# Patient Record
Sex: FEMALE | Race: WHITE | Employment: UNEMPLOYED | URBAN - METROPOLITAN AREA
[De-identification: names, ages, dates, MRNs, and addresses within clinical notes are randomized per-mention and may not be internally consistent; named-entity substitution may affect disease eponyms.]

---

## 2017-08-03 ENCOUNTER — HOSPITAL ENCOUNTER (INPATIENT)
Age: 42
LOS: 4 days | Discharge: NURSING, REHAB OR LONG-TERM CARE | DRG: 065 | End: 2017-08-08
Attending: EMERGENCY MEDICINE | Admitting: INTERNAL MEDICINE
Payer: COMMERCIAL

## 2017-08-03 ENCOUNTER — APPOINTMENT (OUTPATIENT)
Dept: GENERAL RADIOLOGY | Age: 42
DRG: 065 | End: 2017-08-03
Attending: EMERGENCY MEDICINE
Payer: COMMERCIAL

## 2017-08-03 ENCOUNTER — APPOINTMENT (OUTPATIENT)
Dept: CT IMAGING | Age: 42
DRG: 065 | End: 2017-08-03
Attending: EMERGENCY MEDICINE
Payer: COMMERCIAL

## 2017-08-03 DIAGNOSIS — I63.81 LEFT SIDED LACUNAR STROKE (HCC): ICD-10-CM

## 2017-08-03 DIAGNOSIS — F41.1 GENERALIZED ANXIETY DISORDER: ICD-10-CM

## 2017-08-03 DIAGNOSIS — I34.1 MITRAL VALVE PROLAPSE: ICD-10-CM

## 2017-08-03 DIAGNOSIS — D50.9 IRON DEFICIENCY ANEMIA, UNSPECIFIED IRON DEFICIENCY ANEMIA TYPE: ICD-10-CM

## 2017-08-03 DIAGNOSIS — R73.03 PREDIABETES: ICD-10-CM

## 2017-08-03 DIAGNOSIS — G45.9 TRANSIENT CEREBRAL ISCHEMIA, UNSPECIFIED TYPE: Primary | ICD-10-CM

## 2017-08-03 DIAGNOSIS — R07.9 CHEST PAIN, UNSPECIFIED TYPE: ICD-10-CM

## 2017-08-03 DIAGNOSIS — I10 ESSENTIAL HYPERTENSION: ICD-10-CM

## 2017-08-03 DIAGNOSIS — R53.1 WEAKNESS OF RIGHT SIDE OF BODY: ICD-10-CM

## 2017-08-03 DIAGNOSIS — G47.00 INSOMNIA, UNSPECIFIED: ICD-10-CM

## 2017-08-03 DIAGNOSIS — E66.9 OBESITY (BMI 30-39.9): ICD-10-CM

## 2017-08-03 LAB
ALBUMIN SERPL BCP-MCNC: 3.4 G/DL (ref 3.5–5)
ALBUMIN/GLOB SERPL: 0.8 {RATIO} (ref 1.2–3.5)
ALP SERPL-CCNC: 115 U/L (ref 50–136)
ALT SERPL-CCNC: 29 U/L (ref 12–65)
ANION GAP BLD CALC-SCNC: 10 MMOL/L (ref 7–16)
AST SERPL W P-5'-P-CCNC: 20 U/L (ref 15–37)
ATRIAL RATE: 86 BPM
BASOPHILS # BLD AUTO: 0 K/UL (ref 0–0.2)
BASOPHILS # BLD: 1 % (ref 0–2)
BILIRUB SERPL-MCNC: 0.2 MG/DL (ref 0.2–1.1)
BUN SERPL-MCNC: 12 MG/DL (ref 6–23)
CALCIUM SERPL-MCNC: 9 MG/DL (ref 8.3–10.4)
CALCULATED P AXIS, ECG09: 55 DEGREES
CALCULATED R AXIS, ECG10: 74 DEGREES
CALCULATED T AXIS, ECG11: 52 DEGREES
CHLORIDE SERPL-SCNC: 105 MMOL/L (ref 98–107)
CO2 SERPL-SCNC: 26 MMOL/L (ref 21–32)
CREAT SERPL-MCNC: 0.72 MG/DL (ref 0.6–1)
DIAGNOSIS, 93000: NORMAL
DIFFERENTIAL METHOD BLD: ABNORMAL
EOSINOPHIL # BLD: 0.2 K/UL (ref 0–0.8)
EOSINOPHIL NFR BLD: 2 % (ref 0.5–7.8)
ERYTHROCYTE [DISTWIDTH] IN BLOOD BY AUTOMATED COUNT: 16.4 % (ref 11.9–14.6)
GLOBULIN SER CALC-MCNC: 4.3 G/DL (ref 2.3–3.5)
GLUCOSE SERPL-MCNC: 108 MG/DL (ref 65–100)
HCT VFR BLD AUTO: 34.1 % (ref 35.8–46.3)
HGB BLD-MCNC: 10.4 G/DL (ref 11.7–15.4)
IMM GRANULOCYTES # BLD: 0 K/UL (ref 0–0.5)
IMM GRANULOCYTES NFR BLD AUTO: 0.2 % (ref 0–5)
LYMPHOCYTES # BLD AUTO: 34 % (ref 13–44)
LYMPHOCYTES # BLD: 2.2 K/UL (ref 0.5–4.6)
MCH RBC QN AUTO: 20.9 PG (ref 26.1–32.9)
MCHC RBC AUTO-ENTMCNC: 30.5 G/DL (ref 31.4–35)
MCV RBC AUTO: 68.5 FL (ref 79.6–97.8)
MONOCYTES # BLD: 0.7 K/UL (ref 0.1–1.3)
MONOCYTES NFR BLD AUTO: 11 % (ref 4–12)
NEUTS SEG # BLD: 3.5 K/UL (ref 1.7–8.2)
NEUTS SEG NFR BLD AUTO: 52 % (ref 43–78)
P-R INTERVAL, ECG05: 122 MS
PLATELET # BLD AUTO: 454 K/UL (ref 150–450)
PMV BLD AUTO: 9.6 FL (ref 10.8–14.1)
POTASSIUM SERPL-SCNC: 3.9 MMOL/L (ref 3.5–5.1)
PROT SERPL-MCNC: 7.7 G/DL (ref 6.3–8.2)
Q-T INTERVAL, ECG07: 378 MS
QRS DURATION, ECG06: 84 MS
QTC CALCULATION (BEZET), ECG08: 452 MS
RBC # BLD AUTO: 4.98 M/UL (ref 4.05–5.25)
SODIUM SERPL-SCNC: 141 MMOL/L (ref 136–145)
TROPONIN I SERPL-MCNC: <0.02 NG/ML (ref 0.02–0.05)
VENTRICULAR RATE, ECG03: 86 BPM
WBC # BLD AUTO: 6.6 K/UL (ref 4.3–11.1)

## 2017-08-03 PROCEDURE — 80053 COMPREHEN METABOLIC PANEL: CPT | Performed by: EMERGENCY MEDICINE

## 2017-08-03 PROCEDURE — 81003 URINALYSIS AUTO W/O SCOPE: CPT | Performed by: EMERGENCY MEDICINE

## 2017-08-03 PROCEDURE — 74011250636 HC RX REV CODE- 250/636: Performed by: INTERNAL MEDICINE

## 2017-08-03 PROCEDURE — 85025 COMPLETE CBC W/AUTO DIFF WBC: CPT | Performed by: EMERGENCY MEDICINE

## 2017-08-03 PROCEDURE — 99218 HC RM OBSERVATION: CPT

## 2017-08-03 PROCEDURE — 93005 ELECTROCARDIOGRAM TRACING: CPT | Performed by: EMERGENCY MEDICINE

## 2017-08-03 PROCEDURE — 84484 ASSAY OF TROPONIN QUANT: CPT | Performed by: EMERGENCY MEDICINE

## 2017-08-03 PROCEDURE — 70450 CT HEAD/BRAIN W/O DYE: CPT

## 2017-08-03 PROCEDURE — 71020 XR CHEST PA LAT: CPT

## 2017-08-03 PROCEDURE — 99285 EMERGENCY DEPT VISIT HI MDM: CPT | Performed by: EMERGENCY MEDICINE

## 2017-08-03 RX ORDER — SODIUM CHLORIDE 0.9 % (FLUSH) 0.9 %
5-10 SYRINGE (ML) INJECTION EVERY 8 HOURS
Status: DISCONTINUED | OUTPATIENT
Start: 2017-08-03 | End: 2017-08-08 | Stop reason: HOSPADM

## 2017-08-03 RX ORDER — SODIUM CHLORIDE 0.9 % (FLUSH) 0.9 %
5-10 SYRINGE (ML) INJECTION AS NEEDED
Status: DISCONTINUED | OUTPATIENT
Start: 2017-08-03 | End: 2017-08-08 | Stop reason: HOSPADM

## 2017-08-03 RX ORDER — ENOXAPARIN SODIUM 100 MG/ML
40 INJECTION SUBCUTANEOUS EVERY 24 HOURS
Status: DISCONTINUED | OUTPATIENT
Start: 2017-08-03 | End: 2017-08-08 | Stop reason: HOSPADM

## 2017-08-03 RX ORDER — ESCITALOPRAM OXALATE 10 MG/1
20 TABLET ORAL DAILY
Status: DISCONTINUED | OUTPATIENT
Start: 2017-08-04 | End: 2017-08-08 | Stop reason: HOSPADM

## 2017-08-03 RX ORDER — GUAIFENESIN 100 MG/5ML
81 LIQUID (ML) ORAL DAILY
Status: DISCONTINUED | OUTPATIENT
Start: 2017-08-04 | End: 2017-08-08

## 2017-08-03 RX ADMIN — ENOXAPARIN SODIUM 40 MG: 40 INJECTION SUBCUTANEOUS at 22:26

## 2017-08-03 RX ADMIN — Medication 10 ML: at 22:27

## 2017-08-03 NOTE — LETTER
NOTIFICATION RETURN TO WORK 
 
8/7/2017 8:35 AM 
 
Ms. Pipo Summers [address] [facility] [id number] To Whom It May Concern: 
 
Pipo Stauffer is currently under the care of [facility]. She has been hospitalized from 8/2 - present and will be requiring further acute care. If there are questions or concerns please have the patient contact our office. Sincerely, Tank Glover MD

## 2017-08-03 NOTE — ED TRIAGE NOTES
Pt states she was having sharp chest pain last night at work and it went away then it came back today while at work. Pt states the pain made her short of breath. Pt describes the pain as sharp. Pt states she was feeling weak and off balance and her right arm was heavy.

## 2017-08-03 NOTE — ED PROVIDER NOTES
HPI Comments: Patient is a 53-year-old female with a history of hypertension who states yesterday at work she started having sharp pains in her left upper chest.  They then occurred again today at work. She also had an episode this afternoon at 2:30 PM at work where she was dizzy and had near syncope. She states her right leg was weak and her right arm and hand were also weak. She had trouble holding things with the right hand. Those symptoms lasted about one hour and gradually resolved. She denies any head trauma she denies any complete loss of consciousness. Patient is a 38 y.o. female presenting with chest pain. The history is provided by the patient. Chest Pain (Angina)    This is a new problem. The current episode started yesterday. The problem has been rapidly improving. The problem occurs rarely. The pain is present in the left side. The pain is moderate. The quality of the pain is described as sharp. The pain does not radiate. Associated symptoms include weakness. Pertinent negatives include no abdominal pain, no back pain, no cough, no diaphoresis, no hemoptysis, no nausea, no numbness, no palpitations, no shortness of breath, no sputum production and no vomiting. Her past medical history is significant for HTN. Past Medical History:   Diagnosis Date    Generalized anxiety disorder     GERD (gastroesophageal reflux disease)     Hypertension     Insomnia, unspecified     Iron deficiency anemia 8/23/2013    Mitral valve prolapse 1993    Obesity (BMI 30-39. 9)     Prediabetes 7/28/2015 12/2/14 HgbA1C 5.9       Past Surgical History:   Procedure Laterality Date    HX ENDOSCOPY  10/1/04    Normal per Dr. Gerri Graham         Family History:   Problem Relation Age of Onset    Cancer Mother      cervical    Hypertension Mother     Cancer Sister      cervical    Hypertension Brother 32    Diabetes Maternal Grandmother     Hypertension Other      strong FH Social History     Social History    Marital status:      Spouse name: N/A    Number of children: 2    Years of education: N/A     Occupational History    Not on file. Social History Main Topics    Smoking status: Never Smoker    Smokeless tobacco: Never Used    Alcohol use No    Drug use: No    Sexual activity: Yes     Partners: Male     Other Topics Concern    Not on file     Social History Narrative    . Her  is disabled. Mother of 2 children. Her son is 23 and may be Bipolar. 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 ALLERGIES: Erythromycin    Review of Systems   Constitutional: Negative for diaphoresis. HENT: Negative. Eyes: Negative. Respiratory: Negative for cough, hemoptysis, sputum production and shortness of breath. Cardiovascular: Positive for chest pain. Negative for palpitations. Gastrointestinal: Negative for abdominal pain, nausea and vomiting. Endocrine: Negative. Genitourinary: Negative. Musculoskeletal: Negative for back pain and neck pain. Neurological: Positive for weakness. Negative for numbness. Vitals:    08/03/17 1722   BP: (!) 134/100   Pulse: (!) 101   Resp: 20   Temp: 97.8 °F (36.6 °C)   SpO2: 100%   Weight: 77.1 kg (170 lb)   Height: 4' 11\" (1.499 m)            Physical Exam   Constitutional: She is oriented to person, place, and time. She appears well-developed and well-nourished. overweight   HENT:   Head: Normocephalic and atraumatic. Eyes: EOM are normal. Pupils are equal, round, and reactive to light. Neck: Normal range of motion. Neck supple. Cardiovascular: Normal rate and regular rhythm. Pulmonary/Chest: Effort normal and breath sounds normal.   Abdominal: Soft. There is no tenderness. Neurological: She is alert and oriented to person, place, and time. She has normal strength. No cranial nerve deficit or sensory deficit. GCS eye subscore is 4. GCS verbal subscore is 5. GCS motor subscore is 6.    Skin: Skin is warm and dry. No rash noted. Psychiatric: Her mood appears anxious. Nursing note and vitals reviewed. MDM  Number of Diagnoses or Management Options  Diagnosis management comments: 58-year-old female presents with episodes of sharp chest pain since yesterday and episode of right-sided weakness and numbness  Earlier today. Differential diagnosis includes myocardial infarction, angina, arrhythmia, stroke, TIA    Right arm and leg weakness was 4 hours ago and has resolved so she is not a TPA candidate    EKG shows normal sinus rhythm with no acute ischemic changes       Amount and/or Complexity of Data Reviewed  Clinical lab tests: ordered and reviewed  Tests in the radiology section of CPT®: ordered and reviewed  Review and summarize past medical records: yes  Independent visualization of images, tracings, or specimens: yes    Risk of Complications, Morbidity, and/or Mortality  Presenting problems: moderate  Diagnostic procedures: moderate  Management options: moderate      ED Course   7:22 PM  CT scan of the head is negative per the radiologist.  I will discuss the case with the hospitalist to arrange for admission for further workup of stroke versus TIA. Voice dictation software was used during the making of this note. This software is not perfect and grammatical and other typographical errors may be present. This note has been proofread, but may still contain errors.   Ramon Coronado MD; 8/3/2017 @7:27 PM   ===================================================================        Procedures

## 2017-08-04 ENCOUNTER — APPOINTMENT (OUTPATIENT)
Dept: ULTRASOUND IMAGING | Age: 42
DRG: 065 | End: 2017-08-04
Attending: INTERNAL MEDICINE
Payer: COMMERCIAL

## 2017-08-04 ENCOUNTER — APPOINTMENT (OUTPATIENT)
Dept: MRI IMAGING | Age: 42
DRG: 065 | End: 2017-08-04
Attending: INTERNAL MEDICINE
Payer: COMMERCIAL

## 2017-08-04 PROBLEM — I63.9 STROKE (HCC): Status: ACTIVE | Noted: 2017-08-04

## 2017-08-04 LAB
CHOLEST SERPL-MCNC: 171 MG/DL
EST. AVERAGE GLUCOSE BLD GHB EST-MCNC: 111 MG/DL
HBA1C MFR BLD: 5.5 % (ref 4.8–6)
HDLC SERPL-MCNC: 46 MG/DL (ref 40–60)
HDLC SERPL: 3.7 {RATIO}
LDLC SERPL CALC-MCNC: 90.2 MG/DL
LIPID PROFILE,FLP: ABNORMAL
TRIGL SERPL-MCNC: 174 MG/DL (ref 35–150)
VLDLC SERPL CALC-MCNC: 34.8 MG/DL (ref 6–23)

## 2017-08-04 PROCEDURE — 93880 EXTRACRANIAL BILAT STUDY: CPT

## 2017-08-04 PROCEDURE — 99222 1ST HOSP IP/OBS MODERATE 55: CPT | Performed by: PHYSICAL MEDICINE & REHABILITATION

## 2017-08-04 PROCEDURE — 70551 MRI BRAIN STEM W/O DYE: CPT

## 2017-08-04 PROCEDURE — 99218 HC RM OBSERVATION: CPT

## 2017-08-04 PROCEDURE — 65270000029 HC RM PRIVATE

## 2017-08-04 PROCEDURE — 74011250637 HC RX REV CODE- 250/637: Performed by: INTERNAL MEDICINE

## 2017-08-04 PROCEDURE — 97166 OT EVAL MOD COMPLEX 45 MIN: CPT

## 2017-08-04 PROCEDURE — 65660000000 HC RM CCU STEPDOWN

## 2017-08-04 PROCEDURE — 74011250636 HC RX REV CODE- 250/636: Performed by: INTERNAL MEDICINE

## 2017-08-04 PROCEDURE — 97162 PT EVAL MOD COMPLEX 30 MIN: CPT

## 2017-08-04 PROCEDURE — C8929 TTE W OR WO FOL WCON,DOPPLER: HCPCS

## 2017-08-04 PROCEDURE — 80061 LIPID PANEL: CPT | Performed by: INTERNAL MEDICINE

## 2017-08-04 PROCEDURE — 83036 HEMOGLOBIN GLYCOSYLATED A1C: CPT | Performed by: INTERNAL MEDICINE

## 2017-08-04 PROCEDURE — 92610 EVALUATE SWALLOWING FUNCTION: CPT

## 2017-08-04 PROCEDURE — 36415 COLL VENOUS BLD VENIPUNCTURE: CPT | Performed by: INTERNAL MEDICINE

## 2017-08-04 PROCEDURE — 74011000250 HC RX REV CODE- 250: Performed by: INTERNAL MEDICINE

## 2017-08-04 PROCEDURE — 94660 CPAP INITIATION&MGMT: CPT

## 2017-08-04 PROCEDURE — G8996 SWALLOW CURRENT STATUS: HCPCS

## 2017-08-04 PROCEDURE — G8997 SWALLOW GOAL STATUS: HCPCS

## 2017-08-04 RX ORDER — ATORVASTATIN CALCIUM 40 MG/1
40 TABLET, FILM COATED ORAL DAILY
Status: DISCONTINUED | OUTPATIENT
Start: 2017-08-04 | End: 2017-08-08 | Stop reason: HOSPADM

## 2017-08-04 RX ORDER — HYDRALAZINE HYDROCHLORIDE 10 MG/1
10 TABLET, FILM COATED ORAL
Status: COMPLETED | OUTPATIENT
Start: 2017-08-04 | End: 2017-08-04

## 2017-08-04 RX ORDER — LORAZEPAM 2 MG/ML
1 INJECTION INTRAMUSCULAR
Status: DISCONTINUED | OUTPATIENT
Start: 2017-08-04 | End: 2017-08-08 | Stop reason: HOSPADM

## 2017-08-04 RX ADMIN — LORAZEPAM 1 MG: 2 INJECTION INTRAMUSCULAR; INTRAVENOUS at 11:07

## 2017-08-04 RX ADMIN — HYDRALAZINE HYDROCHLORIDE 10 MG: 10 TABLET, FILM COATED ORAL at 06:11

## 2017-08-04 RX ADMIN — ENOXAPARIN SODIUM 40 MG: 40 INJECTION SUBCUTANEOUS at 21:41

## 2017-08-04 RX ADMIN — Medication 10 ML: at 13:49

## 2017-08-04 RX ADMIN — PERFLUTREN 1 ML: 6.52 INJECTION, SUSPENSION INTRAVENOUS at 11:00

## 2017-08-04 RX ADMIN — Medication 10 ML: at 06:13

## 2017-08-04 NOTE — PROGRESS NOTES
was still on campus and received a request to see a  from this patient. Patient shared that she was frightened about her health concerns. Reassurance, support and prayer were provided for her and her family members. She appeared to be more calm by the visit.       [employer]

## 2017-08-04 NOTE — CONSULTS
PM&R Rehab Consult    Subjective:     Date of Consultation:  August 4, 2017    Referring Physician: Dr. Yahaira Martino    Patient is a 38 y.o. female who is being seen for Right sided weakness of acute onset. CVA w/u underway    Principal Problem:    Weakness of right side of body (8/3/2017)    Active Problems:    Essential hypertension (10/5/2016)      Obesity (BMI 30-39.9) ()    HPI: Ms. eVronica Rabago is a previously functionally independent, obese right handed 37yo WF with a htn of htn, LUCHO, MVP, suspected RADHA and anxiety who presented to the ED 8/3 after experiencing an acute onset of right sided weakness, slurred speech and chest pain while working in the [employer] at [facility]. She has never had symptoms like this before, but does note that she \" has chest pain all the time but sometimes barely noticeable. \" She was told she had mitral valve prolapse at the age of 21 after \"years of misdiagnosis. \" Her BP was elevated in the ED. She admits to being under a lot of stress bc her boss is out of town and her work duties have been overwhelming. She states compliance with her BP med (Avalide). She reports a long hx of snoring and an outpt sleep study was recommended but she never had done. She has chronic unspecified insomnia for which she takes Ambien. She was admitted under observation status pending stroke w/u. Her ECHO, Carotid duplex and MRI of the brain are pending. Pt is very tearful and says that she has been taking care of everyone but herself. Upon transfer to the 7th floor , she requested the use of oxygen, but did cpap and tolerated well. EKG nl. Lipid profile shows triglycerides of 174, chol of 171, hdl 46 and LdL of 90.2. She has been started on aspirin and lipitor. PT/OT/ST pending.     Past Medical History:   Diagnosis Date    Generalized anxiety disorder     GERD (gastroesophageal reflux disease)     Hypertension     Insomnia, unspecified     Iron deficiency anemia 8/23/2013    Mitral valve prolapse 1993    Obesity (BMI 30-39. 9)     Prediabetes 7/28/2015 12/2/14 HgbA1C 5.9      Family History   Problem Relation Age of Onset    Cancer Mother      cervical    Hypertension Mother     Cancer Sister      cervical    Hypertension Brother 32    Diabetes Maternal Grandmother     Hypertension Other      strong FH      Social History   Substance Use Topics    Smoking status: Never Smoker    Smokeless tobacco: Never Used    Alcohol use No   . Her  is disabled. Mother of 2 children. Her son is 23 and may be Bipolar. 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 Past Surgical History:   Procedure Laterality Date    HX ENDOSCOPY  10/1/04    Normal per Dr. Kris Chapman      Prior to Admission medications    Medication Sig Start Date End Date Taking? Authorizing Provider   HYDROcodone-homatropine (HYCODAN) 5-1.5 mg/5 mL syrup Take 5 mL by mouth four (4) times daily as needed for Cough. Max Daily Amount: 20 mL. 1/26/17   Linda Warren MD   zolpidem (AMBIEN) 10 mg tablet Take 1 Tab by mouth nightly as needed for Sleep. 10/5/16   Linda Warren MD   irbesartan-hydrochlorothiazide (AVALIDE) 150-12.5 mg per tablet Take 1 Tab by mouth daily. 10/5/16   Linda Warren MD   escitalopram oxalate (LEXAPRO) 20 mg tablet Take 1 Tab by mouth daily. 10/5/16   Linda Warren MD     Allergies   Allergen Reactions    Erythromycin Nausea and Vomiting        Review of Systems:    Constitutional: Negative for diaphoresis. No recent illness  HENT: Negative. Eyes: Negative. denies blurred vision or diplopia  Respiratory: Negative for cough, hemoptysis, sputum production and shortness of breath.  + snoring/?RADHA  Cardiovascular: Positive for non radiating chest pain and \"constant awareness of what heart is doing\". Negative for palpitations. Gastrointestinal: Negative for abdominal pain, nausea and vomiting. Endocrine: Negative. Genitourinary: Negative. Musculoskeletal: Negative for back pain and neck pain. Neurological: Positive for weakness, difficulty speaking and swallowing. Negative for numbness. Objective:     Vitals:  Blood pressure 156/79, pulse 86, temperature 97.6 °F (36.4 °C), resp. rate 16, height 4' 11\" (1.499 m), weight 170 lb (77.1 kg), last menstrual period 2017, SpO2 92 %. Temp (24hrs), Av.2 °F (36.8 °C), Min:97.6 °F (36.4 °C), Max:98.6 °F (37 °C)      Intake and Output:   1901 -  0700  In: -   Out: 60 [Urine:60]    Physical Exam:  General:  Alert, oriented and mood affect appropriate for situation but is tearful and anxious at times   Lungs:   Clear to auscultation bilaterally. Heart:  Regular rate and rhythm, S1, S2 stable, no murmur, click, rub or gallop. Abdomen:   Soft, non-tender. Bowel sounds present. No masses,  No organomegaly. obese   Genitourinary: deferred   Neuro Muscular: On observation, examiner would not think she could actively move her RUE; but with MMT; Biceps 4+, triceps 4+, WE 4+ ,  4, intrinsics 4-, deltoid 4  RLE hip flexion 4-/3+, KE 4, DF 3, PF 4, EHL 3  Some give away weakness noted  Plantar reflex up on right , down on left  L sided strength 5/5  No hypertonicity  Mild dysarthria, right lower facial weakness  Sensation and temp wnl   Skin:  No rashes, lesions, or signs/symptoms or infection.          Labs/Studies:Recent Results (from the past 72 hour(s))   EKG, 12 LEAD, INITIAL    Collection Time: 17  5:23 PM   Result Value Ref Range    Ventricular Rate 86 BPM    Atrial Rate 86 BPM    P-R Interval 122 ms    QRS Duration 84 ms    Q-T Interval 378 ms    QTC Calculation (Bezet) 452 ms    Calculated P Axis 55 degrees    Calculated R Axis 74 degrees    Calculated T Axis 52 degrees    Diagnosis       Normal sinus rhythm  Normal ECG  No previous ECGs available  Confirmed by ST SHARIFA LUND MD (), KUN LI ([id number]) on 8/3/2017 9:06:36 PM     TROPONIN I    Collection Time: 08/03/17  5:30 PM   Result Value Ref Range    Troponin-I, Qt. <0.02 (L) 0.02 - 0.05 NG/ML   CBC WITH AUTOMATED DIFF    Collection Time: 08/03/17  5:30 PM   Result Value Ref Range    WBC 6.6 4.3 - 11.1 K/uL    RBC 4.98 4.05 - 5.25 M/uL    HGB 10.4 (L) 11.7 - 15.4 g/dL    HCT 34.1 (L) 35.8 - 46.3 %    MCV 68.5 (L) 79.6 - 97.8 FL    MCH 20.9 (L) 26.1 - 32.9 PG    MCHC 30.5 (L) 31.4 - 35.0 g/dL    RDW 16.4 (H) 11.9 - 14.6 %    PLATELET 392 (H) 571 - 450 K/uL    MPV 9.6 (L) 10.8 - 14.1 FL    DF AUTOMATED      NEUTROPHILS 52 43 - 78 %    LYMPHOCYTES 34 13 - 44 %    MONOCYTES 11 4.0 - 12.0 %    EOSINOPHILS 2 0.5 - 7.8 %    BASOPHILS 1 0.0 - 2.0 %    IMMATURE GRANULOCYTES 0.2 0.0 - 5.0 %    ABS. NEUTROPHILS 3.5 1.7 - 8.2 K/UL    ABS. LYMPHOCYTES 2.2 0.5 - 4.6 K/UL    ABS. MONOCYTES 0.7 0.1 - 1.3 K/UL    ABS. EOSINOPHILS 0.2 0.0 - 0.8 K/UL    ABS. BASOPHILS 0.0 0.0 - 0.2 K/UL    ABS. IMM. GRANS. 0.0 0.0 - 0.5 K/UL   METABOLIC PANEL, COMPREHENSIVE    Collection Time: 08/03/17  5:30 PM   Result Value Ref Range    Sodium 141 136 - 145 mmol/L    Potassium 3.9 3.5 - 5.1 mmol/L    Chloride 105 98 - 107 mmol/L    CO2 26 21 - 32 mmol/L    Anion gap 10 7 - 16 mmol/L    Glucose 108 (H) 65 - 100 mg/dL    BUN 12 6 - 23 MG/DL    Creatinine 0.72 0.6 - 1.0 MG/DL    GFR est AA >60 >60 ml/min/1.73m2    GFR est non-AA >60 >60 ml/min/1.73m2    Calcium 9.0 8.3 - 10.4 MG/DL    Bilirubin, total 0.2 0.2 - 1.1 MG/DL    ALT (SGPT) 29 12 - 65 U/L    AST (SGOT) 20 15 - 37 U/L    Alk.  phosphatase 115 50 - 136 U/L    Protein, total 7.7 6.3 - 8.2 g/dL    Albumin 3.4 (L) 3.5 - 5.0 g/dL    Globulin 4.3 (H) 2.3 - 3.5 g/dL    A-G Ratio 0.8 (L) 1.2 - 3.5     LIPID PANEL    Collection Time: 08/04/17  4:46 AM   Result Value Ref Range    LIPID PROFILE          Cholesterol, total 171 <200 MG/DL    Triglyceride 174 (H) 35 - 150 MG/DL    HDL Cholesterol 46 40 - 60 MG/DL    LDL, calculated 90.2 <100 MG/DL    VLDL, calculated 34.8 (H) 6.0 - 23.0 MG/DL    CHOL/HDL Ratio 3.7     HEMOGLOBIN A1C WITH EAG    Collection Time: 08/04/17  4:46 AM Result Value Ref Range    Hemoglobin A1c 5.5 4.8 - 6.0 %    Est. average glucose 111 mg/dL         Speech Assessment:    Dysphagia Screening  Vocal Quality/Secretions: Normal  History of Dysphagia: No  O2 Saturation: Normal  Alertness: Normal  Pre-Swallow Assessment Score: 0  Purees: No difficulty noted  Water by Cup: No difficulty noted  Water by Straw: (!) Coughing/throat clearing                Ambulation:  Activity and Safety  Activity Level: Up with Assistance  Activity: In bed, Sleeping, Family/Visitors present  Activity Assistance: Partial (one person)  Repositioned: Head of bed elevated (degrees)  Patient Turned: Turns self  Head of Bed Elevated: HOB 30  Activity Response: Fairly tolerated  Assistive Device: Fall prevention device  Safety Measures: Bed/Chair-Wheels locked, Bed/Chair alarm on, Bed in low position, Call light within reach, Family at bedside, Gripper socks, Side rails X2     Impression/Plan:     Principal Problem:    Weakness of right side of body (8/3/2017)    Active Problems:    Essential hypertension (10/5/2016)      Obesity (BMI 30-39.9) ()    Possible Left hemispheric stroke    Recommendations: Continue Acute Rehab Program  Coordination of rehab/medical care  Counseling of PM & R care issues management  Monitoring and management of medical conditions per plan of care/orders   -await completion of stroke w/u; await PT/OT/ST evals. Rehab potential is excellent. Exam inconsistent and pt is actually quite strong when I isolated out muscle groups. [facility] appropriateness will depend on documentation of actual stroke and functional needs. Will f/u Monday. Will need [employer] precert for inpt rehab.  -will be interested in seeing ECHO results given report of constant cp and cardiac \"awareness\" . ?  PFO  -needs outpt sleep study for suspected RADHA  -I do think that regardless of what MRI shows, pts anxiety and current life stressors play a role in presentation  Discussion with Family/Caregiver/Staff  Reviewed Therapies/Labs/Meds/Records  Thank you  Signed By:  Iker Pike MD     August 4, 2017

## 2017-08-04 NOTE — PROGRESS NOTES
Problem: Mobility Impaired (Adult and Pediatric)  Goal: *Acute Goals and Plan of Care (Insert Text)  STG:  (1.)Ms. Fran Romero will move from supine to sit and sit to supine , scoot up and down and roll side to side in bed with SUPERVISION within 3 day(s). (2.)Ms. Fran Romero will transfer from bed to chair and chair to bed with CONTACT GUARD ASSIST using the least restrictive device within 3 day(s). (3.)Ms. Fran Romero will ambulate with MINIMAL ASSIST for 50+ feet with the least restrictive device within 3 day(s). (4.)Ms. Fran Romero will participate in therapeutic activity/exerices x 12 minutes for increased strength within 3 days. (5.)Ms. Fran Romero will ascend and descend 3 stairs using B hand rail(s) with MINIMAL ASSIST to improve functional mobility and safety within 3 day(s). LTG:  (1.)Ms. Fran Romero will move from supine to sit and sit to supine , scoot up and down and roll side to side in bed with MODIFIED INDEPENDENCE within 7 day(s). (2.)Ms. Fran Romero will transfer from bed to chair and chair to bed with SUPERVISION using the least restrictive device within 7 day(s). (3.)Ms. Boykin will ambulate with STAND BY ASSIST for 75 feet with the least restrictive device within 7 day(s). (4.)Ms. Fran Romero will participate in therapeutic activity/exerices x 23 minutes for increased strength within 7 days. (5.)Ms. Fran Romero will ascend and descend 3 stairs using B hand rail(s) with CONTACT GUARD ASSIST to improve functional mobility and safety within 7 day(s).     ________________________________________________________________________________________________      PHYSICAL THERAPY: INITIAL ASSESSMENT, AM 8/4/2017  OBSERVATION: Hospital Day: 2  Payor: Susi Neves / Plan: Adia Gaviria / Product Type: PPO /      NAME/AGE/GENDER: Willi Malave is a 38 y.o. female     PRIMARY DIAGNOSIS: Weakness of right side of body Weakness of right side of body Weakness of right side of body        ICD-10: Treatment Diagnosis: · Generalized Muscle Weakness (M62.81)  · Difficulty in walking, Not elsewhere classified (R26.2)  · Other abnormalities of gait and mobility (R26.89)   Precaution/Allergies:  Erythromycin       ASSESSMENT:      Ms. Barak Verdugo is a 38 y.o. female admitted to the hospital for the above and undergoing a CVA work-up. She reports that she lives in a trailer with her  and son that has several steps to enter. Pt also mentioned that PTA she was independent with ADLs and ambulation with no recent falls. Ms. Barak Verdugo had several bouts of tearfulness throughout evaluation and slight confusion with some complex tasks. Pt presents to PT with generalized weakness and AROM deficits in R LE (grossly 3- to 3/5). Pt also reports decreased sensation to light touch in R L3-S2 dermatomes. Pt also noted to have decreased R LE coordination but normal tone. Pt demonstrates prop sitting on edge of bed and required min assist for STS transfers. Ms. Barak Verdugo also required min-mod assist for standing balance which was poor. She has increased lean to L and required min assist to take steps in place with difficulty in R LE weight bearing. Ms. Barak Verdugo appears to be functioning below her baseline and could benefit from skilled PT. This section established at most recent assessment   PROBLEM LIST (Impairments causing functional limitations):  1. Decreased Strength  2. Decreased ADL/Functional Activities  3. Decreased Transfer Abilities  4. Decreased Ambulation Ability/Technique  5. Decreased Balance    INTERVENTIONS PLANNED: (Benefits and precautions of physical therapy have been discussed with the patient.)  1. Balance Exercise  2. Bed Mobility  3. Family Education  4. Gait Training  5. Neuromuscular Re-education/Strengthening  6. Therapeutic Activites  7. Therapeutic Exercise/Strengthening  8. Transfer Training  9.  Group Therapy      TREATMENT PLAN: Frequency/Duration: 3 times a week for duration of hospital stay  Rehabilitation Potential For Stated Goals: GOOD      RECOMMENDED REHABILITATION/EQUIPMENT: (at time of discharge pending progress): Continue Skilled Therapy. HISTORY:   History of Present Injury/Illness (Reason for Referral):  Per H&P:   HPI:   Patient is a 38 yof with a hx of obesity, htn who presents with acute onset rt sided upper and lower extremity weakness associated with some slurred speech. Denies associated Ha, blurred va, cp, nausea, vomiting, sob. Ct head in ER neg for acute abnorms. Pt not currently on any antiplatelets. Denies prior similar symptoms     Past Medical History/Comorbidities:   Ms. Tracy Jansen  has a past medical history of Generalized anxiety disorder; GERD (gastroesophageal reflux disease); Hypertension; Insomnia, unspecified; Iron deficiency anemia (8/23/2013); Mitral valve prolapse (1993); Obesity (BMI 30-39.9); and Prediabetes (7/28/2015). Ms. Tracy Jansen  has a past surgical history that includes tubal ligation (1995) and endoscopy (10/1/04). Social History/Living Environment:   Home Environment: Trailer/mobile home  # Steps to Enter: 4  Rails to Enter: Yes  Hand Rails : Bilateral  One/Two Story Residence: One story  Living Alone: No  Support Systems: Spouse/Significant Other/Partner  Patient Expects to be Discharged to[de-identified] Unknown  Current DME Used/Available at Home: None  Tub or Shower Type: Tub/Shower combination  Prior Level of Function/Work/Activity:  Pt lives in [facility] with  and son that has steps to enter. Independent with ADLs/ambulation PTA. Number of Personal Factors/Comorbidities that affect the Plan of Care:   Anxiety    1-2: MODERATE COMPLEXITY   EXAMINATION:   Most Recent Physical Functioning:   Gross Assessment:  AROM: Generally decreased, functional (R knee ext )  PROM: Within functional limits  Strength: Generally decreased, functional (R LE (grossly 3- to 3/5))  Coordination: Generally decreased, functional (R LE )  Tone: Normal  Sensation: Impaired (R LE)               Posture:     Balance:  Sitting: Impaired  Sitting - Static: Prop sitting  Sitting - Dynamic: Prop sitting  Standing: Impaired  Standing - Static: Poor  Standing - Dynamic : Poor Bed Mobility:  Sit to Supine: Stand-by asssistance  Scooting: Stand-by asssistance  Wheelchair Mobility:     Transfers:  Sit to Stand: Minimum assistance  Stand to Sit: Minimum assistance  Gait:             Body Structures Involved:  1. Nerves  2. Muscles Body Functions Affected:  1. Sensory/Pain  2. Neuromusculoskeletal  3. Movement Related Activities and Participation Affected:  1. General Tasks and Demands  2. Mobility  3. Self Care  4. Domestic Life  5. Community, Social and [employer]   Number of elements that affect the Plan of Care: 4+: HIGH COMPLEXITY   CLINICAL PRESENTATION:   Presentation: Evolving clinical presentation with changing clinical characteristics: MODERATE COMPLEXITY   CLINICAL DECISION MAKIN [facility] Inpatient Short Form  How much difficulty does the patient currently have. .. Unable A Lot A Little None   1. Turning over in bed (including adjusting bedclothes, sheets and blankets)? [ ] 1   [ ] 2   [X] 3   [ ] 4   2. Sitting down on and standing up from a chair with arms ( e.g., wheelchair, bedside commode, etc.)   [ ] 1   [ ] 2   [X] 3   [ ] 4   3. Moving from lying on back to sitting on the side of the bed? [ ] 1   [ ] 2   [X] 3   [ ] 4   How much help from another person does the patient currently need. .. Total A Lot A Little None   4. Moving to and from a bed to a chair (including a wheelchair)? [ ] 1   [X] 2   [ ] 3   [ ] 4   5. Need to walk in hospital room? [ ] 1   [X] 2   [ ] 3   [ ] 4   6. Climbing 3-5 steps with a railing? [ ] 1   [X] 2   [ ] 3   [ ] 4   © , Trustees of [address] [place] [address] [address] [id number], under license to [employer].  All rights reserved    Score:  Initial: 15 Most Recent: X (Date: -- )     Interpretation of Tool: Represents activities that are increasingly more difficult (i.e. Bed mobility, Transfers, Gait). Score 24 23 22-20 19-15 14-10 9-7 6       Modifier CH CI CJ CK CL CM CN         · Mobility - Walking and Moving Around:               - CURRENT STATUS:    CK - 40%-59% impaired, limited or restricted               - GOAL STATUS:           CJ - 20%-39% impaired, limited or restricted               - D/C STATUS:                       ---------------To be determined---------------  Payor: [employer] / Plan: Guido Nelson / Product Type: PPO /       Medical Necessity:     · Patient demonstrates good rehab potential due to higher previous functional level. Reason for Services/Other Comments:  · Patient continues to require skilled intervention due to decreased balance, ambulation, and functional mobility. Use of outcome tool(s) and clinical judgement create a POC that gives a: Questionable prediction of patient's progress: MODERATE COMPLEXITY                 TREATMENT:   (In addition to Assessment/Re-Assessment sessions the following treatments were rendered)   Pre-treatment Symptoms/Complaints:  No complaints  Pain: Initial:   Pain Intensity 1: 0  Post Session:  0      Assessment/Reassessment only, no treatment provided today     Braces/Orthotics/Lines/Etc:   · O2 Device: CPAP nasal  Treatment/Session Assessment:    · Response to Treatment:  Pt tolerated fairly given increased tearfulness and poor standing balance. · Interdisciplinary Collaboration:  · Physical Therapist  · Occupational Therapist  · Registered Nurse  · After treatment position/precautions:  · Supine in bed  · Bed/Chair-wheels locked  · Bed in low position  · Call light within reach  · Family at bedside  · Compliance with Program/Exercises: Will assess as treatment progresses. · Recommendations/Intent for next treatment session:   \"Next visit will focus on advancements to more challenging activities and reduction in assistance provided\".   Total Treatment Duration:  PT Patient Time In/Time Out  Time In: 0925  Time Out: 14500 Derek Carreno,[address] Larry PT, DPT

## 2017-08-04 NOTE — PROGRESS NOTES
Skin assessment performed with Tremaine Monterroso RN. Small scar on belly button. Scar on right lower abdomen. Skin otherwise intact.

## 2017-08-04 NOTE — PROGRESS NOTES
SPEECH PATHOLOGY NOTE:    Speech therapy consult received and appreciated. Attempted to see patient this AM for bedside swallow evaluation. Patient is currently off the floor for ultrasound. Will re-attempt at later time this date as patient becomes available and as schedule allows.      NOEMÍ Matthew, CCC-SLP, CBIS

## 2017-08-04 NOTE — PROGRESS NOTES
Problem: Interdisciplinary Rounds  Goal: Interdisciplinary Rounds  Outcome: Progressing Towards Goal  Interdisciplinary team rounds were held 8/4/2017 with the following team members:{INTERDISCIPLINARY TEAM:[id number]} and the {Persons; family members:[id number]}. Plan of care discussed. See clinical pathway and/or care plan for interventions and desired outcomes.

## 2017-08-04 NOTE — PROGRESS NOTES
LTG: Patient will tolerate least restrictive diet without overt signs or symptoms of airway compromise. STG: Patient will tolerate mechanical soft and thin without overt signs or symptoms of airway compromise. STG: Patient will participate in modified barium swallow study as clinically indicated. STG: Patient will participate in speech-language/cognitive evaluation to further assess function abilities. Speech language pathology: bedside swallow note: Initial Assessment    NAME/AGE/GENDER: Flora Medina is a 38 y.o. female  DATE: 8/4/2017  PRIMARY DIAGNOSIS: Weakness of right side of body       ICD-10: Treatment Diagnosis: R13.11 Oral Dysphagia  INTERDISCIPLINARY COLLABORATION: Registered Nurse  PRECAUTIONS/ALLERGIES: Erythromycin ASSESSMENT:Based on the objective data described below, Ms. Maya Rodriguez presents with mild oropharyngeal dysphagia. Per RN, patient coughing with thin liquids via straw last evening. Patient states that mastication felt effortful this AM, but denies coughing or choking. Patient restless throughout assessment and unable to identify comfortable position. Strained vocal quality and low volume, phonemic paraphasias and decreased word finding noted during interview. Patient was demonstrates mild right facial weakness characterized by decreased labial retraction and right lingual deviation. Patient also endorsing mild sensory changes on right side. She was presented with thin liquid via cup and straw, puree, mixed consistency, and solids. Increased mastication with mixed and solid consistency. Patient enforcing \"heaviness\" with mastication, which reduced rate. Swallow initiation was timely with adequate laryngeal elevation/excursion. Adequate oral clearance with no oral residue. No overt signs or symptoms of airway compromise noted with any consistency this date. Recommend  Mechanical soft diet with thin liquids. Medications as tolerated. Speech therapy to follow for diet tolerance. Patient will benefit from oral motor exercises to address facial weakness. Speech-language/cognitive evaluation is also warranted. Patient will benefit from skilled intervention to address the below impairments. ?????? ? ? This section established at most recent assessment??????????  PROBLEM LIST (Impairments causing functional limitations):  1. Oral dysphagia  2. Dysarthria  3. Word finding difficulties  REHABILITATION POTENTIAL FOR STATED GOALS: Good  PLAN OF CARE:   Patient will benefit from skilled intervention to address the following impairments. RECOMMENDATIONS AND PLANNED INTERVENTIONS (Benefits and precautions of therapy have been discussed with the patient.):  · PO:  Mechanical soft  · Liquids:  regular thin  MEDICATIONS:  · With liquid  COMPENSATORY STRATEGIES/MODIFICATIONS INCLUDING:  · Alternate liquids/solids  · Small sips and bites  OTHER RECOMMENDATIONS (including follow up treatment recommendations):   · Oral motor exercises  · Family training/education  · Patient education  · speech-language/cognitive evaluation. RECOMMENDED DIET MODIFICATIONS DISCUSSED WITH:  · Nursing  · Family  · Patient  FREQUENCY/DURATION: Continue to follow patient 3 times a week for duration of hospital stay to address above goals. RECOMMENDED REHABILITATION/EQUIPMENT: (at time of discharge pending progress):   Continue Skilled Therapy and Rehab. SUBJECTIVE:   Alert, cooperative. Family at bedside. History of Present Injury/Illness: Ms. Rosalba Springer  has a past medical history of Generalized anxiety disorder; GERD (gastroesophageal reflux disease); Hypertension; Insomnia, unspecified; Iron deficiency anemia (8/23/2013); Mitral valve prolapse (1993); Obesity (BMI 30-39.9); and Prediabetes (7/28/2015). .  She also  has a past surgical history that includes tubal ligation (1995) and endoscopy (10/1/04).    Present Symptoms: \"heaviness\" with mastication   Pain Intensity 1: 0  Pain Location 1: Chest  Current Medications:   No current facility-administered medications on file prior to encounter. Current Outpatient Prescriptions on File Prior to Encounter   Medication Sig Dispense Refill    HYDROcodone-homatropine (HYCODAN) 5-1.5 mg/5 mL syrup Take 5 mL by mouth four (4) times daily as needed for Cough. Max Daily Amount: 20 mL. 180 mL 0    zolpidem (AMBIEN) 10 mg tablet Take 1 Tab by mouth nightly as needed for Sleep. 30 Tab 5    irbesartan-hydrochlorothiazide (AVALIDE) 150-12.5 mg per tablet Take 1 Tab by mouth daily. 30 Tab 5    escitalopram oxalate (LEXAPRO) 20 mg tablet Take 1 Tab by mouth daily. 30 Tab 5     Current Dietary Status:  Regular/thin      Social History/Home Situation:    Home Environment: Trailer/mobile home  # Steps to Enter: 4  Rails to Enter: Yes  Hand Rails : Bilateral  One/Two Story Residence: One story  Living Alone: No  Support Systems: Spouse/Significant Other/Partner  Patient Expects to be Discharged to[de-identified] Unknown  Current DME Used/Available at Home: None  Tub or Shower Type: Tub/Shower combination  OBJECTIVE:   Respiratory Status:        CXR Results:No acute cardiopulmonary process evident by plain film imaging. MRI/CT Results:Motion limited study without acute intracranial process evident by  noncontrast CT study of the head. MRI results pending  Oral Motor Structure/Speech:  Oral-Motor Structure/Motor Speech  Labial: Right droop  Dentition: Intact, Natural  Oral Hygiene: Adequate  Lingual: Right deviation    Cognitive and Communication Status:  Neurologic State: Alert; Restless  Orientation Level: Oriented X4  Cognition: Follows commands  Perception: Cues to maintain midline in sitting  Perseveration: No perseveration noted  Safety/Judgement: Fall prevention    BEDSIDE SWALLOW EVALUATION  Oral Assessment:  Oral Assessment  Labial: Right droop  Dentition: Intact; Natural  Oral Hygiene: Adequate  Lingual: Right deviation  P.O.  Trials:  Patient Position: Upright in bed    The patient was given tsp-small bites amounts of the following:   Consistency Presented: Solid;Puree; Thin liquid;Mixed consistency  How Presented: Self-fed/presented;Cup/sip;Straw;Spoon    ORAL PHASE:  Bolus Acceptance: No impairment  Bolus Formation/Control: Impaired  Propulsion: Delayed (# of seconds)  Type of Impairment: Delayed;Mastication  Oral Residue: None    PHARYNGEAL PHASE:  Initiation of Swallow: No impairment  Laryngeal Elevation: Functional  Aspiration Signs/Symptoms: None  Vocal Quality: Low volume;Strain     Effective Modifications: Small sips and bites     Pharyngeal Phase Characteristics: No impairment, issues, or problems     OTHER OBSERVATIONS:  Rate/bite size: Impaired   Endurance:  Impaired   Comments: Tool Used: Dysphagia Outcome and Severity Scale (ARMOND)    Score Comments   Normal Diet  [] 7 With no strategies or extra time needed   Functional Swallow  [] 6 May have mild oral or pharyngeal delay       Mild Dysphagia    [x] 5 Which may require one diet consistency restricted (those who demonstrate penetration which is entirely cleared on MBS would be included)   Mild-Moderate Dysphagia  [] 4 With 1-2 diet consistencies restricted       Moderate Dysphagia  [] 3 With 2 or more diet consistencies restricted       Moderately Severe Dysphagia  [] 2 With partial PO strategies (trials with ST only)       Severe Dysphagia  [] 1 With inability to tolerate any PO safely          Score:  Initial: 5 Most Recent: X (Date: -- )   Interpretation of Tool: The Dysphagia Outcome and Severity Scale (ARMOND) is a simple, easy-to-use, 7-point scale developed to systematically rate the functional severity of dysphagia based on objective assessment and make recommendations for diet level, independence level, and type of nutrition. Score 7 6 5 4 3 2 1   Modifier CH CI CJ CK CL CM CN   ?  Swallowing:     - CURRENT STATUS: CJ - 20%-39% impaired, limited or restricted    - GOAL STATUS:  CI - 1%-19% impaired, limited or restricted    - D/C STATUS:  ---------------To be determined---------------  Payor: [employer] / Plan: Hawk Magdaleno / Product Type: PPO /     TREATMENT:    (In addition to Assessment/Re-Assessment sessions the following treatments were rendered)  Assessment/Reassessment only, no treatment provided today  MODALITIES:                                                                    ORAL MOTOR  EXERCISES:                                                                                                                                                                      LARYNGEAL / PHARYNGEAL EXERCISES:                                                                                                                                     __________________________________________________________________________________________________  Safety:   After treatment position/precautions:  · RN notified  · Family at bedside  · Upright in Bed. Progression/Medical Necessity:   · Patient is expected to demonstrate progress in diet tolerance and swallow safety to improve swallow safety. Compliance with Program/Exercises: Will assess as treatment progresses. Reason for Continuation of Services/Other Comments:  · Patient continues to require skilled intervention due to Dysphagia. Recommendations/Intent for next treatment session: \"Treatment next visit will focus on Diet tolerance and speech-language/cognitive assessment\".     Total Treatment Duration:  Time In: 1249  Time Out: 5500 Nacho Landry, ONEMÍ, CCC-SLP, CBIS

## 2017-08-04 NOTE — PROGRESS NOTES
Pt complained of SOB and difficulty swallowing. NC 2L O2 given. STAND test performed. Speech consult placed.

## 2017-08-04 NOTE — H&P
HOSPITALIST H&P/CONSULT  NAME:  Tonio Alcazar   Age:  38 y.o.  :   1975   MRN:   [id number]  PCP: Helen Stevens MD  Consulting MD:  Treatment Team: Attending Provider: Franca Lamas MD; Primary Nurse: Arabella West RN  HPI:   Patient is a 38 yof with a hx of obesity, htn who presents with acute onset rt sided upper and lower extremity weakness associated with some slurred speech. Denies associated Ha, blurred va, cp, nausea, vomiting, sob. Ct head in ER neg for acute abnorms. Pt not currently on any antiplatelets. Denies prior similar symptoms    Complete ROS done and is as stated in HPI or otherwise negative  Past Medical History:   Diagnosis Date    Generalized anxiety disorder     GERD (gastroesophageal reflux disease)     Hypertension     Insomnia, unspecified     Iron deficiency anemia 2013    Mitral valve prolapse     Obesity (BMI 30-39. 9)     Prediabetes 2015 HgbA1C 5.9      Past Surgical History:   Procedure Laterality Date    HX ENDOSCOPY  10/1/04    Normal per Dr. Roxy Hastings      Prior to Admission Medications   Prescriptions Last Dose Informant Patient Reported? Taking? HYDROcodone-homatropine (HYCODAN) 5-1.5 mg/5 mL syrup   No No   Sig: Take 5 mL by mouth four (4) times daily as needed for Cough. Max Daily Amount: 20 mL.   escitalopram oxalate (LEXAPRO) 20 mg tablet   No No   Sig: Take 1 Tab by mouth daily. irbesartan-hydrochlorothiazide (AVALIDE) 150-12.5 mg per tablet   No No   Sig: Take 1 Tab by mouth daily. zolpidem (AMBIEN) 10 mg tablet   No No   Sig: Take 1 Tab by mouth nightly as needed for Sleep.       Facility-Administered Medications: None     Allergies   Allergen Reactions    Erythromycin Nausea and Vomiting      Social History   Substance Use Topics    Smoking status: Never Smoker    Smokeless tobacco: Never Used    Alcohol use No      Family History   Problem Relation Age of Onset    Cancer Mother cervical    Hypertension Mother     Cancer Sister      cervical    Hypertension Brother 32    Diabetes Maternal Grandmother     Hypertension Other      strong FH      Objective:     Visit Vitals    BP (!) 134/100 (BP 1 Location: Right arm, BP Patient Position: At rest)    Pulse (!) 101    Temp 97.8 °F (36.6 °C)    Resp 20    Ht 4' 11\" (1.499 m)    Wt 77.1 kg (170 lb)    LMP 2017    SpO2 100%    BMI 34.34 kg/m2      Temp (24hrs), Av.8 °F (36.6 °C), Min:97.8 °F (36.6 °C), Max:97.8 °F (36.6 °C)    Oxygen Therapy  O2 Sat (%): 100 % (17)  Pulse via Oximetry: 101 beats per minute (17)  O2 Device: Room air (17)  Physical Exam:  General:    Alert, cooperative, no distress, appears stated age. Head:   Normocephalic, without obvious abnormality, atraumatic. Nose:  Nares normal. No drainage or sinus tenderness. Lungs:   Clear to auscultation bilaterally. No Wheezing or Rhonchi. No rales. Heart:   Regular rate and rhythm,  2/6 keyla  Abdomen:   Soft, non-tender. Not distended. Bowel sounds normal.   Extremities: No cyanosis. No edema. No clubbing  Skin:     Texture, turgor normal. No rashes or lesions.   Not Jaundiced  Neurologic: Alert and oriented x 3, rt sided hemiparesis with mild slurred speech  Data Review:   Recent Results (from the past 24 hour(s))   EKG, 12 LEAD, INITIAL    Collection Time: 17  5:23 PM   Result Value Ref Range    Ventricular Rate 86 BPM    Atrial Rate 86 BPM    P-R Interval 122 ms    QRS Duration 84 ms    Q-T Interval 378 ms    QTC Calculation (Bezet) 452 ms    Calculated P Axis 55 degrees    Calculated R Axis 74 degrees    Calculated T Axis 52 degrees    Diagnosis       Normal sinus rhythm  Normal ECG  No previous ECGs available     TROPONIN I    Collection Time: 17  5:30 PM   Result Value Ref Range    Troponin-I, Qt. <0.02 (L) 0.02 - 0.05 NG/ML   CBC WITH AUTOMATED DIFF    Collection Time: 17  5:30 PM   Result Value Ref Range    WBC 6.6 4.3 - 11.1 K/uL    RBC 4.98 4.05 - 5.25 M/uL    HGB 10.4 (L) 11.7 - 15.4 g/dL    HCT 34.1 (L) 35.8 - 46.3 %    MCV 68.5 (L) 79.6 - 97.8 FL    MCH 20.9 (L) 26.1 - 32.9 PG    MCHC 30.5 (L) 31.4 - 35.0 g/dL    RDW 16.4 (H) 11.9 - 14.6 %    PLATELET 874 (H) 115 - 450 K/uL    MPV 9.6 (L) 10.8 - 14.1 FL    DF AUTOMATED      NEUTROPHILS 52 43 - 78 %    LYMPHOCYTES 34 13 - 44 %    MONOCYTES 11 4.0 - 12.0 %    EOSINOPHILS 2 0.5 - 7.8 %    BASOPHILS 1 0.0 - 2.0 %    IMMATURE GRANULOCYTES 0.2 0.0 - 5.0 %    ABS. NEUTROPHILS 3.5 1.7 - 8.2 K/UL    ABS. LYMPHOCYTES 2.2 0.5 - 4.6 K/UL    ABS. MONOCYTES 0.7 0.1 - 1.3 K/UL    ABS. EOSINOPHILS 0.2 0.0 - 0.8 K/UL    ABS. BASOPHILS 0.0 0.0 - 0.2 K/UL    ABS. IMM. GRANS. 0.0 0.0 - 0.5 K/UL   METABOLIC PANEL, COMPREHENSIVE    Collection Time: 08/03/17  5:30 PM   Result Value Ref Range    Sodium 141 136 - 145 mmol/L    Potassium 3.9 3.5 - 5.1 mmol/L    Chloride 105 98 - 107 mmol/L    CO2 26 21 - 32 mmol/L    Anion gap 10 7 - 16 mmol/L    Glucose 108 (H) 65 - 100 mg/dL    BUN 12 6 - 23 MG/DL    Creatinine 0.72 0.6 - 1.0 MG/DL    GFR est AA >60 >60 ml/min/1.73m2    GFR est non-AA >60 >60 ml/min/1.73m2    Calcium 9.0 8.3 - 10.4 MG/DL    Bilirubin, total 0.2 0.2 - 1.1 MG/DL    ALT (SGPT) 29 12 - 65 U/L    AST (SGOT) 20 15 - 37 U/L    Alk. phosphatase 115 50 - 136 U/L    Protein, total 7.7 6.3 - 8.2 g/dL    Albumin 3.4 (L) 3.5 - 5.0 g/dL    Globulin 4.3 (H) 2.3 - 3.5 g/dL    A-G Ratio 0.8 (L) 1.2 - 3.5       Imaging /Procedures /Studies     Assessment and Plan: Active Hospital Problems    Diagnosis Date Noted    Weakness of right side of body 08/03/2017    Essential hypertension 10/05/2016    Obesity (BMI 30-39. 9)        PLAN  ·  admit to obs  · Check mri brain, echo, carotid duplex  · Asa 81 mg po daily  · Check a1c, lipid panel  · Pt/ot/speech eval    Code Status: full    Anticipated discharge: 2days    Signed By: Kenyatta Max MD     August 3, 2017

## 2017-08-04 NOTE — PROGRESS NOTES
Care Management Interventions  PCP Verified by CM: Yes (Dr. Richard Castro)  Last Visit to PCP: 01/26/17  Mode of Transport at Discharge: Other (see comment)  Transition of Care Consult (CM Consult): Discharge Planning  Discharge Durable Medical Equipment: No  Physical Therapy Consult: Yes  Occupational Therapy Consult: Yes  Speech Therapy Consult: Yes  Current Support Network: Own Home, Lives with Spouse (pt works FT at [employer]; spouse is disabled; 24 yo son lives with them)  Confirm Follow Up Transport: Family  Plan discussed with Pt/Family/Caregiver: Yes  Freedom of Choice Offered: Yes  Discharge Location  Discharge Placement: Rehab hospital/unit acute    Pt is a 39 yo female admitted due to right sided weakness. Imaging revealed an acute lacunar infarct. Pt would benefit from rehab at discharge. Physiatrist was consulted and has evaluated the pt. No final decision reached whether or not pt is accepted for admission. Physiatrist plans to follow the pt and reassess on Monday. Pt's insurance will require precert. SW following to assist as needed.

## 2017-08-04 NOTE — ROUTINE PROCESS
TRANSFER - IN REPORT:    Verbal report received from [place], [facility] on [email or web address]  being received from ED for routine progression of care      Report consisted of patients Situation, Background, Assessment and   Recommendations(SBAR). Information from the following report(s) SBAR, ED Summary, [facility] and Cardiac Rhythm . was reviewed with the receiving nurse. Opportunity for questions and clarification was provided. Assessment completed upon patients arrival to unit and care assumed.

## 2017-08-04 NOTE — DISCHARGE INSTRUCTIONS

## 2017-08-04 NOTE — ED NOTES
TRANSFER - OUT REPORT:    Verbal report given to [facility] on [employer]  being transferred to [address] [place] [address] for routine progression of care       Report consisted of patients Situation, Background, Assessment and   Recommendations(SBAR). Information from the following report(s) SBAR, ED Summary, Procedure Summary, Intake/Output, MAR, Recent Results and Cardiac Rhythm sr was reviewed with the receiving nurse. Lines:   Peripheral IV 08/03/17 Left Antecubital (Active)   Site Assessment Clean, dry, & intact 8/3/2017  8:11 PM   Phlebitis Assessment 0 8/3/2017  8:11 PM   Infiltration Assessment 0 8/3/2017  8:11 PM   Dressing Status Clean, dry, & intact 8/3/2017  8:11 PM        Opportunity for questions and clarification was provided.       Patient transported with:   [employer]

## 2017-08-04 NOTE — PROGRESS NOTES
Problem: Self Care Deficits Care Plan (Adult)  Goal: *Acute Goals and Plan of Care (Insert Text)  1. Patient will feed self entire meal with modified independence and adaptive utensils as needed. 2. Patient will complete full body dressing and bathing with minimal assistance and adaptive equipment as needed. 3. Patient will participate in BUE therapeutic exercises to increase strength in RUE to at least 3/5 for participation in ADLs and functional transfers. 4. Patient will participate in [address] [place] [address] therapeutic activities to increase coordination in RUE to [facility] for bimanual fine motor ADLs. 5. Patient will attend to R side for 100% of treatment session with no verbal cues from therapist.   6. Patient will complete functional transfers with SBA and adaptive equipment as needed. Timeframe: 7 visits     Comments:       OCCUPATIONAL THERAPY: Initial Assessment and AM 8/4/2017  OBSERVATION: Hospital Day: 2  Payor: Maritza Ped / Plan: Jaden Raymundo / Product Type: PPO /      NAME/AGE/GENDER: Sharlene Irizarry is a 38 y.o. female     PRIMARY DIAGNOSIS:  Weakness of right side of body Weakness of right side of body Weakness of right side of body        ICD-10: Treatment Diagnosis:        · Generalized Muscle Weakness (M62.81)  · Other lack of cordination (R27.8)  · Hemiplegia and hemiparesis following cerebral infarction affecting right dominant side (I69.351)  · Facial weakness (R29.810)   Precautions/Allergies:         Erythromycin       ASSESSMENT:      Ms. Kimberlyn Rene presents to hospital for above. MRI suggests, \"Acute left corona radiata lacunar infarction. \" Pt lives with her  and son in a [facility], there are 4 EMMA and bilateral hand rails. Pt is typically independent with ADLs/IADLs, including driving and working in the 2015 [employer]. Today, pt presents with R sided weakness. She is supine in bed upon contact, AOx4 and agreeable to OT evaluation.  She completes bed mobility (supine to sit) with minimal assistance for R extremities. She utilizes prop sitting at EOB to maintain sitting balance. Per BUE screen, LUE is WFL; RUE is IMP with decreased AROM/coordination/sensation/strength. In addition, tone is present with quick stretch to biceps and triceps. Her strength appears to improve distally (i.e.  strength > shoulder). Pt was left seated at EOB with PT present to complete STS. Ms. Suzanne Sánchez is functioning well below her baseline and requires skilled OT services aimed at maximizing safety and independence with ADLs. Will follow during acute care stay. Would recommend IRC should pt convert to IPT status. This section established at most recent assessment   PROBLEM LIST (Impairments causing functional limitations):  1. Decreased Strength  2. Decreased ADL/Functional Activities  3. Decreased Transfer Abilities  4. Decreased Ambulation Ability/Technique  5. Decreased Balance  6. Increased Pain  7. Decreased Activity Tolerance  8. Decreased Work Simplification/Energy Conservation Techniques  9. Increased Fatigue  10. Decreased Knowledge of Precautions    INTERVENTIONS PLANNED: (Benefits and precautions of occupational therapy have been discussed with the patient.)  1. Activities of daily living training  2. Adaptive equipment training  3. Balance training  4. Clothing management  5. Donning&doffing training  6. Group therapy  7. Cristiano tech training  8. Manual therapy training  9. Neuromuscular re-eduation  10. Sensory reintegration training  11. Therapeutic activity  12. Therapeutic exercise      TREATMENT PLAN: Frequency/Duration: Follow patient 3x/week to address above goals. Rehabilitation Potential For Stated Goals: Good      RECOMMENDED REHABILITATION/EQUIPMENT: (at time of discharge pending progress): Continue Skilled Therapy.                       OCCUPATIONAL PROFILE AND HISTORY:   History of Present Injury/Illness (Reason for Referral):  See H&P  Past Medical History/Comorbidities:   Ms. Suzanne Sánchez  has a past medical history of Generalized anxiety disorder; GERD (gastroesophageal reflux disease); Hypertension; Insomnia, unspecified; Iron deficiency anemia (8/23/2013); Mitral valve prolapse (1993); Obesity (BMI 30-39.9); and Prediabetes (7/28/2015). Ms. Hilary Pérez  has a past surgical history that includes tubal ligation (1995) and endoscopy (10/1/04). Social History/Living Environment:   Home Environment: Trailer/mobile home  # Steps to Enter: 4  Rails to Enter: Yes  Hand Rails : Bilateral  One/Two Story Residence: One story  Living Alone: No  Support Systems: Spouse/Significant Other/Partner  Patient Expects to be Discharged to[de-identified] Private residence  Current DME Used/Available at Home: None  Tub or Shower Type: Tub/Shower combination  Prior Level of Function/Work/Activity:  Independent at baseline with ADLs/IADLs  Personal Factors:          Sex:  female        Age:  38 y.o. Social Background:  Strong family support    Number of Personal Factors/Comorbidities that affect the Plan of Care: Expanded review of therapy/medical records (1-2):  MODERATE COMPLEXITY   ASSESSMENT OF OCCUPATIONAL PERFORMANCE[de-identified]   Activities of Daily Living:           Basic ADLs (From Assessment) Complex ADLs (From Assessment)   Basic ADL  Feeding: Setup  Oral Facial Hygiene/Grooming: Setup  Bathing: Moderate assistance  Upper Body Dressing: Minimum assistance  Lower Body Dressing: Maximum assistance  Toileting: Moderate assistance Instrumental ADL  Meal Preparation: Maximum assistance  Homemaking:  Total assistance  Medication Management: Independent  Financial Management: Independent   Grooming/Bathing/Dressing Activities of Daily Living     Cognitive Retraining  Safety/Judgement: Fall prevention                       Bed/Mat Mobility  Supine to Sit: Minimum assistance  Sit to Supine: Stand-by asssistance  Sit to Stand: Minimum assistance  Scooting: Stand-by asssistance          Most Recent Physical Functioning:   Gross Assessment:  AROM: Grossly decreased, non-functional  Strength: Grossly decreased, non-functional  Coordination: Generally decreased, functional (functional in hand; non functional with gross motor tasks)  Tone: Abnormal  Sensation: Impaired               Posture:     Balance:  Sitting: Impaired  Sitting - Static: Prop sitting  Sitting - Dynamic: Prop sitting  Standing: Impaired  Standing - Static: Poor  Standing - Dynamic : Poor Bed Mobility:  Supine to Sit: Minimum assistance  Sit to Supine: Stand-by asssistance  Scooting: Stand-by asssistance  Wheelchair Mobility:     Transfers:  Sit to Stand: Minimum assistance  Stand to Sit: Minimum assistance                   No data found. Mental Status  Neurologic State: Alert, Restless  Orientation Level: Oriented X4  Cognition: Follows commands  Perception: Cues to maintain midline in sitting  Perseveration: No perseveration noted  Safety/Judgement: Fall prevention                               Physical Skills Involved:  1. Range of Motion  2. Balance  3. Strength  4. Activity Tolerance  5. Sensation  6. Fine Motor Control  7. Gross Motor Control Cognitive Skills Affected (resulting in the inability to perform in a timely and safe manner): 1. none Psychosocial Skills Affected:  1. Habits/Routines  2. Environmental Adaptation  3. Emotional Regulation   Number of elements that affect the Plan of Care: 5+:  HIGH COMPLEXITY   CLINICAL DECISION MAKIN [facility] [address] [id number] AM-PAC 6 Clicks   Daily Activity Inpatient Short Form  How much help from another person does the patient currently need. .. Total A Lot A Little None   1. Putting on and taking off regular lower body clothing?   [ ] 1   [x ] 2   [ ] 3   [ ] 4   2. Bathing (including washing, rinsing, drying)? [ ] 1   [x ] 2   [ ] 3   [ ] 4   3. Toileting, which includes using toilet, bedpan or urinal?   [ ] 1   [x ] 2   [ ] 3   [ ] 4   4.   Putting on and taking off regular upper body clothing?   [ ] 1   [ ] 2   [x ] 3   [ ] 4 5. Taking care of personal grooming such as brushing teeth? [ ] 1   [ ] 2   [x ] 3   [ ] 4   6. Eating meals? [ ] 1   [ ] 2   [x ] 3   [ ] 4   © 2007, Trustees of [address] [place] [address] [address] [id number], under license to [employer]. All rights reserved    Score:  Initial: 15 Most Recent: X (Date: -- )     Interpretation of Tool:  Represents activities that are increasingly more difficult (i.e. Bed mobility, Transfers, Gait). Score 24 23 22-20 19-15 14-10 9-7 6       Modifier CH CI CJ CK CL CM CN        ? Self Care:     - CURRENT STATUS: CK - 40%-59% impaired, limited or restricted    - GOAL STATUS: CJ - 20%-39% impaired, limited or restricted    - D/C STATUS:  ---------------To be determined---------------  Payor: [employer] / Plan: King Springer / Product Type: PPO /       Medical Necessity:     · Patient is expected to demonstrate progress in strength, range of motion, balance, coordination and functional technique to increase independence with ADLs. Reason for Services/Other Comments:  · Patient continues to require skilled intervention due to medical complications. Use of outcome tool(s) and clinical judgement create a POC that gives a: MODERATE COMPLEXITY             TREATMENT:   (In addition to Assessment/Re-Assessment sessions the following treatments were rendered)      Pre-treatment Symptoms/Complaints:    Pain: Initial:   Pain Intensity 1: 0  Post Session:  same      Assessment/Reassessment only, no treatment provided today     Braces/Orthotics/Lines/Etc:   · O2 Device: Room Air  Treatment/Session Assessment:    · Response to Treatment: eval only. · Interdisciplinary Collaboration:  · Physical Therapist  · Occupational Therapist  · Registered Nurse  · Physician  · After treatment position/precautions:  · EOB with PT  · Compliance with Program/Exercises: Will assess as treatment progresses. · Recommendations/Intent for next treatment session:   \"Next visit will focus on advancements to more challenging activities and reduction in assistance provided\".   Total Treatment Duration:  OT Patient Time In/Time Out  Time In: 8299  Time Out: 700 [facility]

## 2017-08-05 PROBLEM — I63.81 LEFT SIDED LACUNAR STROKE (HCC): Status: ACTIVE | Noted: 2017-08-04

## 2017-08-05 PROCEDURE — 86580 TB INTRADERMAL TEST: CPT | Performed by: INTERNAL MEDICINE

## 2017-08-05 PROCEDURE — 74011000302 HC RX REV CODE- 302: Performed by: INTERNAL MEDICINE

## 2017-08-05 PROCEDURE — 65270000029 HC RM PRIVATE

## 2017-08-05 PROCEDURE — 74011250636 HC RX REV CODE- 250/636: Performed by: INTERNAL MEDICINE

## 2017-08-05 PROCEDURE — 74011250637 HC RX REV CODE- 250/637: Performed by: INTERNAL MEDICINE

## 2017-08-05 PROCEDURE — 94660 CPAP INITIATION&MGMT: CPT

## 2017-08-05 RX ORDER — ACETAMINOPHEN 325 MG/1
650 TABLET ORAL
Status: DISCONTINUED | OUTPATIENT
Start: 2017-08-05 | End: 2017-08-08 | Stop reason: HOSPADM

## 2017-08-05 RX ORDER — ONDANSETRON 2 MG/ML
4 INJECTION INTRAMUSCULAR; INTRAVENOUS
Status: DISCONTINUED | OUTPATIENT
Start: 2017-08-05 | End: 2017-08-08 | Stop reason: HOSPADM

## 2017-08-05 RX ADMIN — ASPIRIN 81 MG 81 MG: 81 TABLET ORAL at 08:32

## 2017-08-05 RX ADMIN — Medication 10 ML: at 15:23

## 2017-08-05 RX ADMIN — ACETAMINOPHEN 650 MG: 325 TABLET, FILM COATED ORAL at 17:26

## 2017-08-05 RX ADMIN — Medication 5 ML: at 21:58

## 2017-08-05 RX ADMIN — TUBERCULIN PURIFIED PROTEIN DERIVATIVE 5 UNITS: 5 INJECTION, SOLUTION INTRADERMAL at 15:20

## 2017-08-05 RX ADMIN — ACETAMINOPHEN 650 MG: 325 TABLET, FILM COATED ORAL at 11:41

## 2017-08-05 RX ADMIN — ATORVASTATIN CALCIUM 40 MG: 40 TABLET, FILM COATED ORAL at 08:32

## 2017-08-05 RX ADMIN — ENOXAPARIN SODIUM 40 MG: 40 INJECTION SUBCUTANEOUS at 21:57

## 2017-08-05 RX ADMIN — Medication 5 ML: at 06:00

## 2017-08-05 RX ADMIN — ONDANSETRON 4 MG: 2 INJECTION INTRAMUSCULAR; INTRAVENOUS at 17:22

## 2017-08-05 RX ADMIN — ESCITALOPRAM OXALATE 20 MG: 10 TABLET ORAL at 08:32

## 2017-08-05 NOTE — PROGRESS NOTES
Patient c/o nausea and one episode of vomiting. Called hospitalist and received orders for zofran. Will continue to monitor.

## 2017-08-05 NOTE — PROGRESS NOTES
Pt c/o headache. Dr. Jaiden Durán notified. New order for Tylenol 650mg q4h PRN. Will continue to monitor.

## 2017-08-05 NOTE — PROGRESS NOTES
Hospitalist Progress Note    2017  Admit Date: 8/3/2017  5:38 PM   NAME: Kristi Collins   :  1975   MRN:  [id number]   Attending: Margarita Rodriguez MD  PCP:  Wiliam Wiley MD    SUBJECTIVE:     Kristi Collins is a 34BPN with obesity, HTN who was admitted on 8/3 with R sided weakness and dysarthria. Found to have acute L lacunar infarct on MRI. Echo WNL. Carotids WNL. : mild improvement in R arm strength and dysarthria. Mother and son at bedside. Questions answered. Denies HA, SOB, CP, visual disturbance. Review of Systems negative with exception of pertinent positives noted above      PHYSICAL EXAM       Visit Vitals    /88 (BP 1 Location: Left arm, BP Patient Position: Sitting)    Pulse (!) 108    Temp 97.5 °F (36.4 °C)    Resp 20    Ht 4' 11\" (1.499 m)    Wt 77.1 kg (170 lb)    LMP 2017    SpO2 96%    Breastfeeding No    BMI 34.34 kg/m2      Temp (24hrs), Av.8 °F (36.6 °C), Min:97.5 °F (36.4 °C), Max:98 °F (36.7 °C)    Oxygen Therapy  O2 Sat (%): 96 % (17 0824)  Pulse via Oximetry: 88 beats per minute (17 0029)  O2 Device: CPAP nasal (17 002)  FIO2 (%): 21 % (17 002)  No intake or output data in the 24 hours ending 17 0855   General: No acute distress. Head:  Atraumatic Normocephalic. Lungs:  CTA Bilaterally. Nonlabored  CVS:  RRR  Abdomen: Soft, NTTP, +BS  Neurologic:  AOx3. Dysarthria, R sided hemiparesis, L side 5/5    No results found for this or any previous visit (from the past 24 hour(s)). Imaging /Procedures /Studies   MRI BRAIN WO CONT   Final Result   Impression: Acute left corona radiata lacunar infarction. DUPLEX CAROTID BILATERAL   Final Result   IMPRESSION:     SUE:  No hemodynamically significant stenosis. LICA:  No hemodynamically significant stenosis. This is essentially a normal carotid artery ultrasound examination. CT HEAD WO CONT   Final Result   IMPRESSION:     1.   Motion limited study without acute intracranial process evident by   noncontrast CT study of the head. XR CHEST PA LAT   Final Result   IMPRESSION:    1. No acute cardiopulmonary process evident by plain film imaging. ASSESSMENT      Hospital Problems as of 8/5/2017  Date Reviewed: 1/26/2017          Codes Class Noted - Resolved POA    Stroke [facility]) ICD-10-CM: I63.9  ICD-9-CM: 434.91  8/4/2017 - Present Unknown        * (Principal)Weakness of right side of body ICD-10-CM: R53.1  ICD-9-CM: 728.87  8/3/2017 - Present Yes        Essential hypertension ICD-10-CM: I10  ICD-9-CM: 401.9  10/5/2016 - Present Yes        Obesity (BMI 30-39. 9) ICD-10-CM: E66.9  ICD-9-CM: 278.00  Unknown - Present Yes                  Plan:  - acute L lacunar CVA  - continue ASA/statin   - restart home BP meds     DVT Prophylaxis: Lovenox  Dispo: PT/OT/SLP following, would benefit from 9th floor acute rehab    Essie Whitmore MD

## 2017-08-06 LAB
MM INDURATION POC: 0 MM (ref 0–5)
PPD POC: 0 NEGATIVE

## 2017-08-06 PROCEDURE — 74011250636 HC RX REV CODE- 250/636: Performed by: INTERNAL MEDICINE

## 2017-08-06 PROCEDURE — 74011250637 HC RX REV CODE- 250/637: Performed by: INTERNAL MEDICINE

## 2017-08-06 PROCEDURE — 65270000029 HC RM PRIVATE

## 2017-08-06 RX ADMIN — ATORVASTATIN CALCIUM 40 MG: 40 TABLET, FILM COATED ORAL at 08:30

## 2017-08-06 RX ADMIN — ONDANSETRON 4 MG: 2 INJECTION INTRAMUSCULAR; INTRAVENOUS at 12:14

## 2017-08-06 RX ADMIN — HYDROCHLOROTHIAZIDE: 12.5 CAPSULE ORAL at 08:30

## 2017-08-06 RX ADMIN — ESCITALOPRAM OXALATE 20 MG: 10 TABLET ORAL at 08:30

## 2017-08-06 RX ADMIN — ASPIRIN 81 MG 81 MG: 81 TABLET ORAL at 08:30

## 2017-08-06 RX ADMIN — ENOXAPARIN SODIUM 40 MG: 40 INJECTION SUBCUTANEOUS at 22:26

## 2017-08-06 RX ADMIN — Medication 10 ML: at 22:27

## 2017-08-06 RX ADMIN — Medication 10 ML: at 12:15

## 2017-08-06 RX ADMIN — Medication 5 ML: at 06:00

## 2017-08-06 NOTE — PROGRESS NOTES
Hospitalist Progress Note    2017  Admit Date: 8/3/2017  5:38 PM   NAME: Garry Vivas   :  1975   MRN:  [id number]   Attending: Osvaldo Forman MD  PCP:  Donny Dean MD    SUBJECTIVE:     Garry Vivas is a 04TBX with obesity, HTN who was admitted on 8/3 with R sided weakness and dysarthria. Found to have acute L lacunar infarct on MRI. Echo WNL. Carotids WNL. : reports no improvement in R arm strength but dysarthria appears better. Had nausea with vomiting once yesterday and again once this AM. No abd pain, HA, visual disturbance. Review of Systems negative with exception of pertinent positives noted above      PHYSICAL EXAM       Visit Vitals    /80 (BP 1 Location: Right arm, BP Patient Position: At rest)    Pulse 82    Temp 97.9 °F (36.6 °C)    Resp 18    Ht 4' 11\" (1.499 m)    Wt 77.1 kg (170 lb)    LMP 2017    SpO2 100%    Breastfeeding No    BMI 34.34 kg/m2      Temp (24hrs), Av.7 °F (36.5 °C), Min:97.1 °F (36.2 °C), Max:98.1 °F (36.7 °C)    Oxygen Therapy  O2 Sat (%): 100 % (17 0823)  Pulse via Oximetry: 97 beats per minute (17 2320)  O2 Device: CPAP nasal (17 2320)  FIO2 (%): 21 % (17 2320)  No intake or output data in the 24 hours ending 17 1148   General: No acute distress. Head:  Atraumatic Normocephalic. Lungs:  CTA Bilaterally. Nonlabored  CVS:  RRR  Abdomen: Soft, NTTP, +BS  Neurologic:  AOx3. Dysarthria improving, R sided paresis upper>lower, L side 5/5    No results found for this or any previous visit (from the past 24 hour(s)). Imaging /Procedures /Studies   MRI BRAIN WO CONT   Final Result   Impression: Acute left corona radiata lacunar infarction. DUPLEX CAROTID BILATERAL   Final Result   IMPRESSION:     SUE:  No hemodynamically significant stenosis. LICA:  No hemodynamically significant stenosis. This is essentially a normal carotid artery ultrasound examination.       CT HEAD WO CONT   Final Result   IMPRESSION:     1. Motion limited study without acute intracranial process evident by   noncontrast CT study of the head. XR CHEST PA LAT   Final Result   IMPRESSION:    1. No acute cardiopulmonary process evident by plain film imaging. ASSESSMENT      Hospital Problems as of 8/6/2017  Date Reviewed: 1/26/2017          Codes Class Noted - Resolved POA    * (Principal)Left sided lacunar stroke ([facility] [address] ICD-10-CM: I63.9  ICD-9-CM: 434.91  8/4/2017 - Present Yes        Weakness of right side of body ICD-10-CM: R53.1  ICD-9-CM: 728.87  8/3/2017 - Present Yes        Essential hypertension ICD-10-CM: I10  ICD-9-CM: 401.9  10/5/2016 - Present Yes        Obesity (BMI 30-39. 9) ICD-10-CM: E66.9  ICD-9-CM: 278.00  Unknown - Present Yes                  Plan:  - acute L lacunar CVA  - continue ASA/statin   - restarted home BP meds, adjust as needed  - zofran prn nausea     DVT Prophylaxis: Lovenox  Dispo: PT/OT/SLP following, would benefit from 9th floor acute rehab    Italia Lawrence MD

## 2017-08-07 ENCOUNTER — APPOINTMENT (OUTPATIENT)
Dept: CT IMAGING | Age: 42
DRG: 065 | End: 2017-08-07
Attending: PHYSICAL MEDICINE & REHABILITATION
Payer: COMMERCIAL

## 2017-08-07 LAB
ANION GAP BLD CALC-SCNC: 13 MMOL/L (ref 7–16)
APTT PPP: 26.4 SEC (ref 23.5–31.7)
BASOPHILS # BLD AUTO: 0 K/UL (ref 0–0.2)
BASOPHILS # BLD: 0 % (ref 0–2)
BUN SERPL-MCNC: 11 MG/DL (ref 6–23)
CALCIUM SERPL-MCNC: 9.1 MG/DL (ref 8.3–10.4)
CHLORIDE SERPL-SCNC: 100 MMOL/L (ref 98–107)
CO2 SERPL-SCNC: 26 MMOL/L (ref 21–32)
CREAT SERPL-MCNC: 0.65 MG/DL (ref 0.6–1)
DIFFERENTIAL METHOD BLD: ABNORMAL
EOSINOPHIL # BLD: 0.1 K/UL (ref 0–0.8)
EOSINOPHIL NFR BLD: 1 % (ref 0.5–7.8)
ERYTHROCYTE [DISTWIDTH] IN BLOOD BY AUTOMATED COUNT: 16.6 % (ref 11.9–14.6)
GLUCOSE SERPL-MCNC: 125 MG/DL (ref 65–100)
HCT VFR BLD AUTO: 37.8 % (ref 35.8–46.3)
HGB BLD-MCNC: 11.7 G/DL (ref 11.7–15.4)
IMM GRANULOCYTES # BLD: 0 K/UL (ref 0–0.5)
IMM GRANULOCYTES NFR BLD AUTO: 0.1 % (ref 0–5)
INR PPP: 1 (ref 0.9–1.2)
LYMPHOCYTES # BLD AUTO: 24 % (ref 13–44)
LYMPHOCYTES # BLD: 1.8 K/UL (ref 0.5–4.6)
MCH RBC QN AUTO: 21 PG (ref 26.1–32.9)
MCHC RBC AUTO-ENTMCNC: 31 G/DL (ref 31.4–35)
MCV RBC AUTO: 67.9 FL (ref 79.6–97.8)
MM INDURATION POC: NORMAL MM (ref 0–5)
MONOCYTES # BLD: 0.7 K/UL (ref 0.1–1.3)
MONOCYTES NFR BLD AUTO: 9 % (ref 4–12)
NEUTS SEG # BLD: 4.9 K/UL (ref 1.7–8.2)
NEUTS SEG NFR BLD AUTO: 66 % (ref 43–78)
PLATELET # BLD AUTO: 463 K/UL (ref 150–450)
PMV BLD AUTO: 9.9 FL (ref 10.8–14.1)
POTASSIUM SERPL-SCNC: 3.2 MMOL/L (ref 3.5–5.1)
PPD POC: NORMAL NEGATIVE
PROTHROMBIN TIME: 10.4 SEC (ref 9.6–12)
RBC # BLD AUTO: 5.57 M/UL (ref 4.05–5.25)
SODIUM SERPL-SCNC: 139 MMOL/L (ref 136–145)
WBC # BLD AUTO: 7.5 K/UL (ref 4.3–11.1)

## 2017-08-07 PROCEDURE — 86147 CARDIOLIPIN ANTIBODY EA IG: CPT | Performed by: PHYSICAL MEDICINE & REHABILITATION

## 2017-08-07 PROCEDURE — 80048 BASIC METABOLIC PNL TOTAL CA: CPT | Performed by: INTERNAL MEDICINE

## 2017-08-07 PROCEDURE — 65270000029 HC RM PRIVATE

## 2017-08-07 PROCEDURE — 99232 SBSQ HOSP IP/OBS MODERATE 35: CPT | Performed by: PHYSICAL MEDICINE & REHABILITATION

## 2017-08-07 PROCEDURE — 94660 CPAP INITIATION&MGMT: CPT

## 2017-08-07 PROCEDURE — 85302 CLOT INHIBIT PROT C ANTIGEN: CPT | Performed by: PHYSICAL MEDICINE & REHABILITATION

## 2017-08-07 PROCEDURE — 85730 THROMBOPLASTIN TIME PARTIAL: CPT | Performed by: PHYSICAL MEDICINE & REHABILITATION

## 2017-08-07 PROCEDURE — 92526 ORAL FUNCTION THERAPY: CPT

## 2017-08-07 PROCEDURE — 36415 COLL VENOUS BLD VENIPUNCTURE: CPT | Performed by: INTERNAL MEDICINE

## 2017-08-07 PROCEDURE — 70450 CT HEAD/BRAIN W/O DYE: CPT

## 2017-08-07 PROCEDURE — 74011250637 HC RX REV CODE- 250/637: Performed by: INTERNAL MEDICINE

## 2017-08-07 PROCEDURE — 85220 BLOOC CLOT FACTOR V TEST: CPT | Performed by: PHYSICAL MEDICINE & REHABILITATION

## 2017-08-07 PROCEDURE — 74011250636 HC RX REV CODE- 250/636: Performed by: INTERNAL MEDICINE

## 2017-08-07 PROCEDURE — 97164 PT RE-EVAL EST PLAN CARE: CPT

## 2017-08-07 PROCEDURE — 85610 PROTHROMBIN TIME: CPT | Performed by: PHYSICAL MEDICINE & REHABILITATION

## 2017-08-07 PROCEDURE — 85025 COMPLETE CBC W/AUTO DIFF WBC: CPT | Performed by: INTERNAL MEDICINE

## 2017-08-07 PROCEDURE — 83090 ASSAY OF HOMOCYSTEINE: CPT | Performed by: PHYSICAL MEDICINE & REHABILITATION

## 2017-08-07 PROCEDURE — 85306 CLOT INHIBIT PROT S FREE: CPT | Performed by: PHYSICAL MEDICINE & REHABILITATION

## 2017-08-07 RX ORDER — POTASSIUM CHLORIDE 20 MEQ/1
40 TABLET, EXTENDED RELEASE ORAL
Status: COMPLETED | OUTPATIENT
Start: 2017-08-07 | End: 2017-08-07

## 2017-08-07 RX ADMIN — ATORVASTATIN CALCIUM 40 MG: 40 TABLET, FILM COATED ORAL at 09:30

## 2017-08-07 RX ADMIN — Medication 10 ML: at 06:21

## 2017-08-07 RX ADMIN — ASPIRIN 81 MG 81 MG: 81 TABLET ORAL at 09:30

## 2017-08-07 RX ADMIN — Medication 10 ML: at 21:55

## 2017-08-07 RX ADMIN — POTASSIUM CHLORIDE 40 MEQ: 20 TABLET, EXTENDED RELEASE ORAL at 09:30

## 2017-08-07 RX ADMIN — Medication 5 ML: at 14:32

## 2017-08-07 RX ADMIN — ENOXAPARIN SODIUM 40 MG: 40 INJECTION SUBCUTANEOUS at 21:53

## 2017-08-07 RX ADMIN — ESCITALOPRAM OXALATE 20 MG: 10 TABLET ORAL at 09:30

## 2017-08-07 RX ADMIN — VALSARTAN: 160 TABLET, FILM COATED ORAL at 10:37

## 2017-08-07 NOTE — PROGRESS NOTES
PM&R Consult Progress Note      Patient: Deangelo Casarez  Admit Date: 8/3/2017  Admit Diagnosis: Weakness of right side of body;Stroke ([facility] [address]  Recommendations: Continue Acute Rehab Program, Coordination of rehab/medical care, Counseling of PM & R care issues management, [address] [place] [address],Third Floor for [facility] admission; Accepted for admit 7/8 pending med clearance  -pt with flaccid R hemiplegia today which is far worse than at time of consult on 8/4. Inconsistent and pts exam was odd. Nevertheless; will order HCT to f/u to r/o evolution of stroke or hemorrhagic conversion  -PT/OT f/u pending. Was min A with transfers and bed mobility. reeval needs to be done due to change in neuro status  -hypokalemia; replace and monitor  -given age, should do a hypercoaguable w/u (Protein S, Protein C, homocysteine etc...)    History/Subjective/Complaint:     Patient seen and examined. Records reviewed. \" Im much weaker today. \" had headache, n/v over weekend. Says she noticed the worsening strength yesterday. No difficulty swallowing    Pain 1  Pain Scale 1: Numeric (0 - 10) (Denies pain and discomfort this shift. ) (08/06/17 2318)  Pain Intensity 1: 0 (08/06/17 2318)  Patient Stated Pain Goal: 0 (08/06/17 1609)  Pain Reassessment 1: Yes (08/06/17 1609)  Pain Onset 1: 1400 hrs today (08/03/17 1722)  Pain Location 1: Chest (08/03/17 1722)  Pain Description 1: Stabbing (08/03/17 1722)     Objective:     Vitals:  Patient Vitals for the past 8 hrs:   BP Temp Pulse Resp SpO2   08/07/17 0910 - - - - 95 %   08/07/17 0752 150/88 97.8 °F (36.6 °C) 99 18 95 %   08/07/17 0520 142/63 98.3 °F (36.8 °C) 88 18 96 %      Intake and Output:        Allergies   Allergen Reactions    Erythromycin Nausea and Vomiting     Current Facility-Administered Medications   Medication Dose Route Frequency    valsartan/hydroCHLOROthiazide (DIOVAN HCT) 160/25 mg   Oral DAILY    acetaminophen (TYLENOL) tablet 650 mg  650 mg Oral Q4H PRN    ondansetron (ZOFRAN) injection 4 mg  4 mg IntraVENous Q4H PRN    PPD READ REMINDER  1 Each Other Q24H    atorvastatin (LIPITOR) tablet 40 mg  40 mg Oral DAILY    LORazepam (ATIVAN) injection 1 mg  1 mg IntraVENous Q8H PRN    escitalopram oxalate (LEXAPRO) tablet 20 mg  20 mg Oral DAILY    sodium chloride (NS) flush 5-10 mL  5-10 mL IntraVENous Q8H    sodium chloride (NS) flush 5-10 mL  5-10 mL IntraVENous PRN    aspirin chewable tablet 81 mg  81 mg Oral DAILY    enoxaparin (LOVENOX) injection 40 mg  40 mg SubCUTAneous Q24H       Physical Exam:  Right lower facial weakness. Flaccid RUE and RLE. Withdraws to pain however. PERRLA, EOMI  When asked to try to raise right arm; grimacing, growling thrashing left side all over.   CV rrr no m  LUNGS cta b  ABD; soft obese nt  EXT; no edema    Functional Assessment:  Gross Assessment  AROM: Generally decreased, functional (R knee ext ) (08/04/17 0925)  PROM: Within functional limits (08/04/17 0925)  Strength: Generally decreased, functional (R LE (grossly 3- to 3/5)) (08/04/17 0925)  Coordination: Generally decreased, functional (R LE ) (08/04/17 0925)  Tone: Normal (08/04/17 0925)  Sensation: Impaired (R LE) (08/04/17 0925)           Bed Mobility  Supine to Sit: Minimum assistance (08/04/17 0905)  Sit to Supine: Stand-by asssistance (08/04/17 0925)  Scooting: Stand-by asssistance (08/04/17 0925)     Balance  Sitting: Impaired (08/04/17 0925)  Sitting - Static: Prop sitting (08/04/17 0925)  Sitting - Dynamic: Prop sitting (08/04/17 0925)  Standing: Impaired (08/04/17 0925)  Standing - Static: Poor (08/04/17 0925)  Standing - Dynamic : Poor (08/04/17 0925)                       Bed/Mat Mobility  Supine to Sit: Minimum assistance (08/04/17 0905)  Sit to Supine: Stand-by asssistance (08/04/17 0925)  Sit to Stand: Minimum assistance (08/04/17 0925)  Scooting: Stand-by asssistance (08/04/17 0925)     Labs/Studies:  Recent Results (from the past 67 hour(s))   PLEASE READ & DOCUMENT PPD TEST IN 24 HRS    Collection Time: 08/06/17  2:00 PM   Result Value Ref Range    PPD 0 0neg Negative    mm Induration 0 0 mm   CBC WITH AUTOMATED DIFF    Collection Time: 08/07/17  5:36 AM   Result Value Ref Range    WBC 7.5 4.3 - 11.1 K/uL    RBC 5.57 (H) 4.05 - 5.25 M/uL    HGB 11.7 11.7 - 15.4 g/dL    HCT 37.8 35.8 - 46.3 %    MCV 67.9 (L) 79.6 - 97.8 FL    MCH 21.0 (L) 26.1 - 32.9 PG    MCHC 31.0 (L) 31.4 - 35.0 g/dL    RDW 16.6 (H) 11.9 - 14.6 %    PLATELET 113 (H) 523 - 450 K/uL    MPV 9.9 (L) 10.8 - 14.1 FL    DF AUTOMATED      NEUTROPHILS 66 43 - 78 %    LYMPHOCYTES 24 13 - 44 %    MONOCYTES 9 4.0 - 12.0 %    EOSINOPHILS 1 0.5 - 7.8 %    BASOPHILS 0 0.0 - 2.0 %    IMMATURE GRANULOCYTES 0.1 0.0 - 5.0 %    ABS. NEUTROPHILS 4.9 1.7 - 8.2 K/UL    ABS. LYMPHOCYTES 1.8 0.5 - 4.6 K/UL    ABS. MONOCYTES 0.7 0.1 - 1.3 K/UL    ABS. EOSINOPHILS 0.1 0.0 - 0.8 K/UL    ABS. BASOPHILS 0.0 0.0 - 0.2 K/UL    ABS. IMM.  GRANS. 0.0 0.0 - 0.5 K/UL   METABOLIC PANEL, BASIC    Collection Time: 08/07/17  5:36 AM   Result Value Ref Range    Sodium 139 136 - 145 mmol/L    Potassium 3.2 (L) 3.5 - 5.1 mmol/L    Chloride 100 98 - 107 mmol/L    CO2 26 21 - 32 mmol/L    Anion gap 13 7 - 16 mmol/L    Glucose 125 (H) 65 - 100 mg/dL    BUN 11 6 - 23 MG/DL    Creatinine 0.65 0.6 - 1.0 MG/DL    GFR est AA >60 >60 ml/min/1.73m2    GFR est non-AA >60 >60 ml/min/1.73m2    Calcium 9.1 8.3 - 10.4 MG/DL        Assessment:     Principal Problem:    Left sided lacunar stroke ([address] (8/4/2017)      Overview: 8/2017    Active Problems:    Essential hypertension (10/5/2016)      Obesity (BMI 30-39.9) ()      Weakness of right side of body (8/3/2017)        Plan:     Recommendations: Continue Acute Rehab Program  Coordination of rehab/medical care  Counseling of PM & R care issues management  Monitoring and management of medical conditions per plan of care/orders  Discussion with Family/Caregiver/Staff  Reviewed Therapies/Labs/Medications/Records    Signed By:  Tito Bui MD     August 7, 2017

## 2017-08-07 NOTE — PROGRESS NOTES
LTG: Patient will tolerate least restrictive diet without overt signs or symptoms of airway compromise. STG: Patient will tolerate mechanical soft and thin without overt signs or symptoms of airway compromise. STG: Patient will participate in modified barium swallow study as clinically indicated. STG: Patient will participate in speech-language/cognitive evaluation to further assess function abilities. Speech language pathology: bedside swallow note: Daily Note 1    NAME/AGE/GENDER: Maryam Kaplan is a 38 y.o. female  DATE: 8/7/2017  PRIMARY DIAGNOSIS: Weakness of right side of body  Stroke [facility])       ICD-10: Treatment Diagnosis: R13.11 Oral Dysphagia  INTERDISCIPLINARY COLLABORATION: Registered Nurse  PRECAUTIONS/ALLERGIES: Erythromycin ASSESSMENT:Patient completed oral motor tasks as outlined below to decrease oral residue build up and increase safe swallow. Patient reports eating solids is \"tough\" but does \"ok\" with mechanical soft. Recommend continue  Mechanical soft diet with thin liquids. Medications as tolerated. Speech therapy to continue to follow for diet tolerance, oral motor tasks, and Speech-language/cognitive evaluation is also warranted. Patient will benefit from skilled intervention to address the below impairments. ?????? ? ? This section established at most recent assessment??????????  PROBLEM LIST (Impairments causing functional limitations):  1. Oral dysphagia  2. Dysarthria  3. Word finding difficulties  REHABILITATION POTENTIAL FOR STATED GOALS: Good  PLAN OF CARE:   Patient will benefit from skilled intervention to address the following impairments.   RECOMMENDATIONS AND PLANNED INTERVENTIONS (Benefits and precautions of therapy have been discussed with the patient.):  · PO:  Mechanical soft  · Liquids:  regular thin  MEDICATIONS:  · With liquid  COMPENSATORY STRATEGIES/MODIFICATIONS INCLUDING:  · Alternate liquids/solids  · Small sips and bites  OTHER RECOMMENDATIONS (including follow up treatment recommendations):   · Oral motor exercises  · Family training/education  · Patient education  · speech-language/cognitive evaluation. RECOMMENDED DIET MODIFICATIONS DISCUSSED WITH:  · Nursing  · Family  · Patient  FREQUENCY/DURATION: Continue to follow patient 3 times a week for duration of hospital stay to address above goals. RECOMMENDED REHABILITATION/EQUIPMENT: (at time of discharge pending progress):   Continue Skilled Therapy and Rehab. SUBJECTIVE:   Patient sleeping, but awakens easily. History of Present Injury/Illness: Ms. Antony Lovett  has a past medical history of Generalized anxiety disorder; GERD (gastroesophageal reflux disease); Hypertension; Insomnia, unspecified; Iron deficiency anemia (8/23/2013); Left sided lacunar stroke ([facility] [address] (08/2017); Mitral valve prolapse (1993); Obesity (BMI 30-39.9); and Prediabetes (7/28/2015). .  She also  has a past surgical history that includes tubal ligation (1995) and endoscopy (10/1/04). Present Symptoms: \"heaviness\" with mastication   Pain Intensity 1: 0  Pain Location 1: Chest  Current Medications:   No current facility-administered medications on file prior to encounter. Current Outpatient Prescriptions on File Prior to Encounter   Medication Sig Dispense Refill    HYDROcodone-homatropine (HYCODAN) 5-1.5 mg/5 mL syrup Take 5 mL by mouth four (4) times daily as needed for Cough. Max Daily Amount: 20 mL. 180 mL 0    zolpidem (AMBIEN) 10 mg tablet Take 1 Tab by mouth nightly as needed for Sleep. 30 Tab 5    irbesartan-hydrochlorothiazide (AVALIDE) 150-12.5 mg per tablet Take 1 Tab by mouth daily. 30 Tab 5    escitalopram oxalate (LEXAPRO) 20 mg tablet Take 1 Tab by mouth daily.  30 Tab 5     Current Dietary Status:  Mechanical soft/thin      Social History/Home Situation:    Home Environment: Trailer/mobile home  # Steps to Enter: 4  Rails to Enter: Yes  Hand Rails : Bilateral  One/Two Story Residence: One story  Living Alone: No  Support Systems: Spouse/Significant Other/Partner  Patient Expects to be Discharged to[de-identified] Private residence  Current DME Used/Available at Home: None  Tub or Shower Type: Tub/Shower combination  OBJECTIVE:   Respiratory Status:  Room air     CXR Results:No acute cardiopulmonary process evident by plain film imaging. MRI/CT Results:Motion limited study without acute intracranial process evident by  noncontrast CT study of the head. MRI results pending  Oral Motor Structure/Speech:  Oral-Motor Structure/Motor Speech  Labial: Right droop  Dentition: Intact, Natural  Oral Hygiene: Adequate  Lingual: Right deviation    Cognitive and Communication Status:  Neurologic State: Alert  Orientation Level: Oriented X4  Cognition: Follows commands             BEDSIDE SWALLOW EVALUATION  Oral Assessment:     P.O. Trials: The patient was given tsp-small bites amounts of the following:           ORAL PHASE:                   PHARYNGEAL PHASE:                            OTHER OBSERVATIONS:  Rate/bite size: Impaired   Endurance:  Impaired   Comments:       Tool Used: Dysphagia Outcome and Severity Scale (ARMOND)    Score Comments   Normal Diet  [] 7 With no strategies or extra time needed   Functional Swallow  [] 6 May have mild oral or pharyngeal delay       Mild Dysphagia    [x] 5 Which may require one diet consistency restricted (those who demonstrate penetration which is entirely cleared on MBS would be included)   Mild-Moderate Dysphagia  [] 4 With 1-2 diet consistencies restricted       Moderate Dysphagia  [] 3 With 2 or more diet consistencies restricted       Moderately Severe Dysphagia  [] 2 With partial PO strategies (trials with ST only)       Severe Dysphagia  [] 1 With inability to tolerate any PO safely          Score:  Initial: 5 Most Recent: X (Date: -- )   Interpretation of Tool: The Dysphagia Outcome and Severity Scale (ARMOND) is a simple, easy-to-use, 7-point scale developed to systematically rate the functional severity of dysphagia based on objective assessment and make recommendations for diet level, independence level, and type of nutrition. Score 7 6 5 4 3 2 1   Modifier CH CI CJ CK CL CM CN   ? Swallowing:     - CURRENT STATUS: CJ - 20%-39% impaired, limited or restricted    - GOAL STATUS:  CI - 1%-19% impaired, limited or restricted    - D/C STATUS:  ---------------To be determined---------------  Payor: [employer] / Plan: Frankie Diss / Product Type: PPO /     TREATMENT:    (In addition to Assessment/Re-Assessment sessions the following treatments were rendered)  Dysphagia Activities: Activities/Procedures listed utilized to improve progress in swallow strength, swallow timeliness, swallow function, diet tolerance and swallow safety. Required moderate cueing to improve swallow safety, work toward diet advancement and decrease aspiration risk. MODALITIES:                                                                  ORAL MOTOR  EXERCISES:  Exaggerate Vowels: Yes  Reps: 5  Sets : 1           Labial Closure:  Yes  Reps : 5  Sets: 1  Labial Protrusion: Yes  Reps : 5  Sets : 1  Labial Pucker: Yes  Reps: 5  Sets : 1  Labial Resistance: Yes  Reps : 5  Sets : 1  Labial Retraction: Yes  Reps : 5  Sets : 1  Labial Seal-Cheeks Extended: Yes  Reps : 5  Sets : 1                   Lingual Lateralization-Interior: Yes  Reps : 5  Sets : 1  Lingual Lateralization-Exterior: Yes  Reps : 5  Sets: 1  Lingual Protraction: Yes  Reps : 5  Sets : 1  Lingual Protrusion: Yes  Reps : 5  Sets : 1  Lingual Resistance: Yes  Reps : 5  Sets : 1  Lingual Teeth Sweep: Yes  Reps : 5  Sets : 1  \"OO-EE\": Yes  Reps: 5  Sets : 1  Reps: 5  Sets : 1  Tongue Base Retraction: Yes     LARYNGEAL / PHARYNGEAL EXERCISES: __________________________________________________________________________________________________  Safety:   After treatment position/precautions:  · RN notified  · Family at bedside  · Upright in Bed. Progression/Medical Necessity:   · Patient is expected to demonstrate progress in diet tolerance and swallow safety to improve swallow safety. Compliance with Program/Exercises: Will assess as treatment progresses. Reason for Continuation of Services/Other Comments:  · Patient continues to require skilled intervention due to Dysphagia. Recommendations/Intent for next treatment session: \"Treatment next visit will focus on oral motor tasks, Diet tolerance and speech-language/cognitive assessment\". Total Treatment Duration:  Time In: 1351  Time Out: Brittany 54.  MS Roland, CCC-SLP

## 2017-08-07 NOTE — PROGRESS NOTES
Problem: Mobility Impaired (Adult and Pediatric)  Goal: *Acute Goals and Plan of Care (Insert Text)    Re-evaluation Goals 8/7/17:  LTG:  (1.)Ms. Rogelio Lee will move from supine to sit and sit to supine , scoot up and down and roll side to side in bed with STAND BY ASSIST within 7 day(s). Goal Updated 8/7/17  (2.)Ms. Rogelio Lee will demonstrate GOOD static and dynamic sitting balance within 7 day(s). Goal Added 8/7/17  (2.)Ms. Rogelio Lee will transfer from bed to chair and chair to bed with MODERATE ASSIST using the least restrictive device within 7 day(s). Goal Updated 8/7/17  (3.)Ms. Rogelio Lee will ambulate with MAXIMAL ASSIST for 25+ feet with the least restrictive device within 7 day(s). Goal Updated 8/7/17  (4.)Ms. Rogelio Lee will participate in therapeutic activity/exerices x 20 minutes for increased strength within 7 days. Goal Updated 8/7/17    ________________________________________________________________________________________________      PHYSICAL THERAPY: Re-evaluation and PM 8/7/2017  INPATIENT: Hospital Day: 5  Payor: [employer] / Plan: [facility] / Product Type: PPO /      NAME/AGE/GENDER: Sergio Hernandez is a 38 y.o. female     PRIMARY DIAGNOSIS: Weakness of right side of body  Stroke ([address] Left sided lacunar stroke ([address] Left sided lacunar stroke ([address]        ICD-10: Treatment Diagnosis:       · Generalized Muscle Weakness (M62.81)  · Difficulty in walking, Not elsewhere classified (R26.2)  · Other abnormalities of gait and mobility (R26.89)   Precaution/Allergies:  Erythromycin       ASSESSMENT:      Ms. Rogelio Lee is a 38 y.o. female admitted to the hospital for the above and undergoing a CVA work-up. Patient seen this PM for PT re-eval due to recent change in neuro status according to MD. Patient presents supine in bed with family at bedside, endorses 0/10 pain, and agreeable to therapy. She reports feeling weak which began yesterday. RUE appears flaccid, unable to move it at all.  Reports numbness/tingling in R hand. Sensation decreased to light touch C4-T1 and L4-S1 R. RLE also presents with decreased tone. RLE MMT strength: hip flexion 2+/5, hip abduction 2-/5, knee extension 1+/5, and ankle DF/PF 0/5. Of note, pt compensating for hip flex and abd by leaning posteriorly and to L. Performed bed mobility with min assist requiring assistance moving RUE and RLE. Demonstrated fair prop sitting balance at EOB. While sitting EOB, patient performed dynamic sitting balance activities reaching outside of KESHAV. Therapist performed PROM of R elbow flex/ext and wrist flex/ext, AAROM of R hip flex/abd and knee ext, and PROM of ankle DF/PF. Joint approximation of R knee and R ankle also performed by therapist. Did not attempt to stand due to significant RLE weakness and safety concerns. Following re-assessment, patient transferred back to supine in bed with all needs met. Ms. Veronica Rabago appears to be experiencing a decline in strength and functional mobility since her eval last week. She continues to require skilled PT services to improve strength, functional mobility, activity tolerance, and balance/gait status. Will continue efforts. This section established at most recent assessment   PROBLEM LIST (Impairments causing functional limitations):  1. Decreased Strength  2. Decreased ADL/Functional Activities  3. Decreased Transfer Abilities  4. Decreased Ambulation Ability/Technique  5. Decreased Balance    INTERVENTIONS PLANNED: (Benefits and precautions of physical therapy have been discussed with the patient.)  1. Balance Exercise  2. Bed Mobility  3. Family Education  4. Gait Training  5. Neuromuscular Re-education/Strengthening  6. Therapeutic Activites  7. Therapeutic Exercise/Strengthening  8. Transfer Training  9.  Group Therapy      TREATMENT PLAN: Frequency/Duration: 3 times a week for duration of hospital stay  Rehabilitation Potential For Stated Goals: GOOD      RECOMMENDED REHABILITATION/EQUIPMENT: (at time of discharge pending progress): Continue Skilled Therapy. HISTORY:   History of Present Injury/Illness (Reason for Referral):  Per H&P:   HPI:   Patient is a 38 yof with a hx of obesity, htn who presents with acute onset rt sided upper and lower extremity weakness associated with some slurred speech. Denies associated Ha, blurred va, cp, nausea, vomiting, sob. Ct head in ER neg for acute abnorms. Pt not currently on any antiplatelets. Denies prior similar symptoms     Past Medical History/Comorbidities:   Ms. Demi pitt  has a past medical history of Generalized anxiety disorder; GERD (gastroesophageal reflux disease); Hypertension; Insomnia, unspecified; Iron deficiency anemia (8/23/2013); Left sided lacunar stroke ([facility] [address] (08/2017); Mitral valve prolapse (1993); Obesity (BMI 30-39.9); and Prediabetes (7/28/2015). Ms. Demi pitt  has a past surgical history that includes tubal ligation (1995) and endoscopy (10/1/04). Social History/Living Environment:   Home Environment: Trailer/mobile home  # Steps to Enter: 4  Rails to Enter: Yes  Hand Rails : Bilateral  One/Two Story Residence: One story  Living Alone: No  Support Systems: Spouse/Significant Other/Partner  Patient Expects to be Discharged to[de-identified] Private residence  Current DME Used/Available at Home: None  Tub or Shower Type: Tub/Shower combination  Prior Level of Function/Work/Activity:  Pt lives in [facility] with  and son that has steps to enter. Independent with ADLs/ambulation PTA. Number of Personal Factors/Comorbidities that affect the Plan of Care:   Anxiety    1-2: MODERATE COMPLEXITY   EXAMINATION:   Most Recent Physical Functioning:   Gross Assessment:  AROM: Generally decreased, functional (RUE/RLE)  PROM: Within functional limits  Strength: Generally decreased, functional (RUE/RLE)  Coordination: Generally decreased, functional  Tone: Abnormal (Flaccid RUE/RLE)  Sensation: Impaired (Decreased to light touch RUE/RLE) Posture:     Balance:  Sitting: Impaired  Sitting - Static: Prop sitting  Sitting - Dynamic: Prop sitting  Standing:  (Not tested) Bed Mobility:  Supine to Sit: Minimum assistance; Moderate assistance  Sit to Supine: Minimum assistance  Scooting: Contact guard assistance  Wheelchair Mobility:     Transfers:     Gait:             Body Structures Involved:  1. Nerves  2. Muscles Body Functions Affected:  1. Sensory/Pain  2. Neuromusculoskeletal  3. Movement Related Activities and Participation Affected:  1. General Tasks and Demands  2. Mobility  3. Self Care  4. Domestic Life  5. Community, Social and [place]   Number of elements that affect the Plan of Care: 4+: HIGH COMPLEXITY   CLINICAL PRESENTATION:   Presentation: Evolving clinical presentation with changing clinical characteristics: MODERATE COMPLEXITY   CLINICAL DECISION MAKIN [facility] Inpatient Short Form  How much difficulty does the patient currently have. .. Unable A Lot A Little None   1. Turning over in bed (including adjusting bedclothes, sheets and blankets)? [ ] 1   [ ] 2   [X] 3   [ ] 4   2. Sitting down on and standing up from a chair with arms ( e.g., wheelchair, bedside commode, etc.)   [ ] 1   [X] 2   [ ] 3   [ ] 4   3. Moving from lying on back to sitting on the side of the bed? [ ] 1   [ ] 2   [X] 3   [ ] 4   How much help from another person does the patient currently need. .. Total A Lot A Little None   4. Moving to and from a bed to a chair (including a wheelchair)? [ ] 1   [X] 2   [ ] 3   [ ] 4   5. Need to walk in hospital room? [ ] 1   [X] 2   [ ] 3   [ ] 4   6. Climbing 3-5 steps with a railing? [ ] 1   [X] 2   [ ] 3   [ ] 4   © , Trustees of [address] [place] [address] [address] [id number], under license to [employer].  All rights reserved    Score:  Initial: 15 Most Recent: 14 (Date: 17 )     Interpretation of Tool:  Represents activities that are increasingly more difficult (i.e. Bed mobility, Transfers, Gait). Score 24 23 22-20 19-15 14-10 9-7 6       Modifier CH CI CJ CK CL CM CN         · Mobility - Walking and Moving Around:               - CURRENT STATUS:    CL - 60%-79% impaired, limited or restricted               - GOAL STATUS:           CK - 40%-59% impaired, limited or restricted               - D/C STATUS:                       ---------------To be determined---------------  Payor: [employer] / Plan: Frankie Diss / Product Type: PPO /       Medical Necessity:     · Patient demonstrates good rehab potential due to higher previous functional level. Reason for Services/Other Comments:  · Patient continues to require skilled intervention due to decreased balance, ambulation, and functional mobility. Use of outcome tool(s) and clinical judgement create a POC that gives a: Questionable prediction of patient's progress: MODERATE COMPLEXITY                 TREATMENT:   (In addition to Assessment/Re-Assessment sessions the following treatments were rendered)   Pre-treatment Symptoms/Complaints:  Reports feeling increasingly weak which started yesterday  Pain: Initial:   Pain Intensity 1: 0  Post Session:  Unchanged; positioned to comfort supine in bed post-session      Assessment/Reassessment only, no treatment provided today     Braces/Orthotics/Lines/Etc:   · O2 Device: Room air  Treatment/Session Assessment:    · Response to Treatment:  See above. · Interdisciplinary Collaboration:  · Registered Nurse and SDPT  · After treatment position/precautions:  · Supine in bed, Bed/Chair-wheels locked, Call light within reach, Family at bedside and [facility] rep in room  · Compliance with Program/Exercises: Will assess as treatment progresses. · Recommendations/Intent for next treatment session: \"Next visit will focus on advancements to more challenging activities and reduction in assistance provided\".   Total Treatment Duration:  PT Patient Time In/Time Out  Time In: 1425  Time Out: [place] [address] Naren Young

## 2017-08-07 NOTE — PROGRESS NOTES
Hospitalist Progress Note    2017  Admit Date: 8/3/2017  5:38 PM   NAME: Casandra Sexton   :  1975   MRN:  [id number]   Attending: Toby Dillon MD  PCP:  Kinga De Jesus MD    SUBJECTIVE:     Casandra Sexton is a 15WEV with obesity, HTN who was admitted on 8/3 with R sided weakness and dysarthria. Found to have acute L lacunar infarct on MRI. Echo WNL. Carotids WNL. : no improvement in R sided strength, specifically R arm. Dysarthria improves. Nausea resolved. No abd pain, HA, visual disturbance. Review of Systems negative with exception of pertinent positives noted above      PHYSICAL EXAM       Visit Vitals    /88 (BP 1 Location: Left arm, BP Patient Position: At rest)    Pulse 99    Temp 97.8 °F (36.6 °C)    Resp 18    Ht 4' 11\" (1.499 m)    Wt 77.1 kg (170 lb)    LMP 2017    SpO2 95%    Breastfeeding No    BMI 34.34 kg/m2      Temp (24hrs), Av °F (36.7 °C), Min:97.5 °F (36.4 °C), Max:98.3 °F (36.8 °C)    Oxygen Therapy  O2 Sat (%): 95 % (17 0752)  Pulse via Oximetry: 94 beats per minute (17 0120)  O2 Device: CPAP nasal (17 0120)  FIO2 (%): 21 % (17 0120)    Intake/Output Summary (Last 24 hours) at 17 0902  Last data filed at 17 0756   Gross per 24 hour   Intake              150 ml   Output                0 ml   Net              150 ml      General: No acute distress. Head:  Atraumatic Normocephalic. Lungs:  CTA Bilaterally. Nonlabored  CVS:  RRR  Abdomen: Soft, NTTP, +BS  Neurologic:  AOx3.  Dysarthria improving, R sided paresis upper>lower, L side 5/5    Recent Results (from the past 24 hour(s))   PLEASE READ & DOCUMENT PPD TEST IN 24 HRS    Collection Time: 17  2:00 PM   Result Value Ref Range    PPD 0 0neg Negative    mm Induration 0 0 mm   CBC WITH AUTOMATED DIFF    Collection Time: 17  5:36 AM   Result Value Ref Range    WBC 7.5 4.3 - 11.1 K/uL    RBC 5.57 (H) 4.05 - 5.25 M/uL    HGB 11.7 11.7 - 15.4 g/dL HCT 37.8 35.8 - 46.3 %    MCV 67.9 (L) 79.6 - 97.8 FL    MCH 21.0 (L) 26.1 - 32.9 PG    MCHC 31.0 (L) 31.4 - 35.0 g/dL    RDW 16.6 (H) 11.9 - 14.6 %    PLATELET 868 (H) 042 - 450 K/uL    MPV 9.9 (L) 10.8 - 14.1 FL    DF AUTOMATED      NEUTROPHILS 66 43 - 78 %    LYMPHOCYTES 24 13 - 44 %    MONOCYTES 9 4.0 - 12.0 %    EOSINOPHILS 1 0.5 - 7.8 %    BASOPHILS 0 0.0 - 2.0 %    IMMATURE GRANULOCYTES 0.1 0.0 - 5.0 %    ABS. NEUTROPHILS 4.9 1.7 - 8.2 K/UL    ABS. LYMPHOCYTES 1.8 0.5 - 4.6 K/UL    ABS. MONOCYTES 0.7 0.1 - 1.3 K/UL    ABS. EOSINOPHILS 0.1 0.0 - 0.8 K/UL    ABS. BASOPHILS 0.0 0.0 - 0.2 K/UL    ABS. IMM. GRANS. 0.0 0.0 - 0.5 K/UL   METABOLIC PANEL, BASIC    Collection Time: 08/07/17  5:36 AM   Result Value Ref Range    Sodium 139 136 - 145 mmol/L    Potassium 3.2 (L) 3.5 - 5.1 mmol/L    Chloride 100 98 - 107 mmol/L    CO2 26 21 - 32 mmol/L    Anion gap 13 7 - 16 mmol/L    Glucose 125 (H) 65 - 100 mg/dL    BUN 11 6 - 23 MG/DL    Creatinine 0.65 0.6 - 1.0 MG/DL    GFR est AA >60 >60 ml/min/1.73m2    GFR est non-AA >60 >60 ml/min/1.73m2    Calcium 9.1 8.3 - 10.4 MG/DL         Imaging /Procedures /Studies   MRI BRAIN WO CONT   Final Result   Impression: Acute left corona radiata lacunar infarction. DUPLEX CAROTID BILATERAL   Final Result   IMPRESSION:     SUE:  No hemodynamically significant stenosis. LICA:  No hemodynamically significant stenosis. This is essentially a normal carotid artery ultrasound examination. CT HEAD WO CONT   Final Result   IMPRESSION:     1. Motion limited study without acute intracranial process evident by   noncontrast CT study of the head. XR CHEST PA LAT   Final Result   IMPRESSION:    1. No acute cardiopulmonary process evident by plain film imaging.                        ASSESSMENT      Hospital Problems as of 8/7/2017  Date Reviewed: 1/26/2017          Codes Class Noted - Resolved POA    * (Principal)Left sided lacunar stroke ([facility] [address] ICD-10-CM: I63.9  ICD-9-CM: 434.91  8/4/2017 - Present Yes    Overview Signed 8/6/2017  7:44 PM by Wiliam Wiley MD     8/2017             Weakness of right side of body ICD-10-CM: R53.1  ICD-9-CM: 728.87  8/3/2017 - Present Yes        Essential hypertension ICD-10-CM: I10  ICD-9-CM: 401.9  10/5/2016 - Present Yes        Obesity (BMI 30-39. 9) ICD-10-CM: E66.9  ICD-9-CM: 278.00  Unknown - Present Yes                  Plan:  - acute L lacunar CVA  - continue ASA/statin   - increased home diovan today  - zofran prn nausea   - needs outpatient sleep study    DVT Prophylaxis: Lovenox  Dispo: PT/OT/SLP following, would benefit from 9th floor acute rehab, awaiting approval    Je Rivera MD

## 2017-08-07 NOTE — PROGRESS NOTES
Pt has been accepted for admission to [facility] pending insurance approval.  They are initiating the precert today. SW following.

## 2017-08-08 ENCOUNTER — HOSPITAL ENCOUNTER (INPATIENT)
Age: 42
LOS: 29 days | Discharge: HOME | DRG: 057 | End: 2017-09-06
Attending: PHYSICAL MEDICINE & REHABILITATION | Admitting: PHYSICAL MEDICINE & REHABILITATION
Payer: COMMERCIAL

## 2017-08-08 VITALS
RESPIRATION RATE: 18 BRPM | BODY MASS INDEX: 34.27 KG/M2 | HEIGHT: 59 IN | DIASTOLIC BLOOD PRESSURE: 65 MMHG | SYSTOLIC BLOOD PRESSURE: 129 MMHG | OXYGEN SATURATION: 96 % | TEMPERATURE: 97.6 F | WEIGHT: 170 LBS | HEART RATE: 98 BPM

## 2017-08-08 DIAGNOSIS — I34.1 MITRAL VALVE PROLAPSE: ICD-10-CM

## 2017-08-08 DIAGNOSIS — I63.81 LEFT SIDED LACUNAR STROKE (HCC): ICD-10-CM

## 2017-08-08 DIAGNOSIS — G47.00 INSOMNIA, UNSPECIFIED: ICD-10-CM

## 2017-08-08 DIAGNOSIS — I63.9 CEREBROVASCULAR ACCIDENT (CVA), UNSPECIFIED MECHANISM (HCC): Primary | ICD-10-CM

## 2017-08-08 DIAGNOSIS — D50.9 IRON DEFICIENCY ANEMIA, UNSPECIFIED IRON DEFICIENCY ANEMIA TYPE: ICD-10-CM

## 2017-08-08 DIAGNOSIS — R73.03 PREDIABETES: ICD-10-CM

## 2017-08-08 DIAGNOSIS — I10 ESSENTIAL HYPERTENSION: ICD-10-CM

## 2017-08-08 DIAGNOSIS — E87.6 DIURETIC-INDUCED HYPOKALEMIA: ICD-10-CM

## 2017-08-08 DIAGNOSIS — E66.9 OBESITY (BMI 30-39.9): ICD-10-CM

## 2017-08-08 DIAGNOSIS — T50.2X5A DIURETIC-INDUCED HYPOKALEMIA: ICD-10-CM

## 2017-08-08 DIAGNOSIS — F41.1 GENERALIZED ANXIETY DISORDER: ICD-10-CM

## 2017-08-08 DIAGNOSIS — R53.1 WEAKNESS OF RIGHT SIDE OF BODY: ICD-10-CM

## 2017-08-08 DIAGNOSIS — R07.9 CHEST PAIN, UNSPECIFIED TYPE: ICD-10-CM

## 2017-08-08 DIAGNOSIS — G45.9 TRANSIENT CEREBRAL ISCHEMIA, UNSPECIFIED TYPE: ICD-10-CM

## 2017-08-08 LAB
ANION GAP BLD CALC-SCNC: 12 MMOL/L (ref 7–16)
BUN SERPL-MCNC: 13 MG/DL (ref 6–23)
CALCIUM SERPL-MCNC: 8.9 MG/DL (ref 8.3–10.4)
CHLORIDE SERPL-SCNC: 99 MMOL/L (ref 98–107)
CO2 SERPL-SCNC: 28 MMOL/L (ref 21–32)
CREAT SERPL-MCNC: 0.65 MG/DL (ref 0.6–1)
GLUCOSE SERPL-MCNC: 95 MG/DL (ref 65–100)
HCYS SERPL-SCNC: 8.6 UMOL/L (ref 0–15)
MM INDURATION POC: NORMAL MM (ref 0–5)
POTASSIUM SERPL-SCNC: 3.2 MMOL/L (ref 3.5–5.1)
PPD POC: NEGATIVE NEGATIVE
SODIUM SERPL-SCNC: 139 MMOL/L (ref 136–145)

## 2017-08-08 PROCEDURE — 97116 GAIT TRAINING THERAPY: CPT

## 2017-08-08 PROCEDURE — 74011250636 HC RX REV CODE- 250/636: Performed by: PHYSICAL MEDICINE & REHABILITATION

## 2017-08-08 PROCEDURE — 99223 1ST HOSP IP/OBS HIGH 75: CPT | Performed by: PHYSICAL MEDICINE & REHABILITATION

## 2017-08-08 PROCEDURE — 74011250637 HC RX REV CODE- 250/637: Performed by: INTERNAL MEDICINE

## 2017-08-08 PROCEDURE — 85610 PROTHROMBIN TIME: CPT | Performed by: INTERNAL MEDICINE

## 2017-08-08 PROCEDURE — 80048 BASIC METABOLIC PNL TOTAL CA: CPT | Performed by: INTERNAL MEDICINE

## 2017-08-08 PROCEDURE — 65310000000 HC RM PRIVATE REHAB

## 2017-08-08 PROCEDURE — 97162 PT EVAL MOD COMPLEX 30 MIN: CPT

## 2017-08-08 PROCEDURE — 97530 THERAPEUTIC ACTIVITIES: CPT

## 2017-08-08 PROCEDURE — 97110 THERAPEUTIC EXERCISES: CPT

## 2017-08-08 PROCEDURE — 36415 COLL VENOUS BLD VENIPUNCTURE: CPT | Performed by: INTERNAL MEDICINE

## 2017-08-08 RX ORDER — AMOXICILLIN 250 MG
2 CAPSULE ORAL DAILY PRN
Status: DISCONTINUED | OUTPATIENT
Start: 2017-08-08 | End: 2017-09-06 | Stop reason: HOSPADM

## 2017-08-08 RX ORDER — ATORVASTATIN CALCIUM 40 MG/1
40 TABLET, FILM COATED ORAL DAILY
Status: CANCELLED | OUTPATIENT
Start: 2017-08-08

## 2017-08-08 RX ORDER — PANTOPRAZOLE SODIUM 40 MG/1
40 TABLET, DELAYED RELEASE ORAL
Status: DISCONTINUED | OUTPATIENT
Start: 2017-08-09 | End: 2017-09-06 | Stop reason: HOSPADM

## 2017-08-08 RX ORDER — TEMAZEPAM 7.5 MG/1
15 CAPSULE ORAL
Status: DISCONTINUED | OUTPATIENT
Start: 2017-08-08 | End: 2017-08-08

## 2017-08-08 RX ORDER — ACETAMINOPHEN 325 MG/1
650 TABLET ORAL
Status: CANCELLED | OUTPATIENT
Start: 2017-08-08

## 2017-08-08 RX ORDER — ENOXAPARIN SODIUM 100 MG/ML
40 INJECTION SUBCUTANEOUS EVERY 24 HOURS
Status: DISCONTINUED | OUTPATIENT
Start: 2017-08-08 | End: 2017-09-06 | Stop reason: HOSPADM

## 2017-08-08 RX ORDER — ONDANSETRON 4 MG/1
4 TABLET, ORALLY DISINTEGRATING ORAL
Status: CANCELLED | OUTPATIENT
Start: 2017-08-08

## 2017-08-08 RX ORDER — ACETAMINOPHEN 325 MG/1
650 TABLET ORAL
Qty: 30 TAB | Refills: 0 | Status: SHIPPED | OUTPATIENT
Start: 2017-08-08 | End: 2020-10-20

## 2017-08-08 RX ORDER — MAG HYDROX/ALUMINUM HYD/SIMETH 200-200-20
30 SUSPENSION, ORAL (FINAL DOSE FORM) ORAL
Status: CANCELLED | OUTPATIENT
Start: 2017-08-08

## 2017-08-08 RX ORDER — ESCITALOPRAM OXALATE 10 MG/1
20 TABLET ORAL DAILY
Status: CANCELLED | OUTPATIENT
Start: 2017-08-08

## 2017-08-08 RX ORDER — FACIAL-BODY WIPES
10 EACH TOPICAL DAILY PRN
Status: DISCONTINUED | OUTPATIENT
Start: 2017-08-08 | End: 2017-09-06 | Stop reason: HOSPADM

## 2017-08-08 RX ORDER — ACETAMINOPHEN 325 MG/1
650 TABLET ORAL
Status: DISCONTINUED | OUTPATIENT
Start: 2017-08-08 | End: 2017-09-06 | Stop reason: HOSPADM

## 2017-08-08 RX ORDER — ESCITALOPRAM OXALATE 10 MG/1
20 TABLET ORAL DAILY
Status: DISCONTINUED | OUTPATIENT
Start: 2017-08-09 | End: 2017-09-06 | Stop reason: HOSPADM

## 2017-08-08 RX ORDER — ASPIRIN 325 MG
325 TABLET, DELAYED RELEASE (ENTERIC COATED) ORAL DAILY
Status: DISCONTINUED | OUTPATIENT
Start: 2017-08-08 | End: 2017-08-08 | Stop reason: HOSPADM

## 2017-08-08 RX ORDER — GUAIFENESIN 100 MG/5ML
81 LIQUID (ML) ORAL DAILY
Status: CANCELLED | OUTPATIENT
Start: 2017-08-08

## 2017-08-08 RX ORDER — ATORVASTATIN CALCIUM 40 MG/1
40 TABLET, FILM COATED ORAL DAILY
Qty: 30 TAB | Refills: 0 | Status: SHIPPED | OUTPATIENT
Start: 2017-08-08 | End: 2017-09-19 | Stop reason: SDUPTHER

## 2017-08-08 RX ORDER — FACIAL-BODY WIPES
10 EACH TOPICAL DAILY PRN
Status: CANCELLED | OUTPATIENT
Start: 2017-08-08

## 2017-08-08 RX ORDER — MAG HYDROX/ALUMINUM HYD/SIMETH 200-200-20
30 SUSPENSION, ORAL (FINAL DOSE FORM) ORAL
Status: DISCONTINUED | OUTPATIENT
Start: 2017-08-08 | End: 2017-09-06 | Stop reason: HOSPADM

## 2017-08-08 RX ORDER — TRAZODONE HYDROCHLORIDE 50 MG/1
50 TABLET ORAL
Status: DISCONTINUED | OUTPATIENT
Start: 2017-08-08 | End: 2017-09-06 | Stop reason: HOSPADM

## 2017-08-08 RX ORDER — ADHESIVE BANDAGE
30 BANDAGE TOPICAL DAILY PRN
Status: DISCONTINUED | OUTPATIENT
Start: 2017-08-08 | End: 2017-09-06 | Stop reason: HOSPADM

## 2017-08-08 RX ORDER — AMOXICILLIN 250 MG
2 CAPSULE ORAL DAILY PRN
Status: CANCELLED | OUTPATIENT
Start: 2017-08-08

## 2017-08-08 RX ORDER — ATORVASTATIN CALCIUM 40 MG/1
40 TABLET, FILM COATED ORAL DAILY
Status: DISCONTINUED | OUTPATIENT
Start: 2017-08-09 | End: 2017-09-06 | Stop reason: HOSPADM

## 2017-08-08 RX ORDER — ADHESIVE BANDAGE
30 BANDAGE TOPICAL DAILY PRN
Status: CANCELLED | OUTPATIENT
Start: 2017-08-08

## 2017-08-08 RX ORDER — ASPIRIN 325 MG
325 TABLET, DELAYED RELEASE (ENTERIC COATED) ORAL DAILY
Qty: 30 TAB | Refills: 0 | Status: SHIPPED | OUTPATIENT
Start: 2017-08-08 | End: 2017-09-19 | Stop reason: SDUPTHER

## 2017-08-08 RX ORDER — ENOXAPARIN SODIUM 100 MG/ML
40 INJECTION SUBCUTANEOUS EVERY 24 HOURS
Status: CANCELLED | OUTPATIENT
Start: 2017-08-08

## 2017-08-08 RX ORDER — LORAZEPAM 1 MG/1
1 TABLET ORAL
Status: DISCONTINUED | OUTPATIENT
Start: 2017-08-08 | End: 2017-09-04

## 2017-08-08 RX ORDER — ONDANSETRON 4 MG/1
4 TABLET, ORALLY DISINTEGRATING ORAL
Status: DISCONTINUED | OUTPATIENT
Start: 2017-08-08 | End: 2017-09-06 | Stop reason: HOSPADM

## 2017-08-08 RX ORDER — LORAZEPAM 1 MG/1
1 TABLET ORAL
Status: CANCELLED | OUTPATIENT
Start: 2017-08-08

## 2017-08-08 RX ORDER — TEMAZEPAM 15 MG/1
15 CAPSULE ORAL
Status: CANCELLED | OUTPATIENT
Start: 2017-08-08

## 2017-08-08 RX ORDER — ENOXAPARIN SODIUM 100 MG/ML
40 INJECTION SUBCUTANEOUS DAILY
Qty: 30 SYRINGE | Refills: 0 | Status: SHIPPED
Start: 2017-08-08 | End: 2017-09-06

## 2017-08-08 RX ADMIN — ENOXAPARIN SODIUM 40 MG: 40 INJECTION SUBCUTANEOUS at 21:23

## 2017-08-08 RX ADMIN — ESCITALOPRAM OXALATE 20 MG: 10 TABLET ORAL at 08:32

## 2017-08-08 RX ADMIN — ATORVASTATIN CALCIUM 40 MG: 40 TABLET, FILM COATED ORAL at 08:32

## 2017-08-08 RX ADMIN — VALSARTAN: 160 TABLET, FILM COATED ORAL at 08:32

## 2017-08-08 RX ADMIN — ASPIRIN 81 MG 81 MG: 81 TABLET ORAL at 08:32

## 2017-08-08 RX ADMIN — Medication 10 ML: at 05:42

## 2017-08-08 NOTE — ROUTINE PROCESS
TRANSFER - OUT REPORT:    Verbal report given to Emmanuel Enamorado RN(name) on [facility]  being transferred to [facility](unit) for routine progression of care       Report consisted of patients Situation, Background, Assessment and   Recommendations(SBAR). Information from the following report(s) SBAR was reviewed with the receiving nurse. Lines:   Peripheral IV 08/03/17 Left Antecubital (Active)   Site Assessment Clean, dry, & intact 8/8/2017  8:28 AM   Phlebitis Assessment 0 8/8/2017  8:28 AM   Infiltration Assessment 0 8/8/2017  8:28 AM   Dressing Status Clean, dry, & intact 8/8/2017  8:28 AM   Dressing Type Transparent;Tape 8/8/2017  8:28 AM   Hub Color/Line Status Capped 8/8/2017  8:28 AM   Action Taken Open ports on tubing capped 8/7/2017  7:28 PM   Alcohol Cap Used No 8/7/2017  7:28 PM        Opportunity for questions and clarification was provided.       Patient transported with:  Belongings

## 2017-08-08 NOTE — H&P
[address] [place] [address], [address] [facility]  Tel: [phone]  Fax: [phone]      HISTORY AND PHYSICAL  IRC       Admit Date: 8/8/2017  Primary Care Physician: Catracho Dunn MD  Specialty Group: Hospitalist service    Chief Complaint : Gait dysfunction secondary to below. Admit Diagnosis: Stroke - Right Side Body Involvment;Stroke (cerebrum) (HCC)    Acute Rehab Dx:  Gait impairment/ gait dysfunction  Debility    deconditioning  Mobility and ambulation deficits  Self Care/ADL deficits    Medical Dx:  Past Medical History:   Diagnosis Date    Generalized anxiety disorder     GERD (gastroesophageal reflux disease)     Hypertension     Insomnia, unspecified     Iron deficiency anemia 8/23/2013    Left sided lacunar stroke ([facility] [address] 08/2017    Mitral valve prolapse 1993    Obesity (BMI 30-39. 9)     Prediabetes 7/28/2015 12/2/14 HgbA1C 5.9       Date of Evaluation:  August 8, 2017    HPI: Sara Andrade is a 38 y.o. female patient at [address] [place] [address] who was admitted on 8/8/2017  by Kailee Vale MD with below mentioned medical history ,is being seen for Physical Medicine and Rehabilitation. 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 She has never had symptoms like this before, but does note that she \" has chest pain all the time but sometimes barely noticeable. \" She was told she had mitral valve prolapse at the age of 21 after \"years of misdiagnosis. \" Her BP was elevated in the ED. She admits to being under a lot of stress bc her boss is out of town and her work duties have been overwhelming. She states compliance with her BP med (Avalide).  She reports a long hx of snoring and an outpt sleep study was recommended but she never had done. She has chronic unspecified insomnia for which she takes Ambien. Upon transfer to the 7th floor , she requested the use of oxygen, but did cpap and tolerated well. EKG nl. Lipid profile shows triglycerides of 174, chol of 171, hdl 46 and LdL of 90.2. She has been started on aspirin and lipitor. Telemetry did not reveal any arrhythmias. Her echo and carotid duplex were normal. MRI confirmed a lacunar infarct in the left corona radiata. On 8/7 pt reported worsening right hemiplegia thus a f/u HCT was ordered that ruled out hemorrhage and showed simple evolution of the infarct. While on the 7th floor, I did order a hypercoaguable w/u given pt has no etiology or precursing issue that alludes to the reason for her stroke. She has no hx of DVT. She is doing well with CPAP and will need a sleep study and f/u post dc with pulmonary. Physical therapy was initiated and patient was starting to mobilize. However, she shows significant functional deficits due to prolonged immobility and hospitalization. Our service was consulted for rehab needs and we recommended inpatient hospital rehabilitation is reasonable and necessary due to ongoing acute medical issues which have not changed since initial pre-admission evaluation. and rehab needs still likely best managed in IRU setting. The patient was evaluated by [facility] admissions coordinators. I reviewed the preadmission screening and have approved for admission to the [facility]. The patient was cleared for transfer to rehab today. Patient continues to have ongoing  medical issues outlined above requiring physician medical supervision and functional deficits which would benefit from continued intensive therapies.        Current Level of Function: (evaluated by acute therapy staff, with bed mobility, transfers, balance personally observed post-admission in the IRF setting minutes prior to submission of document) she is min to CGA with bed mobility , mod A with transfers and ambulation not yet assessed. (she was sba with bed mobility and min A with transfers prior to the evolution of infarct) Prior to worsening weakness, she was Min A with UE dressing, max A LE, mod A with toileting, mod A with bathing. She is on a mechanical soft diet with thin liquids (cardiac diet)      Prior Level of Function/Home Situation:   Home Environment: Trailer/mobile home  # Steps to Enter: 4  Rails to Enter: Yes  Hand Rails : Bilateral  One/Two Story Residence: One story  Living Alone: No  Support Systems: Spouse/Significant Other/Partner  Patient Expects to be Discharged to[de-identified] Private residence  Current DME Used/Available at Home: None  Tub or Shower Type: Tub/Shower combination  Prior Level of Function/Work/Activity:  Pt lives in trail with  and son that has steps to enter. Independent with ADLs/ambulation PTA? ? Works full time at [employer] in [employer]. Her  is disabled and she supports the family. Has one son and one dtg    Past Medical History:   Diagnosis Date    Generalized anxiety disorder     GERD (gastroesophageal reflux disease)     Hypertension     Insomnia, unspecified     Iron deficiency anemia 8/23/2013    Left sided lacunar stroke ([facility] [address] 08/2017    Mitral valve prolapse 1993    Obesity (BMI 30-39. 9)     Prediabetes 7/28/2015 12/2/14 HgbA1C 5.9      Past Surgical History:   Procedure Laterality Date    HX ENDOSCOPY  10/1/04    Normal per Dr. Jossue Grewal     Allergies   Allergen Reactions    Erythromycin Nausea and Vomiting      Family History   Problem Relation Age of Onset    Cancer Mother      cervical    Hypertension Mother     Cancer Sister      cervical    Hypertension Brother 32    Diabetes Maternal Grandmother     Hypertension Other      strong FH      Social History   Substance Use Topics    Smoking status: Never Smoker    Smokeless tobacco: Never Used    Alcohol use No      No current facility-administered medications for this encounter. Review of Systems:    Constitutional: Negative for diaphoresis. No recent illness  HENT: Negative.    Eyes: Negative.  denies blurred vision or diplopia  Respiratory: Negative for cough, hemoptysis, sputum production and shortness of breath.  + snoring/?RADHA; has done well with starting with the CPAP here  Cardiovascular: Positive for non radiating chest pain and \"constant awareness of what heart is doing\". Negative for palpitations. No hx of syncope  Gastrointestinal: Negative for abdominal pain, nausea and vomiting. + obesity  Endocrine: Negative.    Genitourinary: Negative.    Musculoskeletal: Negative for back pain and neck pain. Neurological: Positive for weakness, difficulty speaking and swallowing. Positive for worsening weakness and for numbness starting on 8/6      Objective:     Temp 97.6, HR 98, RR 18, /65 sats 96% on RA    Physical Exam:  General:  Alert, oriented and mood affect appropriate for situation but is tearful and anxious at times   Lungs:   Clear to auscultation bilaterally. Heart:  Regular rate and rhythm, S1, S2 stable, no murmur, click, rub or gallop. Abdomen:   Soft, non-tender. Bowel sounds present. No masses,  No organomegaly. obese   Genitourinary: deferred   Neuro Muscular: \"On observation, examiner would not think she could actively move her RUE; but with MMT; Biceps 4+, triceps 4+, WE 4+ ,  4, intrinsics 4-, deltoid 4  RLE hip flexion 4-/3+, KE 4, DF 3, PF 4, EHL 3  Some give away weakness noted  Plantar reflex up on right , down on left  L sided strength 5/5\" EXAM FROM DAY OF CONSULT 8/4  TODAY'S EXAM;   No hypertonicity; now hypotonicity especially RUE  Mild dysarthria, right lower facial weakness  Sensation and temp wnl  RUE; flaccid, right hip flexion 2+, KE 1+, DF/PF 0/5  Full passive range of motion all joints.  dtrs muted on the right, 1+ on the left   Skin:  No rashes, lesions, or signs/symptoms or infection. No edema        Lab Review:    Recent Results (from the past 72 hour(s))   PLEASE READ & DOCUMENT PPD TEST IN 24 HRS    Collection Time: 08/06/17  2:00 PM   Result Value Ref Range    PPD 0 0neg Negative    mm Induration 0 0 mm   CBC WITH AUTOMATED DIFF    Collection Time: 08/07/17  5:36 AM   Result Value Ref Range    WBC 7.5 4.3 - 11.1 K/uL    RBC 5.57 (H) 4.05 - 5.25 M/uL    HGB 11.7 11.7 - 15.4 g/dL    HCT 37.8 35.8 - 46.3 %    MCV 67.9 (L) 79.6 - 97.8 FL    MCH 21.0 (L) 26.1 - 32.9 PG    MCHC 31.0 (L) 31.4 - 35.0 g/dL    RDW 16.6 (H) 11.9 - 14.6 %    PLATELET 342 (H) 500 - 450 K/uL    MPV 9.9 (L) 10.8 - 14.1 FL    DF AUTOMATED      NEUTROPHILS 66 43 - 78 %    LYMPHOCYTES 24 13 - 44 %    MONOCYTES 9 4.0 - 12.0 %    EOSINOPHILS 1 0.5 - 7.8 %    BASOPHILS 0 0.0 - 2.0 %    IMMATURE GRANULOCYTES 0.1 0.0 - 5.0 %    ABS. NEUTROPHILS 4.9 1.7 - 8.2 K/UL    ABS. LYMPHOCYTES 1.8 0.5 - 4.6 K/UL    ABS. MONOCYTES 0.7 0.1 - 1.3 K/UL    ABS. EOSINOPHILS 0.1 0.0 - 0.8 K/UL    ABS. BASOPHILS 0.0 0.0 - 0.2 K/UL    ABS. IMM.  GRANS. 0.0 0.0 - 0.5 K/UL   METABOLIC PANEL, BASIC    Collection Time: 08/07/17  5:36 AM   Result Value Ref Range    Sodium 139 136 - 145 mmol/L    Potassium 3.2 (L) 3.5 - 5.1 mmol/L    Chloride 100 98 - 107 mmol/L    CO2 26 21 - 32 mmol/L    Anion gap 13 7 - 16 mmol/L    Glucose 125 (H) 65 - 100 mg/dL    BUN 11 6 - 23 MG/DL    Creatinine 0.65 0.6 - 1.0 MG/DL    GFR est AA >60 >60 ml/min/1.73m2    GFR est non-AA >60 >60 ml/min/1.73m2    Calcium 9.1 8.3 - 10.4 MG/DL   PROTEIN C ANTIGEN, PLASMA    Collection Time: 08/07/17  2:22 PM   Result Value Ref Range    Protein C Antigen PENDING %   PTT    Collection Time: 08/07/17  2:22 PM   Result Value Ref Range    aPTT 26.4 23.5 - 31.7 SEC   PROTHROMBIN TIME + INR    Collection Time: 08/07/17  2:22 PM   Result Value Ref Range    Prothrombin time 10.4 9.6 - 12.0 sec    INR 1.0 0.9 - 1.2     FACTOR V    Collection Time: 08/07/17  2:22 PM   Result Value Ref Range    Factor V Activity PENDING %   PROTEIN S ANTIGEN    Collection Time: 08/07/17  2:22 PM   Result Value Ref Range    Protein S Antigen PENDING %   PLEASE READ & DOCUMENT PPD TEST IN 48 HRS    Collection Time: 08/07/17  3:38 PM   Result Value Ref Range    PPD  0 Negative    mm Induration  0 mm   METABOLIC PANEL, BASIC    Collection Time: 08/08/17  2:39 AM   Result Value Ref Range    Sodium 139 136 - 145 mmol/L    Potassium 3.2 (L) 3.5 - 5.1 mmol/L    Chloride 99 98 - 107 mmol/L    CO2 28 21 - 32 mmol/L    Anion gap 12 7 - 16 mmol/L    Glucose 95 65 - 100 mg/dL    BUN 13 6 - 23 MG/DL    Creatinine 0.65 0.6 - 1.0 MG/DL    GFR est AA >60 >60 ml/min/1.73m2    GFR est non-AA >60 >60 ml/min/1.73m2    Calcium 8.9 8.3 - 10.4 MG/DL     Condition on Admission: Fair      Assessment: This is an unfortunate 35yo WF with obesity, RADHA, and htn admitted with right hemiplegia due to Left hemispheric stroke with evolution; etiology of stroke not clear  The Post Assessment Physician Evaluation (GUNJAN) found the current functional status to be comparable with the Pre-admission Screening. The Patient is a good candidate for acute inpatient rehabilitation. Nothing since the Pre-admission screen has changed that determination. Rehabilitation Plan  The patient has shown the ability to tolerate and benefit from 3 hours of therapy daily and is being admitted to a comprehensive acute inpatient rehabilitation program consisting of at least 3 hours of combined physical and occupational therapies. Begin intensive Physical Therapy for a minimum of 1.5 hours a day, at least 5 out of 7 days per week to address bed mobility, transfers, ambulation, strengthening, balance, and endurance. Begin intensive Occupational Therapy for a minimum of 1.5 hours a day, at least 5 out of 7 days per week to address ADL ( bathing, LE dressing, toileting) and adaptive equipment as needed.     Continue ST for: dysarthria, mild dysphagia, consider vital stimulation for R facial weakness. Continue 24-hour skilled rehabilitation nursing for bowel and bladder management, skin care for decubitus ulcer prevention , pain management and ongoing medication administration . Continue daily physician medical management:    CVA; cont ASA and Lipitor , hypercoag w/u pending (outside labs)    Pneumonia prophylaxis- Incentive spirometer every hour while awake    DVT risk / DVT Prophylaxis- Will require daily physician exam to assess for signs and symptoms as patient is at increased risk for of thromboembolism. Mobilization as tolerated. Intermittent pneumatic compression devices when in bed Thigh-high or knee-high thromboembolic deterrent hose when out of bed. Lovenox subq daily. Pain Control: stable, mild-to-moderate joint symptoms intermittently, reasonably well controlled by PRN meds. Will require regular pain assessment and comprenhensive pain management, Cont prn tylenol    Chronic insomnia; on Ambien at home. Have not restarted this. Try prn trazadone. Wound Care: Monitor wound status daily per staff and physician. At risk for failure. Will require 24/7 rehab nursing. None needed; turning schedule q 2 hrs while in bed    Suspect RADHA; can use CPAP while here but will need outpt formal sleep study and Pulmonary follow up    Obesity; have discussed importance of wt loss for mobility and overall health    Hypertension - BP better controlled, fluctuating, managed medically. Cont Diovan HCT    LUCHO; gets IV infusions; cont to monitor. Currently hgb 11.7    Hypokalemia; K 3.2; add supplement and monitor; re check 8/10    Urinary retention/ neurogenic bladder - schedule voids q6-8 hrs. Check post-void residual as needed; In and Out catheterize if post-void residual is more than 400 cc.    bowel program - add prn bowel program    GERD - start PPI. At times may need additional antacids, Maalox prn.     Depression; cont Lexapro; home med due to generalized anxiety due to stressors in life. His risk post stroke depression. Order recreational therapy and psych if needed    Dysphagia; mild. Cont ST for this as well as for dysarthria. Mechanical consistency diet, cont aspir precautions        The patient's prognosis for significant practical improvement within a reasonable period of time appears good and the estimated length of stay is 14 days and she is expected to return home with spouse and continued rehabilitation with home health therapy vs outpt if able to get reliable transportation    Given the patient's complex neurologic/medical condition and the risk of further medical complications including: DVT, PE, skin breakdown, pneumonia due to decreased mobility,   Recurrent CVA, MI, cardiac arrhythmias due to HTN, increased risk of thromboembolism secondary to decreased mobility, could potentially impact the IRF program with decreased cardiovascular efficiency, postural hypotension and cardiac arrhythmia. For these ongoing medical issues, rehabilitation services could not be safely provided at a lower level of care such as a skilled nursing facility or nursing home.       >70min  Signed By: Nargis Lowe MD     August 8, 2017

## 2017-08-08 NOTE — PROGRESS NOTES
TRANSFER - IN REPORT:    Verbal report received from [id number] Richard Carreno  on [employer]  being received from [facility] for routine progression of care      Report consisted of patients Situation, Background, Assessment and   Recommendations(SBAR). Information from the following report(s) Kardex was reviewed with the receiving nurse. Opportunity for questions and clarification was provided. Assessment completed upon patients arrival to unit and care assumed.

## 2017-08-08 NOTE — DISCHARGE SUMMARY
Discharge Summary     Patient: Meek Little MRN: [id number]  SSN: [id number]    YOB: 1975  Age: 38 y.o. Sex: female       Admit Date: 8/3/2017    Discharge Date: 8/8/2017      Admission Diagnoses: Weakness of right side of body  Stroke [facility])    Discharge Diagnoses:   Problem List as of 8/8/2017  Date Reviewed: 1/26/2017          Codes Class Noted - Resolved    * (Principal)Left sided lacunar stroke [facility]) ICD-10-CM: I63.9  ICD-9-CM: 434.91  8/4/2017 - Present    Overview Signed 8/6/2017  7:44 PM by Saranya Richards MD     8/2017             Weakness of right side of body ICD-10-CM: R53.1  ICD-9-CM: 728.87  8/3/2017 - Present        Essential hypertension ICD-10-CM: I10  ICD-9-CM: 401.9  10/5/2016 - Present        Obesity (BMI 30-39. 9) ICD-10-CM: E66.9  ICD-9-CM: 278.00  Unknown - Present        Prediabetes ICD-10-CM: R73.03  ICD-9-CM: 790.29  7/28/2015 - Present        Generalized anxiety disorder ICD-10-CM: F41.1  ICD-9-CM: 300.02  Unknown - Present        Insomnia, unspecified ICD-10-CM: G47.00  ICD-9-CM: 780.52  Unknown - Present        Mitral valve prolapse ICD-10-CM: I34.1  ICD-9-CM: 424.0  Unknown - Present        Iron deficiency anemia ICD-10-CM: D50.9  ICD-9-CM: 280.9  8/23/2013 - Present    Overview Signed 10/5/2016  2:49 PM by Saranya Richards MD     S/p iron infusion x 2             RESOLVED: GERD (gastroesophageal reflux disease) ICD-10-CM: K21.9  ICD-9-CM: 530.81  Unknown - 10/5/2016        RESOLVED: Mixed hyperlipidemia ICD-10-CM: E78.2  ICD-9-CM: 272.2  Unknown - 10/5/2016        RESOLVED: Hypertension ICD-10-CM: I10  ICD-9-CM: 401.9  Unknown - 10/5/2016               Discharge Condition: [facility] Course:     Patient is a 38 yof with a hx of obesity, htn who presented on 8/3/17 with acute onset rt sided upper and lower extremity weakness associated with some slurred speech and R sided facial droop. Denied associated Ha, blurred va, cp, nausea, vomiting, sob.   Ct head in ER neg for acute abnorms. She presented to the ER 4 hours after the onset of symptoms and was considered out of window to use TPA. She was admitted to the medical service and an MRI done on 8/4 reported an Acute left corona radiata lacunar infarction. An echo was done and did not show any cardiac abnormality and her carotid US was negative for any relevant disease. Her telemetry monitor did not report any arrhythmia and her blood workup remained unremarkable. A thrombophilia workup has been sent ( lupus anticoagulant, protein C, etc...) and is still pending to be reported. She is being transferred to the 9th floor today for acute rehab. Patient snores at night and complains of SOB  At night. SOB not relieved by O2 by nasal canula. Empirically started on CPAP during this admission. Will need a sleep study and follow up with pulmonary once she is discharged. General:                    No acute distress. Head:                                   Atraumatic Normocephalic. Lungs:                                 CTA Bilaterally. Nonlabored  CVS:                                    RRR  Abdomen:                  Soft, NTTP, +BS  Neurologic:                AOx3. Dysarthria, R sided hemiparesis, L side 5/5          Consults: None    Significant Diagnostic Studies: see hospital course     Disposition: rehab     Discharge Medications:   Current Discharge Medication List      START taking these medications    Details   acetaminophen (TYLENOL) 325 mg tablet Take 2 Tabs by mouth every six (6) hours as needed for Pain or Fever (headache). Qty: 30 Tab, Refills: 0      aspirin delayed-release 325 mg tablet Take 1 Tab by mouth daily. Qty: 30 Tab, Refills: 0      atorvastatin (LIPITOR) 40 mg tablet Take 1 Tab by mouth daily. Qty: 30 Tab, Refills: 0      enoxaparin (LOVENOX) 40 mg/0.4 mL 0.4 mL by SubCUTAneous route daily.   Qty: 30 Syringe, Refills: 0         CONTINUE these medications which have NOT CHANGED    Details irbesartan-hydrochlorothiazide (AVALIDE) 150-12.5 mg per tablet Take 1 Tab by mouth daily. Qty: 30 Tab, Refills: 5    Associated Diagnoses: Essential hypertension      escitalopram oxalate (LEXAPRO) 20 mg tablet Take 1 Tab by mouth daily. Qty: 30 Tab, Refills: 5    Associated Diagnoses: Generalized anxiety disorder         STOP taking these medications       HYDROcodone-homatropine (HYCODAN) 5-1.5 mg/5 mL syrup Comments:   Reason for Stopping:         zolpidem (AMBIEN) 10 mg tablet Comments:   Reason for Stopping:               Activity: Activity as tolerated  Diet: Cardiac Diet  Wound Care: None needed    Follow-up Appointments   Procedures    FOLLOW UP VISIT Appointment in: Ten Days neurology     neurology     Standing Status:   Standing     Number of Occurrences:   1     Order Specific Question:   Appointment in     Answer:   Ten Days    FOLLOW UP VISIT Appointment in: One Week pcp     pcp     Standing Status:   Standing     Number of Occurrences:   1     Standing Expiration Date:   8/9/2017     Order Specific Question:   Appointment in     Answer: One Week    FOLLOW UP VISIT Appointment in: One Week Pulmonary. Patient will need a sleep study to rule out RADHA once she is discharged     Pulmonary. Patient will need a sleep study to rule out RADHA once she is discharged     Standing Status:   Standing     Number of Occurrences:   1     Standing Expiration Date:   8/9/2017     Order Specific Question:   Appointment in     Answer:    One Week       Signed By: Chelly Mcintyre MD     August 8, 2017

## 2017-08-08 NOTE — PROGRESS NOTES
Pt ate her supper past set up  And transferred from bed to [facility] WITH mod assist . No complaints noted.  Call bell within reach

## 2017-08-08 NOTE — PROGRESS NOTES
PHYSICAL THERAPY EXAMINATION  TIME IN 80  TIME OUT 1550  Patient Name: Delmi Stark  Patient Age: 38 y.o. Past Medical History:   Past Medical History:   Diagnosis Date    Generalized anxiety disorder     GERD (gastroesophageal reflux disease)     Hypertension     Insomnia, unspecified     Iron deficiency anemia 8/23/2013    Left sided lacunar stroke ([facility] [address] 08/2017    Mitral valve prolapse 1993    Obesity (BMI 30-39. 9)     Prediabetes 7/28/2015 12/2/14 HgbA1C 5.9       Medical Diagnosis:  Stroke - Right Side Body Involvment  Stroke (cerebrum) (HCC) <principal problem not specified>    Precautions at Admission: Other (comment) (Fall precautions)    Therapy Diagnosis:   Difficulty with bed mobility  [x]     Difficulty with functional transfers  [x]     Difficulty with ambulation  [x]     Difficulty with stair negotiations  [x]       Problem List:    Decreased strength R LE  [x]     Decreased strength trunk/core  [x]     Decreased AROM   [x]     Decreased PROM  []    Decreased endurance  [x]     Decreased balance sitting  [x]     Decreased balance standing  [x]     Pain   []     Slow ambulation velocity  [x]    Decreased coordination  [x]    Decreased safety awareness  [x]      Functional Limitations:   Decreased independence with bed mobility  [x]     Decreased independence with functional transfers  [x]     Decreased independence with ambulation  [x]     Decreased independence with stair negotiation  [x]       Previous Functional Level: Patient reporting she was independent with ADLs and functional mobility ambulating inside and outside home without a device. 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  on mental disability)  Patient Expects to be Discharged to[de-identified] Private residence  Current DME Used/Available at Home: None  Tub or Shower Type: Tub/Shower combination (shower curtain)         Outcome Measures: Vital Signs:NT    Pain level:No c/o pain  Pain location:NA  Pain interventions:NA    Patient education:PT POC and goals, Bed mobility training,transfer training, gait training, fall precautions, activity pacing, hemiplegic body mechanics,Patient verbalizing understanding and demonstrating partial understanding of patient education. Recommend follow up education. Interdisciplinary Communication:spoke with RN regarding functional status and possibility of getting a low boy bed secondary to patient's height and transfers from w/c to bed are difficult in standard bed due to height of mattress.      MMT Initial Asssessment   Right Lower Extremity Left Lower Extremity   Hip Flexion 2+ 5   Knee Extension 1 5   Knee Flexion 1 5   Ankle Dorsiflexion 0 5   0/5 No palpable muscle contraction  1/5 Palpable muscle contraction, no joint movement  2-/5 Less than full range of motion in gravity eliminated position  2/5 Able to complete full range of motion in gravity eliminated position  2+/5 Able to initiate movement against gravity  3-/5 More than half but not full range of motion against gravity  3/5 Able to complete full range of motion against gravity  3+/5 Completes full range of motion against gravity with minimal resistance  4-/5 Completes full range of motion against gravity with minimal-moderate resistance  4/5 Completes full range of motion against gravity with moderate resistance  4+/5 Completes full range of motion against gravity with moderate-maximum resistance  5/5 Completes full range of motion against gravity with maximum resistance     AROM:LLE WFL, RLE PROM [facility]    FIM SCORES Initial Assessment   Bed/Chair/Wheelchair Transfers 3   Wheelchair Mobility  (Unable to assess due to patient's height and available w/c)   Walking [place] 1   Steps/Stairs  (NT)   PRIMARY MODE OF LOCOMOTION: wheelchair  Please see IRC Interdisciplinary Eval: Coordination/Balance Section for details regarding FIM score description. BED/CHAIR/WHEELCHAIR TRANSFERS Initial Assessment   Rolling Right 6 (Modified independent) (using bed rail)   Rolling Left 4 (Minimal assistance) (cues to attend to RUE and RUE positioning)   Supine to Sit 3 (Moderate assistance) (using bed rail)   Sit to Stand Minimal assistance (from bed and w/c)   Sit to Supine 4 (Minimal assistance)   Transfer Assist Score 3   Transfer Type SPT without device (bed to w/c to left and w/c to bed with mod assist to her right)   Comments Tendency to be impulsive at times during transfers and bed mobility requiring cues for hemiplegic body mechanics, cues to attend to right side   Car Transfer Not tested   Car Type         WHEELCHAIR MOBILITY/MANAGEMENT Initial Assessment   Able to Propel 0 feet   Functional Level Unable to assess due to patient's height requiring a bobby height w/c with STF height of 14 inches.  W/C of that size not available at this time   Curbs/ramps assistance required 0 (Not tested)   Wheelchair set up assistance required 0 (Not tested)   Wheelchair management  NA       WALKING INDEPENDENCE Initial Assessment   Assistive device Other (comment) (hand rail on left, ace wrap for left DF assist)   Ambulation assistance - level surface 3 (Moderate assistance) (max assist to advance and stabilize RLE)   Distance 20 Feet (ft) x 1 with hand rail, 10ft x 1 in parallel bars   Functional Level 1   Comments slow start/stop step to hemiplegic gait pattern leading leading with RLE and stepping to LLE   Ambulation assistance - unlevel surface 0 (Not tested)       STEPS/STAIRS Initial Assessment   Steps/Stairs ambulated 0   Rail Use     Functional Level  (NT)   Comments unable to ambulate up/down steps at this time secondary to extent of right hemiparesis   Curbs/Ramps 0 (Not tested)     QUALITY INDICATOR ASSIST COMMENTS   Walk 10 feet MOD ASSIST    Walk 50 feet with 2 turns Unable secondary to extent of right hemiparesis    Walk 150 feet Unable secondary to extent of right hemiparesis    Walk 10 feet on uneven  Unable secondary to extent of right hemiparesis    1 step/curb Unable secondary to extent of right hemiparesis    4 steps Unable secondary to extent of right hemiparesis    12 steps Unable secondary to extent of right hemiparesis     object Unable secondary to extent of right hemiparesis and impaired balance    Wheel 50' w/2 turns Unable to assess due to patient's height requiring a bobby height w/c with STF height of 14 inches. W/C of that size not available at this time    Wheel 150' As noted above             PHYSICAL THERAPY PLAN OF CARE    Therapy Diagnosis:   Please see table above    Order received from MD for physical therapy services and chart reviewed. Pt to be seen at least 5 times per week for at least 1.5 hours of physical therapy per day for 3 weeks. Thank you for the referral.    LTGs:  STG 1. Patient transfer supine<>sit with min assist in 1 week  LTG 1. Patient roll left<>right and transfer supine<>sit with modified independence  in 3 weeks  STG 2. Patient transfer sit<>stand and perform stand pivot transfer with LRAD and min assist in 1 week  LTG 2. Patient transfer sit<>stand and perform stand pivot transfer with LRADand modified independence in 3 weeks  STG 3. Patient ambulate 50 feet with LRAD and mod assist in 1 week  LTG 3. Patient ambulate 150 feet with LRAD  and SBA  in 3 weeks  STG 4. Patient ambulate up/down 4 steps with left hand rail and mod assist in 1 week  LTG 4. Patient ambulate up/down 4 steps with left hand rail and SBA  in 3 weeks  STG 5. Patient propel wheelchair 150 feet with min assist in 1 week  LTG 5.  Patient propel wheelchair 200 feet with modified independence  in 3 weeks    Pt would benefit from skilled physical therapy in order to improve independent functional mobility within the home. Interventions may include range of motion (AROM, PROM B LE/trunk), motor function (B LE/trunk strengthening/coordination), activity tolerance (vitals, oxygen saturation levels), bed mobility training, balance activities, gait training (progressive ambulation program), and functional transfer training. Please see IRC; Interdisciplinary Eval, Care Plan, and Patient Education for further information regarding physical therapy examination and plan of care. Patient returned to room at end of treatment. Patient supine in bed with head of bed elevated and bed rails up x 2. Needs placed in reach of patient.     Abran Chand, PT  8/8/2017

## 2017-08-08 NOTE — PROGRESS NOTES
Pt oriented to room with family at bedside . No complaints of pain noted . Right upper body  side flaccid with gross movement of lower left  leg  Using upper body . Pt is alert and oriented excited to be here . Skin assessment completed with Corey Rincon RN  . No skin breakdown noted . Call bell with in reach ,.  Informed pt not get OOB without assist .

## 2017-08-08 NOTE — IP AVS SNAPSHOT
[address] 
[phone] Patient: Carole Farris MRN: [id number] OUMOU:[id number] You are allergic to the following Allergen Reactions Erythromycin Nausea and Vomiting Recent Documentation Breastfeeding? OB Status Smoking Status No Having regular periods Never Smoker Unresulted Labs Order Current Status PLEASE READ & DOCUMENT PPD TEST IN 72 HRS Preliminary result Emergency Contacts Name Discharge Info Relation Home Work Mobile Yocasta Miller  Mother [14] [phone] Shirin Becker  Spouse [3] [phone] About your hospitalization You were admitted on:  August 8, 2017 You last received care in the:  [facility] 9 INPATIENT REHAB UNIT You were discharged on:  September 6, 2017 Unit phone number:  [phone] Why you were hospitalized Your primary diagnosis was:  Not on File Your diagnoses also included:  Stroke (Cerebrum) (Hcc) Providers Seen During Your Hospitalizations Provider Role Specialty Primary office phone Alon Youngblood MD Attending Provider Physical Medicine and Rehab [phone] Your Primary Care Physician (PCP) Primary Care Physician Office Phone Office Fax Gideon Brown [phone] Follow-up Information Follow up With Details Comments Contact Info Lucila Anglin MD On 9/19/2017 appointment at 2:00 PM [address] [place] [address] Drive [address] [place] [address] 
[phone] Alon Youngblood MD On 10/18/2017 appointment at 10:00 AM [address] [place] [address] 
[phone] Your Appointments Monday September 11, 2017  9:15 AM EDT  
SC OT INITIAL VISIT CVA with [facility] OCCUPATIONAL THERAPY  
[place] ([address] [facility]) [address] [facility], 
[address] [place] [id number] [phone] Please arrive 10-15 minutes prior to your appointment time. Wear comfortable clothing. Please bring your insurance cards with you. Please bring a list of your medications including herbal supplements. Please bring a list of your allergies including food allergies. [facility], [address] [facility], [facility] [place] Friday September 15, 2017  1:30 PM EDT  
SC PT CVA/NEURO with MANOLO Moreno ([address] [place] [address]) [place] [address], 
[address] [place] [id number] [phone] Please arrive 10 to 15 minutes prior to your appointment time. Wear comfortable clothing. Please bring your insurance cards with you. Please bring a list of your medications including herbal supplements. Please bring a list of your allergies including food allergies. [facility],[address], [address] [facility], [facility] [place] Tuesday September 19, 2017  2:00 PM EDT Office Visit with Dayanara Masterson MD  
[address] ([address] [facility]) [address] [place] [address]  
[phone] Wednesday October 18, 2017 10:00 AM EDT Office Visit with Edda Kyle MD  
[facility] ([address]) [place] [address] [address] [facility] [address]  
[phone] Current Discharge Medication List  
  
START taking these medications Dose & Instructions Dispensing Information Comments Morning Noon Evening Bedtime  
 diazePAM 5 mg tablet Commonly known as:  VALIUM Your last dose was: Your next dose is:    
   
   
 Dose:  2.5 mg Take 0.5 Tabs by mouth every six (6) hours as needed. Max Daily Amount: 10 mg. Indications: MUSCLE SPASM Quantity:  60 Tab Refills:  1  
     
   
   
   
  
 hydroCHLOROthiazide 12.5 mg capsule Commonly known as:  Marbella Chouody Your last dose was: Your next dose is:    
   
   
 Dose:  25 mg Take 2 Caps by mouth daily. Quantity:  30 Cap Refills:  6  
     
   
   
   
  
 potassium chloride SR 20 mEq tablet Commonly known as:  K-TAB Your last dose was: Your next dose is:    
   
   
 Dose:  20 mEq Take 1 Tab by mouth two (2) times a day. Quantity:  60 Tab Refills:  6 tiZANidine 4 mg tablet Commonly known as:  Gareloy Connollyt Your last dose was: Your next dose is:    
   
   
 Dose:  4 mg Take 1 Tab by mouth three (3) times daily. Quantity:  90 Tab Refills:  6  
     
   
   
   
  
 traZODone 50 mg tablet Commonly known as:  Gail Hail Your last dose was: Your next dose is:    
   
   
 Dose:  50 mg Take 1 Tab by mouth nightly as needed for Sleep. Indications: insomnia associated with depression Quantity:  30 Tab Refills:  2  
     
   
   
   
  
 valsartan 160 mg tablet Commonly known as:  DIOVAN Your last dose was: Your next dose is:    
   
   
 Dose:  160 mg Take 1 Tab by mouth daily. Quantity:  30 Tab Refills:  6 CONTINUE these medications which have CHANGED Dose & Instructions Dispensing Information Comments Morning Noon Evening Bedtime * aspirin delayed-release 325 mg tablet What changed:  Another medication with the same name was added. Make sure you understand how and when to take each. Your last dose was: Your next dose is:    
   
   
 Dose:  325 mg Take 1 Tab by mouth daily. Quantity:  30 Tab Refills:  0  
     
   
   
   
  
 * aspirin 325 mg tablet Commonly known as:  ASPIRIN What changed: You were already taking a medication with the same name, and this prescription was added. Make sure you understand how and when to take each. Your last dose was: Your next dose is:    
   
   
 Dose:  325 mg Take 1 Tab by mouth daily. Quantity:  30 Tab Refills:  12  
     
   
   
   
  
 * atorvastatin 40 mg tablet Commonly known as:  LIPITOR What changed:  Another medication with the same name was added. Make sure you understand how and when to take each. Your last dose was: Your next dose is:    
   
   
 Dose:  40 mg Take 1 Tab by mouth daily. Quantity:  30 Tab Refills:  0  
     
   
   
   
  
 * atorvastatin 40 mg tablet Commonly known as:  LIPITOR What changed: You were already taking a medication with the same name, and this prescription was added. Make sure you understand how and when to take each. Your last dose was: Your next dose is:    
   
   
 Dose:  40 mg Take 1 Tab by mouth daily. Quantity:  30 Tab Refills:  6  
     
   
   
   
  
 * Notice: This list has 4 medication(s) that are the same as other medications prescribed for you. Read the directions carefully, and ask your doctor or other care provider to review them with you. CONTINUE these medications which have NOT CHANGED Dose & Instructions Dispensing Information Comments Morning Noon Evening Bedtime  
 acetaminophen 325 mg tablet Commonly known as:  TYLENOL Your last dose was: Your next dose is:    
   
   
 Dose:  650 mg Take 2 Tabs by mouth every six (6) hours as needed for Pain or Fever (headache). Quantity:  30 Tab Refills:  0  
     
   
   
   
  
 escitalopram oxalate 20 mg tablet Commonly known as:  Bruce Orts Your last dose was: Your next dose is:    
   
   
 Dose:  20 mg Take 1 Tab by mouth daily. Quantity:  30 Tab Refills:  5  
     
   
   
   
  
 irbesartan-hydroCHLOROthiazide 150-12.5 mg per tablet Commonly known as:  AVALIDE Your last dose was: Your next dose is:    
   
   
 Dose:  1 Tab Take 1 Tab by mouth daily. Quantity:  30 Tab Refills:  5 STOP taking these medications   
 enoxaparin 40 mg/0.4 mL  
 Commonly known as:  LOVENOX Where to Get Your Medications Information on where to get these meds will be given to you by the nurse or doctor. ! Ask your nurse or doctor about these medications  
  aspirin 325 mg tablet  
 atorvastatin 40 mg tablet  
 diazePAM 5 mg tablet  
 hydroCHLOROthiazide 12.5 mg capsule  
 potassium chloride SR 20 mEq tablet  
 tiZANidine 4 mg tablet  
 traZODone 50 mg tablet  
 valsartan 160 mg tablet Discharge Instructions DISCHARGE SUMMARY from Nurse The following personal items are in your possession at time of discharge: 
 
Dental Appliances: None Visual Aid: None Home Medications: Sent home Jewelry: None Clothing: Footwear Other Valuables: Cell Phone PATIENT INSTRUCTIONS: 
 
 
F-face looks uneven A-arms unable to move or move unevenly S-speech slurred or non-existent T-time-call 911 as soon as signs and symptoms begin-DO NOT go Back to bed or wait to see if you get better-TIME IS BRAIN. Warning Signs of HEART ATTACK Call 911 if you have these symptoms: 
? Chest discomfort. Most heart attacks involve discomfort in the center of the chest that lasts more than a few minutes, or that goes away and comes back. It can feel like uncomfortable pressure, squeezing, fullness, or pain. ? Discomfort in other areas of the upper body. Symptoms can include pain or discomfort in one or both arms, the back, neck, jaw, or stomach. ? Shortness of breath with or without chest discomfort. ? Other signs may include breaking out in a cold sweat, nausea, or lightheadedness. Don't wait more than five minutes to call [address]! Fast action can save your life.  Calling 911 is almost always the fastest way to get lifesaving treatment. Emergency Medical Services staff can begin treatment when they arrive  up to an hour sooner than if someone gets to the hospital by car. The discharge information has been reviewed with the patient and family. The patient and family verbalized understanding. Discharge medications reviewed with the patient and family and appropriate educational materials and side effects teaching were provided. Discharge Orders None [employer] Announcement We are excited to announce that we are making your provider's discharge notes available to you in [employer]. You will see these notes when they are completed and signed by the physician that discharged you from your recent hospital stay. If you have any questions or concerns about any information you see in [employer], please call the Health Information Department where you were seen or reach out to your Primary Care Provider for more information about your plan of care. Introducing [facility] SERVICES! Desiree Joya introduces [employer] patient portal. Now you can access parts of your medical record, email your doctor's office, and request medication refills online. 1. In your internet browser, go to [email or web address] 2. Click on the First Time User? Click Here link in the Sign In box. You will see the New Member Sign Up page. 3. Enter your [employer] Access Code exactly as it appears below. You will not need to use this code after youve completed the sign-up process. If you do not sign up before the expiration date, you must request a new code. · [employer] Access Code: [facility] Expires: 11/1/2017  5:31 PM 
 
4. Enter the last four digits of your Social Security Number (xxxx) and Date of Birth (mm/dd/yyyy) as indicated and click Submit. You will be taken to the next sign-up page. 5. Create a [employer]et ID.  This will be your [employer] login ID and cannot be changed, so think of one that is secure and easy to remember. 6. Create a Vim[employer] password. You can change your password at any time. 7. Enter your Password Reset Question and Answer. This can be used at a later time if you forget your password. 8. Enter your e-mail address. You will receive e-mail notification when new information is available in [address] 9. Click Sign Up. You can now view and download portions of your medical record. 10. Click the Download Summary menu link to download a portable copy of your medical information. If you have questions, please visit the Frequently Asked Questions section of the [employer] website. Remember, [employer] is NOT to be used for urgent needs. For medical emergencies, dial 911. Now available from your iPhone and Android! General Information Please provide this summary of care documentation to your next provider. Patient Signature:  ____________________________________________________________ Date:  ____________________________________________________________  
  
[place] Provider Signature:  ____________________________________________________________ Date:  ____________________________________________________________

## 2017-08-09 LAB
ANION GAP BLD CALC-SCNC: 11 MMOL/L (ref 7–16)
BUN SERPL-MCNC: 12 MG/DL (ref 6–23)
CALCIUM SERPL-MCNC: 9 MG/DL (ref 8.3–10.4)
CARDIOLIPIN IGA SER IA-ACNC: <9 APL U/ML (ref 0–11)
CHLORIDE SERPL-SCNC: 99 MMOL/L (ref 98–107)
CO2 SERPL-SCNC: 29 MMOL/L (ref 21–32)
CREAT SERPL-MCNC: 0.61 MG/DL (ref 0.6–1)
GLUCOSE SERPL-MCNC: 100 MG/DL (ref 65–100)
MAGNESIUM SERPL-MCNC: 2.2 MG/DL (ref 1.8–2.4)
POTASSIUM SERPL-SCNC: 3.4 MMOL/L (ref 3.5–5.1)
PROT S FREE AG ACT/NOR PPP IA: NORMAL %
SODIUM SERPL-SCNC: 139 MMOL/L (ref 136–145)

## 2017-08-09 PROCEDURE — 97116 GAIT TRAINING THERAPY: CPT

## 2017-08-09 PROCEDURE — 96125 COGNITIVE TEST BY HC PRO: CPT

## 2017-08-09 PROCEDURE — 80048 BASIC METABOLIC PNL TOTAL CA: CPT | Performed by: PHYSICAL MEDICINE & REHABILITATION

## 2017-08-09 PROCEDURE — 97110 THERAPEUTIC EXERCISES: CPT

## 2017-08-09 PROCEDURE — 99232 SBSQ HOSP IP/OBS MODERATE 35: CPT | Performed by: PHYSICAL MEDICINE & REHABILITATION

## 2017-08-09 PROCEDURE — 97032 APPL MODALITY 1+ESTIM EA 15: CPT

## 2017-08-09 PROCEDURE — 74011250636 HC RX REV CODE- 250/636: Performed by: PHYSICAL MEDICINE & REHABILITATION

## 2017-08-09 PROCEDURE — 97166 OT EVAL MOD COMPLEX 45 MIN: CPT

## 2017-08-09 PROCEDURE — 74011250637 HC RX REV CODE- 250/637: Performed by: PHYSICAL MEDICINE & REHABILITATION

## 2017-08-09 PROCEDURE — 97535 SELF CARE MNGMENT TRAINING: CPT

## 2017-08-09 PROCEDURE — 65310000000 HC RM PRIVATE REHAB

## 2017-08-09 PROCEDURE — 97530 THERAPEUTIC ACTIVITIES: CPT

## 2017-08-09 PROCEDURE — 83735 ASSAY OF MAGNESIUM: CPT | Performed by: PHYSICAL MEDICINE & REHABILITATION

## 2017-08-09 PROCEDURE — 36415 COLL VENOUS BLD VENIPUNCTURE: CPT | Performed by: PHYSICAL MEDICINE & REHABILITATION

## 2017-08-09 RX ORDER — POTASSIUM CHLORIDE 20 MEQ/1
40 TABLET, EXTENDED RELEASE ORAL DAILY
Status: DISCONTINUED | OUTPATIENT
Start: 2017-08-09 | End: 2017-08-11

## 2017-08-09 RX ADMIN — PANTOPRAZOLE SODIUM 40 MG: 40 TABLET, DELAYED RELEASE ORAL at 06:31

## 2017-08-09 RX ADMIN — ENOXAPARIN SODIUM 40 MG: 40 INJECTION SUBCUTANEOUS at 21:13

## 2017-08-09 RX ADMIN — ACETAMINOPHEN 650 MG: 325 TABLET ORAL at 13:08

## 2017-08-09 RX ADMIN — HYDROCHLOROTHIAZIDE: 25 TABLET ORAL at 09:29

## 2017-08-09 RX ADMIN — ESCITALOPRAM OXALATE 20 MG: 10 TABLET ORAL at 09:29

## 2017-08-09 RX ADMIN — ATORVASTATIN CALCIUM 40 MG: 40 TABLET, FILM COATED ORAL at 09:29

## 2017-08-09 RX ADMIN — POTASSIUM CHLORIDE 40 MEQ: 20 TABLET, EXTENDED RELEASE ORAL at 09:33

## 2017-08-09 NOTE — PROGRESS NOTES
PHYSICAL THERAPY DAILY NOTE  Time In: 1301  Time Out: 1651  Patient Seen For: PM;Balance activities;Gait training;Patient education; Therapeutic exercise;Transfer training; Wheelchair mobility; Other (see progress notes)    Subjective: patient reporting she is tired and her back is hurting from being up in the w/c all day. Requesting to rest in the bed at end of treatment. Objective:Vital Signs:  Patient Vitals for the past 12 hrs:   Temp Pulse Resp BP SpO2   08/09/17 0751 97.8 °F (36.6 °C) 97 16 122/76 97 %   08/09/17 0419 97.9 °F (36.6 °C) 98 16 128/78 97 %     Pain level:4 out of 10  Pain location:low back  Pain interventions:Pain medication, rest, positioning    Patient education:Bed mobility training,transfer training, gait training, fall precautions, activity pacing, body mechanics, w/c mobility and parts management, Patient verbalizing understanding and demonstrating partial understanding of patient education. Recommend follow up education. Interdisciplinary Communication:spoke with RN regarding back pain.  RN issued patient pain medication    Other (comment) (Fall precautions)  GROSS ASSESSMENT Daily Assessment     NA       BED/MAT MOBILITY Daily Assessment    Rolling Right : 6 (Modified independent)  Rolling Left : 0 (Not tested)  Supine to Sit : 3 (Moderate assistance)  Sit to Supine : 4 (Minimal assistance)       TRANSFERS Daily Assessment    Transfer Type: SPT without device (w/c<>mat and w/c to bed to the left)  Transfer Assistance : 4 (Minimal assistance) (with verbal cues for body mechanics)  Sit to Stand Assistance: Minimal assistance  Car Transfers: Not tested       GAIT Daily Assessment    Amount of Assistance: 3 (Moderate assistance) (mod assist to advance RLE, max assist to stabilize R knee)  Distance (ft): 20 Feet (ft) (20ft x 2)  Assistive Device: Other (comment) (left hand rail and right ace wrap for DF assist)   Gait training with slow start/stop step to hemiplegic gait pattern leading with RLE and stepping to with LLE. Decreased right knee PF at terminal stance and foot flat with knee flexed at initial contact, knee flexed during RLE stance with occasional knee hyperextension during stance    STEPS or STAIRS Daily Assessment    Steps/Stairs Ambulated (#): 0  Level of Assist : 0 (Not tested)       BALANCE Daily Assessment    Sitting - Static: Good (unsupported)  Sitting - Dynamic: Fair (occasional)  Standing - Static: Fair;Constant support (with left hand rail )  Standing - Dynamic : Impaired       WHEELCHAIR MOBILITY Daily Assessment   Adjusted w/c to bobby height and instructed patient on how to propel w/c using LUE and LLE, able to propel w/c with min assist and verbal cues Able to Propel (ft): 50 feet (50ft x 2)  Functional Level: 2  Curbs/Ramps Assist Required (FIM Score): 0 (Not tested)  Wheelchair Setup Assist Required : 3 (Moderate assistance) (using LUE)  Wheelchair Management: Manages left brake;Manages right brake       LOWER EXTREMITY EXERCISES Daily Assessment   Premodulated ESTIM to right quad, 10 sec on 10 sec off , intensity 13 milliamps, Frequency 80 to 150HZ. Extremity: Right  Exercise Type #1: Other (comment) (Supine SAQ with ESTIM)  Sets Performed: 2  Reps Performed: 10  Level of Assist: Moderate assistance  Exercise Type #2: Other (comment) (left sidelying RLE strengthening exercises)  Sets Performed: 2  Reps Performed: 10  Level of Assist: Moderate assistance          Assessment: Progressing towards goals. Patient returned to room at end of treatment. Patient supine in bed with head of bed elevated and bed rails up x 2. Needs placed in reach of patient. Plan of Care: Continue with POC and progress as tolerated.      Karen Montiel, PT  8/9/2017

## 2017-08-09 NOTE — PROGRESS NOTES
PHYSICAL THERAPY DAILY NOTE  Time In: 0826  Time Out: 6796  Patient Seen For: AM;Balance activities; Patient education; Therapeutic exercise;Transfer training; Other (see progress notes)    Subjective: Patient reporting she feels OK. Reports her right side feels heavy. Reports she wants to be able to walk         Objective:Vital Signs:  Patient Vitals for the past 24 hrs:   Temp Pulse Resp BP SpO2   08/09/17 0419 97.9 °F (36.6 °C) 98 16 128/78 97 %   08/08/17 1955 97.5 °F (36.4 °C) 94 17 120/74 96 %   08/08/17 1518 97.9 °F (36.6 °C) 99 18 130/72 98 %     Pain level:No c/o pain  Pain location:NA  Pain interventions:NA    Patient education:Bed mobility training,transfer training, balance training, fall precautions, activity pacing,bobby body mechanics, Patient verbalizing understanding and demonstrating partial understanding of patient education. Recommend follow up education. Interdisciplinary Communication:NA    Other (comment) (Fall precautions)  GROSS ASSESSMENT Daily Assessment     NA       BED/MAT MOBILITY Daily Assessment   Verbal cues for body mechanics and sequencing Rolling Right : 6 (Modified independent)  Rolling Left : 4 (Minimal assistance)  Supine to Sit : 3 (Moderate assistance)  Sit to Supine : 4 (Minimal assistance)       TRANSFERS Daily Assessment   Verbal cues for body mechanics and sequencing.  Transfer Type: SPT without device (w/c<>mat level surfaces to her left)  Transfer Assistance : 4 (Minimal assistance)  Sit to Stand Assistance: Minimal assistance (with hemiwalker on the left)  Car Transfers: Not tested       GAIT Daily Assessment    Amount of Assistance: 0 (Not tested)  Distance (ft): 0 Feet (ft)       STEPS or STAIRS Daily Assessment    Steps/Stairs Ambulated (#): 0  Level of Assist : 0 (Not tested)       BALANCE Daily Assessment   Static standing balance activities x 1 min x 4 with left UE support on hemiwalker facilitating upright midline posture and lateral weight shifts to increase weight bearing through RLE, blocking right knee facilitating increased quad tone and inhibiting kne hyperextension Sitting - Static: Good (unsupported)  Sitting - Dynamic: Fair (occasional)  Standing - Static: Fair;Constant support (with hemiwalker on her left and blocking right knee)       WHEELCHAIR MOBILITY Daily Assessment   Unable to assess secondary to waiting for bobby height w/c to accommodate for patient's height Able to Propel (ft): 0 feet  Curbs/Ramps Assist Required (FIM Score): 0 (Not tested)  Wheelchair Setup Assist Required : 0 (Not tested)       LOWER EXTREMITY EXERCISES Daily Assessment    Extremity: Right  Exercise Type #1: Supine lower extremity strengthening (facilitating increased tone in RLE)  Sets Performed: 2  Reps Performed: 10  Level of Assist: Moderate assistance  Exercise Type #2: Other (comment) (left sidelying RLE strengthening exercises)  Sets Performed: 2  Reps Performed: 10  Level of Assist: Moderate assistance          Assessment: progressing towards goals. Patient remained in w/c in w/c waiting for ST    Plan of Care: Continue with POC and progress as tolerated.      Sanya Robin, PT  8/9/2017

## 2017-08-09 NOTE — PROGRESS NOTES
08/09/17 1109   Time Spent With Patient   Time In 0934   Time Out 1000   Mental Status   Neurologic State Alert   Orientation Level Oriented X4   Cognition Appropriate decision making   Perception Appears intact   Perseveration No perseveration noted   Safety/Judgement Fall prevention   Psychosocial   Patient Behaviors Calm   Reading Comprehension   Visual Impairment No impairment   Pre-Morbid Reading Status Literate   Overall Impairment Severity None   Written Expression   Pre-Morbid Dominant Hand Unknown/unable to assess      08/09/17 1110   Verbal Expression   Primary Mode of Expression Verbal   Initiation No impairment   Automatic Speech Task No impairment   Repetition No impairment   Naming Impaired   Conversation Dysarthric   Speech Characteristics Word retrieval   Effective Techniques Cueing;Decreased rate;Provide extra time; Word retrieval strategies   Overall Impairment Mild   FIM Score (Verbal Expression) 4   Oral-Motor Structure/Motor Speech   Face Lower facial weakness   Labial Right droop   Dentition Natural   Oral Hygiene adequate   Apraxic Characteristics None   Dysarthric Characteristics Blended word boundaries   Overall Impairment Severity Mild   Neuro-Linguistics/Cognitive Function   Reasoning  Abstract reasoning; Inductive reasoning; [place] reasoning;Convergent thinking;Divergent thinking;Deductive reasoning   Organizational Complex functional tasks; Sequencing   Problem Solving Complex; Functional   FIM Score (Problem Solving) 4   Mathematics No impairment   Memory  Short-term   FIM Score (Memory) 4   Attention  No impairment   Overall Impairment Severity Mild   Pragmatics   Pragmatics Impairment No impairment   Pragmatics Impairment Severity None   FIM Score (Pragmatics/Social Interaction) 6   Pt completed basic cognitive-linguistic evaluation with performance as follows: simple yes/no questions 5/5, complex yes/no questions 5/5, 1-2 step commands 10/10, 3 step commands 5/5, named 5 items per minute in food & animal categories, problem solving questions 5/5, reasoning questions 4/5 and recalled 2/3 items in short term memory task. Pt presents with noted cognitive-linguistic deficits in problem solving, reasoning, word retrieval, and memory. Pt would benefit from ST to address these cognitive-linguistic deficits and assess higher level cognitive abilities secondary to prior level of function.     Roxy Flood MA/CCC/SLP

## 2017-08-09 NOTE — PROGRESS NOTES
Care Management Interventions  PCP Verified by CM: Yes (Dr Charity Edwards)  Transition of Care Consult (CM Consult): Discharge Planning  Physical Therapy Consult: Yes  Occupational Therapy Consult: Yes  Speech Therapy Consult: Yes  Current Support Network: Lives with Spouse, Own Home, Other, Family Lives Nearby (24 yo son lives in home.  )  Plan discussed with Pt/Family/Caregiver: Yes  Freedom of Choice Offered: Yes  Discharge Location  Discharge Placement: Home with family assistance (and continued therapy)    Pt admitted from [facility]. Prior to this admission pt was independent with self care and ambulation. Pt was working at [employer] in [employer] at the time of her stroke. Pt lives with her spouse and 23 yr old son. Spouse is on disability and son is presently on medical leave. Pt's mother and daughter are in area and will also be available to assist if needed. Pt is using her sick days at present and will be eligible for STD from her employer. Pt lives in a mobile home with 4 domingo. Pt has no dme not has not utilzed [facility] or outpatient therapy. Pt has [employer]. Gael Santos Approved for 7 days with update due on 8/15 to Nikolai Dias at  Ph [phone] ext [phone]  Fax [phone]. Pt states she wants to be able to return to work. Discussed voc rehab. WIll make referral.  Discussed role of SW and rehab process. Team conference tomorrow.

## 2017-08-09 NOTE — PROGRESS NOTES
[facility] OCCUPATIONAL THERAPY INITIAL EVALUATION    Time In: 7947  Time Out: 0827    Precautions: Falls and Right hemiparesis    Vitals:   Patient Vitals for the past 8 hrs:   Temp Pulse Resp BP SpO2   08/09/17 0751 97.8 °F (36.6 °C) 97 16 122/76 97 %         Pain: None indicated. 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 She has never had symptoms like this before, but does note that she \" has chest pain all the time but sometimes barely noticeable. \" She was told she had mitral valve prolapse at the age of 21 after \"years of misdiagnosis. \" Her BP was elevated in the ED. She admits to being under a lot of stress bc her boss is out of town and her work duties have been overwhelming. She states compliance with her BP med (Avalide). She reports a long hx of snoring and an outpt sleep study was recommended but she never had done. She has chronic unspecified insomnia for which she takes Ambien.  Upon transfer to the 7th floor , she requested the use of oxygen, but did cpap and tolerated well. EKG nl. Lipid profile shows triglycerides of 174, chol of 171, hdl 46 and LdL of 90.2. She has been started on aspirin and lipitor. Telemetry did not reveal any arrhythmias. Her echo and carotid duplex were normal. MRI confirmed a lacunar infarct in the left corona radiata. On 8/7 pt reported worsening right hemiplegia thus a f/u HCT was ordered that ruled out hemorrhage and showed simple evolution of the infarct. While on the 7th floor, I did order a hypercoaguable w/u given pt has no etiology or precursing issue that alludes to the reason for her stroke. She has no hx of DVT.  She is doing well with CPAP and will need a sleep study and f/u post dc with pulmonary. Past Medical History/ Co-morbidities:   Past Medical History:   Diagnosis Date    Generalized anxiety disorder     GERD (gastroesophageal reflux disease)     Hypertension     Insomnia, unspecified     Iron deficiency anemia 8/23/2013    Left sided lacunar stroke ([address] 08/2017    Mitral valve prolapse 1993    Obesity (BMI 30-39. 9)     Prediabetes 7/28/2015 12/2/14 HgbA1C 5.9       Patient's Goal: \"Working. ..walking. Michaelyn Daily Michaelyn Daily I do everything.'    Previous Level of Function: Independent with ADL/IADL, working and driving    [address] [facility] residence   Lives Alone No   Support Systems Family member(s) (Lives with  and son, daughter lives nearby)   Shower Situation Tub/Shower combination   Current DME None   Lift Chair     Stairs to [employer] 4      Rails Bilateral      W/C Ramp No   Interior Steps       Upper Extremity Function   RAY SULTANA   FMC  Intact  Impaired   GMC  Intact  Impaired   Light Touch No apparent deficit No apparent deficit   Sharp/Dull Discrimination       Hot/Cold No apparent deficit No apparent deficit   Proprioception       Stereognosis       9 Hole Peg Test Normal (22 seconds)     General Comments        RAY SULTANA   General Evalutaion   Hypertonia   AROM       Shoulder Flexion 4+ 0   Shoulder Extension    0   Shoulder Abduction 4+ 0   Shoulder Adduction   0   Elbow Flexion 4+ 0   Elbow Extension  4+ 0   Wrist Flexion/Extension 4      4 0   General Comments     0/5 No palpable muscle contraction  1/5 Palpable muscle contraction, no joint movement  2-/5 Less than full range of motion in gravity eliminated position  2/5 Able to complete full range of motion in gravity eliminated position  2+/5 Able to initiate movement against gravity  3-/5 More than half but not full range of motion against gravity  3/5 Able to complete full range of motion against gravity  3+/5 Completes full range of motion against gravity with minimal resistance  4-/5 Completes full range of motion against gravity with minimal-moderate resistance  4/5 Completes full range of motion against gravity with moderate resistance  4+/5 Completes full range of motion against gravity with moderate-maximum resistance  5/5 Completes full range of motion against gravity with maximum resistance    Cognition   Performance Comments   Orientation Level Oriented X4    Comprehension Level 7 Mode: Auditory  Hearing Aid:None  Glasses:    Expression (Native Language) 6 Mode: Verbal  Expresses complex, abstract ideas with extra time, mild difficulty due to dysarthria   Social Interaction/Pragmatics 6 Patient tearful (appropriate) end of session, provided encouragement and education   Problem Solving 6 Makes decisions with mild difficulty or solves complex problems with extra time. Memory 6 Recognizes people often seen, daily routines, and executes requests with additional time    Comments       Activities of Daily Living    Score Comments   Self-Feeding 5 Setup/cues for container management   Grooming 3 Tasks completed by patient: Washed face, Brushed hair, Brushed teeth (Applied deodorant)  Assist to apply toothpaste to toothbrush and apply deodorant to R underarm   Bathing 3 Body Parts Bathe: Thigh, right, Thigh, left, [place] area, Chest, Arm, right, Arm, left, Abdomen  Assist to bathe lower BLEs and bottom, blocking right knee as patient squats to have bottom bathed   Tub/Shower Transfer [place] 3 Type of Shower: Shower  Adaptive  Equipment:Tub transfer bench, Grab bars and Wheelchair   Upper Body  Dressing/Undressing 3 Items Applied:Pullover (4 steps)  Assist to thread RUE through shirt and to assist down trunk   Lower Body Dressing/Undressing 1 Items Applied:Elastic waist pants (3 steps), Shoe, left (1 step), Shoe, right (1 step), Sock, left (1 step), Sock, right (1 step), Underpants (3 steps)  Dependent for LB dressing at this time   Toileting 0 Activity did not occur, patient reports continent of bowel and bladder   Toilet Transfer 0 Did not occur     Vision/Perception: Tracking and peripheral vision intact, however patient reports \"blurriness. \"  To continue formal vision assessment in future OT sessions. Instrumental Activities of Daily Living   Performance Comments   Meal Preperation Total assistance    Homemaking Total assistance    Medication Management Setup    Financial Management Setup        Session: Patient seen for OT and ADL evaluation. See above for FIM details. Patient completed bathing seated in shower. Patient with 0/5 strength detected in R dominant UE, utilized nondominant LUE for all functional tasks this session. Cues to problem solve one-handed techniques. Patient transferred bed > wheelchair and wheelchair > TTB towards L with minimal-moderate assistance lateral scoot/squat pivot. Patient transferred TTB > wheelchair towards R stand pivot with minimal-moderate assistance. Interdisciplinary Communication: Collaborated with PT and nursing for current level of function, plan of care, and measures to assure safety during their stay. Updated board with current level of function to increase carryover between disciplines. Patient/Family Education: Patient was/were educated On the role of OT, On POC and On IRC expectations. Problem List: [facility], [address], Activity Tolerance, Visual Perceptual , Safety Awareness, Strength, Sitting Balance, Standing Balance, Abnormal Muscle Tone and Cognition    Functional Limitations: ADL, IADL, Functional Transfers and Functional Mobility    Goals: Please see Care Plan    OT order received and chart reviewed. OT orders have been acknowledged. Patient will benefit from skilled OT services to address ADL, functional transfers, UE strength, UE coordination, balance, cognition and activity tolerance to maximize functional performance with daily self-care tasks and functional mobility.  Treatment is likely to include ADL, Balance, Strength, Activity tolerance, Neuro-muscular re-education, Visual perceptual, Cognitive, DME, AE, Family  and Safety awareness training to increase independence with self-care. Patient will be seen for 1.5-2 hours of skilled OT services 5-6 days a week.      Leland Vallejo, OT

## 2017-08-09 NOTE — PROGRESS NOTES
Lajuana Moritz, MD,   Medical Director  [address] [facility], [address] [facility]  Tel: [phone]       SFD PROGRESS NOTE    Clement Joseph  Admit Date: 8/8/2017  Admit Diagnosis: Stroke - Right Side Body Involvment;Stroke (cerebrum) (HCC)    Subjective     No specific complaints . Slept well, no headache. No dizziness. Motivated. Showered with OT this a.m. Objective:     Current Facility-Administered Medications   Medication Dose Route Frequency    potassium chloride (K-DUR, KLOR-CON) SR tablet 40 mEq  40 mEq Oral DAILY    enoxaparin (LOVENOX) injection 40 mg  40 mg SubCUTAneous Q24H    acetaminophen (TYLENOL) tablet 650 mg  650 mg Oral Q4H PRN    alum-mag hydroxide-simeth (MYLANTA) oral suspension 30 mL  30 mL Oral Q4H PRN    atorvastatin (LIPITOR) tablet 40 mg  40 mg Oral DAILY    bisacodyl (DULCOLAX) suppository 10 mg  10 mg Rectal DAILY PRN    escitalopram oxalate (LEXAPRO) tablet 20 mg  20 mg Oral DAILY    LORazepam (ATIVAN) tablet 1 mg  1 mg Oral Q6H PRN    magnesium hydroxide (MILK OF MAGNESIA) 400 mg/5 mL oral suspension 30 mL  30 mL Oral DAILY PRN    ondansetron (ZOFRAN ODT) tablet 4 mg  4 mg Oral Q6H PRN    senna-docusate (PERICOLACE) 8.6-50 mg per tablet 2 Tab  2 Tab Oral DAILY PRN    valsartan/hydroCHLOROthiazide (DIOVAN HCT) 160/25 mg   Oral DAILY    traZODone (DESYREL) tablet 50 mg  50 mg Oral QHS PRN    pantoprazole (PROTONIX) tablet 40 mg  40 mg Oral ACB     Review of Systems:Denies chest pain, shortness of breath, cough, headache, visual problems, abdominal pain, dysurea, calf pain. Pertinent positives are as noted in the medical records and unremarkable otherwise. Visit Vitals    /78    Pulse 98    Temp 97.9 °F (36.6 °C)    Resp 16    SpO2 97%    Breastfeeding No        Physical Exam:   General: Alert and age appropriately oriented. No acute cardio respiratory distress.    HEENT: Normocephalic,no scleral icterus  Oral mucosa moist without cyanosis   Lungs: Clear to auscultation  bilaterally. Respiration even and unlabored   Heart: Regular rate and rhythm, S1, S2   No  murmurs, clicks, rub or gallops   Abdomen: Soft, non-tender, nondistended. Bowel sounds present. No organomegaly. Genitourinary: Benign . Neuromuscular:      Grossly no focal motor deficits noted. Flaccid RUE; RLE not examined due to PT working with her     Skin/extremity: No rashes, no erythema. No calf tenderness BLE  No edema                                                                            Functional Assessment:          Balance  Sitting - Static: Good (unsupported) (08/08/17 1500)  Sitting - Dynamic: Fair (occasional) (08/08/17 1500)  Standing - Static: Fair;Constant support (with LUE support on hand rail blocking right knee) (08/08/17 1500)                     Karen Grider Fall Risk Assessment:  Karen Grider Fall Risk  Mobility: Ambulates or transfers with assist devices or assistance/unsteady gait (08/09/17 0748)  Mobility Interventions: Patient to call before getting OOB;PT Consult for mobility concerns;Strengthening exercises (ROM-active/passive); Communicate number of staff needed for ambulation/transfer (08/09/17 1365)  Mentation: Alert, oriented x 3 (08/09/17 6252)  Medication: Patient receiving anticonvulsants, sedatives(tranquilizers), psychotropics or hypnotics, hypoglycemics, narcotics, sleep aids, antihypertensives, laxatives, or diuretics (08/09/17 0748)  Medication Interventions: Patient to call before getting OOB; Teach patient to arise slowly (08/09/17 3565)  Elimination: Needs assistance with toileting (08/09/17 0748)  Elimination Interventions: Call light in reach;Elevated toilet seat;Patient to call for help with toileting needs; Toilet paper/wipes in reach (08/09/17 0748)  Prior Fall History: No  (08/09/17 0748)  Total Score: 3 (08/09/17 0748)  High Fall Risk: Yes (08/09/17 0748)     Speech Assessment: Ambulation:  Gait  Distance (ft): 20 Feet (ft) (08/08/17 1500)  Assistive Device: Other (comment) (hand rail on left, ace wrap for left DF assist) (08/08/17 1500)     Labs/Studies:  Recent Results (from the past 72 hour(s))   PLEASE READ & DOCUMENT PPD TEST IN 24 HRS    Collection Time: 08/06/17  2:00 PM   Result Value Ref Range    PPD 0 0neg Negative    mm Induration 0 0 mm   CBC WITH AUTOMATED DIFF    Collection Time: 08/07/17  5:36 AM   Result Value Ref Range    WBC 7.5 4.3 - 11.1 K/uL    RBC 5.57 (H) 4.05 - 5.25 M/uL    HGB 11.7 11.7 - 15.4 g/dL    HCT 37.8 35.8 - 46.3 %    MCV 67.9 (L) 79.6 - 97.8 FL    MCH 21.0 (L) 26.1 - 32.9 PG    MCHC 31.0 (L) 31.4 - 35.0 g/dL    RDW 16.6 (H) 11.9 - 14.6 %    PLATELET 946 (H) 262 - 450 K/uL    MPV 9.9 (L) 10.8 - 14.1 FL    DF AUTOMATED      NEUTROPHILS 66 43 - 78 %    LYMPHOCYTES 24 13 - 44 %    MONOCYTES 9 4.0 - 12.0 %    EOSINOPHILS 1 0.5 - 7.8 %    BASOPHILS 0 0.0 - 2.0 %    IMMATURE GRANULOCYTES 0.1 0.0 - 5.0 %    ABS. NEUTROPHILS 4.9 1.7 - 8.2 K/UL    ABS. LYMPHOCYTES 1.8 0.5 - 4.6 K/UL    ABS. MONOCYTES 0.7 0.1 - 1.3 K/UL    ABS. EOSINOPHILS 0.1 0.0 - 0.8 K/UL    ABS. BASOPHILS 0.0 0.0 - 0.2 K/UL    ABS. IMM.  GRANS. 0.0 0.0 - 0.5 K/UL   METABOLIC PANEL, BASIC    Collection Time: 08/07/17  5:36 AM   Result Value Ref Range    Sodium 139 136 - 145 mmol/L    Potassium 3.2 (L) 3.5 - 5.1 mmol/L    Chloride 100 98 - 107 mmol/L    CO2 26 21 - 32 mmol/L    Anion gap 13 7 - 16 mmol/L    Glucose 125 (H) 65 - 100 mg/dL    BUN 11 6 - 23 MG/DL    Creatinine 0.65 0.6 - 1.0 MG/DL    GFR est AA >60 >60 ml/min/1.73m2    GFR est non-AA >60 >60 ml/min/1.73m2    Calcium 9.1 8.3 - 10.4 MG/DL   HOMOCYST(E)INE, PLASMA    Collection Time: 08/07/17  2:22 PM   Result Value Ref Range    Homocysteine, plasma 8.6 0.0 - 15.0 umol/L   PROTEIN C ANTIGEN, PLASMA    Collection Time: 08/07/17  2:22 PM   Result Value Ref Range    Protein C Antigen PENDING %   PTT    Collection Time: 08/07/17  2:22 PM Result Value Ref Range    aPTT 26.4 23.5 - 31.7 SEC   PROTHROMBIN TIME + INR    Collection Time: 08/07/17  2:22 PM   Result Value Ref Range    Prothrombin time 10.4 9.6 - 12.0 sec    INR 1.0 0.9 - 1.2     FACTOR V    Collection Time: 08/07/17  2:22 PM   Result Value Ref Range    Factor V Activity PENDING %   PROTEIN S ANTIGEN    Collection Time: 08/07/17  2:22 PM   Result Value Ref Range    Protein S Antigen PENDING %   PLEASE READ & DOCUMENT PPD TEST IN 48 HRS    Collection Time: 08/07/17  3:38 PM   Result Value Ref Range    PPD  0 Negative    mm Induration  0 mm   METABOLIC PANEL, BASIC    Collection Time: 08/08/17  2:39 AM   Result Value Ref Range    Sodium 139 136 - 145 mmol/L    Potassium 3.2 (L) 3.5 - 5.1 mmol/L    Chloride 99 98 - 107 mmol/L    CO2 28 21 - 32 mmol/L    Anion gap 12 7 - 16 mmol/L    Glucose 95 65 - 100 mg/dL    BUN 13 6 - 23 MG/DL    Creatinine 0.65 0.6 - 1.0 MG/DL    GFR est AA >60 >60 ml/min/1.73m2    GFR est non-AA >60 >60 ml/min/1.73m2    Calcium 8.9 8.3 - 10.4 MG/DL   LUPUS ANTICOAG PANEL    Collection Time: 08/08/17 10:29 AM   Result Value Ref Range    Lupus Anticoag Panel PENDING    PLEASE READ & DOCUMENT PPD TEST IN 72 HRS    Collection Time: 08/08/17  3:00 PM   Result Value Ref Range    PPD negative Negative    mm Induration 0mm mm   METABOLIC PANEL, BASIC    Collection Time: 08/09/17  7:17 AM   Result Value Ref Range    Sodium 139 136 - 145 mmol/L    Potassium 3.4 (L) 3.5 - 5.1 mmol/L    Chloride 99 98 - 107 mmol/L    CO2 29 21 - 32 mmol/L    Anion gap 11 7 - 16 mmol/L    Glucose 100 65 - 100 mg/dL    BUN 12 6 - 23 MG/DL    Creatinine 0.61 0.6 - 1.0 MG/DL    GFR est AA >60 >60 ml/min/1.73m2    GFR est non-AA >60 >60 ml/min/1.73m2    Calcium 9.0 8.3 - 10.4 MG/DL   MAGNESIUM    Collection Time: 08/09/17  7:17 AM   Result Value Ref Range    Magnesium 2.2 1.8 - 2.4 mg/dL       Assessment:     Problem List as of 8/9/2017  Date Reviewed: 1/26/2017          Codes Class Noted - Resolved Stroke (cerebrum) [facility]) ICD-10-CM: I63.9  ICD-9-CM: 434.91  8/8/2017 - Present        Left sided lacunar stroke [facility]) ICD-10-CM: I63.9  ICD-9-CM: 434.91  8/4/2017 - Present    Overview Signed 8/6/2017  7:44 PM by Sheree Archer MD     8/2017             Weakness of right side of body ICD-10-CM: R53.1  ICD-9-CM: 728.87  8/3/2017 - Present        Essential hypertension ICD-10-CM: I10  ICD-9-CM: 401.9  10/5/2016 - Present        Obesity (BMI 30-39. 9) ICD-10-CM: E66.9  ICD-9-CM: 278.00  Unknown - Present        Prediabetes ICD-10-CM: R73.03  ICD-9-CM: 790.29  7/28/2015 - Present        Generalized anxiety disorder ICD-10-CM: F41.1  ICD-9-CM: 300.02  Unknown - Present        Insomnia, unspecified ICD-10-CM: G47.00  ICD-9-CM: 780.52  Unknown - Present        Mitral valve prolapse ICD-10-CM: I34.1  ICD-9-CM: 424.0  Unknown - Present        Iron deficiency anemia ICD-10-CM: D50.9  ICD-9-CM: 280.9  8/23/2013 - Present    Overview Signed 10/5/2016  2:49 PM by Sheree Archer MD     S/p iron infusion x 2             RESOLVED: GERD (gastroesophageal reflux disease) ICD-10-CM: K21.9  ICD-9-CM: 530.81  Unknown - 10/5/2016        RESOLVED: Mixed hyperlipidemia ICD-10-CM: E78.2  ICD-9-CM: 272.2  Unknown - 10/5/2016        RESOLVED: Hypertension ICD-10-CM: I10  ICD-9-CM: 401.9  Unknown - 10/5/2016              Plan: This is an unfortunate 37yo WF with obesity, RADHA, and htn admitted with right hemiplegia due to Left hemispheric stroke with evolution; etiology of stroke not clear    Continue daily physician medical management:     CVA; cont ASA and Lipitor , hypercoag w/u pending (outside labs); neg thus far     Pneumonia prophylaxis- Incentive spirometer every hour while awake     DVT risk / DVT Prophylaxis- Will require daily physician exam to assess for signs and symptoms as patient is at increased risk for of thromboembolism. Mobilization as tolerated.  Intermittent pneumatic compression devices when in bed Thigh-high or knee-high thromboembolic deterrent hose when out of bed. Lovenox subq daily.      Pain Control: stable, mild-to-moderate joint symptoms intermittently, reasonably well controlled by PRN meds. Will require regular pain assessment and comprenhensive pain management, Cont prn tylenol     Chronic insomnia; on Ambien at home. Have not restarted this. Try prn trazadone.      Wound Care: Monitor wound status daily per staff and physician. At risk for failure. Will require 24/7 rehab nursing. None needed; turning schedule q 2 hrs while in bed     Suspect RADHA; can use CPAP while here but will need outpt formal sleep study and Pulmonary follow up     Obesity; have discussed importance of wt loss for mobility and overall health     Hypertension - BP better controlled, fluctuating, managed medically. Cont Diovan HCT     LUCHO; gets IV infusions; cont to monitor. Currently hgb 11.7     Hypokalemia; K 3.2; add supplement and monitor; re check 8/10  -K 3.4 on 8/10, cont KCL 40meq daily     Urinary retention/ neurogenic bladder - schedule voids q6-8 hrs. Check post-void residual as needed; In and Out catheterize if post-void residual is more than 400 cc.     bowel program - add prn bowel program     GERD - start PPI. At times may need additional antacids, Maalox prn.     Depression; cont Lexapro; home med due to generalized anxiety due to stressors in life. His risk post stroke depression. Order recreational therapy and psych if needed     Dysphagia; mild. Cont ST for this as well as for dysarthria. Mechanical consistency diet, cont aspir precautions        Time spent was 25 minutes with over 1/2 in direct patient care/examination, consultation and coordination of care.      Signed By: Mady Madden MD     August 9, 2017

## 2017-08-09 NOTE — PROGRESS NOTES
OT Daily Note    Time In 1116   Time Out 1202     Subjective: \"Have you seen people like me get better? \"  Pain: None indicated    Precautions: Other (comment) (R hemiparesis, Fall Risk)    Functional Mobility   Patient transferred wheelchair <> mat table towards L (to exchange wheelchair) lateral scoot/squat pivot with minimal-moderate assistance. Provided arm tray to support RUE while in wheelchair and educated patient regarding purpose. Neuro-Muscular Re-Education   Patient tolerated PROM stretch to RUE seated in wheelchair in shoulder flexion, extension, internal/external rotation, abduction, elbow flexion/extension, wrist flexion/extension, and digit extension. Patient educated regarding self PROM stretches/exercises, completed 1 set x 10 reps of shoulder flexion, shoulder horizontal add/abduction, and wrist flexion/extension exercises with LUE supporting RUE. Patient completed 1 set x 20 reps of PROM R shoulder flexion/extension towel slide exercises seated at tabletop. Patient required total assist to achieve full ROM. Assessment: Patient tolerated well. No active RUE movement noted. Education: Neuromuscular Re-education  Interdisciplinary Communication: Collaborated with Gracie Quevedo and agreed patient is progressing well and on-track to meet goals as stated in POC. Plan: Continue to address ADL/IADL, functional mobility, activity tolerance, balance, strengthening, coordination, cognition.     Kellie Mclean, OTR/L

## 2017-08-09 NOTE — PROGRESS NOTES
Assessment completed, pt sitting up in bed, she denies pain, respiration even and none labored. Bowel sounds in all 4 quadrants. Right side weakness, flaccid, droop to face. Right AC 20 g hep well. Pt encourage to call for assistance. Call light with  in reach.

## 2017-08-09 NOTE — PROGRESS NOTES
Called by RN to get CPAP for pt, at this time  we do not have any available. As soon as we get one available will bring to pt.

## 2017-08-09 NOTE — PROGRESS NOTES
Subjective \"I want to be independent again. \"   Activity Evaluationi   Strength/Endurance Patient with right sided weakness. Patient is very motivated to participate with all her therapies. Balance Sit<->stand:   Min (A) without AD; vc's needed for reminders to slow down when completing any task due to her motivation level. Social Interaction Patient was very open and shared of the stressors in her life and how she is the one that [employer] everything together. \"   Cognitive A&O X4   Comments Patient appears receptive to participation with recreational therapy. She is extremely motivated and will need reminders of the healing process and the time it takes for the progression. Patient's Recreational Therapy evaluation completed under the [facility] navigation tool on 8/9/17. Please see for specifics regarding plan of care and goals. Patient prefers to be called \"Zoraida. \" Thank you for the referral.  Andres Horvath, CTRS

## 2017-08-10 LAB — LUPUS ANTICOAG PANEL, XMLUPT: NORMAL

## 2017-08-10 PROCEDURE — 65310000000 HC RM PRIVATE REHAB

## 2017-08-10 PROCEDURE — 97535 SELF CARE MNGMENT TRAINING: CPT

## 2017-08-10 PROCEDURE — 74011250637 HC RX REV CODE- 250/637: Performed by: PHYSICAL MEDICINE & REHABILITATION

## 2017-08-10 PROCEDURE — 97110 THERAPEUTIC EXERCISES: CPT

## 2017-08-10 PROCEDURE — 92526 ORAL FUNCTION THERAPY: CPT

## 2017-08-10 PROCEDURE — 74011250636 HC RX REV CODE- 250/636: Performed by: PHYSICAL MEDICINE & REHABILITATION

## 2017-08-10 PROCEDURE — 92507 TX SP LANG VOICE COMM INDIV: CPT

## 2017-08-10 PROCEDURE — 97116 GAIT TRAINING THERAPY: CPT

## 2017-08-10 PROCEDURE — 99232 SBSQ HOSP IP/OBS MODERATE 35: CPT | Performed by: PHYSICAL MEDICINE & REHABILITATION

## 2017-08-10 PROCEDURE — 97112 NEUROMUSCULAR REEDUCATION: CPT

## 2017-08-10 PROCEDURE — 94660 CPAP INITIATION&MGMT: CPT

## 2017-08-10 PROCEDURE — 97530 THERAPEUTIC ACTIVITIES: CPT

## 2017-08-10 RX ORDER — ASPIRIN 325 MG
325 TABLET ORAL DAILY
Status: DISCONTINUED | OUTPATIENT
Start: 2017-08-10 | End: 2017-09-06 | Stop reason: HOSPADM

## 2017-08-10 RX ORDER — ASPIRIN 325 MG
325 TABLET ORAL DAILY
Status: DISCONTINUED | OUTPATIENT
Start: 2017-08-11 | End: 2017-08-10

## 2017-08-10 RX ADMIN — ENOXAPARIN SODIUM 40 MG: 40 INJECTION SUBCUTANEOUS at 20:49

## 2017-08-10 RX ADMIN — ASPIRIN 325 MG ORAL TABLET 325 MG: 325 PILL ORAL at 13:13

## 2017-08-10 RX ADMIN — POTASSIUM CHLORIDE 40 MEQ: 20 TABLET, EXTENDED RELEASE ORAL at 08:04

## 2017-08-10 RX ADMIN — HYDROCHLOROTHIAZIDE: 25 TABLET ORAL at 08:04

## 2017-08-10 RX ADMIN — PANTOPRAZOLE SODIUM 40 MG: 40 TABLET, DELAYED RELEASE ORAL at 06:11

## 2017-08-10 RX ADMIN — ATORVASTATIN CALCIUM 40 MG: 40 TABLET, FILM COATED ORAL at 08:04

## 2017-08-10 RX ADMIN — ESCITALOPRAM OXALATE 20 MG: 10 TABLET ORAL at 08:04

## 2017-08-10 NOTE — PROGRESS NOTES
08/10/17 1020   Time Spent With Patient   Time In 0749   Time Out 0919   Patient Seen For: AM;ADLs   Feeding/Eating   Feeding/Eating Assistance S   Comments Setup to stabilize containers for opening   Grooming   Grooming Assistance  S   Comments Setup to gather materials, educated regarding cristiano-techniques for brushing teeth, donning deodorant   Upper Body Bathing   Bathing Assistance, Upper S   Upper Body  Cristiano technique training   Position Performed Seated in chair   Adaptive Equipment Tub bench   Comments Educated re: forward trunk flexion to access R underarm for bathing   Lower Body Bathing   Bathing Assistance, Lower  Min A   Adaptive Equipment Grab bar;Long handled sponge   Position Performed Seated in chair;Standing   Comments Minimal assist for standing balance blocking at R knee as patient stands to bathe bottom. Patient educated re: long handled sponge for bathing lower BLEs. Supervision for dynamic sitting balance seated on TTB. Upper Body Dressing    Dressing Assistance  Min A   Comments Educated re: cristiano dressing technique. Minimal assist to help overhead shirt down trunk   Lower Body Dressing    Dressing Assistance  Mod A   Position Performed Seated in chair;Standing   Adaptive Equipment Used Sock aid;Reacher;Long handled shoe horn;Grab bar;Other(comment)  (Elastic shoe laces applied to tennis shoes)   Comments Educated regarding cristiano techniques and use of AE for LB dressing. Assist to start R foot into shoe, setup sock aid, start underwear/pants over R hip, and start R foot through pants. Minimal assist for standing balance as patient manages underwear/pants over hips. Functional Transfers   Tub or Shower Type Shower   Amount of Assistance Required Mod A   Adaptive Equipment Grab bars; Tub transfer bench; Wheelchair       S: \"I feel better today than I did yesterday. \" Agreeable to therapy. Focus of session was on ADL retraining and functional mobility.    Patient transferred bed to wheelchair and wheelchair <> TTB SPT with minimal assist for sit to stand, moderate assist for transfer to block/progress RLE. Patient demonstrated good understanding for use of AE and bobby techniques for dressing, bathing, feeding, and grooming this session. Requires further reinforcement. Pain not indicated. Collaborated with PTUyen and confirmed patient is on track to reach goals as documented in the care plan. Collaborated with RN for initiation of resting hand splint wear at night time to oppose flexor tone in RUE. Resting hand splint available in patient's room. Patient tolerated session well, but RUE strength/coordination, balance, functional mobility, activity tolerance, vision are still below baseline and requires skilled facilitation to successfully and safely complete ADL's and transfers. Patient ended session in therapy gym with PTUyen.      Lauren Calderon, OTR/L

## 2017-08-10 NOTE — PROGRESS NOTES
Rosalina Mane MD,   Medical Director  [address] [facility], [address] [facility]  Tel: [phone]       D PROGRESS NOTE    Cristal Sherwood  Admit Date: 8/8/2017  Admit Diagnosis: Stroke - Right Side Body Involvment;Stroke (cerebrum) (HCC)    Subjective     Doing well. Seeing progress. Has moments when she gets impatient with herself. No cp, sob, n. Slept well     Objective:     Current Facility-Administered Medications   Medication Dose Route Frequency    potassium chloride (K-DUR, KLOR-CON) SR tablet 40 mEq  40 mEq Oral DAILY    enoxaparin (LOVENOX) injection 40 mg  40 mg SubCUTAneous Q24H    acetaminophen (TYLENOL) tablet 650 mg  650 mg Oral Q4H PRN    alum-mag hydroxide-simeth (MYLANTA) oral suspension 30 mL  30 mL Oral Q4H PRN    atorvastatin (LIPITOR) tablet 40 mg  40 mg Oral DAILY    bisacodyl (DULCOLAX) suppository 10 mg  10 mg Rectal DAILY PRN    escitalopram oxalate (LEXAPRO) tablet 20 mg  20 mg Oral DAILY    LORazepam (ATIVAN) tablet 1 mg  1 mg Oral Q6H PRN    magnesium hydroxide (MILK OF MAGNESIA) 400 mg/5 mL oral suspension 30 mL  30 mL Oral DAILY PRN    ondansetron (ZOFRAN ODT) tablet 4 mg  4 mg Oral Q6H PRN    senna-docusate (PERICOLACE) 8.6-50 mg per tablet 2 Tab  2 Tab Oral DAILY PRN    valsartan/hydroCHLOROthiazide (DIOVAN HCT) 160/25 mg   Oral DAILY    traZODone (DESYREL) tablet 50 mg  50 mg Oral QHS PRN    pantoprazole (PROTONIX) tablet 40 mg  40 mg Oral ACB     Review of Systems:Denies chest pain, shortness of breath, cough, headache, visual problems, abdominal pain, dysurea, calf pain. Pertinent positives are as noted in the medical records and unremarkable otherwise. Visit Vitals    BP (!) 148/94    Pulse 78    Temp 97.8 °F (36.6 °C)    Resp 18    SpO2 99%    Breastfeeding No        Physical Exam:   General: Alert and age appropriately oriented. No acute cardio respiratory distress.    HEENT: Normocephalic,no scleral icterus  Oral mucosa moist without cyanosis   Lungs: Clear to auscultation  bilaterally. Respiration even and unlabored   Heart: Regular rate and rhythm, S1, S2   No  murmurs, clicks, rub or gallops   Abdomen: Soft, non-tender, nondistended. Bowel sounds present. No organomegaly. obese   Genitourinary: deferred. Neuromuscular:      EOMI, right facial weakness, tongue midline  L side WFL  RUE grossly 2 prox ,  2  RLE hip flexion 2, KE 3 , DF 0, PF 2   Skin/extremity: No rashes, no erythema. No calf tenderness BLE  No edema                                                                            Functional Assessment:          Balance  Sitting - Static: Good (unsupported) (08/09/17 1400)  Sitting - Dynamic: Fair (occasional) (08/09/17 1400)  Standing - Static: Fair;Constant support (with left hand rail ) (08/09/17 1400)  Standing - Dynamic : Impaired (08/09/17 1400)                     Rosibel Niño Fall Risk Assessment:  Rosibel Niño Fall Risk  Mobility: Ambulates or transfers with assist devices or assistance/unsteady gait (08/10/17 0717)  Mobility Interventions: Utilize walker, cane, or other assitive device;Utilize gait belt for transfers/ambulation (08/10/17 0717)  Mentation: Alert, oriented x 3 (08/10/17 0717)  Medication: Patient receiving anticonvulsants, sedatives(tranquilizers), psychotropics or hypnotics, hypoglycemics, narcotics, sleep aids, antihypertensives, laxatives, or diuretics (08/10/17 0717)  Medication Interventions: Patient to call before getting OOB (08/10/17 5419)  Elimination: Needs assistance with toileting (08/10/17 0717)  Elimination Interventions: Call light in reach; Patient to call for help with toileting needs; Toilet paper/wipes in reach (08/10/17 0717)  Prior Fall History: No  (08/10/17 0717)  Total Score: 3 (08/10/17 0717)  High Fall Risk: Yes (08/09/17 1947)     Speech Assessment:         Ambulation:  Gait  Distance (ft): 20 Feet (ft) (20ft x 2) (08/09/17 1400)  Assistive Device: Other (comment) (left hand rail and right ace wrap for DF assist) (08/09/17 1400)     Labs/Studies:  Recent Results (from the past 72 hour(s))   HOMOCYST(E)INE, PLASMA    Collection Time: 08/07/17  2:22 PM   Result Value Ref Range    Homocysteine, plasma 8.6 0.0 - 15.0 umol/L   PROTEIN C ANTIGEN, PLASMA    Collection Time: 08/07/17  2:22 PM   Result Value Ref Range    Protein C Antigen PENDING %   CARDIOLIPIN AB, IGA    Collection Time: 08/07/17  2:22 PM   Result Value Ref Range    Cardiolipin Ab, IgA <9 0 - 11 APL U/mL   PTT    Collection Time: 08/07/17  2:22 PM   Result Value Ref Range    aPTT 26.4 23.5 - 31.7 SEC   PROTHROMBIN TIME + INR    Collection Time: 08/07/17  2:22 PM   Result Value Ref Range    Prothrombin time 10.4 9.6 - 12.0 sec    INR 1.0 0.9 - 1.2     FACTOR V    Collection Time: 08/07/17  2:22 PM   Result Value Ref Range    Factor V Activity PENDING %   PROTEIN S ANTIGEN    Collection Time: 08/07/17  2:22 PM   Result Value Ref Range    Protein S Antigen RESULTS SCANNED IN [facility] %   PLEASE READ & DOCUMENT PPD TEST IN 48 HRS    Collection Time: 08/07/17  3:38 PM   Result Value Ref Range    PPD  0 Negative    mm Induration  0 mm   METABOLIC PANEL, BASIC    Collection Time: 08/08/17  2:39 AM   Result Value Ref Range    Sodium 139 136 - 145 mmol/L    Potassium 3.2 (L) 3.5 - 5.1 mmol/L    Chloride 99 98 - 107 mmol/L    CO2 28 21 - 32 mmol/L    Anion gap 12 7 - 16 mmol/L    Glucose 95 65 - 100 mg/dL    BUN 13 6 - 23 MG/DL    Creatinine 0.65 0.6 - 1.0 MG/DL    GFR est AA >60 >60 ml/min/1.73m2    GFR est non-AA >60 >60 ml/min/1.73m2    Calcium 8.9 8.3 - 10.4 MG/DL   LUPUS ANTICOAG PANEL    Collection Time: 08/08/17 10:29 AM   Result Value Ref Range    Lupus Anticoag Panel RESULTS SCANNED IN [facility]     PLEASE READ & DOCUMENT PPD TEST IN 72 HRS    Collection Time: 08/08/17  3:00 PM   Result Value Ref Range    PPD negative Negative    mm Induration 0mm mm   METABOLIC PANEL, BASIC    Collection Time: 08/09/17  7:17 AM   Result Value Ref Range    Sodium 139 136 - 145 mmol/L    Potassium 3.4 (L) 3.5 - 5.1 mmol/L    Chloride 99 98 - 107 mmol/L    CO2 29 21 - 32 mmol/L    Anion gap 11 7 - 16 mmol/L    Glucose 100 65 - 100 mg/dL    BUN 12 6 - 23 MG/DL    Creatinine 0.61 0.6 - 1.0 MG/DL    GFR est AA >60 >60 ml/min/1.73m2    GFR est non-AA >60 >60 ml/min/1.73m2    Calcium 9.0 8.3 - 10.4 MG/DL   MAGNESIUM    Collection Time: 08/09/17  7:17 AM   Result Value Ref Range    Magnesium 2.2 1.8 - 2.4 mg/dL       Assessment:     Problem List as of 8/10/2017  Date Reviewed: 1/26/2017          Codes Class Noted - Resolved    Stroke (cerebrum) ([facility] [address] ICD-10-CM: I63.9  ICD-9-CM: 434.91  8/8/2017 - Present        Left sided lacunar stroke [facility]) ICD-10-CM: I63.9  ICD-9-CM: 434.91  8/4/2017 - Present    Overview Signed 8/6/2017  7:44 PM by Ron Tariq MD     8/2017             Weakness of right side of body ICD-10-CM: R53.1  ICD-9-CM: 728.87  8/3/2017 - Present        Essential hypertension ICD-10-CM: I10  ICD-9-CM: 401.9  10/5/2016 - Present        Obesity (BMI 30-39. 9) ICD-10-CM: E66.9  ICD-9-CM: 278.00  Unknown - Present        Prediabetes ICD-10-CM: R73.03  ICD-9-CM: 790.29  7/28/2015 - Present        Generalized anxiety disorder ICD-10-CM: F41.1  ICD-9-CM: 300.02  Unknown - Present        Insomnia, unspecified ICD-10-CM: G47.00  ICD-9-CM: 780.52  Unknown - Present        Mitral valve prolapse ICD-10-CM: I34.1  ICD-9-CM: 424.0  Unknown - Present        Iron deficiency anemia ICD-10-CM: D50.9  ICD-9-CM: 280.9  8/23/2013 - Present    Overview Signed 10/5/2016  2:49 PM by Ron Tariq MD     S/p iron infusion x 2             RESOLVED: GERD (gastroesophageal reflux disease) ICD-10-CM: K21.9  ICD-9-CM: 530.81  Unknown - 10/5/2016        RESOLVED: Mixed hyperlipidemia ICD-10-CM: E78.2  ICD-9-CM: 272.2  Unknown - 10/5/2016        RESOLVED: Hypertension ICD-10-CM: I10  ICD-9-CM: 401.9  Unknown - 10/5/2016              Plan:      This is an unfortunate 35yo WF with obesity, RADHA, and htn admitted with right hemiplegia due to Left hemispheric stroke with evolution; etiology of stroke not clear     Continue daily physician medical management:      CVA; cont ASA and Lipitor , hypercoag w/u pending (outside labs); neg thus far      Pneumonia prophylaxis- Incentive spirometer every hour while awake      DVT risk / DVT Prophylaxis- Will require daily physician exam to assess for signs and symptoms as patient is at increased risk for of thromboembolism. Mobilization as tolerated. Intermittent pneumatic compression devices when in bed Thigh-high or knee-high thromboembolic deterrent hose when out of bed. Lovenox subq daily.       Pain Control: stable, mild-to-moderate joint symptoms intermittently, reasonably well controlled by PRN meds. Will require regular pain assessment and comprenhensive pain management, Cont prn tylenol      Chronic insomnia; on Ambien at home. Have not restarted this. Try prn trazadone.       Wound Care: Monitor wound status daily per staff and physician. At risk for failure. Will require 24/7 rehab nursing. None needed; turning schedule q 2 hrs while in bed      Suspect RADHA; can use CPAP while here but will need outpt formal sleep study and Pulmonary follow up      Obesity; have discussed importance of wt loss for mobility and overall health      Hypertension - BP better controlled, fluctuating, managed medically. Cont Diovan HCT; varies, 8/10 consider adding low dose ACE inhib (norvasc)      LUCHO; gets IV infusions; cont to monitor. Currently hgb 11.7. Last infusion in April ; f/u iron studies in 8/11      Hypokalemia; K 3.2; add supplement and monitor; re check 8/11  -K 3.4 on 8/10, cont KCL 40meq daily      Urinary retention/ neurogenic bladder - schedule voids q6-8 hrs. Check post-void residual as needed;  In and Out catheterize if post-void residual is more than 400 cc.      bowel program - add prn bowel program      GERD - start PPI. At times may need additional antacids, Maalox prn.      Depression; cont Lexapro; home med due to generalized anxiety due to stressors in life. His risk post stroke depression. Order recreational therapy and psych if needed      Dysphagia; mild. Cont ST for this as well as for dysarthria. Mechanical consistency diet, cont aspir precautions        Time spent was 25 minutes with over 1/2 in direct patient care/examination, consultation and coordination of care.      Signed By: Bonnie Pate MD     August 10, 2017

## 2017-08-10 NOTE — PROGRESS NOTES
PHYSICAL THERAPY DAILY NOTE  Time In: 6342  Time Out: 1725  Patient Seen For: PM;Other (see progress notes); Therapeutic exercise;Gait training;Transfer training    Subjective: Patient had no complaints. Objective: No pain noted. Other (comment) (Fall precautions)  GROSS ASSESSMENT Daily Assessment            BED/MAT MOBILITY Daily Assessment    Supine to Sit : 0 (Not tested)       TRANSFERS Daily Assessment    Transfer Type: SPT without device  Transfer Assistance : 4 (Minimal assistance)  Sit to Stand Assistance: Minimal assistance       GAIT Daily Assessment    Amount of Assistance: 3 (Moderate assistance)  Distance (ft): 20 Feet (ft)  Assistive Device: Gait belt; Other (comment) (rail at wall with ace wrap to assist DF)       STEPS or STAIRS Daily Assessment    Level of Assist : 0 (Not tested)       BALANCE Daily Assessment            WHEELCHAIR MOBILITY Daily Assessment            LOWER EXTREMITY EXERCISES Daily Assessment    Extremity: Both  Exercise Type #1: Other (comment) (stood in standing frame)  Sets Performed: 1  Reps Performed: 0  Level of Assist: Total assistance          Assessment: Patient making good progress. Plan of Care: Continue with plan of care to reach PT goals. Returned to room with call bell at reach .     Luciano Simms, PTA  8/10/2017

## 2017-08-10 NOTE — PROGRESS NOTES
08/10/17 1136   Time Spent With Patient   Time In 1050   Time Out 1115   Patient Seen For: AM;Oral-motor exercises; Neuro-linguistics   Mental Status   Neurologic State Alert   Orientation Level Oriented X4   Cognition Appropriate decision making   Perception Appears intact   Perseveration No perseveration noted   Safety/Judgement Fall prevention   Pt completed oral motor exercises 2 x 10 with min cues with 85% Accuracy. Pt completed subtest 1, 2 and 4 of the SB with min cues with 90% accuracy.    Dennise Snellen MA/CCC/SLP

## 2017-08-10 NOTE — PROGRESS NOTES
PHYSICAL THERAPY DAILY NOTE  Time In: 8637  Time Out: 1031  Patient Seen For: AM;Transfer training;Gait training; Therapeutic exercise; Other (see progress notes)    Subjective: Patient had no complaints. Objective: Other (comment) (Fall precautions)  GROSS ASSESSMENT Daily Assessment            BED/MAT MOBILITY Daily Assessment    Supine to Sit : 0 (Not tested)       TRANSFERS Daily Assessment    Transfer Type: SPT without device  Transfer Assistance : 4 (Minimal assistance)  Sit to Stand Assistance: Minimal assistance       GAIT Daily Assessment   Required min assist to advance right leg forward. Amount of Assistance: 3 (Moderate assistance)  Distance (ft): 20 Feet (ft)  Assistive Device: Other (comment);Gait belt (with ace wrap to assist DF on right and using rail at wall)       STEPS or STAIRS Daily Assessment    Level of Assist : 0 (Not tested)       BALANCE Daily Assessment            WHEELCHAIR MOBILITY Daily Assessment            LOWER EXTREMITY EXERCISES Daily Assessment    Extremity: Both  Exercise Type #1: Other (comment) (stood in standing frame to weight shift on to the right LE)  Sets Performed: 1  Reps Performed: 15  Level of Assist: Total assistance          Assessment: Patient making progress and working hard with therapy. Plan of Care: Continue with plan of care to reach PT goals. Returned to room with call dunn at reach.     Rony Lala, PTA  8/10/2017

## 2017-08-10 NOTE — PROGRESS NOTES
Problem: Falls - Risk of  Goal: *Absence of Falls  Document Rashmi Fall Risk and appropriate interventions in the flowsheet.    Outcome: Progressing Towards Goal  Fall Risk Interventions:  Mobility Interventions: Utilize walker, cane, or other assitive device, Utilize gait belt for transfers/ambulation           Medication Interventions: Patient to call before getting OOB     Elimination Interventions: Call light in reach, Patient to call for help with toileting needs, Toilet paper/wipes in reach

## 2017-08-10 NOTE — PROGRESS NOTES
Subjective \"I am surprised at how tired I am getting from these exercises. This has been a workout! \"   Activity Wii sword fighting activity with her LUE. and weightbearing and core exercises through her RUE   Strength/Endurance Patient with right hemiparesis and no sign of movement in her arm. She tired easily from the weightbearing exercise due to the core work. Balance NT   Social Interaction Patient was friendly and motivated during the session. She appeared to enjoy the interaction with this therapist.   Cognitive A&O X4   Comments Patient was assisted to her room and requested to be left seated in her w/c. Lunch tray and all needs were left within reach.      Brad Sexton, DEVON

## 2017-08-10 NOTE — PROGRESS NOTES
Team conference; discharge deferred pending progress. Anticipate around 3 weeks pending progress and participation. Discussed with patient; agreeable. Contacted [facility] to see Mitchell Orozco could see pt. She is out on leave. Will try to arrange to have another counselor to see pt while in hospital. If not will schedule appointment with [facility] at time of discharge.

## 2017-08-10 NOTE — PROGRESS NOTES
OT Daily Note    Time In 1305   Time Out 1346     Subjective: \"I want to try it. \" [NMES to RUE]  Pain: None indicated    Precautions:  (falls)    Self-Care   Patient educated regarding protocol for resting hand splint to RUE for nighttime wear to oppose flexor tone/flexion synergy at rest.  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     Neuro-Muscular Re-Education   Patient educated regarding risks and benefits of NMES application to RUE to promote neuromuscular re-education, patient agreeable to NMES. Patient tolerated NMES x 15 minutes (with rest break after 2 minutes) to wrist flexors and extensors while seated at tabletop set in alternating pattern x intensity 5 for wrist/digit flexion and intensity 9 for wrist/digit extension. Moderate hand-over-hand assist to obtain full flexion and extension (gross grasp/release) with each stimulation. Patient completed standing trials at tabletop for RUE weightbearing. Patient transferred sit to stand with minimal assist and cues for sequencing, minimal-moderate assist for standing balance/weightbearing at tabletop with therapist blocking R knee and facilitating weightbearing at R wrist and elbow. Tolerated 2 standing trials x 1-2 minutes each before requiring seated rest breaks/time expires this session. Assessment: Patient tolerated NMES to RUE well. Education: NMES risks and benefits, purpose of therapy  Interdisciplinary Communication: Collaborated with Jersey English and agreed patient is progressing well and on-track to meet goals as stated in POC. Plan: Continue to address ADL/IADL, functional mobility, activity tolerance, balance, strengthening, coordination, cognition.     Marylou Aguirre, OTR/L

## 2017-08-10 NOTE — PROGRESS NOTES
Problem: Falls - Risk of  Goal: *Absence of Falls  Document Rashmi Fall Risk and appropriate interventions in the flowsheet.    Outcome: Progressing Towards Goal  Fall Risk Interventions:  Mobility Interventions: Patient to call before getting OOB           Medication Interventions: Patient to call before getting OOB     Elimination Interventions: Call light in reach

## 2017-08-11 LAB
ANION GAP BLD CALC-SCNC: 10 MMOL/L (ref 7–16)
BUN SERPL-MCNC: 11 MG/DL (ref 6–23)
CALCIUM SERPL-MCNC: 8.9 MG/DL (ref 8.3–10.4)
CHLORIDE SERPL-SCNC: 103 MMOL/L (ref 98–107)
CO2 SERPL-SCNC: 28 MMOL/L (ref 21–32)
CREAT SERPL-MCNC: 0.56 MG/DL (ref 0.6–1)
FACT V ACT/NOR PPP: NORMAL %
FERRITIN SERPL-MCNC: 5 NG/ML (ref 8–388)
GLUCOSE SERPL-MCNC: 88 MG/DL (ref 65–100)
IRON SATN MFR SERPL: 6 %
IRON SERPL-MCNC: 25 UG/DL (ref 35–150)
POTASSIUM SERPL-SCNC: 3.3 MMOL/L (ref 3.5–5.1)
SODIUM SERPL-SCNC: 141 MMOL/L (ref 136–145)
TIBC SERPL-MCNC: 426 UG/DL (ref 250–450)
TRANSFERRIN SERPL-MCNC: 362 MG/DL (ref 202–364)

## 2017-08-11 PROCEDURE — 97535 SELF CARE MNGMENT TRAINING: CPT

## 2017-08-11 PROCEDURE — 36415 COLL VENOUS BLD VENIPUNCTURE: CPT | Performed by: PHYSICAL MEDICINE & REHABILITATION

## 2017-08-11 PROCEDURE — 97530 THERAPEUTIC ACTIVITIES: CPT

## 2017-08-11 PROCEDURE — 99232 SBSQ HOSP IP/OBS MODERATE 35: CPT | Performed by: PHYSICAL MEDICINE & REHABILITATION

## 2017-08-11 PROCEDURE — 83540 ASSAY OF IRON: CPT | Performed by: PHYSICAL MEDICINE & REHABILITATION

## 2017-08-11 PROCEDURE — 97110 THERAPEUTIC EXERCISES: CPT

## 2017-08-11 PROCEDURE — 65310000000 HC RM PRIVATE REHAB

## 2017-08-11 PROCEDURE — 80048 BASIC METABOLIC PNL TOTAL CA: CPT | Performed by: PHYSICAL MEDICINE & REHABILITATION

## 2017-08-11 PROCEDURE — 97116 GAIT TRAINING THERAPY: CPT

## 2017-08-11 PROCEDURE — 82728 ASSAY OF FERRITIN: CPT | Performed by: PHYSICAL MEDICINE & REHABILITATION

## 2017-08-11 PROCEDURE — 84466 ASSAY OF TRANSFERRIN: CPT | Performed by: PHYSICAL MEDICINE & REHABILITATION

## 2017-08-11 PROCEDURE — 74011250637 HC RX REV CODE- 250/637: Performed by: PHYSICAL MEDICINE & REHABILITATION

## 2017-08-11 PROCEDURE — 92526 ORAL FUNCTION THERAPY: CPT

## 2017-08-11 PROCEDURE — 97032 APPL MODALITY 1+ESTIM EA 15: CPT

## 2017-08-11 PROCEDURE — 97112 NEUROMUSCULAR REEDUCATION: CPT

## 2017-08-11 PROCEDURE — 74011250636 HC RX REV CODE- 250/636: Performed by: PHYSICAL MEDICINE & REHABILITATION

## 2017-08-11 RX ORDER — POTASSIUM CHLORIDE 20 MEQ/1
40 TABLET, EXTENDED RELEASE ORAL 2 TIMES DAILY
Status: DISCONTINUED | OUTPATIENT
Start: 2017-08-11 | End: 2017-08-20

## 2017-08-11 RX ORDER — LANOLIN ALCOHOL/MO/W.PET/CERES
1 CREAM (GRAM) TOPICAL 2 TIMES DAILY WITH MEALS
Status: DISCONTINUED | OUTPATIENT
Start: 2017-08-11 | End: 2017-09-06 | Stop reason: HOSPADM

## 2017-08-11 RX ADMIN — ESCITALOPRAM OXALATE 20 MG: 10 TABLET ORAL at 08:18

## 2017-08-11 RX ADMIN — ATORVASTATIN CALCIUM 40 MG: 40 TABLET, FILM COATED ORAL at 08:18

## 2017-08-11 RX ADMIN — POTASSIUM CHLORIDE 40 MEQ: 20 TABLET, EXTENDED RELEASE ORAL at 17:02

## 2017-08-11 RX ADMIN — ENOXAPARIN SODIUM 40 MG: 40 INJECTION SUBCUTANEOUS at 20:26

## 2017-08-11 RX ADMIN — POTASSIUM CHLORIDE 40 MEQ: 20 TABLET, EXTENDED RELEASE ORAL at 08:18

## 2017-08-11 RX ADMIN — ASPIRIN 325 MG ORAL TABLET 325 MG: 325 PILL ORAL at 08:18

## 2017-08-11 RX ADMIN — FERROUS SULFATE TAB 325 MG (65 MG ELEMENTAL FE) 325 MG: 325 (65 FE) TAB at 09:31

## 2017-08-11 RX ADMIN — HYDROCHLOROTHIAZIDE: 25 TABLET ORAL at 08:18

## 2017-08-11 RX ADMIN — FERROUS SULFATE TAB 325 MG (65 MG ELEMENTAL FE) 325 MG: 325 (65 FE) TAB at 16:20

## 2017-08-11 NOTE — PROGRESS NOTES
Problem: Falls - Risk of  Goal: *Absence of Falls  Document Rashmi Fall Risk and appropriate interventions in the flowsheet.    Outcome: Progressing Towards Goal  Fall Risk Interventions:  Mobility Interventions: Patient to call before getting OOB           Medication Interventions: Patient to call before getting OOB     Elimination Interventions: Call light in reach, Patient to call for help with toileting needs, Toileting schedule/hourly rounds

## 2017-08-11 NOTE — PROGRESS NOTES
Jenelle Neil MD,   Medical Director  [address] [facility], [address] [facility]  Tel: [phone]       D PROGRESS NOTE    Umesh Vivas  Admit Date: 8/8/2017  Admit Diagnosis: Stroke - Right Side Body Involvment;Stroke (cerebrum) (HCC)    Subjective     Doing well. Feels \"less fuzzy headed\". Anxiety decreased. Not tearful . Denies headache, cp or SOB. + blurry vision, but doesn't have glasses on    Objective:     Current Facility-Administered Medications   Medication Dose Route Frequency    potassium chloride (K-DUR, KLOR-CON) SR tablet 40 mEq  40 mEq Oral BID    aspirin (ASPIRIN) tablet 325 mg  325 mg Oral DAILY    enoxaparin (LOVENOX) injection 40 mg  40 mg SubCUTAneous Q24H    acetaminophen (TYLENOL) tablet 650 mg  650 mg Oral Q4H PRN    alum-mag hydroxide-simeth (MYLANTA) oral suspension 30 mL  30 mL Oral Q4H PRN    atorvastatin (LIPITOR) tablet 40 mg  40 mg Oral DAILY    bisacodyl (DULCOLAX) suppository 10 mg  10 mg Rectal DAILY PRN    escitalopram oxalate (LEXAPRO) tablet 20 mg  20 mg Oral DAILY    LORazepam (ATIVAN) tablet 1 mg  1 mg Oral Q6H PRN    magnesium hydroxide (MILK OF MAGNESIA) 400 mg/5 mL oral suspension 30 mL  30 mL Oral DAILY PRN    ondansetron (ZOFRAN ODT) tablet 4 mg  4 mg Oral Q6H PRN    senna-docusate (PERICOLACE) 8.6-50 mg per tablet 2 Tab  2 Tab Oral DAILY PRN    valsartan/hydroCHLOROthiazide (DIOVAN HCT) 160/25 mg   Oral DAILY    traZODone (DESYREL) tablet 50 mg  50 mg Oral QHS PRN    pantoprazole (PROTONIX) tablet 40 mg  40 mg Oral ACB     Review of Systems:Denies chest pain, shortness of breath, cough, headache, abdominal pain, dysurea, calf pain. Pertinent positives are as noted in the medical records and unremarkable otherwise.      Visit Vitals    /75 (BP 1 Location: Left arm, BP Patient Position: At rest)    Pulse 76    Temp 97.7 °F (36.5 °C)    Resp 18    SpO2 99%    Breastfeeding No        Physical Exam:   General: Alert and age appropriately oriented. No acute cardio respiratory distress. HEENT: Normocephalic,no scleral icterus  Oral mucosa moist without cyanosis; right lower facial weakness   Lungs: Clear to auscultation  bilaterally. Respiration even and unlabored   Heart: Regular rate and rhythm, S1, S2   No  murmurs, clicks, rub or gallops   Abdomen: Soft, non-tender, nondistended. Bowel sounds present. No organomegaly. Genitourinary: deferred   Neuromuscular:      EOMI, right facial weakness, tongue midline  L side WFL  RUE grossly 2 prox ,  trace; flexion synergy pattern/tone RUE  RLE hip flexion 2, KE 3 , DF 0, PF 2   Skin/extremity: No rashes, no erythema.  No calf tenderness BLE  No edema                                                                            Functional Assessment:          Balance  Sitting - Static: Good (unsupported) (08/09/17 1400)  Sitting - Dynamic: Fair (occasional) (08/09/17 1400)  Standing - Static: Fair;Constant support (with left hand rail ) (08/09/17 1400)  Standing - Dynamic : Impaired (08/09/17 1400)                     Han Patton Fall Risk Assessment:  Han Dye Fall Risk  Mobility: Ambulates or transfers with assist devices or assistance/unsteady gait (08/11/17 0715)  Mobility Interventions: Patient to call before getting OOB;Utilize walker, cane, or other assitive device (08/11/17 0715)  Mentation: Alert, oriented x 3 (08/11/17 0715)  Medication: Patient receiving anticonvulsants, sedatives(tranquilizers), psychotropics or hypnotics, hypoglycemics, narcotics, sleep aids, antihypertensives, laxatives, or diuretics (08/11/17 0715)  Medication Interventions: Patient to call before getting OOB (08/11/17 0715)  Elimination: Needs assistance with toileting (08/11/17 0715)  Elimination Interventions: Call light in reach (08/11/17 0715)  Prior Fall History: No  (08/11/17 0715)  Total Score: 3 (08/11/17 0715)  High Fall Risk: Yes (08/11/17 0715)     Speech Assessment: Ambulation:  Gait  Distance (ft): 20 Feet (ft) (08/10/17 1286)  Assistive Device: Gait belt; Other (comment) (rail at wall with ace wrap to assist DF) (08/10/17 9980)     Labs/Studies:  Recent Results (from the past 72 hour(s))   LUPUS ANTICOAG PANEL    Collection Time: 08/08/17 10:29 AM   Result Value Ref Range    Lupus Anticoag Panel RESULTS SCANNED IN [facility] CARE     PLEASE READ & DOCUMENT PPD TEST IN 72 HRS    Collection Time: 08/08/17  3:00 PM   Result Value Ref Range    PPD negative Negative    mm Induration 0mm mm   METABOLIC PANEL, BASIC    Collection Time: 08/09/17  7:17 AM   Result Value Ref Range    Sodium 139 136 - 145 mmol/L    Potassium 3.4 (L) 3.5 - 5.1 mmol/L    Chloride 99 98 - 107 mmol/L    CO2 29 21 - 32 mmol/L    Anion gap 11 7 - 16 mmol/L    Glucose 100 65 - 100 mg/dL    BUN 12 6 - 23 MG/DL    Creatinine 0.61 0.6 - 1.0 MG/DL    GFR est AA >60 >60 ml/min/1.73m2    GFR est non-AA >60 >60 ml/min/1.73m2    Calcium 9.0 8.3 - 10.4 MG/DL   MAGNESIUM    Collection Time: 08/09/17  7:17 AM   Result Value Ref Range    Magnesium 2.2 1.8 - 2.4 mg/dL   FERRITIN    Collection Time: 08/11/17  7:07 AM   Result Value Ref Range    Ferritin 5 (L) 8 - 388 NG/ML   TRANSFERRIN SATURATION    Collection Time: 08/11/17  7:07 AM   Result Value Ref Range    Iron 25 (L) 35 - 150 ug/dL    TIBC 426 250 - 450 ug/dL    Transferrin Saturation 6 (L) >20 %   TRANSFERRIN    Collection Time: 08/11/17  7:07 AM   Result Value Ref Range    Transferrin 362 202 - 092 mg/dL   METABOLIC PANEL, BASIC    Collection Time: 08/11/17  7:07 AM   Result Value Ref Range    Sodium 141 136 - 145 mmol/L    Potassium 3.3 (L) 3.5 - 5.1 mmol/L    Chloride 103 98 - 107 mmol/L    CO2 28 21 - 32 mmol/L    Anion gap 10 7 - 16 mmol/L    Glucose 88 65 - 100 mg/dL    BUN 11 6 - 23 MG/DL    Creatinine 0.56 (L) 0.6 - 1.0 MG/DL    GFR est AA >60 >60 ml/min/1.73m2    GFR est non-AA >60 >60 ml/min/1.73m2    Calcium 8.9 8.3 - 10.4 MG/DL       Assessment: Problem List as of 8/11/2017  Date Reviewed: 1/26/2017          Codes Class Noted - Resolved    Stroke (cerebrum) ([facility] [address] ICD-10-CM: I63.9  ICD-9-CM: 434.91  8/8/2017 - Present        Left sided lacunar stroke [facility]) ICD-10-CM: I63.9  ICD-9-CM: 434.91  8/4/2017 - Present    Overview Signed 8/6/2017  7:44 PM by Wiliam Wiley MD     8/2017             Weakness of right side of body ICD-10-CM: R53.1  ICD-9-CM: 728.87  8/3/2017 - Present        Essential hypertension ICD-10-CM: I10  ICD-9-CM: 401.9  10/5/2016 - Present        Obesity (BMI 30-39. 9) ICD-10-CM: E66.9  ICD-9-CM: 278.00  Unknown - Present        Prediabetes ICD-10-CM: R73.03  ICD-9-CM: 790.29  7/28/2015 - Present        Generalized anxiety disorder ICD-10-CM: F41.1  ICD-9-CM: 300.02  Unknown - Present        Insomnia, unspecified ICD-10-CM: G47.00  ICD-9-CM: 780.52  Unknown - Present        Mitral valve prolapse ICD-10-CM: I34.1  ICD-9-CM: 424.0  Unknown - Present        Iron deficiency anemia ICD-10-CM: D50.9  ICD-9-CM: 280.9  8/23/2013 - Present    Overview Signed 10/5/2016  2:49 PM by Wiliam Wiley MD     S/p iron infusion x 2             RESOLVED: GERD (gastroesophageal reflux disease) ICD-10-CM: K21.9  ICD-9-CM: 530.81  Unknown - 10/5/2016        RESOLVED: Mixed hyperlipidemia ICD-10-CM: E78.2  ICD-9-CM: 272.2  Unknown - 10/5/2016        RESOLVED: Hypertension ICD-10-CM: I10  ICD-9-CM: 401.9  Unknown - 10/5/2016              Plan:      This is an unfortunate 37yo WF with obesity, RADHA, and htn admitted with right hemiplegia due to Left hemispheric stroke with evolution; etiology of stroke not clear      Continue daily physician medical management:      CVA; cont ASA and Lipitor , hypercoag w/u pending (outside labs); neg thus far      Pneumonia prophylaxis- Incentive spirometer every hour while awake      DVT risk / DVT Prophylaxis- Will require daily physician exam to assess for signs and symptoms as patient is at increased risk for of thromboembolism. Mobilization as tolerated. Intermittent pneumatic compression devices when in bed Thigh-high or knee-high thromboembolic deterrent hose when out of bed. Lovenox subq daily.       Pain Control: stable, mild-to-moderate joint symptoms intermittently, reasonably well controlled by PRN meds. Will require regular pain assessment and comprenhensive pain management, Cont prn tylenol      Chronic insomnia; on Ambien at home. Have not restarted this. Try prn trazadone. 8/11 reports sleeping better      Wound Care: no current evidence of skin breakdown. Will require 24/7 rehab nursing. turning schedule q 2 hrs while in bed      Suspect RADHA; can use CPAP while here but will need outpt formal sleep study and Pulmonary follow up      Obesity; have discussed importance of wt loss for mobility and overall health      Hypertension - BP better controlled, fluctuating, managed medically. Cont Diovan HCT; varies, 8/10 consider adding low dose ACE inhib (norvasc)      LUCHO; gets IV infusions; cont to monitor. Currently hgb 11.7. Last infusion in April ; f/u iron studies in 8/11  -8/11 very iron deficient based on labs; will add ferrous sulfate and will need outpt f/u for transfusion      Hypokalemia; K 3.2; add supplement and monitor; re check 8/11  -K 3.4 on 8/10, cont KCL 40meq daily  -8/11 K 3.3 will increase KCL to  40 bid and recheck 8/14      bowel program - add prn bowel program      GERD - start PPI. At times may need additional antacids, Maalox prn.      Depression; cont Lexapro; home med due to generalized anxiety due to stressors in life. His risk post stroke depression. Order recreational therapy and psych if needed; doing much better and participating well 8/11      Dysphagia; mild. Cont ST for this as well as for dysarthria. Mechanical consistency diet, cont aspir precautions             Time spent was 25 minutes with over 1/2 in direct patient care/examination, consultation and coordination of care.      Signed By: Malachi Christine Sharon Tsai MD     August 11, 2017

## 2017-08-11 NOTE — PROGRESS NOTES
Spoke to [employer] with [facility]. She will visit pt in hospital to assess for services.   Appointment scheduled for August 23 at 10 AM.

## 2017-08-11 NOTE — PROGRESS NOTES
PHYSICAL THERAPY DAILY NOTE  Time In: 2443  Time Out: 1520  Patient Seen For: AM;Balance activities;Gait training;Patient education; Therapeutic exercise;Transfer training; Other (see progress notes)    Subjective: patient reporting she was tired after AM therapy and took a nap after lunch. Reports she wants to be able to walk better. Reports her right LE feels heavy. Objective:Vital Signs:  Patient Vitals for the past 12 hrs:   Temp Pulse Resp BP SpO2   08/11/17 1525 98.5 °F (36.9 °C) 99 18 113/71 97 %   08/11/17 0645 97.7 °F (36.5 °C) 76 18 141/75 99 %     Pain level:No c/o pain  Pain location:NA  Pain interventions:NA    Patient education:Bed mobility training,transfer training, gait training, fall precautions, activity pacing, body mechanics, w/c mobility and parts management, Patient verbalizing understanding and demonstrating partial understanding of patient education. Recommend follow up education. Interdisciplinary Communication:NA    Other (comment) (Fall precautions)  GROSS ASSESSMENT Daily Assessment     NA       BED/MAT MOBILITY Daily Assessment   Increased time and effort to complete Rolling Right : 0 (Not tested)  Rolling Left : 0 (Not tested)  Supine to Sit : 4 (Minimal assistance)  Sit to Supine : 4 (Minimal assistance)       TRANSFERS Daily Assessment   Verbal cues for body mechanics Transfer Type: SPT without device (w/c <>bed to the left)  Transfer Assistance : 4 (Minimal assistance)  Sit to Stand Assistance: Contact guard assistance  Car Transfers: Not tested       GAIT Daily Assessment   Min assist to advance RLE and min to mod assist for right foot placement. Min assist for right knee stabilization during stance, tendency to hyperextend right knee during midstance and internally rotate and adduct RLE during swing phase Amount of Assistance: Mod assist  Distance (ft): 10 ft x 10  Device: left hand rail and ace wrap for right DF assist       STEPS or STAIRS Daily Assessment Steps/Stairs Ambulated (#): 0  Level of Assist : 0 (Not tested)       BALANCE Daily Assessment   Static standing balance with LUE support on hand rail facilitating symmetrical weight bearing through LEs with min assist and cues Sitting - Static: Good (unsupported)  Sitting - Dynamic: Good (unsupported)  Standing - Static: Fair (with LUE support on hand rail)  Standing - Dynamic : Impaired       WHEELCHAIR MOBILITY Daily Assessment   Using LUE and LLE to propel w/c Able to Propel (ft): 100 feet (100ft x 2)  Functional Level: 2  Curbs/Ramps Assist Required (FIM Score): 0 (Not tested)  Wheelchair Setup Assist Required : 4 (Minimal assistance) (with right foot rest)  Wheelchair Management: Manages left brake;Manages right brake;Manages right footrest       LOWER EXTREMITY EXERCISES Daily Assessment    Extremity: Both  Exercise Type #1: Other (comment) (LE motomed x 10 mins at level 2)  Level of Assist: Supervision            Assessment: Progress towards goals. Improving with ability to advance RLE and stabilize right knee during gait training        Patient returned to room at end of treatment. Patient supine in bed with head of bed elevated and bed rails up x 2. Needs placed in reach of patient. Plan of Care: Continue with POC and progress as tolerated.      Fili Blandon, PT  8/11/2017

## 2017-08-11 NOTE — PROGRESS NOTES
OT Daily Note    Time In 1120   Time Out 1158     Subjective: \"I wish I could make it [my shoulder] move myself. \"  Pain: None indicated    Precautions:  (falls)    Self-Care   Patient doffed/donned overhead shirt this session with supervision only, increased time and cues for problem solving required. Patient transferred wheelchair to bed SPT with minimal assist.  Patient managed all containers on lunch tray utilizing cristiano-technique, minimal cues for problem solving. Neuro-Muscular Re-Education   Patient agreeable to NMES to R shoulder in subluxation protocol x 10 minutes on intensity 8-9 in surge pattern (intermittent rest breaks). Patient completed shoulder shrugs bilaterally seated in wheelchair before vertical mirror with each stimulation. Assessment: Patient tolerated well. Progress with UB dressing and feeding this session, see above for details. Education: Cristiano technique, purpose of NMES  Interdisciplinary Communication: Collaborated with Brad Lewis and agreed patient is progressing well and on-track to meet goals as stated in POC. Plan: Continue to address ADL/IADL, functional mobility, activity tolerance, balance, strengthening, coordination, cognition.     Dino Glover, OTR/L

## 2017-08-11 NOTE — PROGRESS NOTES
PHYSICAL THERAPY DAILY NOTE  Time In: 1011  Time Out: 7684  Patient Seen For: AM;Patient education; Therapeutic exercise;Transfer training; Wheelchair mobility; Other (see progress notes)    Subjective: Patient reporting she feels OK. Reports she slept better last night and found something she likes to eat for breakfast. Reports her head feels \"clearer\" and she is able to process things better         Objective:Vital Signs:  Patient Vitals for the past 12 hrs:   Temp Pulse Resp BP SpO2   08/11/17 0645 97.7 °F (36.5 °C) 76 18 141/75 99 %     Pain level:No c/o pain  Pain location:NA  Pain interventions:NA    Patient education:Bed mobility training,transfer training, balance training, fall precautions, activity pacing, body mechanics, w/c mobility and parts management, Patient verbalizing understanding and demonstrating partial understanding of patient education. Recommend follow up education.     Interdisciplinary Communication:NA    Other (comment) (Fall precautions)  GROSS ASSESSMENT Daily Assessment     NA       BED/MAT MOBILITY Daily Assessment   Increased time and effort to complete Rolling Right : 0 (Not tested)  Rolling Left : 0 (Not tested)  Supine to Sit : 4 (Minimal assistance)  Sit to Supine : 4 (Minimal assistance)       TRANSFERS Daily Assessment   verbal cues for body mechanics Transfer Type: SPT without device  Transfer Assistance : 4 (Minimal assistance)  Sit to Stand Assistance: Minimal assistance  Car Transfers: Not tested       GAIT Daily Assessment    Amount of Assistance: 0 (Not tested)  Distance (ft): 0 Feet (ft)       STEPS or STAIRS Daily Assessment    Steps/Stairs Ambulated (#): 0  Level of Assist : 0 (Not tested)       BALANCE Daily Assessment    Sitting - Static: Good (unsupported)  Sitting - Dynamic: Good (unsupported)  Standing - Static: Fair;Constant support  Standing - Dynamic : Impaired       WHEELCHAIR MOBILITY Daily Assessment    Able to Propel (ft): 100 feet (using LUE and LLE)  Functional Level: 2  Curbs/Ramps Assist Required (FIM Score): 0 (Not tested)  Wheelchair Setup Assist Required : 4 (Minimal assistance)  Wheelchair Management: Manages left brake;Manages right brake       LOWER EXTREMITY EXERCISES Daily Assessment    Extremity: Both  Exercise Type #1: Supine lower extremity strengthening  Sets Performed: 2  Reps Performed: 10  Level of Assist: Minimal assistance  Exercise Type #2: Other (comment) (SAQ with ESTIM,premodulated mode,frequency 80 to 150HZ, intensity 13 naman amps)  Reps Performed:  (x 10 mins with 10 sec on 10 sec off cycle)  Level of Assist: Minimal assistance          Assessment: Progressing towards goals. RLE strength slowly improving. Improving with hemiplegic body mechanics       Patient to ST at end of treatment    Plan of Care: Continue with POC and progress as tolerated.      Nafisa Garland, PT  8/11/2017

## 2017-08-11 NOTE — PROGRESS NOTES
Problem: Falls - Risk of  Goal: *Absence of Falls  Document Rashmi Fall Risk and appropriate interventions in the flowsheet.    Outcome: Progressing Towards Goal  Fall Risk Interventions:  Mobility Interventions: Patient to call before getting OOB, Utilize walker, cane, or other assitive device           Medication Interventions: Patient to call before getting OOB     Elimination Interventions: Call light in reach

## 2017-08-11 NOTE — PROGRESS NOTES
08/11/17 1120   Time Spent With Patient   Time In 1050   Time Out 1115   Patient Seen For: AM;Oral-motor exercises   Mental Status   Neurologic State Alert   Orientation Level Oriented X4   Cognition Appropriate decision making   Perception Appears intact   Perseveration No perseveration noted   Safety/Judgement Fall prevention   Pt completed oral motor exercises 2 x 10 with 90% accuracy. Pt completes subtest 6-10 with 90% accuracy.     Austyn Maharaj MA/SELINA/SLP

## 2017-08-11 NOTE — PROGRESS NOTES
08/11/17 1027   Time Spent With Patient   Time In 0748   Time Out 0915   Patient Seen For: AM;ADLs   Grooming   Grooming Assistance  S   Comments Setup to gather materials   Upper Body Bathing   Bathing Assistance, Upper S   Position Performed Seated in chair   Adaptive Equipment Tub bench   Comments Supervision for dynamic sitting balance   Lower Body Bathing   Bathing Assistance, [address] handled sponge   Position Performed Seated in chair   Adaptive Equipment Tub bench   Comments Patient leans to R side with minimal assist for dynamic sitting balance to bathe bottom   Toileting   Toileting Assistance (FIM Score) Max A   Cues Physical assistance for pants up;Physical assistance for pants down   Upper Body Dressing    Dressing Assistance  Min A   Comments Minimal assist to help sleeve over RUE   Lower Body Dressing    Dressing Assistance  Mod A   Leg Crossed Method Used No   Position Performed Seated in chair;Standing   Adaptive Equipment Used Grab bar;Reacher;Sock aid; Long handled shoe horn   Comments Assist to finish underwear and pants over R hip, setup of sock aid, assist to start R foot into shoe. Minimal-moderate assist for standing balance. Functional Transfers   Toilet Transfer  Stand pivot transfer without device   Amount of Assistance Required Mod A   Tub or Shower Type Shower   Amount of Assistance Required Min A   Adaptive Equipment Tub transfer bench;Grab bars; Wheelchair       S: \"It makes you feel so much better to get a hower. \" Agreeable to therapy. Focus of session was on ADL retraining and functional mobility. Patient transferred bed to wheelchair SPT with minimal assist, wheelchair <> TTB SPT with minimal assist towards L and moderate assist towards R. Patient required toileting end of session and transferred wheelchair <> BSC SPT with moderate assist.  Maximal assist for toileting for standing balance and to help underwear and pants on/off hips.   Patient completes hygiene seated on [facility] with supervision for dynamic sitting balance. Pain not indicated. Collaborated with PT, Damon Leyva and confirmed patient is on track to reach goals as documented in the care plan. Patient tolerated session well, but RUE strength/coordination, functional mobility, balance, activity tolerance are still below baseline and requires skilled facilitation to successfully and safely complete ADL's and transfers. Patient ended session in wheelchair with PT, Damon Lyeva.      Deidre Blackwell OTR/L

## 2017-08-12 PROCEDURE — 97535 SELF CARE MNGMENT TRAINING: CPT

## 2017-08-12 PROCEDURE — 97032 APPL MODALITY 1+ESTIM EA 15: CPT

## 2017-08-12 PROCEDURE — 97112 NEUROMUSCULAR REEDUCATION: CPT

## 2017-08-12 PROCEDURE — 65310000000 HC RM PRIVATE REHAB

## 2017-08-12 PROCEDURE — 74011250637 HC RX REV CODE- 250/637: Performed by: PHYSICAL MEDICINE & REHABILITATION

## 2017-08-12 PROCEDURE — 97150 GROUP THERAPEUTIC PROCEDURES: CPT

## 2017-08-12 PROCEDURE — 74011250636 HC RX REV CODE- 250/636: Performed by: PHYSICAL MEDICINE & REHABILITATION

## 2017-08-12 RX ADMIN — FERROUS SULFATE TAB 325 MG (65 MG ELEMENTAL FE) 325 MG: 325 (65 FE) TAB at 09:24

## 2017-08-12 RX ADMIN — FERROUS SULFATE TAB 325 MG (65 MG ELEMENTAL FE) 325 MG: 325 (65 FE) TAB at 16:23

## 2017-08-12 RX ADMIN — ENOXAPARIN SODIUM 40 MG: 40 INJECTION SUBCUTANEOUS at 21:05

## 2017-08-12 RX ADMIN — ASPIRIN 325 MG ORAL TABLET 325 MG: 325 PILL ORAL at 09:24

## 2017-08-12 RX ADMIN — POTASSIUM CHLORIDE 40 MEQ: 20 TABLET, EXTENDED RELEASE ORAL at 09:24

## 2017-08-12 RX ADMIN — POTASSIUM CHLORIDE 40 MEQ: 20 TABLET, EXTENDED RELEASE ORAL at 18:19

## 2017-08-12 RX ADMIN — ATORVASTATIN CALCIUM 40 MG: 40 TABLET, FILM COATED ORAL at 09:23

## 2017-08-12 RX ADMIN — HYDROCHLOROTHIAZIDE: 25 TABLET ORAL at 09:22

## 2017-08-12 RX ADMIN — ESCITALOPRAM OXALATE 20 MG: 10 TABLET ORAL at 09:24

## 2017-08-12 RX ADMIN — PANTOPRAZOLE SODIUM 40 MG: 40 TABLET, DELAYED RELEASE ORAL at 05:21

## 2017-08-12 NOTE — PROGRESS NOTES
PHYSICAL THERAPY DAILY NOTE  Time In: 0932  Time Out: 2092  Patient Seen For: AM;Balance activities;Gait training;Patient education; Therapeutic exercise;Transfer training; Wheelchair mobility; Other (see progress notes)    Subjective: Patient reporting she feels OK. Reports she slept well last night. Reports difficulty telling when her RLE is moving         Objective:Vital Signs:  Patient Vitals for the past 12 hrs:   Temp Pulse Resp BP SpO2   08/12/17 0705 97.8 °F (36.6 °C) 89 18 134/82 98 %     Pain level:No c/o pain  Pain location:NA  Pain interventions:NA    Patient education:Bed mobility training,transfer training, gait training, fall precautions, activity pacing, body mechanics, w/c mobility and parts management, Patient verbalizing understanding and demonstrating partial understanding of patient education. Recommend follow up education.     Interdisciplinary Communication:NA    Other (comment) (Fall precautions)  GROSS ASSESSMENT Daily Assessment     NA       BED/MAT MOBILITY Daily Assessment   Increased time and effort with altered body mechanics Rolling Right : 0 (Not tested)  Rolling Left : 0 (Not tested)  Supine to Sit : 4 (Minimal assistance)  Sit to Supine : 4 (Minimal assistance)       TRANSFERS Daily Assessment    Transfer Type: SPT without device (w/c<>mat level surfaces to the left)  Transfer Assistance : 4 (Contact guard assistance)  Sit to Stand Assistance: Contact guard assistance  Car Transfers: Not tested       GAIT Daily Assessment    Amount of Assistance: 3 (Moderate assistance)  Distance (ft): 20 Feet (ft) (20ft x 3)   Gait training with slow start/stop step to hemiplegic gait pattern with min assist to advance RLE and inhibit excessive adduction, min to mod assist with right foot placement and min assist to stabilize right knee and inhibit hyperextension at midstance    STEPS or STAIRS Daily Assessment    Steps/Stairs Ambulated (#): 0  Level of Assist : 0 (Not tested)       BALANCE Daily Assessment    Sitting - Static: Good (unsupported)  Sitting - Dynamic: Good (unsupported)  Standing - Static: Fair  Standing - Dynamic : Impaired       WHEELCHAIR MOBILITY Daily Assessment   Using LUE and LLE Able to Propel (ft): 100 feet  Functional Level: 2  Curbs/Ramps Assist Required (FIM Score): 0 (Not tested)  Wheelchair Setup Assist Required : 4 (Minimal assistance)  Wheelchair Management: Manages left brake;Manages right brake       LOWER EXTREMITY EXERCISES Daily Assessment    Extremity: Right  Exercise Type #1: Supine lower extremity strengthening  Sets Performed: 2  Reps Performed: 10  Level of Assist: Minimal assistance  Exercise Type #2: Other (comment) (supine SAQ with ESTIM to right quad)  Sets Performed:  (Premodulated mode intensity to 14 naman amps, frequency to 80 to 150HZ,)  Reps Performed:  (x 10 mins with 10 sec on 10 sec off cycle)  Level of Assist: Minimal assistance          Assessment: Progressing towards goals, right quad strength slowly improving       Patient returned to room at end of treatment. Patient supine in bed with head of bed elevated and bed rails up x 2. Needs placed in reach of patient. Plan of Care: Continue with POC and progress as tolerated.      Media Prior, PT  8/12/2017

## 2017-08-12 NOTE — PROGRESS NOTES
Rosalina Mane MD,   Medical Director  [facility], [address] [facility]  Tel: [phone]       [facility] PROGRESS NOTE    Cristal Sherwood  Admit Date: 8/8/2017  Admit Diagnosis: Stroke - Right Side Body Involvment  Stroke (cerebrum) (HCC)    Subjective     Doing well. Feels \"less fuzzy headed\". Anxiety decreased. Not tearful . Denies headache, cp or SOB. + blurry vision, but doesn't have glasses on    Patient seen and examined. Reports making progress in therapy. No pain complaints. Slept better with CPAP. Denies lightheadedness, palpations, SOB or nausea. Participating in therapy. Objective:     Current Facility-Administered Medications   Medication Dose Route Frequency    potassium chloride (K-DUR, KLOR-CON) SR tablet 40 mEq  40 mEq Oral BID    ferrous sulfate tablet 325 mg  1 Tab Oral BID WITH MEALS    aspirin (ASPIRIN) tablet 325 mg  325 mg Oral DAILY    enoxaparin (LOVENOX) injection 40 mg  40 mg SubCUTAneous Q24H    acetaminophen (TYLENOL) tablet 650 mg  650 mg Oral Q4H PRN    alum-mag hydroxide-simeth (MYLANTA) oral suspension 30 mL  30 mL Oral Q4H PRN    atorvastatin (LIPITOR) tablet 40 mg  40 mg Oral DAILY    bisacodyl (DULCOLAX) suppository 10 mg  10 mg Rectal DAILY PRN    escitalopram oxalate (LEXAPRO) tablet 20 mg  20 mg Oral DAILY    LORazepam (ATIVAN) tablet 1 mg  1 mg Oral Q6H PRN    magnesium hydroxide (MILK OF MAGNESIA) 400 mg/5 mL oral suspension 30 mL  30 mL Oral DAILY PRN    ondansetron (ZOFRAN ODT) tablet 4 mg  4 mg Oral Q6H PRN    senna-docusate (PERICOLACE) 8.6-50 mg per tablet 2 Tab  2 Tab Oral DAILY PRN    valsartan/hydroCHLOROthiazide (DIOVAN HCT) 160/25 mg   Oral DAILY    traZODone (DESYREL) tablet 50 mg  50 mg Oral QHS PRN    pantoprazole (PROTONIX) tablet 40 mg  40 mg Oral ACB     Review of Systems:Denies chest pain, shortness of breath, cough, headache, abdominal pain, dysurea, calf pain.  Pertinent positives are as noted in the medical records and unremarkable otherwise. Visit Vitals    /82 (BP 1 Location: Left arm, BP Patient Position: At rest)    Pulse 89    Temp 97.8 °F (36.6 °C)    Resp 18    SpO2 98%    Breastfeeding No        Physical Exam:   General: Alert and age appropriately oriented. No acute cardio respiratory distress. HEENT: Normocephalic,no scleral icterus  Oral mucosa moist without cyanosis; right lower facial weakness   Lungs: Clear to auscultation  bilaterally. Respiration even and unlabored   Heart: Regular rate and rhythm, S1, S2   No  murmurs, clicks, rub or gallops   Abdomen: Soft, non-tender, nondistended. Bowel sounds present. Genitourinary: deferred   Neuromuscular:      EOMI, right facial weakness, tongue midline  L side WFL  RUE grossly 2 prox ,  trace; flexion synergy pattern/tone RUE  RLE hip flexion 2, KE 3 , DF 0, PF 2   Skin/extremity: No rashes, no erythema.  No calf tenderness BLE  No edema                                                                            Functional Assessment:          Balance  Sitting - Static: Good (unsupported) (08/12/17 1100)  Sitting - Dynamic: Good (unsupported) (08/12/17 1100)  Standing - Static: Fair (08/12/17 1100)  Standing - Dynamic : Impaired (08/12/17 1100)           Toileting  Cues: Physical assistance for pants up;Physical assistance for pants down (08/11/17 1027)         Rashmi Fall Risk Assessment:  Kinsey Peters Fall Risk  Mobility: Ambulates or transfers with assist devices or assistance/unsteady gait (08/12/17 0705)  Mobility Interventions: Patient to call before getting OOB;PT Consult for mobility concerns;PT Consult for assist device competence;OT consult for ADLs (08/12/17 0705)  Mentation: Alert, oriented x 3 (08/12/17 0705)  Medication: Patient receiving anticonvulsants, sedatives(tranquilizers), psychotropics or hypnotics, hypoglycemics, narcotics, sleep aids, antihypertensives, laxatives, or diuretics (08/12/17 4186)  Medication Interventions: Patient to call before getting OOB; Evaluate medications/consider consulting pharmacy (08/12/17 6151)  Elimination: Needs assistance with toileting (08/12/17 3074)  Elimination Interventions: Call light in reach (08/12/17 0705)  Prior Fall History: No  (08/12/17 0705)  Total Score: 3 (08/12/17 0705)  High Fall Risk: Yes (08/11/17 1907)     Speech Assessment:         Ambulation:  Gait  Distance (ft): 20 Feet (ft) (20ft x 3) (08/12/17 1100)  Assistive Device:  (hand rail on left, ace wrap for right DF assist) (08/11/17 1500)     Labs/Studies:  Recent Results (from the past 72 hour(s))   FERRITIN    Collection Time: 08/11/17  7:07 AM   Result Value Ref Range    Ferritin 5 (L) 8 - 388 NG/ML   TRANSFERRIN SATURATION    Collection Time: 08/11/17  7:07 AM   Result Value Ref Range    Iron 25 (L) 35 - 150 ug/dL    TIBC 426 250 - 450 ug/dL    Transferrin Saturation 6 (L) >20 %   TRANSFERRIN    Collection Time: 08/11/17  7:07 AM   Result Value Ref Range    Transferrin 362 202 - 579 mg/dL   METABOLIC PANEL, BASIC    Collection Time: 08/11/17  7:07 AM   Result Value Ref Range    Sodium 141 136 - 145 mmol/L    Potassium 3.3 (L) 3.5 - 5.1 mmol/L    Chloride 103 98 - 107 mmol/L    CO2 28 21 - 32 mmol/L    Anion gap 10 7 - 16 mmol/L    Glucose 88 65 - 100 mg/dL    BUN 11 6 - 23 MG/DL    Creatinine 0.56 (L) 0.6 - 1.0 MG/DL    GFR est AA >60 >60 ml/min/1.73m2    GFR est non-AA >60 >60 ml/min/1.73m2    Calcium 8.9 8.3 - 10.4 MG/DL       Assessment:     Problem List as of 8/12/2017  Date Reviewed: 1/26/2017          Codes Class Noted - Resolved    Stroke (cerebrum) ([facility] [address] ICD-10-CM: I63.9  ICD-9-CM: 434.91  8/8/2017 - Present        Left sided lacunar stroke [facility]) ICD-10-CM: I63.9  ICD-9-CM: 434.91  8/4/2017 - Present    Overview Signed 8/6/2017  7:44 PM by Princess Bocanegra MD     8/2017             Weakness of right side of body ICD-10-CM: R53.1  ICD-9-CM: 728.87  8/3/2017 - Present        Essential hypertension ICD-10-CM: I10  ICD-9-CM: 401.9  10/5/2016 - Present        Obesity (BMI 30-39. 9) ICD-10-CM: E66.9  ICD-9-CM: 278.00  Unknown - Present        Prediabetes ICD-10-CM: R73.03  ICD-9-CM: 790.29  7/28/2015 - Present        Generalized anxiety disorder ICD-10-CM: F41.1  ICD-9-CM: 300.02  Unknown - Present        Insomnia, unspecified ICD-10-CM: G47.00  ICD-9-CM: 780.52  Unknown - Present        Mitral valve prolapse ICD-10-CM: I34.1  ICD-9-CM: 424.0  Unknown - Present        Iron deficiency anemia ICD-10-CM: D50.9  ICD-9-CM: 280.9  8/23/2013 - Present    Overview Signed 10/5/2016  2:49 PM by Trent Neves MD     S/p iron infusion x 2             RESOLVED: GERD (gastroesophageal reflux disease) ICD-10-CM: K21.9  ICD-9-CM: 530.81  Unknown - 10/5/2016        RESOLVED: Mixed hyperlipidemia ICD-10-CM: E78.2  ICD-9-CM: 272.2  Unknown - 10/5/2016        RESOLVED: Hypertension ICD-10-CM: I10  ICD-9-CM: 401.9  Unknown - 10/5/2016              Plan:      This is an unfortunate 37yo WF with obesity, RADHA, and htn admitted with right hemiplegia due to Left hemispheric stroke with evolution; etiology of stroke not clear      Continue daily physician medical management:      CVA; cont ASA and Lipitor , hypercoag w/u pending (outside labs); neg thus far      Pneumonia prophylaxis- Incentive spirometer every hour while awake      DVT risk / DVT Prophylaxis- Will require daily physician exam to assess for signs and symptoms as patient is at increased risk for of thromboembolism. Mobilization as tolerated. Intermittent pneumatic compression devices when in bed Thigh-high or knee-high thromboembolic deterrent hose when out of bed. Lovenox subq daily.       Pain Control: stable, mild-to-moderate joint symptoms intermittently, reasonably well controlled by PRN meds. Will require regular pain assessment and comprenhensive pain management, Cont prn tylenol      Chronic insomnia; on Ambien at home. Have not restarted this.  Try prn trazadone. 8/11 reports sleeping better      Wound Care: no current evidence of skin breakdown. Will require 24/7 rehab nursing. turning schedule q 2 hrs while in bed      Suspect RADHA; can use CPAP while here but will need outpt formal sleep study and Pulmonary follow up      Obesity; have discussed importance of wt loss for mobility and overall health      Hypertension - BP better controlled, fluctuating, managed medically. Cont Diovan HCT; varies, 8/10 consider adding low dose ACE inhib (norvasc)  HR and BP acceptable      LUCHO; gets IV infusions; cont to monitor. Currently hgb 11.7. Last infusion in April ; f/u iron studies in 8/11  -8/11 very iron deficient based on labs; will add ferrous sulfate and will need outpt f/u for transfusion      Hypokalemia; K 3.2; add supplement and monitor; re check 8/11  -K 3.4 on 8/10, cont KCL 40meq daily  -8/11 K 3.3 will increase KCL to  40 bid and recheck 8/14      bowel program - add prn bowel program      GERD - start PPI. At times may need additional antacids, Maalox prn.      Depression; cont Lexapro; home med due to generalized anxiety due to stressors in life. His risk post stroke depression. Order recreational therapy and psych if needed; doing much better and participating well 8/11      Dysphagia; mild. Cont ST for this as well as for dysarthria. Mechanical consistency diet, cont aspir precautions      Time spent was 15 minutes with over 1/2 in direct patient care/examination, consultation and coordination of care.      Signed By: Macrina Johnson MD     August 12, 2017

## 2017-08-12 NOTE — PROGRESS NOTES
08/12/17 0918   Time Spent With Patient   Time In [id number]   Time Out 0915   Patient Seen For: AM;ADLs;Other (see progress notes)   Feeding/Eating   Feeding/Eating Assistance S   Comments (s/u assist)   Grooming   Grooming Assistance  S   Comments set up assist sitting   Upper [address], Upper Min A   Upper Body  Cristiano technique training; Compensatory technique training   Position Performed Seated in chair   Adaptive Equipment Tub bench   Comments (Assist to bathe R UE)   Lower Body Bathing   Bathing Assistance, Lower  Min A   Adaptive Equipment Long handled sponge   Position Performed Seated in chair;Standing   Adaptive Equipment Tub bench   Comments (Assist to cleanse buttocks & sit sarita while leaning to right)   Upper Body Dressing    Dressing Assistance  Min A   Comments (Assist with bra and v/c's for cristiano technique)   Lower Body Dressing    Dressing Assistance  Mod A   Leg Crossed Method Used No   Position Performed Seated in chair;Standing   Functional Transfers   Toilet Transfer  Stand pivot transfer without device   Amount of Assistance Required Mod A   Tub or Shower Type Shower   Amount of Assistance Required Min A   OT Daily Note  Time In 0753   Time Out 0915     Subjective: \"I want to do as much as I am able to do. \"  Pain: 0  Education: Positioning, Neuro reeducation/Cristiano techniques with ADLs  Interdisciplinary Communication: PT, Nursing  Precautions:  (falls)    Self Care Trainig & Neuromuscular Re-ed w/ ADLs Daily Assessment   ADLs noted above. Worked on NDT input to R hip flexors to prepare pt for dressing task. With Neuro input, pt able lift R hip flexors with support of R LE. Worked on R UE weight bearing with weight shift. Trace R Upper Trap mm noted. NDT input to R shoulder and triceps this date; no active mvt noted. Daily Assessment          Daily Assessment         Assessment: Patient very motivated with therapy tasks.   Patient able to activate R hip flexors to prepare for dressing task with support YADI GUSTAFSON. No change noted with YADI CARRILLO this date. Patient responded well to tx.   Plan: Cont OT per tx plan  Elmer Carpio, OT

## 2017-08-12 NOTE — PROGRESS NOTES
Patient resting/asleep in bed. Alert and oriented upon assessment/awakening. Vital signs taken. Lung sounds clear. S1S2, bowel sounds active. Brace to right arm. Continues with weakness. Breakfast tray set up and pkgs opened for patient. No complaint of pain or discomfort at present time. No acute signs of distress. Assessment completed. See doc flow sheet for further assessments.

## 2017-08-12 NOTE — PROGRESS NOTES
Problem: Falls - Risk of  Goal: *Absence of Falls  Document Rashmi Fall Risk and appropriate interventions in the flowsheet. Outcome: Progressing Towards Goal  Fall Risk Interventions:  Mobility Interventions: Patient to call before getting OOB, PT Consult for mobility concerns, PT Consult for assist device competence, OT consult for ADLs           Medication Interventions: Patient to call before getting OOB, Evaluate medications/consider consulting pharmacy     Elimination Interventions: Call light in reach                 Comments:   Call light within reach. Bed in low locked position with side rails up x 2. Telephone and remote also within reach.

## 2017-08-13 PROCEDURE — 74011250636 HC RX REV CODE- 250/636: Performed by: PHYSICAL MEDICINE & REHABILITATION

## 2017-08-13 PROCEDURE — 74011250637 HC RX REV CODE- 250/637: Performed by: PHYSICAL MEDICINE & REHABILITATION

## 2017-08-13 PROCEDURE — 65310000000 HC RM PRIVATE REHAB

## 2017-08-13 RX ADMIN — POTASSIUM CHLORIDE 40 MEQ: 20 TABLET, EXTENDED RELEASE ORAL at 08:44

## 2017-08-13 RX ADMIN — PANTOPRAZOLE SODIUM 40 MG: 40 TABLET, DELAYED RELEASE ORAL at 06:29

## 2017-08-13 RX ADMIN — ESCITALOPRAM OXALATE 20 MG: 10 TABLET ORAL at 08:45

## 2017-08-13 RX ADMIN — POTASSIUM CHLORIDE 40 MEQ: 20 TABLET, EXTENDED RELEASE ORAL at 17:36

## 2017-08-13 RX ADMIN — FERROUS SULFATE TAB 325 MG (65 MG ELEMENTAL FE) 325 MG: 325 (65 FE) TAB at 17:00

## 2017-08-13 RX ADMIN — FERROUS SULFATE TAB 325 MG (65 MG ELEMENTAL FE) 325 MG: 325 (65 FE) TAB at 07:45

## 2017-08-13 RX ADMIN — HYDROCHLOROTHIAZIDE: 25 TABLET ORAL at 08:45

## 2017-08-13 RX ADMIN — ENOXAPARIN SODIUM 40 MG: 40 INJECTION SUBCUTANEOUS at 21:10

## 2017-08-13 RX ADMIN — ATORVASTATIN CALCIUM 40 MG: 40 TABLET, FILM COATED ORAL at 08:46

## 2017-08-13 RX ADMIN — ASPIRIN 325 MG ORAL TABLET 325 MG: 325 PILL ORAL at 08:45

## 2017-08-13 NOTE — PROGRESS NOTES
Problem: Falls - Risk of  Goal: *Absence of Falls  Document Rashmi Fall Risk and appropriate interventions in the flowsheet.    Outcome: Progressing Towards Goal  Fall Risk Interventions:  Mobility Interventions: Patient to call before getting OOB, PT Consult for mobility concerns, PT Consult for assist device competence, OT consult for ADLs           Medication Interventions: Patient to call before getting OOB, Evaluate medications/consider consulting pharmacy     Elimination Interventions: Call light in reach

## 2017-08-13 NOTE — PROGRESS NOTES
Pt A/O x 4, she denies pain, respirations even and non labored, right side flaccid, BM this am, good appetite, pt sleeping in this assessment. Voices no needs.

## 2017-08-13 NOTE — PROGRESS NOTES
Assist with opening packages and meal set up. Pt with right side weakness. assist with moving right leg in transferring. from bed to [facility]. Pt stands an pivots from [facility] back to bed Pt able to cleansed her self, staff empties commode.

## 2017-08-13 NOTE — PROGRESS NOTES
Britt Sebastian MD,   Medical Director  [address] [facility], [address] [facility]  Tel: [phone]       SFD PROGRESS NOTE    Arnulfo Raymond  Admit Date: 8/8/2017  Admit Diagnosis: Stroke - Right Side Body Involvment  Stroke (cerebrum) (HCC)    Subjective     Doing well. Feels \"less fuzzy headed\". Anxiety decreased. Not tearful . Denies headache, cp or SOB. + blurry vision, but doesn't have glasses on    Patient seen and examined. Reports RT not starting CPAP overnight. No pain complaints. Denies lightheadedness, palpations, SOB or nausea. Participating in therapy. Objective:     Current Facility-Administered Medications   Medication Dose Route Frequency    potassium chloride (K-DUR, KLOR-CON) SR tablet 40 mEq  40 mEq Oral BID    ferrous sulfate tablet 325 mg  1 Tab Oral BID WITH MEALS    aspirin (ASPIRIN) tablet 325 mg  325 mg Oral DAILY    enoxaparin (LOVENOX) injection 40 mg  40 mg SubCUTAneous Q24H    acetaminophen (TYLENOL) tablet 650 mg  650 mg Oral Q4H PRN    alum-mag hydroxide-simeth (MYLANTA) oral suspension 30 mL  30 mL Oral Q4H PRN    atorvastatin (LIPITOR) tablet 40 mg  40 mg Oral DAILY    bisacodyl (DULCOLAX) suppository 10 mg  10 mg Rectal DAILY PRN    escitalopram oxalate (LEXAPRO) tablet 20 mg  20 mg Oral DAILY    LORazepam (ATIVAN) tablet 1 mg  1 mg Oral Q6H PRN    magnesium hydroxide (MILK OF MAGNESIA) 400 mg/5 mL oral suspension 30 mL  30 mL Oral DAILY PRN    ondansetron (ZOFRAN ODT) tablet 4 mg  4 mg Oral Q6H PRN    senna-docusate (PERICOLACE) 8.6-50 mg per tablet 2 Tab  2 Tab Oral DAILY PRN    valsartan/hydroCHLOROthiazide (DIOVAN HCT) 160/25 mg   Oral DAILY    traZODone (DESYREL) tablet 50 mg  50 mg Oral QHS PRN    pantoprazole (PROTONIX) tablet 40 mg  40 mg Oral ACB     Review of Systems:Denies chest pain, shortness of breath, cough, headache, abdominal pain, dysurea, calf pain.  Pertinent positives are as noted in the medical records and unremarkable otherwise. Visit Vitals    /74 (BP 1 Location: Left arm, BP Patient Position: At rest)    Pulse 80    Temp 97.7 °F (36.5 °C)    Resp 18    SpO2 95%    Breastfeeding No        Physical Exam:   General: Alert and age appropriately oriented. No acute cardio respiratory distress. HEENT: Normocephalic,no scleral icterus  Oral mucosa moist without cyanosis; right lower facial weakness   Lungs: Clear to auscultation  bilaterally. Respiration even and unlabored   Heart: Regular rate and rhythm, S1, S2   No  murmurs, clicks, rub or gallops   Abdomen: Soft, non-tender, nondistended. Bowel sounds present. Genitourinary: deferred   Neuromuscular:      EOMI, right facial weakness, tongue midline  L side WFL  RUE grossly 2 prox ,  trace; flexion synergy pattern/tone RUE  RLE hip flexion 2, KE 3 , DF 0, PF 2   Skin/extremity: No rashes, no erythema.  No calf tenderness BLE  No edema                                                                            Functional Assessment:          Balance  Sitting - Static: Good (unsupported) (08/12/17 1100)  Sitting - Dynamic: Good (unsupported) (08/12/17 1100)  Standing - Static: Fair (08/12/17 1100)  Standing - Dynamic : Impaired (08/12/17 1100)           Toileting  Cues: Physical assistance for pants up;Physical assistance for pants down (08/11/17 1027)         Rashmi Fall Risk Assessment:  Karin Parmar Fall Risk  Mobility: Ambulates or transfers with assist devices or assistance/unsteady gait (08/12/17 2311)  Mobility Interventions: Patient to call before getting OOB;PT Consult for assist device competence;OT consult for ADLs (08/12/17 2311)  Mentation: Alert, oriented x 3 (08/12/17 2311)  Medication: Patient receiving anticonvulsants, sedatives(tranquilizers), psychotropics or hypnotics, hypoglycemics, narcotics, sleep aids, antihypertensives, laxatives, or diuretics (08/12/17 2311)  Medication Interventions: Patient to call before getting OOB; Evaluate medications/consider consulting pharmacy (08/12/17 2311)  Elimination: Needs assistance with toileting (08/12/17 2311)  Elimination Interventions: Call light in reach; Patient to call for help with toileting needs (08/12/17 2311)  Prior Fall History: No  (08/12/17 2311)  Total Score: 3 (08/12/17 2311)  Standard Fall Precautions: Yes (08/12/17 2311)  High Fall Risk: Yes (08/11/17 1907)     Speech Assessment:         Ambulation:  Gait  Distance (ft): 20 Feet (ft) (20ft x 3) (08/12/17 1100)  Assistive Device:  (hand rail on left, ace wrap for right DF assist) (08/11/17 1500)     Labs/Studies:  Recent Results (from the past 72 hour(s))   FERRITIN    Collection Time: 08/11/17  7:07 AM   Result Value Ref Range    Ferritin 5 (L) 8 - 388 NG/ML   TRANSFERRIN SATURATION    Collection Time: 08/11/17  7:07 AM   Result Value Ref Range    Iron 25 (L) 35 - 150 ug/dL    TIBC 426 250 - 450 ug/dL    Transferrin Saturation 6 (L) >20 %   TRANSFERRIN    Collection Time: 08/11/17  7:07 AM   Result Value Ref Range    Transferrin 362 202 - 768 mg/dL   METABOLIC PANEL, BASIC    Collection Time: 08/11/17  7:07 AM   Result Value Ref Range    Sodium 141 136 - 145 mmol/L    Potassium 3.3 (L) 3.5 - 5.1 mmol/L    Chloride 103 98 - 107 mmol/L    CO2 28 21 - 32 mmol/L    Anion gap 10 7 - 16 mmol/L    Glucose 88 65 - 100 mg/dL    BUN 11 6 - 23 MG/DL    Creatinine 0.56 (L) 0.6 - 1.0 MG/DL    GFR est AA >60 >60 ml/min/1.73m2    GFR est non-AA >60 >60 ml/min/1.73m2    Calcium 8.9 8.3 - 10.4 MG/DL       Assessment:     Problem List as of 8/13/2017  Date Reviewed: 1/26/2017          Codes Class Noted - Resolved    Stroke (cerebrum) ([facility] ICD-10-CM: I63.9  ICD-9-CM: 434.91  8/8/2017 - Present        Left sided lacunar stroke [facility]) ICD-10-CM: I63.9  ICD-9-CM: 434.91  8/4/2017 - Present    Overview Signed 8/6/2017  7:44 PM by Nicol Estrada MD     8/2017             Weakness of right side of body ICD-10-CM: R53.1  ICD-9-CM: 728.87 8/3/2017 - Present        Essential hypertension ICD-10-CM: I10  ICD-9-CM: 401.9  10/5/2016 - Present        Obesity (BMI 30-39. 9) ICD-10-CM: E66.9  ICD-9-CM: 278.00  Unknown - Present        Prediabetes ICD-10-CM: R73.03  ICD-9-CM: 790.29  7/28/2015 - Present        Generalized anxiety disorder ICD-10-CM: F41.1  ICD-9-CM: 300.02  Unknown - Present        Insomnia, unspecified ICD-10-CM: G47.00  ICD-9-CM: 780.52  Unknown - Present        Mitral valve prolapse ICD-10-CM: I34.1  ICD-9-CM: 424.0  Unknown - Present        Iron deficiency anemia ICD-10-CM: D50.9  ICD-9-CM: 280.9  8/23/2013 - Present    Overview Signed 10/5/2016  2:49 PM by Thomas Garcia MD     S/p iron infusion x 2             RESOLVED: GERD (gastroesophageal reflux disease) ICD-10-CM: K21.9  ICD-9-CM: 530.81  Unknown - 10/5/2016        RESOLVED: Mixed hyperlipidemia ICD-10-CM: E78.2  ICD-9-CM: 272.2  Unknown - 10/5/2016        RESOLVED: Hypertension ICD-10-CM: I10  ICD-9-CM: 401.9  Unknown - 10/5/2016              Plan:      This is an unfortunate 37yo WF with obesity, RADHA, and htn admitted with right hemiplegia due to Left hemispheric stroke with evolution; etiology of stroke not clear      Continue daily physician medical management:      CVA; cont ASA and Lipitor , hypercoag w/u pending (outside labs); neg thus far      Pneumonia prophylaxis- Incentive spirometer every hour while awake      DVT risk / DVT Prophylaxis- Will require daily physician exam to assess for signs and symptoms as patient is at increased risk for of thromboembolism. Mobilization as tolerated. Intermittent pneumatic compression devices when in bed Thigh-high or knee-high thromboembolic deterrent hose when out of bed. Lovenox subq daily.       Pain Control: stable, mild-to-moderate joint symptoms intermittently, reasonably well controlled by PRN meds.  Will require regular pain assessment and comprenhensive pain management, Cont prn tylenol      Chronic insomnia; on Ambien at home. Have not restarted this. Try prn trazadone. 8/11 reports sleeping better      Wound Care: no current evidence of skin breakdown. Will require 24/7 rehab nursing. turning schedule q 2 hrs while in bed      Suspect RADHA; can use CPAP while here but will need outpt formal sleep study and Pulmonary follow up      Obesity; have discussed importance of wt loss for mobility and overall health      Hypertension - BP better controlled, fluctuating, managed medically. Cont Diovan HCT; varies, 8/10 consider adding low dose ACE inhib (norvasc)  HR and BP acceptable      LUCHO; gets IV infusions; cont to monitor. Currently hgb 11.7. Last infusion in April ; f/u iron studies in 8/11  -8/11 very iron deficient based on labs; will add ferrous sulfate and will need outpt f/u for transfusion      Hypokalemia; K 3.2; add supplement and monitor; re check 8/11  -K 3.4 on 8/10, cont KCL 40meq daily  -8/11 K 3.3 will increase KCL to  40 bid and recheck 8/14      bowel program - add prn bowel program      GERD - start PPI. At times may need additional antacids, Maalox prn.      Depression; cont Lexapro; home med due to generalized anxiety due to stressors in life. His risk post stroke depression. Order recreational therapy and psych if needed; doing much better and participating well 8/11      Dysphagia; mild. Cont ST for this as well as for dysarthria. Mechanical consistency diet, cont aspir precautions      Time spent was 15 minutes with over 1/2 in direct patient care/examination, consultation and coordination of care.      Signed By: Belen Chou MD     August 13, 2017

## 2017-08-14 LAB
POTASSIUM SERPL-SCNC: 3.8 MMOL/L (ref 3.5–5.1)
PROT C AG ACT/NOR PPP IA: NORMAL %

## 2017-08-14 PROCEDURE — 74011250637 HC RX REV CODE- 250/637: Performed by: PHYSICAL MEDICINE & REHABILITATION

## 2017-08-14 PROCEDURE — 97032 APPL MODALITY 1+ESTIM EA 15: CPT

## 2017-08-14 PROCEDURE — 97535 SELF CARE MNGMENT TRAINING: CPT

## 2017-08-14 PROCEDURE — 97150 GROUP THERAPEUTIC PROCEDURES: CPT

## 2017-08-14 PROCEDURE — 65310000000 HC RM PRIVATE REHAB

## 2017-08-14 PROCEDURE — 92526 ORAL FUNCTION THERAPY: CPT

## 2017-08-14 PROCEDURE — 36415 COLL VENOUS BLD VENIPUNCTURE: CPT | Performed by: PHYSICAL MEDICINE & REHABILITATION

## 2017-08-14 PROCEDURE — 97530 THERAPEUTIC ACTIVITIES: CPT

## 2017-08-14 PROCEDURE — 97112 NEUROMUSCULAR REEDUCATION: CPT

## 2017-08-14 PROCEDURE — 74011250636 HC RX REV CODE- 250/636: Performed by: PHYSICAL MEDICINE & REHABILITATION

## 2017-08-14 PROCEDURE — 97110 THERAPEUTIC EXERCISES: CPT

## 2017-08-14 PROCEDURE — 84132 ASSAY OF SERUM POTASSIUM: CPT | Performed by: PHYSICAL MEDICINE & REHABILITATION

## 2017-08-14 PROCEDURE — 97116 GAIT TRAINING THERAPY: CPT

## 2017-08-14 RX ADMIN — POTASSIUM CHLORIDE 40 MEQ: 20 TABLET, EXTENDED RELEASE ORAL at 08:02

## 2017-08-14 RX ADMIN — ENOXAPARIN SODIUM 40 MG: 40 INJECTION SUBCUTANEOUS at 20:46

## 2017-08-14 RX ADMIN — PANTOPRAZOLE SODIUM 40 MG: 40 TABLET, DELAYED RELEASE ORAL at 06:24

## 2017-08-14 RX ADMIN — ASPIRIN 325 MG ORAL TABLET 325 MG: 325 PILL ORAL at 08:02

## 2017-08-14 RX ADMIN — FERROUS SULFATE TAB 325 MG (65 MG ELEMENTAL FE) 325 MG: 325 (65 FE) TAB at 16:27

## 2017-08-14 RX ADMIN — ESCITALOPRAM OXALATE 20 MG: 10 TABLET ORAL at 08:02

## 2017-08-14 RX ADMIN — ATORVASTATIN CALCIUM 40 MG: 40 TABLET, FILM COATED ORAL at 08:02

## 2017-08-14 RX ADMIN — HYDROCHLOROTHIAZIDE: 25 TABLET ORAL at 08:02

## 2017-08-14 RX ADMIN — FERROUS SULFATE TAB 325 MG (65 MG ELEMENTAL FE) 325 MG: 325 (65 FE) TAB at 08:02

## 2017-08-14 RX ADMIN — POTASSIUM CHLORIDE 40 MEQ: 20 TABLET, EXTENDED RELEASE ORAL at 17:11

## 2017-08-14 NOTE — PROGRESS NOTES
08/14/17 1008   Time Spent With Patient   Time In 0702   Time Out 0829   Patient Seen For: AM;ADLs   Feeding/Eating   Feeding/Eating Assistance Mod I   Comments Increased time required to problem solve container management utilizing bobby techniques   Grooming   Grooming Assistance  S   Comments To gather materials   Upper Body Bathing   Bathing Assistance, Upper Mod I   Position Performed Seated in chair   Adaptive Equipment Tub bench   Lower Body Bathing   Bathing Assistance, Lower  [address] handled sponge   Position Performed Seated in chair   Adaptive Equipment Tub bench   Comments CGA for dynamic sitting balance to bathe bottom leaning to side/anteriorly while seated   Upper Body Dressing    Dressing Assistance  Min A   Comments To help bra down R side of trunk utilizing overhead technique   Lower Body Dressing    Dressing Assistance  Mod A   Leg Crossed Method Used No   Position Performed Seated in chair;Standing   Adaptive Equipment Used Sock aid;Reacher;Long handled shoe horn   Don/Doff Anti-Embolic Stockings NA   Comments Assist to finish underwear only over R hip this session, setup of sock aid required d/t tight socks, assist to start R foot into tennis shoe. Minimal assist for standing balance as patient manages underwear/pants over hips. Functional Transfers   Tub or Shower Type Shower   Amount of Assistance Required Min A   [employer] [place] Grab bars; Wheelchair;Tub transfer bench       S: \"I just have to mess with it a while and I can get it. \" [Increased time required however able to complete all container management on breakfast tray utilizing one-handed technique] Agreeable to therapy. Focus of session was on ADL retraining/functional mobiliy. Patient was able to transfer bed to wheelchair towards R SPT with moderate assist.  Transferred wheelchair <> TTB SPT with use of grab bars, minimal assist to progress RLE.     Patient transferred wheelchair > bed towards L side SPT with minimal assist to self-feed breakfast seated EOB. See above for FIM details. Patient tolerated weightbearing through RUE, facilitated at elbow and wrist with moderate physical assist, seated EOB during breakfast for neuromuscular reeducation. Pain not indicated. Collaborated with PTRonaldo and confirmed patient is on track to reach goals as documented in the care plan. Patient tolerated session well, but RUE strength/coordination, sitting/standing balance, functional mobility, activity tolerance are still below baseline and requires skilled facilitation to successfully and safely complete ADL's and transfers. Patient ended session in recliner with PTRonaldo, present.      [employer], OTR/L

## 2017-08-14 NOTE — PROGRESS NOTES
PHYSICAL THERAPY DAILY NOTE  Time In: 8134  Time Out: 0915  Patient Seen For: AM;Balance activities; Patient education; Therapeutic exercise;Transfer training; Wheelchair mobility; Other (see progress notes)    Subjective: patient reporting she feels OK. Reports she feels a little weaker since not moving around yesterday. Reports she spent most of the day in her bed. Reports her family came to visit with her dog         Objective:Vital Signs:  Patient Vitals for the past 12 hrs:   Temp Pulse Resp BP SpO2   08/14/17 0804 98.3 °F (36.8 °C) (!) 102 20 153/90 95 %     Pain level:No c/o pain  Pain location:NA  Pain interventions:NA    Patient education:Bed mobility training,transfer training, balance training, fall precautions, activity pacing, body mechanics, w/c mobility and parts management, Patient verbalizing understanding and demonstrating partial understanding of patient education. Recommend follow up education.     Interdisciplinary Communication:spoke with OT regarding transfers    Other (comment) (Fall precautions)  GROSS ASSESSMENT Daily Assessment     NA       BED/MAT MOBILITY Daily Assessment   Increased time and effort with cues for body mechanics Rolling Right : 0 (Not tested)  Rolling Left : 0 (Not tested)  Supine to Sit : 5 (Stand-by assistance)  Sit to Supine :  (SBA)       TRANSFERS Daily Assessment   Verbal cues for body mechanics Transfer Type: SPT without device (w/c <>mat and w/c <>recliner) to the left  Transfer Assistance : 4 (Contact guard assistance)  Sit to Stand Assistance: Stand-by assistance  Car Transfers: Not tested       GAIT Daily Assessment    Amount of Assistance: 0 (Not tested)  Distance (ft): 0 Feet (ft)       STEPS or STAIRS Daily Assessment    Steps/Stairs Ambulated (#): 0  Level of Assist : 0 (Not tested)       BALANCE Daily Assessment    Sitting - Static: Good (unsupported)  Sitting - Dynamic: Good (unsupported)  Standing - Static: Fair (with LUE support on rail)  Standing - Dynamic : Impaired       WHEELCHAIR MOBILITY Daily Assessment   Using LUE and LLE Able to Propel (ft): 100 feet (100ft x 2)  Functional Level: 2  Curbs/Ramps Assist Required (FIM Score): 0 (Not tested)  Wheelchair Setup Assist Required : 5 (Stand-by assistance)  Wheelchair Management: Manages left brake;Manages right brake;Manages right footrest       LOWER EXTREMITY EXERCISES Daily Assessment    Extremity: Right  Exercise Type #1: Supine lower extremity strengthening  Sets Performed: 2  Reps Performed: 10  Level of Assist: Minimal assistance  Exercise Type #2: Other (comment) (Supine SAQ with premodulated mode ESTIM to quad)  Sets Performed:  (10 sec on 10 sec off cycle)  Reps Performed:  (x 10 mins with intensity to 14millamps, freq 80 to 150HZ)  Level of Assist: Minimal assistance          Assessment: Progressing towards modified independence with bed mobility and transfers using hemiplegic body mechanics assisting RLE with LLE. Patient returned to room at end of treatment and remained up in recliner with LEs elevated and with needs in reach. Plan of Care: Continue with POC and progress as tolerated.      Joan Loaiza, PT  8/14/2017

## 2017-08-14 NOTE — PROGRESS NOTES
08/14/17 1112   Time Spent With Patient   Time In 1043   Time Out 1111   Patient Seen For: AM;Oral-motor exercises   Mental Status   Neurologic State Alert   Orientation Level Oriented X4   Cognition Appropriate decision making   Perception Appears intact   Perseveration No perseveration noted   Safety/Judgement Fall prevention   Pt completed oral motor exercises 2 x 10 with 90% Accuracy. Pt completed speech tasks with 90% intelligibility. Pt using over articulation for speech compensatory strategy.    Michelle Barragan MA/SELINA/SLP

## 2017-08-14 NOTE — PROGRESS NOTES
Hollis Montes MD,   Medical Director  [address] [facility], [address] [facility]  Tel: [phone]       D PROGRESS NOTE    Delmi Stark  Admit Date: 8/8/2017  Admit Diagnosis: Stroke - Right Side Body Involvment  Stroke (cerebrum) (HCC)    Subjective     Doing well. Feels \"less fuzzy headed\". Anxiety decreased. Not tearful . Denies headache, cp or SOB. + blurry vision, but doesn't have glasses on    Patient seen and examined. No CPAP again overnight. No pain complaints. Denies lightheadedness, palpations, SOB or nausea. Participating in therapy. Objective:     Current Facility-Administered Medications   Medication Dose Route Frequency    potassium chloride (K-DUR, KLOR-CON) SR tablet 40 mEq  40 mEq Oral BID    ferrous sulfate tablet 325 mg  1 Tab Oral BID WITH MEALS    aspirin (ASPIRIN) tablet 325 mg  325 mg Oral DAILY    enoxaparin (LOVENOX) injection 40 mg  40 mg SubCUTAneous Q24H    acetaminophen (TYLENOL) tablet 650 mg  650 mg Oral Q4H PRN    alum-mag hydroxide-simeth (MYLANTA) oral suspension 30 mL  30 mL Oral Q4H PRN    atorvastatin (LIPITOR) tablet 40 mg  40 mg Oral DAILY    bisacodyl (DULCOLAX) suppository 10 mg  10 mg Rectal DAILY PRN    escitalopram oxalate (LEXAPRO) tablet 20 mg  20 mg Oral DAILY    LORazepam (ATIVAN) tablet 1 mg  1 mg Oral Q6H PRN    magnesium hydroxide (MILK OF MAGNESIA) 400 mg/5 mL oral suspension 30 mL  30 mL Oral DAILY PRN    ondansetron (ZOFRAN ODT) tablet 4 mg  4 mg Oral Q6H PRN    senna-docusate (PERICOLACE) 8.6-50 mg per tablet 2 Tab  2 Tab Oral DAILY PRN    valsartan/hydroCHLOROthiazide (DIOVAN HCT) 160/25 mg   Oral DAILY    traZODone (DESYREL) tablet 50 mg  50 mg Oral QHS PRN    pantoprazole (PROTONIX) tablet 40 mg  40 mg Oral ACB     Review of Systems:Denies chest pain, shortness of breath, cough, headache, abdominal pain, dysurea, calf pain.  Pertinent positives are as noted in the medical records and unremarkable otherwise. Visit Vitals    /64    Pulse 93    Temp 97.8 °F (36.6 °C)    Resp 16    SpO2 97%    Breastfeeding No        Physical Exam:   General: Alert and age appropriately oriented. No acute cardio respiratory distress. HEENT: Normocephalic,no scleral icterus  Oral mucosa moist without cyanosis; right lower facial weakness   Lungs: Clear to auscultation  bilaterally. Respiration even and unlabored   Heart: Regular rate and rhythm, S1, S2   No  murmurs, clicks, rub or gallops   Abdomen: Soft, non-tender, nondistended. Bowel sounds present. Genitourinary: deferred   Neuromuscular:      EOMI, right facial weakness, tongue midline  L side WFL  RUE grossly 2 prox ,  trace; flexion synergy pattern/tone RUE  RLE hip flexion 2, KE 3 , DF 0, PF 2   Skin/extremity: No rashes, no erythema. No calf tenderness BLE  No edema                                                                            Functional Assessment:          Balance  Sitting - Static: Good (unsupported) (08/12/17 1100)  Sitting - Dynamic: Good (unsupported) (08/12/17 1100)  Standing - Static: Fair (08/12/17 1100)  Standing - Dynamic : Impaired (08/12/17 1100)           Toileting  Cues: Physical assistance for pants up;Physical assistance for pants down (08/11/17 1027)         Kinsey Peters Fall Risk Assessment:  Kinsey Peters Fall Risk  Mobility: Ambulates or transfers with assist devices or assistance/unsteady gait (08/13/17 2037)  Mobility Interventions: Strengthening exercises (ROM-active/passive); Patient to call before getting OOB;PT Consult for mobility concerns (08/13/17 2037)  Mentation: Alert, oriented x 3 (08/13/17 2037)  Medication: Patient receiving anticonvulsants, sedatives(tranquilizers), psychotropics or hypnotics, hypoglycemics, narcotics, sleep aids, antihypertensives, laxatives, or diuretics (08/13/17 2037)  Medication Interventions: Patient to call before getting OOB (08/13/17 2037)  Elimination: Needs assistance with toileting (08/13/17 2037)  Elimination Interventions: Call light in reach;Elevated toilet seat (08/13/17 2037)  Prior Fall History: No  (08/13/17 2037)  Total Score: 3 (08/13/17 2037)  Standard Fall Precautions: Yes (08/13/17 0830)  High Fall Risk: Yes (08/11/17 1907)     Speech Assessment:         Ambulation:  Gait  Distance (ft): 20 Feet (ft) (20ft x 3) (08/12/17 1100)  Assistive Device:  (hand rail on left, ace wrap for right DF assist) (08/11/17 1500)     Labs/Studies:  Recent Results (from the past 72 hour(s))   FERRITIN    Collection Time: 08/11/17  7:07 AM   Result Value Ref Range    Ferritin 5 (L) 8 - 388 NG/ML   TRANSFERRIN SATURATION    Collection Time: 08/11/17  7:07 AM   Result Value Ref Range    Iron 25 (L) 35 - 150 ug/dL    TIBC 426 250 - 450 ug/dL    Transferrin Saturation 6 (L) >20 %   TRANSFERRIN    Collection Time: 08/11/17  7:07 AM   Result Value Ref Range    Transferrin 362 202 - 587 mg/dL   METABOLIC PANEL, BASIC    Collection Time: 08/11/17  7:07 AM   Result Value Ref Range    Sodium 141 136 - 145 mmol/L    Potassium 3.3 (L) 3.5 - 5.1 mmol/L    Chloride 103 98 - 107 mmol/L    CO2 28 21 - 32 mmol/L    Anion gap 10 7 - 16 mmol/L    Glucose 88 65 - 100 mg/dL    BUN 11 6 - 23 MG/DL    Creatinine 0.56 (L) 0.6 - 1.0 MG/DL    GFR est AA >60 >60 ml/min/1.73m2    GFR est non-AA >60 >60 ml/min/1.73m2    Calcium 8.9 8.3 - 10.4 MG/DL       Assessment:     Problem List as of 8/14/2017  Date Reviewed: 1/26/2017          Codes Class Noted - Resolved    St[facility] ([facility] ICD-10-CM: I63.9  ICD-9-CM: 434.91  8/8/2017 - Present        Left sided lacunar stroke [facility]) ICD-10-CM: I63.9  ICD-9-CM: 434.91  8/4/2017 - Present    Overview Signed 8/6/2017  7:44 PM by Randy Tamayo MD     8/2017             Weakness of right side of body ICD-10-CM: R53.1  ICD-9-CM: 728.87  8/3/2017 - Present        Essential hypertension ICD-10-CM: I10  ICD-9-CM: 401.9  10/5/2016 - Present        Obesity (BMI 30-39. 9) ICD-10-CM: E66.9  ICD-9-CM: 278.00  Unknown - Present        Prediabetes ICD-10-CM: R73.03  ICD-9-CM: 790.29  7/28/2015 - Present        Generalized anxiety disorder ICD-10-CM: F41.1  ICD-9-CM: 300.02  Unknown - Present        Insomnia, unspecified ICD-10-CM: G47.00  ICD-9-CM: 780.52  Unknown - Present        Mitral valve prolapse ICD-10-CM: I34.1  ICD-9-CM: 424.0  Unknown - Present        Iron deficiency anemia ICD-10-CM: D50.9  ICD-9-CM: 280.9  8/23/2013 - Present    Overview Signed 10/5/2016  2:49 PM by Jensen Galdamez MD     S/p iron infusion x 2             RESOLVED: GERD (gastroesophageal reflux disease) ICD-10-CM: K21.9  ICD-9-CM: 530.81  Unknown - 10/5/2016        RESOLVED: Mixed hyperlipidemia ICD-10-CM: E78.2  ICD-9-CM: 272.2  Unknown - 10/5/2016        RESOLVED: Hypertension ICD-10-CM: I10  ICD-9-CM: 401.9  Unknown - 10/5/2016              Plan:      This is an unfortunate 35yo WF with obesity, RADHA, and htn admitted with right hemiplegia due to Left hemispheric stroke with evolution; etiology of stroke not clear      Continue daily physician medical management:      CVA; cont ASA and Lipitor , hypercoag w/u pending (outside labs); neg thus far      Pneumonia prophylaxis- Incentive spirometer every hour while awake      DVT risk / DVT Prophylaxis- Will require daily physician exam to assess for signs and symptoms as patient is at increased risk for of thromboembolism. Mobilization as tolerated. Intermittent pneumatic compression devices when in bed Thigh-high or knee-high thromboembolic deterrent hose when out of bed. Lovenox subq daily.       Pain Control: stable, mild-to-moderate joint symptoms intermittently, reasonably well controlled by PRN meds. Will require regular pain assessment and comprenhensive pain management, Cont prn tylenol      Chronic insomnia; on Ambien at home. Have not restarted this. Try prn trazadone. 8/11 reports sleeping better      Wound Care: no current evidence of skin breakdown. Will require 24/7 rehab nursing. turning schedule q 2 hrs while in bed      Suspect RADHA; can use CPAP while here but will need outpt formal sleep study and Pulmonary follow up      Obesity; have discussed importance of wt loss for mobility and overall health      Hypertension - BP better controlled, fluctuating, managed medically. Cont Diovan HCT; varies, 8/10 consider adding low dose ACE inhib (norvasc)  BP generally wnml, mild tachycardia - 102, BP - 153/90, continue to monitor      LUCHO; gets IV infusions; cont to monitor. Currently hgb 11.7. Last infusion in April ; f/u iron studies in 8/11  -8/11 very iron deficient based on labs; will add ferrous sulfate and will need outpt f/u for transfusion      Hypokalemia; K 3.2; add supplement and monitor; re check 8/11  -K 3.4 on 8/10, cont KCL 40meq daily  -8/11 K 3.3 will increase KCL to  40 bid   - K- 3.8 on 8/14      bowel program - add prn bowel program      GERD - start PPI. At times may need additional antacids, Maalox prn.      Depression; cont Lexapro; home med due to generalized anxiety due to stressors in life. His risk post stroke depression. Order recreational therapy and psych if needed; doing much better and participating well 8/11      Dysphagia; mild. Cont ST for this as well as for dysarthria. Mechanical consistency diet, cont aspir precautions      Time spent was 15 minutes with over 1/2 in direct patient care/examination, consultation and coordination of care.      Signed By: Shila Holstein, MD     August 14, 2017

## 2017-08-14 NOTE — PROGRESS NOTES
Initial visit by  to convey care and concern and encourage patient that  services are available if desired. Empathically listened as Ms. Boykin shared about her goals, inspiration, and family. I offered spiritual interventions in the visit, including affirmation of emotions & surya, exploration of coping skills, and assurance of prayers. Provided business card for follow-up if desired by patient.       Sin Lee 66  Board Certified

## 2017-08-14 NOTE — PROGRESS NOTES
Problem: Falls - Risk of  Goal: *Absence of Falls  Document Rashmi Fall Risk and appropriate interventions in the flowsheet.    Fall Risk Interventions:  Mobility Interventions: Strengthening exercises (ROM-active/passive), Patient to call before getting OOB, PT Consult for mobility concerns           Medication Interventions: Patient to call before getting OOB     Elimination Interventions: Call light in reach, Elevated toilet seat

## 2017-08-14 NOTE — PROGRESS NOTES
OT Daily Note    Time In 1116   Time Out 1200     Subjective: \"I don't feel like it. \" [patient informed of trace strength detected in R shoulder]  Pain: None indicated    Precautions:  (falls)    Functional Mobility   Patient transferred recliner to wheelchair SPT towards R with moderate assistance for balance/to progress RLE. Neuro-Muscular Re-Education   Patient tolerated PROM stretch to RUE seated in wheelchair in preparation for AAROM exercise. Patient tolerated shoulder flexion, extension, abduction, internal/external rotation, elbow extension, wrist extension, and composite digit extension. Patient completed 2 sets x 20 reps of RUE AAROM therapeutic exercises seated in wheelchair with moderate physical assist supporting at elbow and vibration facilitation over targeted muscle bellies. Patient completed shoulder flexion/extension, horizontal abduction/adduction, elbow flexion/extension, and bilateral shoulder shrugs. Note 1/5 strength detected during R shoulder shrug. Assessment: 1/5 strength detected in R upper trapezius with bilateral shoulder shrug exercise this session. Progressing well with RUE neuromuscular reeducation. Education: Purpose of therapy, progress with RUE strength  Interdisciplinary Communication: Collaborated with Pam Casillas regarding transfer techniques and agreed patient is progressing well and on-track to meet goals as stated in [address] [facility]. Plan: Continue to address ADL/IADL, functional mobility, activity tolerance, balance, strengthening, coordination.     Jordin Read OTR/L

## 2017-08-14 NOTE — PROGRESS NOTES
PHYSICAL THERAPY DAILY NOTE  Time In: 1312  Time Out: 1356  Patient Seen For: PM;Gait training;Patient education; Therapeutic exercise;Transfer training; Wheelchair mobility; Other (see progress notes)    Subjective: Patient reporting she wants to be able to walk. Reports she is trying to move her RLE more on her own without help         Objective:Vital Signs:  Patient Vitals for the past 12 hrs:   Temp Pulse Resp BP SpO2   08/14/17 0804 98.3 °F (36.8 °C) (!) 102 20 153/90 95 %     Pain level:No c/o pain  Pain location:NA  Pain interventions:NA    Patient education:Bed mobility training,transfer training, gait training, fall precautions, activity pacing, body mechanics, w/c mobility and parts management, Patient verbalizing understanding and demonstrating partial understanding of patient education. Recommend follow up education. Interdisciplinary Communication:spoke with OT regarding transfer training with hemiwalker    Other (comment) (Fall precautions)  GROSS ASSESSMENT Daily Assessment     NA       BED/MAT MOBILITY Daily Assessment   Verbal cues body mechanics, increased time and effort Rolling Right : 0 (Not tested)  Rolling Left : 0 (Not tested)  Supine to Sit : 0 (Not tested)  Sit to Supine :  (SBA with cues for body mechanics)       TRANSFERS Daily Assessment   Verbal cues for body mechanics  Verbal and visual instruction for SPT to right using hemiwalker Transfer Type: SPT without device (w/c to bed to the left)  Transfer Assistance : 5 (Stand-by assistance)  Sit to Stand Assistance: Stand-by assistance  Car Transfers: Not tested       GAIT Daily Assessment    Amount of Assistance: 3 (Moderate assistance) (min assist to advance and place RLE, min to mod to stabilize Right knee during stance)  Distance (ft): 50 Feet (ft) (50ft x 2 with hemiwalker, 20ft x 2 with hand rail)  Assistive Device: Other (comment); Walker bobby (left hand rail, ace wrap for right DF assist)       STEPS or STAIRS Daily Assessment Steps/Stairs Ambulated (#): 0  Level of Assist : 0 (Not tested)       BALANCE Daily Assessment    Sitting - Static: Good (unsupported)  Sitting - Dynamic: Good (unsupported)  Standing - Static: Fair  Standing - Dynamic : Impaired       WHEELCHAIR MOBILITY Daily Assessment   Using LUE and LLE Able to Propel (ft): 100 feet (100ft x 2)  Functional Level: 2  Curbs/Ramps Assist Required (FIM Score): 0 (Not tested)  Wheelchair Setup Assist Required : 4 (Minimal assistance)  Wheelchair Management: Manages left brake;Manages right brake;Manages right footrest (min assist with foot rest)       LOWER EXTREMITY EXERCISES Daily Assessment    Extremity: Both  Exercise Type #1: Other (comment) (LE motomed x 10 mins at level 2)  Level of Assist: Supervision            Assessment: Gait distance, balance and independence improving daily. Patient returned to room at end of treatment. Patient supine in bed with head of bed elevated and bed rails up x 2. Needs placed in reach of patient. Plan of Care: Continue with POC and progress as tolerated.      Rosario Colin, PT  8/14/2017

## 2017-08-15 LAB — PLATELET # BLD AUTO: 395 K/UL (ref 150–450)

## 2017-08-15 PROCEDURE — 97530 THERAPEUTIC ACTIVITIES: CPT

## 2017-08-15 PROCEDURE — 97535 SELF CARE MNGMENT TRAINING: CPT

## 2017-08-15 PROCEDURE — 92526 ORAL FUNCTION THERAPY: CPT

## 2017-08-15 PROCEDURE — 97110 THERAPEUTIC EXERCISES: CPT

## 2017-08-15 PROCEDURE — 65310000000 HC RM PRIVATE REHAB

## 2017-08-15 PROCEDURE — 74011250636 HC RX REV CODE- 250/636: Performed by: PHYSICAL MEDICINE & REHABILITATION

## 2017-08-15 PROCEDURE — 97116 GAIT TRAINING THERAPY: CPT

## 2017-08-15 PROCEDURE — 36415 COLL VENOUS BLD VENIPUNCTURE: CPT | Performed by: PHYSICAL MEDICINE & REHABILITATION

## 2017-08-15 PROCEDURE — 85049 AUTOMATED PLATELET COUNT: CPT | Performed by: PHYSICAL MEDICINE & REHABILITATION

## 2017-08-15 PROCEDURE — 74011250637 HC RX REV CODE- 250/637: Performed by: PHYSICAL MEDICINE & REHABILITATION

## 2017-08-15 PROCEDURE — 97032 APPL MODALITY 1+ESTIM EA 15: CPT

## 2017-08-15 PROCEDURE — 97112 NEUROMUSCULAR REEDUCATION: CPT

## 2017-08-15 RX ADMIN — ATORVASTATIN CALCIUM 40 MG: 40 TABLET, FILM COATED ORAL at 08:06

## 2017-08-15 RX ADMIN — ENOXAPARIN SODIUM 40 MG: 40 INJECTION SUBCUTANEOUS at 21:00

## 2017-08-15 RX ADMIN — ASPIRIN 325 MG ORAL TABLET 325 MG: 325 PILL ORAL at 08:07

## 2017-08-15 RX ADMIN — FERROUS SULFATE TAB 325 MG (65 MG ELEMENTAL FE) 325 MG: 325 (65 FE) TAB at 16:49

## 2017-08-15 RX ADMIN — POTASSIUM CHLORIDE 40 MEQ: 20 TABLET, EXTENDED RELEASE ORAL at 17:00

## 2017-08-15 RX ADMIN — HYDROCHLOROTHIAZIDE: 25 TABLET ORAL at 08:06

## 2017-08-15 RX ADMIN — FERROUS SULFATE TAB 325 MG (65 MG ELEMENTAL FE) 325 MG: 325 (65 FE) TAB at 08:06

## 2017-08-15 RX ADMIN — ESCITALOPRAM OXALATE 20 MG: 10 TABLET ORAL at 08:06

## 2017-08-15 RX ADMIN — POTASSIUM CHLORIDE 40 MEQ: 20 TABLET, EXTENDED RELEASE ORAL at 08:07

## 2017-08-15 RX ADMIN — PANTOPRAZOLE SODIUM 40 MG: 40 TABLET, DELAYED RELEASE ORAL at 05:25

## 2017-08-15 NOTE — PROGRESS NOTES
PHYSICAL THERAPY DAILY NOTE  Time In: 1116  Time Out: 1200  Patient Seen For: AM;Patient education; Therapeutic exercise;Transfer training; Wheelchair mobility; Other (see progress notes)    Subjective: patient reporting her right legs \"jumps\" some times. Reports exercises are getting a little easier. reports she can feel her muscles working         Objective:Vital Signs:  Patient Vitals for the past 12 hrs:   Temp Pulse Resp BP SpO2   08/15/17 0810 97.4 °F (36.3 °C) 99 18 137/83 96 %   08/15/17 0202 97.4 °F (36.3 °C) 84 18 106/62 95 %     Pain level:No c/o pain  Pain location:NA  Pain interventions:NA    Patient education:Bed mobility training,transfer training, fall precautions, activity pacing, body mechanics, w/c mobility and parts management, Patient verbalizing understanding and demonstrating partial understanding of patient education. Recommend follow up education. Interdisciplinary Communication:NA    Other (comment) (Fall precautions)  GROSS ASSESSMENT Daily Assessment     Issued patient home assessment sheet to give to her daughter for daughter to complete and return to patient.  Issued information regarding ramp installation       BED/MAT MOBILITY Daily Assessment   Increased time and effort to complete with cues for body mechanics Rolling Right : 6 (Modified independent)  Rolling Left : 0 (Not tested)  Supine to Sit : 5 (Stand-by assistance) (from right sidelying using arm rest of w/c as hand rail)  Sit to Supine : 5 (Supervision) (using LLE to assist RLE)       TRANSFERS Daily Assessment   Verbal cues for body mechanics Transfer Type: SPT without device (to the left w/c<>mat)  Transfer Assistance : 5 (Stand-by assistance)  Sit to Stand Assistance: Stand-by assistance  Car Transfers: Not tested       GAIT Daily Assessment    Amount of Assistance: 0 (Not tested)  Distance (ft): 0 Feet (ft)       STEPS or STAIRS Daily Assessment    Steps/Stairs Ambulated (#): 0  Level of Assist : 0 (Not tested)       BALANCE Daily Assessment    Sitting - Static: Good (unsupported)  Sitting - Dynamic: Good (unsupported)  Standing - Static: Fair  Standing - Dynamic : Impaired       WHEELCHAIR MOBILITY Daily Assessment    Able to Propel (ft): 100 feet (using LUE and LLE)  Functional Level: 2  Curbs/Ramps Assist Required (FIM Score): 0 (Not tested)  Wheelchair Setup Assist Required : 5 (Stand-by assistance)  Wheelchair Management: Manages left brake;Manages right brake;Manages right footrest       LOWER EXTREMITY EXERCISES Daily Assessment    Extremity: Right  Exercise Type #1: Supine lower extremity strengthening  Sets Performed: 2  Reps Performed: 10  Level of Assist: Minimal assistance  Exercise Type #2: Other (comment) (Supine SAQ with premodulated mode ESTIM to quad)  Sets Performed:  (10 sec on 10 sec off cycle)  Reps Performed:  (x 10 mins with intensity to 14millamps, freq 80 to 150HZ)  Level of Assist: Stand-by assistance          Assessment: Body mechanics with bed mobility and transfers improving. Right quad strength improving with ESTIM       Patient return to room at end of treatment and remained up in wheelchair with needs in reach. Plan of Care: Continue with POC and progress as tolerated.      Elaine Rodriguez, PT  8/15/2017

## 2017-08-15 NOTE — PROGRESS NOTES
OT WEEKLY PROGRESS NOTE    Time In: 4606  Time Out: 0748    COMPREHENSION MODE Initial Assessment Weekly Progress Assessment 8/15/2017   Score 7 7     EXPRESSION Initial Assessment Weekly Progress Assessment 8/15/2017   Primary Mode of Expression Verbal Verbal   Score 6 6   Comments Expresses complex, abstract ideas with extra time, mild difficulty due to dysarthria Mild dysarthria     SOCIAL INTERACTION/ PRAGMATICS Initial Assessment Weekly Progress Assessment 8/15/2017   Score 6 6   Comments Patient tearful (appropriate) end of session, provided encouragement and education Mildly flat affect, however pleasant and agreeable     PROBLEM SOLVING Initial Assessment Weekly Progress Assessment 8/15/2017   Score 6 6   Comments Makes decisions with mild difficulty or solves complex problems with extra time. complex problems with extra time     MEMORY Initial Assessment Weekly Progress Assessment 8/15/2017   Score 6 6   Comments Recognizes people often seen, daily routines, and executes requests with additional time  Recognizes people often seen, daily routines, and executes requests with additional time or memory device OR executes 3 steps of a 3 step request with additional time      EATING Initial Assessment Weekly Progress Assessment 8/15/2017   Functional Level 5 5   Comments Setup/cues for container management Continues to require occasional setup for container management on meal tray     GROOMING Initial Assessment Weekly Progress Assessment 8/15/2017   Functional Level 3 5   Tasks completed by patient Washed face, Brushed hair, Brushed teeth (Applied deodorant) Brushed hair;Brushed teeth; Washed face; Washed hands   Comments Assist to apply toothpaste to toothbrush and apply deodorant to R underarm Setup to gather materials, utilizing bobby techniques post-education     BATHING Initial Assessment Weekly Progress Assessment 8/15/2017   Functional Level 3 4   Body parts patient bathed Thigh, right, Thigh, left, Fadia area, Chest, Arm, right, Arm, left, Abdomen Abdomen;Arm, left;Arm, right;Buttocks; Chest;Lower leg and foot, left; Lower leg and foot, right;Fadia area; Thigh, left; Thigh, right   Comments Assist to bathe lower BLEs and bottom, blocking right knee as patient squats to have bottom bathed Educated re: Cristiano bathing techniques, requires CGA for dynamic sitting balance as patient leans to L/anteriorly to bathe bottom seated on TTB     TUB/SHOWER TRANSFER INDEPENDENCE Initial Assessment Weekly Progress Assessment 8/15/2017   Score 3 4   Comments Squat/stand pivot Minimal assist to progress RLE, SPT using cristiano walker     UPPER BODY DRESSING/UNDRESSING Initial Assessment Weekly Progress Assessment 8/15/2017   Functional Level 3 4   Items applied/Steps completed Pullover (4 steps) Bra (3 steps); Pullover (4 steps)   Comments Assist to thread RUE through shirt and to assist down trunk Minimal assist to finish bra down R side of trunk only     LOWER BODY DRESSING/UNDRESSING Initial Assessment Weekly Progress Assessment 8/15/2017   Functional Level 1 3   Items applied/Steps completed Elastic waist pants (3 steps), Shoe, left (1 step), Shoe, right (1 step), Sock, left (1 step), Sock, right (1 step), Underpants (3 steps) Underpants (3 steps); Sock, right (1 step); Sock, left (1 step); Shoe, right (1 step); Shoe, left (1 step); Elastic waist pants (3 steps)   Comments Dependent for LB dressing at this time Minimal assist for standing balance and assist to finish pants/underwear over R hip (limited by body habitus), start R foot into shoe, and requires setup of sock aid (d/t small size of socks), use of reacher and long handled shoe horn     TOILETING Initial Assessment Weekly Progress Assessment 8/15/2017   Functional Level 0 3   Comments Activity did not occur, patient reports continent of bowel and bladder Patient completes hygiene, moderate assist for pants up and pants down with assist for standing balance     TOILET TRANSFER INDEPENDENCE Initial Assessment Weekly Progress Assessment 8/15/2017   Transfer score 0 4   Comments Did not occur SPT utilizing bobby walker, blocking/progressing RLE     Plan of Care: Please see Care Plan for updated LTGs. Family Training: To complete as able prior to discharge    Summary of Progress: See above for FIM details and progress. 2-/5 strength for shoulder shrug detected today in RUE. Summary of Session:     S: \"My lower back is hurting. \" Agreeable to therapy. Focus of session was on ADL retraining/functional mobility. Patient was able to transfer wheelchair <> [facility] and wheelchair <> TTB SPT utilizing bobby walker this session with minimal assist to progress/block RLE. Pain in low back (not rated), BioFreeze applied and patient reports decreased discomfort. Collaborated with PT, Nicola Stallworth and confirmed patient is on track to reach goals as documented in the care plan. Patient tolerated session well, but RUE strength/coordination, balance, functional mobility, activity tolerance are still below baseline and requires skilled facilitation to successfully and safely complete ADL's and transfers. Patient ended session in wheelchair with RN, Clay Essex supervising.      Baylee Mike, OTR/L

## 2017-08-15 NOTE — PROGRESS NOTES
SFD PROGRESS NOTE    Zoraida Boykin  Admit Date: 8/8/2017  Admit Diagnosis: Stroke - Right Side Body Involvment;Stroke (cerebrum) ([address]    Subjective     Patient seen and examined. No CPAP again overnight. No pain complaints. No new neurologic setbacks noted. Denies lightheadedness, palpations, SOB or nausea. PT, OT well tolerated. Requires min assist for gait, using a walker, gait belt. Objective:     Current Facility-Administered Medications   Medication Dose Route Frequency    potassium chloride (K-DUR, KLOR-CON) SR tablet 40 mEq  40 mEq Oral BID    ferrous sulfate tablet 325 mg  1 Tab Oral BID WITH MEALS    aspirin (ASPIRIN) tablet 325 mg  325 mg Oral DAILY    enoxaparin (LOVENOX) injection 40 mg  40 mg SubCUTAneous Q24H    acetaminophen (TYLENOL) tablet 650 mg  650 mg Oral Q4H PRN    alum-mag hydroxide-simeth (MYLANTA) oral suspension 30 mL  30 mL Oral Q4H PRN    atorvastatin (LIPITOR) tablet 40 mg  40 mg Oral DAILY    bisacodyl (DULCOLAX) suppository 10 mg  10 mg Rectal DAILY PRN    escitalopram oxalate (LEXAPRO) tablet 20 mg  20 mg Oral DAILY    LORazepam (ATIVAN) tablet 1 mg  1 mg Oral Q6H PRN    magnesium hydroxide (MILK OF MAGNESIA) 400 mg/5 mL oral suspension 30 mL  30 mL Oral DAILY PRN    ondansetron (ZOFRAN ODT) tablet 4 mg  4 mg Oral Q6H PRN    senna-docusate (PERICOLACE) 8.6-50 mg per tablet 2 Tab  2 Tab Oral DAILY PRN    valsartan/hydroCHLOROthiazide (DIOVAN HCT) 160/25 mg   Oral DAILY    traZODone (DESYREL) tablet 50 mg  50 mg Oral QHS PRN    pantoprazole (PROTONIX) tablet 40 mg  40 mg Oral ACB     Review of Systems:Denies chest pain, shortness of breath, cough, headache, abdominal pain, dysurea, calf pain. Pertinent positives are as noted in the medical records and unremarkable otherwise.      Visit Vitals    /83 (BP 1 Location: Left arm, BP Patient Position: Sitting)    Pulse 99    Temp 97.4 °F (36.3 °C)    Resp 18    SpO2 96%    Breastfeeding No Physical Exam:   General: Alert and age appropriately oriented. No acute cardio respiratory distress. HEENT: Normocephalic,no scleral icterus  Oral mucosa moist without cyanosis; right lower facial weakness   Lungs: Clear to auscultation  bilaterally. Respiration even and unlabored   Heart: Regular rate and rhythm, S1, S2   No  murmurs, clicks, rub or gallops   Abdomen: Soft, non-tender, nondistended. Bowel sounds present. Genitourinary: deferred   Neuromuscular:      EOMI, right facial weakness, tongue midline  L side WFL  RUE grossly 2 prox ,  trace; flexion synergy pattern/tone RUE  RLE hip flexion 2, KE 3 , DF 0, PF 2   Skin/extremity: No rashes, no erythema.  No calf tenderness BLE  No edema                                                                            Functional Assessment:          Balance  Sitting - Static: Good (unsupported) (08/14/17 1400)  Sitting - Dynamic: Good (unsupported) (08/14/17 1400)  Standing - Static: Fair (08/14/17 1400)  Standing - Dynamic : Impaired (08/14/17 1400)           Toileting  Cues: Physical assistance for pants up;Physical assistance for pants down (08/11/17 1027)         Nash Rank Fall Risk Assessment:  Pink Rank Fall Risk  Mobility: Ambulates or transfers with assist devices or assistance/unsteady gait (08/15/17 0703)  Mobility Interventions: Patient to call before getting OOB (08/15/17 0703)  Mentation: Alert, oriented x 3 (08/15/17 0703)  Medication: Patient receiving anticonvulsants, sedatives(tranquilizers), psychotropics or hypnotics, hypoglycemics, narcotics, sleep aids, antihypertensives, laxatives, or diuretics (08/15/17 0703)  Medication Interventions: Patient to call before getting OOB (08/15/17 0703)  Elimination: Needs assistance with toileting (08/15/17 0703)  Elimination Interventions: Patient to call for help with toileting needs (08/15/17 0703)  Prior Fall History: No (08/15/17 0703)  Total Score: 3 (08/15/17 0703)  Standard Fall Precautions: Yes (08/15/17 0703)  High Fall Risk: Yes (08/11/17 4567)     Speech Assessment:         Ambulation:  Gait  Distance (ft): 50 Feet (ft) (50ft x 2 with hemiwalker, 20ft x 2 with hand rail) (08/14/17 1400)  Assistive Device: Other (comment); Walker bobby (left hand rail, ace wrap for right DF assist) (08/14/17 1400)     Labs/Studies:  Recent Results (from the past 72 hour(s))   POTASSIUM    Collection Time: 08/14/17  7:11 AM   Result Value Ref Range    Potassium 3.8 3.5 - 5.1 mmol/L       Assessment:     Problem List as of 8/15/2017  Date Reviewed: 1/26/2017          Codes Class Noted - Resolved    Stroke (cerebrum) ([facility] [address] ICD-10-CM: I63.9  ICD-9-CM: 434.91  8/8/2017 - Present        Left sided lacunar stroke [facility]) ICD-10-CM: I63.9  ICD-9-CM: 434.91  8/4/2017 - Present    Overview Signed 8/6/2017  7:44 PM by Antony Shelton MD     8/2017             Weakness of right side of body ICD-10-CM: R53.1  ICD-9-CM: 728.87  8/3/2017 - Present        Essential hypertension ICD-10-CM: I10  ICD-9-CM: 401.9  10/5/2016 - Present        Obesity (BMI 30-39. 9) ICD-10-CM: E66.9  ICD-9-CM: 278.00  Unknown - Present        Prediabetes ICD-10-CM: R73.03  ICD-9-CM: 790.29  7/28/2015 - Present        Generalized anxiety disorder ICD-10-CM: F41.1  ICD-9-CM: 300.02  Unknown - Present        Insomnia, unspecified ICD-10-CM: G47.00  ICD-9-CM: 780.52  Unknown - Present        Mitral valve prolapse ICD-10-CM: I34.1  ICD-9-CM: 424.0  Unknown - Present        Iron deficiency anemia ICD-10-CM: D50.9  ICD-9-CM: 280.9  8/23/2013 - Present    Overview Signed 10/5/2016  2:49 PM by Antony Shelton MD     S/p iron infusion x 2             RESOLVED: GERD (gastroesophageal reflux disease) ICD-10-CM: K21.9  ICD-9-CM: 530.81  Unknown - 10/5/2016        RESOLVED: Mixed hyperlipidemia ICD-10-CM: E78.2  ICD-9-CM: 272.2  Unknown - 10/5/2016        RESOLVED: Hypertension ICD-10-CM: I10  ICD-9-CM: 401.9  Unknown - 10/5/2016              Plan:      This is an unfortunate 35yo WF with obesity, RADHA, and htn admitted with right hemiplegia due to Left hemispheric stroke with evolution; etiology of stroke not clear      CVA- continue medical management for CVA  -  Continue antiplatelet therapy; aspirin 325mg daily  - statin; Lipitor ,   Suspect RADHA; can use CPAP while here but will need outpt formal sleep study and Pulmonary follow up      Obesity; have discussed importance of wt loss for mobility and overall health      Hypertension - BP better controlled, in good range in general.  No adjustments. - Cont Diovan      LUCHO; gets IV infusions; cont to monitor. Currently hgb 11.7. Last infusion in April ; f/u iron studies in 8/11  -8/11 very iron deficient based on labs; will add ferrous sulfate and will need outpt f/u for transfusion      Hypokalemia-   - K 3.4 on 8/10, cont KCL 40meq daily  - 8/11 K 3.3 -> increased KCL to  40 bid   - K- 3.8 on 8/14. Monitor.       bowel program - add prn bowel program      GERD - start PPI. At times may need additional antacids, Maalox prn.      Depression; cont Lexapro; home med due to generalized anxiety due to stressors in life. His risk post stroke depression. Order recreational therapy and psych if needed; doing much better and participating well 8/11      Dysphagia; mild. Cont ST for this as well as for dysarthria. Mechanical consistency diet, cont aspir precautions        Pneumonia prophylaxis- Incentive spirometer every hour while awake      DVT risk / DVT Prophylaxis- Will require daily physician exam to assess for signs and symptoms as patient is at increased risk for of thromboembolism. Mobilization as tolerated. Intermittent pneumatic compression devices when in bed Thigh-high or knee-high thromboembolic deterrent hose when out of bed. Lovenox subq daily.       Pain Control: stable, mild-to-moderate joint symptoms intermittently, reasonably well controlled by PRN meds.  Will require regular pain assessment and comprenhensive pain management. - Cont prn tylenol      Chronic insomnia; on Ambien at home. Have not restarted this. - on prn trazadone.       Wound Care: no current evidence of skin breakdown. Will require 24/7 rehab nursing. turning schedule q 2 hrs while in bed      Time spent was 15 minutes with over 1/2 in direct patient care/examination, consultation and coordination of care.      Signed By: Jg Tubbs MD     August 15, 2017

## 2017-08-15 NOTE — PROGRESS NOTES
08/15/17 1031   Time Spent With Patient   Time In 1005   Time Out 1028   Patient Seen For: AM;Oral-motor exercises   Mental Status   Neurologic State Alert   Orientation Level Oriented X4   Cognition Appropriate decision making   Perception Appears intact   Perseveration No perseveration noted   Safety/Judgement Fall prevention   Pt completed oral motor exercises 2 x 10 with 90% accuracy.    Emily Plunkett MA/SELINA/SLP

## 2017-08-15 NOTE — PROGRESS NOTES
OT Daily Note    Time In 0916   Time Out 1000     Subjective: \"My back is hurting. \"  Pain: Not rated, patient repositioned to supine on mat table, reports decrease in discomfort    Precautions:  (falls)    Neuro-Muscular Re-Education   Patient completed weightbearing x approximately 10 minutes through RUE seated edge of mat while completing jigsaw puzzle task at tray table using LUE. Moderate assist to facilitate weightbearing at R elbow and wrist, patient leans to R to retrieve and replace puzzle pieces from container. Patient tolerated NMES to R shoulder (upper trapezius, posterior deltoid) side-lying on mat table for AAROM shoulder shrugs at intensity 7 x 8 minutes. 2-/5 strength detected in upper trapezius this session with AROM trials (prior to NMES application). Patient tolerates well, no skin issues. Strengthening   Patient educated and completed 1 set x 10-20 reps of AAROM BUE therapeutic exercises supine on mat. Patient completed chest press, shoulder flexion, and elbow flexion exercises with LUE supporting RUE. Assessment: Patient progressing well. 2-/5 strength detected in R shoulder this session, see above for details. Education: Purpose of therapy  Interdisciplinary Communication: Collaborated with Kelle Powell and agreed patient is progressing well and on-track to meet goals as stated in [facility]. Plan: Continue to address ADL/IADL, functional mobility, activity tolerance, balance, strengthening, coordination.     Kenya Iglesias OTR/L

## 2017-08-15 NOTE — PROGRESS NOTES
Problem: Falls - Risk of  Goal: *Absence of Falls  Document Rashmi Fall Risk and appropriate interventions in the flowsheet.    Outcome: Progressing Towards Goal  Fall Risk Interventions:  Mobility Interventions: Patient to call before getting OOB           Medication Interventions: Patient to call before getting OOB     Elimination Interventions: Patient to call for help with toileting needs

## 2017-08-15 NOTE — PROGRESS NOTES
PHYSICAL THERAPY DAILY NOTE  Time In: 1300  Time Out: 3339  Patient Seen For: PM;Other (see progress notes); Therapeutic exercise;Gait training;Transfer training    Subjective: Patient had no complaints. Objective: No pain noted.   Other (comment) (Fall precautions)  GROSS ASSESSMENT Daily Assessment            BED/MAT MOBILITY Daily Assessment    Rolling Right : 6 (Modified independent)  Rolling Left : 0 (Not tested)  Supine to Sit : 5 (Stand-by assistance)  Sit to Supine : 5 (Supervision)       TRANSFERS Daily Assessment    Transfer Type: SPT without device  Transfer Assistance : 5 (Stand-by assistance)  Sit to Stand Assistance: Stand-by assistance  Car Transfers: Not tested       GAIT Daily Assessment    Amount of Assistance: 4 (Minimal assistance)  Distance (ft): 60 Feet (ft)  Assistive Device: Gait belt;Walker bobby;Other (comment) (ace wrap to assist DF)       STEPS or STAIRS Daily Assessment    Steps/Stairs Ambulated (#): 0  Level of Assist : 0 (Not tested)       BALANCE Daily Assessment    Sitting - Static: Good (unsupported)  Sitting - Dynamic: Good (unsupported)  Standing - Static: Fair  Standing - Dynamic : Impaired       WHEELCHAIR MOBILITY Daily Assessment    Able to Propel (ft): 100 feet (using LUE and LLE)  Functional Level: 2  Curbs/Ramps Assist Required (FIM Score): 0 (Not tested)  Wheelchair Setup Assist Required : 5 (Stand-by assistance)  Wheelchair Management: Manages left brake;Manages right brake;Manages right footrest       LOWER EXTREMITY EXERCISES Daily Assessment    Extremity: Right  Exercise Type #1: Supine lower extremity strengthening  Sets Performed: 2  Reps Performed: 10  Level of Assist: Minimal assistance  Exercise Type #2: Other (comment) (Supine SAQ with premodulated mode ESTIM to quad)  Sets Performed:  (10 sec on 10 sec off cycle)  Reps Performed:  (x 10 mins with intensity to 14millamps, freq 80 to 150HZ)  Level of Assist: Stand-by assistance          Assessment: Patient making good progress with gait. Plan of Care: Continue with plan of care to reach PT goals. Returned to room with call bell at reach.     Wanda Broussard, PTA  8/15/2017

## 2017-08-16 LAB
ANION GAP BLD CALC-SCNC: 7 MMOL/L (ref 7–16)
BUN SERPL-MCNC: 12 MG/DL (ref 6–23)
CALCIUM SERPL-MCNC: 8.8 MG/DL (ref 8.3–10.4)
CHLORIDE SERPL-SCNC: 105 MMOL/L (ref 98–107)
CO2 SERPL-SCNC: 27 MMOL/L (ref 21–32)
CREAT SERPL-MCNC: 0.64 MG/DL (ref 0.6–1)
GLUCOSE SERPL-MCNC: 87 MG/DL (ref 65–100)
MAGNESIUM SERPL-MCNC: 2 MG/DL (ref 1.8–2.4)
POTASSIUM SERPL-SCNC: 3.9 MMOL/L (ref 3.5–5.1)
SODIUM SERPL-SCNC: 139 MMOL/L (ref 136–145)

## 2017-08-16 PROCEDURE — 74011250637 HC RX REV CODE- 250/637: Performed by: PHYSICAL MEDICINE & REHABILITATION

## 2017-08-16 PROCEDURE — 36415 COLL VENOUS BLD VENIPUNCTURE: CPT | Performed by: PHYSICAL MEDICINE & REHABILITATION

## 2017-08-16 PROCEDURE — 97116 GAIT TRAINING THERAPY: CPT

## 2017-08-16 PROCEDURE — 92526 ORAL FUNCTION THERAPY: CPT

## 2017-08-16 PROCEDURE — 97110 THERAPEUTIC EXERCISES: CPT

## 2017-08-16 PROCEDURE — 97112 NEUROMUSCULAR REEDUCATION: CPT

## 2017-08-16 PROCEDURE — 80048 BASIC METABOLIC PNL TOTAL CA: CPT | Performed by: PHYSICAL MEDICINE & REHABILITATION

## 2017-08-16 PROCEDURE — 74011250636 HC RX REV CODE- 250/636: Performed by: PHYSICAL MEDICINE & REHABILITATION

## 2017-08-16 PROCEDURE — 97535 SELF CARE MNGMENT TRAINING: CPT

## 2017-08-16 PROCEDURE — 83735 ASSAY OF MAGNESIUM: CPT | Performed by: PHYSICAL MEDICINE & REHABILITATION

## 2017-08-16 PROCEDURE — 97032 APPL MODALITY 1+ESTIM EA 15: CPT

## 2017-08-16 PROCEDURE — 97530 THERAPEUTIC ACTIVITIES: CPT

## 2017-08-16 PROCEDURE — 65310000000 HC RM PRIVATE REHAB

## 2017-08-16 RX ADMIN — FERROUS SULFATE TAB 325 MG (65 MG ELEMENTAL FE) 325 MG: 325 (65 FE) TAB at 08:30

## 2017-08-16 RX ADMIN — PANTOPRAZOLE SODIUM 40 MG: 40 TABLET, DELAYED RELEASE ORAL at 06:07

## 2017-08-16 RX ADMIN — POTASSIUM CHLORIDE 40 MEQ: 20 TABLET, EXTENDED RELEASE ORAL at 17:47

## 2017-08-16 RX ADMIN — FERROUS SULFATE TAB 325 MG (65 MG ELEMENTAL FE) 325 MG: 325 (65 FE) TAB at 16:58

## 2017-08-16 RX ADMIN — HYDROCHLOROTHIAZIDE: 25 TABLET ORAL at 08:29

## 2017-08-16 RX ADMIN — ATORVASTATIN CALCIUM 40 MG: 40 TABLET, FILM COATED ORAL at 08:30

## 2017-08-16 RX ADMIN — ASPIRIN 325 MG ORAL TABLET 325 MG: 325 PILL ORAL at 08:30

## 2017-08-16 RX ADMIN — ESCITALOPRAM OXALATE 20 MG: 10 TABLET ORAL at 08:28

## 2017-08-16 RX ADMIN — ENOXAPARIN SODIUM 40 MG: 40 INJECTION SUBCUTANEOUS at 21:26

## 2017-08-16 RX ADMIN — POTASSIUM CHLORIDE 40 MEQ: 20 TABLET, EXTENDED RELEASE ORAL at 08:29

## 2017-08-16 NOTE — PROGRESS NOTES
OT Daily Note    Time In 1116   Time Out 1159     Subjective: \"Have you seen people like me, with no function in their arm, get it back? \"  [Patient re-educated regarding progress since admission with RUE strength and stroke rehab process, verbalized understanding]  Pain: None indicated    Precautions:  (falls)    Strengthening   Patient completed 3 sets x 10 reps of AAROM shoulder shrug exercise in RUE seated in wheelchair before vertical mirror. Moderate physical assist provided at elbow to relieve weight of forearm during exercise. Neuro-Muscular Re-Education   Patient agreeable to and tolerated NMES to R bicep/tricep in reciprocal pattern x 10 minutes at intensity 7 for bicep, intensity 10 for tricep. Patient completed AAROM arm skate exercise at tabletop in elbow flexion and extension with each stimulation, moderate physical assist from therapist to obtain full extension and minimal assist for flexion. Patient completed standing trials at tabletop for RUE weightbearing and dynamic standing balance task. Patient transferred sit to stand with minimal assist, maintained standing balance completing anterior trunk lean into tabletop with minimal assist only to facilitate RUE weightbearing at elbow and wrist.  Patient reached to L and R of tabletop to retrieve clothespins of varied resistance using LUE and attach to vertical/horizontal poles x 2 trials. Patient removed all clothespins upon completion of each trial.  Patient continues to tolerate standing trials x 2-3 minutes before requiring seated rest breaks. Assessment: Patient continues to tolerate NMES well to RUE. Progress with static and dynamic standing balance. Education: Purpose of therapy, rehab course, progress since admission  Interdisciplinary Communication: Collaborated with Henrietta Kocher and agreed patient is progressing well and on-track to meet goals as stated in [address] [facility].      Plan: Continue to address ADL/IADL, functional mobility, activity tolerance, balance, strengthening, coordination.       Shari Robles, OTR/L

## 2017-08-16 NOTE — PROGRESS NOTES
Subjective \"I am glad you came and helped me to wake up! \"   Activity Weight bearing exercises through her RUE, standing and seated in her w/c. Strength/Endurance Patient with right UE hemiparesis. She  was able to bear weight through her right leg at times but often put her weight through her left leg. She did not c/o tiredness or fatigue during the session. Balance Sit<->stand:  CGA with quad cane   Social Interaction Patient was friendly and cooperative during the session. She was in good spirits and initiated conversation with this therapist.   Cognitive A&O X4   Comments Patient was assisted to her room and into her recliner chair with all needs within reach.      Andriy Chamberlain, CTRS

## 2017-08-16 NOTE — PROGRESS NOTES
08/16/17 9976   Time Spent With Patient   Time In 0830   Time Out 0959   Patient Seen For: AM;ADLs   Feeding/Eating   Feeding/Eating Assistance I   Grooming   Grooming Assistance  S   Comments Seated at sink   Upper Body Bathing   Bathing Assistance, Upper Mod I   Position Performed Seated in chair   Adaptive Equipment Tub bench   Lower Body Bathing   Bathing Assistance, Lower  S   Position Performed Seated in chair   Adaptive Equipment Tub bench   Comments Close supervision for dynamic sitting balance to bathe bottom   Toileting   Toileting Assistance (FIM Score) Mod A   Upper Body Dressing    Dressing Assistance  Min A   Comments To help bra down R trunk   Lower Body Dressing    Dressing Assistance  Mod A   Leg Crossed Method Used No   Position Performed Seated in chair;Standing   Adaptive Equipment Used Reacher;Sock aid; Long handled shoe horn   Don/Doff Anti-Embolic Stockings NA   Functional Transfers   Toilet Transfer  Stand pivot transfer with walker  (Cristiano walker to [facility])   Amount of Assistance Required Min A   Tub or Shower Type Shower   Amount of Assistance Required Min A   Adaptive Equipment Walker (comment); Tub transfer bench;Grab bars  (Cristiano walker)       S: \"Since y'all got me started later today I feel a little less fuzzy-headed. \" Agreeable to therapy. Focus of session was on ADL retraining and functional mobility. Patient was able to transfer bed to wheelchair, wheelchair <> BSC towards L SPT using cristiano walker with minimal assistance today (progress with functional transfers). Patient transferred wheelchair <> TTB SPT using cristiano walker with minimal assist.  Pain not indicated. Patient tolerated PROM stretch to RUE in shoulder flexion, extension, abduction, internal/external rotation, elbow/wrist extension, and supination seated in wheelchair following ADL.   Patient completed standing trials at tabletop for RUE weightbearing (facilitated by therapist at elbow and wrist/hand) while participating in cognitive task at tabletop. Minimal assist for standing balance and sit <> stand. Tolerates 1-handed standing task x approximately 2-3 minutes before requiring seated rest breaks. Collaborated with Daniel FRENCH and confirmed patient is on track to reach goals as documented in the care plan. Patient tolerated session well, but RUE strength/coordination, balance, activity tolerance, functional mobility are still below baseline and requires skilled facilitation to successfully and safely complete ADL's and transfers. Patient ended session in wheelchair with Daniel FRENCH.      Kaylen Mckeon OTR/L

## 2017-08-16 NOTE — PROGRESS NOTES
Problem: Falls - Risk of  Goal: *Absence of Falls  Document Rashmi Fall Risk and appropriate interventions in the flowsheet.    Outcome: Progressing Towards Goal  Fall Risk Interventions:  Mobility Interventions: Patient to call before getting OOB, Bed/chair exit alarm, PT Consult for mobility concerns           Medication Interventions: Patient to call before getting OOB, Teach patient to arise slowly, Evaluate medications/consider consulting pharmacy     Elimination Interventions: Call light in reach, Elevated toilet seat, Patient to call for help with toileting needs, Toilet paper/wipes in reach

## 2017-08-16 NOTE — PROGRESS NOTES
PHYSICAL THERAPY DAILY NOTE  Time In: 1314  Time Out: 7190  Patient Seen For: PM;Other (see progress notes); Therapeutic exercise;Gait training;Transfer training    Subjective: Patient had no complaints. Objective: No pain noted. Other (comment) (Fall precautions)  GROSS ASSESSMENT Daily Assessment    AROM:  (LLE WFL, RLE AAROM WFL)  PROM: Grossly decreased, non-functional  Strength:  (LLE WFL, Right hip -3/5 to 2/5, knee ext -3/5,flex -2/5)  Coordination:  (LLE WFL,RLE impaired)  Tone:  (hypotonia RLE, clonus noted with achilles tendon stretch)  Sensation:  (RLE proprioception impaired)       BED/MAT MOBILITY Daily Assessment    Rolling Right : 6 (Modified independent)  Rolling Left : 5 (Supervision) (cues for body mechanics,attend to RUE)  Supine to Sit : 5 (Stand-by assistance)  Sit to Supine : 5 (Supervision)       TRANSFERS Daily Assessment    Transfer Type: SPT without device  Transfer Assistance : 5 (Stand-by assistance)  Sit to Stand Assistance: Stand-by assistance  Car Transfers: Not tested       GAIT Daily Assessment    Amount of Assistance: 4 (Minimal assistance)  Distance (ft): 60 Feet (ft)  Assistive Device: Other (comment); Walker bobby;Gait belt (ace wrap to assist DF)       STEPS or STAIRS Daily Assessment    Steps/Stairs Ambulated (#): 0  Level of Assist : 0 (Not tested)       BALANCE Daily Assessment    Sitting - Static: Good (unsupported)  Sitting - Dynamic: Good (unsupported)  Standing - Static: Fair (with hemiwalker)  Standing - Dynamic : Impaired       WHEELCHAIR MOBILITY Daily Assessment    Able to Propel (ft): 150 feet  Functional Level: 5  Curbs/Ramps Assist Required (FIM Score): 0 (Not tested)  Wheelchair Setup Assist Required : 5 (Supervision/setup)  Wheelchair Management: Manages left brake;Manages right brake;Manages right footrest       LOWER EXTREMITY EXERCISES Daily Assessment    Extremity: Both  Exercise Type #1: Other (comment) (motomed x 10 mnutes)  Sets Performed: 1  Reps Performed: 10  Level of Assist: Supervision          Assessment: Patient making good progress. Plan of Care: Continue with plan of care to reach PT goals. Returned to room to bed with call dunn at reach.     Jax Lam PTA  8/16/2017

## 2017-08-16 NOTE — PROGRESS NOTES
SFD PROGRESS NOTE    Zoraida Boykin  Admit Date: 8/8/2017  Admit Diagnosis: Stroke - Right Side Body Involvment;Stroke (cerebrum) ([address]    Subjective     Patient seen and examined. No new neurologic setbacks noted. No new pain source. Po intake good. Continent of B/B. Denies lightheadedness, palpations, SOB or nausea. PT, OT well tolerated. Requires min assist for gait, using a walker, gait belt. Objective:     Current Facility-Administered Medications   Medication Dose Route Frequency    potassium chloride (K-DUR, KLOR-CON) SR tablet 40 mEq  40 mEq Oral BID    ferrous sulfate tablet 325 mg  1 Tab Oral BID WITH MEALS    aspirin (ASPIRIN) tablet 325 mg  325 mg Oral DAILY    enoxaparin (LOVENOX) injection 40 mg  40 mg SubCUTAneous Q24H    acetaminophen (TYLENOL) tablet 650 mg  650 mg Oral Q4H PRN    alum-mag hydroxide-simeth (MYLANTA) oral suspension 30 mL  30 mL Oral Q4H PRN    atorvastatin (LIPITOR) tablet 40 mg  40 mg Oral DAILY    bisacodyl (DULCOLAX) suppository 10 mg  10 mg Rectal DAILY PRN    escitalopram oxalate (LEXAPRO) tablet 20 mg  20 mg Oral DAILY    LORazepam (ATIVAN) tablet 1 mg  1 mg Oral Q6H PRN    magnesium hydroxide (MILK OF MAGNESIA) 400 mg/5 mL oral suspension 30 mL  30 mL Oral DAILY PRN    ondansetron (ZOFRAN ODT) tablet 4 mg  4 mg Oral Q6H PRN    senna-docusate (PERICOLACE) 8.6-50 mg per tablet 2 Tab  2 Tab Oral DAILY PRN    valsartan/hydroCHLOROthiazide (DIOVAN HCT) 160/25 mg   Oral DAILY    traZODone (DESYREL) tablet 50 mg  50 mg Oral QHS PRN    pantoprazole (PROTONIX) tablet 40 mg  40 mg Oral ACB     Review of Systems:Denies chest pain, shortness of breath, cough, headache, abdominal pain, dysurea, calf pain. Pertinent positives are as noted in the medical records and unremarkable otherwise.      Visit Vitals    /78 (BP 1 Location: Right arm, BP Patient Position: Supine)    Pulse 82    Temp 97.8 °F (36.6 °C)    Resp 16    SpO2 97%    Breastfeeding No        Physical Exam:   General: Alert and age appropriately oriented. No acute cardio respiratory distress. HEENT: Normocephalic,no scleral icterus  Oral mucosa moist without cyanosis; right lower facial weakness   Lungs: Clear to auscultation  bilaterally. Respiration even and unlabored   Heart: Regular rate and rhythm, S1, S2   No  murmurs, clicks, rub or gallops   Abdomen: Soft, non-tender, nondistended. Bowel sounds present. Genitourinary: deferred   Neuromuscular:      EOMI, right facial weakness, tongue midline  L side WFL  RUE grossly 2+ to 3 prox ,  trace; flexion synergy pattern/tone RUE  RLE hip flexion 2+, KE 3 , DF 0, PF 2   Skin/extremity: No rashes, no erythema. No calf tenderness BLE  No edema. Functional Assessment:          Balance  Sitting - Static: Good (unsupported) (08/15/17 1200)  Sitting - Dynamic: Good (unsupported) (08/15/17 1200)  Standing - Static: Fair (08/15/17 1200)  Standing - Dynamic : Impaired (08/15/17 1200)           Toileting  Cues: Physical assistance for pants up;Physical assistance for pants down (08/11/17 1027)         Eb Wolof Fall Risk Assessment:  [facility] Wolof Fall Risk  Mobility: Ambulates or transfers with assist devices or assistance/unsteady gait (08/16/17 0749)  Mobility Interventions: Patient to call before getting OOB; Bed/chair exit alarm;PT Consult for mobility concerns (08/16/17 0749)  Mentation: Alert, oriented x 3 (08/16/17 0749)  Medication: Patient receiving anticonvulsants, sedatives(tranquilizers), psychotropics or hypnotics, hypoglycemics, narcotics, sleep aids, antihypertensives, laxatives, or diuretics (08/16/17 0749)  Medication Interventions: Patient to call before getting OOB; Teach patient to arise slowly; Evaluate medications/consider consulting pharmacy (08/16/17 0156)  Elimination: Needs assistance with toileting (08/16/17 0749)  Elimination Interventions: Call light in reach;Elevated toilet seat;Patient to call for help with toileting needs; Toilet paper/wipes in reach (08/16/17 0749)  Prior Fall History: No (08/16/17 0749)  Total Score: 3 (08/16/17 0749)  Standard Fall Precautions: Yes (08/16/17 0749)  High Fall Risk: Yes (08/11/17 1907)     Speech Assessment:         Ambulation:  Gait  Distance (ft): 60 Feet (ft) (08/15/17 1446)  Assistive Device: Gait belt;Walker bobby;Other (comment) (ace wrap to assist DF) (08/15/17 1446)     Labs/Studies:  Recent Results (from the past 72 hour(s))   POTASSIUM    Collection Time: 08/14/17  7:11 AM   Result Value Ref Range    Potassium 3.8 3.5 - 5.1 mmol/L   PLATELET COUNT    Collection Time: 08/15/17  6:36 AM   Result Value Ref Range    PLATELET 642 036 - 114 K/uL   MAGNESIUM    Collection Time: 08/16/17  6:35 AM   Result Value Ref Range    Magnesium 2.0 1.8 - 2.4 mg/dL   METABOLIC PANEL, BASIC    Collection Time: 08/16/17  6:35 AM   Result Value Ref Range    Sodium 139 136 - 145 mmol/L    Potassium 3.9 3.5 - 5.1 mmol/L    Chloride 105 98 - 107 mmol/L    CO2 27 21 - 32 mmol/L    Anion gap 7 7 - 16 mmol/L    Glucose 87 65 - 100 mg/dL    BUN 12 6 - 23 MG/DL    Creatinine 0.64 0.6 - 1.0 MG/DL    GFR est AA >60 >60 ml/min/1.73m2    GFR est non-AA >60 >60 ml/min/1.73m2    Calcium 8.8 8.3 - 10.4 MG/DL       Assessment:     Problem List as of 8/16/2017  Date Reviewed: 1/26/2017          Codes Class Noted - Resolved    Stroke (cerebrum) ([facility] [address] ICD-10-CM: I63.9  ICD-9-CM: 434.91  8/8/2017 - Present        Left sided lacunar stroke [facility]) ICD-10-CM: I63.9  ICD-9-CM: 434.91  8/4/2017 - Present    Overview Signed 8/6/2017  7:44 PM by Jenesn Galdamez MD     8/2017             Weakness of right side of body ICD-10-CM: R53.1  ICD-9-CM: 728.87  8/3/2017 - Present        Essential hypertension ICD-10-CM: I10  ICD-9-CM: 401.9  10/5/2016 - Present        Obesity (BMI 30-39. 9) ICD-10-CM: E66.9  ICD-9-CM: 278.00  Unknown - Present        Prediabetes ICD-10-CM: R73.03  ICD-9-CM: 790.29  7/28/2015 - Present        Generalized anxiety disorder ICD-10-CM: F41.1  ICD-9-CM: 300.02  Unknown - Present        Insomnia, unspecified ICD-10-CM: G47.00  ICD-9-CM: 780.52  Unknown - Present        Mitral valve prolapse ICD-10-CM: I34.1  ICD-9-CM: 424.0  Unknown - Present        Iron deficiency anemia ICD-10-CM: D50.9  ICD-9-CM: 280.9  8/23/2013 - Present    Overview Signed 10/5/2016  2:49 PM by Kareen Jewell MD     S/p iron infusion x 2             RESOLVED: GERD (gastroesophageal reflux disease) ICD-10-CM: K21.9  ICD-9-CM: 530.81  Unknown - 10/5/2016        RESOLVED: Mixed hyperlipidemia ICD-10-CM: E78.2  ICD-9-CM: 272.2  Unknown - 10/5/2016        RESOLVED: Hypertension ICD-10-CM: I10  ICD-9-CM: 401.9  Unknown - 10/5/2016              Plan:      This is an unfortunate 37yo WF with obesity, RADHA, and htn admitted with right hemiplegia due to Left hemispheric stroke with evolution; etiology of stroke not clear      CVA- continue medical management for CVA  -  Continue antiplatelet therapy; aspirin 325mg daily  - continued on statin; Lipitor ,     Suspect RADHA - can use CPAP while here but will need outpt formal sleep study and Pulmonary follow up      Obesity; have discussed importance of wt loss for mobility and overall health      Hypertension - BP fairly controlled. - No adjustments. - Cont Diovan at current dose.        LUCHO; gets IV infusions; Anemia/ microcytic, on fe supplement. - patient continue fe supplements po. Hypokalemia-   - K 3.4 on 8/10, cont KCL 40meq daily  - 8/11 K 3.3 -> increased KCL to  40 bid   - K- 3.8 on 8/14 -> 3.9(8/16) continue Kcl po.       bowel program - add prn bowel program      GERD - start PPI. At times may need additional antacids, Maalox prn.      Depression; cont Lexapro; home med due to generalized anxiety due to stressors in life. His risk post stroke depression.  Order recreational therapy and psych if needed; doing much better and participating well 8/11      Dysphagia; mild. Cont ST for this as well as for dysarthria. Continuing joel-motor exercises. - Mechanical consistency diet, cont aspiration  precautions        Pneumonia prophylaxis- Incentive spirometer every hour while awake      DVT risk / DVT Prophylaxis- Will require daily physician exam to assess for signs and symptoms as patient is at increased risk for of thromboembolism. Mobilization as tolerated. Intermittent pneumatic compression devices when in bed Thigh-high or knee-high thromboembolic deterrent hose when out of bed. Lovenox subq daily.       Pain Control: stable, mild-to-moderate joint symptoms intermittently, reasonably well controlled by PRN meds. Will require regular pain assessment and comprenhensive pain management. - Cont prn tylenol      Chronic insomnia; on Ambien at home. Have not restarted this. - on prn trazadone.       Wound Care: no current evidence of skin breakdown. Will require 24/7 rehab nursing. turning schedule q 2 hrs while in bed      Time spent was 25 minutes with over 1/2 in direct patient care/examination, consultation and coordination of care.      Signed By: Dayday Ortiz MD     August 16, 2017

## 2017-08-16 NOTE — PROGRESS NOTES
Report received from [facility] 3rd shift. Pt resting good in bed, pt denies pain, respiration even and non labored. Right side weakness, pt is A/O x 4, she denies pain, respiration even and non labored. Bowel sounds in all 4 quadrants.  Call bell light with in reach

## 2017-08-16 NOTE — PROGRESS NOTES
08/16/17 1120   Time Spent With Patient   Time In 1050   Time Out 1115   Patient Seen For: AM;Oral-motor exercises   Team Conference   Team Conference Date 08/10/17   Family/Caregiver Training   Family Training Needs to be scheduled   Mental Status   Neurologic State Alert   Orientation Level Oriented X4   Cognition Appropriate decision making   Perception Appears intact   Perseveration No perseveration noted   Safety/Judgement Fall prevention   Comprehension (Native Language)   Primary Mode of Comprehension Auditory   Score 7   Expression (Native Language)   Primary Mode of Expression Verbal   Score 7   Social Interaction/Pragmatics   Score 7   Problem Solving   Score 7   Memory   Score 7   Pt making progress toward her goals, however[facility] oral motor weakness. Pt completed oral motor exercises 2 x 10 with 90% accuracy. Pt continues with right facial weakness.    Chago Henson MA/SELINA/SLP

## 2017-08-16 NOTE — PROGRESS NOTES
PT WEEKLY PROGRESS NOTE   Time In: 1686   Time Out: 1051    Subjective: patient reporting she feels OK this AM. Reports she feels like her RLE is getting stronger but it is hard for her to tell when she is moving her RLE. Reports she still feels heavy on her right side. Reports she wants to be able to walk better         Objective: Vital Signs:  Patient Vitals for the past 12 hrs:   Temp Pulse Resp BP SpO2   08/16/17 0707 97.8 °F (36.6 °C) 82 16 131/78 97 %     Pain level:No c/o pain  Pain location:NA  Pain interventions:NA    Patient education:Bed mobility training,transfer training, gait training, fall precautions, activity pacing, body mechanics, w/c mobility and parts management, Patient verbalizing understanding and demonstrating partial understanding of patient education. Recommend follow up education. Interdisciplinary Communication:NA    Other (comment) (Fall precautions)    Outcome Measures:     FIM SCORES Initial Assessment Weekly Progress Assessment 8/16/2017   Bed/Chair/Wheelchair Transfers 3 5   Wheelchair Mobility  (Unable to assess due to patient's height and available w/c) 6   Walking [place] 1 2   Steps/Stairs  (NT) NT   Please see [facility] Interdisciplinary Eval: Coordination/Balance Section for details regarding FIM score description.     BED/CHAIR/WHEELCHAIR TRANSFERS Initial Assessment Weekly Progress Assessment 8/16/2017   Rolling Right 6 (Modified independent) (using bed rail) 6 (Modified independent)   Rolling Left 4 (Minimal assistance) (cues to attend to RUE and RUE positioning) 5 (Supervision) (cues for body mechanics,attend to RUE)   Supine to Sit 3 (Moderate assistance) (using bed rail) 5 (Stand-by assistance) (using hemiwalker as simulated bed rail)   Sit to Stand Minimal assistance (from bed and w/c) Stand-by assistance (cues for symmetrical weight bearing through LEs)   Sit to Supine 4 (Minimal assistance) 5 (Supervision) (using LLE to assist RLE)   Transfer Type SPT without device (bed to w/c to left and w/c to bed with mod assist to her rig) SPT without device (to the left, w.c<>mat)   Comments Tendency to be impulsive at times during transfers and bed mobility requiring cues for hemiplegic body mechanics, cues to attend to right side  Increased time and effort to complete with cues for body mechanics.     Car Transfer Not tested Not tested   Car Type         GROSS ASSESSMENT Weekly Progress Assessment 8/16/2017   AROM  (LLE WFL, RLE AAROM WFL)   Strength  (LLE WFL, Right hip -3/5 to 2/5, knee ext -3/5,flex -2/5, ankle PF 1/5 DF 0/5   Coordination  (LLE WFL,RLE impaired)   Tone  (hypotonia RLE, clonus noted with achilles tendon stretch)   Sensation  (RLE proprioception impaired)   PROM Grossly decreased, non-functional     POSTURE Weekly Progress Assessment 8/16/2017   Posture (WDL) Exceptions to WDL   Posture Assessment Forward head;Rounded shoulders (weight shift to LLE)     WHEELCHAIR MOBILITY/MANAGEMENT Initial Assessment Weekly Progress Assessment 8/16/2017   Able to Propel 0 feet 150 feet   Curbs/ramps assistance required 0 (Not tested) 0 (Not tested)   Wheelchair set up assistance required 0 (Not tested)     Wheelchair management Manages left brake, Manages right brake Manages left brake;Manages right brake;Manages right footrest     WALKING INDEPENDENCE Initial Assessment Weekly Progress Assessment 8/16/2017   Assistive device Other (comment) (hand rail on left, ace wrap for left DF assist) Gait belt;Walker bobby;Other (comment) (ace wrap for right DF assist)   Ambulation assistance - level surface 3 (Moderate assistance) (max assist to advance and stabilize RLE)  min assist to advance RLE and stabilize right knee and right hip in stance phase   Distance 20 Feet (ft) 60 Feet (ft)   Comments slow start/stop step to hemiplegic gait pattern leading leading with RLE and stepping to LLE Gait distance and independence improving     GAIT Weekly Progress Assessment 8/16/2017   Gait Description (WDL)  slow start/stop step to hemiplegic gait pattern leading with RLE and stepping to with LLE. Gait Abnormalities  decreased ankle PF and knee flex at terminal stance, decreased ankle DF and knee ext initial contact, right knee hyperextension and hip retraction at midstance RLE     STEPS/STAIRS Initial Assessment Weekly Progress Assessment 8/16/2017   Steps/Stairs ambulated 0 0   Rail Use    NA   Comments unable to ambulate up/down steps at this time secondary to extent of right hemiparesis  Unable to ambulate up/down steps secondary to extent of right hemiparesis   Curbs/Ramps 0 (Not tested)  NA     SUPINE EXERCISES Sets Reps Comments   Ankle Pumps 1 20 PROM facilitating increased tone   Hip+knee flex<>ext 2 10    Hip Abduction 2 10 With skate   Short Arc Quad 2 10    Bridging with Adduction isometric 2 10    ESTIM to ant tib x 8 mins with 5 sec on 5 sec off cycle premodulated mode with intensity to 21 naman amps freq 80 to 150HZ. Facilitating ankle DF during on cycle         Assessment: patient making good progress towards goals. Patient has met all STGs per eval with good potential to meet revised LTGs. RLE strength and AROM slowly improving with good response to ESTIM to right quad. Progressed to ESTIM to right ant tib to facilitate increased movement in right ankle. Patient currently modified independent with mobility on level surfaces in her wheelchair. Recommend cont with inpatient rehab to progress to a modified independent level with bed mobility and transfers and progress to household distance ambulation with assistive device and assistance. Plan to reassess next week and determine appropriate D/C date. Patient return to room at end of treatment and remained up in wheelchair with needs in reach. Plan of Care: Patient seen for weekly assessment . Goals updated and revised. Please see Care Plan for updated LTGs.     Family Training: Needs to be scheduled    Reji Lennon, PT  8/16/2017

## 2017-08-16 NOTE — PROGRESS NOTES
Assisted pt to BR via wheelchair. Pt able to stand and pivot using grab bars. RN had to pull pt pants up and down.  Pt cleanse  Herself.with  Tissue

## 2017-08-17 PROCEDURE — 92526 ORAL FUNCTION THERAPY: CPT

## 2017-08-17 PROCEDURE — 97112 NEUROMUSCULAR REEDUCATION: CPT

## 2017-08-17 PROCEDURE — 97116 GAIT TRAINING THERAPY: CPT

## 2017-08-17 PROCEDURE — 97530 THERAPEUTIC ACTIVITIES: CPT

## 2017-08-17 PROCEDURE — 97110 THERAPEUTIC EXERCISES: CPT

## 2017-08-17 PROCEDURE — 97032 APPL MODALITY 1+ESTIM EA 15: CPT

## 2017-08-17 PROCEDURE — 74011250637 HC RX REV CODE- 250/637: Performed by: PHYSICAL MEDICINE & REHABILITATION

## 2017-08-17 PROCEDURE — 65310000000 HC RM PRIVATE REHAB

## 2017-08-17 PROCEDURE — 74011250636 HC RX REV CODE- 250/636: Performed by: PHYSICAL MEDICINE & REHABILITATION

## 2017-08-17 PROCEDURE — 97535 SELF CARE MNGMENT TRAINING: CPT

## 2017-08-17 PROCEDURE — 94660 CPAP INITIATION&MGMT: CPT

## 2017-08-17 RX ADMIN — PANTOPRAZOLE SODIUM 40 MG: 40 TABLET, DELAYED RELEASE ORAL at 06:24

## 2017-08-17 RX ADMIN — POTASSIUM CHLORIDE 40 MEQ: 20 TABLET, EXTENDED RELEASE ORAL at 18:11

## 2017-08-17 RX ADMIN — FERROUS SULFATE TAB 325 MG (65 MG ELEMENTAL FE) 325 MG: 325 (65 FE) TAB at 07:42

## 2017-08-17 RX ADMIN — ESCITALOPRAM OXALATE 20 MG: 10 TABLET ORAL at 08:28

## 2017-08-17 RX ADMIN — FERROUS SULFATE TAB 325 MG (65 MG ELEMENTAL FE) 325 MG: 325 (65 FE) TAB at 16:59

## 2017-08-17 RX ADMIN — ATORVASTATIN CALCIUM 40 MG: 40 TABLET, FILM COATED ORAL at 08:28

## 2017-08-17 RX ADMIN — HYDROCHLOROTHIAZIDE: 25 TABLET ORAL at 08:28

## 2017-08-17 RX ADMIN — POTASSIUM CHLORIDE 40 MEQ: 20 TABLET, EXTENDED RELEASE ORAL at 08:28

## 2017-08-17 RX ADMIN — ENOXAPARIN SODIUM 40 MG: 40 INJECTION SUBCUTANEOUS at 21:20

## 2017-08-17 RX ADMIN — ASPIRIN 325 MG ORAL TABLET 325 MG: 325 PILL ORAL at 08:29

## 2017-08-17 NOTE — PROGRESS NOTES
08/17/17 1226   Time Spent With Patient   Time In 0916   Time Out 1003   Patient Seen For: AM;ADLs   Grooming   Grooming Assistance  S   Comments Setup seated at tray table   Upper Body Bathing   Bathing Assistance, Upper Mod I   Position Performed Seated in chair   Adaptive Equipment Tub bench   Lower Body Bathing   Bathing Assistance, One Ramila Deer handled sponge   Position Performed Seated in chair   Adaptive Equipment Tub bench   29 Amanda Gloria (FIM Score) Mod A   Cues Physical assistance for pants down;Physical assistance for pants up   Upper Body Dressing    Dressing Assistance  Min A   Comments To assist with bra   Lower Body Dressing    Dressing Assistance  Mod A   Leg Crossed Method Used No   Position Performed Seated in chair;Standing   Adaptive Equipment Used Reacher;Long handled shoe horn;Sock aid   Don/Doff Anti-Embolic Stockings NA   Functional Transfers   Toilet Transfer  Stand pivot transfer with walker  (Cristiano walker)   Amount of Assistance Required Min A   Tub or Shower Type Shower   Amount of Assistance Required Min A   Adaptive Equipment Tub transfer bench;Grab bars; Walker (comment)  (Cristiano walker)       S: \"My [R] elbow is getting stiff at night. \" [Discussed self-direction of nursing care at night with patient and positioning options for sleep for increased support of RUE] Agreeable to therapy. Focus of session was on ADL retraining and functional mobility. Patient was able to transfer bed to wheelchair, wheelchair <> BSC towards L SPT using cristiano walker with minimal assist, blocking at R knee. Note R knee buckling and LOB posteriorly (back to seated on BSC) during transfer BSC > wheelchair after toileting. Patient transferred wheelchair <> TTB SPT using cristiano walker with minimal assist, blocking R knee. Pain not indicated. Collaborated with PTIman and confirmed patient is on track to reach goals as documented in the care plan.    Patient tolerated session well, but RUE strength/coordination, balance, functional mobility, activity tolerance are still below baseline and requires skilled facilitation to successfully and safely complete ADL's and transfers. Patient ended session in recliner with call remote and phone within reach.      Marylou Aguirre, OTR/L

## 2017-08-17 NOTE — PROGRESS NOTES
Problem: Falls - Risk of  Goal: *Absence of Falls  Document Rashmi Fall Risk and appropriate interventions in the flowsheet.    Outcome: Progressing Towards Goal  Fall Risk Interventions:  Mobility Interventions: Patient to call before getting OOB, Strengthening exercises (ROM-active/passive), PT Consult for mobility concerns           Medication Interventions: Bed/chair exit alarm, Patient to call before getting OOB, Teach patient to arise slowly     Elimination Interventions: Call light in reach

## 2017-08-17 NOTE — PROGRESS NOTES
PHYSICAL THERAPY DAILY NOTE  Time In: 2660  Time Out: 3318  Patient Seen For: PM;Gait training;Patient education; Therapeutic exercise;Transfer training; Wheelchair mobility; Other (see progress notes)    Subjective: patient reporting she feels OK. Reports she does not get tired when walking but her RLE feels weaker after walking a longer distance. Reports she wants to be able to walk better         Objective:Vital Signs:  Patient Vitals for the past 12 hrs:   Temp Pulse Resp BP SpO2   08/17/17 0718 97.8 °F (36.6 °C) 76 18 135/65 95 %     Pain level:No c/o pain  Pain location:NA  Pain interventions:NA    Patient education:Bed mobility training,transfer training, gait training, fall precautions, activity pacing, body mechanics, w/c mobility and parts management, Patient verbalizing understanding and demonstrating partial understanding of patient education. Recommend follow up education. Interdisciplinary Communication:NA    Other (comment) (Fall precautions)  GROSS ASSESSMENT Daily Assessment     NA       BED/MAT MOBILITY Daily Assessment    Rolling Right : 0 (Not tested)  Rolling Left : 0 (Not tested)  Supine to Sit : 0 (Not tested)  Sit to Supine : 4 (Minimal assistance) (with RLE)       TRANSFERS Daily Assessment    Transfer Type: SPT without device (w/c to bed)  Transfer Assistance : 5 (Stand-by assistance)  Sit to Stand Assistance: Stand-by assistance  Car Transfers: Not tested       GAIT Daily Assessment    Amount of Assistance: 4 (Minimal assistance)  Distance (ft): 75 Feet (ft) (75ft x 4)  Assistive Device: Walker bobby;Orthotic device; Other (comment) (1/4 inch heel lift R,ace wrap for DF assist R)   Gait training with cues for weight shift to LLE during RLE swing, cues to inhibit right hip retraction and knee hyperextension at midstance    STEPS or STAIRS Daily Assessment    Steps/Stairs Ambulated (#): 0  Level of Assist : 0 (Not tested)       BALANCE Daily Assessment    Sitting - Static: Good (unsupported)  Sitting - Dynamic: Fair (occasional)  Standing - Static: Fair (with hemiwalker)  Standing - Dynamic : Impaired       WHEELCHAIR MOBILITY Daily Assessment    Able to Propel (ft): 150 feet  Functional Level: 5  Curbs/Ramps Assist Required (FIM Score): 0 (Not tested)  Wheelchair Setup Assist Required : 5 (Stand-by assistance)  Wheelchair Management: Manages left brake;Manages right brake;Manages right footrest       LOWER EXTREMITY EXERCISES Daily Assessment    Extremity: Both  Exercise Type #1: Other (comment) (LE motomed x 13 mins at level 2)  Level of Assist: Supervision            Assessment: Progressing towards goals. Improving with ability to advance and place RLE during gait training       Patient returned to room at end of treatment. Patient supine in bed with head of bed elevated and bed rails up x 2. Needs placed in reach of patient. Plan of Care: Continue with POC and progress as tolerated.      Tigist Chapman, PT  8/17/2017

## 2017-08-17 NOTE — PROGRESS NOTES
Pt resting in bed, ate 100% of her breakfast. Pt Dx with CVa and right side weakness. Pt states she has numbness and tingling to right hand fingers. Pt has right side weakness. Pt reported to had a BM last night. Pt is A/O x 4, denies pain, denies shortness of breath. Bowel sounds in all 4 quadrants. she wears a C-pap at night. Pt transfers with 1 person assist. She stands an pivots from bed to wheelchair or to commode.  Pt encourage to call for any needs

## 2017-08-17 NOTE — PROGRESS NOTES
SFD PROGRESS NOTE    Zoraida Boykin  Admit Date: 8/8/2017  Admit Diagnosis: Stroke - Right Side Body Involvment;Stroke (cerebrum) ([address]    Subjective     Patient seen and examined. Vss. Case discussed in team conference. No new weakness. Slow, steady RLE motor improvrment seen. Therapy well tolerated no new barriers. Objective:     Current Facility-Administered Medications   Medication Dose Route Frequency    potassium chloride (K-DUR, KLOR-CON) SR tablet 40 mEq  40 mEq Oral BID    ferrous sulfate tablet 325 mg  1 Tab Oral BID WITH MEALS    aspirin (ASPIRIN) tablet 325 mg  325 mg Oral DAILY    enoxaparin (LOVENOX) injection 40 mg  40 mg SubCUTAneous Q24H    acetaminophen (TYLENOL) tablet 650 mg  650 mg Oral Q4H PRN    alum-mag hydroxide-simeth (MYLANTA) oral suspension 30 mL  30 mL Oral Q4H PRN    atorvastatin (LIPITOR) tablet 40 mg  40 mg Oral DAILY    bisacodyl (DULCOLAX) suppository 10 mg  10 mg Rectal DAILY PRN    escitalopram oxalate (LEXAPRO) tablet 20 mg  20 mg Oral DAILY    LORazepam (ATIVAN) tablet 1 mg  1 mg Oral Q6H PRN    magnesium hydroxide (MILK OF MAGNESIA) 400 mg/5 mL oral suspension 30 mL  30 mL Oral DAILY PRN    ondansetron (ZOFRAN ODT) tablet 4 mg  4 mg Oral Q6H PRN    senna-docusate (PERICOLACE) 8.6-50 mg per tablet 2 Tab  2 Tab Oral DAILY PRN    valsartan/hydroCHLOROthiazide (DIOVAN HCT) 160/25 mg   Oral DAILY    traZODone (DESYREL) tablet 50 mg  50 mg Oral QHS PRN    pantoprazole (PROTONIX) tablet 40 mg  40 mg Oral ACB     Review of Systems:Denies chest pain, shortness of breath, cough, headache, abdominal pain, dysurea, calf pain. Pertinent positives are as noted in the medical records and unremarkable otherwise. Visit Vitals    /65 (BP 1 Location: Left arm, BP Patient Position: At rest)    Pulse 76    Temp 97.8 °F (36.6 °C)    Resp 18    SpO2 95%    Breastfeeding No        Physical Exam:   General: Alert and age appropriately oriented.   No acute cardio respiratory distress. HEENT: Normocephalic,no scleral icterus  Oral mucosa moist without cyanosis; right lower facial weakness   Lungs: Clear to auscultation  bilaterally. Respiration even and unlabored   Heart: Regular rate and rhythm, S1, S2   No  murmurs, clicks, rub or gallops   Abdomen: Soft, non-tender, nondistended. Bowel sounds present. Genitourinary: deferred   Neuromuscular:      EOMI, right facial weakness, tongue midline  L side WFL  RUE grossly 2+ to 3 prox ,  trace; flexion synergy pattern/tone RUE  RLE hip flexion 2+, KE 3 , DF 0, PF 2   Skin/extremity: No rashes, no erythema. No calf tenderness BLE  No edema. Functional Assessment:  Gross Assessment  AROM:  (LLE WFL, RLE AAROM WFL) (08/16/17 1000)  PROM: Grossly decreased, non-functional (08/16/17 1000)  Strength:  (LLE WFL, Right hip -3/5 to 2/5, knee ext -3/5,flex -2/5) (08/16/17 1000)  Coordination:  (LLE WFL,RLE impaired) (08/16/17 1000)  Tone:  (hypotonia RLE, clonus noted with achilles tendon stretch) (08/16/17 1000)  Sensation:  (RLE proprioception impaired) (08/16/17 1000)       Balance  Sitting - Static: Good (unsupported) (08/16/17 1000)  Sitting - Dynamic: Good (unsupported) (08/16/17 1000)  Standing - Static: Fair (with hemiwalker) (08/16/17 1000)  Standing - Dynamic : Impaired (08/16/17 1000)           Toileting  Cues: Physical assistance for pants up;Physical assistance for pants down (08/11/17 1027)         Crystal Gallardo Fall Risk Assessment:  Crystal Gallardo Fall Risk  Mobility: Ambulates or transfers with assist devices or assistance/unsteady gait (08/17/17 5493)  Mobility Interventions: Patient to call before getting OOB; Strengthening exercises (ROM-active/passive);PT Consult for mobility concerns (08/17/17 1186)  Mentation: Alert, oriented x 3 (08/17/17 2189)  Medication: Patient receiving anticonvulsants, sedatives(tranquilizers), psychotropics or hypnotics, hypoglycemics, narcotics, sleep aids, antihypertensives, laxatives, or diuretics (08/17/17 1165)  Medication Interventions: Bed/chair exit alarm; Patient to call before getting OOB; Teach patient to arise slowly (08/17/17 6534)  Elimination: Needs assistance with toileting (08/17/17 7480)  Elimination Interventions: Call light in reach (08/17/17 0812)  Prior Fall History: No (08/17/17 0812)  Total Score: 3 (08/17/17 0812)  Standard Fall Precautions: Yes (08/17/17 0059)  High Fall Risk: Yes (08/17/17 0812)     Speech Assessment:         Ambulation:  Gait  Distance (ft): 60 Feet (ft) (08/16/17 1641)  Assistive Device: Other (comment); Walker bobby;Gait belt (ace wrap to assist DF) (08/16/17 1641)     Labs/Studies:  Recent Results (from the past 72 hour(s))   PLATELET COUNT    Collection Time: 08/15/17  6:36 AM   Result Value Ref Range    PLATELET 636 879 - 730 K/uL   MAGNESIUM    Collection Time: 08/16/17  6:35 AM   Result Value Ref Range    Magnesium 2.0 1.8 - 2.4 mg/dL   METABOLIC PANEL, BASIC    Collection Time: 08/16/17  6:35 AM   Result Value Ref Range    Sodium 139 136 - 145 mmol/L    Potassium 3.9 3.5 - 5.1 mmol/L    Chloride 105 98 - 107 mmol/L    CO2 27 21 - 32 mmol/L    Anion gap 7 7 - 16 mmol/L    Glucose 87 65 - 100 mg/dL    BUN 12 6 - 23 MG/DL    Creatinine 0.64 0.6 - 1.0 MG/DL    GFR est AA >60 >60 ml/min/1.73m2    GFR est non-AA >60 >60 ml/min/1.73m2    Calcium 8.8 8.3 - 10.4 MG/DL       Assessment:     Problem List as of 8/17/2017  Date Reviewed: 1/26/2017          Codes Class Noted - Resolved    Stroke (cerebrum) ([facility] ICD-10-CM: I63.9  ICD-9-CM: 434.91  8/8/2017 - Present        Left sided lacunar stroke [facility]) ICD-10-CM: I63.9  ICD-9-CM: 434.91  8/4/2017 - Present    Overview Signed 8/6/2017  7:44 PM by Megha Layton MD     8/2017             Weakness of right side of body ICD-10-CM: R53.1  ICD-9-CM: 728.87  8/3/2017 - Present        Essential hypertension ICD-10-CM: I10  ICD-9-CM: 401.9 10/5/2016 - Present        Obesity (BMI 30-39. 9) ICD-10-CM: E66.9  ICD-9-CM: 278.00  Unknown - Present        Prediabetes ICD-10-CM: R73.03  ICD-9-CM: 790.29  7/28/2015 - Present        Generalized anxiety disorder ICD-10-CM: F41.1  ICD-9-CM: 300.02  Unknown - Present        Insomnia, unspecified ICD-10-CM: G47.00  ICD-9-CM: 780.52  Unknown - Present        Mitral valve prolapse ICD-10-CM: I34.1  ICD-9-CM: 424.0  Unknown - Present        Iron deficiency anemia ICD-10-CM: D50.9  ICD-9-CM: 280.9  8/23/2013 - Present    Overview Signed 10/5/2016  2:49 PM by Wiliam Wiley MD     S/p iron infusion x 2             RESOLVED: GERD (gastroesophageal reflux disease) ICD-10-CM: K21.9  ICD-9-CM: 530.81  Unknown - 10/5/2016        RESOLVED: Mixed hyperlipidemia ICD-10-CM: E78.2  ICD-9-CM: 272.2  Unknown - 10/5/2016        RESOLVED: Hypertension ICD-10-CM: I10  ICD-9-CM: 401.9  Unknown - 10/5/2016              Plan:      This is an unfortunate 35yo WF with obesity, RADHA, and htn admitted with right hemiplegia due to Left hemispheric stroke with evolution; etiology of stroke not clear      CVA- continue medical management for CVA  -  Continue antiplatelet therapy; aspirin 325mg daily  - continued on statin; Lipitor. Suspect RADHA -use CPAP while at IRU.       Obesity; have discussed importance of wt loss for mobility and overall health      Hypertension - BP remains well controlled. - No adjustments. - Cont Diovan at current dose.      LUCHO; gets IV infusions; Anemia/ microcytic, on fe supplement. - continue fe supplements po. Hypokalemia-   - K 3.4 on 8/10, cont KCL 40meq daily  - 8/11 K 3.3 -> increased KCL to  40 bid   - K- 3.8 on 8/14 -> 3.9(8/16) continue Kcl po.       bowel program - add prn bowel program      GERD - start PPI. At times may need additional antacids, Maalox prn.      Depression; cont Lexapro; home med due to generalized anxiety due to stressors in life. His risk post stroke depression.  Order recreational therapy and psych if needed; doing much better and participating well 8/11      Dysphagia; mild. Cont ST for this as well as for dysarthria. Continuing joel-motor exercises. - Mechanical consistency diet, cont aspiration  precautions        Pneumonia prophylaxis- Incentive spirometer every hour while awake      DVT risk / DVT Prophylaxis- Will require daily physician exam to assess for signs and symptoms as patient is at increased risk for of thromboembolism. Mobilization as tolerated. Intermittent pneumatic compression devices when in bed Thigh-high or knee-high thromboembolic deterrent hose when out of bed. Lovenox subq daily.       Pain Control: stable, mild-to-moderate joint symptoms intermittently, reasonably well controlled by PRN meds. Will require regular pain assessment and comprenhensive pain management. - Cont prn tylenol      Chronic insomnia; on Ambien at home. Have not restarted this. - on prn trazadone.       Wound Care: no current evidence of skin breakdown. Will require 24/7 rehab nursing. turning schedule q 2 hrs while in bed      Time spent was 25 minutes with over 1/2 in direct patient care/examination, consultation and coordination of care.      Signed By: Mita Kinney MD     August 17, 2017

## 2017-08-17 NOTE — PROGRESS NOTES
Subjective \"Does this help with my balance? \"   Activity Standing horseshoe toss with her LUE   Strength/Endurance Patient was able to stand 4 times without c/o tiredness during the session. She has right UE hemiparesis. Balance Sit<->stand:  CGA with quad cane. Anaid Floor wrapped patient's R foot to help protect and support her foot. Social Interaction Patient was sociable and laughing appropriately with this therapist and other staff in the gym. Cognitive A&O X4   Comments Patient was assisted to her room and left seated up in her w/c during her break between therapies. All needs were left within reach.      Javon Evans, CTRS

## 2017-08-17 NOTE — PROGRESS NOTES
Fax received from Timmy Sampson [phone]  Ext [phone] from [place]. Pt approved for 7 additional days with next update on 8/22. Will continue to [facility].

## 2017-08-17 NOTE — PROGRESS NOTES
PHYSICAL THERAPY DAILY NOTE  Time In: 1423  Time Out: 1201  Patient Seen For: AM;Patient education; Therapeutic exercise;Transfer training; Wheelchair mobility; Other (see progress notes)    Subjective: Patient reporting she cannot tell when her right foot is bending with the ESTIM. Reports she wants to stay in rehab as long as she can         Objective:Vital Signs:  Patient Vitals for the past 12 hrs:   Temp Pulse Resp BP SpO2   08/17/17 0718 97.8 °F (36.6 °C) 76 18 135/65 95 %     Pain level:No c/o pain  Pain location:NA  Pain interventions:NA    Patient education:Bed mobility training,transfer training, balance training, fall precautions, activity pacing, body mechanics, w/c mobility and parts management, Patient verbalizing understanding and demonstrating partial understanding of patient education. Recommend follow up education.     Interdisciplinary Communication:NA    Other (comment) (Fall precautions)  GROSS ASSESSMENT Daily Assessment     NA       BED/MAT MOBILITY Daily Assessment   Increased time and effort to complete with cues for body mechanics Rolling Right : 6 (Modified independent)  Rolling Left : 0 (Not tested)  Supine to Sit : 5 (Stand-by assistance) (using wheelchair as simulated bed rail for sidelying to sit)  Sit to Supine : 5 (Supervision)       TRANSFERS Daily Assessment    Transfer Type: SPT without device (to the left   w/c<>mat)  Transfer Assistance : 5 (Stand-by assistance)  Sit to Stand Assistance: Stand-by assistance  Car Transfers: Not tested       GAIT Daily Assessment    Amount of Assistance: 0 (Not tested)  Distance (ft): 0 Feet (ft)       STEPS or STAIRS Daily Assessment    Steps/Stairs Ambulated (#): 0  Level of Assist : 0 (Not tested)       BALANCE Daily Assessment    Sitting - Static: Good (unsupported)  Sitting - Dynamic: Fair (occasional)  Standing - Static: Fair  Standing - Dynamic : Impaired       WHEELCHAIR MOBILITY Daily Assessment    Able to Propel (ft): 150 feet (using LUE and LLE)  Functional Level: 5  Curbs/Ramps Assist Required (FIM Score): 0 (Not tested)  Wheelchair Setup Assist Required : 5 (Stand-by assistance)  Wheelchair Management: Manages left brake;Manages right brake;Manages right footrest       LOWER EXTREMITY EXERCISES Daily Assessment    Extremity: Right  Exercise Type #1: Supine lower extremity strengthening  Sets Performed: 2  Reps Performed: 10  Level of Assist: Minimal assistance  Exercise Type #2: Other (comment) (ESTIM Premodulated mode to right ant tib)  Sets Performed:  (10 mins)  Reps Performed:  (narendra DF with 5 sec on 5 sec off cycle, intensity to 21 MA) Frequency 80 to 150HZ  Level of Assist: Supervision (with cues)          Assessment: Good response to ESTIM to left ant tib. Progressing towards modified independence with SPT to left and bed mobility       Patient return to room at end of treatment and remained up in wheelchair with needs in reach    Plan of Care: Continue with POC and progress as tolerated.      Bandar Gonzalez, PT  8/17/2017

## 2017-08-17 NOTE — PROGRESS NOTES
OT Daily Note    Time In 1304   Time Out 1347     Subjective: \"I wish it would move itself. \" [RUE]  Pain: None indicated    Precautions:  (falls)    Self-Care   Patient transferred wheelchair <> [facility] SPT using bobby walker (towards L and R) with minimal assist, blocking at R knee. Patient completed toileting with moderate assistance for standing balance and to finish pants/underwear on/off R hip. Patient completes all hygiene seated on [facility]. Neuro-Muscular Re-Education   Patient completed 2 sets x 20 reps of AAROM RUE therapeutic exercises facilitated by hand-over-hand taping utilizing mirror box for neuromuscular reeducation. Patient completed bilateral (LUE AROM, RUE AAROM) wrist extension, gross grasp/release, digit abduction/adduction, radial/ulnar deviation, and supination/pronation exercises utilizing mirror box. Patient completed large pegboard task to retrieve large wooden pegs from tabletop and insert into pegboard in rows according to color using RUE facilitated by hand-over-hand taping and maximal assist at R elbow to support shoulder movement. Patient removed all pegs and replaced into container on tabletop upon completion of task. Patient stacked/unstacked x 5 cups, inserted checkers into cup utilizing pincer grasp, and poured checkers out of cup using RUE facilitated by same hand-over-hand taping assist.     Assessment: Patient tolerated mirror therapy and hand over hand taping facilitation well. Progress with toilet transfer towards R SPT utilizing bobby walker. Education: Mirror box therapy, purpose of hand-over-hand  Interdisciplinary Communication: Collaborated with Gracie Quevedo and agreed patient is progressing well and on-track to meet goals as stated in [address]. Plan: Continue to address ADL/IADL, functional mobility, activity tolerance, balance, RUE strengthening, coordination.       Kellie Mclean, OTR/L

## 2017-08-18 PROCEDURE — 74011250636 HC RX REV CODE- 250/636: Performed by: PHYSICAL MEDICINE & REHABILITATION

## 2017-08-18 PROCEDURE — 92526 ORAL FUNCTION THERAPY: CPT

## 2017-08-18 PROCEDURE — 97535 SELF CARE MNGMENT TRAINING: CPT

## 2017-08-18 PROCEDURE — 65310000000 HC RM PRIVATE REHAB

## 2017-08-18 PROCEDURE — 97110 THERAPEUTIC EXERCISES: CPT

## 2017-08-18 PROCEDURE — 97530 THERAPEUTIC ACTIVITIES: CPT

## 2017-08-18 PROCEDURE — 97116 GAIT TRAINING THERAPY: CPT

## 2017-08-18 PROCEDURE — 74011250637 HC RX REV CODE- 250/637: Performed by: PHYSICAL MEDICINE & REHABILITATION

## 2017-08-18 RX ADMIN — PANTOPRAZOLE SODIUM 40 MG: 40 TABLET, DELAYED RELEASE ORAL at 06:29

## 2017-08-18 RX ADMIN — HYDROCHLOROTHIAZIDE: 25 TABLET ORAL at 08:35

## 2017-08-18 RX ADMIN — ASPIRIN 325 MG ORAL TABLET 325 MG: 325 PILL ORAL at 08:35

## 2017-08-18 RX ADMIN — ESCITALOPRAM OXALATE 20 MG: 10 TABLET ORAL at 08:35

## 2017-08-18 RX ADMIN — ENOXAPARIN SODIUM 40 MG: 40 INJECTION SUBCUTANEOUS at 20:52

## 2017-08-18 RX ADMIN — ATORVASTATIN CALCIUM 40 MG: 40 TABLET, FILM COATED ORAL at 08:36

## 2017-08-18 RX ADMIN — POTASSIUM CHLORIDE 40 MEQ: 20 TABLET, EXTENDED RELEASE ORAL at 17:29

## 2017-08-18 RX ADMIN — FERROUS SULFATE TAB 325 MG (65 MG ELEMENTAL FE) 325 MG: 325 (65 FE) TAB at 08:36

## 2017-08-18 RX ADMIN — POTASSIUM CHLORIDE 40 MEQ: 20 TABLET, EXTENDED RELEASE ORAL at 08:35

## 2017-08-18 RX ADMIN — FERROUS SULFATE TAB 325 MG (65 MG ELEMENTAL FE) 325 MG: 325 (65 FE) TAB at 16:36

## 2017-08-18 NOTE — PROGRESS NOTES
PHYSICAL THERAPY DAILY NOTE  Time In: 1345  Time Out: 1430  Patient Seen For: PM;Gait training; Therapeutic exercise    Subjective: \"That device feels like it give me good support\"         Objective:  Vital Signs:    Patient Vitals for the past 12 hrs:   Temp Pulse Resp BP SpO2   08/18/17 0745 98 °F (36.7 °C) 83 18 108/77 98 %       Pain level:  Patient had no complaints of pain during therapy this afternoon. Patient education: Trial of 2 different AFOs with the plastic pre-riky working best but it was too large and tight for any long term use. It improved her RLE knee control during stance and allow for more normal swing. Other (comment) (Fall precautions)  GROSS ASSESSMENT Daily Assessment    AROM: Generally decreased, functional  PROM: Grossly decreased, non-functional  Tone: Abnormal  Sensation: Impaired         TRANSFERS Daily Assessment    Transfer Type: SPT without device  Transfer Assistance : 5 (Stand-by assistance)  Sit to Stand Assistance: Stand-by assistance  Car Transfers: Not tested       GAIT Daily Assessment   Only ambulated enough for a trial of the various AFOs this p.m. Improved gait and knee control with the RLE pre-riky plastic AFO but it was too big for on-going use. Amount of Assistance: 4 (Contact guard assistance)  Distance (ft): 30 Feet (ft) (30' x 2 trying AFOs)  Assistive Device: Walker bobby;Orthotic device         BALANCE Daily Assessment    Sitting - Static: Good (unsupported)  Sitting - Dynamic: Fair (occasional)  Standing - Static: Fair;Constant support  Standing - Dynamic : Impaired         LOWER EXTREMITY EXERCISES Daily Assessment   Estim applied to her RLE anterior tib with the following parameters:    Premodulation mode  80-15-HZ frequency  21 mA for channel 1 intensity  Surge - 5 on/5off  Time - 10 minutes  Electrodes to markings on RLE anterior tib.   Extremity: Both  Exercise Type #1: Seated lower extremity strengthening (Motomed x 10 minutes level 3)  Sets Performed: 2  Reps Performed: 10  Level of Assist: Supervision  Exercise Type #2: Seated lower extremity strengthening (Estim to her RLE anterior tib)  Sets Performed: 1  Reps Performed: 10  Level of Assist: Supervision          Assessment: Patient tolerated therapy again well this afternoon and participated in all tasks. Very pleasant and motivated for therapy. Patient returned to her room and was assisted back into her bed to rest her back after the p.m. Therapy. Plan of Care: Continue to work on maximizing her functional mobility skills and strengthening her right bobby-body.       [place] [facility] [place], [place]  8/18/2017

## 2017-08-18 NOTE — PROGRESS NOTES
Pt A/0 and oriented x 4, no s/sx of distress, VSS, right side weakness,  tingling and numbness to right hand and fingers. Pt voices no needs at this time. Assessment completed.

## 2017-08-18 NOTE — PROGRESS NOTES
Subjective \"I haven't played ANKIT in a long time. \"   Activity ANKIT card game   Strength/Endurance Patient with right sided weakness. Patient was able to bear more weight through her RLE during transfer to bedside commode. Balance CGA with SPT from bedside commode to her w/c   Social Interaction Patient was sociable and in good spirits during the session. She was motivated and friendly. Cognitive A&O X4   Comments Patient was handed over to PT at the end of the session.      Sumaya Yoo, CTRS

## 2017-08-18 NOTE — PROGRESS NOTES
PHYSICAL THERAPY DAILY NOTE  Time In: 1030  Time Out: 1116  Patient Seen For: AM;Balance activities;Gait training; Therapeutic exercise;Transfer training    Subjective: \"They tell me I walk too fast\"         Objective:  Vital Signs:    Patient Vitals for the past 12 hrs:   Temp Pulse Resp BP SpO2   08/18/17 0745 98 °F (36.7 °C) 83 18 108/77 98 %       Pain level:  Patient had no complaints of pain in therapy today. Patient education:  Reviewed recovery process for right bobby-body after a stroke. Other (comment) (Fall precautions)  GROSS ASSESSMENT Daily Assessment    AROM: Generally decreased, functional  PROM: Grossly decreased, non-functional  Tone: Abnormal  Sensation: Impaired         TRANSFERS Daily Assessment    Transfer Type: SPT without device  Transfer Assistance : 5 (Stand-by assistance)  Sit to Stand Assistance: Stand-by assistance  Car Transfers: Not tested       GAIT Daily Assessment   Verbal cues needed at times for upright posture and speed control. Improved step through gait pattern and patient had fair to good balance during gait. Good awareness with RLE knee control preventing it from hyperextending. Amount of Assistance: 4 (Minimal assistance)  Distance (ft): 75 Feet (ft) (76' x 2)  Assistive Device: Walker bobby;Orthotic device (Ace wrap for DF/ ace wrap on knee, shoe lift)       BALANCE Daily Assessment    Sitting - Static: Good (unsupported)  Sitting - Dynamic: Fair (occasional)  Standing - Static: Fair;Constant support  Standing - Dynamic : Impaired         LOWER EXTREMITY EXERCISES Daily Assessment   Tolerated quad strength on her RLE using the step, keeping the RLE up on the step and stepping up/down with the left. Also practiced RLE up/down for hip flex/ext.  Extremity: Right  Exercise Type #1: Supine lower extremity strengthening  Sets Performed: 2  Reps Performed: 10  Level of Assist: Contact guard assistance  Exercise Type #2: Standing lower extremity strengthening (Step exercise with RLE up/down step)  Sets Performed: 1  Reps Performed: 10  Level of Assist: Contact guard assistance          Assessment: Patient participated and tolerated therapy well today. She remains hopeful and motivated for more return and use of her RUE/LE. She has good focus during therapy and appears to be making progress. Patient was taken back to her room where she elected to stay in her w/c after her morning PT noemi. Plan of Care: Continue to treat and progress as indicated.       Aldair Ash  8/18/2017

## 2017-08-18 NOTE — PROGRESS NOTES
17 1329   Time Spent With Patient   Time In 09   Time Out 1030   Patient Seen For: AM;ADLs   Grooming   Grooming Assistance  S   Comments Setup at sink, good use of cristiano techniques   Upper Body Bathing   Bathing Assistance, Upper Mod I   Position Performed Seated in chair   Adaptive Equipment Tub bench   Lower Body Bathing   Bathing Assistance, Lower  SBA   Adaptive Equipment Long handled sponge   Position Performed Seated in chair   Adaptive Equipment Tub bench   Toileting   Toileting Assistance (FIM Score) Mod A   Upper Body Dressing    Dressing Assistance  Min A   Comments To assist bra down R trunk/back   Lower Body Dressing    Dressing Assistance  Mod A   Leg Crossed Method Used No   Position Performed Seated in chair;Standing   Adaptive Equipment Used Reacher;Long handled shoe horn   Don/Doff Anti-Embolic Stockings NA   Comments Patient re-educated and uses cristiano technique to don socks this session with assist to bend up/support RLE, using one-handed technique to don socks using Lhand   Functional Transfers   Toilet Transfer  Stand pivot transfer with walker  (Cristiano walker)   Amount of Assistance Required Min A   Tub or Shower Type Shower   Amount of Assistance Required Min A   Adaptive Equipment Grab bars; Tub transfer bench;Walker (comment)  (Cristiano walker)     S: \"I want to keep getting better, I'm too young to stay like this. \" Agreeable to therapy. Focus of session was on ADL retraining and functional mobility. Patient was able to transfer wheelchair <> BSC and TTB SPT using cristiano walker with minimal assist to block R knee. Pain not indicated. Patient tolerated PROM stretch to RUE following ADL in shoulder flexion, extension, abduction, internal/external rotation, elbow extension, wrist extension, and digit extension. Patient completed 1 set x 20 reps of AAROM/self-ROM supine on mat table, LUE assisting RUE.   Patient completed shoulder flexion and chest press before time  this session. Collaborated with PT, Td Cordero and confirmed patient is on track to reach goals as documented in the care plan. Patient tolerated session well, but RUE strength/coordination, balance, functional mobility, activity tolerance are still below baseline and require skilled facilitation to successfully and safely complete ADL's and transfers. Patient ended session in gym with PT, Td Cordero.   Kaylen Mckeon, OTR/L

## 2017-08-18 NOTE — PROGRESS NOTES
Problem: Nutrition Deficit  Goal: *Optimize nutritional status  Nutrition:  Assessment based on early LOS. Assessment:  Anthropometrics:              Ht - 4'11\", wgt - 77.1 kg (unknown wgt source 8/3/17), BMI 34.3 c/w obesity class I, edema - none. Macronutrient Needs:  Estimated calorie needs - 2783-2392 cathleen/day (15-20 cathleen/kg/day)   Estimated protein needs - 52-56 gm pro/day (1.2-1.3 gm pro/kgIBW/day) (GFR >60 ml/min)  Intake/Comparative Standards: This patient's average intake of cardiac mechanical soft diet for the past 7 recorded days/13 meals: 89%. This potentially meets >100% of calorie and >100% of protein goals. Diet:              Cardiac, mechanical soft. Food/Nutrition History:              The patient presents with no acute nutrition risk factors based on the nursing admission malnutrition screen. Her last bowel movement was yesterday. Diagnosis (Nutrition):  No nutrition diagnosis at this time. Intervention:  Meals and Snacks: Cardiac, mechanical soft. Nutrition Discharge Plan: Too soon to determine. Cady Hoffman.  [place] [employer]  [phone]

## 2017-08-18 NOTE — PROGRESS NOTES
08/18/17 0916   Time Spent With Patient   Time In 0847   Time Out 0914   Patient Seen For: AM;Oral-motor exercises   Mental Status   Neurologic State Alert   Orientation Level Oriented X4   Cognition Appropriate decision making   Perception Appears intact   Perseveration No perseveration noted   Safety/Judgement Fall prevention   Pt completed oral motor exercises 2 x 10 with 90% accuracy.    Prakash Wyatt MA/SELINA/SLP

## 2017-08-19 PROCEDURE — 65310000000 HC RM PRIVATE REHAB

## 2017-08-19 PROCEDURE — 97150 GROUP THERAPEUTIC PROCEDURES: CPT

## 2017-08-19 PROCEDURE — 74011250636 HC RX REV CODE- 250/636: Performed by: PHYSICAL MEDICINE & REHABILITATION

## 2017-08-19 PROCEDURE — 94760 N-INVAS EAR/PLS OXIMETRY 1: CPT

## 2017-08-19 PROCEDURE — 74011250637 HC RX REV CODE- 250/637: Performed by: PHYSICAL MEDICINE & REHABILITATION

## 2017-08-19 PROCEDURE — 94660 CPAP INITIATION&MGMT: CPT

## 2017-08-19 RX ADMIN — FERROUS SULFATE TAB 325 MG (65 MG ELEMENTAL FE) 325 MG: 325 (65 FE) TAB at 16:42

## 2017-08-19 RX ADMIN — PANTOPRAZOLE SODIUM 40 MG: 40 TABLET, DELAYED RELEASE ORAL at 05:08

## 2017-08-19 RX ADMIN — FERROUS SULFATE TAB 325 MG (65 MG ELEMENTAL FE) 325 MG: 325 (65 FE) TAB at 08:08

## 2017-08-19 RX ADMIN — ASPIRIN 325 MG ORAL TABLET 325 MG: 325 PILL ORAL at 08:08

## 2017-08-19 RX ADMIN — POTASSIUM CHLORIDE 40 MEQ: 20 TABLET, EXTENDED RELEASE ORAL at 17:03

## 2017-08-19 RX ADMIN — POTASSIUM CHLORIDE 40 MEQ: 20 TABLET, EXTENDED RELEASE ORAL at 08:08

## 2017-08-19 RX ADMIN — ATORVASTATIN CALCIUM 40 MG: 40 TABLET, FILM COATED ORAL at 08:08

## 2017-08-19 RX ADMIN — ENOXAPARIN SODIUM 40 MG: 40 INJECTION SUBCUTANEOUS at 19:30

## 2017-08-19 RX ADMIN — HYDROCHLOROTHIAZIDE: 25 TABLET ORAL at 08:08

## 2017-08-19 RX ADMIN — ESCITALOPRAM OXALATE 20 MG: 10 TABLET ORAL at 08:08

## 2017-08-19 NOTE — PROGRESS NOTES
PHYSICAL THERAPY DAILY NOTE  Time In: 1003  Time Out: 7974  Patient Seen For: AM;Transfer training;Gait training; Therapeutic exercise; Other (see progress notes)    Subjective: Patient had no complaints. Objective: No pain noted. Other (comment) (Fall precautions)  GROSS ASSESSMENT Daily Assessment            BED/MAT MOBILITY Daily Assessment    Supine to Sit : 4 (Minimal assistance)  Sit to Supine : 4 (Minimal assistance)       TRANSFERS Daily Assessment    Transfer Type: SPT without device  Transfer Assistance : 4 (Contact guard assistance)  Sit to Stand Assistance: Contact guard assistance       GAIT Daily Assessment    Amount of Assistance: 4 (Contact guard assistance)  Distance (ft): 60 Feet (ft)  Assistive Device: Gait belt;Walker bobby;Brace/Splint (1/4 inch heel lift and ace wraps to assist DF and to knee for proprieception)       STEPS or STAIRS Daily Assessment    Level of Assist : 0 (Not tested)       BALANCE Daily Assessment            WHEELCHAIR MOBILITY Daily Assessment            LOWER EXTREMITY EXERCISES Daily Assessment    Extremity: Both  Exercise Type #1: Other (comment) (motomed x 10 minutes)  Sets Performed: 1  Reps Performed: 10  Level of Assist: Supervision  Exercise Type #2: Supine lower extremity strengthening (And sidelying with skate board)  Sets Performed: 1  Reps Performed: 20  Level of Assist: Minimal assistance          Assessment: Patient improving with balance and gait sequence. Plan of Care: Continue with plan of care to reach PT goals. Returned to room with lunch set up and call bell at reach.     Rony Lala, PTA  8/19/2017

## 2017-08-19 NOTE — PROGRESS NOTES
SFD PROGRESS NOTE    Zoraida Boykin  Admit Date: 8/8/2017  Admit Diagnosis: Stroke - Right Side Body Involvment;Stroke (cerebrum) ([address]    Subjective     Patient seen and examined. Case discussed in conference. No new neurologic setbacks noted. Continent of B/B. Improvement in rle strength noted. Still weak, flaccid RUE. PT, OT well tolerated. Objective:     Current Facility-Administered Medications   Medication Dose Route Frequency    potassium chloride (K-DUR, KLOR-CON) SR tablet 40 mEq  40 mEq Oral BID    ferrous sulfate tablet 325 mg  1 Tab Oral BID WITH MEALS    aspirin (ASPIRIN) tablet 325 mg  325 mg Oral DAILY    enoxaparin (LOVENOX) injection 40 mg  40 mg SubCUTAneous Q24H    acetaminophen (TYLENOL) tablet 650 mg  650 mg Oral Q4H PRN    alum-mag hydroxide-simeth (MYLANTA) oral suspension 30 mL  30 mL Oral Q4H PRN    atorvastatin (LIPITOR) tablet 40 mg  40 mg Oral DAILY    bisacodyl (DULCOLAX) suppository 10 mg  10 mg Rectal DAILY PRN    escitalopram oxalate (LEXAPRO) tablet 20 mg  20 mg Oral DAILY    LORazepam (ATIVAN) tablet 1 mg  1 mg Oral Q6H PRN    magnesium hydroxide (MILK OF MAGNESIA) 400 mg/5 mL oral suspension 30 mL  30 mL Oral DAILY PRN    ondansetron (ZOFRAN ODT) tablet 4 mg  4 mg Oral Q6H PRN    senna-docusate (PERICOLACE) 8.6-50 mg per tablet 2 Tab  2 Tab Oral DAILY PRN    valsartan/hydroCHLOROthiazide (DIOVAN HCT) 160/25 mg   Oral DAILY    traZODone (DESYREL) tablet 50 mg  50 mg Oral QHS PRN    pantoprazole (PROTONIX) tablet 40 mg  40 mg Oral ACB     Review of Systems:Denies chest pain, shortness of breath, cough, headache, abdominal pain, dysurea, calf pain. Pertinent positives are as noted in the medical records and unremarkable otherwise. Visit Vitals    /83 (BP 1 Location: Left arm)    Pulse 79    Temp 97.8 °F (36.6 °C)    Resp 16    SpO2 97%    Breastfeeding No        Physical Exam:   General: Alert and age appropriately oriented.   No acute cardio respiratory distress. HEENT: Normocephalic,no scleral icterus  Oral mucosa moist without cyanosis; right lower facial weakness   Lungs: Clear to auscultation  bilaterally. Respiration even and unlabored   Heart: Regular rate and rhythm, S1, S2   No  murmurs, clicks, rub or gallops   Abdomen: Soft, non-tender, nondistended. Bowel sounds present. Genitourinary: deferred   Neuromuscular:      EOMI, right facial weakness, tongue midline  L side WFL  RUE grossly 2+ to 3 prox ,  trace; flexion synergy pattern/tone RUE  RLE hip flexion 2+, KE 3 , DF 0, PF 2   Skin/extremity: No rashes, no erythema. No calf tenderness BLE  No edema.                                                                             Functional Assessment:  Gross Assessment  AROM: Generally decreased, functional (08/18/17 1500)  PROM: Grossly decreased, non-functional (08/18/17 1500)  Strength:  (LLE WFL, Right hip -3/5 to 2/5, knee ext -3/5,flex -2/5) (08/16/17 1000)  Coordination:  (LLE WFL,RLE impaired) (08/16/17 1000)  Tone: Abnormal (08/18/17 1500)  Sensation: Impaired (08/18/17 1500)       Balance  Sitting - Static: Good (unsupported) (08/18/17 1500)  Sitting - Dynamic: Fair (occasional) (08/18/17 1500)  Standing - Static: Fair;Constant support (08/18/17 1500)  Standing - Dynamic : Impaired (08/18/17 1500)           Toileting  Cues: Physical assistance for pants down;Physical assistance for pants up (08/17/17 1226)         Crockett Sill Fall Risk Assessment:  Crockett Sill Fall Risk  Mobility: Ambulates or transfers with assist devices or assistance/unsteady gait (08/18/17 2051)  Mobility Interventions: Patient to call before getting OOB (08/18/17 2051)  Mentation: Alert, oriented x 3 (08/18/17 2051)  Medication: Patient receiving anticonvulsants, sedatives(tranquilizers), psychotropics or hypnotics, hypoglycemics, narcotics, sleep aids, antihypertensives, laxatives, or diuretics (08/18/17 2051)  Medication Interventions: Patient to call before getting OOB (08/18/17 2051)  Elimination: Needs assistance with toileting (08/18/17 2051)  Elimination Interventions: Call light in reach (08/18/17 2051)  Prior Fall History: No (08/18/17 2051)  Total Score: 3 (08/18/17 2051)  Standard Fall Precautions: Yes (08/18/17 2051)  High Fall Risk: Yes (08/18/17 0840)     Speech Assessment:         Ambulation:  Gait  Distance (ft): 30 Feet (ft) (30' x 2 trying AFOs) (08/18/17 1500)  Assistive Device: Walker bobby;Orthotic device (08/18/17 1500)     Labs/Studies:  Recent Results (from the past 72 hour(s))   MAGNESIUM    Collection Time: 08/16/17  6:35 AM   Result Value Ref Range    Magnesium 2.0 1.8 - 2.4 mg/dL   METABOLIC PANEL, BASIC    Collection Time: 08/16/17  6:35 AM   Result Value Ref Range    Sodium 139 136 - 145 mmol/L    Potassium 3.9 3.5 - 5.1 mmol/L    Chloride 105 98 - 107 mmol/L    CO2 27 21 - 32 mmol/L    Anion gap 7 7 - 16 mmol/L    Glucose 87 65 - 100 mg/dL    BUN 12 6 - 23 MG/DL    Creatinine 0.64 0.6 - 1.0 MG/DL    GFR est AA >60 >60 ml/min/1.73m2    GFR est non-AA >60 >60 ml/min/1.73m2    Calcium 8.8 8.3 - 10.4 MG/DL       Assessment:     Problem List as of 8/18/2017  Date Reviewed: 1/26/2017          Codes Class Noted - Resolved    Stroke (cerebrum) ([facility] ICD-10-CM: I63.9  ICD-9-CM: 434.91  8/8/2017 - Present        Left sided lacunar stroke [facility]) ICD-10-CM: I63.9  ICD-9-CM: 434.91  8/4/2017 - Present    Overview Signed 8/6/2017  7:44 PM by Wiliam Wiley MD     8/2017             Weakness of right side of body ICD-10-CM: R53.1  ICD-9-CM: 728.87  8/3/2017 - Present        Essential hypertension ICD-10-CM: I10  ICD-9-CM: 401.9  10/5/2016 - Present        Obesity (BMI 30-39. 9) ICD-10-CM: E66.9  ICD-9-CM: 278.00  Unknown - Present        Prediabetes ICD-10-CM: R73.03  ICD-9-CM: 790.29  7/28/2015 - Present        Generalized anxiety disorder ICD-10-CM: F41.1  ICD-9-CM: 300.02  Unknown - Present        Insomnia, unspecified ICD-10-CM: G47.00  ICD-9-CM: 780.52  Unknown - Present        Mitral valve prolapse ICD-10-CM: I34.1  ICD-9-CM: 424.0  Unknown - Present        Iron deficiency anemia ICD-10-CM: D50.9  ICD-9-CM: 280.9  8/23/2013 - Present    Overview Signed 10/5/2016  2:49 PM by Doris Newell MD     S/p iron infusion x 2             RESOLVED: GERD (gastroesophageal reflux disease) ICD-10-CM: K21.9  ICD-9-CM: 530.81  Unknown - 10/5/2016        RESOLVED: Mixed hyperlipidemia ICD-10-CM: E78.2  ICD-9-CM: 272.2  Unknown - 10/5/2016        RESOLVED: Hypertension ICD-10-CM: I10  ICD-9-CM: 401.9  Unknown - 10/5/2016              Plan:      This is an unfortunate 35yo WF with obesity, RADHA, and htn admitted with right hemiplegia due to Left hemispheric stroke with evolution; etiology of stroke not clear      CVA- continue medical management for CVA  -  Continue antiplatelet therapy; aspirin 325mg daily  - continued on statin; Lipitor ,     Suspect RADHA - can use CPAP while here but will need outpt formal sleep study and Pulmonary follow up      Obesity; have discussed importance of wt loss for mobility and overall health      Hypertension - BP fairly controlled. - No adjustments. - Cont Diovan at current dose.        LUCHO; gets IV infusions; Anemia/ microcytic, on fe supplement. - patient continue fe supplements po. Hypokalemia-   - K 3.4 on 8/10, cont KCL 40meq daily  - 8/11 K 3.3 -> increased KCL to  40 bid   - K- 3.8 on 8/14 -> 3.9(8/16) continue Kcl po. - Resolved. Will check as needed. - check levels monday. If ok reduce or will discontinue      bowel program - add prn bowel program      GERD - start PPI. At times may need additional antacids, Maalox prn.      Depression; cont Lexapro; home med due to generalized anxiety due to stressors in life. His risk post stroke depression. Order recreational therapy and psych if needed; doing much better and participating well 8/11      Dysphagia; mild. Cont ST for this as well as for dysarthria.  Continuing joel-motor exercises. - Mechanical consistency diet, cont aspiration  precautions        Pneumonia prophylaxis- Incentive spirometer every hour while awake      DVT risk / DVT Prophylaxis- Will require daily physician exam to assess for signs and symptoms as patient is at increased risk for of thromboembolism. Mobilization as tolerated. Intermittent pneumatic compression devices when in bed Thigh-high or knee-high thromboembolic deterrent hose when out of bed. Lovenox subq daily.       Pain Control: stable, mild-to-moderate joint symptoms intermittently, reasonably well controlled by PRN meds. Will require regular pain assessment and comprenhensive pain management. - Cont prn tylenol      Chronic insomnia; on Ambien at home. Have not restarted this. - on prn trazadone.       Wound Care: no current evidence of skin breakdown. Will require 24/7 rehab nursing. turning schedule q 2 hrs while in bed      Time spent was 25 minutes with over 1/2 in direct patient care/examination, consultation and coordination of care.      Signed By: Naomi Conn MD     August 18, 2017

## 2017-08-19 NOTE — PROGRESS NOTES
SFD PROGRESS NOTE    Zoraida Boykin  Admit Date: 8/8/2017  Admit Diagnosis: Stroke - Right Side Body Involvment;Stroke (cerebrum) ([address]    Subjective     Patient seen and examined. No new neurologic setbacks noted. No report of neurologic changes. Po intake adequate. + BM. Requires CGA assist for gait, using a bobby walker, gait belt. Significant improvement this week. Objective:     Current Facility-Administered Medications   Medication Dose Route Frequency    potassium chloride (K-DUR, KLOR-CON) SR tablet 40 mEq  40 mEq Oral BID    ferrous sulfate tablet 325 mg  1 Tab Oral BID WITH MEALS    aspirin (ASPIRIN) tablet 325 mg  325 mg Oral DAILY    enoxaparin (LOVENOX) injection 40 mg  40 mg SubCUTAneous Q24H    acetaminophen (TYLENOL) tablet 650 mg  650 mg Oral Q4H PRN    alum-mag hydroxide-simeth (MYLANTA) oral suspension 30 mL  30 mL Oral Q4H PRN    atorvastatin (LIPITOR) tablet 40 mg  40 mg Oral DAILY    bisacodyl (DULCOLAX) suppository 10 mg  10 mg Rectal DAILY PRN    escitalopram oxalate (LEXAPRO) tablet 20 mg  20 mg Oral DAILY    LORazepam (ATIVAN) tablet 1 mg  1 mg Oral Q6H PRN    magnesium hydroxide (MILK OF MAGNESIA) 400 mg/5 mL oral suspension 30 mL  30 mL Oral DAILY PRN    ondansetron (ZOFRAN ODT) tablet 4 mg  4 mg Oral Q6H PRN    senna-docusate (PERICOLACE) 8.6-50 mg per tablet 2 Tab  2 Tab Oral DAILY PRN    valsartan/hydroCHLOROthiazide (DIOVAN HCT) 160/25 mg   Oral DAILY    traZODone (DESYREL) tablet 50 mg  50 mg Oral QHS PRN    pantoprazole (PROTONIX) tablet 40 mg  40 mg Oral ACB     Review of Systems:Denies chest pain, shortness of breath, cough, headache, abdominal pain, dysurea, calf pain. Pertinent positives are as noted in the medical records and unremarkable otherwise. Visit Vitals    /66    Pulse 81    Temp 98 °F (36.7 °C)    Resp 18    SpO2 98%    Breastfeeding No        Physical Exam:   General: Alert and age appropriately oriented.   No acute cardio respiratory distress. HEENT: Normocephalic,no scleral icterus  Oral mucosa moist without cyanosis; right lower facial weakness   Lungs: Clear to auscultation  bilaterally. Respiration even and unlabored   Heart: Regular rate and rhythm, S1, S2   No  murmurs, clicks, rub or gallops   Abdomen: Soft, non-tender, nondistended. Bowel sounds present. Genitourinary: deferred   Neuromuscular:      EOMI, right facial weakness, tongue midline  L side WFL  RUE grossly 2+ to 3 prox ,  trace; flexion synergy pattern/tone RUE  RLE hip flexion 2+, KE 3 , DF 0, PF 2   Skin/extremity: No rashes, no erythema. No calf tenderness BLE  No edema.                                                                             Functional Assessment:  Gross Assessment  AROM: Generally decreased, functional (08/18/17 1500)  PROM: Grossly decreased, non-functional (08/18/17 1500)  Strength:  (LLE WFL, Right hip -3/5 to 2/5, knee ext -3/5,flex -2/5) (08/16/17 1000)  Coordination:  (LLE WFL,RLE impaired) (08/16/17 1000)  Tone: Abnormal (08/18/17 1500)  Sensation: Impaired (08/18/17 1500)       Balance  Sitting - Static: Good (unsupported) (08/18/17 1500)  Sitting - Dynamic: Fair (occasional) (08/18/17 1500)  Standing - Static: Fair;Constant support (08/18/17 1500)  Standing - Dynamic : Impaired (08/18/17 1500)           Toileting  Cues: Physical assistance for pants down;Physical assistance for pants up (08/17/17 1226)         Crystal Gallardo Fall Risk Assessment:  Crystal Gallardo Fall Risk  Mobility: Ambulates or transfers with assist devices or assistance/unsteady gait (08/19/17 0726)  Mobility Interventions: Patient to call before getting OOB (08/19/17 0726)  Mentation: Alert, oriented x 3 (08/19/17 0726)  Medication: Patient receiving anticonvulsants, sedatives(tranquilizers), psychotropics or hypnotics, hypoglycemics, narcotics, sleep aids, antihypertensives, laxatives, or diuretics (08/19/17 0726)  Medication Interventions: Patient to call before getting OOB (08/19/17 6214)  Elimination: Needs assistance with toileting (08/19/17 0726)  Elimination Interventions: Call light in reach; Patient to call for help with toileting needs; Toileting schedule/hourly rounds (08/19/17 0726)  Prior Fall History: No (08/19/17 0726)  Total Score: 3 (08/19/17 0726)  Standard Fall Precautions: Yes (08/19/17 0726)  High Fall Risk: Yes (08/18/17 0840)     Speech Assessment:         Ambulation:  Gait  Distance (ft): 30 Feet (ft) (30' x 2 trying AFOs) (08/18/17 1500)  Assistive Device: Walker bobby;Orthotic device (08/18/17 1500)     Labs/Studies:  No results found for this or any previous visit (from the past 67 hour(s)). Assessment:     Problem List as of 8/19/2017  Date Reviewed: 1/26/2017          Codes Class Noted - Resolved    Stroke (cerebrum) ([facility] ICD-10-CM: I63.9  ICD-9-CM: 434.91  8/8/2017 - Present        Left sided lacunar stroke [facility]) ICD-10-CM: I63.9  ICD-9-CM: 434.91  8/4/2017 - Present    Overview Signed 8/6/2017  7:44 PM by Arlen Cruz MD     8/2017             Weakness of right side of body ICD-10-CM: R53.1  ICD-9-CM: 728.87  8/3/2017 - Present        Essential hypertension ICD-10-CM: I10  ICD-9-CM: 401.9  10/5/2016 - Present        Obesity (BMI 30-39. 9) ICD-10-CM: E66.9  ICD-9-CM: 278.00  Unknown - Present        Prediabetes ICD-10-CM: R73.03  ICD-9-CM: 790.29  7/28/2015 - Present        Generalized anxiety disorder ICD-10-CM: F41.1  ICD-9-CM: 300.02  Unknown - Present        Insomnia, unspecified ICD-10-CM: G47.00  ICD-9-CM: 780.52  Unknown - Present        Mitral valve prolapse ICD-10-CM: I34.1  ICD-9-CM: 424.0  Unknown - Present        Iron deficiency anemia ICD-10-CM: D50.9  ICD-9-CM: 280.9  8/23/2013 - Present    Overview Signed 10/5/2016  2:49 PM by Arlen Cruz MD     S/p iron infusion x 2             RESOLVED: GERD (gastroesophageal reflux disease) ICD-10-CM: K21.9  ICD-9-CM: 530.81  Unknown - 10/5/2016        RESOLVED: Mixed hyperlipidemia ICD-10-CM: E78.2  ICD-9-CM: 272.2  Unknown - 10/5/2016        RESOLVED: Hypertension ICD-10-CM: I10  ICD-9-CM: 401.9  Unknown - 10/5/2016              Plan:      This is an unfortunate 37yo WF with obesity, RADHA, and htn admitted with right hemiplegia due to Left hemispheric stroke with evolution; etiology of stroke not clear      CVA- continue medical management for CVA  -  Continue antiplatelet therapy; aspirin 325mg daily  - continued on statin; Lipitor ,     Suspect RADHA - can use CPAP while here but will need outpt formal sleep study and Pulmonary follow up      Obesity; have discussed importance of wt loss for mobility and overall health      Hypertension - BP fairly controlled. - No adjustments. - Cont Diovan at current dose.        LUCHO; gets IV infusions; Anemia/ microcytic, on fe supplement. - patient continue fe supplements po. Hypokalemia-   - K 3.4 on 8/10, cont KCL 40meq daily  - 8/11 K 3.3 -> increased KCL to  40 bid   - K- 3.8 on 8/14 -> 3.9(8/16) continue Kcl po.       bowel program - add prn bowel program      GERD - start PPI. At times may need additional antacids, Maalox prn.      Depression; cont Lexapro; home med due to generalized anxiety due to stressors in life. His risk post stroke depression. Order recreational therapy and psych if needed; doing much better and participating well 8/11      Dysphagia; mild. Cont ST for this as well as for dysarthria. Continuing joel-motor exercises. - Mechanical consistency diet, cont aspiration  precautions        Pneumonia prophylaxis- Incentive spirometer every hour while awake      DVT risk / DVT Prophylaxis- Will require daily physician exam to assess for signs and symptoms as patient is at increased risk for of thromboembolism. Mobilization as tolerated. Intermittent pneumatic compression devices when in bed Thigh-high or knee-high thromboembolic deterrent hose when out of bed.  Lovenox subq daily.       Pain Control: stable, mild-to-moderate joint symptoms intermittently, reasonably well controlled by PRN meds. Will require regular pain assessment and comprenhensive pain management. - Cont prn tylenol      Chronic insomnia; on Ambien at home. Have not restarted this. - on prn trazadone.       Wound Care: no current evidence of skin breakdown. Will require 24/7 rehab nursing. turning schedule q 2 hrs while in bed      Time spent was 25 minutes with over 1/2 in direct patient care/examination, consultation and coordination of care.      Signed By: Dilan Ferrer MD     August 19, 2017

## 2017-08-20 PROCEDURE — 97150 GROUP THERAPEUTIC PROCEDURES: CPT

## 2017-08-20 PROCEDURE — 77010033678 HC OXYGEN DAILY

## 2017-08-20 PROCEDURE — 65310000000 HC RM PRIVATE REHAB

## 2017-08-20 PROCEDURE — 74011250637 HC RX REV CODE- 250/637: Performed by: PHYSICAL MEDICINE & REHABILITATION

## 2017-08-20 PROCEDURE — 74011250636 HC RX REV CODE- 250/636: Performed by: PHYSICAL MEDICINE & REHABILITATION

## 2017-08-20 PROCEDURE — 94660 CPAP INITIATION&MGMT: CPT

## 2017-08-20 RX ORDER — POTASSIUM CHLORIDE 20 MEQ/1
40 TABLET, EXTENDED RELEASE ORAL DAILY
Status: DISCONTINUED | OUTPATIENT
Start: 2017-08-21 | End: 2017-09-01

## 2017-08-20 RX ADMIN — POTASSIUM CHLORIDE 40 MEQ: 20 TABLET, EXTENDED RELEASE ORAL at 08:10

## 2017-08-20 RX ADMIN — PANTOPRAZOLE SODIUM 40 MG: 40 TABLET, DELAYED RELEASE ORAL at 05:01

## 2017-08-20 RX ADMIN — ATORVASTATIN CALCIUM 40 MG: 40 TABLET, FILM COATED ORAL at 08:10

## 2017-08-20 RX ADMIN — FERROUS SULFATE TAB 325 MG (65 MG ELEMENTAL FE) 325 MG: 325 (65 FE) TAB at 08:10

## 2017-08-20 RX ADMIN — ESCITALOPRAM OXALATE 20 MG: 10 TABLET ORAL at 08:10

## 2017-08-20 RX ADMIN — ENOXAPARIN SODIUM 40 MG: 40 INJECTION SUBCUTANEOUS at 21:10

## 2017-08-20 RX ADMIN — HYDROCHLOROTHIAZIDE: 25 TABLET ORAL at 08:10

## 2017-08-20 RX ADMIN — ASPIRIN 325 MG ORAL TABLET 325 MG: 325 PILL ORAL at 08:10

## 2017-08-20 RX ADMIN — FERROUS SULFATE TAB 325 MG (65 MG ELEMENTAL FE) 325 MG: 325 (65 FE) TAB at 16:21

## 2017-08-20 NOTE — PROGRESS NOTES
Up to the [facility]. Respirations even and unlabored. No acute distress noted.

## 2017-08-20 NOTE — PROGRESS NOTES
PHYSICAL THERAPY DAILY NOTE  Time In: 2122  Time Out: 1201  Patient Seen For: AM;Transfer training;Gait training; Therapeutic exercise; Other (see progress notes)    Subjective: Patient had no complaints. Objective: Other (comment) (Fall precautions)  GROSS ASSESSMENT Daily Assessment            BED/MAT MOBILITY Daily Assessment    Supine to Sit : 4 (Minimal assistance)  Sit to Supine : 4 (Minimal assistance)       TRANSFERS Daily Assessment    Transfer Type: SPT without device  Transfer Assistance : 4 (Contact guard assistance)  Sit to Stand Assistance: Contact guard assistance       GAIT Daily Assessment    Amount of Assistance: 4 (Contact guard assistance)  Distance (ft): 100 Feet (ft)  Assistive Device: Gait belt;Walker bobby;Brace/Splint (1/4 inch heel lift and ace wraps to right foot)       STEPS or STAIRS Daily Assessment    Level of Assist : 0 (Not tested)       BALANCE Daily Assessment            WHEELCHAIR MOBILITY Daily Assessment            LOWER EXTREMITY EXERCISES Daily Assessment    Extremity: Both  Exercise Type #1: Other (comment) (motomed x 10 minutes on gear 3)  Sets Performed: 1  Reps Performed: 10  Level of Assist: Supervision  Exercise Type #2: Supine lower extremity strengthening; Other (comment) (sidelying hip flexion/extesion exs on skate board)  Sets Performed: 1  Reps Performed: 10  Level of Assist: Minimal assistance          Assessment: Patient increasing balance and strength. Plan of Care: Continue with plan of care to reach PT goals. Returned to room with call bell at reach. Lunch set up.     Joya Downing, PTA  8/20/2017

## 2017-08-20 NOTE — PROGRESS NOTES
SFD PROGRESS NOTE    Zoraida Boykin  Admit Date: 8/8/2017  Admit Diagnosis: Stroke - Right Side Body Involvment;Stroke (cerebrum) ([address]    Subjective     Patient seen and examined. Vss. Afebrile. Mood good. No acute complaints. Tolerated therapy well today, Sunday. Requires CGA assist for gait, using a bobby walker, gait belt and 1/4 inch heel lift and ace wraps to right foot. Objective:     Current Facility-Administered Medications   Medication Dose Route Frequency    [START ON 8/21/2017] potassium chloride (K-DUR, KLOR-CON) SR tablet 40 mEq  40 mEq Oral DAILY    ferrous sulfate tablet 325 mg  1 Tab Oral BID WITH MEALS    aspirin (ASPIRIN) tablet 325 mg  325 mg Oral DAILY    enoxaparin (LOVENOX) injection 40 mg  40 mg SubCUTAneous Q24H    acetaminophen (TYLENOL) tablet 650 mg  650 mg Oral Q4H PRN    alum-mag hydroxide-simeth (MYLANTA) oral suspension 30 mL  30 mL Oral Q4H PRN    atorvastatin (LIPITOR) tablet 40 mg  40 mg Oral DAILY    bisacodyl (DULCOLAX) suppository 10 mg  10 mg Rectal DAILY PRN    escitalopram oxalate (LEXAPRO) tablet 20 mg  20 mg Oral DAILY    LORazepam (ATIVAN) tablet 1 mg  1 mg Oral Q6H PRN    magnesium hydroxide (MILK OF MAGNESIA) 400 mg/5 mL oral suspension 30 mL  30 mL Oral DAILY PRN    ondansetron (ZOFRAN ODT) tablet 4 mg  4 mg Oral Q6H PRN    senna-docusate (PERICOLACE) 8.6-50 mg per tablet 2 Tab  2 Tab Oral DAILY PRN    valsartan/hydroCHLOROthiazide (DIOVAN HCT) 160/25 mg   Oral DAILY    traZODone (DESYREL) tablet 50 mg  50 mg Oral QHS PRN    pantoprazole (PROTONIX) tablet 40 mg  40 mg Oral ACB     Review of Systems:Denies chest pain, shortness of breath, cough, headache, abdominal pain, dysurea, calf pain. Pertinent positives are as noted in the medical records and unremarkable otherwise.      Visit Vitals    /76 (BP 1 Location: Left arm, BP Patient Position: At rest)    Pulse 88    Temp 97.5 °F (36.4 °C)    Resp 16    SpO2 99%    Breastfeeding No        Physical Exam:   General: Alert and age appropriately oriented. No acute cardio respiratory distress. HEENT: Normocephalic,no scleral icterus  Oral mucosa moist without cyanosis; right lower facial weakness   Lungs: Clear to auscultation  bilaterally. Respiration even and unlabored   Heart: Regular rate and rhythm, S1, S2   No  murmurs, clicks, rub or gallops   Abdomen: Soft, non-tender, nondistended. Bowel sounds present. Genitourinary: deferred   Neuromuscular:      EOMI, right facial weakness, tongue midline  L side WFL  RUE grossly 2+ to 3 prox ,  trace; flexion synergy pattern/tone RUE  RLE hip flexion 2+, KE 3 , DF 0, PF 2   Skin/extremity: No rashes, no erythema. No calf tenderness BLE  No edema.                                                                             Functional Assessment:  Gross Assessment  AROM: Generally decreased, functional (08/18/17 1500)  PROM: Grossly decreased, non-functional (08/18/17 1500)  Strength:  (LLE WFL, Right hip -3/5 to 2/5, knee ext -3/5,flex -2/5) (08/16/17 1000)  Coordination:  (LLE WFL,RLE impaired) (08/16/17 1000)  Tone: Abnormal (08/18/17 1500)  Sensation: Impaired (08/18/17 1500)       Balance  Sitting - Static: Good (unsupported) (08/18/17 1500)  Sitting - Dynamic: Fair (occasional) (08/18/17 1500)  Standing - Static: Fair;Constant support (08/18/17 1500)  Standing - Dynamic : Impaired (08/18/17 1500)           Toileting  Cues: Physical assistance for pants down;Physical assistance for pants up (08/17/17 1226)         Nathaly Gallardo Fall Risk Assessment:  Nathaly Gallardo Fall Risk  Mobility: Ambulates or transfers with assist devices or assistance/unsteady gait (08/20/17 0721)  Mobility Interventions: Patient to call before getting OOB (08/20/17 0721)  Mentation: Alert, oriented x 3 (08/20/17 0721)  Medication: Patient receiving anticonvulsants, sedatives(tranquilizers), psychotropics or hypnotics, hypoglycemics, narcotics, sleep aids, antihypertensives, laxatives, or diuretics (08/20/17 7254)  Medication Interventions: Patient to call before getting OOB (08/20/17 6555)  Elimination: Needs assistance with toileting (08/20/17 0721)  Elimination Interventions: Call light in reach; Patient to call for help with toileting needs; Toileting schedule/hourly rounds (08/20/17 0721)  Prior Fall History: No (08/20/17 0721)  Total Score: 3 (08/20/17 0721)  Standard Fall Precautions: Yes (08/20/17 0721)  High Fall Risk: Yes (08/18/17 0840)     Speech Assessment:         Ambulation:  Gait  Distance (ft): 60 Feet (ft) (08/19/17 1412)  Assistive Device: Gait belt;Walker bobby;Brace/Splint (1/4 inch heel lift and ace wraps to assist DF and to knee for proprieception) (08/19/17 1412)     Labs/Studies:  No results found for this or any previous visit (from the past 72 hour(s)). Assessment:     Problem List as of 8/20/2017  Date Reviewed: 1/26/2017          Codes Class Noted - Resolved    Stroke (cerebrum) ([facility] [address] ICD-10-CM: I63.9  ICD-9-CM: 434.91  8/8/2017 - Present        Left sided lacunar stroke [facility]) ICD-10-CM: I63.9  ICD-9-CM: 434.91  8/4/2017 - Present    Overview Signed 8/6/2017  7:44 PM by Josue Kate MD     8/2017             Weakness of right side of body ICD-10-CM: R53.1  ICD-9-CM: 728.87  8/3/2017 - Present        Essential hypertension ICD-10-CM: I10  ICD-9-CM: 401.9  10/5/2016 - Present        Obesity (BMI 30-39. 9) ICD-10-CM: E66.9  ICD-9-CM: 278.00  Unknown - Present        Prediabetes ICD-10-CM: R73.03  ICD-9-CM: 790.29  7/28/2015 - Present        Generalized anxiety disorder ICD-10-CM: F41.1  ICD-9-CM: 300.02  Unknown - Present        Insomnia, unspecified ICD-10-CM: G47.00  ICD-9-CM: 780.52  Unknown - Present        Mitral valve prolapse ICD-10-CM: I34.1  ICD-9-CM: 424.0  Unknown - Present        Iron deficiency anemia ICD-10-CM: D50.9  ICD-9-CM: 280.9  8/23/2013 - Present    Overview Signed 10/5/2016  2:49 PM by Josue Kate MD     S/p iron infusion x 2 RESOLVED: GERD (gastroesophageal reflux disease) ICD-10-CM: K21.9  ICD-9-CM: 530.81  Unknown - 10/5/2016        RESOLVED: Mixed hyperlipidemia ICD-10-CM: E78.2  ICD-9-CM: 272.2  Unknown - 10/5/2016        RESOLVED: Hypertension ICD-10-CM: I10  ICD-9-CM: 401.9  Unknown - 10/5/2016              Plan:      This is an unfortunate 35yo WF with obesity, RADHA, and htn admitted with right hemiplegia due to Left hemispheric stroke with evolution; etiology of stroke not clear      CVA- continue medical management for CVA  -  Continue antiplatelet therapy; aspirin 325mg daily  - continued on statin; Lipitor ,     Suspect RADHA - can use CPAP while here but will need outpt formal sleep study and Pulmonary follow up. - Continue CPAP, feels she benefits significantly. Feels less tired.       Obesity; have discussed importance of wt loss for mobility and overall health      Hypertension - BP fairly controlled; -130s. - No adjustments. - Cont Diovan at current dose. LUCHO; gets IV infusions; Anemia/ microcytic, on fe supplement. - patient continue fe supplements po. Hypokalemia-   - K 3.4 on 8/10, cont KCL 40meq daily  - 8/11 K 3.3 -> increased KCL to  40 bid   - K- 3.8 on 8/14 -> 3.9(8/16) . Decreased KCl 40mg daily. Check Moday.         bowel program - add prn bowel program      GERD - start PPI. At times may need additional antacids, Maalox prn.      Depression; cont Lexapro; home med due to generalized anxiety due to stressors in life. His risk post stroke depression. Order recreational therapy and psych if needed; doing much better and participating well 8/11      Dysphagia; mild. Cont ST for this as well as for dysarthria. Continuing joel-motor exercises.    - Mechanical consistency diet, cont aspiration  precautions        Pneumonia prophylaxis- Incentive spirometer every hour while awake      DVT risk / DVT Prophylaxis- Will require daily physician exam to assess for signs and symptoms as patient is at increased risk for of thromboembolism. Mobilization as tolerated. Intermittent pneumatic compression devices when in bed Thigh-high or knee-high thromboembolic deterrent hose when out of bed. - Lovenox subq daily. - she has no signs of DVT.     Pain Control: stable, mild-to-moderate joint symptoms intermittently, reasonably well controlled by PRN meds. Will require regular pain assessment and comprenhensive pain management. - Cont prn tylenol      Chronic insomnia; on Ambien at home. Have not restarted this. - on prn trazadone.       Wound Care: no current evidence of skin breakdown.    - has been on 24/7 rehab nursing. turning schedule q 2 hrs while in bed. This can be monitored. appears pt turning well.       Time spent was 25 minutes with over 1/2 in direct patient care/examination, consultation and coordination of care.      Signed By: Marija Cotto MD     August 20, 2017

## 2017-08-20 NOTE — PROGRESS NOTES
Problem: Falls - Risk of  Goal: *Absence of Falls  Document Rashmi Fall Risk and appropriate interventions in the flowsheet.    Fall Risk Interventions:  Mobility Interventions: Patient to call before getting OOB           Medication Interventions: Patient to call before getting OOB     Elimination Interventions: Call light in reach

## 2017-08-21 LAB
ANION GAP SERPL CALC-SCNC: 11 MMOL/L (ref 7–16)
BUN SERPL-MCNC: 9 MG/DL (ref 6–23)
CALCIUM SERPL-MCNC: 9 MG/DL (ref 8.3–10.4)
CHLORIDE SERPL-SCNC: 103 MMOL/L (ref 98–107)
CO2 SERPL-SCNC: 26 MMOL/L (ref 21–32)
CREAT SERPL-MCNC: 0.61 MG/DL (ref 0.6–1)
GLUCOSE SERPL-MCNC: 90 MG/DL (ref 65–100)
POTASSIUM SERPL-SCNC: 3.6 MMOL/L (ref 3.5–5.1)
SODIUM SERPL-SCNC: 140 MMOL/L (ref 136–145)

## 2017-08-21 PROCEDURE — 74011250637 HC RX REV CODE- 250/637: Performed by: PHYSICAL MEDICINE & REHABILITATION

## 2017-08-21 PROCEDURE — 65310000000 HC RM PRIVATE REHAB

## 2017-08-21 PROCEDURE — 97112 NEUROMUSCULAR REEDUCATION: CPT

## 2017-08-21 PROCEDURE — 97032 APPL MODALITY 1+ESTIM EA 15: CPT

## 2017-08-21 PROCEDURE — 36415 COLL VENOUS BLD VENIPUNCTURE: CPT | Performed by: PHYSICAL MEDICINE & REHABILITATION

## 2017-08-21 PROCEDURE — 97530 THERAPEUTIC ACTIVITIES: CPT

## 2017-08-21 PROCEDURE — 74011250636 HC RX REV CODE- 250/636: Performed by: PHYSICAL MEDICINE & REHABILITATION

## 2017-08-21 PROCEDURE — 99232 SBSQ HOSP IP/OBS MODERATE 35: CPT | Performed by: PHYSICAL MEDICINE & REHABILITATION

## 2017-08-21 PROCEDURE — 97535 SELF CARE MNGMENT TRAINING: CPT

## 2017-08-21 PROCEDURE — 97110 THERAPEUTIC EXERCISES: CPT

## 2017-08-21 PROCEDURE — 80048 BASIC METABOLIC PNL TOTAL CA: CPT | Performed by: PHYSICAL MEDICINE & REHABILITATION

## 2017-08-21 PROCEDURE — 97116 GAIT TRAINING THERAPY: CPT

## 2017-08-21 PROCEDURE — 92526 ORAL FUNCTION THERAPY: CPT

## 2017-08-21 RX ADMIN — PANTOPRAZOLE SODIUM 40 MG: 40 TABLET, DELAYED RELEASE ORAL at 05:52

## 2017-08-21 RX ADMIN — ASPIRIN 325 MG ORAL TABLET 325 MG: 325 PILL ORAL at 08:16

## 2017-08-21 RX ADMIN — ENOXAPARIN SODIUM 40 MG: 40 INJECTION SUBCUTANEOUS at 20:37

## 2017-08-21 RX ADMIN — FERROUS SULFATE TAB 325 MG (65 MG ELEMENTAL FE) 325 MG: 325 (65 FE) TAB at 08:16

## 2017-08-21 RX ADMIN — ATORVASTATIN CALCIUM 40 MG: 40 TABLET, FILM COATED ORAL at 08:16

## 2017-08-21 RX ADMIN — HYDROCHLOROTHIAZIDE: 25 TABLET ORAL at 08:16

## 2017-08-21 RX ADMIN — ESCITALOPRAM OXALATE 20 MG: 10 TABLET ORAL at 08:17

## 2017-08-21 RX ADMIN — POTASSIUM CHLORIDE 40 MEQ: 20 TABLET, EXTENDED RELEASE ORAL at 08:16

## 2017-08-21 RX ADMIN — FERROUS SULFATE TAB 325 MG (65 MG ELEMENTAL FE) 325 MG: 325 (65 FE) TAB at 17:26

## 2017-08-21 NOTE — PROGRESS NOTES
08/21/17 1025   Time Spent With Patient   Time In 0916   Time Out 1004   Patient Seen For: AM;ADLs   Grooming   Grooming Assistance  I   Comments Seated at sink   Upper Body Bathing   Bathing Assistance, Upper Mod I   Position Performed Seated in chair   Adaptive Equipment Tub bench   Lower Body Bathing   Bathing Assistance, Lower  CGA   Adaptive Equipment Grab bar;Long handled sponge   Position Performed Seated in chair;Standing   Adaptive Equipment Tub bench   Comments CGA standing this session to bathe bottom   Upper Body Dressing    Dressing Assistance  Min A   Comments to help bra down trunk   Lower Body Dressing    Dressing Assistance  Min A   Leg Crossed Method Used No   Position Performed Seated in chair;Standing   Adaptive Equipment Used Reacher;Long handled shoe horn;Grab bar   Don/Doff Anti-Embolic Stockings NA   Comments Minimal assist for standing balance, to help underwear over R hip, start R foot into tennis shoe, and support RLE to don R sock using LUE   Functional Transfers   Tub or Shower Type Shower   Amount of Assistance Required Min A   Adaptive Equipment Walker (comment); Wheelchair;Tub transfer bench       S: \"I'm feeling so much clearer in the head, even more today. \" Agreeable to therapy. Focus of session was on ADL retraining and functional mobility. Patient was able to transfer bed to wheelchair and wheelchair <> TTB SPT using bobby walker with minimal assist, towards L and R.  Pain not indicated. Collaborated with PT, Maryjo Osler and confirmed patient is on track to reach goals as documented in the care plan. Note new discharge disposition (to mother's home) decided over weekend. Patient tolerated session well, but RUE strength/coordination, balance, functional mobility, activity tolerance are still below baseline and require skilled facilitation to successfully and safely complete ADL's and transfers. Patient ended session in wheelchair with PT, Maryjo Osler.      Jordin Read, OTR/L

## 2017-08-21 NOTE — PROGRESS NOTES
Zuri Lindquist MD,   Medical Director  [address] [facility], [address] [facility]  Tel: [phone]       SFD PROGRESS NOTE    Stephen Blind  Admit Date: 8/8/2017  Admit Diagnosis: Stroke - Right Side Body Involvment;Stroke (cerebrum) ([facility] [address]    Subjective     Patient seen and examined. Vss. No acute complaints. PT, OT well tolerated. Steady gains made. No new barrier to progress noted. Feels less \"foggy\". Appetite better. Feeling less discouraged. Discussed progress made while I was gone last week    Objective:     Current Facility-Administered Medications   Medication Dose Route Frequency    potassium chloride (K-DUR, KLOR-CON) SR tablet 40 mEq  40 mEq Oral DAILY    ferrous sulfate tablet 325 mg  1 Tab Oral BID WITH MEALS    aspirin (ASPIRIN) tablet 325 mg  325 mg Oral DAILY    enoxaparin (LOVENOX) injection 40 mg  40 mg SubCUTAneous Q24H    acetaminophen (TYLENOL) tablet 650 mg  650 mg Oral Q4H PRN    alum-mag hydroxide-simeth (MYLANTA) oral suspension 30 mL  30 mL Oral Q4H PRN    atorvastatin (LIPITOR) tablet 40 mg  40 mg Oral DAILY    bisacodyl (DULCOLAX) suppository 10 mg  10 mg Rectal DAILY PRN    escitalopram oxalate (LEXAPRO) tablet 20 mg  20 mg Oral DAILY    LORazepam (ATIVAN) tablet 1 mg  1 mg Oral Q6H PRN    magnesium hydroxide (MILK OF MAGNESIA) 400 mg/5 mL oral suspension 30 mL  30 mL Oral DAILY PRN    ondansetron (ZOFRAN ODT) tablet 4 mg  4 mg Oral Q6H PRN    senna-docusate (PERICOLACE) 8.6-50 mg per tablet 2 Tab  2 Tab Oral DAILY PRN    valsartan/hydroCHLOROthiazide (DIOVAN HCT) 160/25 mg   Oral DAILY    traZODone (DESYREL) tablet 50 mg  50 mg Oral QHS PRN    pantoprazole (PROTONIX) tablet 40 mg  40 mg Oral ACB     Review of Systems:Denies chest pain, shortness of breath, cough, headache, visual problems, abdominal pain, dysurea, calf pain. Pertinent positives are as noted in the medical records and unremarkable otherwise.      Visit Vitals    /73 (BP 1 Location: Left arm, BP Patient Position: At rest)    Pulse 85    Temp 97.7 °F (36.5 °C)    Resp 16    SpO2 97%    Breastfeeding No        Physical Exam:   General: Alert and age appropriately oriented. No acute cardio respiratory distress. HEENT: Normocephalic,no scleral icterus  Oral mucosa moist without cyanosis   Lungs: Clear to auscultation  bilaterally. Respiration even and unlabored   Heart: Regular rate and rhythm, S1, S2   No  murmurs, clicks, rub or gallops   Abdomen: Soft, non-tender, nondistended. Bowel sounds present. No organomegaly. Genitourinary: Benign . Neuromuscular:      L sided strength 5/5. RUE shoulder flexion 1, EF1, EE 1,  2-, finger ext 0/5  slt increase tone with PROM  RLE; hip flexion 3+, KE 3, DF 0, PF 1  Sensation intact. Right lower facial weakness    No rashes, no erythema.  No calf tenderness BLE  No edema                                                                            Functional Assessment:  Gross Assessment  AROM: Generally decreased, functional (08/18/17 1500)  PROM: Grossly decreased, non-functional (08/18/17 1500)  Strength:  (LLE WFL, Right hip -3/5 to 2/5, knee ext -3/5,flex -2/5) (08/16/17 1000)  Coordination:  (LLE WFL,RLE impaired) (08/16/17 1000)  Tone: Abnormal (08/18/17 1500)  Sensation: Impaired (08/18/17 1500)       Balance  Sitting - Static: Good (unsupported) (08/18/17 1500)  Sitting - Dynamic: Fair (occasional) (08/18/17 1500)  Standing - Static: Fair;Constant support (08/18/17 1500)  Standing - Dynamic : Impaired (08/18/17 1500)           Toileting  Cues: Physical assistance for pants down;Physical assistance for pants up (08/17/17 1226)         Gagandeep Wells Fall Risk Assessment:  Gagandeep Dueñasford Fall Risk  Mobility: Ambulates or transfers with assist devices or assistance/unsteady gait (08/20/17 1442)  Mobility Interventions: Patient to call before getting OOB (08/20/17 1442)  Mentation: Alert, oriented x 3 (08/20/17 1442)  Medication: Patient receiving anticonvulsants, sedatives(tranquilizers), psychotropics or hypnotics, hypoglycemics, narcotics, sleep aids, antihypertensives, laxatives, or diuretics (08/20/17 1442)  Medication Interventions: Patient to call before getting OOB (08/20/17 1442)  Elimination: Needs assistance with toileting (08/20/17 1442)  Elimination Interventions: Call light in reach (08/20/17 1442)  Prior Fall History: No (08/20/17 1442)  Total Score: 3 (08/20/17 1442)  Standard Fall Precautions: Yes (08/20/17 1442)  High Fall Risk: Yes (08/18/17 0840)     Speech Assessment:         Ambulation:  Gait  Distance (ft): 100 Feet (ft) (08/20/17 1333)  Assistive Device: Gait belt;Walker bobby;Brace/Splint (1/4 inch heel lift and ace wraps to right foot) (08/20/17 1333)     Labs/Studies:  Recent Results (from the past 72 hour(s))   METABOLIC PANEL, BASIC    Collection Time: 08/21/17  6:26 AM   Result Value Ref Range    Sodium 140 136 - 145 mmol/L    Potassium 3.6 3.5 - 5.1 mmol/L    Chloride 103 98 - 107 mmol/L    CO2 26 21 - 32 mmol/L    Anion gap 11 7 - 16 mmol/L    Glucose 90 65 - 100 mg/dL    BUN 9 6 - 23 MG/DL    Creatinine 0.61 0.6 - 1.0 MG/DL    GFR est AA >60 >60 ml/min/1.73m2    GFR est non-AA >60 >60 ml/min/1.73m2    Calcium 9.0 8.3 - 10.4 MG/DL       Assessment:     Problem List as of 8/21/2017  Date Reviewed: 1/26/2017          Codes Class Noted - Resolved    Stroke (cerebrum) ([facility] [address] ICD-10-CM: I63.9  ICD-9-CM: 434.91  8/8/2017 - Present        Left sided lacunar stroke [facility]) ICD-10-CM: I63.9  ICD-9-CM: 434.91  8/4/2017 - Present    Overview Signed 8/6/2017  7:44 PM by Kate Nance MD     8/2017             Weakness of right side of body ICD-10-CM: R53.1  ICD-9-CM: 728.87  8/3/2017 - Present        Essential hypertension ICD-10-CM: I10  ICD-9-CM: 401.9  10/5/2016 - Present        Obesity (BMI 30-39. 9) ICD-10-CM: E66.9  ICD-9-CM: 278.00  Unknown - Present        Prediabetes ICD-10-CM: R73.03  ICD-9-CM: 790.29  7/28/2015 - Present        Generalized anxiety disorder ICD-10-CM: F41.1  ICD-9-CM: 300.02  Unknown - Present        Insomnia, unspecified ICD-10-CM: G47.00  ICD-9-CM: 780.52  Unknown - Present        Mitral valve prolapse ICD-10-CM: I34.1  ICD-9-CM: 424.0  Unknown - Present        Iron deficiency anemia ICD-10-CM: D50.9  ICD-9-CM: 280.9  8/23/2013 - Present    Overview Signed 10/5/2016  2:49 PM by Randal Tom MD     S/p iron infusion x 2             RESOLVED: GERD (gastroesophageal reflux disease) ICD-10-CM: K21.9  ICD-9-CM: 530.81  Unknown - 10/5/2016        RESOLVED: Mixed hyperlipidemia ICD-10-CM: E78.2  ICD-9-CM: 272.2  Unknown - 10/5/2016        RESOLVED: Hypertension ICD-10-CM: I10  ICD-9-CM: 401.9  Unknown - 10/5/2016              Plan: This is an unfortunate 35yo WF with obesity, RADHA, and htn admitted with right hemiplegia due to Left hemispheric stroke with evolution; etiology of stroke not clear      CVA- continue medical management for CVA  -  Continue antiplatelet therapy; aspirin 325mg daily  - continued on statin; Lipitor   -hypercoag w/u neg x for slt elevation prot C; insignificant finding      Suspect RADHA - can use CPAP while here but will need outpt formal sleep study and Pulmonary follow up. - Continue CPAP, feels she benefits significantly. Feels less tired.       Obesity; have discussed importance of wt loss for mobility and overall health      Hypertension - BP fairly controlled; -130s. - No adjustments. - Cont Diovan at current dose.     LUCHO; gets IV infusions; Anemia/ microcytic, on fe supplement. - patient continue fe supplements po. 11.7 back on 8/7; recheck 8/22     Hypokalemia-   - K 3.4 on 8/10, cont KCL 40meq daily  - 8/11 K 3.3 -> increased KCL to  40 bid   - K- 3.8 on 8/14 -> 3.9(8/16) . Decreased KCl 40mg daily. 8/21 k 3.6 cont supplement daily         bowel program - add prn bowel program      GERD - start PPI.  At times may need additional antacids, Maalox prn.      Depression; cont Lexapro; home med due to generalized anxiety due to stressors in life. His risk post stroke depression. Order recreational therapy and psych if needed; doing much better and participating well 8/11      Dysphagia; mild. Cont ST for this as well as for dysarthria. Continuing joel-motor exercises. - Mechanical consistency diet, cont aspiration  precautions    8/21 watching for hypertonicity; resting hand splint right hand/wrist at noc      Pneumonia prophylaxis- Incentive spirometer every hour while awake      DVT risk / DVT Prophylaxis- Will require daily physician exam to assess for signs and symptoms as patient is at increased risk for of thromboembolism. Mobilization as tolerated. Intermittent pneumatic compression devices when in bed Thigh-high or knee-high thromboembolic deterrent hose when out of bed. - Lovenox subq daily. - she has no signs of DVT.     Pain Control: stable, mild-to-moderate joint symptoms intermittently, reasonably well controlled by PRN meds. Will require regular pain assessment and comprenhensive pain management. - Cont prn tylenol      Chronic insomnia; on Ambien at home. Have not restarted this. - on prn trazadone.            Time spent was 25 minutes with over 1/2 in direct patient care/examination, consultation and coordination of care.      Signed By: Carina Malik MD     August 21, 2017

## 2017-08-21 NOTE — PROGRESS NOTES
PHYSICAL THERAPY DAILY NOTE  Time In: 1794  Time Out: 0429  Patient Seen For: PM;Balance activities;Gait training;Patient education; Therapeutic exercise;Transfer training; Wheelchair mobility; Other (see progress notes)    Subjective: patient reporting she feels better about her walking today. Reports she is practicing moving her RLE in the bed         Objective:Vital Signs:  Patient Vitals for the past 12 hrs:   Temp Pulse Resp BP SpO2   08/21/17 1511 97.5 °F (36.4 °C) (!) 121 16 125/77 96 %   08/21/17 0746 98.4 °F (36.9 °C) 70 16 124/74 96 %     Pain level:No c/o pain  Pain location:NA  Pain interventions:NA    Patient education:balance training,transfer training, gait training, fall precautions, activity pacing, body mechanics, w/c mobility and parts management, Patient verbalizing understanding and demonstrating partial understanding of patient education. Recommend follow up education. Interdisciplinary Communication:NA    Other (comment) (Fall precautions)  GROSS ASSESSMENT Daily Assessment     NA       BED/MAT MOBILITY Daily Assessment    Rolling Right : 0 (Not tested)  Rolling Left : 0 (Not tested)  Supine to Sit : 0 (Not tested)  Sit to Supine : 0 (Not tested)       TRANSFERS Daily Assessment   Verbal cues for body mechanics Transfer Type: Other (SPT with QC recliner to w/c to her right)  Transfer Assistance : 4 (Contact guard assistance)  Sit to Stand Assistance: Stand-by assistance  Car Transfers: Not tested       GAIT Daily Assessment    Amount of Assistance: 4 (Contact guard assistance)  Distance (ft): 110 Feet (ft)  Assistive Device: Cane, quad;Orthotic device; Other (comment) (1/4 inch heel lift R, ace wrap for R DF assist, Ace wrap to )   Gait training as noted in AM with quad cane    Gait training x 20 ft x 4 using left hand rail facilitating cont step through gait pattern with min assist    STEPS or STAIRS Daily Assessment   Single step at a time going up/down steps leading up with LLE and down with RLE. Increased time and effort to complete with 3 min sitting rest break after going up steps. Min assist to maneuver RLE up steps and down steps Steps/Stairs Ambulated (#): 4  Level of Assist : 4 (Minimal assistance)  Rail Use: Left        BALANCE Daily Assessment    Sitting - Static: Good (unsupported)  Sitting - Dynamic: Good (unsupported)  Standing - Static: Fair (with QC)  Standing - Dynamic : Impaired       WHEELCHAIR MOBILITY Daily Assessment    Able to Propel (ft): 150 feet (150ft x 2)  Functional Level: 6  Curbs/Ramps Assist Required (FIM Score): 0 (Not tested)  Wheelchair Setup Assist Required : 5 (Supervision/setup)  Wheelchair Management: Manages left brake;Manages right brake;Manages right footrest       LOWER EXTREMITY EXERCISES Daily Assessment    Extremity: Both  Exercise Type #1: Other (comment) (LE motomed x 10 mins at level 2)  Level of Assist: Supervision            Assessment: Progressing towards goals. Able to advance to gait up/down steps       Patient returned to room at end of treatment and remained up in recliner with LEs elevated and with needs in reach. Plan of Care: Continue with POC and progress as tolerated.      Phil Johnson, PT  8/21/2017

## 2017-08-21 NOTE — PROGRESS NOTES
PHYSICAL THERAPY DAILY NOTE  Time In: 1003  Time Out: 7489  Patient Seen For: AM;Balance activities;Gait training;Patient education; Therapeutic exercise;Transfer training; Wheelchair mobility; Other (see progress notes)    Subjective: Patient reporting she is now going to her mom's home at discharge. Reports only one small step to get into her home. Objective:Vital Signs:  Patient Vitals for the past 12 hrs:   Temp Pulse Resp BP SpO2   08/21/17 0746 98.4 °F (36.9 °C) 70 16 124/74 96 %     Pain level:No c/o pain  Pain location:NA  Pain interventions:NA    Patient education:Bed mobility training,transfer training, gait training, fall precautions, activity pacing, body mechanics, w/c mobility and parts management, Patient verbalizing understanding and demonstrating partial understanding of patient education. Recommend follow up education. Interdisciplinary Communication:spoke with OT regarding progressing to quad cane    Other (comment) (Fall precautions)  GROSS ASSESSMENT Daily Assessment     NA       BED/MAT MOBILITY Daily Assessment   Increased time and effort to complete. Demonstrating correct body mechanics. Discussed recommendation for making height of bed at home 20 to 24 inches Rolling Right : 6 (Modified independent)  Rolling Left : 0 (Not tested)  Supine to Sit : 5 (Stand-by assistance)  Sit to Supine : 5 (Supervision)       TRANSFERS Daily Assessment    Transfer Type: SPT without device (w/c to mat to the left)  Transfer Assistance : 5 (Stand-by assistance)  Sit to Stand Assistance: Stand-by assistance       GAIT Daily Assessment    Amount of Assistance: 4 (Contact guard assistance)  Distance (ft): 75 Feet (ft) (75ft x 2)  Assistive Device: Cane, quad;Orthotic device; Other (comment) (1/4 inch heel lift R, ace wrap for R DF assist, Ace wrap to )   Gait training with slow start/stop step to hemiplegic gait pattern leading with RLE and stepping to with LLE.  Verbal cues to improve right knee ext and ankle DF at initial contact and cues for upright posture. Attempt slow cont step through gait advancing cane and RLE at the same time. Unable to maintain balance at this time with advancement in gait pattern. STEPS or STAIRS Daily Assessment    Steps/Stairs Ambulated (#): 0  Level of Assist : 0 (Not tested)       BALANCE Daily Assessment    Sitting - Static: Good (unsupported)  Sitting - Dynamic: Good (unsupported)  Standing - Static: Fair (with quad cane)  Standing - Dynamic : Impaired       WHEELCHAIR MOBILITY Daily Assessment   Using LUE and LLE Able to Propel (ft): 150 feet  Functional Level: 6  Curbs/Ramps Assist Required (FIM Score): 0 (Not tested)  Wheelchair Setup Assist Required : 5 (Supervision/setup)  Wheelchair Management: Manages left brake;Manages right brake;Manages right footrest       LOWER EXTREMITY EXERCISES Daily Assessment    Extremity: Right  Exercise Type #1: Supine lower extremity strengthening  Sets Performed: 2  Reps Performed: 10  Level of Assist: Minimal assistance  Exercise Type #2: Other (comment) (Right ankle DF with Premodulated ESTIM)  Sets Performed:  (x 10 mins with intensity at 20 MA,FREQ 80 to 150HZ)  Reps Performed:  (5 sec on 5 sec off cycle)  Level of Assist: Supervision          Assessment: Progressing towards modified independence with bed mobility and SPT to the left. Gait balance and pattern improving with patient able to advance to gait with quad cane. Limited return in right ankle and patent will probably require a custom AFO at time of D/C       Patient to OT at end of treatment    Plan of Care: Continue with POC and progress as tolerated.      Reji Lennon, PT  8/21/2017

## 2017-08-21 NOTE — PROGRESS NOTES
OT Daily Note    Time In 1116   Time Out 1202     Subjective: \"What muscle is that working to bend my arm?\"  Pain: None indicated    Precautions:  (falls)    Education   Initiated stroke education packet with patient during standing trial.  Educated re: neuromuscular reeducation process and muscle groups targeted during NMES. Neuro-Muscular Re-Education   Patient completed standing trial ~4 minutes for weightbearing on tabletop through RUE in preparation for NMES. Minimal assist to facilitate weightbearing through R elbow and for digit extension as patient maintains standing balance with anterior trunk lean on tabletop/weightbearing through LUE. Patient tolerated NMES to R shoulder in subluxation protocol seated in wheelchair before vertical mirror, surge pattern on intensity 11 x 10 minutes. Patient completed AAROM shoulder shrug during breaks between surges. Patient tolerated NMES in reciprocal pattern to R bicep/tricep seated at tabletop facilitated by towel slide (gravity eliminated). Intensity x 6 for bicep, intensity x 9 for tricep x 5 minutes. Required moderate-maximal assist to obtain full extension d/t increased flexor tone. Patient completed 1 set x 10 reps AAROM elbow flexion/extension towel slides on tabletop following NMES administration, muscle tapping facilitation to obtain full elbow flexion with towel slide and moderate-maximal physical assist to obtain full extension. Assessment: Patient tolerated session well and is demonstrating continued neuromuscular return in RUE.  2-/5 strength noted in R bicep this session. Education: Purpose of therapy, NMR  Interdisciplinary Communication: Collaborated with Henrietta Kocher and agreed patient is progressing well and on-track to meet goals as stated in [facility]. Plan: Continue to address ADL/IADL, functional mobility, activity tolerance, balance, strengthening, coordination.       Jorge Veras, OTR/L

## 2017-08-21 NOTE — PROGRESS NOTES
08/21/17 0926   Time Spent With Patient   Time In 0845   Time Out 0910   Patient Seen For: AM;Oral-motor exercises   Mental Status   Neurologic State Alert   Orientation Level Oriented X4   Cognition Appropriate decision making   Perception Appears intact   Perseveration No perseveration noted   Safety/Judgement Fall prevention   Pt completed oral motor exercises 2 x 10 with 90% accuracy.    Lawanda Ta MA/SELINA/SLP

## 2017-08-22 LAB
ERYTHROCYTE [DISTWIDTH] IN BLOOD BY AUTOMATED COUNT: 20.9 % (ref 11.9–14.6)
HCT VFR BLD AUTO: 36.2 % (ref 35.8–46.3)
HGB BLD-MCNC: 11.2 G/DL (ref 11.7–15.4)
MCH RBC QN AUTO: 21.8 PG (ref 26.1–32.9)
MCHC RBC AUTO-ENTMCNC: 30.9 G/DL (ref 31.4–35)
MCV RBC AUTO: 70.6 FL (ref 79.6–97.8)
PLATELET # BLD AUTO: 409 K/UL (ref 150–450)
PMV BLD AUTO: 9.4 FL (ref 10.8–14.1)
RBC # BLD AUTO: 5.13 M/UL (ref 4.05–5.25)
WBC # BLD AUTO: 5.7 K/UL (ref 4.3–11.1)

## 2017-08-22 PROCEDURE — 97116 GAIT TRAINING THERAPY: CPT

## 2017-08-22 PROCEDURE — 94660 CPAP INITIATION&MGMT: CPT

## 2017-08-22 PROCEDURE — 65310000000 HC RM PRIVATE REHAB

## 2017-08-22 PROCEDURE — 74011250636 HC RX REV CODE- 250/636: Performed by: PHYSICAL MEDICINE & REHABILITATION

## 2017-08-22 PROCEDURE — 97112 NEUROMUSCULAR REEDUCATION: CPT

## 2017-08-22 PROCEDURE — 97110 THERAPEUTIC EXERCISES: CPT

## 2017-08-22 PROCEDURE — 36415 COLL VENOUS BLD VENIPUNCTURE: CPT | Performed by: PHYSICAL MEDICINE & REHABILITATION

## 2017-08-22 PROCEDURE — 92526 ORAL FUNCTION THERAPY: CPT

## 2017-08-22 PROCEDURE — 97032 APPL MODALITY 1+ESTIM EA 15: CPT

## 2017-08-22 PROCEDURE — 99232 SBSQ HOSP IP/OBS MODERATE 35: CPT | Performed by: PHYSICAL MEDICINE & REHABILITATION

## 2017-08-22 PROCEDURE — 74011250637 HC RX REV CODE- 250/637: Performed by: PHYSICAL MEDICINE & REHABILITATION

## 2017-08-22 PROCEDURE — 85027 COMPLETE CBC AUTOMATED: CPT | Performed by: PHYSICAL MEDICINE & REHABILITATION

## 2017-08-22 PROCEDURE — 97535 SELF CARE MNGMENT TRAINING: CPT

## 2017-08-22 RX ADMIN — FERROUS SULFATE TAB 325 MG (65 MG ELEMENTAL FE) 325 MG: 325 (65 FE) TAB at 17:08

## 2017-08-22 RX ADMIN — FERROUS SULFATE TAB 325 MG (65 MG ELEMENTAL FE) 325 MG: 325 (65 FE) TAB at 08:17

## 2017-08-22 RX ADMIN — ENOXAPARIN SODIUM 40 MG: 40 INJECTION SUBCUTANEOUS at 21:23

## 2017-08-22 RX ADMIN — ASPIRIN 325 MG ORAL TABLET 325 MG: 325 PILL ORAL at 08:17

## 2017-08-22 RX ADMIN — POTASSIUM CHLORIDE 40 MEQ: 20 TABLET, EXTENDED RELEASE ORAL at 08:16

## 2017-08-22 RX ADMIN — ATORVASTATIN CALCIUM 40 MG: 40 TABLET, FILM COATED ORAL at 08:16

## 2017-08-22 RX ADMIN — PANTOPRAZOLE SODIUM 40 MG: 40 TABLET, DELAYED RELEASE ORAL at 05:07

## 2017-08-22 RX ADMIN — HYDROCHLOROTHIAZIDE: 25 TABLET ORAL at 08:17

## 2017-08-22 RX ADMIN — ESCITALOPRAM OXALATE 20 MG: 10 TABLET ORAL at 08:16

## 2017-08-22 NOTE — PROGRESS NOTES
PHYSICAL THERAPY DAILY NOTE  Time In: 3796  Time Out: 1525  Patient Seen For: PM;Balance activities;Gait training;Patient education; Therapeutic exercise;Transfer training; Wheelchair mobility; Other (see progress notes)    Subjective: Patient reporting she is trying to concentrate on what her RLE is doing when she is walking. Reports she tries to prevent her right knee from \"snapping back\"         Objective:Vital Signs:  Patient Vitals for the past 12 hrs:   Temp Pulse Resp BP SpO2   08/22/17 0749 97.7 °F (36.5 °C) 87 18 148/86 100 %     Pain level:No c/o pain  Pain location:NA  Pain interventions:NA    Patient education:Balance training,transfer training, gait training, fall precautions, activity pacing, body mechanics, w/c mobility and parts management, Patient verbalizing understanding and demonstrating partial understanding of patient education. Recommend follow up education. Interdisciplinary Communication:NA    Other (comment) (Fall precautions)  GROSS ASSESSMENT Daily Assessment    NA       BED/MAT MOBILITY Daily Assessment    Rolling Right : 0 (Not tested)  Rolling Left : 0 (Not tested)  Supine to Sit : 0 (Not tested)  Sit to Supine : 0 (Not tested)       TRANSFERS Daily Assessment   Increased time and effort to complete with cues for body mechanics Transfer Type:  Other (SPT with quad cane to the right )  Transfer Assistance : 5 (Stand-by assistance)  Sit to Stand Assistance: Stand-by assistance  Car Transfers: Not tested       GAIT Daily Assessment    Amount of Assistance: 5 (Stand-by assistance), verbal cues for posture correction and to inhibit right knee hyperextension  Distance (ft): 120 Feet (ft)  Assistive Device: Cane, quad;Orthotic device (1/4 inch heel lift R, ace wrap for R DF assist,) ace wrap to inhibit right knee hyperextension   Gait training x 20 ft x 10 using left hand rail facilitating cont step through gait pattern facilitating improved ankle PF and knee flex at terminal stance with improved push off at terminal stance. Inhibiting right knee hyperextension at midstance    STEPS or STAIRS Daily Assessment   Slow single step at a time leading up with LLE and down with RLE. Increased time and effort to complete with min assist to assist RLE up and down steps and inhibit right knee hyperextension. 2 mins sitting rest break after going up steps Steps/Stairs Ambulated (#): 4  Level of Assist : 4 (Minimal assistance)  Rail Use: Left        BALANCE Daily Assessment    Sitting - Static: Good (unsupported)  Sitting - Dynamic: Good (unsupported)  Standing - Static: Fair (with quad cane)  Standing - Dynamic : Impaired       WHEELCHAIR MOBILITY Daily Assessment   Using LUE and LLE Able to Propel (ft): 150 feet  Functional Level: 6  Curbs/Ramps Assist Required (FIM Score): 0 (Not tested)  Wheelchair Setup Assist Required : 5 (Supervision/setup)  Wheelchair Management: Manages left brake;Manages right brake;Manages right footrest       LOWER EXTREMITY EXERCISES Daily Assessment    Extremity: Both  Exercise Type #1: Other (comment) (LE motomed x 10 mins at level 3)  Level of Assist: Supervision          Assessment: Progressing towards supervision with gait with quad cane. Cont step through gait pattern using hand rail slowly improving. Patient returned to room at end of treatment. Patient supine in bed with head of bed elevated and bed rails up x 2. Needs placed in reach of patient. Plan of Care: Continue with POC and progress as tolerated.      Rosario Colin, PT  8/22/2017

## 2017-08-22 NOTE — PROGRESS NOTES
PT WEEKLY PROGRESS NOTE   Time In: 6489   Time Out: 1159    Subjective: patient reporting she wants to be able to walk better and walk on her own. Reports she wants to be able to move her right ankle. Objective: Vital Signs:  Patient Vitals for the past 12 hrs:   Temp Pulse Resp BP SpO2   08/22/17 1605 98.1 °F (36.7 °C) 86 16 112/67 96 %   08/22/17 0749 97.7 °F (36.5 °C) 87 18 148/86 100 %       Pain level:No c/o pain  Pain location:NA  Pain interventions:NA    Patient education:Balance training,transfer training, gait training, fall precautions, activity pacing, body mechanics, w/c mobility and parts management,Patient verbalizing understanding and demonstrating partial understanding of patient education. Recommend follow up education. Interdisciplinary Communication:spoke with OT regarding progress towards goals    Other (comment) (Fall precautions)    Outcome Measures:    FIM SCORES Initial Assessment Weekly Progress Assessment 8/22/2017   Bed/Chair/Wheelchair Transfers 3 5   Wheelchair Mobility  (Unable to assess due to patient's height and available w/c) 6   Walking [place] 1 2   Steps/Stairs  (NT) 2   Please see [facility] Interdisciplinary Eval: Coordination/Balance Section for details regarding FIM score description.     BED/CHAIR/WHEELCHAIR TRANSFERS Initial Assessment Weekly Progress Assessment 8/22/2017   Rolling Right 6 (Modified independent) (using bed rail) 0 (Not tested)   Rolling Left 4 (Minimal assistance) (cues to attend to RUE and RUE positioning) 0 (Not tested)   Supine to Sit 3 (Moderate assistance) (using bed rail) 0 (Not tested)   Sit to Stand Minimal assistance (from bed and w/c) Stand-by assistance   Sit to Supine 4 (Minimal assistance) 0 (Not tested)   Transfer Type SPT without device (bed to w/c to left and w/c to bed with mod assist to her rig) Other (SPT with quad cane to the right )   Comments Tendency to be impulsive at times during transfers and bed mobility requiring cues for hemiplegic body mechanics, cues to attend to right side  Modified independent with bed mobility, SPT with quad cane and SBA to the right   Car Transfer Not tested Not tested   Car Type         GROSS ASSESSMENT Weekly Progress Assessment 8/22/2017   AROM  (LLE WFL, RLE AAROM WFL)   Strength  (RLE ankle 1/5,knee ext 4/5 flex -3/5,hip-3/5 to +2/5)   Coordination  (RLE impaired, improving)   Tone Abnormal (RLE clonus with achilles quick stretch)   Sensation Impaired (RLE proprioception)   PROM Generally decreased, functional     POSTURE Weekly Progress Assessment 8/22/2017   Posture (WDL) Exceptions to WDL   Posture Assessment Forward head;Rounded shoulders (weight shift to LLE)     WHEELCHAIR MOBILITY/MANAGEMENT Initial Assessment Weekly Progress Assessment 8/22/2017   Able to Propel 0 feet 150 feet   Curbs/ramps assistance required 0 (Not tested) 0 (Not tested)   Wheelchair set up assistance required 0 (Not tested)  supervision   Wheelchair management Manages left brake, Manages right brake Manages left brake;Manages right brake;Manages right footrest     WALKING INDEPENDENCE Initial Assessment Weekly Progress Assessment 8/22/2017   Assistive device Other (comment) (hand rail on left, ace wrap for left DF assist) Cane, quad;Orthotic device (1/4 inch heel lift R, ace wrap for R DF assist,)   Ambulation assistance - level surface 3 (Moderate assistance) (max assist to advance and stabilize RLE)  SBA with cues for correcting gait deviations   Distance 20 Feet (ft) 120 Feet (ft)   Comments slow start/stop step to hemiplegic gait pattern leading leading with RLE and stepping to LLE Gait distance and balance improving daily     GAIT Weekly Progress Assessment 8/22/2017   Gait Description (WDL)  slow start/stop partial step through hemiplegic gait pattern   Gait Abnormalities  decreased ankle PF and knee flexion at terminal stance, decreased knee flex and ankle DF at midswing, decreased knee ext and ankle DF at initial contact, right knee hyperextension at midstance     STEPS/STAIRS Initial Assessment Weekly Progress Assessment 8/22/2017   Steps/Stairs ambulated 0 4   Rail Use Left  Left    Comments unable to ambulate up/down steps at this time secondary to extent of right hemiparesis  single step at a time leading up with LLE and down with RLE. Increased time and effort to complete with 2 min sitting rest break after going up steps. Min assist to advance RLE up steps and down steps inhibiting right knee hyperextension   Curbs/Ramps 0 (Not tested)  NT   Supine RLE strengthening exercises x 2 sets of 10 each with min assist. ESTIM to right Ant tib x 10 mins premodulated mode, 5 sec on 5 sec off, intensity 20 naman amps, freqeuncy 80-150HZ, ankle DF during on cycle       Assessment: patient making good progress towards goals. Patient has met STG and LTG for bed mobility and wheelchair mobility. She has met STGs for transfers, gait and stairs with good potential to meet LTGs. Patient progressing towards a modified independent functional level at a wheelchair level. Patient has good potential to progress to household ambulation with LRAD and SBA. Recommend cont inpatient rehab to maximize rehab potential with plan to reassess functional level next week. RLE knee and hip strength cont to improve but slow progress with ankle strength. At this time anticipate patient will need a custom AFO at time of discharge to accommodate for right ankle weakness. Patient return to room at end of treatment and remained up in wheelchair with needs in reach. Plan of Care: Patient seen for weekly assessment. Goals updated and revised. Please see Care Plan for updated LTGs.     Family Training: Needs to be scheduled    Cristian Gomez, PT  8/22/2017

## 2017-08-22 NOTE — PROGRESS NOTES
08/22/17 1327   Time Spent With Patient   Time In 1303   Time Out 1327   Patient Seen For: AM;Oral-motor exercises   Mental Status   Neurologic State Alert   Orientation Level Oriented X4   Cognition Appropriate decision making   Perception Appears intact   Perseveration No perseveration noted   Safety/Judgement Fall prevention   Pt completed oral motor exercises 2 x 10 with 90% accuracy.     Prakash Wyatt MA/SELINA/SLP

## 2017-08-22 NOTE — PROGRESS NOTES
Mady Madden MD,   Medical Director  [address] [facility], [address] [facility]  Tel: [phone]       SFD PROGRESS NOTE    Kiah Rodriguez  Admit Date: 8/8/2017  Admit Diagnosis: Stroke - Right Side Body Involvment;Stroke (cerebrum) (HCC)    Subjective     Doing well. No cp, sob, n/v. Sleeping well. Very pleased with progress especially with gait; 110ft CGA with quad cane and DF assist orthotic    Objective:     Current Facility-Administered Medications   Medication Dose Route Frequency    potassium chloride (K-DUR, KLOR-CON) SR tablet 40 mEq  40 mEq Oral DAILY    ferrous sulfate tablet 325 mg  1 Tab Oral BID WITH MEALS    aspirin (ASPIRIN) tablet 325 mg  325 mg Oral DAILY    enoxaparin (LOVENOX) injection 40 mg  40 mg SubCUTAneous Q24H    acetaminophen (TYLENOL) tablet 650 mg  650 mg Oral Q4H PRN    alum-mag hydroxide-simeth (MYLANTA) oral suspension 30 mL  30 mL Oral Q4H PRN    atorvastatin (LIPITOR) tablet 40 mg  40 mg Oral DAILY    bisacodyl (DULCOLAX) suppository 10 mg  10 mg Rectal DAILY PRN    escitalopram oxalate (LEXAPRO) tablet 20 mg  20 mg Oral DAILY    LORazepam (ATIVAN) tablet 1 mg  1 mg Oral Q6H PRN    magnesium hydroxide (MILK OF MAGNESIA) 400 mg/5 mL oral suspension 30 mL  30 mL Oral DAILY PRN    ondansetron (ZOFRAN ODT) tablet 4 mg  4 mg Oral Q6H PRN    senna-docusate (PERICOLACE) 8.6-50 mg per tablet 2 Tab  2 Tab Oral DAILY PRN    valsartan/hydroCHLOROthiazide (DIOVAN HCT) 160/25 mg   Oral DAILY    traZODone (DESYREL) tablet 50 mg  50 mg Oral QHS PRN    pantoprazole (PROTONIX) tablet 40 mg  40 mg Oral ACB     Review of Systems:Denies chest pain, shortness of breath, cough, headache, visual problems, abdominal pain, dysurea, calf pain. Pertinent positives are as noted in the medical records and unremarkable otherwise.      Visit Vitals    /59    Pulse 86    Temp 97.5 °F (36.4 °C)    Resp 17    SpO2 96%    Breastfeeding No        Physical Exam:   General: Alert and age appropriately oriented. No acute cardio respiratory distress. HEENT: Normocephalic,no scleral icterus  Oral mucosa moist without cyanosis; mild right lower facial weakness   Lungs: Clear to auscultation  bilaterally. Respiration even and unlabored   Heart: Regular rate and rhythm, S1, S2   No  murmurs, clicks, rub or gallops   Abdomen: Soft, non-tender, nondistended. Bowel sounds present. No organomegaly. obese   Genitourinary: Benign . Neuromuscular:      RUE shoulder flexion 1, EF1, EE 1,  2-, finger ext 0/5  slt increase tone with PROM  RLE; hip flexion 3+, KE 3, DF 0, PF 1  Sensation intact. Right lower facial weakness  L sided strength wfl   Skin/extremity: No rashes, no erythema.  No calf tenderness BLE  No edema                                                                            Functional Assessment:  Gross Assessment  AROM: Generally decreased, functional (08/18/17 1500)  PROM: Grossly decreased, non-functional (08/18/17 1500)  Strength:  (LLE WFL, Right hip -3/5 to 2/5, knee ext -3/5,flex -2/5) (08/16/17 1000)  Coordination:  (LLE WFL,RLE impaired) (08/16/17 1000)  Tone: Abnormal (08/18/17 1500)  Sensation: Impaired (08/18/17 1500)       Balance  Sitting - Static: Good (unsupported) (08/21/17 1600)  Sitting - Dynamic: Good (unsupported) (08/21/17 1600)  Standing - Static: Fair (with QC) (08/21/17 1600)  Standing - Dynamic : Impaired (08/21/17 1600)           Toileting  Cues: Physical assistance for pants down;Physical assistance for pants up (08/17/17 1226)         Alvord Shows Fall Risk Assessment:  Aysha Shows Fall Risk  Mobility: Ambulates or transfers with assist devices or assistance/unsteady gait (08/21/17 2005)  Mobility Interventions: Patient to call before getting OOB (08/21/17 2005)  Mentation: Alert, oriented x 3 (08/21/17 2005)  Medication: Patient receiving anticonvulsants, sedatives(tranquilizers), psychotropics or hypnotics, hypoglycemics, narcotics, sleep aids, antihypertensives, laxatives, or diuretics (08/21/17 2005)  Medication Interventions: Patient to call before getting OOB (08/21/17 2005)  Elimination: Needs assistance with toileting (08/21/17 2005)  Elimination Interventions: Call light in reach (08/21/17 2005)  Prior Fall History: Unknown (08/21/17 2005)  Total Score: 3 (08/21/17 2005)  Standard Fall Precautions: Yes (08/21/17 2005)  High Fall Risk: Yes (08/18/17 0840)     Speech Assessment:         Ambulation:  Gait  Distance (ft): 110 Feet (ft) (08/21/17 1600)  Assistive Device: Cane, quad;Orthotic device; Other (comment) (1/4 inch heel lift R, ace wrap for R DF assist, Ace wrap to ) (08/21/17 1600)  Rail Use: Left  (08/21/17 1600)     Labs/Studies:  Recent Results (from the past 72 hour(s))   METABOLIC PANEL, BASIC    Collection Time: 08/21/17  6:26 AM   Result Value Ref Range    Sodium 140 136 - 145 mmol/L    Potassium 3.6 3.5 - 5.1 mmol/L    Chloride 103 98 - 107 mmol/L    CO2 26 21 - 32 mmol/L    Anion gap 11 7 - 16 mmol/L    Glucose 90 65 - 100 mg/dL    BUN 9 6 - 23 MG/DL    Creatinine 0.61 0.6 - 1.0 MG/DL    GFR est AA >60 >60 ml/min/1.73m2    GFR est non-AA >60 >60 ml/min/1.73m2    Calcium 9.0 8.3 - 10.4 MG/DL       Assessment:     Problem List as of 8/22/2017  Date Reviewed: 1/26/2017          Codes Class Noted - Resolved    Stroke (cerebrum) ([facility] [address] ICD-10-CM: I63.9  ICD-9-CM: 434.91  8/8/2017 - Present        Left sided lacunar stroke [facility]) ICD-10-CM: I63.9  ICD-9-CM: 434.91  8/4/2017 - Present    Overview Signed 8/6/2017  7:44 PM by Aaliyah Polanco MD     8/2017             Weakness of right side of body ICD-10-CM: R53.1  ICD-9-CM: 728.87  8/3/2017 - Present        Essential hypertension ICD-10-CM: I10  ICD-9-CM: 401.9  10/5/2016 - Present        Obesity (BMI 30-39. 9) ICD-10-CM: E66.9  ICD-9-CM: 278.00  Unknown - Present        Prediabetes ICD-10-CM: R73.03  ICD-9-CM: 790.29  7/28/2015 - Present        Generalized anxiety disorder ICD-10-CM: F41.1  ICD-9-CM: 300.02  Unknown - Present        Insomnia, unspecified ICD-10-CM: G47.00  ICD-9-CM: 780.52  Unknown - Present        Mitral valve prolapse ICD-10-CM: I34.1  ICD-9-CM: 424.0  Unknown - Present        Iron deficiency anemia ICD-10-CM: D50.9  ICD-9-CM: 280.9  8/23/2013 - Present    Overview Signed 10/5/2016  2:49 PM by Saranya Richards MD     S/p iron infusion x 2             RESOLVED: GERD (gastroesophageal reflux disease) ICD-10-CM: K21.9  ICD-9-CM: 530.81  Unknown - 10/5/2016        RESOLVED: Mixed hyperlipidemia ICD-10-CM: E78.2  ICD-9-CM: 272.2  Unknown - 10/5/2016        RESOLVED: Hypertension ICD-10-CM: I10  ICD-9-CM: 401.9  Unknown - 10/5/2016              Plan:         This is an unfortunate 37yo WF with obesity, RADHA, and htn admitted with right hemiplegia due to Left hemispheric stroke with evolution; etiology of stroke not clear      CVA- continue medical management for CVA  -  Continue antiplatelet therapy; aspirin 325mg daily  - continued on statin; Lipitor   -hypercoag w/u neg x for slt elevation prot C; insignificant finding       Suspect RADHA - can use CPAP while here but will need outpt formal sleep study and Pulmonary follow up. - Continue CPAP, feels she benefits significantly. Feels less tired.       Obesity; have discussed importance of wt loss for mobility and overall health      Hypertension - BP fairly controlled; -130s. - No adjustments. - Cont Diovan at current dose.      LUCHO; gets IV infusions; Anemia/ microcytic, on fe supplement. - patient continue fe supplements po. 11.7 back on 8/7; recheck pending      Hypokalemia-   - K 3.4 on 8/10, cont KCL 40meq daily  - 8/11 K 3.3 -> increased KCL to  40 bid   - K- 3.8 on 8/14 -> 3.9(8/16) . Decreased KCl 40mg daily. 8/21 k 3.6 cont supplement daily          bowel program - add prn bowel program      GERD - start PPI.  At times may need additional antacids, Maalox prn.      Depression; cont Lexapro; home med due to generalized anxiety due to stressors in life. His risk post stroke depression. Order recreational therapy and psych if needed; doing much better and participating well 8/11  -8/22 spirits extremely good. No anxiety noted      Dysphagia; mild. Cont ST for this as well as for dysarthria. Continuing joel-motor exercises. - Mechanical consistency diet, cont aspiration  precautions     8/21 watching for hypertonicity; resting hand splint right hand/wrist at noc      Pneumonia prophylaxis- Incentive spirometer every hour while awake      DVT risk / DVT Prophylaxis- Will require daily physician exam to assess for signs and symptoms as patient is at increased risk for of thromboembolism. Mobilization as tolerated. Intermittent pneumatic compression devices when in bed Thigh-high or knee-high thromboembolic deterrent hose when out of bed. - Lovenox subq daily. - she has no signs of DVT.     Pain Control: stable, mild-to-moderate joint symptoms intermittently, reasonably well controlled by PRN meds. Will require regular pain assessment and comprenhensive pain management. - Cont prn tylenol      Chronic insomnia; on Ambien at home. Have not restarted this. - on prn trazadone.        -pt reports hx of MVP since age of 21. Takes abx with dental procedures. 2D ECHO did not appreciate any abnl of the mitral valve           Time spent was 25 minutes with over 1/2 in direct patient care/examination, consultation and coordination of care.      Signed By: Bibi Miller MD     August 22, 2017

## 2017-08-22 NOTE — PROGRESS NOTES
OT Daily Note    Time In 1348   Time Out 1431     Subjective: \"You don't realize how serious things are til they happen to you. \" [re: Stroke Education]  Pain: None indicated    Precautions:  (falls)    Education/NMR   Completed stroke education packet with patient while completing weightbearing through RUE seated in wheelchair facilitated by therapist at R elbow and wrist.  Patient verbalized understanding of stroke types, etiology, risk factors (preventable and non-preventable), warning signs, treatment, and symptoms of lacunar CVA. Self-Care   Patient educated regarding TTB and transfer for discharge according to home setup. Patient verbalized and demonstrated understanding for transfer technique, TTB setup, recommended features, and ideas for obtaining. Patient transferred wheelchair <> TTB SPT using quad cane with CGA, CGA for safety sliding on/off TTB and moderate assist to lift RLE over tub ledge. Patient provided printed copy of information for obtaining TTB. Assessment: Patient verbalized understanding of all education. Education: TTB/transfer, Stroke Education  Interdisciplinary Communication: Collaborated with Madiha Keating and agreed patient is progressing well and on-track to meet goals as stated in [address]. Plan: Continue to address ADL/IADL, functional mobility, activity tolerance, balance, strengthening, coordination.       Keyshawn Cotto, OTR/L

## 2017-08-22 NOTE — PROGRESS NOTES
Clinical update faxed to Terri Guevara with [facility] requesting additional rehab days. Confirme Voc Rehab visit tomorrow.   Continue to follow

## 2017-08-22 NOTE — PROGRESS NOTES
OT WEEKLY PROGRESS NOTE    Time In: 6131  Time Out: 1006    COMPREHENSION MODE Initial Assessment Weekly Progress Assessment 8/22/2017   Score 7 7     EXPRESSION Initial Assessment Weekly Progress Assessment 8/22/2017   Primary Mode of Expression Verbal Verbal   Score 6 7   Comments Expresses complex, abstract ideas with extra time, mild difficulty due to dysarthria Mild dysarthria     SOCIAL INTERACTION/ PRAGMATICS Initial Assessment Weekly Progress Assessment 8/22/2017   Score 6 7   Comments Patient tearful (appropriate) end of session, provided encouragement and education Pleasant and agreeable     PROBLEM SOLVING Initial Assessment Weekly Progress Assessment 8/22/2017   Score 6 7   Comments Makes decisions with mild difficulty or solves complex problems with extra time. WNL     MEMORY Initial Assessment Weekly Progress Assessment 8/22/2017   Score 6 7   Comments Recognizes people often seen, daily routines, and executes requests with additional time  WNL     EATING Initial Assessment Weekly Progress Assessment 8/22/2017   Functional Level 5 5   Comments Setup/cues for container management Occasional setup required for containers     GROOMING Initial Assessment Weekly Progress Assessment 8/22/2017   Functional Level 3 7   Tasks completed by patient Washed face, Brushed hair, Brushed teeth (Applied deodorant) Washed face; Washed hands;Brushed hair;Brushed teeth   Comments Assist to apply toothpaste to toothbrush and apply deodorant to R underarm Independent seated at sink     BATHING Initial Assessment Weekly Progress Assessment 8/22/2017   Functional Level 3 5   Body parts patient bathed Thigh, right, Thigh, left, Fadia area, Chest, Arm, right, Arm, left, Abdomen Abdomen;Arm, right;Arm, left; Buttocks; Chest;Lower leg and foot, left; Lower leg and foot, right;Fadia area; Thigh, left; Thigh, right   Comments Assist to bathe lower BLEs and bottom, blocking right knee as patient squats to have bottom bathed Supervision/setup seated shower, patient leans to side seated on TTB to bathe bottom     TUB/SHOWER TRANSFER INDEPENDENCE Initial Assessment Weekly Progress Assessment 8/22/2017   Score 3 4   Comments Squat/stand pivot CGA for SPT using quad cane     UPPER BODY DRESSING/UNDRESSING Initial Assessment Weekly Progress Assessment 8/22/2017   Functional Level 3 4   Items applied/Steps completed Pullover (4 steps) Bra (3 steps); Pullover (4 steps)   Comments Assist to thread RUE through shirt and to assist down trunk Minimal assist to finish bra, limited by fit of clothing     LOWER BODY DRESSING/UNDRESSING Initial Assessment Weekly Progress Assessment 8/22/2017   Functional Level 1 3   Items applied/Steps completed Elastic waist pants (3 steps), Shoe, left (1 step), Shoe, right (1 step), Sock, left (1 step), Sock, right (1 step), Underpants (3 steps) Underpants (3 steps); Sock, right (1 step); Sock, left (1 step); Shoe, right (1 step); Shoe, left (1 step); Elastic waist pants (3 steps)   Comments Dependent for LB dressing at this time Assist to finish pants/underwear over R hip, start R foot into shoe, and support RLE/start R sock (limited by tight fit of clothing)     TOILETING Initial Assessment Weekly Progress Assessment 8/22/2017   Functional Level 0 3   Comments Activity did not occur, patient reports continent of bowel and bladder For standing balance/finishing pants and underwear over R hip     TOILET TRANSFER INDEPENDENCE Initial Assessment Weekly Progress Assessment 8/22/2017   Transfer score 0 4   Comments Did not occur CGA SPT using quad cane     Plan of Care: Please see Care Plan for updated LTGs. Family Training: To complete prior to discharge    Summary of Progress: Patient has demonstrated progress with ADL and functional mobility during IPR stay, see above for details.   Patient has demonstrated progress with RUE strength/neuromuscular return, demonstrating 2-/5 strength for shoulder shrug and elbow flexion. Summary of Session:    S: \"That Venda Jon is a lot easier [for shower transfer]. \" Agreeable to therapy. Focus of session was on ADL retraining and functional mobility. Patient was able to transfer bed to wheelchair and wheelchair <> TTB SPT using quad cane with CGA. Pain not indicated. Collaborated with PT, Luis Tejada and confirmed patient is on track to reach goals as documented in the care plan. Patient tolerated session well, but RUE strength/coordination, balance, functional mobility, activity tolerance are still below baseline and require skilled facilitation to successfully and safely complete ADL's and transfers. Patient ended session in recliner with call remote and phone within reach.      Dino Glover OTR/L

## 2017-08-22 NOTE — PROGRESS NOTES
Subjective \"I am doing good. \"   Activity Tennis/balloon hit and reaching activity    Strength/Endurance Patient with RUE hemiparesis. Her activity tolerance has increased since last week because patient was able to complete more activity with less shortness of breath and need for a rest break. Balance SBA with SPT from recliner to w/c   Social Interaction Patient was friendly and sociable. She was in good spirits. Cognitive A&O X4   Comments Patient was handed off to Sheri Mahmood PT at the end of the session.      Lino Flor, CTRS

## 2017-08-22 NOTE — PROGRESS NOTES
Assessment completed, pt A/O x 4, denies pain, respiration even and non labored. Pt with weakness, numbness to right side with limited movement. Pt can turn herself in bed and feed herself. Pt encourage to call for any needs. Call light with in reach.

## 2017-08-23 PROCEDURE — 92526 ORAL FUNCTION THERAPY: CPT

## 2017-08-23 PROCEDURE — 97116 GAIT TRAINING THERAPY: CPT

## 2017-08-23 PROCEDURE — 97530 THERAPEUTIC ACTIVITIES: CPT

## 2017-08-23 PROCEDURE — 74011250637 HC RX REV CODE- 250/637: Performed by: PHYSICAL MEDICINE & REHABILITATION

## 2017-08-23 PROCEDURE — 97112 NEUROMUSCULAR REEDUCATION: CPT

## 2017-08-23 PROCEDURE — 99232 SBSQ HOSP IP/OBS MODERATE 35: CPT | Performed by: PHYSICAL MEDICINE & REHABILITATION

## 2017-08-23 PROCEDURE — 97032 APPL MODALITY 1+ESTIM EA 15: CPT

## 2017-08-23 PROCEDURE — 97110 THERAPEUTIC EXERCISES: CPT

## 2017-08-23 PROCEDURE — 74011250636 HC RX REV CODE- 250/636: Performed by: PHYSICAL MEDICINE & REHABILITATION

## 2017-08-23 PROCEDURE — 97535 SELF CARE MNGMENT TRAINING: CPT

## 2017-08-23 PROCEDURE — 65310000000 HC RM PRIVATE REHAB

## 2017-08-23 RX ORDER — TIZANIDINE 2 MG/1
4 TABLET ORAL
Status: DISCONTINUED | OUTPATIENT
Start: 2017-08-23 | End: 2017-08-24

## 2017-08-23 RX ADMIN — ASPIRIN 325 MG ORAL TABLET 325 MG: 325 PILL ORAL at 08:48

## 2017-08-23 RX ADMIN — ESCITALOPRAM OXALATE 20 MG: 10 TABLET ORAL at 08:50

## 2017-08-23 RX ADMIN — TIZANIDINE 4 MG: 2 TABLET ORAL at 20:58

## 2017-08-23 RX ADMIN — POTASSIUM CHLORIDE 40 MEQ: 20 TABLET, EXTENDED RELEASE ORAL at 08:48

## 2017-08-23 RX ADMIN — ENOXAPARIN SODIUM 40 MG: 40 INJECTION SUBCUTANEOUS at 20:57

## 2017-08-23 RX ADMIN — FERROUS SULFATE TAB 325 MG (65 MG ELEMENTAL FE) 325 MG: 325 (65 FE) TAB at 07:43

## 2017-08-23 RX ADMIN — ATORVASTATIN CALCIUM 40 MG: 40 TABLET, FILM COATED ORAL at 08:51

## 2017-08-23 RX ADMIN — PANTOPRAZOLE SODIUM 40 MG: 40 TABLET, DELAYED RELEASE ORAL at 05:57

## 2017-08-23 RX ADMIN — HYDROCHLOROTHIAZIDE: 25 TABLET ORAL at 08:50

## 2017-08-23 RX ADMIN — FERROUS SULFATE TAB 325 MG (65 MG ELEMENTAL FE) 325 MG: 325 (65 FE) TAB at 17:25

## 2017-08-23 NOTE — PROGRESS NOTES
Alma Caruso MD,   Medical Director  [address] [facility], [address] [facility]  Tel: [phone]       SFD PROGRESS NOTE    Simin Serrano  Admit Date: 8/8/2017  Admit Diagnosis: Stroke - Right Side Body Involvment;Stroke (cerebrum) (HCC)    Subjective     Spirits good. Slept well. Thinks CPAP very helpful due to less daytime sleepiness. No pain. Less foggy headed, clearer thinking. Positive attitude. Sees improvements    Objective:     Current Facility-Administered Medications   Medication Dose Route Frequency    potassium chloride (K-DUR, KLOR-CON) SR tablet 40 mEq  40 mEq Oral DAILY    ferrous sulfate tablet 325 mg  1 Tab Oral BID WITH MEALS    aspirin (ASPIRIN) tablet 325 mg  325 mg Oral DAILY    enoxaparin (LOVENOX) injection 40 mg  40 mg SubCUTAneous Q24H    acetaminophen (TYLENOL) tablet 650 mg  650 mg Oral Q4H PRN    alum-mag hydroxide-simeth (MYLANTA) oral suspension 30 mL  30 mL Oral Q4H PRN    atorvastatin (LIPITOR) tablet 40 mg  40 mg Oral DAILY    bisacodyl (DULCOLAX) suppository 10 mg  10 mg Rectal DAILY PRN    escitalopram oxalate (LEXAPRO) tablet 20 mg  20 mg Oral DAILY    LORazepam (ATIVAN) tablet 1 mg  1 mg Oral Q6H PRN    magnesium hydroxide (MILK OF MAGNESIA) 400 mg/5 mL oral suspension 30 mL  30 mL Oral DAILY PRN    ondansetron (ZOFRAN ODT) tablet 4 mg  4 mg Oral Q6H PRN    senna-docusate (PERICOLACE) 8.6-50 mg per tablet 2 Tab  2 Tab Oral DAILY PRN    valsartan/hydroCHLOROthiazide (DIOVAN HCT) 160/25 mg   Oral DAILY    traZODone (DESYREL) tablet 50 mg  50 mg Oral QHS PRN    pantoprazole (PROTONIX) tablet 40 mg  40 mg Oral ACB     Review of Systems:Denies chest pain, shortness of breath, cough, headache, visual problems, abdominal pain, dysurea, calf pain. Pertinent positives are as noted in the medical records and unremarkable otherwise.      Visit Vitals    /85    Pulse 90    Temp 97.7 °F (36.5 °C)    Resp 16    SpO2 96%    Breastfeeding No        Physical Exam:   General: Alert and age appropriately oriented. No acute cardio respiratory distress. HEENT: Normocephalic,no scleral icterus  Oral mucosa moist without cyanosis; right lower facial weakness   Lungs: Clear to auscultation  bilaterally. Respiration even and unlabored   Heart: Regular rate and rhythm, S1, S2   No  murmurs, clicks, rub or gallops   Abdomen: Soft, non-tender, nondistended. Bowel sounds present. No organomegaly. Genitourinary: Benign . Neuromuscular:      RUE shoulder flexion 1, EF1, EE 1,  2-, finger ext 0/5  slt increase tone with PROM  RLE; hip flexion 3+, KE 3, DF 0, PF 1  Sensation intact. Right lower facial weakness  L sided strength wfl  Increasing tone right hemiplegia, clonus right ankle   Skin/extremity: No rashes, no erythema. No calf tenderness BLE  No edema                                                                            Functional Assessment:  Gross Assessment  AROM:  (LLE WFL, RLE AAROM WFL) (08/22/17 1300)  PROM: Generally decreased, functional (08/22/17 1300)  Strength:  (RLE ankle 1/5,knee ext 4/5 flex -3/5,hip-3/5 to +2/5) (08/22/17 1300)  Coordination:  (RLE impaired, improving) (08/22/17 1300)  Tone: Abnormal (RLE clonus with achilles quick stretch) (08/22/17 1300)  Sensation: Impaired (RLE proprioception) (08/22/17 1300)       Balance  Sitting - Static: Good (unsupported) (08/22/17 1500)  Sitting - Dynamic: Good (unsupported) (08/22/17 1500)  Standing - Static: Fair (with quad cane) (08/22/17 1500)  Standing - Dynamic : Impaired (08/22/17 1500)           Toileting  Cues: Physical assistance for pants down;Physical assistance for pants up (08/17/17 1226)         Gagandeep Wells Fall Risk Assessment:  Gagandeep Wells Fall Risk  Mobility: Ambulates or transfers with assist devices or assistance/unsteady gait (08/23/17 3642)  Mobility Interventions: Patient to call before getting OOB; Communicate number of staff needed for ambulation/transfer (08/23/17 0222)  Mentation: Alert, oriented x 3 (08/23/17 0222)  Medication: Patient receiving anticonvulsants, sedatives(tranquilizers), psychotropics or hypnotics, hypoglycemics, narcotics, sleep aids, antihypertensives, laxatives, or diuretics (08/23/17 0222)  Medication Interventions: Patient to call before getting OOB; Teach patient to arise slowly (08/23/17 0222)  Elimination: Needs assistance with toileting (08/23/17 0222)  Elimination Interventions: Call light in reach; Patient to call for help with toileting needs; Toileting schedule/hourly rounds (08/23/17 0222)  Prior Fall History: No (08/23/17 0222)  Total Score: 3 (08/23/17 0222)  Standard Fall Precautions: Yes (08/23/17 0222)  High Fall Risk: Yes (08/18/17 0840)     Speech Assessment:         Ambulation:  Gait  Distance (ft): 120 Feet (ft) (08/22/17 1500)  Assistive Device: Cane, quad;Orthotic device (1/4 inch heel lift R, ace wrap for R DF assist,) (08/22/17 1500)  Rail Use: Left  (08/22/17 1500)     Labs/Studies:  Recent Results (from the past 72 hour(s))   METABOLIC PANEL, BASIC    Collection Time: 08/21/17  6:26 AM   Result Value Ref Range    Sodium 140 136 - 145 mmol/L    Potassium 3.6 3.5 - 5.1 mmol/L    Chloride 103 98 - 107 mmol/L    CO2 26 21 - 32 mmol/L    Anion gap 11 7 - 16 mmol/L    Glucose 90 65 - 100 mg/dL    BUN 9 6 - 23 MG/DL    Creatinine 0.61 0.6 - 1.0 MG/DL    GFR est AA >60 >60 ml/min/1.73m2    GFR est non-AA >60 >60 ml/min/1.73m2    Calcium 9.0 8.3 - 10.4 MG/DL   CBC W/O DIFF    Collection Time: 08/22/17  7:09 AM   Result Value Ref Range    WBC 5.7 4.3 - 11.1 K/uL    RBC 5.13 4.05 - 5.25 M/uL    HGB 11.2 (L) 11.7 - 15.4 g/dL    HCT 36.2 35.8 - 46.3 %    MCV 70.6 (L) 79.6 - 97.8 FL    MCH 21.8 (L) 26.1 - 32.9 PG    MCHC 30.9 (L) 31.4 - 35.0 g/dL    RDW 20.9 (H) 11.9 - 14.6 %    PLATELET 537 757 - 081 K/uL    MPV 9.4 (L) 10.8 - 14.1 FL       Assessment:     Problem List as of 8/23/2017  Date Reviewed: 1/26/2017 Codes Class Noted - Resolved    Stroke (cerebrum) ([facility] [address] ICD-10-CM: I63.9  ICD-9-CM: 434.91  8/8/2017 - Present        Left sided lacunar stroke [facility]) ICD-10-CM: I63.9  ICD-9-CM: 434.91  8/4/2017 - Present    Overview Signed 8/6/2017  7:44 PM by Catracho Dunn MD     8/2017             Weakness of right side of body ICD-10-CM: R53.1  ICD-9-CM: 728.87  8/3/2017 - Present        Essential hypertension ICD-10-CM: I10  ICD-9-CM: 401.9  10/5/2016 - Present        Obesity (BMI 30-39. 9) ICD-10-CM: E66.9  ICD-9-CM: 278.00  Unknown - Present        Prediabetes ICD-10-CM: R73.03  ICD-9-CM: 790.29  7/28/2015 - Present        Generalized anxiety disorder ICD-10-CM: F41.1  ICD-9-CM: 300.02  Unknown - Present        Insomnia, unspecified ICD-10-CM: G47.00  ICD-9-CM: 780.52  Unknown - Present        Mitral valve prolapse ICD-10-CM: I34.1  ICD-9-CM: 424.0  Unknown - Present        Iron deficiency anemia ICD-10-CM: D50.9  ICD-9-CM: 280.9  8/23/2013 - Present    Overview Signed 10/5/2016  2:49 PM by Catracho Dunn MD     S/p iron infusion x 2             RESOLVED: GERD (gastroesophageal reflux disease) ICD-10-CM: K21.9  ICD-9-CM: 530.81  Unknown - 10/5/2016        RESOLVED: Mixed hyperlipidemia ICD-10-CM: E78.2  ICD-9-CM: 272.2  Unknown - 10/5/2016        RESOLVED: Hypertension ICD-10-CM: I10  ICD-9-CM: 401.9  Unknown - 10/5/2016              Plan:         This is an unfortunate 37yo WF with obesity, RADHA, and htn admitted with right hemiplegia due to Left hemispheric stroke with evolution; etiology of stroke not clear      CVA- continue medical management for CVA  -  Continue antiplatelet therapy; aspirin 325mg daily  - continued on statin; Lipitor   -hypercoag w/u neg x for slt elevation prot C; insignificant finding       Suspect RADHA - can use CPAP while here but will need outpt formal sleep study and Pulmonary follow up. - Continue CPAP, feels she benefits significantly.  Feels less tired.       Obesity; have discussed importance of wt loss for mobility and overall health    8/23 tone developing; consider dantrium or zanaflex; [facility] z[facility]aflex at [facility]      Hypertension - BP fairly controlled; -130s. - No adjustments. - Cont Diovan at current dose.      LUCHO; gets IV infusions; Anemia/ microcytic, on fe supplement. - patient continue fe supplements po. 11.7 back on 8/7; recheck pending; 11.2      Hypokalemia-   - K 3.4 on 8/10, cont KCL 40meq daily  - 8/11 K 3.3 -> increased KCL to  40 bid   - K- 3.8 on 8/14 -> 3.9(8/16) . Decreased KCl 40mg daily. 8/21 k 3.6 cont supplement daily          bowel program - add prn bowel program      GERD - start PPI. At times may need additional antacids, Maalox prn.      Depression; cont Lexapro; home med due to generalized anxiety due to stressors in life. His risk post stroke depression. Order recreational therapy and psych if needed; doing much better and participating well 8/11  -8/22 spirits extremely good. No anxiety noted      Dysphagia; mild. Cont ST for this as well as for dysarthria. Continuing joel-motor exercises. - Mechanical consistency diet, cont aspiration  precautions      8/21 watching for hypertonicity; resting hand splint right hand/wrist at noc      Pneumonia prophylaxis- Incentive spirometer every hour while awake      DVT risk / DVT Prophylaxis- Will require daily physician exam to assess for signs and symptoms as patient is at increased risk for of thromboembolism. Mobilization as tolerated. Intermittent pneumatic compression devices when in bed Thigh-high or knee-high thromboembolic deterrent hose when out of bed. - Lovenox subq daily. - she has no signs of DVT.     Pain Control: stable, mild-to-moderate joint symptoms intermittently, reasonably well controlled by PRN meds. Will require regular pain assessment and comprenhensive pain management. - Cont prn tylenol      Chronic insomnia; on Ambien at home. Have not restarted this. - on prn trazadone.     -pt reports hx of MVP since age of 21. Takes abx with dental procedures. 2D ECHO did not appreciate any abnl of the mitral valve          Time spent was 25 minutes with over 1/2 in direct patient care/examination, consultation and coordination of care.      Signed By: Zuri Lindquist MD     August 23, 2017

## 2017-08-23 NOTE — PROGRESS NOTES
08/23/17 1200   Time Spent With Patient   Time In 1134   Time Out 1159   Patient Seen For: AM;Oral-motor exercises   Mental Status   Neurologic State Alert   Orientation Level Oriented X4   Cognition Appropriate decision making   Perception Appears intact   Perseveration No perseveration noted   Safety/Judgement Fall prevention   Pt completed oral motor exercises 2 x 10 with 90% accuracy.    [place] Armen CADE/SELINA/SLP

## 2017-08-23 NOTE — PROGRESS NOTES
PHYSICAL THERAPY DAILY NOTE  Time In: 7475  Time Out: 8848  Patient Seen For: PM;Gait training;Patient education; Therapeutic exercise;Transfer training; Wheelchair mobility; Other (see progress notes)    Subjective: Patient reporting she has a 8 inch step to go up at her mother's home. Objective:Vital Signs:  Patient Vitals for the past 12 hrs:   Temp Pulse Resp BP SpO2   08/23/17 0735 97.9 °F (36.6 °C) 89 14 124/67 97 %     Pain level:No c/o pain  Pain location:NA  Pain interventions:NA    Patient education:. Bed mobility training,transfer training, gait training, fall precautions, activity pacing, body mechanics, w/c mobility and parts management, Patient verbalizing understanding and demonstrating partial understanding of patient education. Recommend follow up education. Interdisciplinary Communication:NA    Other (comment) (Fall precautions)  GROSS ASSESSMENT Daily Assessment     NA       BED/MAT MOBILITY Daily Assessment   Hemiplegic body mechanics Rolling Right : 0 (Not tested)  Rolling Left : 0 (Not tested)  Supine to Sit : 0 (Not tested)  Sit to Supine : 6 (Modified independent)       TRANSFERS Daily Assessment    Transfer Type: SPT without device (w/c to bed to her left)  Transfer Assistance : 5 (Stand-by assistance)  Sit to Stand Assistance: Stand-by assistance  Car Transfers: Not tested       GAIT Daily Assessment    Amount of Assistance: 5 (Stand-by assistance), verbal cues for increasing step length with LLE  Distance (ft): 160 Feet (ft)  Assistive Device: Cane, quad;Orthotic device (1/4 inch heel lift R, ace wrap for R DF assist,)   Gait training x 20 ft x 10 using left hand rail facilitating cont step through gait pattern facilitating improved ankle PF and knee flex at terminal stance with improved push off at terminal stance. Inhibiting right knee hyperextension at midstance    STEPS or STAIRS Daily Assessment   Slow single step at a time leading up with LLE and down with RLE.  Increased time and effort to complete with min assist to assist RLE up and down steps and inhibit right knee hyperextension. 2 mins sitting rest break after going up steps Steps/Stairs Ambulated (#): 4  Level of Assist : 4 (Minimal assistance)  Rail Use: Left      Gait training up/down 8 inch step with quad cane and min assist x 2. BALANCE Daily Assessment    Sitting - Static: Good (unsupported)  Sitting - Dynamic: Good (unsupported)  Standing - Static: Fair  Standing - Dynamic : Impaired       WHEELCHAIR MOBILITY Daily Assessment   Using LUE and LLE Able to Propel (ft): 150 feet  Functional Level: 6  Curbs/Ramps Assist Required (FIM Score): 0 (Not tested)  Wheelchair Setup Assist Required : 6 (Modified independent)  Wheelchair Management: Manages left brake;Manages right brake;Manages right footrest       LOWER EXTREMITY EXERCISES Daily Assessment    Extremity: Both  Exercise Type #1: Other (comment) (LE motomed x 10 mins at level 3)  Level of Assist: Supervision            Assessment: Gait distance improving daily. Improving with ability to control right knee hyperextension       Patient returned to room at end of treatment. Patient supine in bed with head of bed elevated and bed rails up x 2. Needs placed in reach of patient. Plan of Care: Continue with POC and progress as tolerated.      Clyde La, PT  8/23/2017

## 2017-08-23 NOTE — PROGRESS NOTES
PHYSICAL THERAPY DAILY NOTE  Time In: 0916  Time Out: 1004  Patient Seen For: AM;Patient education; Therapeutic exercise;Transfer training; Wheelchair mobility; Other (see progress notes)    Subjective: Patient reporting she still cannot feel her right foot move. Reports she tries to move her right foot on her own but cannot. Objective:Vital Signs:  Patient Vitals for the past 12 hrs:   Temp Pulse Resp BP SpO2   08/23/17 0735 97.9 °F (36.6 °C) 89 14 124/67 97 %     Pain level:No c/o pain  Pain location:NA  Pain interventions:NA    Patient education:Bed mobility training,transfer training,  fall precautions, activity pacing, body mechanics, w/c mobility and parts management, Patient verbalizing understanding and demonstrating partial understanding of patient education. Recommend follow up education.     Interdisciplinary Communication:spoke with MSW regarding patient meeting with voc rehab    Other (comment) (Fall precautions)  GROSS ASSESSMENT Daily Assessment     Discussed at length patient's mom's home accessibility with measurements her mother took        BED/MAT MOBILITY Daily Assessment   Increased time and effort demonstrating improved body mechanics Rolling Right : 6 (Modified independent)  Rolling Left : 0 (Not tested)  Supine to Sit : 6 (Modified independent)  Sit to Supine : 6 (Modified independent)       TRANSFERS Daily Assessment    Transfer Type: SPT with walker (w/c<>mat to the left with mat elevated to 21 inches)  Transfer Assistance : 5 (Stand-by assistance)  Sit to Stand Assistance: Stand-by assistance  Car Transfers: Not tested       GAIT Daily Assessment    Amount of Assistance: 0 (Not tested)  Distance (ft): 0 Feet (ft)       STEPS or STAIRS Daily Assessment    Steps/Stairs Ambulated (#): 0  Level of Assist : 0 (Not tested)       BALANCE Daily Assessment    Sitting - Static: Good (unsupported)  Sitting - Dynamic: Good (unsupported)  Standing - Static: Fair  Standing - Dynamic : Impaired WHEELCHAIR MOBILITY Daily Assessment   Using LUE and LLE Able to Propel (ft): 150 feet  Functional Level: 6  Curbs/Ramps Assist Required (FIM Score): 0 (Not tested)  Wheelchair Setup Assist Required : 6 (Modified independent)  Wheelchair Management: Manages left brake;Manages right brake;Manages right footrest       LOWER EXTREMITY EXERCISES Daily Assessment    Extremity: Right  Exercise Type #1: Supine lower extremity strengthening  Sets Performed: 2  Reps Performed: 10  Level of Assist: Minimal assistance  Exercise Type #2: Other (comment) (Supine right ankle DF in SAQ position with ESTIM to Ant Tib)  Sets Performed:  (5 sec on 5 sec off cycle,intensity to 20 naman amps)  Reps Performed:  (10 mins with freqeuncy to 80 to 150HZ)  Level of Assist: Supervision          Assessment: Progressing towards goals. Body mechanics with bed mobility and transfers improving. No change in right ant tib strength       Patient return to room at end of treatment and remained up in wheelchair with needs in reach. Plan of Care: Continue with POC and progress as tolerated.      Ceferino Roa, PT  8/23/2017

## 2017-08-23 NOTE — PROGRESS NOTES
Assessment completed. Pt A/O x 4, no s/sx of distress, active bowel sounds in all 4 quadrants. Pt has weakness, tingling to upper extremity and hands. Pt ate 100% of breakfast. She is Min A when transferring from bed to chair to commode.

## 2017-08-23 NOTE — PROGRESS NOTES
Problem: Falls - Risk of  Goal: *Absence of Falls  Document Rashmi Fall Risk and appropriate interventions in the flowsheet.    Outcome: Progressing Towards Goal  Fall Risk Interventions:  Mobility Interventions: Patient to call before getting OOB, Communicate number of staff needed for ambulation/transfer           Medication Interventions: Patient to call before getting OOB, Teach patient to arise slowly     Elimination Interventions: Call light in reach, Patient to call for help with toileting needs, Toileting schedule/hourly rounds

## 2017-08-23 NOTE — PROGRESS NOTES
Message received from [address] [place] [address] with Adri Mclean. Pt approved for 7 additional days through 8/28. Next update on 8/29. Will probably be sent to insurance MD for LOS and level of care. Pt seen by Darby Stein and Autumn Espinosa from [facility]. Will follow up at discharge. FLMA and Disability paperwork completed by MD and given to patient.

## 2017-08-23 NOTE — PROGRESS NOTES
Subjective \"I am improving every day. \"   Activity Standing Connect 4 game and basketball toss while weight bearing through her RUE   Strength/Endurance Patient tolerated the session with no c/o tiredness or fatigue. Her right hand needed assistance in being flattened out when weightbearing. Balance SBA during the standing activity   Social Interaction Friendly and motivated during the session. Cognitive A&OX4   Comments Patient was handed off to Dhara toribio PT at the end of the session.      Venice Lewis, NIKOLASS

## 2017-08-23 NOTE — PROGRESS NOTES
08/23/17 1026   Time Spent With Patient   Time In 0830   Time Out 0919   Patient Seen For: AM;ADLs   Grooming   Grooming Assistance  I   Comments Seated at sink   Upper Body Bathing   Bathing Assistance, Upper Mod I   Position Performed Seated in chair   Adaptive Equipment Tub bench   Lower Body Bathing   Bathing Assistance, [address] handled sponge   Position Performed Seated in chair   Adaptive Equipment Tub bench   29 Amanda Gloria (FIM Score) Mod A   Adaptive Equipment (BSC)   Upper Body Dressing    Dressing Assistance  Min A   Comments To finish bra down trunk   Lower Body Dressing    Dressing Assistance  Mod A   Position Performed Seated in chair;Standing   Adaptive Equipment Used Reacher   Don/Doff Anti-Embolic Stockings NA   Comments Increased assist this session to don socks and shoes d/t time constraints   Functional Transfers   Toilet Transfer  Other (comment)  (SPT with quad cane)   Amount of Assistance Required CGA   Tub or Shower Type Shower   Amount of Assistance Required CGA   Adaptive Equipment Tub transfer bench; Other (comment)  (Quad cane)       S: \"Now the workout's done! \" [post- bathing and dressing] agreeable to therapy. Focus of session was on ADL retraining and functional mobility. Patient was able to transfer wheelchair <> BSC and TTB SPT using quad cane with CGA. Pain not indicated. Collaborated with Daniel FRENCH and confirmed patient is on track to reach goals as documented in the care plan. Patient tolerated session well, but RUE strength/coordination, balance, functional mobility, activity tolerance are still below baseline and require skilled facilitation to successfully and safely complete ADL's and transfers. Patient ended session in wheelchair with Daniel FRENCH.      Kaylen Mckeon OTR/L

## 2017-08-23 NOTE — PROGRESS NOTES
OT Daily Note    Time In 1305   Time Out 1351     Subjective: \"Is it moving better? \" [RUE]  Pain: none indicated    Precautions:  (falls)    Neuro-Muscular Re-Education   Patient completed standing trials for RUE weightbearing at tabletop. Patient transferred sit to stand with CGA, assist to facilitate weightbearing through RUE at elbow/wrist while patient maintains standing balance through anterior trunk lean on tabletop. Patient tolerated 2 standing trials x ~5 minutes each to participate in Pattern Play using LUE at tabletop before requiring seated rest breaks. Strengthening   Patient completed 2 sets x 20 reps of AAROM RUE therapeutic exercise/NMR (1 set seated in wheelchair, 1 set supine/side-lying on mat table). First set administered vibration facilitation at targeted muscle bellies, completed shoulder shrug, flexion/extension, horizontal abduction/adduction, and elbow flexion/extension. Second set completed shoulder shrug and elbow flexion/extension, no vibration facilitation for second set supine on mat table. Patient required maximal physical assist to obtain full elbow extension supine on mat, minimal assist for antagonist movement during elbow flexion. Assessment: Patient tolerated well. Continues with 2-/5 strength in elbow flexion and shoulder shrug. Education: Purpose of therapy  Interdisciplinary Communication: Collaborated with Rosalind Rodriguez and agreed patient is progressing well and on-track to meet goals as stated in [facility]. Plan: Continue to address ADL/IADL, functional mobility, activity tolerance, balance, strengthening, coordination.       Mayelin Lora, OTR/L

## 2017-08-23 NOTE — PROGRESS NOTES
Patient wearing a brace to right hand, she states she has to sleep in it also. Pt voices no other needs. call light with in reach.

## 2017-08-24 PROCEDURE — 97530 THERAPEUTIC ACTIVITIES: CPT

## 2017-08-24 PROCEDURE — 74011250636 HC RX REV CODE- 250/636: Performed by: PHYSICAL MEDICINE & REHABILITATION

## 2017-08-24 PROCEDURE — 97112 NEUROMUSCULAR REEDUCATION: CPT

## 2017-08-24 PROCEDURE — 65310000000 HC RM PRIVATE REHAB

## 2017-08-24 PROCEDURE — 74011250637 HC RX REV CODE- 250/637: Performed by: PHYSICAL MEDICINE & REHABILITATION

## 2017-08-24 PROCEDURE — 97535 SELF CARE MNGMENT TRAINING: CPT

## 2017-08-24 PROCEDURE — 92526 ORAL FUNCTION THERAPY: CPT

## 2017-08-24 PROCEDURE — 97116 GAIT TRAINING THERAPY: CPT

## 2017-08-24 PROCEDURE — 97110 THERAPEUTIC EXERCISES: CPT

## 2017-08-24 PROCEDURE — 99232 SBSQ HOSP IP/OBS MODERATE 35: CPT | Performed by: PHYSICAL MEDICINE & REHABILITATION

## 2017-08-24 RX ORDER — CYCLOBENZAPRINE HCL 10 MG
5-10 TABLET ORAL
Status: DISCONTINUED | OUTPATIENT
Start: 2017-08-24 | End: 2017-09-04

## 2017-08-24 RX ORDER — TIZANIDINE 2 MG/1
2 TABLET ORAL 3 TIMES DAILY
Status: DISCONTINUED | OUTPATIENT
Start: 2017-08-24 | End: 2017-08-27

## 2017-08-24 RX ADMIN — TIZANIDINE 2 MG: 2 TABLET ORAL at 15:46

## 2017-08-24 RX ADMIN — ATORVASTATIN CALCIUM 40 MG: 40 TABLET, FILM COATED ORAL at 08:07

## 2017-08-24 RX ADMIN — ENOXAPARIN SODIUM 40 MG: 40 INJECTION SUBCUTANEOUS at 20:44

## 2017-08-24 RX ADMIN — ACETAMINOPHEN 650 MG: 325 TABLET ORAL at 20:44

## 2017-08-24 RX ADMIN — POTASSIUM CHLORIDE 40 MEQ: 20 TABLET, EXTENDED RELEASE ORAL at 08:07

## 2017-08-24 RX ADMIN — HYDROCHLOROTHIAZIDE: 25 TABLET ORAL at 08:07

## 2017-08-24 RX ADMIN — ACETAMINOPHEN 650 MG: 325 TABLET ORAL at 08:11

## 2017-08-24 RX ADMIN — PANTOPRAZOLE SODIUM 40 MG: 40 TABLET, DELAYED RELEASE ORAL at 06:35

## 2017-08-24 RX ADMIN — TIZANIDINE 2 MG: 2 TABLET ORAL at 20:42

## 2017-08-24 RX ADMIN — ESCITALOPRAM OXALATE 20 MG: 10 TABLET ORAL at 08:07

## 2017-08-24 RX ADMIN — FERROUS SULFATE TAB 325 MG (65 MG ELEMENTAL FE) 325 MG: 325 (65 FE) TAB at 16:50

## 2017-08-24 RX ADMIN — FERROUS SULFATE TAB 325 MG (65 MG ELEMENTAL FE) 325 MG: 325 (65 FE) TAB at 08:07

## 2017-08-24 RX ADMIN — ASPIRIN 325 MG ORAL TABLET 325 MG: 325 PILL ORAL at 08:07

## 2017-08-24 NOTE — PROGRESS NOTES
08/24/17 1107   Time Spent With Patient   Time In 1033   Time Out 1100   Patient Seen For: AM;Oral-motor exercises   Team Conference   Team Conference Date 08/17/17   Family/Caregiver Training   Family Training Needs to be scheduled   Mental Status   Neurologic State Alert   Orientation Level Oriented X4   Cognition Appropriate decision making   Perception Appears intact   Perseveration No perseveration noted   Safety/Judgement Fall prevention   Comprehension (Native Language)   Primary Mode of Comprehension Auditory   Score 7   Expression (Native Language)   Primary Mode of Expression Verbal   Score 7   Social Interaction/Pragmatics   Score 7   Problem Solving   Score 7   Memory   Score 7   Pt making progress toward her goals. Pt would benefit from continued ST services 5 times a week to address oral motor weakness. Pt completed oral motor exercises 2 x 10 with 90% accuracy.     Roxy Flood MA/SELINA/SLP

## 2017-08-24 NOTE — PROGRESS NOTES
Team conference; tentative discharge scheduled for 9/6 with discharge to mother's home. Continued therapy to be determined. Discussed with pt; agreeable. Family training closer to discharge. Pt to decide which family members for training. Mother will be primary caregiver.

## 2017-08-24 NOTE — PROGRESS NOTES
Problem: Falls - Risk of  Goal: *Absence of Falls  Document Rashmi Fall Risk and appropriate interventions in the flowsheet.    Outcome: Progressing Towards Goal  Fall Risk Interventions:  Mobility Interventions: Patient to call before getting OOB, Communicate number of staff needed for ambulation/transfer           Medication Interventions: Patient to call before getting OOB, Evaluate medications/consider consulting pharmacy, Teach patient to arise slowly     Elimination Interventions: Call light in reach, Elevated toilet seat, Patient to call for help with toileting needs, Toilet paper/wipes in reach

## 2017-08-24 NOTE — PROGRESS NOTES
PHYSICAL THERAPY DAILY NOTE  Time In: 1300  Time Out: 0897  Patient Seen For: PM;Other (see progress notes); Therapeutic exercise;Gait training;Transfer training    Subjective: Patient had no complaints. Objective: Other (comment) (Fall precautions)  GROSS ASSESSMENT Daily Assessment            BED/MAT MOBILITY Daily Assessment    Supine to Sit : 6 (Modified independent)  Sit to Supine : 6 (Modified independent)       TRANSFERS Daily Assessment    Transfer Type: SPT without device  Transfer Assistance : 5 (Stand-by assistance)  Sit to Stand Assistance: Stand-by assistance       GAIT Daily Assessment    Using rail at wall to work on better step length and a step thru gait. Amount of Assistance: 4 (Contact guard assistance)  Distance (ft): 40 Feet (ft)  Assistive Device: Gait belt;Brace/Splint (ace wrap to assist DF and heel lift in shoe)       STEPS or STAIRS Daily Assessment    Level of Assist : 0 (Not tested)       BALANCE Daily Assessment            WHEELCHAIR MOBILITY Daily Assessment            LOWER EXTREMITY EXERCISES Daily Assessment    Extremity: Both  Exercise Type #1: Supine lower extremity strengthening  Sets Performed: 1  Reps Performed: 20  Level of Assist: Minimal assistance          Assessment: Patient making great progress. Increasing strength in right leg. Plan of Care: Continue with plan of care to reach PT goals. To OT after treatment.     Yumi Lugo, PTA  8/24/2017

## 2017-08-24 NOTE — PROGRESS NOTES
PHYSICAL THERAPY DAILY NOTE  Time In: 4547  Time Out: 1200  Patient Seen For: AM;Other (see progress notes); Therapeutic exercise;Gait training;Transfer training    Subjective: Patient had no complaints. Objective: Other (comment) (Fall precautions)  GROSS ASSESSMENT Daily Assessment            BED/MAT MOBILITY Daily Assessment    Supine to Sit : 6 (Modified independent)  Sit to Supine : 6 (Modified independent)       TRANSFERS Daily Assessment    Transfer Type: SPT without device  Transfer Assistance : 5 (Stand-by assistance)  Sit to Stand Assistance: Stand-by assistance       GAIT Daily Assessment   Right knee popping back during gait today. Amount of Assistance: 4 (Contact guard assistance)  Distance (ft): 40 Feet (ft)  Assistive Device: Cane, quad; Other (comment) (heel lift in right shoe and ace wrap to assist DF on right)       STEPS or STAIRS Daily Assessment    Level of Assist : 0 (Not tested)       BALANCE Daily Assessment            WHEELCHAIR MOBILITY Daily Assessment            LOWER EXTREMITY EXERCISES Daily Assessment    Extremity: Both  Exercise Type #1: Other (comment) (motomed x 10 minutes)  Sets Performed: 1  Reps Performed: 10  Level of Assist: Supervision          Assessment: Patient making good progress. Plan of Care: Continue with plan of care to reach PT goals. Returned to room with call dunn at reach.     Devante Rogers, PTA  8/24/2017

## 2017-08-24 NOTE — PROGRESS NOTES
OT Daily Note    Time In 1350   Time Out 1440     Subjective: \"It's so funny to see [my R arm] move like that. \"  Pain: None indicated    Precautions:  (falls)    PROM   Patient tolerated PROM stretch to RUE seated in wheelchair in shoulder flexion, extension, abduction, internal/external rotation, elbow extension, wrist extension, and composite digit extension. Neuro-Muscular Re-Education   Patient agreeable to NMES administration to RUE seated in wheelchair. Patient tolerated x 5 minutes at intensity 11 to wrist extensors in surge pattern, good contraction. Patient then tolerated x 8 minutes at intensity 13 for shoulder shrug in surge pattern, patient completes active assisted shoulder shrug seated before vertical mirror with each stimulation. Assessment: Patient tolerated well. Education: Purpose of therapy  Interdisciplinary Communication: Collaborated with Alon Hartley and agreed patient is progressing well and on-track to meet goals as stated in POC. Plan: Continue to address ADL/IADL, functional mobility, activity tolerance, balance, strengthening, coordination.     Dereck Dalton, OTR/L

## 2017-08-24 NOTE — PROGRESS NOTES
08/24/17 1220   Time Spent With Patient   Time In 0916   Time Out 1005   Patient Seen For: AM;ADLs   Feeding/Eating   Feeding/Eating Assistance I   Grooming   Grooming Assistance  I   Comments Seated at sink   Upper Body Bathing   Bathing Assistance, Upper Mod I   Position Performed Seated in chair   Adaptive Equipment Tub bench   Lower Body Bathing   Bathing Assistance, [address] handled sponge   Position Performed Seated in chair   Adaptive Equipment Tub bench   [address] (FIM Score) Mod A   Cues Physical assistance for pants down;Physical assistance for pants up   Upper Body [id number] with bra   Lower Body Dressing    Dressing Assistance  Min A   Leg Crossed Method Used No   Position Performed Seated in chair;Standing   Adaptive Equipment Used Reacher;Long handled shoe horn   Don/Doff Anti-Embolic Stockings NA   Comments Assist to support RLE (Crossing over L knee) and start sock on R foot, finish pants and underwear over hips for donning   Functional Transfers   Toilet Transfer  Other (comment)  (BSC)   Amount of Assistance Required S   Tub or Shower Type Shower   Amount of Assistance Required S   Adaptive Equipment Tub transfer bench; Other (comment)  (Quad cane)       S: \"I need a curved [hair] brush that I can get to the right side of my head with. \" Agreeable to therapy. Focus of session was on ADL retraining and functional mobility. Patient was able to transfer wheelchair <> BSC and TTB this session SPT using quad cane with close SBA. Pain not indicated. Collaborated with PT, Isis Baker and confirmed patient is on track to reach goals as documented in the care plan. Patient tolerated session well, but RUE strength/coordination, RUE tone, balance, functional mobility, activity tolerance are still below baseline and require skilled facilitation to successfully and safely complete ADL's and transfers. Patient ended session in recliner with call remote and phone within reach.      Gisell Gutierrez, OTR/L

## 2017-08-24 NOTE — PROGRESS NOTES
Keshia Hatfield MD,   Medical Director  [address] [facility], [address] [facility]  Tel: [phone]       D PROGRESS NOTE    Casandra Sexton  Admit Date: 8/8/2017  Admit Diagnosis: Stroke - Right Side Body Involvment;Stroke (cerebrum) ([address]    Subjective     Started her menstrual cycle. Heavy bleeding but chronic for her. No cramping. Enjoying therapies. No pain. Spirits good    Objective:     Current Facility-Administered Medications   Medication Dose Route Frequency    tiZANidine (ZANAFLEX) tablet 4 mg  4 mg Oral QHS    potassium chloride (K-DUR, KLOR-CON) SR tablet 40 mEq  40 mEq Oral DAILY    ferrous sulfate tablet 325 mg  1 Tab Oral BID WITH MEALS    aspirin (ASPIRIN) tablet 325 mg  325 mg Oral DAILY    enoxaparin (LOVENOX) injection 40 mg  40 mg SubCUTAneous Q24H    acetaminophen (TYLENOL) tablet 650 mg  650 mg Oral Q4H PRN    alum-mag hydroxide-simeth (MYLANTA) oral suspension 30 mL  30 mL Oral Q4H PRN    atorvastatin (LIPITOR) tablet 40 mg  40 mg Oral DAILY    bisacodyl (DULCOLAX) suppository 10 mg  10 mg Rectal DAILY PRN    escitalopram oxalate (LEXAPRO) tablet 20 mg  20 mg Oral DAILY    LORazepam (ATIVAN) tablet 1 mg  1 mg Oral Q6H PRN    magnesium hydroxide (MILK OF MAGNESIA) 400 mg/5 mL oral suspension 30 mL  30 mL Oral DAILY PRN    ondansetron (ZOFRAN ODT) tablet 4 mg  4 mg Oral Q6H PRN    senna-docusate (PERICOLACE) 8.6-50 mg per tablet 2 Tab  2 Tab Oral DAILY PRN    valsartan/hydroCHLOROthiazide (DIOVAN HCT) 160/25 mg   Oral DAILY    traZODone (DESYREL) tablet 50 mg  50 mg Oral QHS PRN    pantoprazole (PROTONIX) tablet 40 mg  40 mg Oral ACB     Review of Systems:Denies chest pain, shortness of breath, cough, headache, visual problems, abdominal pain, dysurea, calf pain. Pertinent positives are as noted in the medical records and unremarkable otherwise.      Visit Vitals    /68    Pulse 95    Temp 97.7 °F (36.5 °C)    Resp 16  SpO2 95%    Breastfeeding No        Physical Exam:   General: Alert and age appropriately oriented. No acute cardio respiratory distress. HEENT: Normocephalic,no scleral icterus  Oral mucosa moist without cyanosis   Lungs: Clear to auscultation  bilaterally. Respiration even and unlabored   Heart: Regular rate and rhythm, S1, S2   No  murmurs, clicks, rub or gallops   Abdomen: Soft, non-tender, nondistended. Bowel sounds present. No organomegaly. Genitourinary: Benign . Neuromuscular:      Left side; Grossly no focal motor deficits noted.     RUE shoulder flexion 1, EF1, EE 1,  2-, finger ext 0/5  slt increase tone with PROM  RLE; hip flexion 3+, KE 3, DF 0, PF 1  Sensation intact. Right lower facial weakness     tone right hemiplegia, clonus right ankle; Maggi 2     Skin/extremity: No rashes, no erythema.  No calf tenderness BLE  No edema                                                                            Functional Assessment:  Gross Assessment  AROM:  (LLE WFL, RLE AAROM WFL) (08/22/17 1300)  PROM: Generally decreased, functional (08/22/17 1300)  Strength:  (RLE ankle 1/5,knee ext 4/5 flex -3/5,hip-3/5 to +2/5) (08/22/17 1300)  Coordination:  (RLE impaired, improving) (08/22/17 1300)  Tone: Abnormal (RLE clonus with achilles quick stretch) (08/22/17 1300)  Sensation: Impaired (RLE proprioception) (08/22/17 1300)       Balance  Sitting - Static: Good (unsupported) (08/23/17 1600)  Sitting - Dynamic: Good (unsupported) (08/23/17 1600)  Standing - Static: Fair (08/23/17 1600)  Standing - Dynamic : Impaired (08/23/17 1600)           Toileting  Cues: Physical assistance for pants down;Physical assistance for pants up (08/17/17 1226)  Adaptive Equipment:  (BSC) (08/23/17 1026)         Shari Edwards Fall Risk Assessment:  Shari Edwards Fall Risk  Mobility: Ambulates or transfers with assist devices or assistance/unsteady gait (08/24/17 4320)  Mobility Interventions: Patient to call before getting OOB (08/24/17 5840)  Mentation: Alert, oriented x 3 (08/24/17 0735)  Medication: Patient receiving anticonvulsants, sedatives(tranquilizers), psychotropics or hypnotics, hypoglycemics, narcotics, sleep aids, antihypertensives, laxatives, or diuretics (08/24/17 0735)  Medication Interventions: Patient to call before getting OOB (08/24/17 0735)  Elimination: Needs assistance with toileting (08/24/17 0735)  Elimination Interventions: Call light in reach; Patient to call for help with toileting needs; Toileting schedule/hourly rounds (08/24/17 0735)  Prior Fall History: No (08/24/17 0735)  Total Score: 3 (08/24/17 0735)  Standard Fall Precautions: Yes (08/24/17 0735)  High Fall Risk: Yes (08/18/17 0840)     Speech Assessment:         Ambulation:  Gait  Distance (ft): 160 Feet (ft) (08/23/17 1600)  Assistive Device: Cane, quad;Orthotic device (1/4 inch heel lift R, ace wrap for R DF assist,) (08/23/17 1600)  Rail Use: Left  (08/23/17 1600)     Labs/Studies:  Recent Results (from the past 72 hour(s))   CBC W/O DIFF    Collection Time: 08/22/17  7:09 AM   Result Value Ref Range    WBC 5.7 4.3 - 11.1 K/uL    RBC 5.13 4.05 - 5.25 M/uL    HGB 11.2 (L) 11.7 - 15.4 g/dL    HCT 36.2 35.8 - 46.3 %    MCV 70.6 (L) 79.6 - 97.8 FL    MCH 21.8 (L) 26.1 - 32.9 PG    MCHC 30.9 (L) 31.4 - 35.0 g/dL    RDW 20.9 (H) 11.9 - 14.6 %    PLATELET 182 845 - 595 K/uL    MPV 9.4 (L) 10.8 - 14.1 FL       Assessment:     Problem List as of 8/24/2017  Date Reviewed: 1/26/2017          Codes Class Noted - Resolved    [facility] ICD-10-CM: I63.9  ICD-9-CM: 434.91  8/8/2017 - Present        Left sided lacunar stroke [facility]) ICD-10-CM: I63.9  ICD-9-CM: 434.91  8/4/2017 - Present    Overview Signed 8/6/2017  7:44 PM by Princess Bocanegra MD     8/2017             Weakness of right side of body ICD-10-CM: R53.1  ICD-9-CM: 728.87  8/3/2017 - Present        Essential hypertension ICD-10-CM: I10  ICD-9-CM: 401.9  10/5/2016 - Present        Obesity (BMI 30-39. 9) ICD-10-CM: E66.9  ICD-9-CM: 278.00  Unknown - Present        Prediabetes ICD-10-CM: R73.03  ICD-9-CM: 790.29  7/28/2015 - Present        Generalized anxiety disorder ICD-10-CM: F41.1  ICD-9-CM: 300.02  Unknown - Present        Insomnia, unspecified ICD-10-CM: G47.00  ICD-9-CM: 780.52  Unknown - Present        Mitral valve prolapse ICD-10-CM: I34.1  ICD-9-CM: 424.0  Unknown - Present        Iron deficiency anemia ICD-10-CM: D50.9  ICD-9-CM: 280.9  8/23/2013 - Present    Overview Signed 10/5/2016  2:49 PM by Delilah Loyola MD     S/p iron infusion x 2             RESOLVED: GERD (gastroesophageal reflux disease) ICD-10-CM: K21.9  ICD-9-CM: 530.81  Unknown - 10/5/2016        RESOLVED: Mixed hyperlipidemia ICD-10-CM: E78.2  ICD-9-CM: 272.2  Unknown - 10/5/2016        RESOLVED: Hypertension ICD-10-CM: I10  ICD-9-CM: 401.9  Unknown - 10/5/2016              Plan:          This is an unfortunate 35yo WF with obesity, RADHA, and htn admitted with right hemiplegia due to Left hemispheric stroke with evolution; etiology of stroke not clear      CVA- continue medical management for CVA  -  Continue antiplatelet therapy; aspirin 325mg daily  - continued on statin; Lipitor   -hypercoag w/u neg x for slt elevation prot C; insignificant finding       Suspect RADHA - can use CPAP while here but will need outpt formal sleep study and Pulmonary follow up. - Continue CPAP, feels she benefits significantly. Feels less tired.       Obesity; have discussed importance of wt loss for mobility and overall health     8/23 tone developing; consider dantrium or zanaflex; start zanaflex at noc      Hypertension - BP fairly controlled; -130s. - No adjustments. - Cont Diovan at current dose.      LUCHO; gets IV infusions; Anemia/ microcytic, on fe supplement. - patient continue fe supplements po. 11.7 back on 8/7; recheck pending; 11.2  -8/24 hx of heavy menstrual cycle 5-7d/mos.  ? Etiology of her LUCHO; check [facility] Monday     Hypokalemia-   - K 3.4 on 8/10, cont KCL 40meq daily  - 8/11 K 3.3 -> increased KCL to  40 bid   - K- 3.8 on 8/14 -> 3.9(8/16) . Decreased KCl 40mg daily. 8/21 k 3.6 cont supplement daily; resolved, monitor loosely 8/24          bowel program - add prn bowel program      GERD - start PPI. At times may need additional antacids, Maalox prn.      Depression; cont Lexapro; home med due to generalized anxiety due to stressors in life. His risk post stroke depression. Order recreational therapy and psych if needed; doing much better and participating well 8/11  -8/22 spirits extremely good. No anxiety noted      Dysphagia; mild. Cont ST for this as well as for dysarthria. Continuing joel-motor exercises. - Mechanical consistency diet, cont aspiration  precautions      8/21 watching for hypertonicity; resting hand splint right hand/wrist at noc; started Zanaflex at noc 8/23      Pneumonia prophylaxis- Incentive spirometer every hour while awake      DVT risk / DVT Prophylaxis- Will require daily physician exam to assess for signs and symptoms as patient is at increased risk for of thromboembolism. Mobilization as tolerated. Intermittent pneumatic compression devices when in bed Thigh-high or knee-high thromboembolic deterrent hose when out of bed. - Lovenox subq daily. - she has no signs of DVT.     Pain Control: stable, mild-to-moderate joint symptoms intermittently, reasonably well controlled by PRN meds. Will require regular pain assessment and comprenhensive pain management. - Cont prn tylenol      Chronic insomnia; on Ambien at home. Have not restarted this. - on prn trazadone.       -pt reports hx of MVP since age of 21. Takes abx with dental procedures. 2D ECHO did not appreciate any abnl of the mitral valve             Time spent was 25 minutes with over 1/2 in direct patient care/examination, consultation and coordination of care.      Signed By: Criss Bright MD     August 24, 2017

## 2017-08-25 LAB
HCT VFR BLD AUTO: 39.5 % (ref 35.8–46.3)
HGB BLD-MCNC: 11.9 G/DL (ref 11.7–15.4)

## 2017-08-25 PROCEDURE — 97032 APPL MODALITY 1+ESTIM EA 15: CPT

## 2017-08-25 PROCEDURE — 97116 GAIT TRAINING THERAPY: CPT

## 2017-08-25 PROCEDURE — 92526 ORAL FUNCTION THERAPY: CPT

## 2017-08-25 PROCEDURE — 97535 SELF CARE MNGMENT TRAINING: CPT

## 2017-08-25 PROCEDURE — 97110 THERAPEUTIC EXERCISES: CPT

## 2017-08-25 PROCEDURE — 99232 SBSQ HOSP IP/OBS MODERATE 35: CPT | Performed by: PHYSICAL MEDICINE & REHABILITATION

## 2017-08-25 PROCEDURE — 74011250636 HC RX REV CODE- 250/636: Performed by: PHYSICAL MEDICINE & REHABILITATION

## 2017-08-25 PROCEDURE — 36415 COLL VENOUS BLD VENIPUNCTURE: CPT | Performed by: PHYSICAL MEDICINE & REHABILITATION

## 2017-08-25 PROCEDURE — 65310000000 HC RM PRIVATE REHAB

## 2017-08-25 PROCEDURE — 74011250637 HC RX REV CODE- 250/637: Performed by: PHYSICAL MEDICINE & REHABILITATION

## 2017-08-25 PROCEDURE — 97112 NEUROMUSCULAR REEDUCATION: CPT

## 2017-08-25 PROCEDURE — 85018 HEMOGLOBIN: CPT | Performed by: PHYSICAL MEDICINE & REHABILITATION

## 2017-08-25 RX ADMIN — ACETAMINOPHEN 650 MG: 325 TABLET ORAL at 08:13

## 2017-08-25 RX ADMIN — TIZANIDINE 2 MG: 2 TABLET ORAL at 08:08

## 2017-08-25 RX ADMIN — HYDROCHLOROTHIAZIDE: 25 TABLET ORAL at 08:08

## 2017-08-25 RX ADMIN — TIZANIDINE 2 MG: 2 TABLET ORAL at 20:31

## 2017-08-25 RX ADMIN — ESCITALOPRAM OXALATE 20 MG: 10 TABLET ORAL at 08:08

## 2017-08-25 RX ADMIN — ATORVASTATIN CALCIUM 40 MG: 40 TABLET, FILM COATED ORAL at 08:08

## 2017-08-25 RX ADMIN — ENOXAPARIN SODIUM 40 MG: 40 INJECTION SUBCUTANEOUS at 21:02

## 2017-08-25 RX ADMIN — POTASSIUM CHLORIDE 40 MEQ: 20 TABLET, EXTENDED RELEASE ORAL at 08:08

## 2017-08-25 RX ADMIN — FERROUS SULFATE TAB 325 MG (65 MG ELEMENTAL FE) 325 MG: 325 (65 FE) TAB at 16:34

## 2017-08-25 RX ADMIN — FERROUS SULFATE TAB 325 MG (65 MG ELEMENTAL FE) 325 MG: 325 (65 FE) TAB at 08:08

## 2017-08-25 RX ADMIN — PANTOPRAZOLE SODIUM 40 MG: 40 TABLET, DELAYED RELEASE ORAL at 05:22

## 2017-08-25 RX ADMIN — ASPIRIN 325 MG ORAL TABLET 325 MG: 325 PILL ORAL at 08:08

## 2017-08-25 RX ADMIN — TIZANIDINE 2 MG: 2 TABLET ORAL at 16:34

## 2017-08-25 RX ADMIN — ACETAMINOPHEN 650 MG: 325 TABLET ORAL at 21:05

## 2017-08-25 NOTE — PROGRESS NOTES
Completed additional disability paperwork. Given to pt. Application and RX given to pt for disabled parking placard.

## 2017-08-25 NOTE — PROGRESS NOTES
Subjective \"I will have to tell my son about this game. \"   Activity [employer] game   Strength/Endurance Patient tolerated the session with no c/o tiredness or fatigue. She is limited with the use of her R hand but adapts well to getting her tasks completed. Balance SBA with SPT's from w/c to bedside commode. Social Interaction Patient was in good spirits. Cognitive A&O X4   Comments Patient was assisted to her room with her lunch tray and all needs within reach.      Vicente Dow, CTRS

## 2017-08-25 NOTE — PROGRESS NOTES
PHYSICAL THERAPY DAILY NOTE  Time In: 1430  Time Out: 5101  Patient Seen For: PM;Balance activities;Gait training;Patient education;Transfer training; Wheelchair mobility; Other (see progress notes)    Subjective: patient reporting she wants to practice her walking as much as possible         Objective:Vital Signs:  Patient Vitals for the past 12 hrs:   Temp Pulse Resp BP SpO2   08/25/17 0740 97.6 °F (36.4 °C) 86 16 138/83 96 %     Pain level:No c/o pain  Pain location:NA  Pain interventions:NA    Patient education:Balance training,transfer training, gait training, fall precautions, activity pacing,Patient verbalizing understanding and demonstrating partial understanding of patient education. Recommend follow up education.     Interdisciplinary Communication:NA    Other (comment) (Fall precautions)  GROSS ASSESSMENT Daily Assessment     NA       BED/MAT MOBILITY Daily Assessment    Rolling Right : 0 (Not tested)  Rolling Left : 0 (Not tested)  Supine to Sit : 0 (Not tested)  Sit to Supine : 6 (Modified independent)       TRANSFERS Daily Assessment    Transfer Type: SPT without device  Transfer Assistance : 5 (Supervision/setup) (to the left, SBA to the right)  Sit to Stand Assistance: Supervision  Car Transfers: Minimum assistance  Car Type: rehab car       GAIT Daily Assessment    Amount of Assistance: 4 (Contact guard assistance) tactile and verbal cues for upright posture and to inhibit right hip retraction and knee hyperextension  Distance (ft): 160 Feet (ft)  Assistive Device: Cane, quad;Orthotic device (1/4 inch heel lift R, ace wrap for R DF assist,)   Gait training x 20 ft  1 with quad cane and no device on right foot or ankle, CGA to min assist to complete    Gait training x 20 ft with Quad cane, right Toe Off AFO and CGA    Gait training x 75ft with straight cane, ace wrap for right DF assist and 1/4 inch heel lift right shoe, min assist with cues to complete facilitating cont step through gait pattern    STEPS or STAIRS Daily Assessment   Slow single step at a time leading up with LLE and down with RLE, 2 min sitting rest break after going up steps. Assist to lift  RLE up step and assist with placing right foot on step when going down steps. Tendency to hyperextend right knee and adduct RLE  Steps/Stairs Ambulated (#): 4  Level of Assist : 4 (Minimal assistance)  Rail Use: Left    Gait training up/down 6 inch step with quad cane and min assist, cues for sequencing  Gait training up/down 8 inch step with quad cane and min assist, cues for sequencing    BALANCE Daily Assessment    Sitting - Static: Good (unsupported)  Sitting - Dynamic: Good (unsupported)  Standing - Static: Good (with quad cane)  Standing - Dynamic : Impaired       WHEELCHAIR MOBILITY Daily Assessment   Using LUE and LLE Able to Propel (ft): 150 feet  Functional Level: 6  Curbs/Ramps Assist Required (FIM Score): 0 (Not tested)  Wheelchair Setup Assist Required : 6 (Modified independent)  Wheelchair Management: Manages left brake;Manages right brake;Manages right footrest       LOWER EXTREMITY EXERCISES Daily Assessment    NA          Assessment: gait balance and stance stability on RLE improving. Able to advance to gait with straight cane this PM with good potential to improve. Difficulty ambulating up/down steps due to right hamstring and DF weakness       Patient returned to room at end of treatment. Patient supine in bed with head of bed elevated and bed rails up x 2. Needs placed in reach of patient. Plan of Care: Continue with POC and progress as tolerated.      Tigist Chapman, PT  8/25/2017

## 2017-08-25 NOTE — PROGRESS NOTES
08/25/17 1025   Time Spent With Patient   Time In 0831   Time Out 0917   Patient Seen For: AM;ADLs   Feeding/Eating   Feeding/Eating Assistance I   Grooming   Grooming Assistance  I   Comments Seated at sink utilizing bobby techniques   Upper Body Bathing   Bathing Assistance, Upper Mod I   Position Performed Seated in chair   Adaptive Equipment Tub bench   Lower Body Bathing   Bathing Assistance, Lower  S   Position Performed Seated in chair   Adaptive Equipment Tub bench   Comments Supervision for standing balance this session for thoroughness with chandrika hygiene   Toileting   Toileting Assistance (FIM Score) Mod A   Cues Physical assistance for pants up   Upper Body [id number] with bra utiliizing overhead technique   Lower Body Dressing    Dressing Assistance  Min A   Leg Crossed Method Used No   Position Performed Seated in chair;Standing   Adaptive Equipment Used Reacher;Long handled shoe horn   Don/Doff Anti-Embolic Stockings NA   Comments Assist to finish underwear over R hip for donning, assist to prop RLE on edge of bed and support RLE crossed over L knee for donning sock. Assist to start R foot into shoe. Functional Transfers   Tub or Shower Type Shower   Amount of Assistance Required S   Adaptive Equipment Tub transfer bench; Other (comment)  (Quad cane)       S: \"I feel so much better. \" Agreeable to therapy. Focus of session was on ADL retraining/functional mobility. Patient was able to transfer bed to wheelchair and wheelchair <> TTB SPT using quad cane with SBA. Pain not indicated. Note patient continues to complete all ADL and and functional transfers utilizing bobby technique due to continued non-functional use of RUE at this time despite minimal progress with strength at elbow and shoulder. Collaborated with PT, Jamel Goodell and confirmed patient is on track to reach goals as documented in the care plan.    Patient tolerated session well, but RUE strength/coordination, RUE flexor tone, balance, functional mobility, activity tolerance are still below baseline and require skilled facilitation to successfully and safely complete ADL's and transfers. Patient ended session in wheelchair with PT, Ronnie Enrike.      Marylou Aguirre, OTR/L

## 2017-08-25 NOTE — ADT AUTH CERT NOTES
Utilization Review           Stroke: Ischemic - Care Day 5 (8/7/2017) by Narendra Dutta RN        Review Status Review Entered       Completed 8/24/2017       Details              Care Day: 5 Care Date: 8/7/2017 Level of Care: Telemetry       Guideline Day 2        Level Of Care       (X) Stroke unit, telemetry, floor, or ICU              Clinical Status       (X) * Hemodynamic stability       (X) * Mental status at baseline or stable       ( ) * Neurologic deficits absent or stable       (X) * Unimpaired swallowing       (X) * Tolerates sitting in chair       (X) * Feeding with or without assistance              Activity       (X) * Up to chair       (X) Rehabilitation at bedside       (X) Possible assisted ambulation              Routes       (X) * Oral hydration, medications       (X) * Liquid diet or advance as tolerated              Interventions       (X) Neurologic checks       (X) Rehabilitation assessment       (X) DVT prophylaxis       (X) Possible physical therapy       (X) Possible occupational and speech therapy              Medications       (X) Blood pressure control              8/24/2017 5:12 PM EDT by Keron Grewal       Subject: Additional Clinical Information       Clinical Date Reviewed: 8/7/17  LOS; Status; Review Type: (RETRO) Telemetry, Inpatient contâd stay         Vital Signs: 98.2, 150/88, 99, 18, 95-96/RA         130/80, 96         132/76, 97         133/78, 93                  Abn. Findings LABS/RADIOLOGY:         Plt 463; K+ 3.2, Glu 125                  CT Head: Evolving infarct in the left corona radiata without associated         hemorrhage or midline shift/mass effect.                  Meds:         ASA 81mg PO daily         Atorvastatin 40mg PO daily         Lovenox 40mg SC q24hr         Lexapro 20mg PO daily         Zofran 4mg IV q4hr PRN (1x/24hr)         Diovan/HCT 80-12.5mg PO daily incr to 160/25mg PO daily         Klor-Con 40mEq PO x1                                         * Milestone              Additional Notes       Internal Medicine Hospitalist Progress Notes Date of Service: 08/07/17 0901       SUBJECTIVE:       Zoraida Nuñez is a 41yoF with obesity, HTN who was admitted on 8/3 with R sided weakness and dysarthria. Found to have acute L lacunar infarct on MRI. Echo WNL. Carotids WNL.               8/7: no improvement in R sided strength, specifically R arm.  Dysarthria improves. Nausea resolved. No abd pain, HA, visual disturbance.                PHYSICAL EXAM       Neurologic:  AOx3.  Dysarthria improving, R sided paresis upper>lower, L side 5/5              ASSESSMENT       (Principal) Left sided lacunar stroke       Weakness of right side of body       Essential hypertension       Obesity               Plan:       - acute L lacunar CVA       - continue ASA/statin        - increased home diovan today       - zofran prn nausea        - needs outpatient sleep study               DVT Prophylaxis: Lovenox       Dispo: PT/OT/SLP following, would benefit from 9th floor acute rehab, awaiting approval           Stroke: Ischemic - Care Day 4 (8/6/2017) by Troy Frost RN        Review Status Review Entered       Completed 8/24/2017       Details              Care Day: 4 Care Date: 8/6/2017 Level of Care: Telemetry       Guideline Day 2        Level Of Care       (X) Stroke unit, telemetry, floor, or ICU              Clinical Status       (X) * Hemodynamic stability       (X) * Mental status at baseline or stable       ( ) * Neurologic deficits absent or stable       (X) * Unimpaired swallowing       (X) * Tolerates sitting in chair       (X) * Feeding with or without assistance              Activity       (X) * Up to chair              Routes       (X) * Oral hydration, medications       (X) * Liquid diet or advance as tolerated              Interventions       (X) Neurologic checks       (X) DVT prophylaxis              Medications       (X) Blood pressure control              8/24/2017 5:01 PM EDT by Joe Hartley       Subject: Additional Clinical Information       Clinical Date Reviewed: 8/6/17  LOS; Status; Review Type: (RETRO) Telemetry, Inpatient contâd stay         Vital Signs: 98.1, 151/94, 91, 20, 96-99/RA         160/93, 78         131/71, 80         158/95, 98         152/80, 94                  Abn. Findings LABS/RADIOLOGY:         N/A                  Meds:         Tylenol 650mg PO q4hr PRN (2x/24hr)         ASA 81mg PO daily         Atorvastatin 40mg PO daily         Lovenox 40mg SC q24hr         Lexapro 20mg PO daily         Zofran 4mg IV q4hr PRN x1 (2x/24hr)         Diovan/HCT 80-12.5mg PO daily                                          * Milestone              Additional Notes       Internal Medicine Hospitalist Progress Notes Date of Service: 08/06/17 1148       SUBJECTIVE:       Breanna Juárez is a 41yoF with obesity, HTN who was admitted on 8/3 with R sided weakness and dysarthria. Found to have acute L lacunar infarct on MRI. Echo WNL. Carotids WNL.               8/6: reports no improvement in R arm strength but dysarthria appears better. Had nausea with vomiting once yesterday and again once this AM. No abd pain, HA, visual disturbance.               PHYSICAL EXAM       Neurologic: AOx3.  Dysarthria improving, R sided paresis upper>lower, L side 5/5              ASSESSMENT       (Principal) Left sided lacunar stroke       Weakness of right side of body       Essential hypertension       Obesity              Plan:       - acute L lacunar CVA       - continue ASA/statin        - restarted home BP meds, adjust as needed       - zofran prn nausea                DVT Prophylaxis: Lovenox       Dispo: PT/OT/SLP following, would benefit from 9th floor acute rehab           Stroke: Ischemic - Care Day 3 (8/5/2017) by Conner Sanabria RN        Review Status Review Entered       Completed 8/24/2017       Details              Care Day: 3 Care Date: 8/5/2017 Level of Care: Telemetry       Guideline Day 1        Level Of Care       (X) Stroke unit, telemetry, floor, or ICU [A]              Clinical Status       (X) * Clinical Indications met [B]       (X) Cerebral hemorrhage excluded              Interventions       (X) Neurologic checks       (X) DVT prophylaxis       (X) Possible cardiac monitoring [C]              Medications       (X) Blood pressure control              8/24/2017 4:51 PM EDT by Dayana Jackson       Subject: Additional Clinical Information       Clinical Date Reviewed: 8/5/17  LOS; Status; Review Type: (RETRO) Telemetry, Inpatient contâd stay         Vital Signs: 98.1, 150/89, 107, 16, 96-98/RA         170/97, 88         165/91, 87         143/79, 99                  Abn. Findings LABS/RADIOLOGY:         8/4 MRI Brain: Acute left corona radiata lacunar infarction.                  Meds:         Tylenol 650mg PO q4hr PRN x2         ASA 81mg PO daily         Atorvastatin 40mg PO daily         Lovenox 40mg SC q24hr         Lexapro 20mg PO daily         Ativan 1mg IV q8hr PRN (1x/24hr)         Zofran 4mg IV q4hr PRN x1         Diovan/HCT 80-12.5mg PO daily                                          * Milestone              Additional Notes       Internal Medicine Hospitalist Progress Notes Date of Service: 08/05/17 0855       SUBJECTIVE:       Kristi Collins is a 41yoF with obesity, HTN who was admitted on 8/3 with R sided weakness and dysarthria. Found to have acute L lacunar infarct on MRI. Echo WNL. Carotids WNL.               8/5: mild improvement in R arm strength and dysarthria. Mother and son at bedside. Questions answered. Denies HA, SOB, CP, visual disturbance.              PHYSICAL EXAM       Neurologic: AOx3.  Dysarthria, R sided hemiparesis, L side 5/5              ASSESSMENT:       Stroke        (Principal) Weakness of right side of body       Essential hypertension       Obesity              Plan:       - acute L lacunar CVA     - continue ASA/statin        - restart home BP meds                DVT Prophylaxis: Lovenox       Dispo: PT/OT/SLP following, would benefit from 9th floor acute rehab

## 2017-08-25 NOTE — PROGRESS NOTES
08/25/17 1113   Time Spent With Patient   Time In 1045   Time Out 1110   Patient Seen For: AM;Oral-motor exercises   Mental Status   Neurologic State Alert   Orientation Level Oriented X4   Cognition Appropriate decision making   Perception Appears intact   Perseveration No perseveration noted   Safety/Judgement Fall prevention   Pt completed oral motor exercises 2 x 10 with 100% accuracy independently. Pt appears to be at her cognitive baseline. Pt can independently complete oral motor exercises. Encouraged pt to complete exercises daily. No further ST indicated at this time.     [place] Screen MA/SELINA/SLP

## 2017-08-25 NOTE — PROGRESS NOTES
OT Daily Note    Time In 1305   Time Out 1350     Subjective: \"I like it. \" [mirror box therapy]  Pain: L lower back, Biofreeze applied and patient reports decrease in pain    Precautions:  (falls)    Neuro-Muscular Re-Education   Patient completed standing trials at tabletop for RUE weightbearing facilitated at elbow and maximal assist for composite digit extension. Patient completed puzzle task using LUE during weightbearing, tolerated standing trials ~5 minutes each before requiring seated rest breaks. Patient completed 2 sets x 20 reps of AAROM RUE therapeutic exercises facilitated by hand-over-hand taping utilizing mirror box for neuromuscular reeducation. Patient completed bilateral (LUE AROM, RUE AAROM) wrist extension, gross grasp/release, digit abduction/adduction, radial/ulnar deviation, and supination/pronation exercises utilizing mirror box.        Patient completed modified box and block task to retrieve wooden blocks from tabletop, grasp and release over barrier using RUE facilitated by hand-over-hand taping and maximal assist at R elbow to support shoulder movement. Patient then retrieved Connect 4 pieces from tabletop utilizing pincer/three jaw bertrand grasp and released into slots in vertical Connect 4 board using RUE facilitated by same hand-over-hand taping assist.     Assessment: Patient tolerated well    Education: Mirror therapy  Interdisciplinary Communication: Collaborated with Debra Otoole and agreed patient is progressing well and on-track to meet goals as stated in [facility]. Plan: Continue to address ADL/IADL, functional mobility, activity tolerance, balance, strengthening, coordination.     Efe Nova, OTR/L

## 2017-08-25 NOTE — PROGRESS NOTES
PHYSICAL THERAPY DAILY NOTE  Time In: 4846  Time Out: 1005  Patient Seen For: AM;Gait training;Patient education; Therapeutic exercise;Transfer training; Wheelchair mobility; Other (see progress notes)    Subjective: Patient reporting she feels OK. Reports she is having some clonus at night in her right foot. Reports no volitional active movement in her right foot         Objective:Vital Signs:  Patient Vitals for the past 12 hrs:   Temp Pulse Resp BP SpO2   08/25/17 0740 97.6 °F (36.4 °C) 86 16 138/83 96 %     Pain level:No c/o pain  Pain location:NA  Pain interventions:NA    Patient education:Bed mobility training,transfer training, gait training, fall precautions, activity pacing, body mechanics,Patient verbalizing understanding and demonstrating partial understanding of patient education. Recommend follow up education. Interdisciplinary Communication:spoke with MD regarding custom AFO for right LE    Other (comment) (Fall precautions)  GROSS ASSESSMENT Daily Assessment     NA       BED/MAT MOBILITY Daily Assessment   Increased time and effort using hemiplegic body mechanics Rolling Right : 6 (Modified independent)  Rolling Left : 0 (Not tested)  Supine to Sit : 6 (Modified independent)  Sit to Supine : 6 (Modified independent)       TRANSFERS Daily Assessment    Transfer Type: SPT without device (w/c<>mat with mat elevated to 21 inches)  Transfer Assistance : 5 (Supervision/setup)  Sit to Stand Assistance: Supervision  Car Transfers: Not tested       GAIT Daily Assessment    Amount of Assistance: 4 (Contact guard assistance)  Distance (ft): 110 Feet (ft)  Assistive Device: Cane, quad;Orthotic device (1/4 inch heel lift R, ace wrap for R DF assist,)   Slow start/stop partial step through hemiplegic gait pattern with cues to improve LLE step length and RLE stance stability, inhibit right hip retraction and knee hyperextension at midstance.     STEPS or STAIRS Daily Assessment    Steps/Stairs Ambulated (#): 0  Level of Assist : 0 (Not tested)       BALANCE Daily Assessment    Sitting - Static: Good (unsupported)  Sitting - Dynamic: Good (unsupported)  Standing - Static: Good (with quad cane)  Standing - Dynamic : Impaired       WHEELCHAIR MOBILITY Daily Assessment    Able to Propel (ft): 150 feet  Functional Level: 6  Curbs/Ramps Assist Required (FIM Score): 0 (Not tested)  Wheelchair Setup Assist Required : 6 (Modified independent)  Wheelchair Management: Manages left brake;Manages right brake;Manages right footrest       LOWER EXTREMITY EXERCISES Daily Assessment    Extremity: Right  Exercise Type #1: Supine lower extremity strengthening  Sets Performed: 2  Reps Performed: 10  Level of Assist: Minimal assistance  Exercise Type #2: Other (comment) (Premodulated ESTIM to right Ant Tib with ankle DF)  Sets Performed:  (5 sec on 5 sec off cycle x 10 mins)  Reps Performed:  (intensity to 17 naman amps, freg 80 to 150HZ)  Level of Assist: Supervision          Assessment: No significant change in right ant tib strength. Discussed MD and MD agreed with plan for orthotist consult for right AFO. At this time the AFO will help make the patient more independent with gait       Patient return to room at end of treatment and remained up in wheelchair with needs in reach. Plan of Care: Continue with POC and progress as tolerated.      Annabelle Porter, PT  8/25/2017

## 2017-08-25 NOTE — PROGRESS NOTES
Ranjana Dominguez MD,   Medical Director  [address] [facility], [address] [facility]  Tel: [phone]       [facility] PROGRESS NOTE    Henna Range  Admit Date: 8/8/2017  Admit Diagnosis: Stroke - Right Side Body Involvment;Stroke (cerebrum) ([facility] [address]    Subjective     Patient seen and examined. Vss. No acute complaints. PT, OT well tolerated. Steady gains made. No new barrier to progress noted. Heavy menstrual flow, feeling fatigued    Objective:     Current Facility-Administered Medications   Medication Dose Route Frequency    tiZANidine (ZANAFLEX) tablet 2 mg  2 mg Oral TID    cyclobenzaprine (FLEXERIL) tablet 5-10 mg  5-10 mg Oral TID PRN    potassium chloride (K-DUR, KLOR-CON) SR tablet 40 mEq  40 mEq Oral DAILY    ferrous sulfate tablet 325 mg  1 Tab Oral BID WITH MEALS    aspirin (ASPIRIN) tablet 325 mg  325 mg Oral DAILY    enoxaparin (LOVENOX) injection 40 mg  40 mg SubCUTAneous Q24H    acetaminophen (TYLENOL) tablet 650 mg  650 mg Oral Q4H PRN    alum-mag hydroxide-simeth (MYLANTA) oral suspension 30 mL  30 mL Oral Q4H PRN    atorvastatin (LIPITOR) tablet 40 mg  40 mg Oral DAILY    bisacodyl (DULCOLAX) suppository 10 mg  10 mg Rectal DAILY PRN    escitalopram oxalate (LEXAPRO) tablet 20 mg  20 mg Oral DAILY    LORazepam (ATIVAN) tablet 1 mg  1 mg Oral Q6H PRN    magnesium hydroxide (MILK OF MAGNESIA) 400 mg/5 mL oral suspension 30 mL  30 mL Oral DAILY PRN    ondansetron (ZOFRAN ODT) tablet 4 mg  4 mg Oral Q6H PRN    senna-docusate (PERICOLACE) 8.6-50 mg per tablet 2 Tab  2 Tab Oral DAILY PRN    valsartan/hydroCHLOROthiazide (DIOVAN HCT) 160/25 mg   Oral DAILY    traZODone (DESYREL) tablet 50 mg  50 mg Oral QHS PRN    pantoprazole (PROTONIX) tablet 40 mg  40 mg Oral ACB     Review of Systems:Denies chest pain, shortness of breath, cough, headache, visual problems, abdominal pain, + menstrual cramping . denies dysurea, calf pain.  Pertinent positives are as noted in the medical records and unremarkable otherwise. Visit Vitals    /80    Pulse 78    Temp 97.9 °F (36.6 °C)    Resp 17    SpO2 98%    Breastfeeding No        Physical Exam:   General: Alert and age appropriately oriented. No acute cardio respiratory distress. HEENT: Normocephalic,no scleral icterus  Oral mucosa moist without cyanosis, right lower facial weakness   Lungs: Clear to auscultation  bilaterally. Respiration even and unlabored   Heart: Regular rate and rhythm, S1, S2   No  murmurs, clicks, rub or gallops   Abdomen: Soft, non-tender, nondistended. Bowel sounds present. No organomegaly. obese   Genitourinary: Benign . Neuromuscular:      Left side; Grossly no focal motor deficits noted.     RUE shoulder flexion 1, EF1, EE 1,  2-, finger ext 0/5  slt increase tone with PROM  RLE; hip flexion 3+, KE 3, DF 0, PF 1  Sensation intact. Right lower facial weakness    tone right hemiplegia, clonus right ankle; Maggi 2-     Skin/extremity: No rashes, no erythema. No calf tenderness BLE  Wound covered.                                                                             Functional Assessment:  Gross Assessment  AROM:  (LLE WFL, RLE [facility]) (08/22/17 1300)  PROM: Generally decreased, functional (08/22/17 1300)  Strength:  (RLE ankle 1/5,knee ext 4/5 flex -3/5,hip-3/5 to +2/5) (08/22/17 1300)  Coordination:  (RLE impaired, improving) (08/22/17 1300)  Tone: Abnormal (RLE clonus with achilles quick stretch) (08/22/17 1300)  Sensation: Impaired (RLE proprioception) (08/22/17 1300)       Balance  Sitting - Static: Good (unsupported) (08/23/17 1600)  Sitting - Dynamic: Good (unsupported) (08/23/17 1600)  Standing - Static: Fair (08/23/17 1600)  Standing - Dynamic : Impaired (08/23/17 1600)           Toileting  Cues: Physical assistance for pants down;Physical assistance for pants up (08/24/17 1220)  Adaptive Equipment:  (BSC) (08/23/17 1026)         Nathaly Gallardo Fall Risk Assessment:  Tichnor Lam Fall Risk  Mobility: Ambulates or transfers with assist devices or assistance/unsteady gait (08/24/17 2125)  Mobility Interventions: Patient to call before getting OOB (08/24/17 2125)  Mentation: Alert, oriented x 3 (08/24/17 2125)  Medication: Patient receiving anticonvulsants, sedatives(tranquilizers), psychotropics or hypnotics, hypoglycemics, narcotics, sleep aids, antihypertensives, laxatives, or diuretics (08/24/17 2125)  Medication Interventions: Patient to call before getting OOB (08/24/17 2125)  Elimination: Needs assistance with toileting (08/24/17 2125)  Elimination Interventions: Call light in reach (08/24/17 2125)  Prior Fall History: No (08/24/17 2125)  Total Score: 3 (08/24/17 2125)  Standard Fall Precautions: Yes (08/24/17 2125)  High Fall Risk: Yes (08/18/17 0840)     Speech Assessment:         Ambulation:  Gait  Distance (ft): 40 Feet (ft) (08/24/17 1702)  Assistive Device: Gait belt;Brace/Splint (ace wrap to assist DF and heel lift in shoe) (08/24/17 1702)  Rail Use: Left  (08/23/17 1600)     Labs/Studies:  No results found for this or any previous visit (from the past 72 hour(s)). Assessment:     Problem List as of 8/25/2017  Date Reviewed: 1/26/2017          Codes Class Noted - Resolved    [facility] ICD-10-CM: I63.9  ICD-9-CM: 434.91  8/8/2017 - Present        Left sided lacunar stroke [facility]) ICD-10-CM: I63.9  ICD-9-CM: 434.91  8/4/2017 - Present    Overview Signed 8/6/2017  7:44 PM by Antony Shelton MD     8/2017             Weakness of right side of body ICD-10-CM: R53.1  ICD-9-CM: 728.87  8/3/2017 - Present        Essential hypertension ICD-10-CM: I10  ICD-9-CM: 401.9  10/5/2016 - Present        Obesity (BMI 30-39. 9) ICD-10-CM: E66.9  ICD-9-CM: 278.00  Unknown - Present        Prediabetes ICD-10-CM: R73.03  ICD-9-CM: 790.29  7/28/2015 - Present        Generalized anxiety disorder ICD-10-CM: F41.1  ICD-9-CM: 300.02  Unknown - Present        Insomnia, unspecified ICD-10-CM: G47.00  ICD-9-CM: 780.52  Unknown - Present        Mitral valve prolapse ICD-10-CM: I34.1  ICD-9-CM: 424.0  Unknown - Present        Iron deficiency anemia ICD-10-CM: D50.9  ICD-9-CM: 280.9  8/23/2013 - Present    Overview Signed 10/5/2016  2:49 PM by Megha Layton MD     S/p iron infusion x 2             RESOLVED: GERD (gastroesophageal reflux disease) ICD-10-CM: K21.9  ICD-9-CM: 530.81  Unknown - 10/5/2016        RESOLVED: Mixed hyperlipidemia ICD-10-CM: E78.2  ICD-9-CM: 272.2  Unknown - 10/5/2016        RESOLVED: Hypertension ICD-10-CM: I10  ICD-9-CM: 401.9  Unknown - 10/5/2016              Plan:        This is an unfortunate 37yo WF with obesity, RADHA, and htn admitted with right hemiplegia due to Left hemispheric stroke with evolution; etiology of stroke not clear      CVA- continue medical management for CVA  -  Continue antiplatelet therapy; aspirin 325mg daily  - continued on statin; Lipitor   -hypercoag w/u neg x for slt elevation prot C; insignificant finding       Suspect RADHA - can use CPAP while here but will need outpt formal sleep study and Pulmonary follow up. - Continue CPAP, feels she benefits significantly. Feels less tired.       Obesity; have discussed importance of wt loss for mobility and overall health      8/23 tone developing; consider dantrium or zanaflex; start zanaflex at noc; added daytime dosing 8/24 at 2mg tid; monitor effects      Hypertension - BP fairly controlled; -130s. - No adjustments. - Cont Diovan at current dose.      LUCHO; gets IV infusions; Anemia/ microcytic, on fe supplement. - patient continue fe supplements po. 11.7 back on 8/7; recheck pending; 11.2  -8/24 hx of heavy menstrual cycle 5-7d/mos. ? Etiology of her LUCHO; check HH today      Hypokalemia-   - K 3.4 on 8/10, cont KCL 40meq daily  - 8/11 K 3.3 -> increased KCL to  40 bid   - K- 3.8 on 8/14 -> 3.9(8/16) . Decreased KCl 40mg daily.   8/21 k 3.6 cont supplement daily; resolved, monitor loosely 8/24          bowel program - add prn bowel program      GERD - start PPI. At times may need additional antacids, Maalox prn.      Depression; cont Lexapro; home med due to generalized anxiety due to stressors in life. His risk post stroke depression. Order recreational therapy and psych if needed; doing much better and participating well 8/11  -8/22 spirits extremely good. No anxiety noted      Dysphagia; mild. Cont ST for this as well as for dysarthria. Continuing joel-motor exercises. - Mechanical consistency diet, cont aspiration  precautions      8/21 watching for hypertonicity; resting hand splint right hand/wrist at noc; started Zanaflex at noc 8/23      Pneumonia prophylaxis- Incentive spirometer every hour while awake      DVT risk / DVT Prophylaxis- Will require daily physician exam to assess for signs and symptoms as patient is at increased risk for of thromboembolism. Mobilization as tolerated. Intermittent pneumatic compression devices when in bed Thigh-high or knee-high thromboembolic deterrent hose when out of bed. - Lovenox subq daily. - she has no signs of DVT.     Pain Control: stable, mild-to-moderate joint symptoms intermittently, reasonably well controlled by PRN meds. Will require regular pain assessment and comprenhensive pain management. - Cont prn tylenol      Chronic insomnia; on Ambien at home. Have not restarted this. - on prn trazadone.       -pt reports hx of MVP since age of 21. Takes abx with dental procedures. 2D ECHO did not appreciate any abnl of the mitral valve              Time spent was 25 minutes with over 1/2 in direct patient care/examination, consultation and coordination of care.      Signed By: Marita Yepez MD     August 25, 2017

## 2017-08-26 PROCEDURE — 74011250637 HC RX REV CODE- 250/637: Performed by: PHYSICAL MEDICINE & REHABILITATION

## 2017-08-26 PROCEDURE — 94660 CPAP INITIATION&MGMT: CPT

## 2017-08-26 PROCEDURE — 74011250636 HC RX REV CODE- 250/636: Performed by: PHYSICAL MEDICINE & REHABILITATION

## 2017-08-26 PROCEDURE — 97112 NEUROMUSCULAR REEDUCATION: CPT

## 2017-08-26 PROCEDURE — 97150 GROUP THERAPEUTIC PROCEDURES: CPT

## 2017-08-26 PROCEDURE — 99232 SBSQ HOSP IP/OBS MODERATE 35: CPT | Performed by: PHYSICAL MEDICINE & REHABILITATION

## 2017-08-26 PROCEDURE — 97116 GAIT TRAINING THERAPY: CPT

## 2017-08-26 PROCEDURE — 65310000000 HC RM PRIVATE REHAB

## 2017-08-26 RX ADMIN — ATORVASTATIN CALCIUM 40 MG: 40 TABLET, FILM COATED ORAL at 08:18

## 2017-08-26 RX ADMIN — ASPIRIN 325 MG ORAL TABLET 325 MG: 325 PILL ORAL at 08:19

## 2017-08-26 RX ADMIN — ENOXAPARIN SODIUM 40 MG: 40 INJECTION SUBCUTANEOUS at 21:31

## 2017-08-26 RX ADMIN — HYDROCHLOROTHIAZIDE: 25 TABLET ORAL at 08:18

## 2017-08-26 RX ADMIN — TIZANIDINE 2 MG: 2 TABLET ORAL at 08:18

## 2017-08-26 RX ADMIN — TIZANIDINE 2 MG: 2 TABLET ORAL at 21:32

## 2017-08-26 RX ADMIN — TIZANIDINE 2 MG: 2 TABLET ORAL at 16:54

## 2017-08-26 RX ADMIN — FERROUS SULFATE TAB 325 MG (65 MG ELEMENTAL FE) 325 MG: 325 (65 FE) TAB at 08:22

## 2017-08-26 RX ADMIN — PANTOPRAZOLE SODIUM 40 MG: 40 TABLET, DELAYED RELEASE ORAL at 04:49

## 2017-08-26 RX ADMIN — ACETAMINOPHEN 650 MG: 325 TABLET ORAL at 21:31

## 2017-08-26 RX ADMIN — POTASSIUM CHLORIDE 40 MEQ: 20 TABLET, EXTENDED RELEASE ORAL at 08:19

## 2017-08-26 RX ADMIN — FERROUS SULFATE TAB 325 MG (65 MG ELEMENTAL FE) 325 MG: 325 (65 FE) TAB at 16:53

## 2017-08-26 RX ADMIN — ESCITALOPRAM OXALATE 20 MG: 10 TABLET ORAL at 08:18

## 2017-08-26 NOTE — PROGRESS NOTES
PHYSICAL THERAPY DAILY NOTE  Time In: 8984  Time Out: 8712  Patient Seen For: AM;Gait training;Patient education; Therapeutic exercise    Subjective: \"I feel good today\"         Objective: Pt rates 0/10 pain before and after therapy session  Other (comment) (Fall precautions)  GROSS ASSESSMENT Daily Assessment            BED/MAT MOBILITY Daily Assessment            TRANSFERS Daily Assessment   Additional SPT with quad cane CGA Transfer Type: SPT without device  Transfer Assistance : 4 (Contact guard assistance)  Sit to Stand Assistance: Contact guard assistance       GAIT Daily Assessment    Amount of Assistance: 4 (Contact guard assistance)  Distance (ft): 120 Feet (ft)  Assistive Device: Cane, quad       STEPS or STAIRS Daily Assessment            BALANCE Daily Assessment            WHEELCHAIR MOBILITY Daily Assessment            LOWER EXTREMITY EXERCISES Daily Assessment   Standing with quad cane left hand for support pt performed RLE step ups onto 4in stool to increase hip flex/foot clearance carryover to gait AROM. Seated EOM blue physioball rollouts with BUE on ball WB to increase proprioception RUE 3x20 AAROM and to help increase lumber ROM Extremity: Right  Exercise Type #1: Seated lower extremity strengthening  Sets Performed: 3  Reps Performed: 20  Level of Assist: Contact guard assistance  Exercise Type #2: Standing lower extremity strengthening  Sets Performed: 1  Reps Performed: 15  Level of Assist: Contact guard assistance          Assessment: Pt displays right sided deficits secondary to CVA with RUE flaccidity, pt displays right knee hyperextension x2 during gait with PTA protecting RLE to block prn, pt verbalizes taking controlled steps to prevent the hyperextension. Plan of Care: Continue with current POC to help pt achieve PLOF.      Chano Cole, PTA  8/26/2017

## 2017-08-26 NOTE — PROGRESS NOTES
Lavonne Salgado MD,   Medical Director  [address] [facility], [address] [facility]  Tel: [phone]       SFD PROGRESS NOTE    Stephen Blind  Admit Date: 8/8/2017  Admit Diagnosis: Stroke - Right Side Body Involvment;Stroke (cerebrum) ([facility] [address]    Subjective     Still with very heavy menstrual flow. Denies dizziness or light headedness. Feels well otherwise. Slept well. Continent b/b    Objective:     Current Facility-Administered Medications   Medication Dose Route Frequency    tiZANidine (ZANAFLEX) tablet 2 mg  2 mg Oral TID    cyclobenzaprine (FLEXERIL) tablet 5-10 mg  5-10 mg Oral TID PRN    potassium chloride (K-DUR, KLOR-CON) SR tablet 40 mEq  40 mEq Oral DAILY    ferrous sulfate tablet 325 mg  1 Tab Oral BID WITH MEALS    aspirin (ASPIRIN) tablet 325 mg  325 mg Oral DAILY    enoxaparin (LOVENOX) injection 40 mg  40 mg SubCUTAneous Q24H    acetaminophen (TYLENOL) tablet 650 mg  650 mg Oral Q4H PRN    alum-mag hydroxide-simeth (MYLANTA) oral suspension 30 mL  30 mL Oral Q4H PRN    atorvastatin (LIPITOR) tablet 40 mg  40 mg Oral DAILY    bisacodyl (DULCOLAX) suppository 10 mg  10 mg Rectal DAILY PRN    escitalopram oxalate (LEXAPRO) tablet 20 mg  20 mg Oral DAILY    LORazepam (ATIVAN) tablet 1 mg  1 mg Oral Q6H PRN    magnesium hydroxide (MILK OF MAGNESIA) 400 mg/5 mL oral suspension 30 mL  30 mL Oral DAILY PRN    ondansetron (ZOFRAN ODT) tablet 4 mg  4 mg Oral Q6H PRN    senna-docusate (PERICOLACE) 8.6-50 mg per tablet 2 Tab  2 Tab Oral DAILY PRN    valsartan/hydroCHLOROthiazide (DIOVAN HCT) 160/25 mg   Oral DAILY    traZODone (DESYREL) tablet 50 mg  50 mg Oral QHS PRN    pantoprazole (PROTONIX) tablet 40 mg  40 mg Oral ACB     Review of Systems:Denies chest pain, shortness of breath, cough, headache, visual problems, abdominal pain, dysurea, calf pain. Pertinent positives are as noted in the medical records and unremarkable otherwise.      Visit Vitals    /81 (BP 1 Location: Left arm, BP Patient Position: At rest)    Pulse 88    Temp 98.1 °F (36.7 °C)    Resp 19    SpO2 94%    Breastfeeding No        Physical Exam:   General: Alert and age appropriately oriented. No acute cardio respiratory distress. HEENT: Normocephalic,no scleral icterus  Oral mucosa moist without cyanosis   Lungs: Clear to auscultation  bilaterally. Respiration even and unlabored   Heart: Regular rate and rhythm, S1, S2   No  murmurs, clicks, rub or gallops   Abdomen: Soft, non-tender, nondistended. Bowel sounds present. No organomegaly. Genitourinary: Benign . Neuromuscular:      No changes   RUE shoulder flexion 1, EF1, EE 1,  2-, finger ext 0/5  slt increase tone with PROM  RLE; hip flexion 3+, KE 3, DF 0, PF 1  Sensation intact. Right lower facial weakness    tone right hemiplegia, clonus right ankle; Maggi 2-     Skin/extremity: No rashes, no erythema.  No calf tenderness BLE  No edema                                                                            Functional Assessment:  Gross Assessment  AROM:  (LLE WFL, RLE AAROM WFL) (08/22/17 1300)  PROM: Generally decreased, functional (08/22/17 1300)  Strength:  (RLE ankle 1/5,knee ext 4/5 flex -3/5,hip-3/5 to +2/5) (08/22/17 1300)  Coordination:  (RLE impaired, improving) (08/22/17 1300)  Tone: Abnormal (RLE clonus with achilles quick stretch) (08/22/17 1300)  Sensation: Impaired (RLE proprioception) (08/22/17 1300)       Balance  Sitting - Static: Good (unsupported) (08/25/17 1600)  Sitting - Dynamic: Good (unsupported) (08/25/17 1600)  Standing - Static: Good (with quad cane) (08/25/17 1600)  Standing - Dynamic : Impaired (08/25/17 1600)           Toileting  Cues: Physical assistance for pants up (08/25/17 1025)  Adaptive Equipment:  (BSC) (08/23/17 1026)         Orvel Buffy Fall Risk Assessment:  Orvel Buffy Fall Risk  Mobility: Ambulates with unsteady gait and no assistance (08/25/17 2030)  Mobility Interventions: Patient to call before getting OOB (08/25/17 2030)  Mentation: Alert, oriented x 3 (08/25/17 2030)  Medication: Patient receiving anticonvulsants, sedatives(tranquilizers), psychotropics or hypnotics, hypoglycemics, narcotics, sleep aids, antihypertensives, laxatives, or diuretics (08/25/17 2030)  Medication Interventions: Patient to call before getting OOB (08/25/17 2030)  Elimination: Needs assistance with toileting (08/25/17 2030)  Elimination Interventions: Call light in reach (08/25/17 2030)  Prior Fall History: No (08/25/17 2030)  Total Score: 3 (08/25/17 2030)  Standard Fall Precautions: Yes (08/25/17 2030)  High Fall Risk: Yes (08/18/17 0840)     Speech Assessment:         Ambulation:  Gait  Distance (ft): 160 Feet (ft) (08/25/17 1600)  Assistive Device: [place], quad;Orthotic device (1/4 inch heel lift R, ace wrap for R DF assist,) (08/25/17 1600)  Rail Use: Left  (08/25/17 1600)     Labs/Studies:  Recent Results (from the past 72 hour(s))   HGB & HCT    Collection Time: 08/25/17  9:31 AM   Result Value Ref Range    HGB 11.9 11.7 - 15.4 g/dL    HCT 39.5 35.8 - 46.3 %       Assessment:     Problem List as of 8/26/2017  Date Reviewed: 1/26/2017          Codes Class Noted - Resolved    [facility] ICD-10-CM: I63.9  ICD-9-CM: 434.91  8/8/2017 - Present        Left sided lacunar stroke [facility]) ICD-10-CM: I63.9  ICD-9-CM: 434.91  8/4/2017 - Present    Overview Signed 8/6/2017  7:44 PM by Nicol Estrada MD     8/2017             Weakness of right side of body ICD-10-CM: R53.1  ICD-9-CM: 728.87  8/3/2017 - Present        Essential hypertension ICD-10-CM: I10  ICD-9-CM: 401.9  10/5/2016 - Present        Obesity (BMI 30-39. 9) ICD-10-CM: E66.9  ICD-9-CM: 278.00  Unknown - Present        Prediabetes ICD-10-CM: R73.03  ICD-9-CM: 790.29  7/28/2015 - Present        Generalized anxiety disorder ICD-10-CM: F41.1  ICD-9-CM: 300.02  Unknown - Present        Insomnia, unspecified ICD-10-CM: G47.00  ICD-9-CM: 780.52  Unknown - Present        Mitral valve prolapse ICD-10-CM: I34.1  ICD-9-CM: 424.0  Unknown - Present        Iron deficiency anemia ICD-10-CM: D50.9  ICD-9-CM: 280.9  8/23/2013 - Present    Overview Signed 10/5/2016  2:49 PM by Dayanara Masterson MD     S/p iron infusion x 2             RESOLVED: GERD (gastroesophageal reflux disease) ICD-10-CM: K21.9  ICD-9-CM: 530.81  Unknown - 10/5/2016        RESOLVED: Mixed hyperlipidemia ICD-10-CM: E78.2  ICD-9-CM: 272.2  Unknown - 10/5/2016        RESOLVED: Hypertension ICD-10-CM: I10  ICD-9-CM: 401.9  Unknown - 10/5/2016              Plan:        This is an unfortunate 35yo WF with obesity, RADHA, and htn admitted with right hemiplegia due to Left hemispheric stroke with evolution; etiology of stroke not clear      CVA- continue medical management for CVA  -  Continue antiplatelet therapy; aspirin 325mg daily  - continued on statin; Lipitor   -hypercoag w/u neg x for slt elevation prot C; insignificant finding       Suspect RADHA - can use CPAP while here but will need outpt formal sleep study and Pulmonary follow up. - Continue CPAP, feels she benefits significantly. Feels less tired.       Obesity; have discussed importance of wt loss for mobility and overall health      8/23 tone developing; consider dantrium or zanaflex; start zanaflex at noc; added daytime dosing 8/24 at 2mg tid; monitor effects      Hypertension - BP fairly controlled; -130s. - No adjustments. - Cont Diovan at current dose.      LUCHO; gets IV infusions; Anemia/ microcytic, on fe supplement. - patient continue fe supplements po. 11.7 back on 8/7; recheck pending; 11.2  -8/24 hx of heavy menstrual cycle 5-7d/mos. ? Etiology of her LUCHO; check HH today; 11.9 improved      Hypokalemia-   - K 3.4 on 8/10, cont KCL 40meq daily  - 8/11 K 3.3 -> increased KCL to  40 bid   - K- 3.8 on 8/14 -> 3.9(8/16) . Decreased KCl 40mg daily.   8/21 k 3.6 cont supplement daily; resolved, monitor loosely 8/24          bowel program - add prn bowel program      GERD - start PPI. At times may need additional antacids, Maalox prn.      Depression; cont Lexapro; home med due to generalized anxiety due to stressors in life. His risk post stroke depression. Order recreational therapy and psych if needed; doing much better and participating well 8/11  -8/22 spirits extremely good. No anxiety noted      Dysphagia; mild. Cont ST for this as well as for dysarthria. Continuing joel-motor exercises. - Mechanical consistency diet, cont aspiration  precautions      8/21 watching for hypertonicity; resting hand splint right hand/wrist at noc; started Zanaflex at noc 8/23; per PT slt improvement but I still note clonus right ankle; cont 2mg tid and titrate to desired effect 8/26      Pneumonia prophylaxis- Incentive spirometer every hour while awake      DVT risk / DVT Prophylaxis- Will require daily physician exam to assess for signs and symptoms as patient is at increased risk for of thromboembolism. Mobilization as tolerated. Intermittent pneumatic compression devices when in bed Thigh-high or knee-high thromboembolic deterrent hose when out of bed. - Lovenox subq daily. - she has no signs of DVT.     Pain Control: stable, mild-to-moderate joint symptoms intermittently, reasonably well controlled by PRN meds. Will require regular pain assessment and comprenhensive pain management. - Cont prn tylenol      Chronic insomnia; on Ambien at home. Have not restarted this. - on prn trazadone.       -pt reports hx of MVP since age of 21. Takes abx with dental procedures. 2D ECHO did not appreciate any abnl of the mitral valve                 Time spent was 25 minutes with over 1/2 in direct patient care/examination, consultation and coordination of care.      Signed By: Karina Velarde MD     August 26, 2017

## 2017-08-26 NOTE — PROGRESS NOTES
Assessment completed, pt resting comfortably in bed, she denies pain, respiration even and non labored, pt with CVA with RT hemisphaeria. Active bowel sounds in all 4 quadrants. Skin intact. Pt wears brace to Rt hand/arm. She is on her menstrual cycle. Pt encourage to call for any needs. Call light with in reach.

## 2017-08-26 NOTE — PROGRESS NOTES
08/26/17 1157   Time Spent With Patient   Time In 0940   Time Out 1018   Patient Seen For: AM;Other (see progress notes)     Patient lying in bed when therapist arrived and was accepting to therapy. Patient required assistance to don right shoe and independent with left shoe. Patient completed SPT from edge of bed to wheelchair with SBA. RUE A/AROM to reduce flexor tone and assist in muscle facilitation. With proximal assistance of RUE, patient completed bilateral mandie 10 reps x's 3. Patient used a gross grasp with RUE to stabilize everyday containers. Patient required hand held assistance to extend digits around container and was dependent for releasing object. At the end of session, patient transferred into bed with SBA. All essentials within reach.      Rolando Bran, OTR/L

## 2017-08-27 PROCEDURE — 94660 CPAP INITIATION&MGMT: CPT

## 2017-08-27 PROCEDURE — 65310000000 HC RM PRIVATE REHAB

## 2017-08-27 PROCEDURE — 74011250637 HC RX REV CODE- 250/637: Performed by: PHYSICAL MEDICINE & REHABILITATION

## 2017-08-27 PROCEDURE — 99232 SBSQ HOSP IP/OBS MODERATE 35: CPT | Performed by: PHYSICAL MEDICINE & REHABILITATION

## 2017-08-27 PROCEDURE — 74011250636 HC RX REV CODE- 250/636: Performed by: PHYSICAL MEDICINE & REHABILITATION

## 2017-08-27 RX ORDER — TIZANIDINE 2 MG/1
4 TABLET ORAL 3 TIMES DAILY
Status: DISCONTINUED | OUTPATIENT
Start: 2017-08-27 | End: 2017-09-06 | Stop reason: HOSPADM

## 2017-08-27 RX ADMIN — ENOXAPARIN SODIUM 40 MG: 40 INJECTION SUBCUTANEOUS at 21:00

## 2017-08-27 RX ADMIN — TIZANIDINE 4 MG: 2 TABLET ORAL at 16:36

## 2017-08-27 RX ADMIN — PANTOPRAZOLE SODIUM 40 MG: 40 TABLET, DELAYED RELEASE ORAL at 06:28

## 2017-08-27 RX ADMIN — ESCITALOPRAM OXALATE 20 MG: 10 TABLET ORAL at 09:04

## 2017-08-27 RX ADMIN — POTASSIUM CHLORIDE 40 MEQ: 20 TABLET, EXTENDED RELEASE ORAL at 09:04

## 2017-08-27 RX ADMIN — FERROUS SULFATE TAB 325 MG (65 MG ELEMENTAL FE) 325 MG: 325 (65 FE) TAB at 09:05

## 2017-08-27 RX ADMIN — FERROUS SULFATE TAB 325 MG (65 MG ELEMENTAL FE) 325 MG: 325 (65 FE) TAB at 16:36

## 2017-08-27 RX ADMIN — ATORVASTATIN CALCIUM 40 MG: 40 TABLET, FILM COATED ORAL at 09:04

## 2017-08-27 RX ADMIN — HYDROCHLOROTHIAZIDE: 25 TABLET ORAL at 09:03

## 2017-08-27 RX ADMIN — ASPIRIN 325 MG ORAL TABLET 325 MG: 325 PILL ORAL at 09:04

## 2017-08-27 RX ADMIN — TIZANIDINE 4 MG: 2 TABLET ORAL at 21:19

## 2017-08-27 RX ADMIN — TIZANIDINE 2 MG: 2 TABLET ORAL at 09:04

## 2017-08-27 NOTE — PROGRESS NOTES
Pt A/O x 4 denies pain, denies shortness of breath. CVA with right side weakness. Bowel sounds in all 4 quadrants. Pt able to stand and pivot from bed to W/C and to commode. Pt on her cycle. Pt encourage to call for ant needs.

## 2017-08-27 NOTE — PROGRESS NOTES
Hollis Montes MD,   Medical Director  [address] [facility], [address] [facility]  Tel: [phone]       D PROGRESS NOTE    Delmi Stark  Admit Date: 8/8/2017  Admit Diagnosis: Stroke - Right Side Body Involvment;Stroke (cerebrum) ([facility])    Subjective     Good spirits. Menstrual flow is lessening. Still will tone on the right side but less so in hand. No pain    Objective:     Current Facility-Administered Medications   Medication Dose Route Frequency    tiZANidine (ZANAFLEX) tablet 2 mg  2 mg Oral TID    cyclobenzaprine (FLEXERIL) tablet 5-10 mg  5-10 mg Oral TID PRN    potassium chloride (K-DUR, KLOR-CON) SR tablet 40 mEq  40 mEq Oral DAILY    ferrous sulfate tablet 325 mg  1 Tab Oral BID WITH MEALS    aspirin (ASPIRIN) tablet 325 mg  325 mg Oral DAILY    enoxaparin (LOVENOX) injection 40 mg  40 mg SubCUTAneous Q24H    acetaminophen (TYLENOL) tablet 650 mg  650 mg Oral Q4H PRN    alum-mag hydroxide-simeth (MYLANTA) oral suspension 30 mL  30 mL Oral Q4H PRN    atorvastatin (LIPITOR) tablet 40 mg  40 mg Oral DAILY    bisacodyl (DULCOLAX) suppository 10 mg  10 mg Rectal DAILY PRN    escitalopram oxalate (LEXAPRO) tablet 20 mg  20 mg Oral DAILY    LORazepam (ATIVAN) tablet 1 mg  1 mg Oral Q6H PRN    magnesium hydroxide (MILK OF MAGNESIA) 400 mg/5 mL oral suspension 30 mL  30 mL Oral DAILY PRN    ondansetron (ZOFRAN ODT) tablet 4 mg  4 mg Oral Q6H PRN    senna-docusate (PERICOLACE) 8.6-50 mg per tablet 2 Tab  2 Tab Oral DAILY PRN    valsartan/hydroCHLOROthiazide (DIOVAN HCT) 160/25 mg   Oral DAILY    traZODone (DESYREL) tablet 50 mg  50 mg Oral QHS PRN    pantoprazole (PROTONIX) tablet 40 mg  40 mg Oral ACB     Review of Systems:Denies chest pain, shortness of breath, cough, headache, visual problems, abdominal pain, dysurea, calf pain. Pertinent positives are as noted in the medical records and unremarkable otherwise.      Visit Vitals    BP 116/74 (BP 1 Location: Left arm, BP Patient Position: At rest)    Pulse 77    Temp 97.6 °F (36.4 °C)    Resp 20    SpO2 97%    Breastfeeding No        Physical Exam:   General: Alert and age appropriately oriented. No acute cardio respiratory distress. HEENT: Normocephalic,no scleral icterus  Oral mucosa moist without cyanosis   Lungs: Clear to auscultation  bilaterally. Respiration even and unlabored   Heart: Regular rate and rhythm, S1, S2   No  murmurs, clicks, rub or gallops   Abdomen: Soft, non-tender, nondistended. Bowel sounds present. No organomegaly. Genitourinary: Benign . Neuromuscular:      Grossly no focal motor deficits noted on the left  2+ tone with ROM RUE, several beats clonus at right ankly  RUE shoulder flexion 1, EF1, EE 1,  2-, finger ext 0/5  slt increase tone with PROM  RLE; hip flexion 3+, KE 3, DF 0, PF 1  Sensation intact. Right lower facial weakness   Skin/extremity: No rashes, no erythema.  No calf tenderness BLE  No edema                                                                            Functional Assessment:  Gross Assessment  AROM:  (LLE WFL, RLE AAROM WFL) (08/22/17 1300)  PROM: Generally decreased, functional (08/22/17 1300)  Strength:  (RLE ankle 1/5,knee ext 4/5 flex -3/5,hip-3/5 to +2/5) (08/22/17 1300)  Coordination:  (RLE impaired, improving) (08/22/17 1300)  Tone: Abnormal (RLE clonus with achilles quick stretch) (08/22/17 1300)  Sensation: Impaired (RLE proprioception) (08/22/17 1300)       Balance  Sitting - Static: Good (unsupported) (08/25/17 1600)  Sitting - Dynamic: Good (unsupported) (08/25/17 1600)  Standing - Static: Good (with quad cane) (08/25/17 1600)  Standing - Dynamic : Impaired (08/25/17 1600)           Toileting  Cues: Physical assistance for pants up (08/25/17 1025)  Adaptive Equipment:  (BSC) (08/23/17 1026)         Emilee Menezes Fall Risk Assessment:  Emilee Menezes Fall Risk  Mobility: Ambulates with unsteady gait and no assistance (08/27/17 3607)  Mobility Interventions: Patient to call before getting OOB (08/27/17 0042)  Mentation: Alert, oriented x 3 (08/27/17 0042)  Medication: Patient receiving anticonvulsants, sedatives(tranquilizers), psychotropics or hypnotics, hypoglycemics, narcotics, sleep aids, antihypertensives, laxatives, or diuretics (08/27/17 0042)  Medication Interventions: Patient to call before getting OOB (08/27/17 0042)  Elimination: Needs assistance with toileting (08/27/17 0042)  Elimination Interventions: Call light in reach (08/27/17 0042)  Prior Fall History: No (08/27/17 0042)  Total Score: 3 (08/27/17 0042)  Standard Fall Precautions: Yes (08/27/17 0042)  High Fall Risk: Yes (08/27/17 0042)     Speech Assessment:         Ambulation:  Gait  Distance (ft): 120 Feet (ft) (08/26/17 1200)  Assistive Device: Cane, quad (08/26/17 1200)  Rail Use: Left  (08/25/17 1600)     Labs/Studies:  Recent Results (from the past 72 hour(s))   HGB & HCT    Collection Time: 08/25/17  9:31 AM   Result Value Ref Range    HGB 11.9 11.7 - 15.4 g/dL    HCT 39.5 35.8 - 46.3 %       Assessment:     Problem List as of 8/27/2017  Date Reviewed: 1/26/2017          Codes Class Noted - Resolved    Stroke (cerebrum) ([facility] ICD-10-CM: I63.9  ICD-9-CM: 434.91  8/8/2017 - Present        Left sided lacunar stroke [facility]) ICD-10-CM: I63.9  ICD-9-CM: 434.91  8/4/2017 - Present    Overview Signed 8/6/2017  7:44 PM by Kinga De Jesus MD     8/2017             Weakness of right side of body ICD-10-CM: R53.1  ICD-9-CM: 728.87  8/3/2017 - Present        Essential hypertension ICD-10-CM: I10  ICD-9-CM: 401.9  10/5/2016 - Present        Obesity (BMI 30-39. 9) ICD-10-CM: E66.9  ICD-9-CM: 278.00  Unknown - Present        Prediabetes ICD-10-CM: R73.03  ICD-9-CM: 790.29  7/28/2015 - Present        Generalized anxiety disorder ICD-10-CM: F41.1  ICD-9-CM: 300.02  Unknown - Present        Insomnia, unspecified ICD-10-CM: G47.00  ICD-9-CM: 780.52  Unknown - Present        Mitral valve prolapse ICD-10-CM: I34.1  ICD-9-CM: 424.0  Unknown - Present        Iron deficiency anemia ICD-10-CM: D50.9  ICD-9-CM: 280.9  8/23/2013 - Present    Overview Signed 10/5/2016  2:49 PM by Randy Tamayo MD     S/p iron infusion x 2             RESOLVED: GERD (gastroesophageal reflux disease) ICD-10-CM: K21.9  ICD-9-CM: 530.81  Unknown - 10/5/2016        RESOLVED: Mixed hyperlipidemia ICD-10-CM: E78.2  ICD-9-CM: 272.2  Unknown - 10/5/2016        RESOLVED: Hypertension ICD-10-CM: I10  ICD-9-CM: 401.9  Unknown - 10/5/2016              Plan:          This is an unfortunate 35yo WF with obesity, RADHA, and htn admitted with right hemiplegia due to Left hemispheric stroke with evolution; etiology of stroke not clear      CVA- continue medical management for CVA  -  Continue antiplatelet therapy; aspirin 325mg daily  - continued on statin; Lipitor   -hypercoag w/u neg x for slt elevation prot C; insignificant finding       Suspect RADHA - can use CPAP while here but will need outpt formal sleep study and Pulmonary follow up. - Continue CPAP, feels she benefits significantly. Feels less tired.       Obesity; have discussed importance of wt loss for mobility and overall health      8/23 tone developing; consider dantrium or zanaflex; start zanaflex at noc; added daytime dosing 8/24 at 2mg tid; monitor effects      Hypertension - BP fairly controlled; -130s. - No adjustments. - Cont Diovan at current dose.      LUCHO; gets IV infusions; Anemia/ microcytic, on fe supplement. - patient continue fe supplements po. 11.7 back on 8/7; recheck pending; 11.2  -8/24 hx of heavy menstrual cycle 5-7d/mos. ? Etiology of her LUCHO; check HH today; 11.9 improved      Hypokalemia-   - K 3.4 on 8/10, cont KCL 40meq daily  - 8/11 K 3.3 -> increased KCL to  40 bid   - K- 3.8 on 8/14 -> 3.9(8/16) . Decreased KCl 40mg daily.   8/21 k 3.6 cont supplement daily; resolved, monitor loosely 8/24          bowel program - add prn bowel program      GERD - start PPI. At times may need additional antacids, Maalox prn.      Depression; cont Lexapro; home med due to generalized anxiety due to stressors in life. His risk post stroke depression. Order recreational therapy and psych if needed; doing much better and participating well 8/11  -8/22 spirits extremely good. No anxiety noted      Dysphagia; mild. Cont ST for this as well as for dysarthria. Continuing joel-motor exercises. - Mechanical consistency diet, cont aspiration  precautions      8/21 watching for hypertonicity; resting hand splint right hand/wrist at noc; started Zanaflex at noc 8/23; per PT slt improvement but I still note clonus right ankle; cont 2mg tid and titrate to desired effect 8/26  -8/27 need to increase Zanflex to 4mg tid; if too sedating will decrease and start low dose Baclofen      Pneumonia prophylaxis- Incentive spirometer every hour while awake      DVT risk / DVT Prophylaxis- Will require daily physician exam to assess for signs and symptoms as patient is at increased risk for of thromboembolism. Mobilization as tolerated. Intermittent pneumatic compression devices when in bed Thigh-high or knee-high thromboembolic deterrent hose when out of bed. - Lovenox subq daily. - she has no signs of DVT.     Pain Control: stable, mild-to-moderate joint symptoms intermittently, reasonably well controlled by PRN meds. Will require regular pain assessment and comprenhensive pain management. - Cont prn tylenol      Chronic insomnia; on Ambien at home. Have not restarted this. - on prn trazadone.       -pt reports hx of MVP since age of 21. Takes abx with dental procedures. 2D ECHO did not appreciate any abnl of the mitral valve                    Time spent was 25 minutes with over 1/2 in direct patient care/examination, consultation and coordination of care.      Signed By: Bibi Miller MD     August 27, 2017

## 2017-08-28 PROCEDURE — 74011250637 HC RX REV CODE- 250/637: Performed by: PHYSICAL MEDICINE & REHABILITATION

## 2017-08-28 PROCEDURE — 97110 THERAPEUTIC EXERCISES: CPT

## 2017-08-28 PROCEDURE — 97032 APPL MODALITY 1+ESTIM EA 15: CPT

## 2017-08-28 PROCEDURE — 65310000000 HC RM PRIVATE REHAB

## 2017-08-28 PROCEDURE — 97112 NEUROMUSCULAR REEDUCATION: CPT

## 2017-08-28 PROCEDURE — 97116 GAIT TRAINING THERAPY: CPT

## 2017-08-28 PROCEDURE — 97535 SELF CARE MNGMENT TRAINING: CPT

## 2017-08-28 PROCEDURE — 99232 SBSQ HOSP IP/OBS MODERATE 35: CPT | Performed by: PHYSICAL MEDICINE & REHABILITATION

## 2017-08-28 PROCEDURE — 74011250636 HC RX REV CODE- 250/636: Performed by: PHYSICAL MEDICINE & REHABILITATION

## 2017-08-28 PROCEDURE — 94660 CPAP INITIATION&MGMT: CPT

## 2017-08-28 RX ADMIN — TIZANIDINE 4 MG: 2 TABLET ORAL at 21:36

## 2017-08-28 RX ADMIN — FERROUS SULFATE TAB 325 MG (65 MG ELEMENTAL FE) 325 MG: 325 (65 FE) TAB at 08:00

## 2017-08-28 RX ADMIN — FERROUS SULFATE TAB 325 MG (65 MG ELEMENTAL FE) 325 MG: 325 (65 FE) TAB at 18:11

## 2017-08-28 RX ADMIN — POTASSIUM CHLORIDE 40 MEQ: 20 TABLET, EXTENDED RELEASE ORAL at 08:00

## 2017-08-28 RX ADMIN — HYDROCHLOROTHIAZIDE: 25 TABLET ORAL at 08:00

## 2017-08-28 RX ADMIN — PANTOPRAZOLE SODIUM 40 MG: 40 TABLET, DELAYED RELEASE ORAL at 05:21

## 2017-08-28 RX ADMIN — ATORVASTATIN CALCIUM 40 MG: 40 TABLET, FILM COATED ORAL at 08:00

## 2017-08-28 RX ADMIN — TIZANIDINE 4 MG: 2 TABLET ORAL at 18:11

## 2017-08-28 RX ADMIN — ENOXAPARIN SODIUM 40 MG: 40 INJECTION SUBCUTANEOUS at 21:37

## 2017-08-28 RX ADMIN — TIZANIDINE 4 MG: 2 TABLET ORAL at 08:00

## 2017-08-28 RX ADMIN — ESCITALOPRAM OXALATE 20 MG: 10 TABLET ORAL at 08:00

## 2017-08-28 RX ADMIN — ASPIRIN 325 MG ORAL TABLET 325 MG: 325 PILL ORAL at 08:00

## 2017-08-28 NOTE — PROGRESS NOTES
OT Daily Note  Time In 1039   Time Out 1114     Pain: Patient had no complaint of pain. Functional Mobility   Pt managed w/c independently. Neuro-Muscular Re-Education   Pt engaged in AAROM and SROM with arm skate to promote LUE NMR. Pt demonstrated 2-/5 for internal/external rotation and 3/5 shoulder protraction/retraction. Pt demonstrated 0/5 for triceps. Pt engaged in WB with LUE, but was limited for duration secondary to stress on back when WB. Pt completed 2x in standing for approximately 4 minutes each time. Education   Typical recovery pattern     Plan: Continue with POC. Pt was left in w/c with all needs within reach.      Bg Armenta OTR/L  8/28/2017

## 2017-08-28 NOTE — PROGRESS NOTES
08/28/17 1150   Time Spent With Patient   Time In 0917   Time Out 1003   Patient Seen For: AM;ADLs   Feeding/Eating   Feeding/Eating Assistance I   Grooming   Grooming Assistance  I   Comments Seated at sink   Upper Body Bathing   Bathing Assistance, Upper Mod I   Position Performed Seated in chair   Adaptive Equipment Tub bench   Lower Body Bathing   Bathing Assistance, [address] handled sponge   Position Performed Seated in chair   Adaptive Equipment Tub bench   Upper [address] with bra   Lower Body Dressing    Dressing Assistance  Min A   Leg Crossed Method Used No   Position Performed Seated in chair;Standing   Adaptive Equipment Used Reacher;Sock aid; Long handled shoe horn   Don/Doff Anti-Embolic Stockings NA   Comments Assist to finish underwear over R hip, start R foot into shoe, support RLE/start R sock   Functional Transfers   Tub or Shower Type Shower   Amount of Assistance Required S   Adaptive Equipment Tub transfer bench; Wheelchair; Other (comment)  (Quad cane)       S: \"I felt weaker when I woke up, but now I'm feeling better already. \" Agreeable to therapy. Focus of session was on ADL retraining and functional mobility. Patient was able to transfer bed to wheelchair and wheelchair <> TTB SPT using quad cane with supervision. Pain not indicated. Collaborated with PT, Jamel Goodell and confirmed patient is on track to reach goals as documented in the care plan. Patient tolerated session well, but RUE tone, strength, coordination, balance, functional mobility, activity tolerance are still below baseline and require skilled facilitation to successfully and safely complete ADL's and transfers. Patient ended session in wheelchair with PT, Jamel Goodell.     Mayelin Lora OTR/L

## 2017-08-28 NOTE — PROGRESS NOTES
Harlow Goltz, MD,   Medical Director  [address] [facility], [address] [facility]  Tel: [phone]       [facility] PROGRESS NOTE    Casandra Sexton  Admit Date: 8/8/2017  Admit Diagnosis: Stroke - Right Side Body Involvment;Stroke (cerebrum) ([address]    Subjective     Slept well. Menstrual flow much improved. Wearing resting hand splint of the right. Discussed reasoning behind its use and importance of ROM and stretching. Asks appropriate questions. No complaints    Objective:     Current Facility-Administered Medications   Medication Dose Route Frequency    tiZANidine (ZANAFLEX) tablet 4 mg  4 mg Oral TID    cyclobenzaprine (FLEXERIL) tablet 5-10 mg  5-10 mg Oral TID PRN    potassium chloride (K-DUR, KLOR-CON) SR tablet 40 mEq  40 mEq Oral DAILY    ferrous sulfate tablet 325 mg  1 Tab Oral BID WITH MEALS    aspirin (ASPIRIN) tablet 325 mg  325 mg Oral DAILY    enoxaparin (LOVENOX) injection 40 mg  40 mg SubCUTAneous Q24H    acetaminophen (TYLENOL) tablet 650 mg  650 mg Oral Q4H PRN    alum-mag hydroxide-simeth (MYLANTA) oral suspension 30 mL  30 mL Oral Q4H PRN    atorvastatin (LIPITOR) tablet 40 mg  40 mg Oral DAILY    bisacodyl (DULCOLAX) suppository 10 mg  10 mg Rectal DAILY PRN    escitalopram oxalate (LEXAPRO) tablet 20 mg  20 mg Oral DAILY    LORazepam (ATIVAN) tablet 1 mg  1 mg Oral Q6H PRN    magnesium hydroxide (MILK OF MAGNESIA) 400 mg/5 mL oral suspension 30 mL  30 mL Oral DAILY PRN    ondansetron (ZOFRAN ODT) tablet 4 mg  4 mg Oral Q6H PRN    senna-docusate (PERICOLACE) 8.6-50 mg per tablet 2 Tab  2 Tab Oral DAILY PRN    valsartan/hydroCHLOROthiazide (DIOVAN HCT) 160/25 mg   Oral DAILY    traZODone (DESYREL) tablet 50 mg  50 mg Oral QHS PRN    pantoprazole (PROTONIX) tablet 40 mg  40 mg Oral ACB     Review of Systems:Denies chest pain, shortness of breath, cough, headache, visual problems, abdominal pain, dysurea, calf pain.  Pertinent positives are as noted in the medical records and unremarkable otherwise. Visit Vitals    /72    Pulse 75    Temp 98.1 °F (36.7 °C)    Resp 18    SpO2 96%    Breastfeeding No        Physical Exam:   General: Alert and age appropriately oriented. No acute cardio respiratory distress. HEENT: Normocephalic,no scleral icterus  Oral mucosa moist without cyanosis; improving right lower facial weakness   Lungs: Clear to auscultation  bilaterally. Respiration even and unlabored   Heart: Regular rate and rhythm, S1, S2   No  murmurs, clicks, rub or gallops   Abdomen: Soft, non-tender, nondistended. Bowel sounds present. No organomegaly. Genitourinary: Benign . Neuromuscular:      Speech hardly dysarthric at this point  2+ tone with ROM RUE, several beats clonus at right ankly  RUE shoulder flexion 1, EF1, EE 1,  2-, finger ext 0/5  slt increase tone with PROM  RLE; hip flexion 3+, KE 3, DF 0, PF 1  Sensation intact   Skin/extremity: No rashes, no erythema.  No calf tenderness BLE  No peripheral edema                                                                            Functional Assessment:  Gross Assessment  AROM:  (LLE WFL, RLE AAROM WFL) (08/22/17 1300)  PROM: Generally decreased, functional (08/22/17 1300)  Strength:  (RLE ankle 1/5,knee ext 4/5 flex -3/5,hip-3/5 to +2/5) (08/22/17 1300)  Coordination:  (RLE impaired, improving) (08/22/17 1300)  Tone: Abnormal (RLE clonus with achilles quick stretch) (08/22/17 1300)  Sensation: Impaired (RLE proprioception) (08/22/17 1300)       Balance  Sitting - Static: Good (unsupported) (08/25/17 1600)  Sitting - Dynamic: Good (unsupported) (08/25/17 1600)  Standing - Static: Good (with quad cane) (08/25/17 1600)  Standing - Dynamic : Impaired (08/25/17 1600)           Toileting  Cues: Physical assistance for pants up (08/25/17 1025)  Adaptive Equipment:  (BSC) (08/23/17 1026)         Tri Gomez Fall Risk Assessment:  Tri Gomez Fall Risk  Mobility: Ambulates or transfers with assist devices or assistance/unsteady gait (08/28/17 0705)  Mobility Interventions: Patient to call before getting OOB (08/28/17 0705)  Mentation: Alert, oriented x 3 (08/28/17 0705)  Medication: Patient receiving anticonvulsants, sedatives(tranquilizers), psychotropics or hypnotics, hypoglycemics, narcotics, sleep aids, antihypertensives, laxatives, or diuretics (08/28/17 0705)  Medication Interventions: Patient to call before getting OOB (08/28/17 7513)  Elimination: Needs assistance with toileting (08/28/17 0705)  Elimination Interventions: Call light in reach; Patient to call for help with toileting needs; Toileting schedule/hourly rounds (08/28/17 0705)  Prior Fall History: No (08/28/17 0705)  Total Score: 3 (08/28/17 0705)  Standard Fall Precautions: Yes (08/28/17 0705)  High Fall Risk: Yes (08/27/17 0930)     Speech Assessment:         Ambulation:  Gait  Distance (ft): 120 Feet (ft) (08/26/17 1200)  Assistive Device: Cane, quad (08/26/17 1200)  Rail Use: Left  (08/25/17 1600)     Labs/Studies:  Recent Results (from the past 72 hour(s))   HGB & HCT    Collection Time: 08/25/17  9:31 AM   Result Value Ref Range    HGB 11.9 11.7 - 15.4 g/dL    HCT 39.5 35.8 - 46.3 %       Assessment:     Problem List as of 8/28/2017  Date Reviewed: 1/26/2017          Codes Class Noted - Resolved    Stroke (cerebrum) ([facility] ICD-10-CM: I63.9  ICD-9-CM: 434.91  8/8/2017 - Present        Left sided lacunar stroke [facility]) ICD-10-CM: I63.9  ICD-9-CM: 434.91  8/4/2017 - Present    Overview Signed 8/6/2017  7:44 PM by Jensen Galdamez MD     8/2017             Weakness of right side of body ICD-10-CM: R53.1  ICD-9-CM: 728.87  8/3/2017 - Present        Essential hypertension ICD-10-CM: I10  ICD-9-CM: 401.9  10/5/2016 - Present        Obesity (BMI 30-39. 9) ICD-10-CM: E66.9  ICD-9-CM: 278.00  Unknown - Present        Prediabetes ICD-10-CM: R73.03  ICD-9-CM: 790.29  7/28/2015 - Present        Generalized anxiety disorder ICD-10-CM: F41.1  ICD-9-CM: 300.02  Unknown - Present        Insomnia, unspecified ICD-10-CM: G47.00  ICD-9-CM: 780.52  Unknown - Present        Mitral valve prolapse ICD-10-CM: I34.1  ICD-9-CM: 424.0  Unknown - Present        Iron deficiency anemia ICD-10-CM: D50.9  ICD-9-CM: 280.9  8/23/2013 - Present    Overview Signed 10/5/2016  2:49 PM by Randy Tamayo MD     S/p iron infusion x 2             RESOLVED: GERD (gastroesophageal reflux disease) ICD-10-CM: K21.9  ICD-9-CM: 530.81  Unknown - 10/5/2016        RESOLVED: Mixed hyperlipidemia ICD-10-CM: E78.2  ICD-9-CM: 272.2  Unknown - 10/5/2016        RESOLVED: Hypertension ICD-10-CM: I10  ICD-9-CM: 401.9  Unknown - 10/5/2016              Plan:          This is an unfortunate 35yo WF with obesity, RADHA, and htn admitted with right hemiplegia due to Left hemispheric stroke with evolution; etiology of stroke not clear      CVA- continue medical management for CVA  -  Continue antiplatelet therapy; aspirin 325mg daily  - continued on statin; Lipitor   -hypercoag w/u neg x for slt elevation prot C; insignificant finding       Suspect RADHA - can use CPAP while here but will need outpt formal sleep study and Pulmonary follow up. - Continue CPAP, feels she benefits significantly. Feels less tired.       Obesity; have discussed importance of wt loss for mobility and overall health      8/23 tone developing; consider dantrium or zanaflex; started zanaflex at [facility]; added daytime dosing 8/24 at 2mg tid; monitor effects; 8/27 inc to 4mg tid      Hypertension - BP fairly controlled; -130s. - No adjustments. - Cont Diovan at current dose.      LUCHO; gets IV infusions; Anemia/ microcytic, on fe supplement. - patient continue fe supplements po. 11.7 back on 8/7; recheck pending; 11.2  -8/24 hx of heavy menstrual cycle 5-7d/mos.  ? Etiology of her LUCHO; check HH today; 11.9 improved      Hypokalemia-   - K 3.4 on 8/10, cont KCL 40meq daily  - 8/11 K 3.3 -> increased KCL to  40 bid   - K- 3.8 on 8/14 -> 3.9(8/16) . Decreased KCl 40mg daily. 8/21 k 3.6 cont supplement daily; resolved, monitor loosely 8/24          bowel program - add prn bowel program      GERD - start PPI. At times may need additional antacids, Maalox prn.      Depression; cont Lexapro; home med due to generalized anxiety due to stressors in life. His risk post stroke depression. Order recreational therapy and psych if needed; doing much better and participating well 8/11  -8/22 spirits extremely good. No anxiety noted      Dysphagia; mild. Cont ST for this as well as for dysarthria. Continuing joel-motor exercises. - Mechanical consistency diet, cont aspiration  precautions  -8/28 ? Upgrade, d/w speech      8/21 watching for hypertonicity; resting hand splint right hand/wrist at noc; started Zanaflex at noc 8/23; per PT slt improvement but I still note clonus right ankle; cont 2mg tid and titrate to desired effect 8/26  -8/27 need to increase Zanflex to 4mg tid; if too sedating will decrease and start low dose Baclofen      Pneumonia prophylaxis- Incentive spirometer every hour while awake      DVT risk / DVT Prophylaxis- Will require daily physician exam to assess for signs and symptoms as patient is at increased risk for of thromboembolism. Mobilization as tolerated. Intermittent pneumatic compression devices when in bed Thigh-high or knee-high thromboembolic deterrent hose when out of bed. - Lovenox subq daily. - she has no signs of DVT.     Pain Control: stable, mild-to-moderate joint symptoms intermittently, reasonably well controlled by PRN meds. Will require regular pain assessment and comprenhensive pain management. - Cont prn tylenol      Chronic insomnia; on Ambien at home. Have not restarted this. - on prn trazadone.       -pt reports hx of MVP since age of 21. Takes abx with dental procedures.  2D ECHO did not appreciate any abnl of the mitral valve            Time spent was 25 minutes with over 1/2 in direct patient care/examination, consultation and coordination of care.      Signed By: Devang Arvizu MD     August 28, 2017

## 2017-08-28 NOTE — PROGRESS NOTES
PHYSICAL THERAPY DAILY NOTE  Time In: 5691  Time Out: 3656  Patient Seen For: PM;Gait training;Patient education; Therapeutic exercise;Transfer training; Wheelchair mobility; Other (see progress notes)    Subjective: patient reporting she feels OK. Reports she can tell  Better when her left foot is bending with ESTIM. Reports she wants to be able to walk better         Objective:Vital Signs:  Patient Vitals for the past 12 hrs:   Temp Pulse Resp BP SpO2   08/28/17 0736 98.1 °F (36.7 °C) 75 18 112/72 96 %     Pain level:no c/o pain  Pain location:NA  Pain interventions:NA    Patient education:Bed mobility training,transfer training, gait training, fall precautions, activity pacing, body mechanics, w/c mobility and parts management, Patient verbalizing understanding and demonstrating partial understanding of patient education. Recommend follow up education.     Interdisciplinary Communication:NA    Other (comment) (Fall precautions)  GROSS ASSESSMENT Daily Assessment     NA       BED/MAT MOBILITY Daily Assessment   Increased time and effort to complete using hemiplegic body mechanics Rolling Right : 6 (Modified independent)  Rolling Left : 0 (Not tested)  Supine to Sit : 6 (Modified independent)  Sit to Supine : 6 (Modified independent)       TRANSFERS Daily Assessment    Transfer Type: SPT without device (to the left w/c to mat elevated to 21 inches)  Transfer Assistance : 5 (Supervision/setup)  Sit to Stand Assistance: Modified independent  Car Transfers: Not tested       GAIT Daily Assessment    Amount of Assistance: 4 (Contact guard assistance) (tactile cues to facilitate cont step through gait pattern)  Distance (ft): 100 Feet (ft)  Assistive Device: Cane, quad;Orthotic device (1/4 inch heel lift R, ace wrap for R DF assist,)       STEPS or STAIRS Daily Assessment    Steps/Stairs Ambulated (#): 0  Level of Assist : 0 (Not tested)       BALANCE Daily Assessment    Sitting - Static: Good (unsupported)  Sitting - Dynamic: Good (unsupported)  Standing - Static: Good (with straight cane)  Standing - Dynamic : Impaired       WHEELCHAIR MOBILITY Daily Assessment    Able to Propel (ft): 150 feet  Functional Level: 6  Curbs/Ramps Assist Required (FIM Score): 0 (Not tested)  Wheelchair Setup Assist Required : 6 (Modified independent)  Wheelchair Management: Manages left brake;Manages right brake;Manages right footrest       LOWER EXTREMITY EXERCISES Daily Assessment    Extremity: Right  Exercise Type #1: Supine lower extremity strengthening  Sets Performed: 2  Reps Performed: 10  Level of Assist: Minimal assistance  Exercise Type #2: Other (comment) (Supine right ankle DF with premodulated ESTIM )  Sets Performed:  (5 sec on 5 sec off x 10 mins)  Reps Performed:  (Intensity to 21 naman amps, frequency 80 to 150HZ)  Level of Assist: Supervision          Assessment: Ability to ambulate with straight cane and cont step through gait pattern slowly improving. Flexor synergy pattern RLE during swing phase more prominent when increasing gait speed and step length. Difficulty with right hamstring eccentric control from midswing to initial contact making terminal knee ext at initial contact more difficult       Patient returned to room at end of treatment. Patient supine in bed with head of bed elevated and bed rails up x 2. Needs placed in reach of patient. Plan of Care: Continue with POC and progress as tolerated.      Abran Chand, PT  8/28/2017

## 2017-08-28 NOTE — PROGRESS NOTES
Report received from [facility] on pts history and status. Care assumed. Pt laying in bed with HOB elevated, head to toe assessment completed. Denies any discomfort at this time.

## 2017-08-28 NOTE — PROGRESS NOTES
Problem: Falls - Risk of  Goal: *Absence of Falls  Document Rashmi Fall Risk and appropriate interventions in the flowsheet.    Outcome: Progressing Towards Goal  Fall Risk Interventions:  Mobility Interventions: Patient to call before getting OOB, Utilize walker, cane, or other assitive device, Utilize gait belt for transfers/ambulation           Medication Interventions: Patient to call before getting OOB, Teach patient to arise slowly, Utilize gait belt for transfers/ambulation     Elimination Interventions: Call light in reach, Elevated toilet seat, Patient to call for help with toileting needs, Toilet paper/wipes in reach, Toileting schedule/hourly rounds

## 2017-08-28 NOTE — PROGRESS NOTES
PHYSICAL THERAPY DAILY NOTE  Time In: 9496  Time Out: 5538  Patient Seen For: AM;Gait training;Patient education;Transfer training; Other (see progress notes)    Subjective: Patient reporting she wants to be able to walk the best she can. Explain purpose of orthotist assessment and purpose of AFO. Patient agreeable to orthotist assessment         Objective:Vital Signs:  Patient Vitals for the past 12 hrs:   Temp Pulse Resp BP SpO2   08/28/17 0736 98.1 °F (36.7 °C) 75 18 112/72 96 %     Pain level:No c/o pain  Pain location:NA  Pain interventions:NA    Patient education:transfer training, gait training, fall precautions,Patient verbalizing understanding and demonstrating partial understanding of patient education. Recommend follow up education. Interdisciplinary Communication:Orthotist from [address] [place] [address] in to assess patient for right AFO and right resting hand splint    Other (comment) (Fall precautions)  GROSS ASSESSMENT Daily Assessment     NA       BED/MAT MOBILITY Daily Assessment    Rolling Right : 0 (Not tested)  Rolling Left : 0 (Not tested)  Supine to Sit : 0 (Not tested)  Sit to Supine : 0 (Not tested)       TRANSFERS Daily Assessment    Transfer Type: Other (SPT with Quad cane)  Transfer Assistance : 5 (Stand-by assistance)  Sit to Stand Assistance: Stand-by assistance  Car Transfers: Not tested       GAIT Daily Assessment    Amount of Assistance: 4 (Contact guard assistance)  Distance (ft): 60 Feet (ft) (60ft x 2)  Assistive Device: Cane, quad;Orthotic device (1/4 inch heel lift R, ace wrap for R DF assist,)   Orthotist in to assess gait with and without devices noted above. When ambulating without devices demonstrating right knee hyperextension at midstance, flexor synergy pattern with excessive ankle inversion during swing phase and inverted foot flat initial contact with decreased knee ext at initial contact. Decreased ankle PF and knee flex at terminal stance.      STEPS or STAIRS Daily Assessment    Steps/Stairs Ambulated (#): 0  Level of Assist : 0 (Not tested)       BALANCE Daily Assessment    Sitting - Static: Good (unsupported)  Sitting - Dynamic: Good (unsupported)  Standing - Static: Good (with quad cane)  Standing - Dynamic : Impaired       WHEELCHAIR MOBILITY Daily Assessment    Able to Propel (ft):  (NT)  Functional Level:  (NT)  Curbs/Ramps Assist Required (FIM Score): 0 (Not tested)  Wheelchair Setup Assist Required : 0 (Not tested)       LOWER EXTREMITY EXERCISES Daily Assessment     NA          Assessment: Orthotist assessment agrees with plan for custom AFO with articulating ankle joint with PF stop to control knee hyperextension. Patient to OT at end of treatment    Plan of Care: Continue with POC and progress as tolerated.      Sandip Hamlin, PT  8/28/2017

## 2017-08-29 PROCEDURE — 99232 SBSQ HOSP IP/OBS MODERATE 35: CPT | Performed by: PHYSICAL MEDICINE & REHABILITATION

## 2017-08-29 PROCEDURE — 97535 SELF CARE MNGMENT TRAINING: CPT

## 2017-08-29 PROCEDURE — 74011250636 HC RX REV CODE- 250/636: Performed by: PHYSICAL MEDICINE & REHABILITATION

## 2017-08-29 PROCEDURE — 97110 THERAPEUTIC EXERCISES: CPT

## 2017-08-29 PROCEDURE — 97032 APPL MODALITY 1+ESTIM EA 15: CPT

## 2017-08-29 PROCEDURE — 65310000000 HC RM PRIVATE REHAB

## 2017-08-29 PROCEDURE — 97112 NEUROMUSCULAR REEDUCATION: CPT

## 2017-08-29 PROCEDURE — 74011250637 HC RX REV CODE- 250/637: Performed by: PHYSICAL MEDICINE & REHABILITATION

## 2017-08-29 PROCEDURE — 97116 GAIT TRAINING THERAPY: CPT

## 2017-08-29 PROCEDURE — 97150 GROUP THERAPEUTIC PROCEDURES: CPT

## 2017-08-29 RX ADMIN — HYDROCHLOROTHIAZIDE: 25 TABLET ORAL at 08:26

## 2017-08-29 RX ADMIN — ASPIRIN 325 MG ORAL TABLET 325 MG: 325 PILL ORAL at 08:26

## 2017-08-29 RX ADMIN — FERROUS SULFATE TAB 325 MG (65 MG ELEMENTAL FE) 325 MG: 325 (65 FE) TAB at 16:15

## 2017-08-29 RX ADMIN — ENOXAPARIN SODIUM 40 MG: 40 INJECTION SUBCUTANEOUS at 20:47

## 2017-08-29 RX ADMIN — ATORVASTATIN CALCIUM 40 MG: 40 TABLET, FILM COATED ORAL at 08:26

## 2017-08-29 RX ADMIN — PANTOPRAZOLE SODIUM 40 MG: 40 TABLET, DELAYED RELEASE ORAL at 06:05

## 2017-08-29 RX ADMIN — TIZANIDINE 4 MG: 2 TABLET ORAL at 08:26

## 2017-08-29 RX ADMIN — ESCITALOPRAM OXALATE 20 MG: 10 TABLET ORAL at 08:26

## 2017-08-29 RX ADMIN — TIZANIDINE 4 MG: 2 TABLET ORAL at 20:48

## 2017-08-29 RX ADMIN — FERROUS SULFATE TAB 325 MG (65 MG ELEMENTAL FE) 325 MG: 325 (65 FE) TAB at 08:26

## 2017-08-29 RX ADMIN — POTASSIUM CHLORIDE 40 MEQ: 20 TABLET, EXTENDED RELEASE ORAL at 08:26

## 2017-08-29 RX ADMIN — TIZANIDINE 4 MG: 2 TABLET ORAL at 16:15

## 2017-08-29 NOTE — PROGRESS NOTES
OT WEEKLY PROGRESS NOTE    Time In: 0745  Time Out: 0915    COMPREHENSION MODE Initial Assessment Weekly Progress Assessment 8/29/2017   Score 7 7     EXPRESSION Initial Assessment Weekly Progress Assessment 8/29/2017   Primary Mode of Expression Verbal Verbal   Score 6 7   Comments Expresses complex, abstract ideas with extra time, mild difficulty due to dysarthria Mild dysarthria     SOCIAL INTERACTION/ PRAGMATICS Initial Assessment Weekly Progress Assessment 8/29/2017   Score 6 7   Comments Patient tearful (appropriate) end of session, provided encouragement and education Pleasant and agreeable     PROBLEM SOLVING Initial Assessment Weekly Progress Assessment 8/29/2017   Score 6 7   Comments Makes decisions with mild difficulty or solves complex problems with extra time. WNL     MEMORY Initial Assessment Weekly Progress Assessment 8/29/2017   Score 6 7   Comments Recognizes people often seen, daily routines, and executes requests with additional time  WNL     EATING Initial Assessment Weekly Progress Assessment 8/29/2017   Functional Level 5 7   Comments Setup/cues for container management Independent utilizing bobby techniques     GROOMING Initial Assessment Weekly Progress Assessment 8/29/2017   Functional Level 3 7   Tasks completed by patient Washed face, Brushed hair, Brushed teeth (Applied deodorant) Brushed hair;Brushed teeth; Washed face; Washed hands   Comments Assist to apply toothpaste to toothbrush and apply deodorant to R underarm Independent seated at sink utilizing bobby techniques     BATHING Initial Assessment Weekly Progress Assessment 8/29/2017   Functional Level 3 6   Body parts patient bathed Thigh, right, Thigh, left, [place]adia area, Chest, Arm, right, Arm, left, Abdomen Abdomen;Arm, left;Arm, right;Buttocks; Lower leg and foot, left; Chest;Lower leg and foot, right;Fadia area; Thigh, left; Thigh, right   Comments Assist to bathe lower BLEs and bottom, blocking right knee as patient squats to have bottom bathed seated on TTB utilizing long handled sponge     TUB/SHOWER TRANSFER INDEPENDENCE Initial Assessment Weekly Progress Assessment 8/29/2017   Score 3 5   Comments Squat/stand pivot SPT utilizing quad cane     UPPER BODY DRESSING/UNDRESSING Initial Assessment Weekly Progress Assessment 8/29/2017   Functional Level 3 4   Items applied/Steps completed Pullover (4 steps) Pullover (4 steps); Bra (3 steps)   Comments Assist to thread RUE through shirt and to assist down trunk Minimal assist to finish bra down trunk utilizing overhead technique     LOWER BODY DRESSING/UNDRESSING Initial Assessment Weekly Progress Assessment 8/29/2017   Functional Level 1 4   Items applied/Steps completed Elastic waist pants (3 steps), Shoe, left (1 step), Shoe, right (1 step), Sock, left (1 step), Sock, right (1 step), Underpants (3 steps) Shoe, right (1 step); Shoe, left (1 step); Sock, right (1 step); Sock, left (1 step); Underpants (3 steps); Elastic waist pants (3 steps)   Comments Dependent for LB dressing at this time Assist to finish underwear over R hip, start R foot into shoe. Patient utilizes soft sock aid with increased success this session. Uses reacher to start underwear and pants over feet and doff socks. TOILETING Initial Assessment Weekly Progress Assessment 8/29/2017   Functional Level 0 4   Comments Activity did not occur, patient reports continent of bowel and bladder To finish underwear over R hip for donning     TOILET TRANSFER INDEPENDENCE Initial Assessment Weekly Progress Assessment 8/29/2017   Transfer score 0 5   Comments Did not occur SPT using quad cane     Plan of Care: Please see Care Plan for updated LTGs. Family Training: To complete prior to discharge    Summary of Progress: Patient demonstrates progress with ADL and functional transfers since admission, see above for FIM details.   However, patient continues to be limited by RUE hemiparesis (2-/5 strength for shoulder shrug and elbow flexion only)  , including increased flexor tone, resulting in patient having to complete all functional tasks utilizing bobby technique with LUE. Summary of Session:     S: \"My [R] shoulder is hurting when I raise it overhead with my other arm in the bed. \" Agreeable to therapy. Focus of session was on ADL retraining, functional mobility, and neuromuscular reeducation. Patient was able to transfer bed to wheelchair and wheelchair <> TTB SPT with SBA. Pain 8/10 for overhead R shoulder ROM while seated, decreased when patient moved to supine on mat table. Re-educated regarding importance of proper positioning of RUE for sleep. Subluxation not apparent. Patient tolerated PROM stretch to RUE in shoulder flexion, abduction, extension, internal/external rotation, elbow extension, wrist extension, and digit extension. Patient tolerated NMES to R shoulder in subluxation protocol seated in wheelchair before vertical mirror x 10 minutes at intensity 14. Patient completed AAROM shoulder shrug with each stimulation. Patient transferred wheelchair to mat table SPT with SBA. Patient completed 1 set x 20 reps of AAROM (self-Rom with LUE supporting RUE) supine on mat in shoulder flexion and chest press. Collaborated with PTAngie regarding shoulder pain and R subluxation sling provided for ambulation. Confirmed patient is on track to reach goals as documented in the care plan. Patient tolerated session well, but RUE strength, tone, coordination, balance, activity tolerance, functional mobility are still below baseline and require skilled facilitation to successfully and safely complete ADL's and transfers. Patient ended session on mat table with PTAngie.        TATIANA Law/LUCY

## 2017-08-29 NOTE — PROGRESS NOTES
Clinical update faxed to [facility] with [facility] requesting additional rehab days.

## 2017-08-29 NOTE — PROGRESS NOTES
PHYSICAL THERAPY DAILY NOTE  Time In: 1116  Time Out: 9179  Patient Seen For: AM;Balance activities;Gait training;Patient education; Therapeutic exercise;Transfer training; Wheelchair mobility; Other (see progress notes)    Subjective: patient reporting she feels better now. Reports no dizziness and no right shoulder pain. Reports she wants to walk          Objective:Vital Signs:  Patient Vitals for the past 12 hrs:   Temp Pulse Resp BP SpO2   08/29/17 0733 97.5 °F (36.4 °C) 68 18 110/71 96 %     Pain level:No c/o pain  Pain location:NA  Pain interventions:NA    Patient education:Balance training,transfer training, gait training, fall precautions, activity pacing, body mechanics, w/c mobility and parts management, Patient verbalizing understanding and demonstrating partial understanding of patient education. Recommend follow up education.     Interdisciplinary Communication:NA    Other (comment) (Fall precautions)  GROSS ASSESSMENT Daily Assessment    NA       BED/MAT MOBILITY Daily Assessment    Rolling Right : 0 (Not tested)  Rolling Left : 0 (Not tested)  Supine to Sit : 0 (Not tested)  Sit to Supine : 0 (Not tested)       TRANSFERS Daily Assessment    Transfer Type: SPT without device  Other: SPT to the right with quad cane and Supervision  Transfer Assistance : 5 (Supervision/setup)  Sit to Stand Assistance: Modified independent  Car Transfers: Not tested  Car Type: 4 door rehab car       GAIT Daily Assessment   Gait training x 20 ft x 2 in figure 8 pattern around bolsters with quad cane and devices as noted with SBA and cues Amount of Assistance: 5 (Stand-by assistance) cues to correct gait deviations  Distance (ft): 150 Feet (ft)  Assistive Device: Cane, quad;Orthotic device (1/4 inch heel lift R, ace wrap for R DF assist,)   Gait training up/dwon 10 ft ramp with quad cane and devices noted above with CGA to min assist with slow start/stop step to hemiplegic gait pattern    STEPS or STAIRS Daily Assessment   Gait training up/down 6 inch step x 2 with quad cane and CGA, cues for sequence NT       BALANCE Daily Assessment    Sitting - Static: Good (unsupported)  Sitting - Dynamic: Good (unsupported)  Standing - Static: Good (with quad cane)  Standing - Dynamic : Impaired       WHEELCHAIR MOBILITY Daily Assessment   Using LUE and LLE Able to Propel (ft): 150 feet  Functional Level: 6  Curbs/Ramps Assist Required (FIM Score): 0 (Not tested)  Wheelchair Setup Assist Required : 6 (Modified independent)  Wheelchair Management: Manages left brake;Manages right brake;Manages right footrest       LOWER EXTREMITY EXERCISES Daily Assessment    Extremity: Both  Exercise Type #1: Other (comment) (LE motomed x 11 mins at level 3)  Level of Assist: Supervision          Assessment: ability to ambulate with quad cane improving. Able to advance to gait up/down ramp with quad cane       Patient return to room at end of treatment and remained up in wheelchair with needs in reach. Plan of Care: Continue with POC and progress as tolerated.      Phil Johnson, PT  8/29/2017

## 2017-08-29 NOTE — PROGRESS NOTES
PT WEEKLY PROGRESS NOTE   Time In: 4911   Time Out: 1010    Subjective: patient reporting she does not feel as good this AM.C/O dry mouth and feeling light headed and unsteady. Asking about side effects of new anti spasticity medication side effects. Reports she thinks some of her family can come in for family training this Friday and the other Members next Monday. Reports she is working on her exercises in her room and wants to be able to walk on her own eventually. Discussed plan to go home at w/c level and advance to independent gait inside the home with quad cane and AFO         Objective: Vital Signs:Sitting BP 93/57 HR 73  Patient Vitals for the past 12 hrs:   Temp Pulse Resp BP SpO2   08/29/17 0733 97.5 °F (36.4 °C) 68 18 110/71 96 %     Pain level:No c/o pain  Pain location:NA  Pain interventions:NA    Patient education:Bed mobility training,transfer training, gait training, fall precautions, activity pacing, body mechanics, w/c mobility and parts management, Patient verbalizing understanding and demonstrating partial understanding of patient education. Recommend follow up education. Interdisciplinary Communication:spoke with OT regarding progress towards goals. Spoke with RN regarding BP reading    Other (comment) (Fall precautions)    Outcome Measures:     FIM SCORES Initial Assessment Weekly Progress Assessment 8/29/2017   Bed/Chair/Wheelchair Transfers 3 5   Wheelchair Mobility  (Unable to assess due to patient's height and available w/c) 6   Walking [place] 1 5   Steps/Stairs  (NT) 2   Please see [facility] Interdisciplinary Eval: Coordination/Balance Section for details regarding FIM score description.     BED/CHAIR/WHEELCHAIR TRANSFERS Initial Assessment Weekly Progress Assessment 8/29/2017   Rolling Right 6 (Modified independent) (using bed rail) 6 (Modified independent)   Rolling Left 4 (Minimal assistance) (cues to attend to RUE and RUE positioning) 6 (Modified independent)   Supine to Sit 3 (Moderate assistance) (using bed rail) 6 (Modified independent)   Sit to Stand Minimal assistance (from bed and w/c) Modified independent   Sit to Supine 4 (Minimal assistance) 6 (Modified independent)   Transfer Type SPT without device (bed to w/c to left and w/c to bed with mod assist to her rig) SPT without device (w/c<>mat to her left)   Comments Tendency to be impulsive at times during transfers and bed mobility requiring cues for hemiplegic body mechanics, cues to attend to right side  Increased time and effort to complete bed mobility using hemiplegic body mechanics, now able to complete bed mobility without use of bed rail   Car Transfer Not tested Minimum assistance   Car Type rehab car 4 door rehab car     GROSS ASSESSMENT Weekly Progress Assessment 8/29/2017   AROM  (RLE AAROM WFL)   Strength  (RLE ankle 1/5,knee ext 4/5 from 45 degrees flex to 0, 90 to 45 degrees is 2/5, flex -3/5 to +2/5,hip-3/5 to +2/5)Flexor strength is influenced by flexor synergy   Coordination  (RLE impaired, flexor synergy noted with AROM and gait)   Tone Abnormal (Hypertonia RLE w/ clonus with achilles stretch reflex)   Sensation Intact (RLE)   PROM  (RLE WFL)     POSTURE Weekly Progress Assessment 8/29/2017   Posture (WDL) Exceptions to WDL   Posture Assessment Forward head;Rounded shoulders;Trunk flexion (weight shift to LLE)     WHEELCHAIR MOBILITY/MANAGEMENT Initial Assessment Weekly Progress Assessment 8/29/2017   Able to Propel 0 feet 150 feet   Curbs/ramps assistance required 0 (Not tested) 0 (Not tested)   Wheelchair set up assistance required 0 (Not tested)  Modified independent   Wheelchair management Manages left brake, Manages right brake Manages left brake;Manages right brake;Manages right footrest     WALKING INDEPENDENCE Initial Assessment Weekly Progress Assessment 8/29/2017   Assistive device Other (comment) (hand rail on left, ace wrap for left DF assist) Cane, quad;Orthotic device (1/4 inch heel lift R, ace wrap for R DF assist,)   Ambulation assistance - level surface 3 (Moderate assistance) (max assist to advance and stabilize RLE)  SBA with cues to correction deviations   Distance 20 Feet (ft) 150 Feet (ft)   Comments slow start/stop step to hemiplegic gait pattern leading leading with RLE and stepping to LLE Gait training x 150ft with straight cane and devices as noted above with min assist.Manual and verbal cues to correct deviations facilitating cont step through gait pattern     GAIT Weekly Progress Assessment 8/29/2017   Gait Description (WDL)  with quad cane, slow start/stop partial step through hemiplegic gait pattern leading with RLE   Gait Abnormalities  Decreased knee ext and ankle DF at initial contact,Fluctuating from increased knee flex to hyperextension right knee at midstance, decreased ankle PF and knee flex at terminal stance, decreased knee flex and ankle DF at midswing     STEPS/STAIRS Initial Assessment Weekly Progress Assessment 8/29/2017   Steps/Stairs ambulated 0 4   Rail Use Left  Left    Comments unable to ambulate up/down steps at this time secondary to extent of right hemiparesis  slow single step at a time leading up with LLE and down with RLE. Increased time and effort to complete. LLE vaulting with right hip hiking and circumduction and limited with knee flex and ankle DF going up steps. Tendency to adduct RLE going down steps. Cues for gait sequence   Curbs/Ramps 0 (Not tested)  up/down 6 inch step with quad cane and CGA with cues for sequence.  RLE pattern going up steps as noted above     SUPINE EXERCISES Sets Reps Comments   Ankle DF   In SAQ position with Premodulated ESTIM to ant tib 5 sec on 5 sec off frequency 80 to 150HZ and intensity to 18 naman amps x 10 mins   Bridging with hip ADD isometric 2 10    Single limb bridging RLE 1 10    Hip+knee flex<>ext with manual resistance 2 10    Hip Abduction <>Adduction with red t-band 2 10 With skate   Short Arc Quad 2 10 With 2 pound weight            Assessment: patient cont to make good progress towards goals. Patient has progressed to modified independent with bed mobility without use of bed rail and supervision with transfers from left and right side with good potential to progress to modified independence with SPT from both directions. Gait distance, balance and pattern cont to slowly improve with patient progressing towards modified independent gait for household distances with quad cane and AFO. Patient assessed by orthotist for custom AFO and orthotist to cast patient 09/01/17 and plan to deliver AFO 09/05/17. RLE strength and coordination cont to slowly improve but over the last week patient is beginning to develop hypertonicity in RLE with hyperactive reflexes and clonus in RLE. Flexor synergy pattern now influencing gait pattern. Recommending cont inpatient rehab with plan to D/C 09/06/17. Next week of rehab will be to initiate and complete family training with the multiple family members that will be caring for patient ensuring that patient's family will be able to assist her with gait inside the home, advance patient to a modified independent functional level with transfers, and assess new custom AFO to ensure proper fit and complete patient education regarding AFO. DME has been ordered with plan to deliver for assessment next week. Patient return to room at end of treatment and remained up in wheelchair with needs in reach    Plan of Care: Patient seen for weekly assessment. Goals updated and revised. Please see Care Plan for updated LTGs.     Family Training: Needs to be scheduled    Sanya Robin, PT  8/29/2017

## 2017-08-29 NOTE — PROGRESS NOTES
Spoke to Rosemarie Gravely with DME company states insurance will cover the cost of both the WC and quad cane. Insurance update due today. Discussed it therapists.

## 2017-08-29 NOTE — PROGRESS NOTES
Subjective \"I enjoyed learning this game. \"   Activity [employer] game   Strength/Endurance Patient tolerated the session without c/o tiredness or fatigue. She is always motivated and eager to participate. Balance SBA with SPT from bed to w/c    Social Interaction Patient was friendly and sociable during the session. Cognitive A&O X4   Comments Patient propelled herself to her room at the end of the session.       Bibi Zambrano, CTRS

## 2017-08-29 NOTE — PROGRESS NOTES
Lavonne Salgado MD,   Medical Director  [address] [facility], [address] [facility]  Tel: [phone]       SFD PROGRESS NOTE    Stephen Blind  Admit Date: 8/8/2017  Admit Diagnosis: Stroke - Right Side Body Involvment;Stroke (cerebrum) (HCC)    Subjective     Doing well. Slept fine. Really thinks CPAP has helped her. Discussed need for formal sleep study which will be set up by PCP; Dr. Rashida Harrell    Objective:     Current Facility-Administered Medications   Medication Dose Route Frequency    tiZANidine (ZANAFLEX) tablet 4 mg  4 mg Oral TID    cyclobenzaprine (FLEXERIL) tablet 5-10 mg  5-10 mg Oral TID PRN    potassium chloride (K-DUR, KLOR-CON) SR tablet 40 mEq  40 mEq Oral DAILY    ferrous sulfate tablet 325 mg  1 Tab Oral BID WITH MEALS    aspirin (ASPIRIN) tablet 325 mg  325 mg Oral DAILY    enoxaparin (LOVENOX) injection 40 mg  40 mg SubCUTAneous Q24H    acetaminophen (TYLENOL) tablet 650 mg  650 mg Oral Q4H PRN    alum-mag hydroxide-simeth (MYLANTA) oral suspension 30 mL  30 mL Oral Q4H PRN    atorvastatin (LIPITOR) tablet 40 mg  40 mg Oral DAILY    bisacodyl (DULCOLAX) suppository 10 mg  10 mg Rectal DAILY PRN    escitalopram oxalate (LEXAPRO) tablet 20 mg  20 mg Oral DAILY    LORazepam (ATIVAN) tablet 1 mg  1 mg Oral Q6H PRN    magnesium hydroxide (MILK OF MAGNESIA) 400 mg/5 mL oral suspension 30 mL  30 mL Oral DAILY PRN    ondansetron (ZOFRAN ODT) tablet 4 mg  4 mg Oral Q6H PRN    senna-docusate (PERICOLACE) 8.6-50 mg per tablet 2 Tab  2 Tab Oral DAILY PRN    valsartan/hydroCHLOROthiazide (DIOVAN HCT) 160/25 mg   Oral DAILY    traZODone (DESYREL) tablet 50 mg  50 mg Oral QHS PRN    pantoprazole (PROTONIX) tablet 40 mg  40 mg Oral ACB     Review of Systems:Denies chest pain, shortness of breath, cough, headache, visual problems, abdominal pain, dysurea, calf pain.  Pertinent positives are as noted in the medical records and unremarkable otherwise. Visit Vitals    /71    Pulse 68    Temp 97.5 °F (36.4 °C)    Resp 18    SpO2 96%    Breastfeeding No        Physical Exam:   General: Alert and age appropriately oriented. No acute cardio respiratory distress. HEENT: Normocephalic,no scleral icterus  Oral mucosa moist without cyanosis   Lungs: Clear to auscultation  bilaterally. Respiration even and unlabored   Heart: Regular rate and rhythm, S1, S2   No  murmurs, clicks, rub or gallops   Abdomen: Soft, non-tender, nondistended. Bowel sounds present. No organomegaly. Neuromuscular:      2+ tone with ROM RUE, several beats clonus at right ankly  RUE shoulder flexion 1, EF1, EE 1,  2-, finger ext 0/5  slt increase tone with PROM  RLE; hip flexion 3+, KE 3, DF 0, PF 1  Sensation intact     Skin/extremity: No rashes, no erythema.  No calf tenderness BLE  No edema                                                                            Functional Assessment:  Gross Assessment  AROM:  (LLE WFL, RLE AAROM WFL) (08/22/17 1300)  PROM: Generally decreased, functional (08/22/17 1300)  Strength:  (RLE ankle 1/5,knee ext 4/5 flex -3/5,hip-3/5 to +2/5) (08/22/17 1300)  Coordination:  (RLE impaired, improving) (08/22/17 1300)  Tone: Abnormal (RLE clonus with achilles quick stretch) (08/22/17 1300)  Sensation: Impaired (RLE proprioception) (08/22/17 1300)       Balance  Sitting - Static: Good (unsupported) (08/28/17 1400)  Sitting - Dynamic: Good (unsupported) (08/28/17 1400)  Standing - Static: Good (with straight cane) (08/28/17 1400)  Standing - Dynamic : Impaired (08/28/17 1400)           Toileting  Cues: Physical assistance for pants up (08/25/17 1025)  Adaptive Equipment:  (BSC) (08/23/17 1026)         Megan Meeks Fall Risk Assessment:  Megan Meeks Fall Risk  Mobility: Ambulates or transfers with assist devices or assistance/unsteady gait (08/29/17 3818)  Mobility Interventions: Patient to call before getting OOB (08/29/17 0984)  Mentation: Alert, oriented x 3 (08/29/17 0708)  Medication: Patient receiving anticonvulsants, sedatives(tranquilizers), psychotropics or hypnotics, hypoglycemics, narcotics, sleep aids, antihypertensives, laxatives, or diuretics (08/29/17 0708)  Medication Interventions: Patient to call before getting OOB (08/29/17 0762)  Elimination: Needs assistance with toileting (08/29/17 0708)  Elimination Interventions: Call light in reach; Patient to call for help with toileting needs; Toileting schedule/hourly rounds (08/29/17 0708)  Prior Fall History: No (08/29/17 0708)  Total Score: 3 (08/29/17 0708)  Standard Fall Precautions: Yes (08/29/17 0708)  High Fall Risk: Yes (08/27/17 0930)     Speech Assessment:         Ambulation:  Gait  Distance (ft): 100 Feet (ft) (08/28/17 1400)  Assistive Device: Cane, quad;Orthotic device (1/4 inch heel lift R, ace wrap for R DF assist,) (08/28/17 1400)  Rail Use: Left  (08/25/17 1600)     Labs/Studies:  No results found for this or any previous visit (from the past 72 hour(s)). Assessment:     Problem List as of 8/29/2017  Date Reviewed: 1/26/2017          Codes Class Noted - Resolved    [facility] ICD-10-CM: I63.9  ICD-9-CM: 434.91  8/8/2017 - Present        Left sided lacunar stroke [facility]) ICD-10-CM: I63.9  ICD-9-CM: 434.91  8/4/2017 - Present    Overview Signed 8/6/2017  7:44 PM by Josue Kate MD     8/2017             Weakness of right side of body ICD-10-CM: R53.1  ICD-9-CM: 728.87  8/3/2017 - Present        Essential hypertension ICD-10-CM: I10  ICD-9-CM: 401.9  10/5/2016 - Present        Obesity (BMI 30-39. 9) ICD-10-CM: E66.9  ICD-9-CM: 278.00  Unknown - Present        Prediabetes ICD-10-CM: R73.03  ICD-9-CM: 790.29  7/28/2015 - Present        Generalized anxiety disorder ICD-10-CM: F41.1  ICD-9-CM: 300.02  Unknown - Present        Insomnia, unspecified ICD-10-CM: G47.00  ICD-9-CM: 780.52  Unknown - Present        Mitral valve prolapse ICD-10-CM: I34.1  ICD-9-CM: 424.0 Unknown - Present        Iron deficiency anemia ICD-10-CM: D50.9  ICD-9-CM: 280.9  8/23/2013 - Present    Overview Signed 10/5/2016  2:49 PM by Wiliam Wiley MD     S/p iron infusion x 2             RESOLVED: GERD (gastroesophageal reflux disease) ICD-10-CM: K21.9  ICD-9-CM: 530.81  Unknown - 10/5/2016        RESOLVED: Mixed hyperlipidemia ICD-10-CM: E78.2  ICD-9-CM: 272.2  Unknown - 10/5/2016        RESOLVED: Hypertension ICD-10-CM: I10  ICD-9-CM: 401.9  Unknown - 10/5/2016              Plan:        This is an unfortunate 35yo WF with obesity, RADHA, and htn admitted with right hemiplegia due to Left hemispheric stroke with evolution; etiology of stroke not clear    -needs right AFO, no real response to ant tib n with Estim. Cont RHS at [facility]      CVA- continue medical management for CVA  -  Continue antiplatelet therapy; aspirin 325mg daily  - continued on statin; Lipitor   -hypercoag w/u neg x for slt elevation prot C; insignificant finding       Suspect RADHA - can use CPAP while here but will need outpt formal sleep study and Pulmonary follow up. - Continue CPAP, feels she benefits significantly. Feels less tired.       Obesity; have discussed importance of wt loss for mobility and overall health      8/23 tone developing; consider dantrium or zanaflex; started zanaflex at [facility]; added daytime dosing 8/24 at 2mg tid; monitor effects; 8/27 inc to 4mg tid  -improving per PT      Hypertension - BP fairly controlled; -130s. - No adjustments. - Cont Diovan at current dose.      LUCHO; gets IV infusions; Anemia/ microcytic, on fe supplement. - patient continue fe supplements po. 11.7 back on 8/7; recheck pending; 11.2  -8/24 hx of heavy menstrual cycle 5-7d/mos. ? Etiology of her LUCHO; check HH today; 11.9 improved      Hypokalemia-   - K 3.4 on 8/10, cont KCL 40meq daily  - 8/11 K 3.3 -> increased KCL to  40 bid   - K- 3.8 on 8/14 -> 3.9(8/16) . Decreased KCl 40mg daily.   8/21 k 3.6 cont supplement daily; resolved, monitor loosely 8/24          bowel program - add prn bowel program      GERD - start PPI. At times may need additional antacids, Maalox prn.      Depression; cont Lexapro; home med due to generalized anxiety due to stressors in life. His risk post stroke depression. Order recreational therapy and psych if needed; doing much better and participating well 8/11  -8/22 spirits extremely good. No anxiety noted      Dysphagia; mild. Cont ST for this as well as for dysarthria. Continuing joel-motor exercises. - Mechanical consistency diet, cont aspiration  precautions  -8/28 ? Upgrade, d/w speech      8/21 watching for hypertonicity; resting hand splint right hand/wrist at noc; started Zanaflex at noc 8/23; per PT slt improvement but I still note clonus right ankle; cont 2mg tid and titrate to desired effect 8/26  -8/27 need to increase Zanflex to 4mg tid; if too sedating will decrease and start low dose Baclofen      Pneumonia prophylaxis- Incentive spirometer every hour while awake      DVT risk / DVT Prophylaxis- Will require daily physician exam to assess for signs and symptoms as patient is at increased risk for of thromboembolism. Mobilization as tolerated. Intermittent pneumatic compression devices when in bed Thigh-high or knee-high thromboembolic deterrent hose when out of bed. - Lovenox subq daily. - she has no signs of DVT.     Pain Control: stable, mild-to-moderate joint symptoms intermittently, reasonably well controlled by PRN meds. Will require regular pain assessment and comprenhensive pain management. - Cont prn tylenol      Chronic insomnia; on Ambien at home. Have not restarted this. - on prn trazadone.       -pt reports hx of MVP since age of 21. Takes abx with dental procedures. 2D ECHO did not appreciate any abnl of the mitral valve                    Time spent was 25 minutes with over 1/2 in direct patient care/examination, consultation and coordination of care.      Signed By: Alyssia Baldwin MD     August 29, 2017

## 2017-08-30 PROCEDURE — 97116 GAIT TRAINING THERAPY: CPT

## 2017-08-30 PROCEDURE — 97112 NEUROMUSCULAR REEDUCATION: CPT

## 2017-08-30 PROCEDURE — 74011250637 HC RX REV CODE- 250/637: Performed by: PHYSICAL MEDICINE & REHABILITATION

## 2017-08-30 PROCEDURE — 97032 APPL MODALITY 1+ESTIM EA 15: CPT

## 2017-08-30 PROCEDURE — 97535 SELF CARE MNGMENT TRAINING: CPT

## 2017-08-30 PROCEDURE — 65310000000 HC RM PRIVATE REHAB

## 2017-08-30 PROCEDURE — 97530 THERAPEUTIC ACTIVITIES: CPT

## 2017-08-30 PROCEDURE — 74011250636 HC RX REV CODE- 250/636: Performed by: PHYSICAL MEDICINE & REHABILITATION

## 2017-08-30 PROCEDURE — 97110 THERAPEUTIC EXERCISES: CPT

## 2017-08-30 RX ADMIN — TIZANIDINE 4 MG: 2 TABLET ORAL at 20:48

## 2017-08-30 RX ADMIN — FERROUS SULFATE TAB 325 MG (65 MG ELEMENTAL FE) 325 MG: 325 (65 FE) TAB at 16:33

## 2017-08-30 RX ADMIN — FERROUS SULFATE TAB 325 MG (65 MG ELEMENTAL FE) 325 MG: 325 (65 FE) TAB at 08:10

## 2017-08-30 RX ADMIN — TIZANIDINE 4 MG: 2 TABLET ORAL at 16:33

## 2017-08-30 RX ADMIN — TIZANIDINE 4 MG: 2 TABLET ORAL at 08:10

## 2017-08-30 RX ADMIN — PANTOPRAZOLE SODIUM 40 MG: 40 TABLET, DELAYED RELEASE ORAL at 05:34

## 2017-08-30 RX ADMIN — ENOXAPARIN SODIUM 40 MG: 40 INJECTION SUBCUTANEOUS at 20:47

## 2017-08-30 RX ADMIN — HYDROCHLOROTHIAZIDE: 25 TABLET ORAL at 08:10

## 2017-08-30 RX ADMIN — ASPIRIN 325 MG ORAL TABLET 325 MG: 325 PILL ORAL at 08:10

## 2017-08-30 RX ADMIN — POTASSIUM CHLORIDE 40 MEQ: 20 TABLET, EXTENDED RELEASE ORAL at 08:10

## 2017-08-30 RX ADMIN — ESCITALOPRAM OXALATE 20 MG: 10 TABLET ORAL at 08:10

## 2017-08-30 RX ADMIN — ATORVASTATIN CALCIUM 40 MG: 40 TABLET, FILM COATED ORAL at 08:10

## 2017-08-30 NOTE — PROGRESS NOTES
Tito Bui MD,   Medical Director  [address] [facility], [address] [facility]  Tel: [phone]       D PROGRESS NOTE    Tad Skinner  Admit Date: 8/8/2017  Admit Diagnosis: Stroke - Right Side Body Involvment;Stroke (cerebrum) (HCC)    Subjective     Doing well. No problems. Did start having some right shoulder pain yesterday which improved with positioning. Discussed shoulder pain in CVA    Objective:     Current Facility-Administered Medications   Medication Dose Route Frequency    tiZANidine (ZANAFLEX) tablet 4 mg  4 mg Oral TID    cyclobenzaprine (FLEXERIL) tablet 5-10 mg  5-10 mg Oral TID PRN    potassium chloride (K-DUR, KLOR-CON) SR tablet 40 mEq  40 mEq Oral DAILY    ferrous sulfate tablet 325 mg  1 Tab Oral BID WITH MEALS    aspirin (ASPIRIN) tablet 325 mg  325 mg Oral DAILY    enoxaparin (LOVENOX) injection 40 mg  40 mg SubCUTAneous Q24H    acetaminophen (TYLENOL) tablet 650 mg  650 mg Oral Q4H PRN    alum-mag hydroxide-simeth (MYLANTA) oral suspension 30 mL  30 mL Oral Q4H PRN    atorvastatin (LIPITOR) tablet 40 mg  40 mg Oral DAILY    bisacodyl (DULCOLAX) suppository 10 mg  10 mg Rectal DAILY PRN    escitalopram oxalate (LEXAPRO) tablet 20 mg  20 mg Oral DAILY    LORazepam (ATIVAN) tablet 1 mg  1 mg Oral Q6H PRN    magnesium hydroxide (MILK OF MAGNESIA) 400 mg/5 mL oral suspension 30 mL  30 mL Oral DAILY PRN    ondansetron (ZOFRAN ODT) tablet 4 mg  4 mg Oral Q6H PRN    senna-docusate (PERICOLACE) 8.6-50 mg per tablet 2 Tab  2 Tab Oral DAILY PRN    valsartan/hydroCHLOROthiazide (DIOVAN HCT) 160/25 mg   Oral DAILY    traZODone (DESYREL) tablet 50 mg  50 mg Oral QHS PRN    pantoprazole (PROTONIX) tablet 40 mg  40 mg Oral ACB     Review of Systems:Denies chest pain, shortness of breath, cough, headache, visual problems, abdominal pain, dysurea, calf pain.  Pertinent positives are as noted in the medical records and unremarkable otherwise. Visit Vitals    /70    Pulse 82    Temp 97.7 °F (36.5 °C)    Resp 14    SpO2 98%    Breastfeeding No        Physical Exam:   General: Alert and age appropriately oriented. No acute cardio respiratory distress. HEENT: Normocephalic,no scleral icterus  Oral mucosa moist without cyanosis   Lungs: Clear to auscultation  bilaterally. Respiration even and unlabored   Heart: Regular rate and rhythm, S1, S2   No  murmurs, clicks, rub or gallops   Abdomen: Soft, non-tender, nondistended. Bowel sounds present. No organomegaly. Genitourinary: Benign . Neuromuscular:      Do not appreciate a right shoulder subluxation but pt supine; 2+ tone with ROM RUE, several beats clonus at right ankly  RUE shoulder flexion 1, EF1, EE 1,  2-, finger ext 0/5  slt increase tone with PROM  RLE; hip flexion 3+, KE 3, DF 0, PF 1  Sensation intact   Skin/extremity: No rashes, no erythema.  No calf tenderness BLE                                                                              Functional Assessment:  Gross Assessment  AROM:  (RLE AAROM WFL) (08/29/17 1200)  PROM:  (RLE WFL) (08/29/17 1200)  Strength:  (RLE ankle 1/5,knee ext 4/5 flex -3/5,hip-3/5 to +2/5) (08/29/17 1200)  Coordination:  (RLE impaired, flexor synergy noted with AROM and gait) (08/29/17 1200)  Tone: Abnormal (Hypertonia RLE w/ clonus with achilles stretch reflex) (08/29/17 1200)  Sensation: Intact (RLE) (08/29/17 1200)       Balance  Sitting - Static: Good (unsupported) (08/29/17 1251)  Sitting - Dynamic: Good (unsupported) (08/29/17 1251)  Standing - Static: Good (with quad cane) (08/29/17 1251)  Standing - Dynamic : Impaired (08/29/17 1251)           Toileting  Cues: Physical assistance for pants up (08/25/17 1025)  Adaptive Equipment:  (BSC) (08/23/17 1026)         Nathaly Gallardo Fall Risk Assessment:  Nathaly Gallardo Fall Risk  Mobility: Ambulates or transfers with assist devices or assistance/unsteady gait (08/30/17 9943)  Mobility Interventions: Patient to call before getting OOB (08/30/17 0726)  Mentation: Alert, oriented x 3 (08/30/17 0726)  Medication: Patient receiving anticonvulsants, sedatives(tranquilizers), psychotropics or hypnotics, hypoglycemics, narcotics, sleep aids, antihypertensives, laxatives, or diuretics (08/30/17 0726)  Medication Interventions: Patient to call before getting OOB (08/30/17 4926)  Elimination: Needs assistance with toileting (08/30/17 0726)  Elimination Interventions: Call light in reach; Patient to call for help with toileting needs; Toileting schedule/hourly rounds (08/30/17 0726)  Prior Fall History: No (08/30/17 0726)  Total Score: 3 (08/30/17 0726)  Standard Fall Precautions: Yes (08/30/17 0726)  High Fall Risk: Yes (08/27/17 0930)     Speech Assessment:         Ambulation:  Gait  Distance (ft): 150 Feet (ft) (08/29/17 1251)  Assistive Device: Anselmo Kaleigh, quad;Orthotic device (1/4 inch heel lift R, ace wrap for R DF assist,) (08/29/17 1251)  Rail Use: Left  (08/29/17 1200)     Labs/Studies:  No results found for this or any previous visit (from the past 72 hour(s)). Assessment:     Problem List as of 8/30/2017  Date Reviewed: 1/26/2017          Codes Class Noted - Resolved    St[facility]cerebrum) ([facility] ICD-10-CM: I63.9  ICD-9-CM: 434.91  8/8/2017 - Present        Left sided lacunar stroke [facility]) ICD-10-CM: I63.9  ICD-9-CM: 434.91  8/4/2017 - Present    Overview Signed 8/6/2017  7:44 PM by Catracho Dunn MD     8/2017             Weakness of right side of body ICD-10-CM: R53.1  ICD-9-CM: 728.87  8/3/2017 - Present        Essential hypertension ICD-10-CM: I10  ICD-9-CM: 401.9  10/5/2016 - Present        Obesity (BMI 30-39. 9) ICD-10-CM: E66.9  ICD-9-CM: 278.00  Unknown - Present        Prediabetes ICD-10-CM: R73.03  ICD-9-CM: 790.29  7/28/2015 - Present        Generalized anxiety disorder ICD-10-CM: F41.1  ICD-9-CM: 300.02  Unknown - Present        Insomnia, unspecified ICD-10-CM: G47.00  ICD-9-CM: 780.52  Unknown - Present        Mitral valve prolapse ICD-10-CM: I34.1  ICD-9-CM: 424.0  Unknown - Present        Iron deficiency anemia ICD-10-CM: D50.9  ICD-9-CM: 280.9  8/23/2013 - Present    Overview Signed 10/5/2016  2:49 PM by Josue Kate MD     S/p iron infusion x 2             RESOLVED: GERD (gastroesophageal reflux disease) ICD-10-CM: K21.9  ICD-9-CM: 530.81  Unknown - 10/5/2016        RESOLVED: Mixed hyperlipidemia ICD-10-CM: E78.2  ICD-9-CM: 272.2  Unknown - 10/5/2016        RESOLVED: Hypertension ICD-10-CM: I10  ICD-9-CM: 401.9  Unknown - 10/5/2016              Plan:        This is an unfortunate 37yo WF with obesity, RADHA, and htn admitted with right hemiplegia due to Left hemispheric stroke with evolution; etiology of stroke not clear     -needs  Custom fit right AFO, no real response to ant tib n with Estim. Cont RHS at noc. Would benefit from a dynamic wrist brace to assist in finger extension. Due to her deficits from her stroke, these will benefit her greatly for proper positioning, preventing contractures, assistance with gait      CVA- continue medical management for CVA  -  Continue antiplatelet therapy; aspirin 325mg daily  - continued on statin; Lipitor   -hypercoag w/u neg x for slt elevation prot C; insignificant finding       Suspect RADHA - can use CPAP while here but will need outpt formal sleep study and Pulmonary follow up. - Continue CPAP, feels she benefits significantly. Feels less tired.       Obesity; have discussed importance of wt loss for mobility and overall health      8/23 tone developing; consider dantrium or zanaflex; started zanaflex at [facility]; added daytime dosing 8/24 at 2mg tid; monitor effects; 8/27 inc to 4mg tid  -improving per PT      Hypertension - BP fairly controlled; -130s. - No adjustments. - Cont Diovan at current dose.      LUCHO; gets IV infusions; Anemia/ microcytic, on fe supplement.    - patient continue fe supplements po. 11.7 back on 8/7; recheck pending; 11.2  -8/24 hx of heavy menstrual cycle 5-7d/mos. ? Etiology of her LUCHO; check HH today; 11.9 improved      Hypokalemia-   - K 3.4 on 8/10, cont KCL 40meq daily  - 8/11 K 3.3 -> increased KCL to  40 bid   - K- 3.8 on 8/14 -> 3.9(8/16) . Decreased KCl 40mg daily. 8/21 k 3.6 cont supplement daily; resolved, monitor loosely 8/24  -8/30 f/u in a.m.          bowel program - add prn bowel program      GERD - start PPI. At times may need additional antacids, Maalox prn.      Depression; cont Lexapro; home med due to generalized anxiety due to stressors in life. His risk post stroke depression. Order recreational therapy and psych if needed; doing much better and participating well 8/11  -8/22 spirits extremely good. No anxiety noted      Dysphagia; mild. Cont ST for this as well as for dysarthria. Continuing joel-motor exercises. - Mechanical consistency diet, cont aspiration  precautions  -8/28 ? Upgrade, d/w speech      8/21 watching for hypertonicity; resting hand splint right hand/wrist at noc; started Zanaflex at noc 8/23; per PT slt improvement but I still note clonus right ankle; cont 2mg tid and titrate to desired effect 8/26  -8/27 need to increase Zanflex to 4mg tid; if too sedating will decrease and start low dose Baclofen      Pneumonia prophylaxis- Incentive spirometer every hour while awake      DVT risk / DVT Prophylaxis- Will require daily physician exam to assess for signs and symptoms as patient is at increased risk for of thromboembolism. Mobilization as tolerated. Intermittent pneumatic compression devices when in bed Thigh-high or knee-high thromboembolic deterrent hose when out of bed. - Lovenox subq daily. - she has no signs of DVT.     Pain Control: stable, mild-to-moderate joint symptoms intermittently, reasonably well controlled by PRN meds. Will require regular pain assessment and comprenhensive pain management. - Cont prn tylenol      Chronic insomnia; on Ambien at home.  Have not restarted this. - on prn trazadone.   -8/30 having very restful nocs off Ambien. trazadone effective      -pt reports hx of MVP since age of 21. Takes abx with dental procedures. 2D ECHO did not appreciate any abnl of the mitral valve          Time spent was 25 minutes with over 1/2 in direct patient care/examination, consultation and coordination of care.      Signed By: Yvette Adan MD     August 30, 2017

## 2017-08-30 NOTE — PROGRESS NOTES
08/30/17 1250   Time Spent With Patient   Time In 0916   Time Out 1005   Patient Seen For: AM;ADLs   Feeding/Eating   Feeding/Eating Assistance I   Grooming   Grooming Assistance  I   Upper Body Bathing   Bathing Assistance, Upper Mod I   Position Performed Seated in chair   Adaptive Equipment Tub bench   Lower Body Bathing   Bathing Assistance, [address] 55 handled sponge   Position Performed Seated in chair   Adaptive Equipment Tub bench   [address] (FIM Score) Mod A   Cues Physical assistance for pants down;Physical assistance for pants up   Adaptive Equipment Other (comment)  (Wide BSC)   Upper Body Dressing    Dressing Assistance  Min A   Comments Assist with bra   Lower Body Dressing    Dressing Assistance  Mod A   Leg Crossed Method Used No   Position Performed Seated in chair;Standing   Don/Doff Anti-Embolic Stockings NA   Comments Assist to don socks and shoes due to time constraints this session, finish underwear over R hip   Functional Transfers   Toilet Transfer  Stand pivot transfer without device   Amount of Assistance Required S   Tub or Shower Type Shower   Amount of Assistance Required S   Adaptive Equipment Tub transfer bench; Wheelchair       S: \"45 minutes goes by fast.\" Agreeable to therapy. Focus of session was on ADL retraining and functional mobility. Patient was able to transfer bed to wheelchair and wheelchair <> BSC and TTB SPT without a device with close SBA. Pain not indicated. Collaborated with PT, Sheri Mahmood and confirmed patient is on track to reach goals as documented in the care plan. Patient tolerated session well, but RUE tone, strength, coordination, balance, functional mobility, activity tolerance are still below baseline and require skilled facilitation to successfully and safely complete ADL's and transfers. Patient ended session in reciner with call remote and phone within reach.      Rhianna Quinn, CLAIRER/L

## 2017-08-30 NOTE — PROGRESS NOTES
PHYSICAL THERAPY DAILY NOTE  Time In: 5088  Time Out: 1210  Patient Seen For: AM;Gait training;Patient education; Therapeutic exercise;Transfer training; Other (see progress notes)    Subjective: Patient reporting she feels OK. Reports she wants to get as much therapy as she can         Objective:Vital Signs:  Patient Vitals for the past 12 hrs:   Temp Pulse Resp BP SpO2   08/30/17 0817 97.7 °F (36.5 °C) 82 14 107/70 98 %     Pain level:No c/o pain  Pain location:NA  Pain interventions:NA    Patient education:Bed mobility training,transfer training, gait training, fall precautions, activity pacing, body mechanics, Patient verbalizing understanding and demonstrating  understanding of patient education. Recommend follow up education.         Interdisciplinary Communication:NA    Other (comment) (Fall precautions)  GROSS ASSESSMENT Daily Assessment     NA       BED/MAT MOBILITY Daily Assessment   Increased time and effort with hemiplegic body mechanics Rolling Right : 6 (Modified independent)  Rolling Left : 6 (Modified independent)  Supine to Sit : 6 (Modified independent)  Sit to Supine : 6 (Modified independent)       TRANSFERS Daily Assessment   Cues for symmetrical weight bearing LEs during sit<>stand Transfer Type: SPT without device  Transfer Assistance : 5 (Supervision/setup)  Sit to Stand Assistance: Modified independent  Car Transfers: Not tested       GAIT Daily Assessment    Amount of Assistance: 5 (Stand-by assistance)  Distance (ft): 100 Feet (ft) (100ft x 2)  Assistive Device: Cane, quad;Orthotic device (1/4 inch heel lift R, ace wrap for R DF assist,)   Gait training with cues for improved weight shift to RLE during RLE stance phase    STEPS or STAIRS Daily Assessment    Steps/Stairs Ambulated (#): 0  Level of Assist : 0 (Not tested)       BALANCE Daily Assessment    Sitting - Static: Good (unsupported)  Sitting - Dynamic: Good (unsupported)  Standing - Static: Good (with quad cane)  Standing - Dynamic : Impaired       WHEELCHAIR MOBILITY Daily Assessment    Curbs/Ramps Assist Required (FIM Score): 0 (Not tested)  Wheelchair Setup Assist Required : 0 (Not tested)       LOWER EXTREMITY EXERCISES Daily Assessment    Extremity: Right  Exercise Type #1: Supine lower extremity strengthening  Sets Performed: 2  Reps Performed: 10  Level of Assist: Minimal assistance  Exercise Type #2: Other (comment) (Supine right ankle DF with premodulated ESTIM )  Sets Performed:  (5 sec on 5 sec off x 10 mins)  Reps Performed:  (Intensity to 21 naman amps, frequency 80 to 150HZ)  Level of Assist: Supervision          Assessment: Progressing towards modified independence with gait with QC. Patient return to room at end of treatment and remained up in wheelchair with needs in reach. Plan of Care: Continue with POC and progress as tolerated.      Reji Lennon, PT  8/30/2017

## 2017-08-30 NOTE — PROGRESS NOTES
Problem: Falls - Risk of  Goal: *Absence of Falls  Document Rashmi Fall Risk and appropriate interventions in the flowsheet.    Outcome: Progressing Towards Goal  Fall Risk Interventions:  Mobility Interventions: Patient to call before getting OOB, Utilize walker, cane, or other assitive device, Utilize gait belt for transfers/ambulation           Medication Interventions: Patient to call before getting OOB     Elimination Interventions: Call light in reach, Toilet paper/wipes in reach, Toileting schedule/hourly rounds

## 2017-08-30 NOTE — PROGRESS NOTES
Message left for Zander Murray with [facility] regarding continued stay. Requested that she contact JEOVANNY

## 2017-08-30 NOTE — PROGRESS NOTES
OT Daily Note    Time In 1302   Time Out 1346     Subjective: \"There's a feeling like it's [L shoulder] really tight and it's slowly releasing. \" [during PROM stretch]  Pain: None indicated    Precautions:  (falls)    Neuro-Muscular Re-Education/Strengthening   Patient transferred wheelchair to mat table SPT with SBA. Continued education of self-ROM exercises for supine on mat and patient provided printed handout for exercises. Patient completed 1 set x 20 reps of elbow flexion/extension, wrist flexion/extension, wrist supination/pronation, and composite digit flexion/extension. Patient completed 1 set x 20 reps of AAROM elbow flexion/extension exercises supine on mat table. Patient able to actively flex elbow from full extension position to 90 degree (forearm vertical) range, decreased antagonistic (tricep) and eccentric contraction for descension from 90 degrees to fully flexed requiring physical assist.  Elbow extension requiring total assist utilizing vibration facilitation, limited also by flexor tone at elbow. Patient completed RUE weightbearing seated EOM facilitated at elbow and digits for full extension. Patient completed [place] [address] task using LUE at tray table during weightbearing, reaching to R of tabletop to retrieve pieces to increase weightbearing through RUE. Assessment: Patient tolerated well. Education: Purpose of therapy  Interdisciplinary Communication: Collaborated with Finn Isabel  and agreed patient is progressing well and on-track to meet goals as stated in [address]. Plan: Continue to address ADL/IADL, functional mobility, activity tolerance, balance, strengthening, coordination.     Hanna Lawson, OTR/L

## 2017-08-30 NOTE — PROGRESS NOTES
Problem: Nutrition Deficit  Goal: *Optimize nutritional status  Nutrition F/U:  Assessment:  The patient continues to eat adequately with no complaints. Last recorded bowel movement was on 8/27/17. Macronutrient Needs:  Estimated calorie needs - 0565-7923 cathleen/day (15-20 cathleen/kg/day)   Estimated protein needs - 52-56 gm pro/day (1.2-1.3 gm pro/kgIBW/day) (GFR >60 ml/min)  Intake/Comparative Standards: This patient's average intake of cardiac mechanical soft diet for the past 6 recorded days/12 meals: 89%. This potentially meets >100% of calorie and >100% of protein goals. Intervention:   Meals and Snacks: Cardiac mechanical soft. Nutrition Discharge Plan: Seems like the patient is meeting her daily nutrition needs with food. No supplement is needed. Miguel Orourke.  Aimee Boyce  [phone]

## 2017-08-31 LAB
ANION GAP SERPL CALC-SCNC: 8 MMOL/L (ref 7–16)
BUN SERPL-MCNC: 10 MG/DL (ref 6–23)
CALCIUM SERPL-MCNC: 9.3 MG/DL (ref 8.3–10.4)
CHLORIDE SERPL-SCNC: 101 MMOL/L (ref 98–107)
CO2 SERPL-SCNC: 30 MMOL/L (ref 21–32)
CREAT SERPL-MCNC: 0.62 MG/DL (ref 0.6–1)
GLUCOSE SERPL-MCNC: 81 MG/DL (ref 65–100)
HCT VFR BLD AUTO: 37.3 % (ref 35.8–46.3)
HGB BLD-MCNC: 11.9 G/DL (ref 11.7–15.4)
POTASSIUM SERPL-SCNC: 3.4 MMOL/L (ref 3.5–5.1)
SODIUM SERPL-SCNC: 139 MMOL/L (ref 136–145)

## 2017-08-31 PROCEDURE — 74011250636 HC RX REV CODE- 250/636: Performed by: PHYSICAL MEDICINE & REHABILITATION

## 2017-08-31 PROCEDURE — 74011250637 HC RX REV CODE- 250/637: Performed by: PHYSICAL MEDICINE & REHABILITATION

## 2017-08-31 PROCEDURE — 97116 GAIT TRAINING THERAPY: CPT

## 2017-08-31 PROCEDURE — 85018 HEMOGLOBIN: CPT | Performed by: PHYSICAL MEDICINE & REHABILITATION

## 2017-08-31 PROCEDURE — 97530 THERAPEUTIC ACTIVITIES: CPT

## 2017-08-31 PROCEDURE — 80048 BASIC METABOLIC PNL TOTAL CA: CPT | Performed by: PHYSICAL MEDICINE & REHABILITATION

## 2017-08-31 PROCEDURE — 65310000000 HC RM PRIVATE REHAB

## 2017-08-31 PROCEDURE — 97112 NEUROMUSCULAR REEDUCATION: CPT

## 2017-08-31 PROCEDURE — 97110 THERAPEUTIC EXERCISES: CPT

## 2017-08-31 PROCEDURE — 99232 SBSQ HOSP IP/OBS MODERATE 35: CPT | Performed by: PHYSICAL MEDICINE & REHABILITATION

## 2017-08-31 PROCEDURE — 97032 APPL MODALITY 1+ESTIM EA 15: CPT

## 2017-08-31 PROCEDURE — 36415 COLL VENOUS BLD VENIPUNCTURE: CPT | Performed by: PHYSICAL MEDICINE & REHABILITATION

## 2017-08-31 PROCEDURE — 97535 SELF CARE MNGMENT TRAINING: CPT

## 2017-08-31 RX ADMIN — ASPIRIN 325 MG ORAL TABLET 325 MG: 325 PILL ORAL at 08:46

## 2017-08-31 RX ADMIN — PANTOPRAZOLE SODIUM 40 MG: 40 TABLET, DELAYED RELEASE ORAL at 06:23

## 2017-08-31 RX ADMIN — TIZANIDINE 4 MG: 2 TABLET ORAL at 22:10

## 2017-08-31 RX ADMIN — HYDROCHLOROTHIAZIDE: 25 TABLET ORAL at 08:45

## 2017-08-31 RX ADMIN — TIZANIDINE 4 MG: 2 TABLET ORAL at 16:00

## 2017-08-31 RX ADMIN — FERROUS SULFATE TAB 325 MG (65 MG ELEMENTAL FE) 325 MG: 325 (65 FE) TAB at 07:41

## 2017-08-31 RX ADMIN — ENOXAPARIN SODIUM 40 MG: 40 INJECTION SUBCUTANEOUS at 22:07

## 2017-08-31 RX ADMIN — ESCITALOPRAM OXALATE 20 MG: 10 TABLET ORAL at 08:45

## 2017-08-31 RX ADMIN — POTASSIUM CHLORIDE 40 MEQ: 20 TABLET, EXTENDED RELEASE ORAL at 08:46

## 2017-08-31 RX ADMIN — TIZANIDINE 4 MG: 2 TABLET ORAL at 08:45

## 2017-08-31 RX ADMIN — ATORVASTATIN CALCIUM 40 MG: 40 TABLET, FILM COATED ORAL at 08:45

## 2017-08-31 RX ADMIN — FERROUS SULFATE TAB 325 MG (65 MG ELEMENTAL FE) 325 MG: 325 (65 FE) TAB at 16:30

## 2017-08-31 NOTE — PROGRESS NOTES
Darcie Deutsch MD,   Medical Director  [address] [facility], [address] [facility]  Tel: [phone]       [facility] PROGRESS NOTE    Laurel Harris  Admit Date: 8/8/2017  Admit Diagnosis: Stroke - Right Side Body Involvment;Stroke (cerebrum) ([facility] [address]    Subjective     Slept without cpap last noc. Notes more spontaneous spasms in RLE. Intermittent, not interfering with mobility. PT/OT commented yesterday on a bit more tone. Not bothersome to pt    Objective:     Current Facility-Administered Medications   Medication Dose Route Frequency    tiZANidine (ZANAFLEX) tablet 4 mg  4 mg Oral TID    cyclobenzaprine (FLEXERIL) tablet 5-10 mg  5-10 mg Oral TID PRN    potassium chloride (K-DUR, KLOR-CON) SR tablet 40 mEq  40 mEq Oral DAILY    ferrous sulfate tablet 325 mg  1 Tab Oral BID WITH MEALS    aspirin (ASPIRIN) tablet 325 mg  325 mg Oral DAILY    enoxaparin (LOVENOX) injection 40 mg  40 mg SubCUTAneous Q24H    acetaminophen (TYLENOL) tablet 650 mg  650 mg Oral Q4H PRN    alum-mag hydroxide-simeth (MYLANTA) oral suspension 30 mL  30 mL Oral Q4H PRN    atorvastatin (LIPITOR) tablet 40 mg  40 mg Oral DAILY    bisacodyl (DULCOLAX) suppository 10 mg  10 mg Rectal DAILY PRN    escitalopram oxalate (LEXAPRO) tablet 20 mg  20 mg Oral DAILY    LORazepam (ATIVAN) tablet 1 mg  1 mg Oral Q6H PRN    magnesium hydroxide (MILK OF MAGNESIA) 400 mg/5 mL oral suspension 30 mL  30 mL Oral DAILY PRN    ondansetron (ZOFRAN ODT) tablet 4 mg  4 mg Oral Q6H PRN    senna-docusate (PERICOLACE) 8.6-50 mg per tablet 2 Tab  2 Tab Oral DAILY PRN    valsartan/hydroCHLOROthiazide (DIOVAN HCT) 160/25 mg   Oral DAILY    traZODone (DESYREL) tablet 50 mg  50 mg Oral QHS PRN    pantoprazole (PROTONIX) tablet 40 mg  40 mg Oral ACB     Review of Systems:Denies chest pain, shortness of breath, cough, headache, visual problems, abdominal pain, dysurea, calf pain.  Pertinent positives are as noted in the medical records and unremarkable otherwise. Visit Vitals    /63 (BP 1 Location: Left arm, BP Patient Position: At rest)    Pulse 67    Temp 98.4 °F (36.9 °C)    Resp 16    SpO2 94%    Breastfeeding No        Physical Exam:   General: Alert and age appropriately oriented. No acute cardio respiratory distress. HEENT: Normocephalic,no scleral icterus  Oral mucosa moist without cyanosis   Lungs: Clear to auscultation  bilaterally. Respiration even and unlabored   Heart: Regular rate and rhythm, S1, S2   No  murmurs, clicks, rub or gallops   Abdomen: Soft, non-tender, nondistended. Bowel sounds present. No organomegaly. obese   Genitourinary: Benign . Neuromuscular:      Right sided weakness unchanged x increased extension of toes voluntarily but only trace DF of ankle/foot  PROM RUE with slight catch at elbow  RUE shoulder flexion 1, EF1, EE 1,  2-, finger ext 0/5  slt increase tone with PROM  RLE; hip flexion 3+, KE 3, DF 0, PF 1  Sensation intact   Skin/extremity: No rashes, no erythema.  No calf tenderness BLE                                                                              Functional Assessment:  Gross Assessment  AROM:  (RLE AAROM WFL) (08/29/17 1200)  PROM:  (RLE WFL) (08/29/17 1200)  Strength:  (RLE ankle 1/5,knee ext 4/5 flex -3/5,hip-3/5 to +2/5) (08/29/17 1200)  Coordination:  (RLE impaired, flexor synergy noted with AROM and gait) (08/29/17 1200)  Tone: Abnormal (Hypertonia RLE w/ clonus with achilles stretch reflex) (08/29/17 1200)  Sensation: Intact (RLE) (08/29/17 1200)       Balance  Sitting - Static: Good (unsupported) (08/30/17 1200)  Sitting - Dynamic: Good (unsupported) (08/30/17 1200)  Standing - Static: Good (with quad cane) (08/30/17 1200)  Standing - Dynamic : Impaired (08/30/17 1200)           Toileting  Cues: Physical assistance for pants down;Physical assistance for pants up (08/30/17 1250)  Adaptive Equipment: Other (comment) (Wide BSC) (08/30/17 1250) Rashmi Fall Risk Assessment:  Tri Gomez Fall Risk  Mobility: Ambulates or transfers with assist devices or assistance/unsteady gait (08/31/17 0310)  Mobility Interventions: Patient to call before getting OOB (08/31/17 0310)  Mentation: Alert, oriented x 3 (08/31/17 0310)  Medication: Patient receiving anticonvulsants, sedatives(tranquilizers), psychotropics or hypnotics, hypoglycemics, narcotics, sleep aids, antihypertensives, laxatives, or diuretics (08/31/17 0310)  Medication Interventions: Patient to call before getting OOB (08/31/17 0310)  Elimination: Needs assistance with toileting (08/31/17 0310)  Elimination Interventions: Call light in reach; Toileting schedule/hourly rounds (08/31/17 0310)  Prior Fall History: No (08/31/17 0310)  Total Score: 3 (08/31/17 0310)  Standard Fall Precautions: Yes (08/31/17 0310)  High Fall Risk: Yes (08/27/17 0930)     Speech Assessment:         Ambulation:  Gait  Distance (ft): 100 Feet (ft) (100ft x 2) (08/30/17 1200)  Assistive Device: Cane, quad;Orthotic device (1/4 inch heel lift R, ace wrap for R DF assist,) (08/30/17 1200)  Rail Use: Left  (08/29/17 1200)     Labs/Studies:  No results found for this or any previous visit (from the past 72 hour(s)). Assessment:     Problem List as of 8/31/2017  Date Reviewed: 1/26/2017          Codes Class Noted - Resolved    Stroke (cerebrum) ([facility] [address] ICD-10-CM: I63.9  ICD-9-CM: 434.91  8/8/2017 - Present        Left sided lacunar stroke [facility]) ICD-10-CM: I63.9  ICD-9-CM: 434.91  8/4/2017 - Present    Overview Signed 8/6/2017  7:44 PM by Lucila Anglin MD     8/2017             Weakness of right side of body ICD-10-CM: R53.1  ICD-9-CM: 728.87  8/3/2017 - Present        Essential hypertension ICD-10-CM: I10  ICD-9-CM: 401.9  10/5/2016 - Present        Obesity (BMI 30-39. 9) ICD-10-CM: E66.9  ICD-9-CM: 278.00  Unknown - Present        Prediabetes ICD-10-CM: R73.03  ICD-9-CM: 790.29  7/28/2015 - Present        Generalized anxiety disorder ICD-10-CM: F41.1  ICD-9-CM: 300.02  Unknown - Present        Insomnia, unspecified ICD-10-CM: G47.00  ICD-9-CM: 780.52  Unknown - Present        Mitral valve prolapse ICD-10-CM: I34.1  ICD-9-CM: 424.0  Unknown - Present        Iron deficiency anemia ICD-10-CM: D50.9  ICD-9-CM: 280.9  8/23/2013 - Present    Overview Signed 10/5/2016  2:49 PM by Lacy Mesa MD     S/p iron infusion x 2             RESOLVED: GERD (gastroesophageal reflux disease) ICD-10-CM: K21.9  ICD-9-CM: 530.81  Unknown - 10/5/2016        RESOLVED: Mixed hyperlipidemia ICD-10-CM: E78.2  ICD-9-CM: 272.2  Unknown - 10/5/2016        RESOLVED: Hypertension ICD-10-CM: I10  ICD-9-CM: 401.9  Unknown - 10/5/2016              Plan:        This is an unfortunate 37yo WF with obesity, RADHA, and htn admitted with right hemiplegia due to Left hemispheric stroke with evolution; etiology of stroke not clear; likely hypertensive      -needs  Custom fit right AFO, no real response to ant tib n with Estim. Cont RHS at noc. Would benefit from a dynamic wrist brace to assist in finger extension. Due to her deficits from her stroke, these will benefit her greatly for proper positioning, preventing contractures, assistance with gait      CVA- continue medical management for CVA  -  Continue antiplatelet therapy; aspirin 325mg daily  - continued on statin; Lipitor   -hypercoag w/u neg x for slt elevation prot C; insignificant finding       Suspect RADHA - can use CPAP while here but will need outpt formal sleep study and Pulmonary follow up. - Continue CPAP, feels she benefits significantly. Feels less tired.       Obesity; have discussed importance of wt loss for mobility and overall health      8/23 tone developing; consider dantrium or zanaflex; started zanaflex at [facility]; added daytime dosing 8/24 at 2mg tid; monitor effects; 8/27 inc to 4mg tid  -improving per PT      Hypertension - BP fairly controlled; -130s. - No adjustments.   - Cont Diovan at current dose.      LUCHO; gets IV infusions; Anemia/ microcytic, on fe supplement. - patient continue fe supplements po. 11.7 back on 8/7; recheck pending; 11.2  -8/24 hx of heavy menstrual cycle 5-7d/mos. ? Etiology of her LUCHO; check HH today; 11.9 improved      Hypokalemia-   - K 3.4 on 8/10, cont KCL 40meq daily  - 8/11 K 3.3 -> increased KCL to  40 bid   - K- 3.8 on 8/14 -> 3.9(8/16) . Decreased KCl 40mg daily. 8/21 k 3.6 cont supplement daily; resolved, monitor loosely 8/24  -8/30 f/u in a.m.          bowel program - add prn bowel program      GERD - start PPI. At times may need additional antacids, Maalox prn.      Depression; cont Lexapro; home med due to generalized anxiety due to stressors in life. His risk post stroke depression. Order recreational therapy and psych if needed; doing much better and participating well 8/11  -8/22 spirits extremely good. No anxiety noted  -8/31 emotionally coping very well, very motivated      Dysphagia; mild. Cont ST for this as well as for dysarthria. Continuing joel-motor exercises. - Mechanical consistency diet, cont aspiration  precautions  -8/28 ? Upgrade, d/w speech      8/21 watching for hypertonicity; resting hand splint right hand/wrist at noc; started Zanaflex at noc 8/23; per PT slt improvement but I still note clonus right ankle; cont 2mg tid and titrate to desired effect 8/26  -8/27 need to increase Zanflex to 4mg tid; if too sedating will decrease and start low dose Baclofen      Pneumonia prophylaxis- Incentive spirometer every hour while awake      DVT risk / DVT Prophylaxis- Will require daily physician exam to assess for signs and symptoms as patient is at increased risk for of thromboembolism. Mobilization as tolerated. Intermittent pneumatic compression devices when in bed Thigh-high or knee-high thromboembolic deterrent hose when out of bed. - Lovenox subq daily. - she has no signs of DVT.       Pain Control: stable, mild-to-moderate joint symptoms intermittently, reasonably well controlled by PRN meds. Will require regular pain assessment and comprenhensive pain management. - Cont prn tylenol      Chronic insomnia; on Ambien at home. Have not restarted this. - on prn trazadone.   -8/30 having very restful nocs off Ambien. trazadone effective      -pt reports hx of MVP since age of 21. Takes abx with dental procedures. 2D ECHO did not appreciate any abnl of the mitral valve          Time spent was 25 minutes with over 1/2 in direct patient care/examination, consultation and coordination of care.      Signed By: Jennifer Julian MD     August 31, 2017

## 2017-08-31 NOTE — PROGRESS NOTES
OT Daily Note    Time In 1116   Time Out 1158     Subjective: \"My back hurts, I think cause my [R] knee keeps wanting to buckle. \" Moira Loera applied to lower back in area of discomfort, defer to task at seated level. Patient reports decreased discomfort]  Pain: Back pain, not rated, see above. Precautions:  (falls)    ROM   Patient tolerated PROM stretch to R shoulder in scapular mobilization (elevation/depression, upward rotation) and shoulder abduction and flexion. Patient tolerated well, reports decrease in pain with humeral traction (to open glenohumeral joint space). Neuro-Muscular Re-Education   Patient completed standing/sitting trials for RUE weightbearing at tabletop while completing LUE coordination/cognitive task. Patient transfers sit to stand and maintains standing balance with supervision, weightbearing facilitated on tabletop/tray (while seated) at R elbow and digits for full extension. Patient tolerated standing trials ~3 minutes each before requiring seated rest breaks d/t back pain. Defer to remainder of task seated at tabletop. Patient completed slanted rubber band board according to visual patterns with 100% accuracy using LUE. Assessment: Patient tolerated well. Increased flexor tone affecting shoulder joint, patient reporting decrease in pain with overhead ROM post-glenohumeral traction. See above for details. Education: Purpose of therapy  Interdisciplinary Communication: Collaborated with Medical Team and agreed patient is progressing well and on-track to meet goals as stated in POC. Plan: Continue to address ADL/IADL, functional mobility, activity tolerance, balance, strengthening, coordination.       Baylee Mike, OTR/L

## 2017-08-31 NOTE — PROGRESS NOTES
PHYSICAL THERAPY DAILY NOTE  Time In: 8486  Time Out: 1010  Patient Seen For: AM;Gait training;Patient education; Therapeutic exercise;Transfer training; Wheelchair mobility; Other (see progress notes)    Subjective: patient reporting she feels a little \"shaky\" again today. Reports she does not feel as steady walking with straight cane as she does with quad cane. reports no increase in RLE spasticity this AM during ADLs         Objective:Vital Signs:  Patient Vitals for the past 12 hrs:   Temp Pulse Resp BP SpO2   08/31/17 0821 97.4 °F (36.3 °C) 70 18 114/73 97 %     Pain level:No c/o pain , reporting her right shoulder only hurts when it is stretched over head  Pain location:NA  Pain interventions:NA    Patient education:Bed mobility training,transfer training, gait training, fall precautions, activity pacing, body mechanics, w/c mobility and parts management, Patient verbalizing understanding and demonstrating understanding of patient education. Recommend follow up education.     Interdisciplinary Communication:NA    Other (comment) (Fall precautions)  GROSS ASSESSMENT Daily Assessment     NA       BED/MAT MOBILITY Daily Assessment   Increased time and effort using hemiplegic body mechanics Rolling Right : 6 (Modified independent)  Rolling Left : 6 (Modified independent)  Supine to Sit : 6 (Modified independent)  Sit to Supine : 6 (Modified independent)       TRANSFERS Daily Assessment    Transfer Type: SPT without device  Transfer Assistance : 6 (Modified independent) (to the left)  Sit to Stand Assistance: Modified independent  Car Transfers: Not tested       GAIT Daily Assessment    Amount of Assistance: 4 (Minimal assistance)  Distance (ft): 150 Feet (ft)  Assistive Device: Cane, straight;Orthotic device (1/4 inch heel lift R, ace wrap for R DF assist,)   Gait training facilitating improved right hip protraction from terminal stance to initial contact and inhibiting right hip retraction and knee hyperextension at midstance    STEPS or STAIRS Daily Assessment    Steps/Stairs Ambulated (#): 0  Level of Assist : 0 (Not tested)       BALANCE Daily Assessment    Sitting - Static: Good (unsupported)  Sitting - Dynamic: Good (unsupported)  Standing - Static: Good (with straight cane)  Standing - Dynamic : Impaired       WHEELCHAIR MOBILITY Daily Assessment    Able to Propel (ft): 150 feet  Functional Level: 6  Curbs/Ramps Assist Required (FIM Score): 0 (Not tested)  Wheelchair Setup Assist Required : 6 (Modified independent)  Wheelchair Management: Manages left brake;Manages right brake;Manages right footrest       LOWER EXTREMITY EXERCISES Daily Assessment    Extremity: Right  Exercise Type #1: Supine lower extremity strengthening  Sets Performed: 2  Reps Performed: 10  Level of Assist: Minimal assistance  Exercise Type #2: Other (comment) (Supine right ankle DF with premodulated ESTIM )  Sets Performed:  (5 sec on 5 sec off x 10 mins)  Reps Performed:  (Intensity to 16 naman amps, frequency 80 to 150HZ)  Level of Assist: Supervision          Assessment: Slowly improving with ability to ambulate with straight cane but at this time patient is safer ambulating with quad cane. Able to get 5 to 10 reps of active DF of right ankle after ESTIM. Difficulty controlling eccentric contraction of right hamstrings from midswing to initial contact limiting ability to increase gait speed and step length with RLE       Patient return to room at end of treatment and remained up in wheelchair with needs in reach. Posey alarm on. Knee immobilizer on  LE. Plan of Care: Continue with POC and progress as tolerated.      Joan Loaiza, PT  8/31/2017

## 2017-08-31 NOTE — PROGRESS NOTES
Assessment completed, pt A/O x 4, she denies pain, respiration even and non labored. Lung fields clear. Pt diagnosis with CVA with right side weakness. Pt has small amount of movement to toes on Rt foot. RUE, no movement. Pt able to open her cereal box and her milk with out assistance. Pt transfers with 1 person assist to wheelchair. Pt skin remains intact.

## 2017-08-31 NOTE — PROGRESS NOTES
PHYSICAL THERAPY DAILY NOTE  Time In: 1302  Time Out: 0850  Patient Seen For: PM;Gait training;Patient education; Therapeutic exercise;Transfer training; Wheelchair mobility; Other (see progress notes)    Subjective: Patient reporting she feels weak and unsteady today. Reports she thinks her muscle relaxer is making her feel weaker today         Objective:Vital Signs:  Patient Vitals for the past 12 hrs:   Temp Pulse Resp BP SpO2   08/31/17 0821 97.4 °F (36.3 °C) 70 18 114/73 97 %     Pain level:No c/o pain  Pain location:NA  Pain interventions:NA    Patient education:Balance training,transfer training, gait training, fall precautions, activity pacing, body mechanics, w/c mobility and parts management, Patient verbalizing understanding and demonstrating partial understanding of patient education. Recommend follow up education. Interdisciplinary Communication:NA    Other (comment) (Fall precautions)  GROSS ASSESSMENT Daily Assessment     NA       BED/MAT MOBILITY Daily Assessment    Rolling Right : 0 (Not tested)  Rolling Left : 0 (Not tested)  Supine to Sit : 0 (Not tested)  Sit to Supine : 0 (Not tested)       TRANSFERS Daily Assessment    Transfer Type: SPT without device (to the left)  Transfer Assistance : 6 (Modified independent)  Sit to Stand Assistance: Modified independent  Car Transfers: Minimum assistance  Car Type: rehab car       GAIT Daily Assessment   Gait training x 150ft x 2 with quad cane, ace wrap for right DF assist and 1/4 inch heel lift right shoe, SBA with cues to control right knee hyperextension and to improve weight shift to RLE.  Amount of Assistance: 5 (Stand-by assistance)  Distance (ft): 150 Feet (ft) (150ft x 2)  Assistive Device: Cane, quad;Orthotic device (1/4 inch heel lift R, ace wrap for R DF assist,)   Gait training up/down 10 ft ramp with quad cane and SBA with devices as noted above    STEPS or STAIRS Daily Assessment   gait training up/down 4 steps x 2 with left hand rail and CGA with verbal cues for improved right step clearance. Increased time and effort to complete with 3 min sitting rest break after first attempt. Demonstrating left LE vaulting with right hip hiking and circumduction going up steps and excessive RLE adduction with tendency to narrow base of support when placing right foot going down steps Steps/Stairs Ambulated (#): 4 (4 steps x 2 with 2 min sitting rest break)  Level of Assist : 4 (Minimal assistance)  Rail Use: Left        BALANCE Daily Assessment    Sitting - Static: Good (unsupported)  Sitting - Dynamic: Good (unsupported)  Standing - Static: Good (with Quad cane)  Standing - Dynamic : Impaired       WHEELCHAIR MOBILITY Daily Assessment   Using LUE and LLE Able to Propel (ft): 150 feet  Functional Level: 6  Curbs/Ramps Assist Required (FIM Score): 0 (Not tested)  Wheelchair Setup Assist Required : 6 (Modified independent)  Wheelchair Management: Manages left brake;Manages right brake;Manages right footrest       LOWER EXTREMITY EXERCISES Daily Assessment    Extremity: Both  Exercise Type #1: Other (comment) (Sit<>stand from w/c w/o use of UEs facilitating sym wt bear)  Sets Performed: 2  Reps Performed: 5  Level of Assist: Contact guard assistance (with tactile cues and verbal cues)            Assessment: Progressing towards modified independence with gait with quad cane. Patient to recreational therapy at end of treatment    Plan of Care: Continue with POC and progress as tolerated.      Media Prior, PT  8/31/2017

## 2017-08-31 NOTE — PROGRESS NOTES
Subjective \"I am just motivated to work hard because I don't want to stay like this. I want to get better. \"   Activity Standing basketball toss    Strength/Endurance Patient stood around 3 minutes with no c/o tiredness or fatigue. She was appreciative of the time she had to stand. Balance Sit<->stand:  S with quad cane   Social Interaction Patient was friendly and motivated to participate with recreational therapy. Cognitive A&O X4   Comments Patient was assisted to her room and left with all needs within reach. She was left with an [employer] rep to help with a fitting for her brace.      Fanta Rincon, NIKOLASS

## 2017-08-31 NOTE — PROGRESS NOTES
08/31/17 1007   Time Spent With Patient   Time In 0832   Time Out 0916   Patient Seen For: AM;ADLs   Feeding/Eating   Feeding/Eating Assistance I   Grooming   Grooming Assistance  I   Upper Body Bathing   Bathing Assistance, Upper Mod I   Position Performed Seated in chair   Adaptive Equipment Tub bench   Lower Body Bathing   Bathing Assistance, Lower  Mod I   Position Performed Seated in chair   Adaptive Equipment Tub bench; Long handled sponge   Upper Body Dressing    Dressing Assistance  Min A   Comments Assist with bra   Lower Body Dressing    Dressing Assistance  Min A   Leg Crossed Method Used No   Position Performed Seated in chair;Standing   Adaptive Equipment Used Sock aid;Reacher;Long handled shoe horn   Don/Doff Anti-Embolic Stockings NA   Comments Use of soft sock aid and reacher, assist to start R shoe and finish pants/underwear over R hip   Functional Transfers   Tub or Shower Type Shower   Amount of Assistance Required S   Adaptive Equipment Tub transfer bench     S: \"If I could get more control of my [R] foot this shoe would be easier. \" Agreeable to therapy. Focus of session was on ADL retraining and functional mobility. Patient was able to transfer bed to wheelchair and wheelchair <> TTB SPT without device with supervision. Pain for PROM stretching of shoulder overhead seated in wheelchair, collaborated with OT, Rebecca Carroll and PTValdemar for further monitoring. No subluxation noted, patent re-educated regarding spastic flexor tone and importance of PROM stretch to tolerance to maintain RUE ROM. MD aware of increased flexor tone in RUE. Patient tolerated session well, but activity tolerance, RUE tone, strength, coordination, balance, functional mobility are still below baseline and require skilled facilitation to successfully and safely complete ADL's and transfers. Patient ended session in wheelchair with PTValdemar.      Samuel Briceno, OTR/L

## 2017-08-31 NOTE — PROGRESS NOTES
PHYSICAL THERAPY DAILY NOTE   TIME IN 1438  TIME OUT 1516    Subjective: Patient reporting she feels OK. Reports she is trying to concentrate on what her RLE is doing when she is walking. Reports she can tell when her right knee snaps back. Objective:Vital Signs:NT    Pain level:No c/o pain  Pain location:NA  Pain interventions:NA    Patient education:Balance training,transfer training, gait training, fall precautions, activity pacing,body mechanics,Patient verbalizing understanding and demonstrating partial understanding of patient education. Recommend follow up education. Interdisciplinary Communication:NA    Other (comment) (Fall precautions)  GROSS ASSESSMENT Daily Assessment     NA       BED/MAT MOBILITY Daily Assessment     Modified independent with sit to supine       TRANSFERS Daily Assessment     Supervision with SPT without a device to the left, Modified independent with sit<>stand. GAIT Daily Assessment     Gait training x 150ft x 2 with quad cane, ace wrap for right DF assist and 1/4 inch heel lift right shoe, SBA with cues to control right knee hyperextension and to improve weight shift to RLE. Gait training up/down 10 ft ramp with quad cane and SBA with devices as noted above    STEPS or STAIRS Daily Assessment     gait training up/down 4 steps x 2 with left hand rail and CGA with verbal cues for improved right step clearance. Increased time and effort to complete with 3 min sitting rest break after first attempt. Demonstrating left LE vaulting with right hip hiking and circumduction going up steps and excessive RLE adduction with tendency to narrow base of support when placing right foot going down steps   Gait training up/down 6 inch step x 2 with quad cane and min assist with cues for sequencing.     BALANCE Daily Assessment     Static sitting: Good  Dynamic Sitting : Good  Standing Static with quad cane: Good  Dynamic standing: Impaired       WHEELCHAIR MOBILITY Daily Assessment NT       LOWER EXTREMITY EXERCISES Daily Assessment     SI<>stand from w/c without use of UEs facilitating symmetrical wt bearing LEs with CGA and tactile cues, 3 sets of 5          Assessment: Improving with ability to ambulate up/down ramp and steps. Difficulty progressing towards cont step through gait due to extent of RLE weakness. Patient returned to room at end of treatment. Patient supine in bed with head of bed elevated and bed rails up x 2. Needs placed in reach of patient. Plan of Care: Continue with POC and progress as tolerated.      Abran Chand, PT  8/30/2017

## 2017-08-31 NOTE — PROGRESS NOTES
Team conference; dc remains scheduled for 9/6 with outpatient PT and OT to be ordered. Discussed with pt; agreeable. Outpatient to be scheduled at [phone].

## 2017-08-31 NOTE — PROGRESS NOTES
Message received from [facility]. Pt approved for additional days through 9/5 with dc on 9/6.

## 2017-08-31 NOTE — PROGRESS NOTES
Message received from Xiomara Vega from [facility] stating that she did not receive clinical update. Upon checking realized that the fax number was off by one number. Refaxed clinicals to Xiomara Vega.

## 2017-09-01 PROCEDURE — 99232 SBSQ HOSP IP/OBS MODERATE 35: CPT | Performed by: PHYSICAL MEDICINE & REHABILITATION

## 2017-09-01 PROCEDURE — 97535 SELF CARE MNGMENT TRAINING: CPT

## 2017-09-01 PROCEDURE — 94660 CPAP INITIATION&MGMT: CPT

## 2017-09-01 PROCEDURE — 74011250637 HC RX REV CODE- 250/637: Performed by: PHYSICAL MEDICINE & REHABILITATION

## 2017-09-01 PROCEDURE — 97110 THERAPEUTIC EXERCISES: CPT

## 2017-09-01 PROCEDURE — 65310000000 HC RM PRIVATE REHAB

## 2017-09-01 PROCEDURE — 97530 THERAPEUTIC ACTIVITIES: CPT

## 2017-09-01 PROCEDURE — 97116 GAIT TRAINING THERAPY: CPT

## 2017-09-01 PROCEDURE — 74011250636 HC RX REV CODE- 250/636: Performed by: PHYSICAL MEDICINE & REHABILITATION

## 2017-09-01 RX ORDER — POTASSIUM CHLORIDE 20 MEQ/1
60 TABLET, EXTENDED RELEASE ORAL DAILY
Status: DISCONTINUED | OUTPATIENT
Start: 2017-09-02 | End: 2017-09-06

## 2017-09-01 RX ADMIN — ATORVASTATIN CALCIUM 40 MG: 40 TABLET, FILM COATED ORAL at 09:12

## 2017-09-01 RX ADMIN — FERROUS SULFATE TAB 325 MG (65 MG ELEMENTAL FE) 325 MG: 325 (65 FE) TAB at 16:52

## 2017-09-01 RX ADMIN — TIZANIDINE 4 MG: 2 TABLET ORAL at 09:12

## 2017-09-01 RX ADMIN — TIZANIDINE 4 MG: 2 TABLET ORAL at 16:52

## 2017-09-01 RX ADMIN — ASPIRIN 325 MG ORAL TABLET 325 MG: 325 PILL ORAL at 09:13

## 2017-09-01 RX ADMIN — POTASSIUM CHLORIDE 40 MEQ: 20 TABLET, EXTENDED RELEASE ORAL at 09:12

## 2017-09-01 RX ADMIN — PANTOPRAZOLE SODIUM 40 MG: 40 TABLET, DELAYED RELEASE ORAL at 06:05

## 2017-09-01 RX ADMIN — FERROUS SULFATE TAB 325 MG (65 MG ELEMENTAL FE) 325 MG: 325 (65 FE) TAB at 07:55

## 2017-09-01 RX ADMIN — HYDROCHLOROTHIAZIDE: 25 TABLET ORAL at 09:13

## 2017-09-01 RX ADMIN — ENOXAPARIN SODIUM 40 MG: 40 INJECTION SUBCUTANEOUS at 21:32

## 2017-09-01 RX ADMIN — ESCITALOPRAM OXALATE 20 MG: 10 TABLET ORAL at 09:13

## 2017-09-01 RX ADMIN — TIZANIDINE 4 MG: 2 TABLET ORAL at 21:31

## 2017-09-01 NOTE — PROGRESS NOTES
Subjective \"I have a harder time concentrating on this game when we are standing than when I am sitting. \"   Activity Standing ANKIT card game with card bolden. Strength/Endurance Patient stood while this therapist supported her RUE as she weight beared  through her R arm to reach for the cards. Balance Patient without loss of balance and was aware of the placement of her RLE. Social Interaction Patient was friendly and in good spirits during the session. Cognitive A&O X4   Comments Patient was assisted to her room and into the bed with all needs within reach.       Ashanti Mancini, CTRS

## 2017-09-01 NOTE — PROGRESS NOTES
09/01/17 1243   Time Spent With Patient   Time In 0887   Time Out 1004   Patient Seen For: AM;ADLs   Feeding/Eating   Feeding/Eating Assistance I   Grooming   Grooming Assistance  I   Upper Body Bathing   Bathing Assistance, Upper Mod I   Position Performed Seated in chair   Adaptive Equipment Tub bench   Lower Body Bathing   Bathing Assistance, Lower  Mod I   Position Performed Seated in chair   Adaptive Equipment Tub bench; Long handled sponge   Toileting   Toileting Assistance (FIM Score) Mod A   Cues Physical assistance for pants up;Physical assistance for pants down   Adaptive Equipment Other (comment)  (BSC)   Upper Body Dressing    Dressing Assistance  Min A   Comments To assist with bra   Lower Body Dressing    Dressing Assistance  Min A   Leg Crossed Method Used No   Position Performed Seated in chair;Standing   Adaptive Equipment Used Grab bar;Long handled shoe horn;Reacher;Sock aid   Don/Doff Anti-Embolic Stockings NA   Comments Assist to finish underwear over R hip, start R foot into shoe   Functional Transfers   Toilet Transfer  Stand pivot transfer without device   Amount of Assistance Required Mod I   Tub or Shower Type Shower   Amount of Assistance Required SBA   Adaptive Equipment Tub transfer bench     S: Agreeable to therapy. Focus of session was on [employer] for ADL, home program for RUE PROM stretching (caregiver-assisted) and functional transfers. Patient was able to transfer wheelchair <> wide BSC SPT with modified independence, <> TTB with supervision. Patient and family educated regarding recommendation for wide BSC at discharge, accommodating patient's bobby technique for completing toileting hygiene (promoting independence and decreasing caregiver burden) hooking LLE over L armrest on wide BSC to access posteriorly between legs to wipe bottom.   Note patient is limited by decreased dynamic sitting/standing balance, R hemiparesis, and body habitus for utilizing this technique on standard bedside commode. Patient and family verbalize understanding. Patient's family educated regarding TTB setup and transfer, patient transfers <> TTB with supervision/assist from mother demonstrating good understanding for safe technique. Family with printed information for acquiring tub bench. Patient's family educated regarding RUE PROM stretching program for home. Verbalize and demonstrate understanding for 5 PROM stretches included in program, printed handout provided to caregivers. Pain not indicated. Collaborated with PTTariq and confirmed patient is on track to reach goals as documented in the care plan. Patient tolerated session well, but activity tolerance, RUE strength, tone, coordination, balance, functional mobility are still below baseline and require skilled facilitation to successfully and safely complete ADL's and transfers. Patient ended session in wheelchair with PTTariq.      Rodolfo Guadarrama OTR/L

## 2017-09-01 NOTE — PROGRESS NOTES
Pt A/O x 4, she denies pain, respiration even and non labored. 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 right arm and Rt lower extremity continues not to be purposeful. Pt uses wheelchair to BR and needs assistance with toileting and ADL's. Pt takes medication w/o difficulty. pt encourage to call for any needs.  Call light with in reach

## 2017-09-01 NOTE — PROGRESS NOTES
Shannan Ugalde MD,   Medical Director  [address] [facility], [address] [facility]  Tel: [phone]       SFD PROGRESS NOTE    Unique Nelson  Admit Date: 8/8/2017  Admit Diagnosis: Stroke - Right Side Body Involvment;Stroke (cerebrum) ([facility] [address]    Subjective     Patient seen and examined. Vss. No acute complaints. PT, OT well tolerated. Steady gains made. No new barrier to progress noted. Sleeping very well with CPAP and very willing to have outpt sleep study    Objective:     Current Facility-Administered Medications   Medication Dose Route Frequency    tiZANidine (ZANAFLEX) tablet 4 mg  4 mg Oral TID    cyclobenzaprine (FLEXERIL) tablet 5-10 mg  5-10 mg Oral TID PRN    potassium chloride (K-DUR, KLOR-CON) SR tablet 40 mEq  40 mEq Oral DAILY    ferrous sulfate tablet 325 mg  1 Tab Oral BID WITH MEALS    aspirin (ASPIRIN) tablet 325 mg  325 mg Oral DAILY    enoxaparin (LOVENOX) injection 40 mg  40 mg SubCUTAneous Q24H    acetaminophen (TYLENOL) tablet 650 mg  650 mg Oral Q4H PRN    alum-mag hydroxide-simeth (MYLANTA) oral suspension 30 mL  30 mL Oral Q4H PRN    atorvastatin (LIPITOR) tablet 40 mg  40 mg Oral DAILY    bisacodyl (DULCOLAX) suppository 10 mg  10 mg Rectal DAILY PRN    escitalopram oxalate (LEXAPRO) tablet 20 mg  20 mg Oral DAILY    LORazepam (ATIVAN) tablet 1 mg  1 mg Oral Q6H PRN    magnesium hydroxide (MILK OF MAGNESIA) 400 mg/5 mL oral suspension 30 mL  30 mL Oral DAILY PRN    ondansetron (ZOFRAN ODT) tablet 4 mg  4 mg Oral Q6H PRN    senna-docusate (PERICOLACE) 8.6-50 mg per tablet 2 Tab  2 Tab Oral DAILY PRN    valsartan/hydroCHLOROthiazide (DIOVAN HCT) 160/25 mg   Oral DAILY    traZODone (DESYREL) tablet 50 mg  50 mg Oral QHS PRN    pantoprazole (PROTONIX) tablet 40 mg  40 mg Oral ACB     Review of Systems:Denies chest pain, shortness of breath, cough, headache, visual problems, abdominal pain, dysurea, calf pain.  Pertinent positives are as noted in the medical records and unremarkable otherwise. Visit Vitals    /88 (BP 1 Location: Right arm, BP Patient Position: At rest)    Pulse 78    Temp 97.8 °F (36.6 °C)    Resp 18    SpO2 94%    Breastfeeding No        Physical Exam:   General: Alert and age appropriately oriented. No acute cardio respiratory distress. HEENT: Normocephalic,no scleral icterus  Oral mucosa moist without cyanosis   Lungs: Clear to auscultation  bilaterally. Respiration even and unlabored   Heart: Regular rate and rhythm, S1, S2   No  murmurs, clicks, rub or gallops   Abdomen: Soft, non-tender, nondistended. Bowel sounds present. No organomegaly. Genitourinary: Benign . Neuromuscular:      Spastic right hemiplegia  Right sided weakness unchanged x increased extension of toes voluntarily but only trace DF of ankle/foot  PROM RUE with slight catch at elbow  RUE shoulder flexion 1, EF1, EE 1,  2-, finger ext 0/5  slt increase tone with PROM  RLE; hip flexion 3+, KE 3, DF 0, PF 1  Sensation intact   Skin/extremity: No rashes, no erythema.  No calf tenderness BLE  No edema                                                                            Functional Assessment:  Gross Assessment  AROM:  (RLE AAROM WFL) (08/29/17 1200)  PROM:  (RLE WFL) (08/29/17 1200)  Strength:  (RLE ankle 1/5,knee ext 4/5 flex -3/5,hip-3/5 to +2/5) (08/29/17 1200)  Coordination:  (RLE impaired, flexor synergy noted with AROM and gait) (08/29/17 1200)  Tone: Abnormal (Hypertonia RLE w/ clonus with achilles stretch reflex) (08/29/17 1200)  Sensation: Intact (RLE) (08/29/17 1200)       Balance  Sitting - Static: Good (unsupported) (08/31/17 1300)  Sitting - Dynamic: Good (unsupported) (08/31/17 1300)  Standing - Static: Good (with Quad cane) (08/31/17 1300)  Standing - Dynamic : Impaired (08/31/17 1300)           Toileting  Cues: Physical assistance for pants down;Physical assistance for pants up (08/30/17 1250)  Adaptive Equipment: Other (comment) (Wide BS) (08/30/17 1250)         Megan Meeks Fall Risk Assessment:  Megan Meeks Fall Risk  Mobility: Ambulates or transfers with assist devices or assistance/unsteady gait (09/01/17 0734)  Mobility Interventions: Communicate number of staff needed for ambulation/transfer;Patient to call before getting OOB (09/01/17 0734)  Mentation: Alert, oriented x 3 (09/01/17 0734)  Medication: Patient receiving anticonvulsants, sedatives(tranquilizers), psychotropics or hypnotics, hypoglycemics, narcotics, sleep aids, antihypertensives, laxatives, or diuretics (09/01/17 0734)  Medication Interventions: Patient to call before getting OOB; Evaluate medications/consider consulting pharmacy (09/01/17 5118)  Elimination: Needs assistance with toileting (09/01/17 0734)  Elimination Interventions: Patient to call for help with toileting needs; Elevated toilet seat;Call light in reach (09/01/17 0734)  Prior Fall History: No (09/01/17 0734)  Total Score: 3 (09/01/17 0734)  Standard Fall Precautions: Yes (09/01/17 0429)  High Fall Risk: Yes (09/01/17 0734)     Speech Assessment:         Ambulation:  Gait  Distance (ft): 150 Feet (ft) (150ft x 2) (08/31/17 1300)  Assistive Device: Cane, quad;Orthotic device (1/4 inch heel lift R, ace wrap for R DF assist,) (08/31/17 1300)  Rail Use: Left  (08/31/17 1300)     Labs/Studies:  Recent Results (from the past 72 hour(s))   METABOLIC PANEL, BASIC    Collection Time: 08/31/17  7:20 AM   Result Value Ref Range    Sodium 139 136 - 145 mmol/L    Potassium 3.4 (L) 3.5 - 5.1 mmol/L    Chloride 101 98 - 107 mmol/L    CO2 30 21 - 32 mmol/L    Anion gap 8 7 - 16 mmol/L    Glucose 81 65 - 100 mg/dL    BUN 10 6 - 23 MG/DL    Creatinine 0.62 0.6 - 1.0 MG/DL    GFR est AA >60 >60 ml/min/1.73m2    GFR est non-AA >60 >60 ml/min/1.73m2    Calcium 9.3 8.3 - 10.4 MG/DL   HGB & HCT    Collection Time: 08/31/17  7:20 AM   Result Value Ref Range    HGB 11.9 11.7 - 15.4 g/dL    HCT 37.3 35.8 - 46.3 %       Assessment:     Problem List as of 9/1/2017  Date Reviewed: 1/26/2017          Codes Class Noted - Resolved    Stroke (cerebrum) ([address] ICD-10-CM: I63.9  ICD-9-CM: 434.91  8/8/2017 - Present        Left sided lacunar stroke [facility]) ICD-10-CM: I63.9  ICD-9-CM: 434.91  8/4/2017 - Present    Overview Signed 8/6/2017  7:44 PM by Doris Newell MD     8/2017             Weakness of right side of body ICD-10-CM: R53.1  ICD-9-CM: 728.87  8/3/2017 - Present        Essential hypertension ICD-10-CM: I10  ICD-9-CM: 401.9  10/5/2016 - Present        Obesity (BMI 30-39. 9) ICD-10-CM: E66.9  ICD-9-CM: 278.00  Unknown - Present        Prediabetes ICD-10-CM: R73.03  ICD-9-CM: 790.29  7/28/2015 - Present        Generalized anxiety disorder ICD-10-CM: F41.1  ICD-9-CM: 300.02  Unknown - Present        Insomnia, unspecified ICD-10-CM: G47.00  ICD-9-CM: 780.52  Unknown - Present        Mitral valve prolapse ICD-10-CM: I34.1  ICD-9-CM: 424.0  Unknown - Present        Iron deficiency anemia ICD-10-CM: D50.9  ICD-9-CM: 280.9  8/23/2013 - Present    Overview Signed 10/5/2016  2:49 PM by Doris Newell MD     S/p iron infusion x 2             RESOLVED: GERD (gastroesophageal reflux disease) ICD-10-CM: K21.9  ICD-9-CM: 530.81  Unknown - 10/5/2016        RESOLVED: Mixed hyperlipidemia ICD-10-CM: E78.2  ICD-9-CM: 272.2  Unknown - 10/5/2016        RESOLVED: Hypertension ICD-10-CM: I10  ICD-9-CM: 401.9  Unknown - 10/5/2016              Plan: This is an unfortunate 37yo WF with obesity, RADHA, and htn admitted with right hemiplegia due to Left hemispheric stroke with evolution; etiology of stroke not clear; likely hypertensive      -needs  Custom fit right AFO, no real response to ant tib n with Estim. Cont RHS at noc. Would benefit from a dynamic wrist brace to assist in finger extension.  Due to her deficits from her stroke, these will benefit her greatly for proper positioning, preventing contractures, assistance with gait      CVA- continue medical management for CVA  -  Continue antiplatelet therapy; aspirin 325mg daily  - continued on statin; Lipitor   -hypercoag w/u neg x for slt elevation prot C; insignificant finding       Suspect RADHA - can use CPAP while here but will need outpt formal sleep study and Pulmonary follow up. - Continue CPAP, feels she benefits significantly. Feels less tired.       Obesity; have discussed importance of wt loss for mobility and overall health      8/23 tone developing; consider dantrium or zanaflex; started zanaflex at noc; added daytime dosing 8/24 at 2mg tid; monitor effects; 8/27 inc to 4mg tid  -improving per PT      Hypertension - BP fairly controlled; -130s. - No adjustments. - Cont Diovan at current dose.      LUCHO; gets IV infusions; Anemia/ microcytic, on fe supplement. - patient continue fe supplements po. 11.7 back on 8/7; recheck pending; 11.2  -8/24 hx of heavy menstrual cycle 5-7d/mos. ? Etiology of her LUCHO; check HH today; 11.9 improved      Hypokalemia-   - K 3.4 on 8/10, cont KCL 40meq daily  - 8/11 K 3.3 -> increased KCL to  40 bid   - K- 3.8 on 8/14 -> 3.9(8/16) . Decreased KCl 40mg daily. 8/21 k 3.6 cont supplement daily; resolved, monitor loosely 8/24  -8/30 f/u in a.m.; 3.4 will increase supplement       bowel program - add prn bowel program      GERD - start PPI. At times may need additional antacids, Maalox prn.      Depression; cont Lexapro; home med due to generalized anxiety due to stressors in life. His risk post stroke depression. Order recreational therapy and psych if needed; doing much better and participating well 8/11  -8/22 spirits extremely good. No anxiety noted  -8/31 emotionally coping very well, very motivated      Dysphagia; mild. Cont ST for this as well as for dysarthria. Continuing joel-motor exercises. - Mechanical consistency diet, cont aspiration  precautions  -8/28 ?  Upgrade, d/w speech      8/21 watching for hypertonicity; resting hand splint right hand/wrist at noc; started Zanaflex at noc 8/23; per PT slt improvement but I still note clonus right ankle; cont 2mg tid and titrate to desired effect 8/26  -8/27 need to increase Zanflex to 4mg tid; if too sedating will decrease and start low dose Baclofen      Pneumonia prophylaxis- Incentive spirometer every hour while awake      DVT risk / DVT Prophylaxis- Will require daily physician exam to assess for signs and symptoms as patient is at increased risk for of thromboembolism. Mobilization as tolerated. Intermittent pneumatic compression devices when in bed Thigh-high or knee-high thromboembolic deterrent hose when out of bed. - Lovenox subq daily. - she has no signs of DVT.     Pain Control: stable, mild-to-moderate joint symptoms intermittently, reasonably well controlled by PRN meds. Will require regular pain assessment and comprenhensive pain management. - Cont prn tylenol      Chronic insomnia; on Ambien at home. Have not restarted this. - on prn trazadone.   -8/30 having very restful nocs off Ambien. trazadone effective      -pt reports hx of MVP since age of 21. Takes abx with dental procedures. 2D ECHO did not appreciate any abnl of the mitral valve           Time spent was 25 minutes with over 1/2 in direct patient care/examination, consultation and coordination of care.      Signed By: Rusty Orellana MD     September 1, 2017

## 2017-09-01 NOTE — PROGRESS NOTES
PHYSICAL THERAPY DAILY NOTE  Time In: 1301  Time Out: 1345  Patient Seen For: PM;Gait training;Patient education;Transfer training; Wheelchair mobility; Other (see progress notes)    Subjective: Patient reporting she felt like she was almost walking normal when walking with the shopping cart. Objective:Vital Signs:  Patient Vitals for the past 12 hrs:   Temp Pulse Resp BP SpO2   09/01/17 1550 97.4 °F (36.3 °C) 73 18 101/61 94 %   09/01/17 0723 97.8 °F (36.6 °C) 78 18 130/88 94 %     Pain level:No c/o pain  Pain location:NA  Pain interventions:NA    Patient education:Balance training,transfer training, gait training, fall precautions, activity pacing, body mechanics, w/c mobility and parts management, Patient verbalizing understanding and demonstrating  understanding of patient education. Recommend follow up education. Interdisciplinary Communication:NA    Other (comment) (Fall precautions)  GROSS ASSESSMENT Daily Assessment     NA       BED/MAT MOBILITY Daily Assessment    Rolling Right : 0 (Not tested)  Rolling Left : 0 (Not tested)  Supine to Sit : 0 (Not tested)  Sit to Supine : 0 (Not tested)       TRANSFERS Daily Assessment    Transfer Type: SPT without device  Transfer Assistance : 6 (Modified independent)  Sit to Stand Assistance: Modified independent  Car Transfers: Not tested  Car Type: rehab car       GAIT Daily Assessment    Amount of Assistance: 5 (min assist) cues as noted below  Distance (ft): 300 Feet (ft) (300ft x 2)  Assistive Device: Other (comment) (Shopping cart with ace wrap for right DF assist)   Gait training x 20 ft x 3 with left hand rail and ace wrap for right DF assist facilitating cont step through gait pattern with improved right hip protraction knee ext and ankle DF at initial contact and inhibiting hip retraction and knee hyperextension at midstance.      STEPS or STAIRS Daily Assessment    NT       BALANCE Daily Assessment    Sitting - Static: Good (unsupported)  Sitting - Dynamic: Good (unsupported)  Standing - Static: Good (with quad cane)  Standing - Dynamic : Impaired       WHEELCHAIR MOBILITY Daily Assessment   Using LUE and LLE Able to Propel (ft): 150 feet  Functional Level: 6  Curbs/Ramps Assist Required (FIM Score): 0 (Not tested)  Wheelchair Setup Assist Required : 6 (Modified independent)  Wheelchair Management: Manages left brake;Manages right brake       LOWER EXTREMITY EXERCISES Daily Assessment    NA          Assessment: Improved ability to ambulate with cont step through gait pattern with shopping cart. Slight improvement ability to inhibit right hamstring spasticity from midswing to initial contact to allow for improved knee ext and ankle DF at initial contact       Patient to recreational therapy at end of treatment    Plan of Care: Continue with POC and progress as tolerated.      Annabelle Porter, PT  9/1/2017

## 2017-09-01 NOTE — PROGRESS NOTES
PHYSICAL THERAPY DAILY NOTE  Time In: 1007  Time Out: 1120  Patient Seen For: AM;Balance activities; Family training;Gait training;Patient education; Therapeutic exercise;Transfer training; Wheelchair mobility; Other (see progress notes)    Subjective: Patient reporting her family is here for family training. Reports her mother, her son and daughter are here. Reports all 3 will be helping her at home. Objective:Vital Signs:  Patient Vitals for the past 12 hrs:   Temp Pulse Resp BP SpO2   09/01/17 0723 97.8 °F (36.6 °C) 78 18 130/88 94 %     Pain level:No c/o pain  Pain location:NA  Pain interventions:NA    Patient education:Family training as noted below    Interdisciplinary Communication:Spoke with OT regarding family training. Other (comment) (Fall precautions)  GROSS ASSESSMENT Daily Assessment     NA       BED/MAT MOBILITY Daily Assessment   Increased time and effort to complete using hemiplegic body mechanics Rolling Right : 6 (Modified independent)  Rolling Left : 6 (Modified independent)  Supine to Sit : 6 (Modified independent)  Sit to Supine : 6 (Modified independent)       TRANSFERS Daily Assessment   Instructed patient's family on how to set up bed room for safe modified independent SPT w/c<>bed. Transfer Type: SPT without device  Transfer Assistance : 6 (Modified independent)  Sit to Stand Assistance: Modified independent  Car Transfers:  (SBA )  Car Type: rehab car       GAIT Daily Assessment   Instructed patient's family on how to assist patient with gait with quad cane and gait belt.  Patient's mother demonstrating and verbalizing understanding of how to assist patient with gait on level surfaces and up/down 10 ft ramp Amount of Assistance: 5 (Stand-by assistance)  Distance (ft): 150 Feet (ft)  Assistive Device: Cane, quad;Orthotic device (1/4 inch heel lift R, ace wrap for R DF assist,)       STEPS or STAIRS Daily Assessment   Instructed patient's family on how to assist patient with gait up/down 4 steps with left hand rail and up/down 6 inch step with quad cane. Step pattern of up with LLE and down with RLE. Patient's mother demonstrating and verbalizing understanding of how to assist patient up/down steps using gait belt Steps/Stairs Ambulated (#): 4  Level of Assist : 4 (Contact guard assistance)  Rail Use: Left        BALANCE Daily Assessment    Sitting - Static: Good (unsupported)  Sitting - Dynamic: Good (unsupported)  Standing - Static: Good (with quad cane)  Standing - Dynamic : Impaired       WHEELCHAIR MOBILITY Daily Assessment   Using LUE and LLE    Instructed patient's family on how to assist patient up/down 10 ft ramp in w/c with patient using LUE and LLE. Able to complete with min assist Able to Propel (ft): 150 feet  Functional Level: 6  Curbs/Ramps Assist Required (FIM Score): 0 (Not tested)  Wheelchair Setup Assist Required : 6 (Modified independent)  Wheelchair Management: Manages left brake;Manages right brake       LOWER EXTREMITY EXERCISES Daily Assessment   Instructed patient's family on how to assist patient with supine RLE HEP and sit<>stand transfer exercise. Daughter was able to video instruction of HEP on her smart phone. Son demonstrating and verbalizing understanding of how to assist patient with HEP Extremity: Right  Exercise Type #1: Supine lower extremity strengthening  Sets Performed: 2  Reps Performed: 10  Level of Assist: Minimal assistance  Exercise Type #2: Other (comment) (sit<>stand with symmetrical weight bearing through LEs)  Sets Performed: 3  Reps Performed: 5  Level of Assist: Stand-by assistance          Assessment: Family demonstrating and verbalizing understanding of how to assist patient with functional mobility and LE HEP listed above. Patient return to room at end of treatment and remained up in wheelchair with needs in reach. Plan of Care: Continue with POC and progress as tolerated.      Ceferino Roa, PT  9/1/2017

## 2017-09-02 PROCEDURE — 65310000000 HC RM PRIVATE REHAB

## 2017-09-02 PROCEDURE — 94660 CPAP INITIATION&MGMT: CPT

## 2017-09-02 PROCEDURE — 97110 THERAPEUTIC EXERCISES: CPT

## 2017-09-02 PROCEDURE — 97116 GAIT TRAINING THERAPY: CPT

## 2017-09-02 PROCEDURE — 97150 GROUP THERAPEUTIC PROCEDURES: CPT

## 2017-09-02 PROCEDURE — 74011250636 HC RX REV CODE- 250/636: Performed by: PHYSICAL MEDICINE & REHABILITATION

## 2017-09-02 PROCEDURE — 97112 NEUROMUSCULAR REEDUCATION: CPT

## 2017-09-02 PROCEDURE — 97530 THERAPEUTIC ACTIVITIES: CPT

## 2017-09-02 PROCEDURE — 74011250637 HC RX REV CODE- 250/637: Performed by: PHYSICAL MEDICINE & REHABILITATION

## 2017-09-02 RX ADMIN — POTASSIUM CHLORIDE 60 MEQ: 20 TABLET, EXTENDED RELEASE ORAL at 08:06

## 2017-09-02 RX ADMIN — ESCITALOPRAM OXALATE 20 MG: 10 TABLET ORAL at 08:06

## 2017-09-02 RX ADMIN — TIZANIDINE 4 MG: 2 TABLET ORAL at 08:06

## 2017-09-02 RX ADMIN — ASPIRIN 325 MG ORAL TABLET 325 MG: 325 PILL ORAL at 08:06

## 2017-09-02 RX ADMIN — ENOXAPARIN SODIUM 40 MG: 40 INJECTION SUBCUTANEOUS at 21:07

## 2017-09-02 RX ADMIN — HYDROCHLOROTHIAZIDE: 25 TABLET ORAL at 08:06

## 2017-09-02 RX ADMIN — TIZANIDINE 4 MG: 2 TABLET ORAL at 16:47

## 2017-09-02 RX ADMIN — FERROUS SULFATE TAB 325 MG (65 MG ELEMENTAL FE) 325 MG: 325 (65 FE) TAB at 16:47

## 2017-09-02 RX ADMIN — PANTOPRAZOLE SODIUM 40 MG: 40 TABLET, DELAYED RELEASE ORAL at 07:08

## 2017-09-02 RX ADMIN — FERROUS SULFATE TAB 325 MG (65 MG ELEMENTAL FE) 325 MG: 325 (65 FE) TAB at 08:06

## 2017-09-02 RX ADMIN — TIZANIDINE 4 MG: 2 TABLET ORAL at 21:06

## 2017-09-02 RX ADMIN — ATORVASTATIN CALCIUM 40 MG: 40 TABLET, FILM COATED ORAL at 08:06

## 2017-09-02 NOTE — PROGRESS NOTES
PHYSICAL THERAPY DAILY NOTE  Time In: 7137  Time Out: 0940  Patient Seen For: AM;Gait training;Patient education; Therapeutic exercise;Transfer training; Wheelchair mobility; Other (see progress notes)    Subjective: Patient reporting her right knee  is sore this AM. Reports she had a lot of spasms in her right knee last night. Reports the exercise and biofreeze made her knee feel better. Objective:Vital Signs:  Patient Vitals for the past 12 hrs:   Temp Pulse Resp BP SpO2   09/02/17 0700 97.6 °F (36.4 °C) 86 18 125/71 96 %     Pain level:0 to 3 out of 10  Pain location:right knee  Pain interventions:Pain medication, rest, positioning,biofreeze, ROm exercises    Patient education:Bed mobility training,transfer training, gait training, fall precautions, activity pacing, body mechanics, w/c mobility and parts management, Patient verbalizing understanding and demonstrating partial understanding of patient education. Recommend follow up education. Interdisciplinary Communication:NA    Other (comment) (Fall precautions)  GROSS ASSESSMENT Daily Assessment     Adjusted height of back rest and lubricated to front casters on w/creceived from DME company.         BED/MAT MOBILITY Daily Assessment    Rolling Right : 6 (Modified independent)  Rolling Left : 6 (Modified independent)  Supine to Sit : 6 (Modified independent)  Sit to Supine : 6 (Modified independent)       TRANSFERS Daily Assessment    Transfer Type: SPT without device (w/c<>mat to the left)  Transfer Assistance : 6 (Modified independent)  Sit to Stand Assistance: Modified independent  Car Transfers: Not tested       GAIT Daily Assessment    Amount of Assistance: 5 (Stand-by assistance)  Distance (ft): 150 Feet (ft)  Assistive Device: Cane, quad;Orthotic device (1/4 inch heel lift R, ace wrap for R DF assist,)       STEPS or STAIRS Daily Assessment    Steps/Stairs Ambulated (#): 0  Level of Assist : 0 (Not tested)       BALANCE Daily Assessment    Sitting - Static: Good (unsupported)  Sitting - Dynamic: Good (unsupported)  Standing - Static: Good (with quad cane)  Standing - Dynamic : Impaired       WHEELCHAIR MOBILITY Daily Assessment    Able to Propel (ft): 150 feet  Functional Level: 6  Curbs/Ramps Assist Required (FIM Score): 0 (Not tested)  Wheelchair Setup Assist Required : 6 (Modified independent)  Wheelchair Management: Manages left brake;Manages right brake;Manages right footrest       LOWER EXTREMITY EXERCISES Daily Assessment   LE motomed x 10 mins at level 1 Extremity: Right  Exercise Type #1: Supine lower extremity strengthening  Sets Performed: 2  Reps Performed: 10  Level of Assist: Minimal assistance          Assessment: Progressing towards modified independence with gait with quad cane and DF assist. Right knee soreness and increase in spasticity possibly due to increase in gait distance and gait speed yesterday       Patient return to room at end of treatment and remained up in wheelchair with needs in reach. Plan of Care: Continue with POC and progress as tolerated.      Brien Head, PT  9/2/2017

## 2017-09-02 NOTE — PROGRESS NOTES
09/02/17 1340   Time Spent With Patient   Time In 1142   Time Out 1219   Patient Seen For: AM;Other (see progress notes)     Patient agreeable to therapy. Patient tolerated scapular mobilization and massage to RUE. Increased pain reported a right A/C joint. Educated patient about the importance of protecting right shoulder from subluxating with good understanding. Patient used the sliding mat on the table to slide right arm for horizontal shoulder abduction and adduction with increased volitional movement. Prolonged stretching of right digits and wrist to decrease flexor tone. Patient has gained more movement of RUE since last week and is an activity participate in her rehab recovery. Overall, patient tolerated session well without any complications. Patient sitting up in room with all essentials within reach.     Corry Nunez, CLAIRER/L

## 2017-09-02 NOTE — PROGRESS NOTES
SFD PROGRESS NOTE    Zoraida Boykin  Admit Date: 8/8/2017  Admit Diagnosis: Stroke - Right Side Body Involvment;Stroke (cerebrum) ([address]    Subjective     Patient seen and examined. Vss. No acute complaints. PT, OT well tolerated. No new neurologic setbacks. Transfers, SPT at mod independent level, ambulating with a QC at SBA level. Objective:     Current Facility-Administered Medications   Medication Dose Route Frequency    potassium chloride (K-DUR, KLOR-CON) SR tablet 60 mEq  60 mEq Oral DAILY    tiZANidine (ZANAFLEX) tablet 4 mg  4 mg Oral TID    cyclobenzaprine (FLEXERIL) tablet 5-10 mg  5-10 mg Oral TID PRN    ferrous sulfate tablet 325 mg  1 Tab Oral BID WITH MEALS    aspirin (ASPIRIN) tablet 325 mg  325 mg Oral DAILY    enoxaparin (LOVENOX) injection 40 mg  40 mg SubCUTAneous Q24H    acetaminophen (TYLENOL) tablet 650 mg  650 mg Oral Q4H PRN    alum-mag hydroxide-simeth (MYLANTA) oral suspension 30 mL  30 mL Oral Q4H PRN    atorvastatin (LIPITOR) tablet 40 mg  40 mg Oral DAILY    bisacodyl (DULCOLAX) suppository 10 mg  10 mg Rectal DAILY PRN    escitalopram oxalate (LEXAPRO) tablet 20 mg  20 mg Oral DAILY    LORazepam (ATIVAN) tablet 1 mg  1 mg Oral Q6H PRN    magnesium hydroxide (MILK OF MAGNESIA) 400 mg/5 mL oral suspension 30 mL  30 mL Oral DAILY PRN    ondansetron (ZOFRAN ODT) tablet 4 mg  4 mg Oral Q6H PRN    senna-docusate (PERICOLACE) 8.6-50 mg per tablet 2 Tab  2 Tab Oral DAILY PRN    valsartan/hydroCHLOROthiazide (DIOVAN HCT) 160/25 mg   Oral DAILY    traZODone (DESYREL) tablet 50 mg  50 mg Oral QHS PRN    pantoprazole (PROTONIX) tablet 40 mg  40 mg Oral ACB     Review of Systems:Denies chest pain, shortness of breath, cough, headache, visual problems, abdominal pain, dysurea, calf pain. Pertinent positives are as noted in the medical records and unremarkable otherwise.      Visit Vitals    /71    Pulse 86    Temp 97.6 °F (36.4 °C)    Resp 18    SpO2 96%    Breastfeeding No        Physical Exam:   General: Alert and age appropriately oriented. No acute cardio respiratory distress. HEENT: Normocephalic,no scleral icterus  Oral mucosa moist without cyanosis   Lungs: Clear to auscultation  bilaterally. Respiration even and unlabored   Heart: Regular rate and rhythm, S1, S2   No  murmurs, clicks, rub or gallops   Abdomen: Soft, non-tender, nondistended. Bowel sounds present. No organomegaly. Genitourinary: Benign . Neuromuscular:      Spastic right hemiplegia  Right sided weakness unchanged x increased extension of toes voluntarily but only trace DF of ankle/foot  PROM RUE with slight catch at elbow  RUE shoulder flexion 1, EF1, EE 1,  2-, finger ext 0/5  slt increase tone with PROM  RLE; hip flexion 3+, KE 3, DF 0, PF 1  Sensation intact   Skin/extremity: No rashes, no erythema.  No calf tenderness BLE  No edema                                                                            Functional Assessment:  Gross Assessment  AROM:  (RLE AAROM WFL) (08/29/17 1200)  PROM:  (RLE WFL) (08/29/17 1200)  Strength:  (RLE ankle 1/5,knee ext 4/5 flex -3/5,hip-3/5 to +2/5) (08/29/17 1200)  Coordination:  (RLE impaired, flexor synergy noted with AROM and gait) (08/29/17 1200)  Tone: Abnormal (Hypertonia RLE w/ clonus with achilles stretch reflex) (08/29/17 1200)  Sensation: Intact (RLE) (08/29/17 1200)       Balance  Sitting - Static: Good (unsupported) (09/01/17 1600)  Sitting - Dynamic: Good (unsupported) (09/01/17 1600)  Standing - Static: Good (with quad cane) (09/01/17 1600)  Standing - Dynamic : Impaired (09/01/17 1600)           Toileting  Cues: Physical assistance for pants up;Physical assistance for pants down (09/01/17 1243)  Adaptive Equipment: Other (comment) ([facility]) (09/01/17 1243)         Deni Gaston Fall Risk Assessment:  Deni Gaston Fall Risk  Mobility: Ambulates or transfers with assist devices or assistance/unsteady gait (09/02/17 0700)  Mobility Interventions: Patient to call before getting OOB (09/02/17 0700)  Mentation: Alert, oriented x 3 (09/02/17 0700)  Medication: Patient receiving anticonvulsants, sedatives(tranquilizers), psychotropics or hypnotics, hypoglycemics, narcotics, sleep aids, antihypertensives, laxatives, or diuretics (09/02/17 0700)  Medication Interventions: Patient to call before getting OOB (09/02/17 0700)  Elimination: Needs assistance with toileting (09/02/17 0700)  Elimination Interventions: Patient to call for help with toileting needs (09/02/17 0700)  Prior Fall History: No (09/02/17 0700)  Total Score: 3 (09/02/17 0700)  Standard Fall Precautions: Yes (09/02/17 0700)  High Fall Risk: Yes (09/02/17 0414)     Speech Assessment:         Ambulation:  Gait  Distance (ft): 300 Feet (ft) (300ft x 2) (09/01/17 1600)  Assistive Device: Other (comment) (Shopping cart with ace wrap for right DF assist) (09/01/17 1600)  Rail Use: Left  (09/01/17 1200)     Labs/Studies:  Recent Results (from the past 72 hour(s))   METABOLIC PANEL, BASIC    Collection Time: 08/31/17  7:20 AM   Result Value Ref Range    Sodium 139 136 - 145 mmol/L    Potassium 3.4 (L) 3.5 - 5.1 mmol/L    Chloride 101 98 - 107 mmol/L    CO2 30 21 - 32 mmol/L    Anion gap 8 7 - 16 mmol/L    Glucose 81 65 - 100 mg/dL    BUN 10 6 - 23 MG/DL    Creatinine 0.62 0.6 - 1.0 MG/DL    GFR est AA >60 >60 ml/min/1.73m2    GFR est non-AA >60 >60 ml/min/1.73m2    Calcium 9.3 8.3 - 10.4 MG/DL   HGB & HCT    Collection Time: 08/31/17  7:20 AM   Result Value Ref Range    HGB 11.9 11.7 - 15.4 g/dL    HCT 37.3 35.8 - 46.3 %       Assessment:     Problem List as of 9/2/2017  Date Reviewed: 1/26/2017          Codes Class Noted - Resolved    Stroke (cerebrum) ([address] ICD-10-CM: I63.9  ICD-9-CM: 434.91  8/8/2017 - Present        Left sided lacunar stroke [facility]) ICD-10-CM: I63.9  ICD-9-CM: 434.91  8/4/2017 - Present    Overview Signed 8/6/2017  7:44 PM by Tien Kumar MD     8/2017             Weakness of right side of body ICD-10-CM: R53.1  ICD-9-CM: 728.87  8/3/2017 - Present        Essential hypertension ICD-10-CM: I10  ICD-9-CM: 401.9  10/5/2016 - Present        Obesity (BMI 30-39. 9) ICD-10-CM: E66.9  ICD-9-CM: 278.00  Unknown - Present        Prediabetes ICD-10-CM: R73.03  ICD-9-CM: 790.29  7/28/2015 - Present        Generalized anxiety disorder ICD-10-CM: F41.1  ICD-9-CM: 300.02  Unknown - Present        Insomnia, unspecified ICD-10-CM: G47.00  ICD-9-CM: 780.52  Unknown - Present        Mitral valve prolapse ICD-10-CM: I34.1  ICD-9-CM: 424.0  Unknown - Present        Iron deficiency anemia ICD-10-CM: D50.9  ICD-9-CM: 280.9  8/23/2013 - Present    Overview Signed 10/5/2016  2:49 PM by Randy Tamayo MD     S/p iron infusion x 2             RESOLVED: GERD (gastroesophageal reflux disease) ICD-10-CM: K21.9  ICD-9-CM: 530.81  Unknown - 10/5/2016        RESOLVED: Mixed hyperlipidemia ICD-10-CM: E78.2  ICD-9-CM: 272.2  Unknown - 10/5/2016        RESOLVED: Hypertension ICD-10-CM: I10  ICD-9-CM: 401.9  Unknown - 10/5/2016              Plan: This is an unfortunate 37yo WF with obesity, RADHA, and htn admitted with right hemiplegia due to Left hemispheric stroke with evolution; etiology of stroke not clear; likely hypertensive      - needs  Custom fit right AFO  -  Due to her deficits from her stroke, these will benefit her greatly for proper positioning, preventing contractures, assistance with gait      CVA- continue medical management for CVA  -  Continue antiplatelet therapy; aspirin 325mg daily  - continued on statin; Lipitor   - hypercoag w/u neg x for slt elevation prot C; insignificant finding       Suspect RADHA - can use CPAP while here but will need outpt formal sleep study and Pulmonary follow up. - Continue CPAP, feels she benefits significantly.  Feels less tired.       Obesity; have discussed importance of wt loss for mobility and overall health      8/23 tone developing; consider dantrium or zanaflex; started zanaflex at [facility]; added daytime dosing 8/24 at 2mg tid; monitor effects; 8/27 inc to 4mg tid  -improving per PT      Hypertension - BP fairly controlled. - Cont Diovan at current daily dose.      LUCHO; gets IV infusions; Anemia/ microcytic, on fe supplement. - patient continue fe supplements po. 11.7 back on 8/7; recheck pending; 11.2  -8/24 hx of heavy menstrual cycle 5-7d/mos. ? Etiology of her LUCHO; check HH today; 11.9 improved      Hypokalemia  - on supplement  KCl 60mg daily     bowel program - add prn bowel program      GERD - start PPI. At times may need additional antacids, Maalox prn.      Depression; cont Lexapro; home med due to generalized anxiety due to stressors in life. - emotionally coping very well, very motivated.      Dysphagia; mild. Cont ST for this as well as for dysarthria. Continuing joel-motor exercises. - Mechanical consistency diet, cont aspiration  precautions  -       8/21 watching for hypertonicity; resting hand splint right hand/wrist at noc; started Zanaflex at noc 8/23; per PT slt improvement but I still note clonus right ankle; cont 2mg tid and titrate to desired effect 8/26  - continue Zanaflex to 4mg tid; if too sedating will decrease and start low dose Baclofen      Pneumonia prophylaxis- Incentive spirometer every hour while awake      DVT risk / DVT Prophylaxis- Will require daily physician exam to assess for signs and symptoms as patient is at increased risk for of thromboembolism. Mobilization as tolerated. Intermittent pneumatic compression devices when in bed Thigh-high or knee-high thromboembolic deterrent hose when out of bed. - Lovenox subq daily. - she has no signs of DVT.     Pain Control: stable, mild-to-moderate joint symptoms intermittently, reasonably well controlled by PRN meds. Will require regular pain assessment and comprenhensive pain management. - Cont prn tylenol      Chronic insomnia; on Ambien at home. Have not restarted this.    - on prn trazadone.   -8/30 having very restful nocs off Ambien. trazadone effective      -pt reports hx of MVP since age of 21. Takes abx with dental procedures. 2D ECHO did not appreciate any abnl of the mitral valve           Time spent was 25 minutes with over 1/2 in direct patient care/examination, consultation and coordination of care.      Signed By: Luz Mondragon MD     September 2, 2017

## 2017-09-03 PROCEDURE — 65310000000 HC RM PRIVATE REHAB

## 2017-09-03 PROCEDURE — 74011250637 HC RX REV CODE- 250/637: Performed by: PHYSICAL MEDICINE & REHABILITATION

## 2017-09-03 PROCEDURE — 74011250636 HC RX REV CODE- 250/636: Performed by: PHYSICAL MEDICINE & REHABILITATION

## 2017-09-03 RX ADMIN — ASPIRIN 325 MG ORAL TABLET 325 MG: 325 PILL ORAL at 08:09

## 2017-09-03 RX ADMIN — FERROUS SULFATE TAB 325 MG (65 MG ELEMENTAL FE) 325 MG: 325 (65 FE) TAB at 08:09

## 2017-09-03 RX ADMIN — ATORVASTATIN CALCIUM 40 MG: 40 TABLET, FILM COATED ORAL at 08:09

## 2017-09-03 RX ADMIN — TIZANIDINE 4 MG: 2 TABLET ORAL at 21:42

## 2017-09-03 RX ADMIN — ENOXAPARIN SODIUM 40 MG: 40 INJECTION SUBCUTANEOUS at 21:43

## 2017-09-03 RX ADMIN — TIZANIDINE 4 MG: 2 TABLET ORAL at 16:13

## 2017-09-03 RX ADMIN — PANTOPRAZOLE SODIUM 40 MG: 40 TABLET, DELAYED RELEASE ORAL at 05:53

## 2017-09-03 RX ADMIN — HYDROCHLOROTHIAZIDE: 25 TABLET ORAL at 08:09

## 2017-09-03 RX ADMIN — FERROUS SULFATE TAB 325 MG (65 MG ELEMENTAL FE) 325 MG: 325 (65 FE) TAB at 16:13

## 2017-09-03 RX ADMIN — ESCITALOPRAM OXALATE 20 MG: 10 TABLET ORAL at 08:09

## 2017-09-03 RX ADMIN — POTASSIUM CHLORIDE 60 MEQ: 20 TABLET, EXTENDED RELEASE ORAL at 08:09

## 2017-09-03 RX ADMIN — TIZANIDINE 4 MG: 2 TABLET ORAL at 08:09

## 2017-09-03 NOTE — PROGRESS NOTES
SFD PROGRESS NOTE    Zoraida Boykin  Admit Date: 8/8/2017  Admit Diagnosis: Stroke - Right Side Body Involvment;Stroke (cerebrum) ([address]    Subjective     Patient seen and examined. Vss. Afebrile. No acute complaints. No new neurologic changes. Mood good. rsting in bed. States she is still motivated, excited about improvements made. Objective:     Current Facility-Administered Medications   Medication Dose Route Frequency    potassium chloride (K-DUR, KLOR-CON) SR tablet 60 mEq  60 mEq Oral DAILY    tiZANidine (ZANAFLEX) tablet 4 mg  4 mg Oral TID    cyclobenzaprine (FLEXERIL) tablet 5-10 mg  5-10 mg Oral TID PRN    ferrous sulfate tablet 325 mg  1 Tab Oral BID WITH MEALS    aspirin (ASPIRIN) tablet 325 mg  325 mg Oral DAILY    enoxaparin (LOVENOX) injection 40 mg  40 mg SubCUTAneous Q24H    acetaminophen (TYLENOL) tablet 650 mg  650 mg Oral Q4H PRN    alum-mag hydroxide-simeth (MYLANTA) oral suspension 30 mL  30 mL Oral Q4H PRN    atorvastatin (LIPITOR) tablet 40 mg  40 mg Oral DAILY    bisacodyl (DULCOLAX) suppository 10 mg  10 mg Rectal DAILY PRN    escitalopram oxalate (LEXAPRO) tablet 20 mg  20 mg Oral DAILY    LORazepam (ATIVAN) tablet 1 mg  1 mg Oral Q6H PRN    magnesium hydroxide (MILK OF MAGNESIA) 400 mg/5 mL oral suspension 30 mL  30 mL Oral DAILY PRN    ondansetron (ZOFRAN ODT) tablet 4 mg  4 mg Oral Q6H PRN    senna-docusate (PERICOLACE) 8.6-50 mg per tablet 2 Tab  2 Tab Oral DAILY PRN    valsartan/hydroCHLOROthiazide (DIOVAN HCT) 160/25 mg   Oral DAILY    traZODone (DESYREL) tablet 50 mg  50 mg Oral QHS PRN    pantoprazole (PROTONIX) tablet 40 mg  40 mg Oral ACB     Review of Systems:Denies chest pain, shortness of breath, cough, headache, visual problems, abdominal pain, dysurea, calf pain. Pertinent positives are as noted in the medical records and unremarkable otherwise.      Visit Vitals    /75    Pulse 82    Temp 97.9 °F (36.6 °C)    Resp 16    SpO2 97%    Breastfeeding No        Physical Exam:   General: Alert and age appropriately oriented. No acute cardio respiratory distress. HEENT: Normocephalic,no scleral icterus  Oral mucosa moist without cyanosis   Lungs: Clear to auscultation  bilaterally. Respiration even and unlabored   Heart: Regular rate and rhythm, S1, S2   No  murmurs, clicks, rub or gallops   Abdomen: Soft, non-tender, nondistended. Bowel sounds present. No organomegaly. Genitourinary: Benign . Neuromuscular:      Spastic right hemiplegia  Right sided weakness unchanged x increased extension of toes voluntarily but only trace DF of ankle/foot  PROM RUE with slight catch at elbow  RUE shoulder flexion 1, EF1, EE 1,  2-, finger ext 0/5  slt increase tone with PROM  RLE; hip flexion 3+, KE 3, DF 0, PF 1  Sensation intact   Skin/extremity: No rashes, no erythema.  No calf tenderness BLE  No edema                                                                            Functional Assessment:  Gross Assessment  AROM:  (RLE AAROM WFL) (08/29/17 1200)  PROM:  (RLE WFL) (08/29/17 1200)  Strength:  (RLE ankle 1/5,knee ext 4/5 flex -3/5,hip-3/5 to +2/5) (08/29/17 1200)  Coordination:  (RLE impaired, flexor synergy noted with AROM and gait) (08/29/17 1200)  Tone: Abnormal (Hypertonia RLE w/ clonus with achilles stretch reflex) (08/29/17 1200)  Sensation: Intact (RLE) (08/29/17 1200)       Balance  Sitting - Static: Good (unsupported) (09/02/17 1200)  Sitting - Dynamic: Good (unsupported) (09/02/17 1200)  Standing - Static: Good (with quad cane) (09/02/17 1200)  Standing - Dynamic : Impaired (09/02/17 1200)           Toileting  Cues: Physical assistance for pants up;Physical assistance for pants down (09/01/17 1243)  Adaptive Equipment: Other (comment) ([facility]) (09/01/17 1243)         [facility] Fall Risk Assessment:  [facility] Fall Risk  Mobility: Ambulates or transfers with assist devices or assistance/unsteady gait (09/03/17 9987)  Mobility Interventions: Patient to call before getting OOB (09/03/17 1447)  Mentation: Alert, oriented x 3 (09/03/17 1447)  Medication: Patient receiving anticonvulsants, sedatives(tranquilizers), psychotropics or hypnotics, hypoglycemics, narcotics, sleep aids, antihypertensives, laxatives, or diuretics (09/03/17 1447)  Medication Interventions: Patient to call before getting OOB (09/03/17 1447)  Elimination: Needs assistance with toileting (09/03/17 1447)  Elimination Interventions: Call light in reach; Patient to call for help with toileting needs; Toileting schedule/hourly rounds (09/03/17 1447)  Prior Fall History: No (09/03/17 1447)  Total Score: 3 (09/03/17 1447)  Standard Fall Precautions: Yes (09/03/17 1447)  High Fall Risk: Yes (09/02/17 0414)     Speech Assessment:         Ambulation:  Gait  Distance (ft): 150 Feet (ft) (09/02/17 1200)  Assistive Device: Cane, quad;Orthotic device (1/4 inch heel lift R, ace wrap for R DF assist,) (09/02/17 1200)  Rail Use: Left  (09/01/17 1200)     Labs/Studies:  No results found for this or any previous visit (from the past 72 hour(s)). Assessment:     Problem List as of 9/3/2017  Date Reviewed: 1/26/2017          Codes Class Noted - Resolved    Stroke (cerebrum) ([email or web address] ICD-10-CM: I63.9  ICD-9-CM: 434.91  8/8/2017 - Present        Left sided lacunar stroke [facility]) ICD-10-CM: I63.9  ICD-9-CM: 434.91  8/4/2017 - Present    Overview Signed 8/6/2017  7:44 PM by Lacy Mesa MD     8/2017             Weakness of right side of body ICD-10-CM: R53.1  ICD-9-CM: 728.87  8/3/2017 - Present        Essential hypertension ICD-10-CM: I10  ICD-9-CM: 401.9  10/5/2016 - Present        Obesity (BMI 30-39. 9) ICD-10-CM: E66.9  ICD-9-CM: 278.00  Unknown - Present        Prediabetes ICD-10-CM: R73.03  ICD-9-CM: 790.29  7/28/2015 - Present        Generalized anxiety disorder ICD-10-CM: F41.1  ICD-9-CM: 300.02  Unknown - Present        Insomnia, unspecified ICD-10-CM: G47.00  ICD-9-CM: 780.52  Unknown - Present Mitral valve prolapse ICD-10-CM: I34.1  ICD-9-CM: 424.0  Unknown - Present        Iron deficiency anemia ICD-10-CM: D50.9  ICD-9-CM: 280.9  8/23/2013 - Present    Overview Signed 10/5/2016  2:49 PM by Charity Edwards MD     S/p iron infusion x 2             RESOLVED: GERD (gastroesophageal reflux disease) ICD-10-CM: K21.9  ICD-9-CM: 530.81  Unknown - 10/5/2016        RESOLVED: Mixed hyperlipidemia ICD-10-CM: E78.2  ICD-9-CM: 272.2  Unknown - 10/5/2016        RESOLVED: Hypertension ICD-10-CM: I10  ICD-9-CM: 401.9  Unknown - 10/5/2016              Plan: This is an unfortunate 35yo WF with obesity, RADHA, and htn admitted with right hemiplegia due to Left hemispheric stroke with evolution; etiology of stroke not clear; likely hypertensive      - needs  Custom fit right AFO  -  Due to her deficits from her stroke, these will benefit her greatly for proper positioning, preventing contractures, assistance with gait      CVA- continue medical management for CVA  -  Continue antiplatelet therapy; aspirin 325mg daily  - continued on statin; Lipitor   - hypercoag w/u neg x for slt elevation prot C; insignificant finding       Suspect RADHA   - Continue CPAP, respiratory assisting.   - feels she benefits significantly.       Obesity; have discussed importance of wt loss for mobility and overall health      8/23 tone developing; consider dantrium or zanaflex; started zanaflex at noc; added daytime dosing 8/24 at 2mg tid; monitor effects; 8/27 inc to 4mg tid  -improving per PT      Hypertension - BP fairly controlled. - Cont Diovan at current daily dose.      LUCHO; gets IV infusions; Anemia/ microcytic, on fe supplement. - patient continue fe supplements po. 11.7 back on 8/7; recheck pending; 11.2  -8/24 hx of heavy menstrual cycle 5-7d/mos.  ? Etiology of her LUCHO  - 11.9 ( 8/31) mproved      Hypokalemia  - on supplement  KCl 60mg daily    bowel program - add prn bowel program      GERD -  Continued on PPI.       Depression; cont Lexapro; home med due to generalized anxiety due to stressors in life. - emotionally coping very well, very motivated.      Dysphagia; mild. Cont ST for this as well as for dysarthria. Continuing joel-motor exercises. - Mechanical consistency diet  - cont aspiration  precautions  -       8/21 watching for hypertonicity; resting hand splint right hand/wrist at noc; started Zanaflex at noc 8/23; per PT slt improvement but I still note clonus right ankle; cont 2mg tid and titrate to desired effect 8/26  - continue Zanaflex to 4mg tid; if too sedating will decrease and start low dose Baclofen. -  tolerating well.       Pneumonia prophylaxis- Incentive spirometer every hour while awake      DVT risk / DVT Prophylaxis-  Continuing daily physician exam to assess for signs and symptoms as patient is at increased risk for of thromboembolism. Mobilization as tolerated. Thigh-high or knee-high thromboembolic deterrent hose when out of bed. - Lovenox subq daily. - she has no signs of DVT.     Pain Control: stable, mild-to-moderate joint symptoms intermittently, reasonably well controlled by PRN meds. Will require regular pain assessment and comprenhensive pain management. - Cont prn tylenol      Chronic insomnia; on Ambien at home. Have not restarted this. - on prn trazadone.   -8/30 having very restful nocs off Ambien. trazadone effective      -pt reports hx of MVP since age of 21. Takes abx with dental procedures. 2D ECHO did not appreciate any abnl of the mitral valve           Time spent was 25 minutes with over 1/2 in direct patient care/examination, consultation and coordination of care.      Signed By: Bc Holden MD     September 3, 2017

## 2017-09-04 LAB
CREAT SERPL-MCNC: 0.73 MG/DL (ref 0.6–1)
HGB BLD-MCNC: 12 G/DL (ref 11.7–15.4)
PLATELET # BLD AUTO: 403 K/UL (ref 150–450)

## 2017-09-04 PROCEDURE — 74011250637 HC RX REV CODE- 250/637: Performed by: PHYSICAL MEDICINE & REHABILITATION

## 2017-09-04 PROCEDURE — 85049 AUTOMATED PLATELET COUNT: CPT | Performed by: PHYSICAL MEDICINE & REHABILITATION

## 2017-09-04 PROCEDURE — 97116 GAIT TRAINING THERAPY: CPT

## 2017-09-04 PROCEDURE — 85018 HEMOGLOBIN: CPT | Performed by: PHYSICAL MEDICINE & REHABILITATION

## 2017-09-04 PROCEDURE — 97110 THERAPEUTIC EXERCISES: CPT

## 2017-09-04 PROCEDURE — 82565 ASSAY OF CREATININE: CPT | Performed by: PHYSICAL MEDICINE & REHABILITATION

## 2017-09-04 PROCEDURE — 36415 COLL VENOUS BLD VENIPUNCTURE: CPT | Performed by: PHYSICAL MEDICINE & REHABILITATION

## 2017-09-04 PROCEDURE — 97530 THERAPEUTIC ACTIVITIES: CPT

## 2017-09-04 PROCEDURE — 94660 CPAP INITIATION&MGMT: CPT

## 2017-09-04 PROCEDURE — 74011250636 HC RX REV CODE- 250/636: Performed by: PHYSICAL MEDICINE & REHABILITATION

## 2017-09-04 PROCEDURE — 65310000000 HC RM PRIVATE REHAB

## 2017-09-04 PROCEDURE — 99232 SBSQ HOSP IP/OBS MODERATE 35: CPT | Performed by: PHYSICAL MEDICINE & REHABILITATION

## 2017-09-04 RX ORDER — DIAZEPAM 5 MG/1
2.5 TABLET ORAL
Status: DISCONTINUED | OUTPATIENT
Start: 2017-09-04 | End: 2017-09-06 | Stop reason: HOSPADM

## 2017-09-04 RX ADMIN — FERROUS SULFATE TAB 325 MG (65 MG ELEMENTAL FE) 325 MG: 325 (65 FE) TAB at 09:40

## 2017-09-04 RX ADMIN — PANTOPRAZOLE SODIUM 40 MG: 40 TABLET, DELAYED RELEASE ORAL at 05:32

## 2017-09-04 RX ADMIN — HYDROCHLOROTHIAZIDE: 25 TABLET ORAL at 09:39

## 2017-09-04 RX ADMIN — TIZANIDINE 4 MG: 2 TABLET ORAL at 09:40

## 2017-09-04 RX ADMIN — ASPIRIN 325 MG ORAL TABLET 325 MG: 325 PILL ORAL at 09:40

## 2017-09-04 RX ADMIN — TIZANIDINE 4 MG: 2 TABLET ORAL at 21:17

## 2017-09-04 RX ADMIN — ENOXAPARIN SODIUM 40 MG: 40 INJECTION SUBCUTANEOUS at 21:17

## 2017-09-04 RX ADMIN — ATORVASTATIN CALCIUM 40 MG: 40 TABLET, FILM COATED ORAL at 09:40

## 2017-09-04 RX ADMIN — TIZANIDINE 4 MG: 2 TABLET ORAL at 16:18

## 2017-09-04 RX ADMIN — FERROUS SULFATE TAB 325 MG (65 MG ELEMENTAL FE) 325 MG: 325 (65 FE) TAB at 16:18

## 2017-09-04 RX ADMIN — POTASSIUM CHLORIDE 60 MEQ: 20 TABLET, EXTENDED RELEASE ORAL at 09:40

## 2017-09-04 RX ADMIN — ESCITALOPRAM OXALATE 20 MG: 10 TABLET ORAL at 11:17

## 2017-09-04 NOTE — PROGRESS NOTES
OT Daily Note    Time In 1301   Time Out 1343     Subjective: \"This is my daughter and mother\" Agreeable to therapy. Pain:Tolerated well during HEP with family. Interdisciplinary Communication: Collaborated with Marya Flores and patient is making progress towards goals. Precautions:  (falls)  Family Training: Daughter and mother in for family training for [address] Exercise included self right UE ROM for: Wrist extension/flexion, Digit extension/flexion, Elbow extension/flexion, Shoulder flexion, wrist supination/pronation. Demonstrated scapular mobility and upper trap message for family to use. Introduced PROM with family to assist with RUE ER/IR and shoulder flexion and abduction. Family demonstrated assisted ROM well. Ended session:In w/c in gym with PTAnne OTR/L

## 2017-09-04 NOTE — PROGRESS NOTES
Problem: Falls - Risk of  Goal: *Absence of Falls  Document Rashmi Fall Risk and appropriate interventions in the flowsheet.    Outcome: Progressing Towards Goal  Fall Risk Interventions:  Mobility Interventions: Patient to call before getting OOB           Medication Interventions: Patient to call before getting OOB     Elimination Interventions: Call light in reach, Bed/chair exit alarm, Elevated toilet seat, Patient to call for help with toileting needs

## 2017-09-04 NOTE — PROGRESS NOTES
PHYSICAL THERAPY DAILY NOTE  Time In: 2776  Time Out: 7681  Patient Seen For: AM;Balance activities;Gait training;Patient education; Therapeutic exercise;Transfer training; Other (see progress notes)    Subjective: Patient reporting her right knee feels better. Reports it is just a little sore. Reports spasms have eased off did not bother her last night or the night before         Objective:Vital Signs:  Patient Vitals for the past 12 hrs:   Temp Pulse Resp BP SpO2   09/04/17 0719 97.9 °F (36.6 °C) (!) 58 20 157/80 99 %     Pain level:No c/o pain  Pain location:NA  Pain interventions:NA    Patient education:Bed mobility training,transfer training, gait training, fall precautions, activity pacing,body mechanics,Patient verbalizing understanding and demonstrating  understanding of patient education. Recommend follow up education.     Interdisciplinary Communication:spoke with MD regarding RLE spasticity    Other (comment) (Fall precautions)  GROSS ASSESSMENT Daily Assessment     NA       BED/MAT MOBILITY Daily Assessment   Increased time and effort using hemiplegic body mechanics Rolling Right : 6 (Modified independent)  Rolling Left : 6 (Modified independent)  Supine to Sit : 6 (Modified independent)  Sit to Supine : 6 (Modified independent)       TRANSFERS Daily Assessment    Transfer Type: SPT without device  Transfer Assistance : 6 (Modified independent)  Sit to Stand Assistance: Modified independent  Car Transfers: Not tested       GAIT Daily Assessment    Amount of Assistance: 5 (Supervision/setup)  Distance (ft): 150 Feet (ft) (150ft x 2)  Assistive Device: Cane, quad;Orthotic device (1/4 inch heel lift R, ace wrap for R DF assist,)   Gait training with cues to control right knee hyperextension and to improve right ankle DF and knee ext at initial contact    STEPS or STAIRS Daily Assessment    Steps/Stairs Ambulated (#): 0  Level of Assist : 0 (Not tested)       BALANCE Daily Assessment   Static standing facilitating symmetrical weight bearing through LEs with UE support on assistive device Sitting - Static: Good (unsupported)  Sitting - Dynamic: Good (unsupported)  Standing - Static: Good (with quad cane)  Standing - Dynamic : Impaired       WHEELCHAIR MOBILITY Daily Assessment    Curbs/Ramps Assist Required (FIM Score): 0 (Not tested)  Wheelchair Setup Assist Required : 6 (Modified independent)  Wheelchair Management: Manages left brake;Manages right brake       LOWER EXTREMITY EXERCISES Daily Assessment    Extremity: Right  Exercise Type #1: Supine lower extremity strengthening  Sets Performed: 2  Reps Performed: 10  Level of Assist: Minimal assistance          Assessment: progressing towards modified independence with gait with quad cane. Patient returned to room at end of treatment and remained up in recliner with LEs elevated and with needs in reach. Plan of Care: Continue with POC and progress as tolerated.      Tamar Carrion, PT  9/4/2017

## 2017-09-04 NOTE — PROGRESS NOTES
OT Daily Note    Time In 1115   Time Out 1158     Subjective: \"That feels better, my arm gets stiff. \" Agreeable to therapy. Pain:Tolerated well during HEP and PROM exercises for RUE. Education: On self ROM for RUE. Interdisciplinary Communication: Collaborated with Tania Kwong and patient is making progress towards goals. Precautions:  (falls)    Balance/functional mobility Daily Assessment   SPT with supervision using quad cane from recliner to w/c      Strengthening/ROM Daily Assessment   HEP for self ROM in right UE in wrist extension flexion, finger flexion and extension, elbow flexion and extension. Shoulder ER, flexion, and abduction. Joint mobilization completed at scapula and [address] joint. Ended session:In w/c in room all needs within reach.      Zahida Bronson OTR/L

## 2017-09-04 NOTE — PROGRESS NOTES
Christin Menezes MD,   Medical Director  [address] [facility], [address] [facility]  Tel: [phone]       [facility] PROGRESS NOTE    Carlie Griffin  Admit Date: 8/8/2017  Admit Diagnosis: Stroke - Right Side Body Involvment;Stroke (cerebrum) ([facility] [address]    Subjective     Patient seen and examined. Vss. No acute complaints. PT, OT well tolerated. Steady gains made. No new barrier to progress noted. Had significant spasms RLE Friday noc. Had done extended ambulation that day. Aggressive therapies. Better the rest of weekend. Denies pain.  Using CPAP    Objective:     Current Facility-Administered Medications   Medication Dose Route Frequency    potassium chloride (K-DUR, KLOR-CON) SR tablet 60 mEq  60 mEq Oral DAILY    tiZANidine (ZANAFLEX) tablet 4 mg  4 mg Oral TID    cyclobenzaprine (FLEXERIL) tablet 5-10 mg  5-10 mg Oral TID PRN    ferrous sulfate tablet 325 mg  1 Tab Oral BID WITH MEALS    aspirin (ASPIRIN) tablet 325 mg  325 mg Oral DAILY    enoxaparin (LOVENOX) injection 40 mg  40 mg SubCUTAneous Q24H    acetaminophen (TYLENOL) tablet 650 mg  650 mg Oral Q4H PRN    alum-mag hydroxide-simeth (MYLANTA) oral suspension 30 mL  30 mL Oral Q4H PRN    atorvastatin (LIPITOR) tablet 40 mg  40 mg Oral DAILY    bisacodyl (DULCOLAX) suppository 10 mg  10 mg Rectal DAILY PRN    escitalopram oxalate (LEXAPRO) tablet 20 mg  20 mg Oral DAILY    LORazepam (ATIVAN) tablet 1 mg  1 mg Oral Q6H PRN    magnesium hydroxide (MILK OF MAGNESIA) 400 mg/5 mL oral suspension 30 mL  30 mL Oral DAILY PRN    ondansetron (ZOFRAN ODT) tablet 4 mg  4 mg Oral Q6H PRN    senna-docusate (PERICOLACE) 8.6-50 mg per tablet 2 Tab  2 Tab Oral DAILY PRN    valsartan/hydroCHLOROthiazide (DIOVAN HCT) 160/25 mg   Oral DAILY    traZODone (DESYREL) tablet 50 mg  50 mg Oral QHS PRN    pantoprazole (PROTONIX) tablet 40 mg  40 mg Oral ACB     Review of Systems:Denies chest pain, shortness of breath, cough, headache, visual problems, abdominal pain, dysurea, calf pain. Pertinent positives are as noted in the medical records and unremarkable otherwise. Visit Vitals    /80 (BP 1 Location: Left arm, BP Patient Position: Sitting)    Pulse (!) 58    Temp 97.9 °F (36.6 °C)    Resp 20    SpO2 99%    Breastfeeding No        Physical Exam:   General: Alert and age appropriately oriented. No acute cardio respiratory distress. HEENT: Normocephalic,no scleral icterus  Oral mucosa moist without cyanosis   Lungs: Clear to auscultation  bilaterally. Respiration even and unlabored   Heart: Regular rate and rhythm, S1, S2   No  murmurs, clicks, rub or gallops   Abdomen: Soft, non-tender, nondistended. Bowel sounds present. No organomegaly. Genitourinary: Benign . Neuromuscular:      Spastic right hemiplegia  Right sided weakness unchanged x increased extension of toes voluntarily but only trace DF of ankle/foot  PROM RUE with slight catch at elbow  RUE shoulder flexion 1, EF1, EE 1,  2-, finger ext 0/5  slt increase tone with PROM  RLE; hip flexion 3+, KE 3, DF 0, PF 1  Sensation intact   Skin/extremity: No rashes, no erythema.  No calf tenderness BLE  No edema                                                                            Functional Assessment:  Gross Assessment  AROM:  (RLE AAROM WFL) (08/29/17 1200)  PROM:  (RLE WFL) (08/29/17 1200)  Strength:  (RLE ankle 1/5,knee ext 4/5 flex -3/5,hip-3/5 to +2/5) (08/29/17 1200)  Coordination:  (RLE impaired, flexor synergy noted with AROM and gait) (08/29/17 1200)  Tone: Abnormal (Hypertonia RLE w/ clonus with achilles stretch reflex) (08/29/17 1200)  Sensation: Intact (RLE) (08/29/17 1200)       Balance  Sitting - Static: Good (unsupported) (09/02/17 1200)  Sitting - Dynamic: Good (unsupported) (09/02/17 1200)  Standing - Static: Good (with quad cane) (09/02/17 1200)  Standing - Dynamic : Impaired (09/02/17 1200)           Toileting  Cues: Physical assistance for pants up;Physical assistance for pants down (17 1243)  Adaptive Equipment: Other (comment) ([facility]) (17 1243)         Sharon Cotto Fall Risk Assessment:  Sharon Yadiel Fall Risk  Mobility: Ambulates or transfers with assist devices or assistance/unsteady gait (17)  Mobility Interventions: Patient to call before getting OOB (17)  Mentation: Alert, oriented x 3 (17)  Medication: Patient receiving anticonvulsants, sedatives(tranquilizers), psychotropics or hypnotics, hypoglycemics, narcotics, sleep aids, antihypertensives, laxatives, or diuretics (17)  Medication Interventions: Patient to call before getting OOB (17)  Elimination: Needs assistance with toileting (17)  Elimination Interventions: Call light in reach (17)  Prior Fall History: No (17)  Total Score: 3 (17)  Standard Fall Precautions: Yes (17)  High Fall Risk: Yes (17 0414)     Speech Assessment:         Ambulation:  Gait  Distance (ft): 150 Feet (ft) (17 1200)  Assistive Device: Cane, quad;Orthotic device (1/4 inch heel lift R, ace wrap for R DF assist,) (17 1200)  Rail Use: Left  (17 1200)     Labs/Studies:  No results found for this or any previous visit (from the past 72 hour(s)). Assessment:     Problem List as of 2017  Date Reviewed: 2017          Codes Class Noted - Resolved    Stroke (cerebrum) ([facility] [address] ICD-10-CM: I63.9  ICD-9-CM: 434.91  2017 - Present        Left sided lacunar stroke [facility]) ICD-10-CM: I63.9  ICD-9-CM: 434.91  2017 - Present    Overview Signed 2017  7:44 PM by Randy Tamayo MD     2017             Weakness of right side of body ICD-10-CM: R53.1  ICD-9-CM: 728.87  8/3/2017 - Present        Essential hypertension ICD-10-CM: I10  ICD-9-CM: 401.9  10/5/2016 - Present        Obesity (BMI 30-39. 9) ICD-10-CM: E66.9  ICD-9-CM: 278.00  Unknown - Present        Prediabetes ICD-10-CM: R73.03  ICD-9-CM: 790.29  7/28/2015 - Present        Generalized anxiety disorder ICD-10-CM: F41.1  ICD-9-CM: 300.02  Unknown - Present        Insomnia, unspecified ICD-10-CM: G47.00  ICD-9-CM: 780.52  Unknown - Present        Mitral valve prolapse ICD-10-CM: I34.1  ICD-9-CM: 424.0  Unknown - Present        Iron deficiency anemia ICD-10-CM: D50.9  ICD-9-CM: 280.9  8/23/2013 - Present    Overview Signed 10/5/2016  2:49 PM by Jocelyn Cao MD     S/p iron infusion x 2             RESOLVED: GERD (gastroesophageal reflux disease) ICD-10-CM: K21.9  ICD-9-CM: 530.81  Unknown - 10/5/2016        RESOLVED: Mixed hyperlipidemia ICD-10-CM: E78.2  ICD-9-CM: 272.2  Unknown - 10/5/2016        RESOLVED: Hypertension ICD-10-CM: I10  ICD-9-CM: 401.9  Unknown - 10/5/2016              Plan:      This is an unfortunate 37yo WF with obesity, RADHA, and htn admitted with right hemiplegia due to Left hemispheric stroke with evolution; etiology of stroke not clear; likely hypertensive      -needs  Custom fit right AFO, no real response to ant tib n with Estim. Cont RHS at noc. Would benefit from a dynamic wrist brace to assist in finger extension. Due to her deficits from her stroke, these will benefit her greatly for proper positioning, preventing contractures, assistance with gait      CVA- continue medical management for CVA  -  Continue antiplatelet therapy; aspirin 325mg daily  - continued on statin; Lipitor   -hypercoag w/u neg x for slt elevation prot C; insignificant finding       Suspect RADHA - can use CPAP while here but will need outpt formal sleep study and Pulmonary follow up. - Continue CPAP, feels she benefits significantly.  Feels less tired.       Obesity; have discussed importance of wt loss for mobility and overall health      8/23 tone developing; consider dantrium or zanaflex; started zanaflex at [facility]; added daytime dosing 8/24 at 2mg tid; monitor effects; 8/27 inc to 4mg tid  -improving per PT      Hypertension - BP fairly controlled; -130s. - No adjustments. - Cont Diovan at current dose.      LUCHO; gets IV infusions; Anemia/ microcytic, on fe supplement. - patient continue fe supplements po. 11.7 back on 8/7; recheck pending; 11.2  -8/24 hx of heavy menstrual cycle 5-7d/mos. ? Etiology of her LUCHO; check HH today; 11.9 improved      Hypokalemia-   - K 3.4 on 8/10, cont KCL 40meq daily  - 8/11 K 3.3 -> increased KCL to  40 bid   - K- 3.8 on 8/14 -> 3.9(8/16) . Decreased KCl 40mg daily. 8/21 k 3.6 cont supplement daily; resolved, monitor loosely 8/24  -8/30 f/u in a.m.; 3.4 will increase supplement       bowel program - add prn bowel program      GERD - start PPI. At times may need additional antacids, Maalox prn.      Depression; cont Lexapro; home med due to generalized anxiety due to stressors in life. His risk post stroke depression. Order recreational therapy and psych if needed; doing much better and participating well 8/11  -8/22 spirits extremely good. No anxiety noted  -8/31 emotionally coping very well, very motivated      Dysphagia; mild. Cont ST for this as well as for dysarthria. Continuing joel-motor exercises. - Mechanical consistency diet, cont aspiration  precautions  -8/28 ? Upgrade, d/w speech      8/21 watching for hypertonicity; resting hand splint right hand/wrist at noc; started Zanaflex at noc 8/23; per PT slt improvement but I still note clonus right ankle; cont 2mg tid and titrate to desired effect 8/26  -8/27 need to increase Zanflex to 4mg tid; if too sedating will decrease and start low dose Baclofen  -9/4 overall controlled, no clonus. Will add prn Valium at noc for spasms      Pneumonia prophylaxis- Incentive spirometer every hour while awake      DVT risk / DVT Prophylaxis- Will require daily physician exam to assess for signs and symptoms as patient is at increased risk for of thromboembolism. Mobilization as tolerated.  Intermittent pneumatic compression devices when in bed Thigh-high or knee-high thromboembolic deterrent hose when out of bed. - Lovenox subq daily. - she has no signs of DVT.     Pain Control: stable, mild-to-moderate joint symptoms intermittently, reasonably well controlled by PRN meds. Will require regular pain assessment and comprenhensive pain management. - Cont prn tylenol      Chronic insomnia; on Ambien at home. Have not restarted this. - on prn trazadone.   -8/30 having very restful nocs off Ambien. trazadone effective      -pt reports hx of MVP since age of 21. Takes abx with dental procedures. 2D ECHO did not appreciate any abnl of the mitral valve               Time spent was 25 minutes with over 1/2 in direct patient care/examination, consultation and coordination of care.      Signed By: Carina Malik MD     September 4, 2017

## 2017-09-04 NOTE — PROGRESS NOTES
PHYSICAL THERAPY DAILY NOTE  Time In: 7853  Time Out: 1435  Patient Seen For: PM;Family training;Gait training;Patient education;Transfer training; Wheelchair mobility; Other (see progress notes)    Subjective: patient reporting her mother and daughter are here for family training. Objective:Vital Signs:  Patient Vitals for the past 12 hrs:   Temp Pulse Resp BP SpO2   09/04/17 1545 97.8 °F (36.6 °C) 69 20 117/76 98 %   09/04/17 0719 97.9 °F (36.6 °C) (!) 58 20 157/80 99 %     Pain level: No c/o pain  Pain location:NA  Pain interventions:NA    Patient education:Bed mobility training,transfer training, gait training, fall precautions, activity pacing,body mechanics w/c mobility and parts management, Patient verbalizing understanding and demonstrating understanding of patient education. family training as noted below    Interdisciplinary Communication:NA    Other (comment) (Fall precautions)  GROSS ASSESSMENT Daily Assessment     NA       BED/MAT MOBILITY Daily Assessment   Increased time and effort to complete using hemiplegic body mechanics Rolling Right : 6 (Modified independent)  Rolling Left : 6 (Modified independent)  Supine to Sit : 6 (Modified independent)  Sit to Supine : 6 (Modified independent)       TRANSFERS Daily Assessment   Instructed patient's mother on how to assist patient with car transfers. Mother able to assist patient correctly. Transfer Type: SPT without device  Transfer Assistance : 6 (Modified independent)  Sit to Stand Assistance: Modified independent  Car Transfers: Supervision  Car Type: rehab car       GAIT Daily Assessment   Instructed patient's mother on how to assist patient with gait with quad cane and gait belt on level surfaces.  Mother able to assist patient correctly Amount of Assistance: 5 (Supervision/setup)  Distance (ft): 150 Feet (ft)  Assistive Device: Cane, quad;Orthotic device (1/4 inch heel lift R, ace wrap for R DF assist,) gait belt   Instructed patient's mother on how to assist patient with gait up/down 10 ft ramp with quad cane and SBA. Patient's mother able to assist patient correctly using gait belt    STEPS or STAIRS Daily Assessment   Instructed patient's family on how to assist patient up/down 4 steps with single hand rail with single step at a time leading up with LLE and down with RLE. Patient's mother able to assist patient up/down steps correctly using gait belt Steps/Stairs Ambulated (#): 4  Level of Assist : 4 (Contact guard assistance) with gait belt  Rail Use: Left    Instructed patient's mother on how to assist patient up/down 6 inch step with quad cane and CGA. Mother able to assist patient correctly up/down 6 inch step with quad cane    BALANCE Daily Assessment    Sitting - Static: Good (unsupported)  Sitting - Dynamic: Good (unsupported)  Standing - Static: Good (with quad cane)  Standing - Dynamic : Impaired       WHEELCHAIR MOBILITY Daily Assessment   Using LUE and LLE    Instructed patient's family on how to manage w/c parts including folding and unfolding w/c and getting w/c in and out of trunk of car Able to Propel (ft): 150 feet  Functional Level: 6  Curbs/Ramps Assist Required (FIM Score): 0 (Not tested)  Wheelchair Setup Assist Required : 6 (Modified independent)  Wheelchair Management: Manages left brake;Manages right brake;Manages right footrest       LOWER EXTREMITY EXERCISES Daily Assessment    NA          Assessment: Patient's family demonstrating and verbalizing understanding of education/instruction noted above       Patient return to room at end of treatment and remained up in wheelchair with needs in reach. Plan of Care: Continue with POC and progress as tolerated.      Karen Montiel, PT  9/4/2017

## 2017-09-05 PROCEDURE — 74011250637 HC RX REV CODE- 250/637: Performed by: PHYSICAL MEDICINE & REHABILITATION

## 2017-09-05 PROCEDURE — 97110 THERAPEUTIC EXERCISES: CPT

## 2017-09-05 PROCEDURE — 97116 GAIT TRAINING THERAPY: CPT

## 2017-09-05 PROCEDURE — 97535 SELF CARE MNGMENT TRAINING: CPT

## 2017-09-05 PROCEDURE — 74011250636 HC RX REV CODE- 250/636: Performed by: PHYSICAL MEDICINE & REHABILITATION

## 2017-09-05 PROCEDURE — 65310000000 HC RM PRIVATE REHAB

## 2017-09-05 PROCEDURE — 97112 NEUROMUSCULAR REEDUCATION: CPT

## 2017-09-05 PROCEDURE — 99232 SBSQ HOSP IP/OBS MODERATE 35: CPT | Performed by: PHYSICAL MEDICINE & REHABILITATION

## 2017-09-05 RX ORDER — HYDROCHLOROTHIAZIDE 12.5 MG/1
25 CAPSULE ORAL DAILY
Qty: 30 CAP | Refills: 6 | Status: SHIPPED | OUTPATIENT
Start: 2017-09-05 | End: 2017-12-13

## 2017-09-05 RX ORDER — VALSARTAN 160 MG/1
160 TABLET ORAL DAILY
Qty: 30 TAB | Refills: 6 | Status: SHIPPED | OUTPATIENT
Start: 2017-09-05 | End: 2017-10-03 | Stop reason: ALTCHOICE

## 2017-09-05 RX ORDER — TRAZODONE HYDROCHLORIDE 50 MG/1
50 TABLET ORAL
Qty: 30 TAB | Refills: 2 | Status: SHIPPED | OUTPATIENT
Start: 2017-09-05 | End: 2017-10-31

## 2017-09-05 RX ORDER — DIAZEPAM 5 MG/1
2.5 TABLET ORAL
Qty: 60 TAB | Refills: 1 | Status: SHIPPED | OUTPATIENT
Start: 2017-09-05 | End: 2017-11-15 | Stop reason: SDUPTHER

## 2017-09-05 RX ORDER — POTASSIUM CHLORIDE 1500 MG/1
20 TABLET, FILM COATED, EXTENDED RELEASE ORAL 2 TIMES DAILY
Qty: 60 TAB | Refills: 6 | Status: SHIPPED | OUTPATIENT
Start: 2017-09-05 | End: 2018-04-10 | Stop reason: SDUPTHER

## 2017-09-05 RX ORDER — ASPIRIN 325 MG
325 TABLET ORAL DAILY
Qty: 30 TAB | Refills: 12 | Status: SHIPPED | OUTPATIENT
Start: 2017-09-05 | End: 2020-10-20 | Stop reason: DRUGHIGH

## 2017-09-05 RX ORDER — TIZANIDINE 4 MG/1
4 TABLET ORAL 3 TIMES DAILY
Qty: 90 TAB | Refills: 6 | Status: SHIPPED | OUTPATIENT
Start: 2017-09-05 | End: 2017-11-15 | Stop reason: DRUGHIGH

## 2017-09-05 RX ORDER — ATORVASTATIN CALCIUM 40 MG/1
40 TABLET, FILM COATED ORAL DAILY
Qty: 30 TAB | Refills: 6 | Status: SHIPPED | OUTPATIENT
Start: 2017-09-05 | End: 2017-10-03

## 2017-09-05 RX ADMIN — HYDROCHLOROTHIAZIDE: 25 TABLET ORAL at 10:17

## 2017-09-05 RX ADMIN — ENOXAPARIN SODIUM 40 MG: 40 INJECTION SUBCUTANEOUS at 21:12

## 2017-09-05 RX ADMIN — TIZANIDINE 4 MG: 2 TABLET ORAL at 16:01

## 2017-09-05 RX ADMIN — FERROUS SULFATE TAB 325 MG (65 MG ELEMENTAL FE) 325 MG: 325 (65 FE) TAB at 16:01

## 2017-09-05 RX ADMIN — ESCITALOPRAM OXALATE 20 MG: 10 TABLET ORAL at 10:18

## 2017-09-05 RX ADMIN — TIZANIDINE 4 MG: 2 TABLET ORAL at 10:18

## 2017-09-05 RX ADMIN — PANTOPRAZOLE SODIUM 40 MG: 40 TABLET, DELAYED RELEASE ORAL at 06:40

## 2017-09-05 RX ADMIN — FERROUS SULFATE TAB 325 MG (65 MG ELEMENTAL FE) 325 MG: 325 (65 FE) TAB at 10:18

## 2017-09-05 RX ADMIN — POTASSIUM CHLORIDE 60 MEQ: 20 TABLET, EXTENDED RELEASE ORAL at 10:18

## 2017-09-05 RX ADMIN — ASPIRIN 325 MG ORAL TABLET 325 MG: 325 PILL ORAL at 10:18

## 2017-09-05 RX ADMIN — TIZANIDINE 4 MG: 2 TABLET ORAL at 21:11

## 2017-09-05 RX ADMIN — ATORVASTATIN CALCIUM 40 MG: 40 TABLET, FILM COATED ORAL at 10:17

## 2017-09-05 NOTE — REHAB NOTE
Report received on pt's history and status from [facility]. Pt sitting up in chair. Head to toe assessment completed. Pt denies any discomfort at this time. Voices that she feels a little nervous about going home.  Reassurance and support provided

## 2017-09-05 NOTE — PROGRESS NOTES
Subjective \"I have already told my kids that I want to get this game for us to play when I get home. \"   Activity Rumikub   Strength/Endurance Patient tolerated the session with no c/o tiredness or fatigue. She is highly motivated to participate. Balance Sit<->stand:  Mod (I) with quad cane   Social Interaction Patient was in good spirits and outgoing. Cognitive A&O X4   Comments Patient was assisted to her room and left seated up in her w/c with all needs within reach. Pt D/C summary completed on 9/5/17. Please see for specifics in [address] Patient expected to be d/c'd to home on 9/6/17.   Tarry Meals, CTRS

## 2017-09-05 NOTE — PROGRESS NOTES
PHYSICAL THERAPY DAILY NOTE  Time In: 0831  Time Out: 0915    Subjective: Patient reporting she was sad yesterday afternoon because she is going home tomorrow and would like to be able to walk better and is going to miss the rehab staff. Reports she is nervous about going home and what comes next. Reports she is \"OK\" this AM. Reports her right knee pain is gone and it is just a little sore on the inside of her right knee         Objective:Vital Signs:  Patient Vitals for the past 12 hrs:   Temp Pulse Resp BP SpO2   09/05/17 0754 97.9 °F (36.6 °C) 95 16 115/66 94 %     Pain level:No c/o pain  Pain location:NA  Pain interventions:NA    Patient education:Bed mobility training,transfer training, gait training, fall precautions, activity pacing, body mechanics, w/c mobility and parts management, Patient verbalizing understanding and demonstrating understanding of patient education. Recommend follow up education. Interdisciplinary Communication:spoke with MSW regarding DME    Other (comment) (Fall precautions)  GROSS ASSESSMENT Daily Assessment     NA       BED/MAT MOBILITY Daily Assessment   Increased time and effort using hemiplegic body mechanics Rolling Right : 6 (Modified independent)  Rolling Left : 6 (Modified independent)  Supine to Sit : 6 (Modified independent)  Sit to Supine : 6 (Modified independent)       TRANSFERS Daily Assessment    Transfer Type: Other (SPT with quad cane)  Transfer Assistance : 6 (Modified independent) (w/c<>mat to the right)  Sit to Stand Assistance: Modified independent  Car Transfers: Not tested       GAIT Daily Assessment    Amount of Assistance: 5 (Supervision/setup)  Distance (ft): 20 Feet (ft) (20ft x 2)  Assistive Device: Cane, quad   Gait training without DF assist and without heel lift for RLE simulating ambulating to/from bathroom for distances she will have in her home environment.  Patient demonstrating safe slow step to hemiplegic gait pattern leading with RLE and stepping to with LLE, excessive right ankle inversion with decreased DF during swing but able to safely achieve foot flat at initial contact. Able to control right knee hyperextension with only 2 incidences of knee hyperextension during stance    STEPS or STAIRS Daily Assessment    Steps/Stairs Ambulated (#): 0  Level of Assist : 0 (Not tested)       BALANCE Daily Assessment    Sitting - Static: Good (unsupported)  Sitting - Dynamic: Good (unsupported)  Standing - Static: Good (with quad cane)  Standing - Dynamic : Impaired       WHEELCHAIR MOBILITY Daily Assessment   Using LUE and LLE Able to Propel (ft): 150 feet  Functional Level: 6  Curbs/Ramps Assist Required (FIM Score): 0 (Not tested)  Wheelchair Setup Assist Required : 6 (Modified independent)  Wheelchair Management: Manages left brake;Manages right brake;Manages right footrest       LOWER EXTREMITY EXERCISES Daily Assessment    Extremity: Right  Exercise Type #1: Supine lower extremity strengthening  Sets Performed: 2  Reps Performed: 10  Level of Assist: Minimal assistance          Assessment: Able to ambulate short distances without DF assist and without heel lift with patient able to demonstrate safe right foot placement and control of right knee hyperextension 90% of distance ambulated. With home environment set up safely, patient should be able to ambulate to/from bathroom for short distances with quad cane and without DF assist.       Patient to OT at end of treatment    Plan of Care: Continue with POC and progress as tolerated.      Kaykay Cruz, PT  9/5/2017

## 2017-09-05 NOTE — PROGRESS NOTES
Keshia Hatfield MD,   Medical Director  [address] [facility], [address] [facility]  Tel: [phone]       SFD PROGRESS NOTE    Casandra Sexton  Admit Date: 8/8/2017  Admit Diagnosis: Stroke - Right Side Body Involvment;Stroke (cerebrum) ([facility] [address]    Subjective     Patient seen and examined. Vss. No acute complaints. PT, OT well tolerated. Steady gains made. No new barrier to progress noted. Reports that she had a crying spell yesterday about fear of going home. Objective:     Current Facility-Administered Medications   Medication Dose Route Frequency    diazePAM (VALIUM) tablet 2.5 mg  2.5 mg Oral Q6H PRN    potassium chloride (K-DUR, KLOR-CON) SR tablet 60 mEq  60 mEq Oral DAILY    tiZANidine (ZANAFLEX) tablet 4 mg  4 mg Oral TID    ferrous sulfate tablet 325 mg  1 Tab Oral BID WITH MEALS    aspirin (ASPIRIN) tablet 325 mg  325 mg Oral DAILY    enoxaparin (LOVENOX) injection 40 mg  40 mg SubCUTAneous Q24H    acetaminophen (TYLENOL) tablet 650 mg  650 mg Oral Q4H PRN    alum-mag hydroxide-simeth (MYLANTA) oral suspension 30 mL  30 mL Oral Q4H PRN    atorvastatin (LIPITOR) tablet 40 mg  40 mg Oral DAILY    bisacodyl (DULCOLAX) suppository 10 mg  10 mg Rectal DAILY PRN    escitalopram oxalate (LEXAPRO) tablet 20 mg  20 mg Oral DAILY    magnesium hydroxide (MILK OF MAGNESIA) 400 mg/5 mL oral suspension 30 mL  30 mL Oral DAILY PRN    ondansetron (ZOFRAN ODT) tablet 4 mg  4 mg Oral Q6H PRN    senna-docusate (PERICOLACE) 8.6-50 mg per tablet 2 Tab  2 Tab Oral DAILY PRN    valsartan/hydroCHLOROthiazide (DIOVAN HCT) 160/25 mg   Oral DAILY    traZODone (DESYREL) tablet 50 mg  50 mg Oral QHS PRN    pantoprazole (PROTONIX) tablet 40 mg  40 mg Oral ACB     Review of Systems:Denies chest pain, shortness of breath, cough, headache, visual problems, abdominal pain, dysurea, calf pain.  Pertinent positives are as noted in the medical records and unremarkable otherwise. Visit Vitals    /66    Pulse 95    Temp 97.9 °F (36.6 °C)    Resp 16    SpO2 94%    Breastfeeding No        Physical Exam:   General: Alert and age appropriately oriented. No acute cardio respiratory distress. HEENT: Normocephalic,no scleral icterus  Oral mucosa moist without cyanosis   Lungs: Clear to auscultation  bilaterally. Respiration even and unlabored   Heart: Regular rate and rhythm, S1, S2   No  murmurs, clicks, rub or gallops   Abdomen: Soft, non-tender, nondistended. Bowel sounds present. No organomegaly. Genitourinary: Benign . Neuromuscular:      RUE dianelys 2-  Still  No active movement RUE  RLE no clonus, strength improving   Skin/extremity: No rashes, no erythema.  No calf tenderness BLE  No edema                                                                            Functional Assessment:  Gross Assessment  AROM:  (RLE AAROM WFL) (08/29/17 1200)  PROM:  (RLE WFL) (08/29/17 1200)  Strength:  (RLE ankle 1/5,knee ext 4/5 flex -3/5,hip-3/5 to +2/5) (08/29/17 1200)  Coordination:  (RLE impaired, flexor synergy noted with AROM and gait) (08/29/17 1200)  Tone: Abnormal (Hypertonia RLE w/ clonus with achilles stretch reflex) (08/29/17 1200)  Sensation: Intact (RLE) (08/29/17 1200)       Balance  Sitting - Static: Good (unsupported) (09/04/17 1600)  Sitting - Dynamic: Good (unsupported) (09/04/17 1600)  Standing - Static: Good (with quad cane) (09/04/17 1600)  Standing - Dynamic : Impaired (09/04/17 1600)           Toileting  Cues: Physical assistance for pants up;Physical assistance for pants down (09/01/17 1243)  Adaptive Equipment: Other (comment) ([facility]) (09/01/17 1243)         Nathaly Gallardo Fall Risk Assessment:  Nathaly Gallardo Fall Risk  Mobility: Ambulates or transfers with assist devices or assistance/unsteady gait (09/04/17 2010)  Mobility Interventions: Utilize walker, cane, or other assitive device (09/04/17 2010)  Mentation: Alert, oriented x 3 (09/04/17 2010)  Medication: Patient receiving anticonvulsants, sedatives(tranquilizers), psychotropics or hypnotics, hypoglycemics, narcotics, sleep aids, antihypertensives, laxatives, or diuretics (09/04/17 2010)  Medication Interventions: Patient to call before getting OOB (09/04/17 2010)  Elimination: Needs assistance with toileting (09/04/17 2010)  Elimination Interventions: Call light in reach (09/04/17 2010)  Prior Fall History: No (09/04/17 2010)  Total Score: 3 (09/04/17 2010)  Standard Fall Precautions: Yes (09/04/17 0910)  High Fall Risk: Yes (09/04/17 2010)     Speech Assessment:         Ambulation:  Gait  Distance (ft): 150 Feet (ft) (09/04/17 1600)  Assistive Device: Cane, quad;Orthotic device (1/4 inch heel lift R, ace wrap for R DF assist,) (09/04/17 1600)  Rail Use: Left  (09/04/17 1600)     Labs/Studies:  Recent Results (from the past 72 hour(s))   CREATININE    Collection Time: 09/04/17  8:29 AM   Result Value Ref Range    Creatinine 0.73 0.6 - 1.0 MG/DL   HEMOGLOBIN    Collection Time: 09/04/17  8:29 AM   Result Value Ref Range    HGB 12.0 11.7 - 15.4 g/dL   PLATELET COUNT    Collection Time: 09/04/17  8:29 AM   Result Value Ref Range    PLATELET 403 536 - 761 K/uL       Assessment:     Problem List as of 9/5/2017  Date Reviewed: 1/26/2017          Codes Class Noted - Resolved    Stroke (cerebrum) ([facility] ICD-10-CM: I63.9  ICD-9-CM: 434.91  8/8/2017 - Present        Left sided lacunar stroke [facility]) ICD-10-CM: I63.9  ICD-9-CM: 434.91  8/4/2017 - Present    Overview Signed 8/6/2017  7:44 PM by Krista Birmingham MD     8/2017             Weakness of right side of body ICD-10-CM: R53.1  ICD-9-CM: 728.87  8/3/2017 - Present        Essential hypertension ICD-10-CM: I10  ICD-9-CM: 401.9  10/5/2016 - Present        Obesity (BMI 30-39. 9) ICD-10-CM: E66.9  ICD-9-CM: 278.00  Unknown - Present        Prediabetes ICD-10-CM: R73.03  ICD-9-CM: 790.29  7/28/2015 - Present        Generalized anxiety disorder ICD-10-CM: F41.1  ICD-9-CM: 300.02 Unknown - Present        Insomnia, unspecified ICD-10-CM: G47.00  ICD-9-CM: 780.52  Unknown - Present        Mitral valve prolapse ICD-10-CM: I34.1  ICD-9-CM: 424.0  Unknown - Present        Iron deficiency anemia ICD-10-CM: D50.9  ICD-9-CM: 280.9  8/23/2013 - Present    Overview Signed 10/5/2016  2:49 PM by Kate Nnace MD     S/p iron infusion x 2             RESOLVED: GERD (gastroesophageal reflux disease) ICD-10-CM: K21.9  ICD-9-CM: 530.81  Unknown - 10/5/2016        RESOLVED: Mixed hyperlipidemia ICD-10-CM: E78.2  ICD-9-CM: 272.2  Unknown - 10/5/2016        RESOLVED: Hypertension ICD-10-CM: I10  ICD-9-CM: 401.9  Unknown - 10/5/2016              Plan:      This is an unfortunate 37yo WF with obesity, RADHA, and htn admitted with right hemiplegia due to Left hemispheric stroke with evolution; etiology of stroke not clear; likely hypertensive      -needs  Custom fit right AFO, no real response to ant tib n with Estim. Cont RHS at noc. Would benefit from a dynamic wrist brace to assist in finger extension. Due to her deficits from her stroke, these will benefit her greatly for proper positioning, preventing contractures, assistance with gait      CVA- continue medical management for CVA  -  Continue antiplatelet therapy; aspirin 325mg daily  - continued on statin; Lipitor   -hypercoag w/u neg x for slt elevation prot C; insignificant finding       Suspect RADHA - can use CPAP while here but will need outpt formal sleep study and Pulmonary follow up. - Continue CPAP, feels she benefits significantly. Feels less tired.       Obesity; have discussed importance of wt loss for mobility and overall health      8/23 tone developing; consider dantrium or zanaflex; started zanaflex at noc; added daytime dosing 8/24 at 2mg tid; monitor effects; 8/27 inc to 4mg tid  -improving per PT      Hypertension - BP fairly controlled; -130s. - No adjustments. - Cont Diovan at current dose.      LUCHO; gets IV infusions;   Anemia/ microcytic, on fe supplement. - patient continue fe supplements po. 11.7 back on 8/7; recheck pending; 11.2  -8/24 hx of heavy menstrual cycle 5-7d/mos. ? Etiology of her LUCHO; check HH today; 11.9 improved      Hypokalemia-   - K 3.4 on 8/10, cont KCL 40meq daily  - 8/11 K 3.3 -> increased KCL to  40 bid   - K- 3.8 on 8/14 -> 3.9(8/16) . Decreased KCl 40mg daily. 8/21 k 3.6 cont supplement daily; resolved, monitor loosely 8/24  -8/30 f/u in a.m.; 3.4 will increase supplement       bowel program - add prn bowel program      GERD - start PPI. At times may need additional antacids, Maalox prn.      Depression; cont Lexapro; home med due to generalized anxiety due to stressors in life. His risk post stroke depression. Order recreational therapy and psych if needed; doing much better and participating well 8/11  -8/22 spirits extremely good. No anxiety noted  -8/31 emotionally coping very well, very motivated      Dysphagia; mild. Cont ST for this as well as for dysarthria. Continuing joel-motor exercises. - Mechanical consistency diet, cont aspiration  precautions  -8/28 ? Upgrade, d/w speech  -8/31 upgraded to reg consistency      8/21 watching for hypertonicity; resting hand splint right hand/wrist at noc; started Zanaflex at noc 8/23; per PT slt improvement but I still note clonus right ankle; cont 2mg tid and titrate to desired effect 8/26  -8/27 need to increase Zanflex to 4mg tid; if too sedating will decrease and start low dose Baclofen  -9/4 overall controlled, no clonus. Will add prn Valium at noc for spasms      Pneumonia prophylaxis- Incentive spirometer every hour while awake      DVT risk / DVT Prophylaxis- Will require daily physician exam to assess for signs and symptoms as patient is at increased risk for of thromboembolism. Mobilization as tolerated. Intermittent pneumatic compression devices when in bed Thigh-high or knee-high thromboembolic deterrent hose when out of bed. - Lovenox subq daily. - she has no signs of DVT.     Pain Control: stable, mild-to-moderate joint symptoms intermittently, reasonably well controlled by PRN meds. Will require regular pain assessment and comprenhensive pain management. - Cont prn tylenol      Chronic insomnia; on Ambien at home. Have not restarted this. - on prn trazadone.   -8/30 having very restful nocs off Ambien. trazadone effective      -pt reports hx of MVP since age of 21. Takes abx with dental procedures. 2D ECHO did not appreciate any abnl of the mitral valve             Time spent was 25 minutes with over 1/2 in direct patient care/examination, consultation and coordination of care.      Signed By: Fiorella Alvarado MD     September 5, 2017

## 2017-09-05 NOTE — PROGRESS NOTES
PHYSICAL THERAPY DAILY NOTE  Time In: 1036  Time Out: 4745  Patient Seen For: AM;Gait training;Patient education;Transfer training; Other (see progress notes)    Subjective: Patient reporting it is harder to control her right foot and ankle and knee when walking without ace wrap or heel lift. Reports she has to take smaller steps and think more about her right foot placement         Objective:Vital Signs:  Patient Vitals for the past 12 hrs:   Temp Pulse Resp BP SpO2   09/05/17 0754 97.9 °F (36.6 °C) 95 16 115/66 94 %     Pain level:No c/o pain  Pain location:NA  Pain interventions:NA    Patient education:gait training, fall precautions      Interdisciplinary Communication:As noted below with [employer] rep    Other (comment) (Fall precautions)  GROSS ASSESSMENT Daily Assessment     Rep from [employer] came in with fabricated custom AFO. Donned AFO RLE and assessed fit. Made markings where AFO needs to be trimmed. Rep to take AFO back to his clinic and have adjustments made then return AFO for assessment with gait training       BED/MAT MOBILITY Daily Assessment    Rolling Right : 0 (Not tested)  Rolling Left : 0 (Not tested)  Supine to Sit : 0 (Not tested)  Sit to Supine : 0 (Not tested)       TRANSFERS Daily Assessment    Transfer Type: SPT without device  Transfer Assistance : 6 (Modified independent)  Sit to Stand Assistance: Modified independent  Car Transfers: Not tested   Gait training  X 50ft x 2 without DF assist and without heel lift for RLE simulating ambulating to/from bathroom for distances she will have in her home environment. Patient demonstrating safe slow step to hemiplegic gait pattern leading with RLE and stepping to with LLE, excessive right ankle inversion with decreased DF during swing but able to safely achieve foot flat at initial contact.  Able to control right knee hyperextension with only 2 incidences of knee hyperextension during stance    GAIT Daily Assessment    Amount of Assistance: 5 (Supervision/setup) verbal cues for improving LLE step length and right knee flex and ankle PF at terminal stance. Cues to prevent right knee hyperextension. Tactile cues to promote right elbow extension and RUE arm swing   Distance (ft): 150 Feet (ft)  Assistive Device: Cane, quad (1/4 inch heel lift R, ace wrap for R DF assist,)       STEPS or STAIRS Daily Assessment    Steps/Stairs Ambulated (#): 0  Level of Assist : 0 (Not tested)       BALANCE Daily Assessment    Sitting - Static: Good (unsupported)  Sitting - Dynamic: Good (unsupported)  Standing - Static: Good  Standing - Dynamic : Impaired       WHEELCHAIR MOBILITY Daily Assessment    Able to Propel (ft): 150 feet  Functional Level: 6  Curbs/Ramps Assist Required (FIM Score): 0 (Not tested)  Wheelchair Setup Assist Required : 6 (Modified independent)  Wheelchair Management: Manages left brake;Manages right brake;Manages right footrest       LOWER EXTREMITY EXERCISES Daily Assessment    NA          Assessment: Weekly assessment deferred secondary to plan to complete D/C assessment tomorrow AM. Gait without DF assist slowly improving but unable to advance gait pattern or gait speed with DF assist and heel lift.  Plan to assess with custom right AFO this PM or tomorrow AM         Plan of Care: Complete D/C assessment tomorrow AM    Savage Bradford, PT  9/5/2017

## 2017-09-05 NOTE — PROGRESS NOTES
OT Daily Note    Time In 1301   Time Out 1346     Subjective: \"I'm gonna take what y'all taught me with me. \"  Pain: None     Precautions:  (falls)    Neuro-Muscular Re-Education   Patient tolerated NMES to RUE seated in wheelchair x 10 minutes at intensity 13 for AAROM shoulder shrug in surge pattern. Patient seated before vertical mirror to promote NMR. Patient then tolerated x 5 minutes at intensity 11 for wrist and digit extension, limited by increased flexor tone however good contraction with forearm supported. Patient completed RUE weightbearing standing at tabletop x ~5 minutes before requiring seated rest break with assist to facilitate extension at elbow and digits. Education   Patient educated regarding different AE available (i.e. Rocker knife, button hook) in medical supply catalog to support ADL/IADL with bobby techniques during standing trial.  Patient verbalized understanding of resources available and ideas for obtaining. Assessment: Patient tolerated well. Continues to be limited by increased RUE flexor tone, more pronounced since admission/beginning of [facility] stay. Education: AE for ADL/IADL  Interdisciplinary Communication: Collaborated with Vinicio Lockwood and agreed patient is ready for discharge tomorrow. Plan: To complete Discharge ADL next OT session.     Sue Noriega, OTR/L

## 2017-09-05 NOTE — PROGRESS NOTES
OT WEEKLY PROGRESS NOTE    Time In: 0700  Time Out: 0745    COMPREHENSION MODE Initial Assessment Weekly Progress Assessment 9/5/2017   Score 7 7     EXPRESSION Initial Assessment Weekly Progress Assessment 9/5/2017   Primary Mode of Expression Verbal Verbal   Score 6 7   Comments Expresses complex, abstract ideas with extra time, mild difficulty due to dysarthria Mild dysarthria     SOCIAL INTERACTION/ PRAGMATICS Initial Assessment Weekly Progress Assessment 9/5/2017   Score 6 7   Comments Patient tearful (appropriate) end of session, provided encouragement and education pleasant and agreeable     PROBLEM SOLVING Initial Assessment Weekly Progress Assessment 9/5/2017   Score 6 7   Comments Makes decisions with mild difficulty or solves complex problems with extra time. WNL     MEMORY Initial Assessment Weekly Progress Assessment 9/5/2017   Score 6 7   Comments Recognizes people often seen, daily routines, and executes requests with additional time  WNL     EATING Initial Assessment Weekly Progress Assessment 9/5/2017   Functional Level 5 7   Comments Setup/cues for container management Independent utilizing bobby techniques     GROOMING Initial Assessment Weekly Progress Assessment 9/5/2017   Functional Level 3 7   Tasks completed by patient Washed face, Brushed hair, Brushed teeth (Applied deodorant) Brushed hair;Brushed teeth; Washed hands; Washed face   Comments Assist to apply toothpaste to toothbrush and apply deodorant to R underarm Independent seated at sink     BATHING Initial Assessment Weekly Progress Assessment 9/5/2017   Functional Level 3 6   Body parts patient bathed Thigh, right, Thigh, left, Fadia area, Chest, Arm, right, Arm, left, Abdomen Abdomen;Arm, right;Arm, left; Buttocks; Chest;Lower leg and foot, left; Lower leg and foot, right;Fadia area; Thigh, left; Thigh, right   Comments Assist to bathe lower BLEs and bottom, blocking right knee as patient squats to have bottom bathed Seated on TTB, using long handled sponge     TUB/SHOWER TRANSFER INDEPENDENCE Initial Assessment Weekly Progress Assessment 9/5/2017   Score 3 5   Comments Squat/stand pivot SPT without a device     UPPER BODY DRESSING/UNDRESSING Initial Assessment Weekly Progress Assessment 9/5/2017   Functional Level 3 4   Items applied/Steps completed Pullover (4 steps) Bra (3 steps); Pullover (4 steps)   Comments Assist to thread RUE through shirt and to assist down trunk Assist to finish bra down back/R trunk     LOWER BODY DRESSING/UNDRESSING Initial Assessment Weekly Progress Assessment 9/5/2017   Functional Level 1 4   Items applied/Steps completed Elastic waist pants (3 steps), Shoe, left (1 step), Shoe, right (1 step), Sock, left (1 step), Sock, right (1 step), Underpants (3 steps) Sock, right (1 step); Shoe, right (1 step); Sock, left (1 step); Shoe, left (1 step); Underpants (3 steps); Elastic waist pants (3 steps)   Comments Dependent for LB dressing at this time Assist to finish underwear on/off R hip, start R foot into shoe     TOILETING Initial Assessment Weekly Progress Assessment 9/5/2017   Functional Level 0 4   Comments Activity did not occur, patient reports continent of bowel and bladder Assist to finish underwear on/off R hip     TOILET TRANSFER INDEPENDENCE Initial Assessment Weekly Progress Assessment 9/5/2017   Transfer score 0 5   Comments Did not occur SPT without device     Plan of Care: Please see Care Plan for updated LTGs. Family Training:  Completed with patient's mother and children present on 9/1/17. Summary of Progress: Patient demonstrates progress with ADL and functional transfers since admission, see above for details. Continues to progress with LB dressing as dynamic standing balance progresses and use of soft sock aid. Continues to be limited by increased tone in RUE, decreased strength and coordination.   Summary of Session:     S: \"I had a breakdown yesterday, I think because this has been such a safe environment, and I have a routine here. \" Agreeable to therapy. Focus of session was on ADL and functional mobility. Patient was able to ambulate transfer bed to wheelchair and wheelchair <> BSC and TTB SPT without device with SBA. Pain not indicated. Collaborated with PT, Anne Weeks and confirmed patient is on track for discharge tomorrow. Patient tolerated session well, but RUE tone, strength, coordination, balance, functional mobility, activity tolerance are still below baseline and require skilled facilitation to successfully and safely complete ADL's and transfers. Patient ended session in wheelchair with call remote and phone within reach.      Krista Callaway, CLAIRER/L

## 2017-09-05 NOTE — PROGRESS NOTES
OT Daily Note    Time In 0918   Time Out 1002     Subjective: \"I think that'll work. \" [patient educated re: kitchen management from wheelchair level this session]  Pain: none indicated    Precautions:  (falls)    Home Management   Patient educated regarding environmental organization, safe item transport, and functional mobility for kitchen management from wheelchair level and/or at quad cane at discharge. Patient verbalized and demonstrated understanding, completing 4-part scavenger hunt in kitchen to retrieve items from refrigerator, drawers, and low cabinets self propeling wheelchair using LLE/LUE with supervision for standing balance as patient stands at sink to fill teapot. PROM   Patient tolerated RUE scapular mobilization in elevation/depression and protraction/retraction, as well as shoulder flexion, abduction, and circumduction seated in wheelchair. Assessment: Patient verbalized and demonstrated understanding of IADL training from wheelchair level in kitchen. Patient will be modified independent for kitchen management from wheelchair level at discharge, supervision ambulating with quad cane. Education: IADL from wheelchair level  Interdisciplinary Communication: Collaborated with Vinicio Locwkood and agreed patient is progressing well and on-track for discharge tomorrow. Plan: Continue to address ADL/IADL, functional mobility, activity tolerance, balance, strengthening, coordination.       Sue Noriega, OTR/L

## 2017-09-05 NOTE — DISCHARGE SUMMARY
REHABILITATION DISCHARGE SUMMARY     Date of admission; 8.8.17  Discharge Date:   9.6.17    Primary Care Physician: Dr. Faizan Salter    Admission Condition: fair  Discharged Condition: good    Hospital Course: The patient was admitted to the [facility] on 8/8/2017 s/p CVA with Right hemiplegia with decline in functional mobility and self care    Arnulfo Raymond is a previously functionally independent, obese right handed 35yo WF with a htn of htn, LUCHO, MVP, suspected RADHA and anxiety who presented to the ED 8/3 after experiencing an acute onset of right sided weakness, slurred speech and chest pain while working in the [employer] at [facility] [employer]. She has never had symptoms like this before, but does note that she \" has chest pain all the time but sometimes barely noticeable. \" She was told she had mitral valve prolapse at the age of 21 after \"years of misdiagnosis. \" Her BP was elevated in the ED. She admits to being under a lot of stress bc her boss is out of town and her work duties have been overwhelming. She states compliance with her BP med (Avalide). She reports a long hx of snoring and an outpt sleep study was recommended but she never had done. She has chronic unspecified insomnia for which she takes Ambien.  Upon transfer to the 7th floor , she requested the use of oxygen, but did cpap and tolerated well. EKG nl. Lipid profile shows triglycerides of 174, chol of 171, hdl 46 and LdL of 90.2. She has been started on aspirin and lipitor. Telemetry did not reveal any arrhythmias. Her echo and carotid duplex were normal. MRI confirmed a lacunar infarct in the left corona radiata. On 8/7 pt reported worsening right hemiplegia thus a f/u HCT was ordered that ruled out hemorrhage and showed simple evolution of the infarct. While on the 7th floor, I did order a hypercoaguable w/u given pt has no etiology or precursing issue that alludes to the reason for her stroke. She has no hx of DVT.  She is doing well with CPAP and will need a sleep study and f/u post dc. Rehabilitation Course: From a medical standpoint, we have not encountered any acute medical issues. She did have mild hypokalemia which has responded well to po supplementation. She will remain on Lipitor and  mg for stroke prevention. Her BP is well controlled with Diovan HCT. She has developed increasing tone with spastic right hemiplegia. This has responded well to titrating doses of Zanaflex, currently at 4mg tid with a prn order for Valium 2.5 mg. She was started on lexapro for post stroke depression and has responded very well. She is very motivated to improve. I have filled out short term disability for patient and will f/u with her in my clinic for ongoing needs. Functional Level On Admission:  min to CGA with bed mobility , mod A with transfers and ambulation not yet assessed. (she was sba with bed mobility and min A with transfers prior to the evolution of infarct) Prior to worsening weakness, she was Min A with UE dressing, max A LE, mod A with toileting, mod A with bathing.  She is on a mechanical soft diet with thin liquids (cardiac diet)    Functional Level At Discharge:       GROSS ASSESSMENT Daily Assessment      recd right custom AFO from orthotist. Will need to be reassessed by orthotist for making adjustments at ankle and foot plate.         BED/MAT MOBILITY Daily Assessment     Rolling Right : 0 (Not tested)  Rolling Left : 0 (Not tested)  Supine to Sit : 0 (Not tested)  Sit to Supine : 0 (Not tested)         TRANSFERS Daily Assessment     Transfer Type: SPT without device (w/c to bed)  Transfer Assistance : 6 (Modified independent)  Sit to Stand Assistance: Modified independent  Car Transfers: Not tested         GAIT Daily Assessment     Amount of Assistance: 5 (Supervision/setup) verbal cues for improving LLE step length and RLE step clearance  Distance (ft): 150 Feet (ft)  Assistive Device: Cane, quad;Orthotic device (custom right AFO)   Gait training up/down 10 ft ramp with quad cane and SBA, slow start/stop step to hemiplegic gait pattern.     Gait training x 200ft with shopping cart jhonatan right AFO facilitating cont step through gait pattern with increased step length and gait speed. Min assist to control shopping cart      STEPS or STAIRS Daily Assessment   Single step at a time going up/down 4 steps. Right LE hip hiking and circumduction going up steps. Steps/Stairs Ambulated (#): 4  Level of Assist : 5 (Stand-by assistance)  Rail Use: Left          BALANCE Daily Assessment     Sitting - Static: Good (unsupported)  Sitting - Dynamic: Good (unsupported)  Standing - Static: Good  Standing - Dynamic : Impaired         WHEELCHAIR MOBILITY Daily Assessment     Able to Propel (ft): 150 feet  Functional Level: 6  Curbs/Ramps Assist Required (FIM Score): 0 (Not tested)  Wheelchair Setup Assist Required : 6 (Modified independent)  Wheelchair Management: Manages left brake;Manages right brake;Manages right footrest         LOWER EXTREMITY EXERCISES Daily Assessment     NA             Assessment: Improved step clearance step length and gait speed ambulating with AFO. Improved step clearance and foot placement going up/down steps and up/down ramp with AFO.  Recommending patient get one shoe size larger for right foot           COMPREHENSION MODE Initial Assessment Weekly Progress Assessment 9/5/2017   Score 7 7      EXPRESSION Initial Assessment Weekly Progress Assessment 9/5/2017   Primary Mode of Expression Verbal Verbal   Score 6 7   Comments Expresses complex, abstract ideas with extra time, mild difficulty due to dysarthria Mild dysarthria      SOCIAL INTERACTION/ PRAGMATICS Initial Assessment Weekly Progress Assessment 9/5/2017   Score 6 7   Comments Patient tearful (appropriate) end of session, provided encouragement and education pleasant and agreeable      PROBLEM SOLVING Initial Assessment Weekly Progress Assessment 9/5/2017   Score 6 7 Comments Makes decisions with mild difficulty or solves complex problems with extra time. WNL      MEMORY Initial Assessment Weekly Progress Assessment 9/5/2017   Score 6 7   Comments Recognizes people often seen, daily routines, and executes requests with additional time  WNL      EATING Initial Assessment Weekly Progress Assessment 9/5/2017   Functional Level 5 7   Comments Setup/cues for container management Independent utilizing bobby techniques      GROOMING Initial Assessment Weekly Progress Assessment 9/5/2017   Functional Level 3 7   Tasks completed by patient Washed face, Brushed hair, Brushed teeth (Applied deodorant) Brushed hair;Brushed teeth; Washed hands; Washed face   Comments Assist to apply toothpaste to toothbrush and apply deodorant to R underarm Independent seated at sink      BATHING Initial Assessment Weekly Progress Assessment 9/5/2017   Functional Level 3 6   Body parts patient bathed Thigh, right, Thigh, left, Fadia area, Chest, Arm, right, Arm, left, Abdomen Abdomen;Arm, right;Arm, left; Buttocks; Chest;Lower leg and foot, left; Lower leg and foot, right;Fadia area; Thigh, left; Thigh, right   Comments Assist to bathe lower BLEs and bottom, blocking right knee as patient squats to have bottom bathed Seated on TTB, using long handled sponge      TUB/SHOWER TRANSFER INDEPENDENCE Initial Assessment Weekly Progress Assessment 9/5/2017   Score 3 5   Comments Squat/stand pivot SPT without a device      UPPER BODY DRESSING/UNDRESSING Initial Assessment Weekly Progress Assessment 9/5/2017   Functional Level 3 4   Items applied/Steps completed Pullover (4 steps) Bra (3 steps); Pullover (4 steps)   Comments Assist to thread RUE through shirt and to assist down trunk Assist to finish bra down back/R trunk      LOWER BODY DRESSING/UNDRESSING Initial Assessment Weekly Progress Assessment 9/5/2017   Functional Level 1 4   Items applied/Steps completed Elastic waist pants (3 steps), Shoe, left (1 step), Shoe, right (1 step), Sock, left (1 step), Sock, right (1 step), Underpants (3 steps) Sock, right (1 step); Shoe, right (1 step); Sock, left (1 step); Shoe, left (1 step); Underpants (3 steps); Elastic waist pants (3 steps)   Comments Dependent for LB dressing at this time Assist to finish underwear on/off R hip, start R foot into shoe      TOILETING Initial Assessment Weekly Progress Assessment 9/5/2017   Functional Level 0 4   Comments Activity did not occur, patient reports continent of bowel and bladder Assist to finish underwear on/off R hip      TOILET TRANSFER INDEPENDENCE Initial Assessment Weekly Progress Assessment 9/5/2017   Transfer score 0 5   Comments Did not occur SPT without device      Plan of Care: Please see Care Plan for updated LTGs. Family Training:  Completed with patient's mother and children present on 9/1/17.     Summary of Progress: Patient demonstrates progress with ADL and functional transfers since admission, see above for details. Continues to progress with LB dressing as dynamic standing balance progresses and use of soft sock aid. Continues to be limited by increased tone in RUE, decreased strength and coordination    Past Medical History:   Diagnosis Date    Generalized anxiety disorder     GERD (gastroesophageal reflux disease)     Hypertension     Insomnia, unspecified     Iron deficiency anemia 8/23/2013    Left sided lacunar stroke ([facility] [address] 08/2017    Mitral valve prolapse 1993    Obesity (BMI 30-39. 9)     Prediabetes 7/28/2015 12/2/14 HgbA1C 5.9      Past Surgical History:   Procedure Laterality Date    HX ENDOSCOPY  10/1/04    Normal per Dr. Norah Pettit      Family History   Problem Relation Age of Onset    Cancer Mother      cervical    Hypertension Mother     Cancer Sister      cervical    Hypertension Brother 32    Diabetes Maternal Grandmother     Hypertension Other      strong FH      Social History   Substance Use Topics    Smoking status: Never Smoker    Smokeless tobacco: Never Used    Alcohol use No     Prior to Admission medications    Medication Sig Start Date End Date Taking? Authorizing Provider   atorvastatin (LIPITOR) 40 mg tablet Take 1 Tab by mouth daily. 9/5/17  Yes Kera Mcfadden MD   diazePAM (VALIUM) 5 mg tablet Take 0.5 Tabs by mouth every six (6) hours as needed. Max Daily Amount: 10 mg. Indications: MUSCLE SPASM 9/5/17  Yes Kera Mcfadden MD   tiZANidine (ZANAFLEX) 4 mg tablet Take 1 Tab by mouth three (3) times daily. 9/5/17  Yes Kera Mcfadden MD   traZODone (DESYREL) 50 mg tablet Take 1 Tab by mouth nightly as needed for Sleep. Indications: insomnia associated with depression 9/5/17  Yes Sandi Meier MD   valsartan (DIOVAN) 160 mg tablet Take 1 Tab by mouth daily. 9/5/17  Yes Kera Mcfadden MD   hydroCHLOROthiazide (MICROZIDE) 12.5 mg capsule Take 2 Caps by mouth daily. 9/5/17  Yes Kera Mcfadden MD   potassium chloride SR (K-TAB) 20 mEq tablet Take 1 Tab by mouth two (2) times a day. 9/5/17  Yes Kera Mcfadden MD   acetaminophen (TYLENOL) 325 mg tablet Take 2 Tabs by mouth every six (6) hours as needed for Pain or Fever (headache). 8/8/17   Gisell Bui MD   aspirin delayed-release 325 mg tablet Take 1 Tab by mouth daily. 8/8/17   Gisell Bui MD   atorvastatin (LIPITOR) 40 mg tablet Take 1 Tab by mouth daily. 8/8/17   Gisell Bui MD   enoxaparin (LOVENOX) 40 mg/0.4 mL 0.4 mL by SubCUTAneous route daily. 8/8/17   Gisell Bui MD   irbesartan-hydrochlorothiazide (AVALIDE) 150-12.5 mg per tablet Take 1 Tab by mouth daily. 10/5/16   Josue Kate MD   escitalopram oxalate (LEXAPRO) 20 mg tablet Take 1 Tab by mouth daily.  10/5/16   Josue Kate MD     Allergies   Allergen Reactions    Erythromycin Nausea and Vomiting        Lab Review:   Recent Results (from the past 72 hour(s))   CREATININE Collection Time: 09/04/17  8:29 AM   Result Value Ref Range    Creatinine 0.73 0.6 - 1.0 MG/DL   HEMOGLOBIN    Collection Time: 09/04/17  8:29 AM   Result Value Ref Range    HGB 12.0 11.7 - 15.4 g/dL   PLATELET COUNT    Collection Time: 09/04/17  8:29 AM   Result Value Ref Range    PLATELET 588 381 - 332 K/uL       Functional Assessment:  Gross Assessment  AROM:  (RLE AAROM WFL) (08/29/17 1200)  PROM:  (RLE WFL) (08/29/17 1200)  Strength:  (RLE ankle 1/5,knee ext 4/5 flex -3/5,hip-3/5 to +2/5) (08/29/17 1200)  Coordination:  (RLE impaired, flexor synergy noted with AROM and gait) (08/29/17 1200)  Tone: Abnormal (Hypertonia RLE w/ clonus with achilles stretch reflex) (08/29/17 1200)  Sensation: Intact (RLE) (08/29/17 1200)       Balance  Sitting - Static: Good (unsupported) (09/05/17 1400)  Sitting - Dynamic: Good (unsupported) (09/05/17 1400)  Standing - Static: Good (09/05/17 1400)  Standing - Dynamic : Impaired (09/05/17 1400)           Toileting  Cues: Physical assistance for pants up;Physical assistance for pants down (09/01/17 1243)  Adaptive Equipment: Other (comment) ([facility]) (09/01/17 1243)         [place] Rank Fall Risk Assessment:  [place] Rank Fall Risk  Mobility: Ambulates or transfers with assist devices or assistance/unsteady gait (09/05/17 0900)  Mobility Interventions: Utilize walker, cane, or other assitive device (09/05/17 0900)  Mentation: Alert, oriented x 3 (09/05/17 0900)  Medication: Patient receiving anticonvulsants, sedatives(tranquilizers), psychotropics or hypnotics, hypoglycemics, narcotics, sleep aids, antihypertensives, laxatives, or diuretics (09/05/17 0900)  Medication Interventions: Patient to call before getting OOB (09/05/17 0900)  Elimination: Needs assistance with toileting (09/05/17 0900)  Elimination Interventions: Call light in reach; Patient to call for help with toileting needs (09/05/17 0900)  Prior Fall History: No (09/05/17 0900)  Total Score: 3 (09/05/17 0900)  Standard Fall Precautions: Yes (09/04/17 0910)  High Fall Risk: Yes (09/05/17 0900)     Speech Assessment:         Ambulation:  Gait  Distance (ft): 150 Feet (ft) (09/05/17 1400)  Assistive Device: Cane, quad;Orthotic device (custom right AFO) (09/05/17 1400)  Rail Use: Left  (09/05/17 1400)     Visit Vitals    /66    Pulse 95    Temp 97.9 °F (36.6 °C)    Resp 16    SpO2 94%    Breastfeeding No      Intake and Output:    09/03 1901 - 09/05 0700  In: 12 [P.O.:960]  Out: -     Discharge Exam:  General: Alert and age appropriately oriented. No acute cardio respiratory distress. HEENT: Normocephalic,no scleral icterus  Oral mucosa moist without cyanosis   Lungs: Clear to auscultation  bilaterally. Respiration even and unlabored   Heart: Regular rate and rhythm, S1, S2   No  murmurs, clicks, rub or gallops   Abdomen: Soft, non-tender, nondistended. Bowel sounds present. No organomegaly. Genitourinary: Benign . Neuromuscular:     Spastic right hemiplegia  Right sided weakness unchanged x increased extension of toes voluntarily but only trace DF of ankle/foot  PROM RUE with slight catch at elbow  RUE shoulder flexion 1, EF1, EE 1,  2-, finger ext 0/5  slt increase tone with PROM  RLE; hip flexion 3+, KE 3, DF 0, PF 1  Sensation intact   Skin/extremity: No rashes, no erythema. No calf tenderness BLE  No edema       Problem List as of 9/5/2017  Date Reviewed: 1/26/2017          Codes Class Noted - Resolved    Stroke (cerebrum) ([facility] ICD-10-CM: I63.9  ICD-9-CM: 434.91  8/8/2017 - Present        Left sided lacunar stroke [facility]) ICD-10-CM: I63.9  ICD-9-CM: 434.91  8/4/2017 - Present    Overview Signed 8/6/2017  7:44 PM by Josue Kate MD     8/2017             Weakness of right side of body ICD-10-CM: R53.1  ICD-9-CM: 728.87  8/3/2017 - Present        Essential hypertension ICD-10-CM: I10  ICD-9-CM: 401.9  10/5/2016 - Present        Obesity (BMI 30-39. 9) ICD-10-CM: E66.9  ICD-9-CM: 278.00  Unknown - Present Prediabetes ICD-10-CM: R73.03  ICD-9-CM: 790.29  7/28/2015 - Present        Generalized anxiety disorder ICD-10-CM: F41.1  ICD-9-CM: 300.02  Unknown - Present        Insomnia, unspecified ICD-10-CM: G47.00  ICD-9-CM: 780.52  Unknown - Present        Mitral valve prolapse ICD-10-CM: I34.1  ICD-9-CM: 424.0  Unknown - Present        Iron deficiency anemia ICD-10-CM: D50.9  ICD-9-CM: 280.9  8/23/2013 - Present    Overview Signed 10/5/2016  2:49 PM by Wiliam Wiley MD     S/p iron infusion x 2             RESOLVED: GERD (gastroesophageal reflux disease) ICD-10-CM: K21.9  ICD-9-CM: 530.81  Unknown - 10/5/2016        RESOLVED: Mixed hyperlipidemia ICD-10-CM: E78.2  ICD-9-CM: 272.2  Unknown - 10/5/2016        RESOLVED: Hypertension ICD-10-CM: I10  ICD-9-CM: 401.9  Unknown - 10/5/2016              Discharge Instructions:   1. Diet: Cardiac Diet  2. Activity: PT/OT Eval and Treat as outpt; no driving  3. Wound Care: None needed  Current Discharge Medication List      START taking these medications    Details   !! atorvastatin (LIPITOR) 40 mg tablet Take 1 Tab by mouth daily. Qty: 30 Tab, Refills: 6    Associated Diagnoses: Transient cerebral ischemia, unspecified type; Chest pain, unspecified type; Essential hypertension; Weakness of right side of body; Generalized anxiety disorder; Iron deficiency anemia, unspecified iron deficiency anemia type; Obesity (BMI 30-39.9); Left sided lacunar stroke ([facility] [address] Insomnia, unspecified; Mitral valve prolapse; Prediabetes      diazePAM (VALIUM) 5 mg tablet Take 0.5 Tabs by mouth every six (6) hours as needed. Max Daily Amount: 10 mg. Indications: MUSCLE SPASM  Qty: 60 Tab, Refills: 1    Associated Diagnoses: Cerebrovascular accident (CVA), unspecified mechanism ([address]      tiZANidine (ZANAFLEX) 4 mg tablet Take 1 Tab by mouth three (3) times daily.   Qty: 90 Tab, Refills: 6    Associated Diagnoses: C[facility] ([facility]), unspecified mechanism [facility]      traZODone (DESYREL) 50 mg tablet Take 1 Tab by mouth nightly as needed for Sleep. Indications: insomnia associated with depression  Qty: 30 Tab, Refills: 2    Associated Diagnoses: Cerebrovascular accident (CVA), unspecified mechanism ([address]      valsartan (DIOVAN) 160 mg tablet Take 1 Tab by mouth daily. Qty: 30 Tab, Refills: 6      hydroCHLOROthiazide (MICROZIDE) 12.5 mg capsule Take 2 Caps by mouth daily. Qty: 30 Cap, Refills: 6      potassium chloride SR (K-TAB) 20 mEq tablet Take 1 Tab by mouth two (2) times a day. Qty: 60 Tab, Refills: 6       !! - Potential duplicate medications found. Please discuss with provider. CONTINUE these medications which have NOT CHANGED    Details   acetaminophen (TYLENOL) 325 mg tablet Take 2 Tabs by mouth every six (6) hours as needed for Pain or Fever (headache). Qty: 30 Tab, Refills: 0      aspirin delayed-release 325 mg tablet Take 1 Tab by mouth daily. Qty: 30 Tab, Refills: 0      !! atorvastatin (LIPITOR) 40 mg tablet Take 1 Tab by mouth daily. Qty: 30 Tab, Refills: 0      irbesartan-hydrochlorothiazide (AVALIDE) 150-12.5 mg per tablet Take 1 Tab by mouth daily. Qty: 30 Tab, Refills: 5    Associated Diagnoses: Essential hypertension      escitalopram oxalate (LEXAPRO) 20 mg tablet Take 1 Tab by mouth daily. Qty: 30 Tab, Refills: 5    Associated Diagnoses: Generalized anxiety disorder       !! - Potential duplicate medications found. Please discuss with provider. STOP taking these medications       enoxaparin (LOVENOX) 40 mg/0.4 mL Comments:   Reason for Stopping:               Rehabilitation Management:   1. Devices: per Therapy recommendations; quad cane, transfer tub bench, long handles sponge, reacher. She has a custom fit R AFO per [employer]. She is also to wear her right resting hand splint at [facility].   2. Consult: Rehab team including PT, OT, recreational therapy, and ; ST followed but has since signed off    Disposition: home with outpt PT  And OT    Follow-up with Dr. Claudia Gaytan and myself as ordered. Will need outpt sleep study  Instructions; please seek medical assistance if you develop altered mental status, worsening or new weakness, difficulty speaking or swallowing or chest pain or shortness of breath.     Signed By: Dominga Cote MD     September 5, 2017

## 2017-09-05 NOTE — PROGRESS NOTES
Problem: Falls - Risk of  Goal: *Absence of Falls  Document Rashmi Fall Risk and appropriate interventions in the flowsheet.    Outcome: Progressing Towards Goal  Fall Risk Interventions:  Mobility Interventions: Utilize walker, cane, or other assitive device           Medication Interventions: Patient to call before getting OOB     Elimination Interventions: Call light in reach

## 2017-09-06 VITALS
DIASTOLIC BLOOD PRESSURE: 75 MMHG | RESPIRATION RATE: 18 BRPM | OXYGEN SATURATION: 98 % | SYSTOLIC BLOOD PRESSURE: 118 MMHG | HEART RATE: 80 BPM | TEMPERATURE: 97.6 F

## 2017-09-06 PROCEDURE — 99239 HOSP IP/OBS DSCHRG MGMT >30: CPT | Performed by: PHYSICAL MEDICINE & REHABILITATION

## 2017-09-06 PROCEDURE — 97535 SELF CARE MNGMENT TRAINING: CPT

## 2017-09-06 PROCEDURE — 97530 THERAPEUTIC ACTIVITIES: CPT

## 2017-09-06 PROCEDURE — 97116 GAIT TRAINING THERAPY: CPT

## 2017-09-06 PROCEDURE — 74011250637 HC RX REV CODE- 250/637: Performed by: PHYSICAL MEDICINE & REHABILITATION

## 2017-09-06 PROCEDURE — 97110 THERAPEUTIC EXERCISES: CPT

## 2017-09-06 RX ORDER — POTASSIUM CHLORIDE 20 MEQ/1
20 TABLET, EXTENDED RELEASE ORAL 2 TIMES DAILY
Status: DISCONTINUED | OUTPATIENT
Start: 2017-09-06 | End: 2017-09-06 | Stop reason: HOSPADM

## 2017-09-06 RX ADMIN — POTASSIUM CHLORIDE 20 MEQ: 20 TABLET, EXTENDED RELEASE ORAL at 08:29

## 2017-09-06 RX ADMIN — ATORVASTATIN CALCIUM 40 MG: 40 TABLET, FILM COATED ORAL at 08:29

## 2017-09-06 RX ADMIN — TIZANIDINE 4 MG: 2 TABLET ORAL at 08:29

## 2017-09-06 RX ADMIN — ESCITALOPRAM OXALATE 20 MG: 10 TABLET ORAL at 08:29

## 2017-09-06 RX ADMIN — PANTOPRAZOLE SODIUM 40 MG: 40 TABLET, DELAYED RELEASE ORAL at 06:24

## 2017-09-06 RX ADMIN — ASPIRIN 325 MG ORAL TABLET 325 MG: 325 PILL ORAL at 08:29

## 2017-09-06 RX ADMIN — FERROUS SULFATE TAB 325 MG (65 MG ELEMENTAL FE) 325 MG: 325 (65 FE) TAB at 08:29

## 2017-09-06 RX ADMIN — HYDROCHLOROTHIAZIDE: 25 TABLET ORAL at 08:29

## 2017-09-06 NOTE — PROGRESS NOTES
Care Management Interventions  PCP Verified by CM: Yes (Dr Richard Castro)  Mode of Transport at Discharge: Other (see comment) (family car)  Transition of Care Consult (CM Consult): Other (Ooutpatient PT and OT )  Discharge Durable Medical Equipment: Yes (WC, Cushion, quad cane [employer])  Physical Therapy Consult: Yes  Occupational Therapy Consult: Yes  Speech Therapy Consult: Yes  Current Support Network: Lives with Spouse, Own Home, Other, Family Lives Nearby (22 yo son lives in home.  )  Confirm Follow Up Transport: Family  Plan discussed with Pt/Family/Caregiver: Yes  Freedom of Choice Offered: Yes  Discharge Location  Discharge Placement: Home with outpatient services ([place] [id number])    Pt to discharge to home today with family  and outpatient PT and OT at [place] [address].  Appointments scheduled and entered into AVS.  Pt will initially goes to mother's home with family available to assist.  All dme delivered to pt in hospital.. Reviewed community resources with pt - Voc Rehab, Stroke support group, etc.

## 2017-09-06 NOTE — PROGRESS NOTES
Problem: Falls - Risk of  Goal: *Absence of Falls  Document Rashmi Fall Risk and appropriate interventions in the flowsheet.    Outcome: Resolved/Met Date Met:  09/06/17  Fall Risk Interventions:  Mobility Interventions: Patient to call before getting OOB, Utilize walker, cane, or other assitive device           Medication Interventions: Patient to call before getting OOB     Elimination Interventions: Call light in reach, Patient to call for help with toileting needs

## 2017-09-06 NOTE — DISCHARGE INSTRUCTIONS
DISCHARGE SUMMARY from Nurse    The following personal items are in your possession at time of discharge:    Dental Appliances: None  Visual Aid: None     Home Medications: Sent home  Jewelry: None  Clothing: Footwear  Other Valuables: Cell Phone             PATIENT INSTRUCTIONS:    After general anesthesia or intravenous sedation, for 24 hours or while taking prescription Narcotics:  · Limit your activities  · Do not drive and operate hazardous machinery  · Do not make important personal or business decisions  · Do  not drink alcoholic beverages  · If you have not urinated within 8 hours after discharge, please contact your surgeon on call. Report the following to your surgeon:  · Excessive pain, swelling, redness or odor of or around the surgical area  · Temperature over 100.5  · Nausea and vomiting lasting longer than 4 hours or if unable to take medications  · Any signs of decreased circulation or nerve impairment to extremity: change in color, persistent  numbness, tingling, coldness or increase pain  · Any questions        What to do at Home:  Recommended activity : Follow therapist's recommendations for safety    *  Please give a list of your current medications to your Primary Care Provider. *  Please update this list whenever your medications are discontinued, doses are      changed, or new medications (including over-the-counter products) are added. *  Please carry medication information at all times in case of emergency situations. These are general instructions for a healthy lifestyle:    No smoking/ No tobacco products/ Avoid exposure to second hand smoke    Surgeon General's Warning:  Quitting smoking now greatly reduces serious risk to your health.     Obesity, smoking, and sedentary lifestyle greatly increases your risk for illness    A healthy diet, regular physical exercise & weight monitoring are important for maintaining a healthy lifestyle    You may be retaining fluid if you have a history of heart failure or if you experience any of the following symptoms:  Weight gain of 3 pounds or more overnight or 5 pounds in a week, increased swelling in our hands or feet or shortness of breath while lying flat in bed. Please call your doctor as soon as you notice any of these symptoms; do not wait until your next office visit. Recognize signs and symptoms of STROKE:    F-face looks uneven    A-arms unable to move or move unevenly    S-speech slurred or non-existent    T-time-call 911 as soon as signs and symptoms begin-DO NOT go       Back to bed or wait to see if you get better-TIME IS BRAIN. Warning Signs of HEART ATTACK     Call 911 if you have these symptoms:   Chest discomfort. Most heart attacks involve discomfort in the center of the chest that lasts more than a few minutes, or that goes away and comes back. It can feel like uncomfortable pressure, squeezing, fullness, or pain.  Discomfort in other areas of the upper body. Symptoms can include pain or discomfort in one or both arms, the back, neck, jaw, or stomach.  Shortness of breath with or without chest discomfort.  Other signs may include breaking out in a cold sweat, nausea, or lightheadedness. Don't wait more than five minutes to call 911 - MINUTES MATTER! Fast action can save your life. Calling 911 is almost always the fastest way to get lifesaving treatment. Emergency Medical Services staff can begin treatment when they arrive -- up to an hour sooner than if someone gets to the hospital by car. The discharge information has been reviewed with the patient and family. The patient and family verbalized understanding. Discharge medications reviewed with the patient and family and appropriate educational materials and side effects teaching were provided.

## 2017-09-06 NOTE — PROGRESS NOTES
Discharge instructions given to patient with mother and son present. Follow up appointments made and  phone #s provided. Prescriptions given, medications discussed. Per Dr. Den Hooker, patient is to take Diovan and HCTZ, not to take Avalide. Patient voices understanding.

## 2017-09-06 NOTE — PROGRESS NOTES
PHYSICAL THERAPY DISCHARGE SUMMARY  TIME   TIME    Precautions at discharge:  Other (comment) (fall precautions)    Problem List:    Decreased strength L LE  [x]     Decreased strength trunk/core  [x]     Decreased AROM   [x]     Decreased PROM  []     Decreased balance sitting  []     Decreased balance standing  [x]     Decreased endurance  [x]     Pain  []       Functional Limitations:   Decreased independence with bed mobility  [x]     Decreased independence with functional transfers  [x]     Decreased independence with ambulation  [x]     Decreased independence with stair negotiation  [x]            Outcome Measures:      MMT Initial Asssessment MMT influenced by abnormal tone in RLE, hyperactive reflexes with spasticity and flexor synergy   Right Lower Extremity Left Lower Extremity   Hip Flexion 2+ 5   Knee Extension 1 5   Knee Flexion 1 5   Ankle Dorsiflexion 0 5      MMT Discharge Assessment   Right Lower Extremity Left Lower Extremity   Hip Flexion 3- 5   Knee Extension 4 (supine SAQ 45 to 0,sitting LAQ 90 to 45 -2/5) 5   Knee Flexion 2 5   Ankle Dorsiflexion 2- 5   0/5 No palpable muscle contraction  1/5 Palpable muscle contraction, no joint movement  2-/5 Less than full range of motion in gravity eliminated position  2/5 Able to complete full range of motion in gravity eliminated position  2+/5 Able to initiate movement against gravity  3-/5 More than half but not full range of motion against gravity  3/5 Able to complete full range of motion against gravity  3+/5 Completes full range of motion against gravity with minimal resistance  4-/5 Completes full range of motion against gravity with minimal-moderate resistance  4/5 Completes full range of motion against gravity with moderate resistance  4+/5 Completes full range of motion against gravity with moderate-maximum resistance  5/5 Completes full range of motion against gravity with maximum resistance     AROM: LLE WFL, RLE AAROM WFL, AROM limited and influenced by flexor synergy pattern    FIM SCORES Initial Assessment Discharge Assessment   Bed/Chair/Wheelchair Transfers 3 6   Wheelchair Mobility  (Unable to assess due to patient's height and available w/c) 6   Walking [place] 1 5   Steps/Stairs  (NT) 2   PRIMARY MODE OF LOCOMOTION: wheelchair  Please see [facility] Interdisciplinary Eval: Coordination/Balance Section for details regarding FIM score description. BED/CHAIR/WHEELCHAIR TRANSFERS Initial Assessment Discharge Assessment   Rolling Right 6 (Modified independent) (using bed rail) 6 (Modified independent)   Rolling Left 4 (Minimal assistance) (cues to attend to RUE and RUE positioning) 6 (Modified independent)   Supine to Sit 3 (Moderate assistance) (using bed rail) 6 (Modified independent)   Sit to Stand Minimal assistance (from bed and w/c) Modified independent   Sit to Supine 4 (Minimal assistance) 6 (Modified independent)   Transfer Assist Score 3 6   Transfer Type SPT without device (bed to w/c to left and w/c to bed with mod assist to her rig) SPT without device (to her left, Modified independent with SPT with QC to right)   Comments Tendency to be impulsive at times during transfers and bed mobility requiring cues for hemiplegic body mechanics, cues to attend to right side Increased time and effort to complete bed mobility using bobby plegic body mechanics, Improved LE symmetrical weight bearing during sit<>stand.    Car Transfer Not tested Supervision   Car Type rehab car rehab car       [facility] MOBILITY/MANAGEMENT Initial Assessment Discharge Assessment   Able to Propel 0 feet 150 feet (using LUE and LLE)   Functional Level  (Unable to assess due to patient's height and available w/c) 6   Curbs/ramps assistance required 0 (Not tested) 4 (Minimal assistance) (up/down 10 ft ramp using LUE and LLE)   Wheelchair set up assistance required 0 (Not tested) 6 (Modified independent)   Wheelchair management Manages left brake, Manages right brake Manages left brake;Manages right brake;Manages right footrest       WALKING INDEPENDENCE Initial Assessment Discharge Assessment   Assistive device Other (comment) (hand rail on left, ace wrap for left DF assist) Cane, quad;Orthotic device (custom dynamic right AFO, right hand splint)   Ambulation assistance - level surface 3 (Moderate assistance) (max assist to advance and stabilize RLE) 5 (Supervision)   Distance 20 Feet (ft) 200 Feet (ft)   Functional Level 1 5   Comments slow start/stop step to hemiplegic gait pattern leading leading with RLE and stepping to LLE slow start/stop partial step through hemipleigc gait pattern leading with RLE, decreased ankle PF and knee flexion at terminal stance, decreased step clearance RLE, decreased knee ext due to hamstring spasticity at initial contact. Ambulation assistance - unlevel surface 0 (Not tested) 4 (Contact guard assistance)       STEPS/STAIRS Initial Assessment Discharge Assessment   Steps/Stairs ambulated 0 4   Rail Use Left  Left    Functional Level  (NT) 2 SBA   Comments unable to ambulate up/down steps at this time secondary to extent of right hemiparesis single step at a time leading up with LLE and down with RLE, RLE hip hiking and circumduction going up steps and excessive RLE adduction going down steps.  Increased time and effort to complete   Curbs/Ramps 0 (Not tested) 5 (Stand-by assistance) (up/down 20 ft ramp with quad cane, right AFO )     QUALITY INDICATOR ASSIST COMMENTS   Walk 10 feet With quad cane right AFO and supervision    Walk 50 feet with 2 turns With quad cane right AFO and supervision    Walk 150 feet With quad cane right AFO and supervision    Walk 10 feet on uneven  With quad cane right AFO and CGA    1 step/curb SBA with quad cane and right AFO    4 steps With left hand rail and AFO, SBA    12 steps Unable due to right hemiparesis     object SBA    Wheel 50' w/2 turns Modified independent    Wheel 150' Modified independent PHYSICAL THERAPY PLAN OF CARE    LTGs: Patient met all goals per eval and reassessments. Refer to care plan for details    Pt would benefit from continued skilled physical therapy in order to improve independent functional mobility within the home with use of least restrictive device. Interventions may include range of motion (AROM, PROM B LE/trunk), motor function (B LE/trunk strengthening/coordination), activity tolerance (vitals, oxygen saturation levels), bed mobility training, balance activities, gait training (progressive ambulation program), and functional transfer training. HEP handout:patient's daughter videoed HEP including passive heel cord stretch, bridging with hip ADD isometric, Bridging with hip ABD with red t-band, Right hip ABD with red t-band, left hip ABD with red t-band, single limb bridging RLE, hip+knee flex<>ext with manual resistance and SAQ    Pt to be discharged 09/06/17 with assistance provided by family. family training completed 09/04/17 and 09/01/17 with daughter,son and patient's mother  Therapy Recommendations upon discharge: Outpatient PT   Equipment needs at discharge: [employer] provided QC and wheelchair, [employer] provided AFO      Please see [facility]; Interdisciplinary Eval, Care Plan, and Patient Education for further information regarding physical therapy discharge summary and plan of care.      Kaykay Cruz, PT  9/6/2017

## 2017-09-06 NOTE — PROGRESS NOTES
OT DISCHARGE REPORT    Time In: 9295  Time Out: 0830    COMPREHENSION MODE Initial Assessment Discharge Assessment 9/6/2017   Score 7 7     EXPRESSION Initial Assessment Discharge Assessment 9/6/2017   Primary Mode of Expression Verbal Verbal   Score 6 7   Comments Expresses complex, abstract ideas with extra time, mild difficulty due to dysarthria [facility]     SOCIAL INTERACTION/ PRAGMATICS Initial Assessment Discharge Assessment 9/6/2017   Score 6 7   Comments Patient tearful (appropriate) end of session, provided encouragement and education Pleasant and agreeable     PROBLEM SOLVING Initial Assessment Discharge Assessment 9/6/2017   Score 6 7   Comments Makes decisions with mild difficulty or solves complex problems with extra time. WNL     MEMORY Initial Assessment Discharge Assessment 9/6/2017   Score 6 7   Comments Recognizes people often seen, daily routines, and executes requests with additional time  WNL     EATING Initial Assessment Discharge Assessment 9/6/2017   Functional Level 5 7   Comments Setup/cues for container management Independent utilizing bobby techniques     GROOMING Initial Assessment Discharge Assessment 9/6/2017   Functional Level 3 7   Tasks completed by patient Washed face, Brushed hair, Brushed teeth (Applied deodorant) Brushed hair;Brushed teeth; Washed face; Washed hands   Comments Assist to apply toothpaste to toothbrush and apply deodorant to R underarm Seated at sink     BATHING Initial Assessment Discharge Assessment 9/6/2017   Functional Level 3 6   Body parts patient bathed Thigh, right, Thigh, left, [place] area, Chest, Arm, right, Arm, left, Abdomen Abdomen;Arm, left;Arm, right;Buttocks; Chest;Lower leg and foot, left; Lower leg and foot, right;[place]adia area; Thigh, left; Thigh, right   Comments Assist to bathe lower BLEs and bottom, blocking right knee as patient squats to have bottom bathed Seated on TTB using long handled sponge     TUB/SHOWER TRANSFER INDEPENDENCE Initial Assessment Discharge Assessment 9/6/2017   Score 3 5  Type of Shower: Shower  Adaptive  Equipment:Tub transfer bench and wheelchiar   Comments Squat/stand pivot SPT without device     UPPER BODY DRESSING/UNDRESSING Initial Assessment Discharge Assessment 9/6/2017   Functional Level 3 7   Items applied/Steps completed Pullover (4 steps) Pullover (4 steps)   Comments Assist to thread RUE through shirt and to assist down trunk Independent for overhead shirt     LOWER BODY DRESSING/UNDRESSING Initial Assessment Discharge Assessment 9/6/2017   Functional Level 1 5   Items applied/Steps completed Elastic waist pants (3 steps), Shoe, left (1 step), Shoe, right (1 step), Sock, left (1 step), Sock, right (1 step), Underpants (3 steps) Sock, left (1 step); Sock, right (1 step); Elastic waist pants (3 steps)   Comments Dependent for LB dressing at this time Supervision for standing balance as patient manages pants over hips, use of reacher and soft sock aid for R sock     TOILETING Initial Assessment Discharge Assessment 9/6/2017   Functional Level 0 5   Comments Activity did not occur, patient reports continent of bowel and bladder Supervision for standing balance as patient manages pants over hips     TOILET TRANSFER INDEPENDENCE Initial Assessment Discharge Assessment 9/6/2017   Transfer score 0 6   Comments Did not occur SPT without device wheelchair <> BSC     Plan of Care: Please see Care Plan for progress towards goals during stay  Precautions at Discharge: Falls and R hemiparesis  Discharge Location: Private Residence (mother's home)  Safety/Supervision Recommendations/Functional Level: Supervision for dynamic standing balance, shower transfer <> TTB, and ambulating with quad cane  Family Training: Completed 9/1/17 with patient's mother and children present. Family verbalize and demonstrate understanding of PROM home stretching program for patient's RUE and patient's functional status/assist level for discharge.     Recommended Continuing Therapy: Other (comment); Outpatient OT (Supervision for standing balance/ambulating with quad cane)  Residual Deficits:  R hemiparesis, decreased dynamic standing balance, functional mobility, RUE flexor tone  Progress over LOS: Patient demonstrates great progress with ADL and functional mobility since admission to [facility], see above for FIM details. Patient continues with increased flexor tone in RUE (more pronounced since admission/beginning of stay) and dense RUE hemiparesis, resulting in patient completing all functional tasks utilizing bobby techniques with non-dominant LUE. Patient demonstrated minimal progress with RUE strength during [facility] stay, 2-/5 strength for elbow flexion and shoulder shrug noted at discharge. Patient provided self-ROM HEP and caregiver PROM stretching HEP for discharge. Patient and family verbalize and demonstrate understanding for ROM HEPs for discharge. Patient and family provided printed information for acquiring TTB for discharge and completed transfer training. Verbalize and demonstrate understanding for TTB setup and safe transfer technique. Patient completed Stroke Education during [facility] stay and verbalized understanding. Provided printed information for Stroke Education to patient. Summary of Session:     S: \"I'm Theodoro Large come dancing back in here to visit y'all! \" Agreeable to therapy. Focus of session was on Discharge ADL evaluation. Patient was able to transfer bed to wheelchair SPT without a device with modified independence, wheelchair <> TTB SPT with supervision without a device. Pain not indicated. Collaborated with PTJose and confirmed patient is ready for discharge. Spoke with Oksana Wilson at [facility] who reports he will fit patient with dynamic R hand splint while she is being seen in outpatient therapy. Patient ended session in wheelchair with PTJose.      Efe Nova, OTR/L

## 2017-09-10 PROBLEM — I63.9 STROKE (CEREBRUM) (HCC): Status: RESOLVED | Noted: 2017-08-08 | Resolved: 2017-09-10

## 2017-09-11 ENCOUNTER — HOSPITAL ENCOUNTER (OUTPATIENT)
Dept: PHYSICAL THERAPY | Age: 42
Discharge: HOME | End: 2017-09-11
Payer: COMMERCIAL

## 2017-09-11 PROCEDURE — 97166 OT EVAL MOD COMPLEX 45 MIN: CPT

## 2017-09-11 NOTE — PROGRESS NOTES
Zoraida pitt  : 1975 [facility]  [facility], [address] [place] [address], [address]  [phone]   [phone]         OUTPATIENT OCCUPATIONAL THERAPY: Initial Assessment 2017    ICD-10: Treatment Diagnosis:   Hemiplegia and hemiparesis following cerebral infarction affecting right dominant side (I69.351)  Precautions/Allergies:   Erythromycin   Fall Risk Score: 3 (? 5 = High Risk)  MD Orders: OT evaluate and treat  MEDICAL/REFERRING DIAGNOSIS:   Stroke (cerebrum) ([facility] [address] [I63.9]   DATE OF ONSET: 2017   REFERRING PHYSICIAN: Kera Ellis*  RETURN PHYSICIAN APPOINTMENT: 2017     INITIAL ASSESSMENT:  Ms. Demi pitt presents to outpatient occupational therapy services s/p CVA with R side hemiplegia. Pt arrived to therapy using quad cane for functional mobility. Pt is extremely pleasant and appears motivated to increase independence levels. Pt currently has no active R UE AROM, but does have some scapular movement present at this time. Pt does report pain in the R shoulder above 90 degrees of ROM, but no subluxation present at this time. Pt noted to have some increased tone in the R UE flexors. Pt receptive to education and verbalizes that she has been using techniques and suggestions that she learned in inpatient rehab. Pt has had no falls since returning home but does report some incontinence at times (stating this all started with the use of Zanaflex). Pt has minimal sensory deficits at this time. Pt does have some impaired midline orientation with functional transfers. Pt is currently functioning below baseline for ADL/functional transfers which impacts her overall quality of life. Pt will benefit from OT services to address stated goals and plan of care. PLAN OF CARE:   PROBLEM LIST:  1. Decreased Strength  2. Decreased ADL/Functional Activities  3. Decreased Transfer Abilities  4.  Decreased Ambulation Ability/Technique  5. Decreased Balance  6. Increased Pain  7. Decreased Activity Tolerance  8. Decreased Flexibility/Joint Mobility  9. Decreased [place] with Home Exercise Program INTERVENTIONS PLANNED:  1. Activities of daily living training  2. Adaptive equipment training  3. Balance training  4. Clothing management  5. Donning&doffing training  6. Manual therapy training  7. Modalities  8. Neuromuscular re-eduation  9. Splinting  10. Therapeutic activity  11. Therapeutic exercise   TREATMENT PLAN:  Effective Dates: 9/11/17 TO 12/11/17. Frequency/Duration: 2 times a week for 12 weeks  GOALS: (Goals have been discussed and agreed upon with patient.)  Short-Term Functional Goals: Time Frame:4- 6 weeks  1. Casandra Sexton will report decreased pain in the R shoulder from 5/10 to 3/10 with passive movement to decrease stiffness for daily activity. 2. Casandra Sexton will be 100% compliant with daily SROM exercises for improving R UE ROM to decrease risk for contractures. 3. Casandra Sexton will demonstrate ability to complete weightbearing activities in R UE with moderate facilitation to improve functional use for transfers. 4. Casandra Sexton will complete basic ADL with supervision to improve independence with functional tasks. 5. Casandra Sexton will tolerate 15 minutes of dynamic standing balance activity with CGA to decrease risk for falls with ADL and to improve activity tolerance. 6. Discharge Goals: Time Frame: 8-12 weeks   1. Casandra Sexton will have functional PROM of R shoulder to [facility] to decrease pain and stiffness in R dominant UE. 2. Casandra Sexton will demonstrate improved strength in the R dominant UE as evidenced by at least 2+ to 3-/5 strength for using R UE as functional assist.   3. Casandra Sexton will demonstrate ability to functionally grasp and release light objects with supervision and with use of R dominant hand.    4. Casandra Sexton will be modified independent with all basic ADL to improve overall quality of life. 5. Maryam Riddles will use R hand as functional assist with daily activities/transfers with supervision to improve independence with ADL. Rehabilitation Potential For Stated Goals: Good  Regarding [place] therapy, I certify that the treatment plan above will be carried out by a therapist or under their direction. Thank you for this referral,  Yris Lam, OT     Referring Physician Signature: Kera Elizabeth* _________________________  Date _________            The information in this section was collected on 9/11/17 (except where otherwise noted). OCCUPATIONAL PROFILE & HISTORY:   History of Present Injury/Illness (Reason for Referral):  Pt reports she was at work when she started feeling like she was going to pass out. Pt reports she didn't really stop working and the feeling wouldn't go away. It started at 2pm and left work at 3:30 pm. Pt felt like she was going to fall and pushed a buggy to her car and drove to her daughter's house. Pt then was brought over to the ER at [facility]. Progressively got worse with R sided weakness and woke up the next morning with full parlaysis of the R side. Reports her eyesight was decreasing and needing reading glasses. R eye is a little worse with the blurry vision. Pt wears to reading glasses. Pt using a wheelchair as less as possible. Using wheelchair when out and about. Pt using a quad cane around the home and has AFO to R LE. Tub transfer bench with CGA. Eats at the kitchen table. Pt stayed from Aug 8th- September 6th in inpatient rehab. Pt reports no falls at home. Pt getting dressed using techniques learned for inpatient rehab. Difficulty walking and carrying objects. Pt has some issues with Zanaflex and incontinence (she is unsure if that is related). Pt presents today for outpatient occupational therapy services.     Past Medical History/Comorbidities:   Ms. Lester Brewer  has a past medical history of Generalized anxiety disorder; GERD (gastroesophageal reflux disease); Hypertension; Insomnia, unspecified; Iron deficiency anemia (8/23/2013); Left sided lacunar stroke ([facility] [address] (08/2017); Mitral valve prolapse (1993); Obesity (BMI 30-39.9); and Prediabetes (7/28/2015). Ms. Radha Lopez  has a past surgical history that includes tubal ligation (1995) and endoscopy (10/1/04). Social History/Living Environment:   Lives with mom with her  because toilet/tub is lower with more open spaces. Pt also has a son. Pt has 24 hr supervision. Home Environment: Private residence  Wheelchair Ramp: Yes  One/Two Story Residence: One story  Current DME Used/Available at Home: Adaptive bathing aides, Adaptive dressing aides, Cane, quad, Commode, bedside, Transfer bench, Wheelchair (hand brace and AFO)  Tub or Shower Type: Tub/Shower combination  Prior Level of Function/Work/Activity: Indpendent and working full time in "[employer] .   Dominant Side:         RIGHTPersonal Factors: Other factors that influence how disability is experienced by the patient:  Hx of anxiety disorder, HTN, Pre-diabetic    Current Medications:    Current Outpatient Prescriptions:     atorvastatin (LIPITOR) 40 mg tablet, Take 1 Tab by mouth daily. , Disp: 30 Tab, Rfl: 6    diazePAM (VALIUM) 5 mg tablet, Take 0.5 Tabs by mouth every six (6) hours as needed. Max Daily Amount: 10 mg. Indications: MUSCLE SPASM, Disp: 60 Tab, Rfl: 1    tiZANidine (ZANAFLEX) 4 mg tablet, Take 1 Tab by mouth three (3) times daily. , Disp: 90 Tab, Rfl: 6    traZODone (DESYREL) 50 mg tablet, Take 1 Tab by mouth nightly as needed for Sleep. Indications: insomnia associated with depression, Disp: 30 Tab, Rfl: 2    valsartan (DIOVAN) 160 mg tablet, Take 1 Tab by mouth daily. , Disp: 30 Tab, Rfl: 6    hydroCHLOROthiazide (MICROZIDE) 12.5 mg capsule, Take 2 Caps by mouth daily. , Disp: 30 Cap, Rfl: 6    potassium chloride SR (K-TAB) 20 mEq tablet, Take 1 Tab by mouth two (2) times a day., Disp: 60 Tab, Rfl: 6    aspirin (ASPIRIN) 325 mg tablet, Take 1 Tab by mouth daily. , Disp: 30 Tab, Rfl: 12    acetaminophen (TYLENOL) 325 mg tablet, Take 2 Tabs by mouth every six (6) hours as needed for Pain or Fever (headache). , Disp: 30 Tab, Rfl: 0    aspirin delayed-release 325 mg tablet, Take 1 Tab by mouth daily. , Disp: 30 Tab, Rfl: 0    atorvastatin (LIPITOR) 40 mg tablet, Take 1 Tab by mouth daily. , Disp: 30 Tab, Rfl: 0    irbesartan-hydrochlorothiazide (AVALIDE) 150-12.5 mg per tablet, Take 1 Tab by mouth daily. , Disp: 30 Tab, Rfl: 5    escitalopram oxalate (LEXAPRO) 20 mg tablet, Take 1 Tab by mouth daily. , Disp: 30 Tab, Rfl: 5            Date Last Reviewed:  9/11/17   Complexity Level : Expanded review of therapy/medical records (1-2):  MODERATE COMPLEXITY   ASSESSMENT OF OCCUPATIONAL PERFORMANCE:     Palpation:  Patient with no palpated subluxation of the R shoulder at this time. Increased pain and tightness with R shoulder flexion/abduction with passive ROM above 90 degrees. Pain along anterior portion of humeral head with anterior tilt. Pain radiates along the middle deltoid. Modified Maggi Scale     Grade Description : Noted in ELBOW FLEXORS, WRIST FLEXORS, FINGER FLEXORS  []0 - No increase in muscle tone  []1 - Slight increase in muscle tone, manifested by a catch and release, or by minimal resistance at the end of the range of motion when the affected part(s) is moved in flexion or extension  [x]1+ - Slight increase in muscle tone, manifested by a catch, followed by minimal resistance throughout the remainder (less than half) of the range of movement (ROM)  []2 - More marked increase in muscle tone through most of ROM, but affected part(s) easily moved  []3 - Considerable increase in muscle tone, passive movement difficult  []4 - Affected part(s) rigid in flexion and extension      ROM/Strength:  L UE WNL; No functional AROM of R UE.            Date:  9/11/17  Date:  9/11/17 PROM  STRENGTH   Movement RIGHT  RIGHT   SCAPULA:      Elevation  Limited  2   Protraction  Limited  2+   Retraction  Limited   2   SHOULDER:      Flexion  100  0   Abduction  90  1   External rotation  70  0   Internal rotation  WNL  0   Horizontal abduction 100  0   Horizontal adduction  WFL  0   Extension       ELBOW:      Flexion  WNL  0   Extension  WNL  0   FOREARM:      Supination  WFL  0   Pronation  WFL  0   WRIST:      Wrist flexion  65  0   Wrist extension  65  0   Wrist radial deviation       Wrist ulnar deviation       HAND:      Finger flexion WFL  0   Finger extension WFL  0   THUMB:       Abduction  WFL  0    Extension WFL  0    Adduction        MCP flexion  WFL  0    IP flexion  WFL  0   Hand Strength:       unable  0   THREE JAW KARY PINCH unable  0   LATERAL PINCH  unable  0     Functional Mobility:         Gait/Ambulation:  CGA to supervision with quad cane; Uses wheelchair occasionally         Bed Mobility:  CGA  Balance:          Good static standing balance but fair to poor dynamic standing balance  Coordination:          Non-functional in R dominant UE  Mental Status: WNL  Vision:          Reading glasses. Reports some blurry vision in R eye. Tracking and visual fields intact. Some decreased speed with saccadic movement to the R.    Activities of Daily Living:           Basic ADLs (From Assessment) Complex ADLs (From Assessment)   Basic ADL  Feeding: Setup (feeds herself with L hand )  Oral Facial Hygiene/Grooming: Minimum assistance  Bathing: Minimum assistance  Upper Body Dressing: Minimum assistance  Lower Body Dressing: Minimum assistance (difficulty getting R shoe/AFO)  Toileting: Minimum assistance (swings leg over the bedside commode) Instrumental ADL  Meal Preparation: Total assistance  Homemaking: Maximum assistance   Grooming/Bathing/Dressing Activities of Daily Living                       Functional Transfers  Tub Transfer: Contact guard assistance Sensation:         Grossly intact to light touch in B UE. R hand with some decreased light touch sensation. Reports some hypersensitivity to cold. Physical Skills Involved:  1. Range of Motion  2. Balance  3. Strength  4. Activity Tolerance  5. Sensation  6. Fine Motor Control  7. Gross Motor Control  8. Pain (acute) Cognitive Skills Affected (resulting in the inability to perform in a timely and safe manner):  1. WNL Psychosocial Skills Affected:  1. Anxiety Disorder (per chart review)   Number of elements that affect the Plan of Care: 5+:  HIGH COMPLEXITY   CLINICAL DECISION MAKING:   Outcome Measure: Tool Used: Fugl-Goodman Upper Extremity Motor Assessment  Score:  Initial: 8/66 Most Recent: X (Date: -- )   Interpretation of Score: Outcome Measure Conversion Site    ?  Carrying, Moving, and Handling Objects:     - CURRENT STATUS: CM - 80%-99% impaired, limited or restricted    - GOAL STATUS: CK - 40%-59% impaired, limited or restricted    - D/C STATUS:  ---------------To be determined---------------     Fugl-Goodman Upper Extremity Motor Assessment (without reflexes) Date:  9/11 Date:   Date:     Item Description Score Score Score   Wrist circumduction 0     Hook grasp 0     Shoulder flexion to 180°, elbow extended 0     Spherical grasp 0     Lateral prehension 0     Wrist flexion/extension, elbow extended 0     Pronation-supination, elbow extended 0     Wrist stable, elbow extended 0     Movement with normal speed 0     Forearm supination 0     Shoulder abduction to 90°, elbow extended 0     Movement without dysmetria 0     Shoulder external rotation 0     Wrist stable, elbow at 90° 0     Wrist flexion/extension, elbow at 90° 0     Palmar prehension 0     Scapular retraction 1     Pronation-supination, elbow at 90° 0     Shoulder flexion to 90°, elbow extended 0     Hand to lumbar spine 0     Shoulder abduction 0     Elbow extension 0     Forearm pronation 0     Movement without tremor 0 Cylindrical grasp 0     Finger mass extension (relaxation of flexion) 0     Scapular elevation 1     Finger mass flexion 0     Shoulder adduction with internal rotation 0     Elbow flexion 0     Total Score (Max = 60) 2           Medical Necessity:   · Patient demonstrates good rehab potential due to higher previous functional level. Reason for Services/Other Comments:  · Patient continues to require skilled intervention due to decreased functional independence s/p CVA impacting overall quality of life. Clinical Decision-Making Assessment:     Assessment process, impact of co-morbidities, assessment modification\need for assistance, and selection of interventions: Analytical Complexity:HIGH COMPLEXITY   TREATMENT:   (In addition to Assessment/Re-Assessment sessions the following treatments were rendered)    Pre-treatment Symptoms/Complaints:    Pain: Initial: Pain Intensity 1: 5  Pain Location 1: Shoulder  Pain Orientation 1: Right  Post Session:  Same     Assessment/Reassessment only, no treatment provided today      Treatment/Session Assessment:    · Response to Treatment:  Evaluation only. · Compliance with Program/Exercises: Will assess as treatment progresses. · Recommendations/Intent for next treatment session: \"Next visit will focus on advancements to more challenging activities and reduction in assistance provided\".   Total Treatment Duration:  OT Patient Time In/Time Out  Time In: 0925  Time Out: 325 Zulema Vargas OT

## 2017-09-19 PROBLEM — G81.91 RIGHT HEMIPARESIS (HCC): Status: ACTIVE | Noted: 2017-09-19

## 2017-09-22 ENCOUNTER — HOSPITAL ENCOUNTER (OUTPATIENT)
Dept: PHYSICAL THERAPY | Age: 42
Discharge: HOME | End: 2017-09-22
Payer: COMMERCIAL

## 2017-09-22 PROCEDURE — 97162 PT EVAL MOD COMPLEX 30 MIN: CPT

## 2017-09-22 NOTE — PROGRESS NOTES
Zoraida pitt  : 1975  Payor: Love Britt / Plan: Dorann Alpers RPN / Product Type: Commerical /  [address]  at [facility] [address], [address], [place] [address] [facility]  [phone]   [phone]           OUTPATIENT PHYSICAL THERAPY:Initial Assessment 2017    ICD-10: Treatment Diagnosis: Difficulty in walking, not elsewhere classified (R26.2); Hemiplegia and hemiparesis following cerebral infarction affecting right dominant side (J99.535)  Precautions/Allergies:   Erythromycin   Fall Risk Score: 0 (? 5 = High Risk)  MD Orders: PT eval MEDICAL/REFERRING DIAGNOSIS:  Stroke (cerebrum) ([facility] [address] [I63.9]   DATE OF ONSET: 8/3/17  REFERRING PHYSICIAN: Kera Watkins*  RETURN PHYSICIAN APPOINTMENT: unknown  DATE OF PROGRESS NOTE:    RECERTIFICATION DATE:      INITIAL ASSESSMENT:  Ms. Demi pitt presents with dominant right hemiplegia s/p CVA on 8/3/17. Pt presents with ADL, balance and gait deficits. Pt will benefit from physical therapy to maximize to full potential and safety with all functional mobility. PROBLEM LIST (Impacting functional limitations):  1. Decreased Strength  2. Decreased ADL/Functional Activities  3. Decreased Ambulation Ability/Technique  4. Decreased Balance  5. Increased Pain  6. Decreased Activity Tolerance  7. Decreased Flexibility/Joint Mobility  8. Decreased [place] with Home Exercise Program INTERVENTIONS PLANNED:  1. Balance Exercise  2. Bed Mobility  3. Family Education  4. Gait Training  5. Home Exercise Program (HEP)  6. Neuromuscular Re-education/Strengthening  7. Range of Motion (ROM)  8. Therapeutic Activites  9. Therapeutic Exercise/Strengthening  10. Transfer Training  11. modalities   TREATMENT PLAN:  Effective Dates: 17 TO 12/15/17. Frequency/Duration: 3 times a week for 8 weeks  GOALS: (Goals have been discussed and agreed upon with patient.)  Short-Term Functional Goals: Time Frame: 4 weeks  1.  Pt will be independent with range of motion, strength and balance home exercise program.  2. Pt will decrease TUG score by 5 seconds indicating more normalized and safer gait pattern. Discharge Goals: Time Frame: 8 weeks  Pt will show increased range of motion and improved posture to allow for improved safety and ability with all functional mobility. Pt will decrease TUG score by 10 seconds indicating more normalized gait pattern and decrease risk for falls. Pt will increase Perez Balance Scale to 49/56 indicating increased balance and decreased risk for falls. Rehabilitation Potential For Stated Goals: Good  Regarding [place] therapy, I certify that the treatment plan above will be carried out by a therapist or under their direction. Thank you for this referral,  Francy Sanabria, PT     Referring Physician Signature: Kera Sawant*              Date                    HISTORY:   GOAL:  Love to be back to doing things independenlty. Would like to get to where I don't have to use the cane. Get back to my own home. Present Symptoms:    38year old  right handed female s/p left CVA 8/3/17 2* to HTN. Short term disability for now. Work in the [employer] at [employer]. Very physical job. More sued to my left hand. Learning how to function with one hand is difficult. ADLs with just my left hand is difficult. Dynamic sitting balance. I just have to be careluf that my foot is flat especially if I have my shoes off. Tire easily. Buildig up strength. Large base quad cane but not all the time at home. Can't carry a heavy load. PAIN:  Right fingers and shoulder with slight movement. 6/10  History of Present Injury/Illness (Reason for Referral):  See above  Past Medical History/Comorbidities:   Ms. Barak Verdugo  has a past medical history of Generalized anxiety disorder; GERD (gastroesophageal reflux disease); Hypertension;  Insomnia, unspecified; Iron deficiency anemia (8/23/2013); Left sided lacunar stroke ([facility] [address] (08/2017); Mitral valve prolapse (1993); Obesity (BMI 30-39.9); and Prediabetes (7/28/2015). Ms. Gris Ferreira  has a past surgical history that includes tubal ligation (1995) and endoscopy (10/1/04). Social History/Living Environment:     25and 25year old son and daughter. . Prior Level of Function/Work/Activity:  Worked in [employer] department. 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 One story - Own home:  About 5 steps to get in with bilateral railing. Staying at [facility] house which is easier to maneuver - low tub - can walk right in. Small wheelchair ramp and then level. Mom helps with driving and household duties - cooking. Dominant Side:         RIGHT    Current Medications:    Current Outpatient Prescriptions:     atorvastatin (LIPITOR) 40 mg tablet, Take 1 Tab by mouth daily. , Disp: 30 Tab, Rfl: 6    diazePAM (VALIUM) 5 mg tablet, Take 0.5 Tabs by mouth every six (6) hours as needed. Max Daily Amount: 10 mg. Indications: MUSCLE SPASM, Disp: 60 Tab, Rfl: 1    tiZANidine (ZANAFLEX) 4 mg tablet, Take 1 Tab by mouth three (3) times daily. , Disp: 90 Tab, Rfl: 6    traZODone (DESYREL) 50 mg tablet, Take 1 Tab by mouth nightly as needed for Sleep. Indications: insomnia associated with depression, Disp: 30 Tab, Rfl: 2    valsartan (DIOVAN) 160 mg tablet, Take 1 Tab by mouth daily. , Disp: 30 Tab, Rfl: 6    hydroCHLOROthiazide (MICROZIDE) 12.5 mg capsule, Take 2 Caps by mouth daily. , Disp: 30 Cap, Rfl: 6    potassium chloride SR (K-TAB) 20 mEq tablet, Take 1 Tab by mouth two (2) times a day., Disp: 60 Tab, Rfl: 6    aspirin (ASPIRIN) 325 mg tablet, Take 1 Tab by mouth daily. , Disp: 30 Tab, Rfl: 12    acetaminophen (TYLENOL) 325 mg tablet, Take 2 Tabs by mouth every six (6) hours as needed for Pain or Fever (headache). , Disp: 30 Tab, Rfl: 0    escitalopram oxalate (LEXAPRO) 20 mg tablet, Take 1 Tab by mouth daily. , Disp: 30 Tab, Rfl: 5   Date Last Reviewed:  9/22/17 Number of Personal Factors/Comorbidities that affect the Plan of Care: 1-2: MODERATE COMPLEXITY   EXAMINATION:   ROM:          Painful right shoulder and finger and hands. Shoulder not fully assessed. Seeing OT. Passive Fingers to neutral extension at least.  No overpressure given. Elbow slight decrease as end ranges. Supination ~75% with mild pain. Right lower extremity WFL  Left WFL  Strength:          Right UE:  Almost no active movement noted. Possible scap stabilization and tone is not flaccid. Right hip flexion:  3-/5  Knee extension at least 4/5  Nothing in DF. Functional Mobility:         Gait/Ambulation:  Ambulates with large base quad came with slow small steps with custom hinged AFO on. Transfers:  Independent. occ no WBing on the right with sit to stand        Bed Mobility:  NT  Mental Status:          Alert and oriented x 4;  Pleasant and appropriate  Vision:          No deficits reported. Body Structures Involved:  1. Nerves  2. Bones  3. Joints  4. Muscles  5. Ligaments Body Functions Affected:  1. Sensory/Pain  2. Neuromusculoskeletal  3. Movement Related Activities and Participation Affected:  1. Learning and Applying Knowledge  2. General Tasks and Demands  3. Mobility  4. Self Care  5. Domestic Life  6. Interpersonal Interactions and Relationships  7. Community, Social and [place]   Number of elements that affect the Plan of Care: 4+: HIGH COMPLEXITY   CLINICAL PRESENTATION:   Presentation: Evolving clinical presentation with changing clinical characteristics: MODERATE COMPLEXITY   CLINICAL DECISION MAKING:   Outcome Measure: Tool Used: Perez Balance Scale  Score:  Initial: 46/56 Most Recent: X/56 (Date: -- )   Interpretation of Score: Each section is scored on a 0-4 scale, 0 representing the patients inability to perform the task and 4 representing independence. The scores of each section are added together for a total score of 56.   The higher the patients score, the more independent the patient is. Any score below 45 indicates increased risk for falls. Score 56 55-45 44-34 33-23 22-12 11-1 0   Modifier CH CI CJ CK CL CM CN       Tool Used: Timed Up and Go (TUG)  Score:  Initial: with cane:  45.24 seconds; without cane 52.01 seconds Most Recent: X seconds (Date: -- )   Interpretation of Score: The test measures, in seconds, the time taken by an individual to stand up from a standard arm chair (seat height 46 cm [18 in], arm height 65 cm [25.6 in]), walk a distance of 3 meters (118 in, approx 10 ft), turn, walk back to the chair and sit down. If the individual takes longer than 14 seconds to complete TUG, this indicates risk for falls. Score 7 7.5-10.5 11-14 14.5-17.5 18-21 21.5-24.5 25+   Modifier CH CI CJ CK CL CM CN     Medical Necessity:   · Skilled intervention continues to be required due to residual right sided hemiparesis. Reason for Services/Other Comments:  · Patient continues to require skilled intervention due to residual right hemiparesis. .   Use of outcome tool(s) and clinical judgement create a POC that gives a: Questionable prediction of patient's progress: MODERATE COMPLEXITY   TREATMENT:   (In addition to Assessment/Re-Assessment sessions the following treatments were rendered)    ASSESSMENT ONLY    Treatment/Session Assessment:  See initial assessment above. Patients response to todays treatment session was tolerated well with no medical complications. .  · Post session pain:  No pain  · Compliance with Program/Exercises: Will assess as treatment progresses. · Recommendations/Intent for next treatment session: \"Next visit will focus on advancements to more challenging activities\".   Total Treatment Duration:  PT Patient Time In/Time Out  Time In: 1140  Time Out: [address] [facility], PT

## 2017-09-25 ENCOUNTER — HOSPITAL ENCOUNTER (OUTPATIENT)
Dept: PHYSICAL THERAPY | Age: 42
Discharge: HOME | End: 2017-09-25
Payer: COMMERCIAL

## 2017-09-25 PROCEDURE — 97110 THERAPEUTIC EXERCISES: CPT

## 2017-09-25 PROCEDURE — 97112 NEUROMUSCULAR REEDUCATION: CPT

## 2017-09-25 NOTE — PROGRESS NOTES
Zoraida pitt  : 1975 [facility]  [address], [address] [facility], [address]  [phone]   [phone]         OUTPATIENT OCCUPATIONAL THERAPY: Daily Note 2017    ICD-10: Treatment Diagnosis:   Hemiplegia and hemiparesis following cerebral infarction affecting right dominant side (I69.351)  Precautions/Allergies:   Erythromycin   Fall Risk Score: 3 (? 5 = High Risk)  MD Orders: OT evaluate and treat  MEDICAL/REFERRING DIAGNOSIS:   Stroke (cerebrum) ([facility] [address] [I63.9]   DATE OF ONSET: 2017   REFERRING PHYSICIAN: Kera Redman*  RETURN PHYSICIAN APPOINTMENT: 2017     INITIAL ASSESSMENT:  Ms. Demi pitt presents to outpatient occupational therapy services s/p CVA with R side hemiplegia. Pt arrived to therapy using quad cane for functional mobility. Pt is extremely pleasant and appears motivated to increase independence levels. Pt currently has no active R UE AROM, but does have some scapular movement present at this time. Pt does report pain in the R shoulder above 90 degrees of ROM, but no subluxation present at this time. Pt noted to have some increased tone in the R UE flexors. Pt receptive to education and verbalizes that she has been using techniques and suggestions that she learned in inpatient rehab. Pt has had no falls since returning home but does report some incontinence at times (stating this all started with the use of Zanaflex). Pt has minimal sensory deficits at this time. Pt does have some impaired midline orientation with functional transfers. Pt is currently functioning below baseline for ADL/functional transfers which impacts her overall quality of life. Pt will benefit from OT services to address stated goals and plan of care. PLAN OF CARE:   PROBLEM LIST:  1. Decreased Strength  2. Decreased ADL/Functional Activities  3. Decreased Transfer Abilities  4.  Decreased Ambulation Ability/Technique  5. Decreased Balance  6. Increased Pain  7. Decreased Activity Tolerance  8. Decreased Flexibility/Joint Mobility  9. Decreased Loving with Home Exercise Program INTERVENTIONS PLANNED:  1. Activities of daily living training  2. Adaptive equipment training  3. Balance training  4. Clothing management  5. Donning&doffing training  6. Manual therapy training  7. Modalities  8. Neuromuscular re-eduation  9. Splinting  10. Therapeutic activity  11. Therapeutic exercise   TREATMENT PLAN:  Effective Dates: 9/11/17 TO 12/11/17. Frequency/Duration: 2 times a week for 12 weeks  GOALS: (Goals have been discussed and agreed upon with patient.)  Short-Term Functional Goals: Time Frame:4- 6 weeks  1. Pipo Stauffer will report decreased pain in the R shoulder from 5/10 to 3/10 with passive movement to decrease stiffness for daily activity. 2. Pipo Stauffer will be 100% compliant with daily SROM exercises for improving R UE ROM to decrease risk for contractures. 3. Pipo Stauffer will demonstrate ability to complete weightbearing activities in R UE with moderate facilitation to improve functional use for transfers. 4. Pipo Stauffer will complete basic ADL with supervision to improve independence with functional tasks. 5. Pipo Stauffer will tolerate 15 minutes of dynamic standing balance activity with CGA to decrease risk for falls with ADL and to improve activity tolerance. 6. Discharge Goals: Time Frame: 8-12 weeks   1. Pipo Stauffer will have functional PROM of R shoulder to [facility] to decrease pain and stiffness in R dominant UE. 2. Pipo Stauffer will demonstrate improved strength in the R dominant UE as evidenced by at least 2+ to 3-/5 strength for using R UE as functional assist.   3. Pipo Stauffer will demonstrate ability to functionally grasp and release light objects with supervision and with use of R dominant hand.    4. Pipo Stauffer will be modified independent with all basic ADL to improve overall quality of life. 5. Alexei Pena will use R hand as functional assist with daily activities/transfers with supervision to improve independence with ADL. Rehabilitation Potential For Stated Goals: Good  Regarding [facility] therapy, I certify that the treatment plan above will be carried out by a therapist or under their direction. Thank you for this referral,  Alyse Carmona OT                 The information in this section was collected on 9/11/17 (except where otherwise noted). OCCUPATIONAL PROFILE & HISTORY:   History of Present Injury/Illness (Reason for Referral):  Pt reports she was at work when she started feeling like she was going to pass out. Pt reports she didn't really stop working and the feeling wouldn't go away. It started at 2pm and left work at 3:30 pm. Pt felt like she was going to fall and pushed a buggy to her car and drove to her daughter's house. Pt then was brought over to the ER at [address] [place] [address]. Progressively got worse with R sided weakness and woke up the next morning with full parlaysis of the R side. Reports her eyesight was decreasing and needing reading glasses. R eye is a little worse with the blurry vision. Pt wears to reading glasses. Pt using a wheelchair as less as possible. Using wheelchair when out and about. Pt using a quad cane around the home and has AFO to R LE. Tub transfer bench with CGA. Eats at the kitchen table. Pt stayed from Aug 8th- September 6th in inpatient rehab. Pt reports no falls at home. Pt getting dressed using techniques learned for inpatient rehab. Difficulty walking and carrying objects. Pt has some issues with Zanaflex and incontinence (she is unsure if that is related). Pt presents today for outpatient occupational therapy services. Past Medical History/Comorbidities:   Ms. Rosemary Gracia  has a past medical history of Generalized anxiety disorder; GERD (gastroesophageal reflux disease); Hypertension;  Insomnia, unspecified; Iron deficiency anemia (8/23/2013); Left sided lacunar stroke ([facility] [address] (08/2017); Mitral valve prolapse (1993); Obesity (BMI 30-39.9); and Prediabetes (7/28/2015). Ms. Melvin Cristobal  has a past surgical history that includes tubal ligation (1995) and endoscopy (10/1/04). Social History/Living Environment:   Lives with mom with her  because toilet/tub is lower with more open spaces. Pt also has a son. Pt has 24 hr supervision. Prior Level of Function/Work/Activity: Indpendent and working full time in [employer] at [employer] .   Dominant Side:         RIGHTPersonal Factors: Other factors that influence how disability is experienced by the patient:  Hx of anxiety disorder, HTN, Pre-diabetic    Current Medications:    Current Outpatient Prescriptions:     atorvastatin (LIPITOR) 40 mg tablet, Take 1 Tab by mouth daily. , Disp: 30 Tab, Rfl: 6    diazePAM (VALIUM) 5 mg tablet, Take 0.5 Tabs by mouth every six (6) hours as needed. Max Daily Amount: 10 mg. Indications: MUSCLE SPASM, Disp: 60 Tab, Rfl: 1    tiZANidine (ZANAFLEX) 4 mg tablet, Take 1 Tab by mouth three (3) times daily. , Disp: 90 Tab, Rfl: 6    traZODone (DESYREL) 50 mg tablet, Take 1 Tab by mouth nightly as needed for Sleep. Indications: insomnia associated with depression, Disp: 30 Tab, Rfl: 2    valsartan (DIOVAN) 160 mg tablet, Take 1 Tab by mouth daily. , Disp: 30 Tab, Rfl: 6    hydroCHLOROthiazide (MICROZIDE) 12.5 mg capsule, Take 2 Caps by mouth daily. , Disp: 30 Cap, Rfl: 6    potassium chloride SR (K-TAB) 20 mEq tablet, Take 1 Tab by mouth two (2) times a day., Disp: 60 Tab, Rfl: 6    aspirin (ASPIRIN) 325 mg tablet, Take 1 Tab by mouth daily. , Disp: 30 Tab, Rfl: 12    acetaminophen (TYLENOL) 325 mg tablet, Take 2 Tabs by mouth every six (6) hours as needed for Pain or Fever (headache). , Disp: 30 Tab, Rfl: 0    escitalopram oxalate (LEXAPRO) 20 mg tablet, Take 1 Tab by mouth daily. , Disp: 30 Tab, Rfl: 5            Date Last Reviewed:  9/11/17 Complexity Level : Expanded review of therapy/medical records (1-2):  MODERATE COMPLEXITY   ASSESSMENT OF OCCUPATIONAL PERFORMANCE:     Palpation:  Patient with no palpated subluxation of the R shoulder at this time. Increased pain and tightness with R shoulder flexion/abduction with passive ROM above 90 degrees. Pain along anterior portion of humeral head with anterior tilt. Pain radiates along the middle deltoid. Modified Maggi Scale     Grade Description : Noted in ELBOW FLEXORS, WRIST FLEXORS, FINGER FLEXORS  []0 - No increase in muscle tone  []1 - Slight increase in muscle tone, manifested by a catch and release, or by minimal resistance at the end of the range of motion when the affected part(s) is moved in flexion or extension  [x]1+ - Slight increase in muscle tone, manifested by a catch, followed by minimal resistance throughout the remainder (less than half) of the range of movement (ROM)  []2 - More marked increase in muscle tone through most of ROM, but affected part(s) easily moved  []3 - Considerable increase in muscle tone, passive movement difficult  []4 - Affected part(s) rigid in flexion and extension      ROM/Strength:  L UE WNL; No functional AROM of R UE.            Date:  9/11/17  Date:  9/11/17    PROM  STRENGTH   Movement RIGHT  RIGHT   SCAPULA:      Elevation  Limited  2   Protraction  Limited  2+   Retraction  Limited   2   SHOULDER:      Flexion  100  0   Abduction  90  1   External rotation  70  0   Internal rotation  WNL  0   Horizontal abduction 100  0   Horizontal adduction  WFL  0   Extension       ELBOW:      Flexion  WNL  0   Extension  WNL  0   FOREARM:      Supination  WFL  0   Pronation  WFL  0   WRIST:      Wrist flexion  65  0   Wrist extension  65  0   Wrist radial deviation       Wrist ulnar deviation       HAND:      Finger flexion WFL  0   Finger extension WFL  0   THUMB:       Abduction  WFL  0    Extension WFL  0    Adduction        MCP flexion  WFL  0    IP flexion  WFL  0   Hand Strength:       unable  0   THREE JAW KARY PINCH unable  0   LATERAL PINCH  unable  0     Functional Mobility:         Gait/Ambulation:  CGA to supervision with quad cane; Uses wheelchair occasionally         Bed Mobility:  CGA  Balance:          Good static standing balance but fair to poor dynamic standing balance  Coordination:          Non-functional in R dominant UE  Mental Status: WNL  Vision:          Reading glasses. Reports some blurry vision in R eye. Tracking and visual fields intact. Some decreased speed with saccadic movement to the R. Activities of Daily Living:           Basic ADLs (From Assessment) Complex ADLs (From Assessment)         Grooming/Bathing/Dressing Activities of Daily Living                                   Sensation:         Grossly intact to light touch in B UE. R hand with some decreased light touch sensation. Reports some hypersensitivity to cold. Physical Skills Involved:  1. Range of Motion  2. Balance  3. Strength  4. Activity Tolerance  5. Sensation  6. Fine Motor Control  7. Gross Motor Control  8. Pain (acute) Cognitive Skills Affected (resulting in the inability to perform in a timely and safe manner):  1. WNL Psychosocial Skills Affected:  1. Anxiety Disorder (per chart review)   Number of elements that affect the Plan of Care: 5+:  HIGH COMPLEXITY   CLINICAL DECISION MAKING:   Outcome Measure: Tool Used: Fugl-Goodman Upper Extremity Motor Assessment  Score:  Initial: 8/66 Most Recent: X (Date: -- )   Interpretation of Score: Outcome Measure Conversion Site    ?  Carrying, Moving, and Handling Objects:     - CURRENT STATUS: CM - 80%-99% impaired, limited or restricted    - GOAL STATUS: CK - 40%-59% impaired, limited or restricted    - D/C STATUS:  ---------------To be determined---------------     Fugl-Goodman Upper Extremity Motor Assessment (without reflexes) Date:  9/11 Date:   Date:     Item Description Score Score Score   Wrist circumduction 0     Hook grasp 0     Shoulder flexion to 180°, elbow extended 0     Spherical grasp 0     Lateral prehension 0     Wrist flexion/extension, elbow extended 0     Pronation-supination, elbow extended 0     Wrist stable, elbow extended 0     Movement with normal speed 0     Forearm supination 0     Shoulder abduction to 90°, elbow extended 0     Movement without dysmetria 0     Shoulder external rotation 0     Wrist stable, elbow at 90° 0     Wrist flexion/extension, elbow at 90° 0     Palmar prehension 0     Scapular retraction 1     Pronation-supination, elbow at 90° 0     Shoulder flexion to 90°, elbow extended 0     Hand to lumbar spine 0     Shoulder abduction 0     Elbow extension 0     Forearm pronation 0     Movement without tremor 0     Cylindrical grasp 0     Finger mass extension (relaxation of flexion) 0     Scapular elevation 1     Finger mass flexion 0     Shoulder adduction with internal rotation 0     Elbow flexion 0     Total Score (Max = 60) 2           Medical Necessity:   · Patient demonstrates good rehab potential due to higher previous functional level. Reason for Services/Other Comments:  · Patient continues to require skilled intervention due to decreased functional independence s/p CVA impacting overall quality of life. Clinical Decision-Making Assessment:     Assessment process, impact of co-morbidities, assessment modification\need for assistance, and selection of interventions: Analytical Complexity:HIGH COMPLEXITY   TREATMENT:   (In addition to Assessment/Re-Assessment sessions the following treatments were rendered)    Pre-treatment Symptoms/Complaints:    Pain: Initial:   3/10 R shoulder Post Session:  0/10      Therapeutic Exercise: ( 25):  Exercises per grid below to improve mobility. Required maximal manual and tactile cues to promote proper body alignment, promote proper body posture and promote proper body mechanics.   Progressed repetitions and complexity of movement as indicated. Date:  9/25/17 Date:   Date:     Activity/Exercise Parameters Parameters Parameters   Shoulder external rotation PROM to tolerance with caregiver education      Shoulder flexion  PROM to tolerance with caregiver education      Shoulder abduction  PROM to tolerance with caregiver education      Shoulder horizontal abduction/adduction  PROM to tolerance with caregiver education      Elbow flexion/extension  PROM to tolerance with caregiver education      Wrist extension  PROM to tolerance with caregiver education              Neuromuscular Re-education: ( 20):  Exercise/activities per grid below to improve kinesthetic sense and proprioception. Required minimal visual, verbal, manual and tactile cues to promote motor control of right, upper extremity(s). Patient and caregiver educated on NDT technique when rolling towards the R UE and pulling R UE into protraction. Once in sitting pt's caregiver educated on pectoral glide to open shoulders and promote more thoracic extension. Pt's caregiver also educated on applying pressure along the biceps tendon to help reduce tone in the elbow. Pt's caregiver educated on gently stretching thumb into extension first and then providing krueger spread to help with tone reduction of the hand. Saebo splint applied with pt and caregiver educated on best technique. Pt also educated on weightbearing into the R elbow with good ability and some pressure pressing back into midline. Pt and caregiver educated on proper positioning of the R UE in sitting, supine, and with transfers. Treatment/Session Assessment:    · Response to Treatment: Session went extremely well today with pt's spouse present for education on positioning, PROM to R UE, and tone reduction techniques. Both patient and caregiver extremely receptive to education. Pt reports no pain in the R UE s/p treatment. Pt's spouse to attend next visit to reinforce what he has learned today. · Compliance with Program/Exercises: Will assess as treatment progresses. · Recommendations/Intent for next treatment session: \"Next visit will focus on advancements to more challenging activities and reduction in assistance provided\".   Total Treatment Duration:OT Patient Time In/Time Out  Time In: 9390  Time Out: 758 [facility],

## 2017-09-26 ENCOUNTER — HOSPITAL ENCOUNTER (OUTPATIENT)
Dept: PHYSICAL THERAPY | Age: 42
Discharge: HOME | End: 2017-09-26
Payer: COMMERCIAL

## 2017-09-26 PROCEDURE — 97530 THERAPEUTIC ACTIVITIES: CPT

## 2017-09-26 PROCEDURE — 97112 NEUROMUSCULAR REEDUCATION: CPT

## 2017-09-26 NOTE — PROGRESS NOTES
Zoraida pitt  : 1975  Payor: Flako Ko / Plan: Luna Mancia RPN / Product Type: Commerical /  [address]  [facility] [address] [facility], [place] [address] [address] [facility]  [phone]   [phone]           OUTPATIENT PHYSICAL THERAPY:Daily Note 17   ICD-10: Treatment Diagnosis: Difficulty in walking, not elsewhere classified (R26.2); Hemiplegia and hemiparesis following cerebral infarction affecting right dominant side (J54.113)  Precautions/Allergies:   Erythromycin   Fall Risk Score: 0 (? 5 = High Risk)  MD Orders: PT eval MEDICAL/REFERRING DIAGNOSIS:  Stroke (cerebrum) ([facility] [address] [I63.9]   DATE OF ONSET: 8/3/17  REFERRING PHYSICIAN: Kera Swain*  RETURN PHYSICIAN APPOINTMENT: unknown  DATE OF PROGRESS NOTE:  56  RECERTIFICATION DATE:      INITIAL ASSESSMENT:  Ms. Demi pitt presents with dominant right hemiplegia s/p CVA on 8/3/17. Pt presents with ADL, balance and gait deficits. Pt will benefit from physical therapy to maximize to full potential and safety with all functional mobility. PROBLEM LIST (Impacting functional limitations):  1. Decreased Strength  2. Decreased ADL/Functional Activities  3. Decreased Ambulation Ability/Technique  4. Decreased Balance  5. Increased Pain  6. Decreased Activity Tolerance  7. Decreased Flexibility/Joint Mobility  8. Decreased [place] with Home Exercise Program INTERVENTIONS PLANNED:  1. Balance Exercise  2. Bed Mobility  3. Family Education  4. Gait Training  5. Home Exercise Program (HEP)  6. Neuromuscular Re-education/Strengthening  7. Range of Motion (ROM)  8. Therapeutic Activites  9. Therapeutic Exercise/Strengthening  10. Transfer Training  11. modalities   TREATMENT PLAN:  Effective Dates: 17 TO 12/15/17. Frequency/Duration: 3 times a week for 8 weeks  GOALS: (Goals have been discussed and agreed upon with patient.)  Short-Term Functional Goals: Time Frame: 4 weeks  1.  Pt will be independent with range of motion, strength and balance home exercise program.  2. Pt will decrease TUG score by 5 seconds indicating more normalized and safer gait pattern. Discharge Goals: Time Frame: 8 weeks  Pt will show increased range of motion and improved posture to allow for improved safety and ability with all functional mobility. Pt will decrease TUG score by 10 seconds indicating more normalized gait pattern and decrease risk for falls. Pt will increase Perez Balance Scale to 49/56 indicating increased balance and decreased risk for falls. Rehabilitation Potential For Stated Goals: Good  Regarding [place] therapy, I certify that the treatment plan above will be carried out by a therapist or under their direction. Thank you for this referral,  Snow Menendez, PT                  HISTORY:   GOAL:  Love to be back to doing things independenlty. Would like to get to where I don't have to use the cane. Get back to my own home. Present Symptoms:    38year old  right handed female s/p left CVA 8/3/17 2* to HTN. Short term disability for now. 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 Very physical job. More sued to my left hand. Learning how to function with one hand is difficult. ADLs with just my left hand is difficult. Dynamic sitting balance. I just have to be careluf that my foot is flat especially if I have my shoes off. Tire easily. Buildig up strength. Large base quad cane but not all the time at home. Can't carry a heavy load. PAIN:  Right fingers and shoulder with slight movement. 6/10  History of Present Injury/Illness (Reason for Referral):  See above  Past Medical History/Comorbidities:   Ms. Olive Jacinto  has a past medical history of Generalized anxiety disorder; GERD (gastroesophageal reflux disease); Hypertension; Insomnia, unspecified; Iron deficiency anemia (8/23/2013); Left sided lacunar stroke ([facility] [address] (08/2017); Mitral valve prolapse (1993);  Obesity (BMI 30-39.9); and Prediabetes (7/28/2015). Ms. Rosalba Springer  has a past surgical history that includes tubal ligation (1995) and endoscopy (10/1/04). Social History/Living Environment:     25and 25year old son and daughter. . 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 One story - Own home:  About 5 steps to get in with bilateral railing. Staying at [facility] house which is easier to maneuver - low tub - can walk right in. Small wheelchair ramp and then level. Mom helps with driving and household duties - cooking. Dominant Side:         RIGHT    Current Medications:    Current Outpatient Prescriptions:     atorvastatin (LIPITOR) 40 mg tablet, Take 1 Tab by mouth daily. , Disp: 30 Tab, Rfl: 6    diazePAM (VALIUM) 5 mg tablet, Take 0.5 Tabs by mouth every six (6) hours as needed. Max Daily Amount: 10 mg. Indications: MUSCLE SPASM, Disp: 60 Tab, Rfl: 1    tiZANidine (ZANAFLEX) 4 mg tablet, Take 1 Tab by mouth three (3) times daily. , Disp: 90 Tab, Rfl: 6    traZODone (DESYREL) 50 mg tablet, Take 1 Tab by mouth nightly as needed for Sleep. Indications: insomnia associated with depression, Disp: 30 Tab, Rfl: 2    valsartan (DIOVAN) 160 mg tablet, Take 1 Tab by mouth daily. , Disp: 30 Tab, Rfl: 6    hydroCHLOROthiazide (MICROZIDE) 12.5 mg capsule, Take 2 Caps by mouth daily. , Disp: 30 Cap, Rfl: 6    potassium chloride SR (K-TAB) 20 mEq tablet, Take 1 Tab by mouth two (2) times a day., Disp: 60 Tab, Rfl: 6    aspirin (ASPIRIN) 325 mg tablet, Take 1 Tab by mouth daily. , Disp: 30 Tab, Rfl: 12    acetaminophen (TYLENOL) 325 mg tablet, Take 2 Tabs by mouth every six (6) hours as needed for Pain or Fever (headache). , Disp: 30 Tab, Rfl: 0    escitalopram oxalate (LEXAPRO) 20 mg tablet, Take 1 Tab by mouth daily. , Disp: 30 Tab, Rfl: 5   Date Last Reviewed:  9/26/17   Number of Personal Factors/Comorbidities that affect the Plan of Care: 1-2: MODERATE COMPLEXITY EXAMINATION:   ROM:          Painful right shoulder and finger and hands. Shoulder not fully assessed. Seeing OT. Passive Fingers to neutral extension at least.  No overpressure given. Elbow slight decrease as end ranges. Supination ~75% with mild pain. Right lower extremity WFL  Left WFL  Strength:          Right UE:  Almost no active movement noted. Possible scap stabilization and tone is not flaccid. Right hip flexion:  3-/5  Knee extension at least 4/5  Nothing in DF. Functional Mobility:         Gait/Ambulation:  Ambulates with large base quad came with slow small steps with custom hinged AFO on. Transfers:  Independent. occ no WBing on the right with sit to stand        Bed Mobility:  NT  Mental Status:          Alert and oriented x 4;  Pleasant and appropriate  Vision:          No deficits reported. Body Structures Involved:  1. Nerves  2. Bones  3. Joints  4. Muscles  5. Ligaments Body Functions Affected:  1. Sensory/Pain  2. Neuromusculoskeletal  3. Movement Related Activities and Participation Affected:  1. Learning and Applying Knowledge  2. General Tasks and Demands  3. Mobility  4. Self Care  5. Domestic Life  6. Interpersonal Interactions and Relationships  7. Community, Social and [place]   Number of elements that affect the Plan of Care: 4+: HIGH COMPLEXITY   CLINICAL PRESENTATION:   Presentation: Evolving clinical presentation with changing clinical characteristics: MODERATE COMPLEXITY   CLINICAL DECISION MAKING:   Outcome Measure: Tool Used: Perez Balance Scale  Score:  Initial: 46/56 Most Recent: X/56 (Date: -- )   Interpretation of Score: Each section is scored on a 0-4 scale, 0 representing the patients inability to perform the task and 4 representing independence. The scores of each section are added together for a total score of 56. The higher the patients score, the more independent the patient is.   Any score below 45 indicates increased risk for falls.    Score 56 55-45 44-34 33-23 22-12 11-1 0   Modifier CH CI CJ CK CL CM CN       Tool Used: Timed Up and Go (TUG)  Score:  Initial: with cane:  45.24 seconds; without cane 52.01 seconds Most Recent: X seconds (Date: -- )   Interpretation of Score: The test measures, in seconds, the time taken by an individual to stand up from a standard arm chair (seat height 46 cm [18 in], arm height 65 cm [25.6 in]), walk a distance of 3 meters (118 in, approx 10 ft), turn, walk back to the chair and sit down. If the individual takes longer than 14 seconds to complete TUG, this indicates risk for falls. Score 7 7.5-10.5 11-14 14.5-17.5 18-21 21.5-24.5 25+   Modifier CH CI CJ CK CL CM CN     Medical Necessity:   · Skilled intervention continues to be required due to residual right sided hemiparesis. Reason for Services/Other Comments:  · Patient continues to require skilled intervention due to residual right hemiparesis. .   Use of outcome tool(s) and clinical judgement create a POC that gives a: Questionable prediction of patient's progress: MODERATE COMPLEXITY      S:  We were putting one of the straps on wrong ([place] stretch hand brace). She showed us how to position my arm for sleeping and when exercising. Feeling much better. No pain today. Been trying to take larger steps but still have the pause. O:  Walking with large base quad cane with hand brace and AFO in place. Right arm/shoulder tight and adduct to her navel. Trying to take larger steps with her left leg. TREATMENT:   (In addition to Assessment/Re-Assessment sessions the following treatments were rendered)    NEUROMUSCULAR RE-EDUCATION: (60 minutes):  Exercise/activities per grid below to improve balance, coordination, kinesthetic sense, proprioception and motor control. Required maximal manual, tactile and facilitation cues to perfrom activities. .     Date:  9/26/17 Date: Date:   Activity/Exercise Parameters Parameters Parameters   Right UE tone reduction and sensorimotor retraining for increased arm swing and posture during gait Supine passive range of motion, mobilization and facilitation of shoulder, elbow and forearm. Slow movement in shoulder as pt is very sensitive and gets what sounds like some impingement. Good pectoral stretch and flexion to about 110. Hand left in [place]. Very sensitive fingers. Marked decrease in tone after above. Arm able to rest/hang at side during gait. Seated in chair (pt is short in stature). Right shoe and brace off Passive to gradual minimal facilitation of Sensorimotor retraining of the foot and knee. Foot slides  Inversion/eversion \"windshielf wipers\"  Foot on 4 kg medicine ball - facilitated foot slides, hip IR and ER with foot on ball, foot circles on ball both directions. Stepping forward and back with left leg at rail Working on righ thip and knee release and return to start x 10 with contact facilitation to keep foot positioned safely. Occ mild right kne recurvatum      Gait retraining With shoes on and cane x 120' Working on larger left step and decrease the \"step to\" pause. EDUCATION:  Instructed in above exercises and home program.  HEP:  No Written HEP given to patient yet. Instructed to owrk on increase step length on the left. Treatment/Session Assessment:  Excellent follow through. Arm better and looser after treatment. Patients response to todays treatment session was tolerated well with no medical complications. .  · Post session pain:  No pain  · Compliance with Program/Exercises: Will assess as treatment progresses. · Recommendations/Intent for next treatment session: \"Next visit will focus on advancements to more challenging activities\".   Total Treatment Duration:  PT Patient Time In/Time Out  Time In: 7601  Time Out: [place] [address] [place] [address]

## 2017-09-28 ENCOUNTER — HOSPITAL ENCOUNTER (OUTPATIENT)
Dept: PHYSICAL THERAPY | Age: 42
Discharge: HOME | End: 2017-09-28
Payer: COMMERCIAL

## 2017-09-28 PROCEDURE — 97112 NEUROMUSCULAR REEDUCATION: CPT

## 2017-09-29 NOTE — PROGRESS NOTES
Zoraida pitt  : 1975  Payor: Hemant Martinez / Plan: Denys Palmer RPN / Product Type: Commerical /  [address]  at [facility]  [address], [place] [address] [address] [facility]  [phone]   [phone]           OUTPATIENT PHYSICAL THERAPY:Daily Note 17   ICD-10: Treatment Diagnosis: Difficulty in walking, not elsewhere classified (R26.2); Hemiplegia and hemiparesis following cerebral infarction affecting right dominant side (U76.456)  Precautions/Allergies:   Erythromycin   Fall Risk Score: 0 (? 5 = High Risk)  MD Orders: PT eval MEDICAL/REFERRING DIAGNOSIS:  Stroke (cerebrum) ([facility] [address] [I63.9]   DATE OF ONSET: 8/3/17  REFERRING PHYSICIAN: Kera Parisi*  RETURN PHYSICIAN APPOINTMENT: unknown  DATE OF PROGRESS NOTE:    RECERTIFICATION DATE: 69     INITIAL ASSESSMENT:  Ms. Demi pitt presents with dominant right hemiplegia s/p CVA on 8/3/17. Pt presents with ADL, balance and gait deficits. Pt will benefit from physical therapy to maximize to full potential and safety with all functional mobility. PROBLEM LIST (Impacting functional limitations):  1. Decreased Strength  2. Decreased ADL/Functional Activities  3. Decreased Ambulation Ability/Technique  4. Decreased Balance  5. Increased Pain  6. Decreased Activity Tolerance  7. Decreased Flexibility/Joint Mobility  8. Decreased [place] with Home Exercise Program INTERVENTIONS PLANNED:  1. Balance Exercise  2. Bed Mobility  3. Family Education  4. Gait Training  5. Home Exercise Program (HEP)  6. Neuromuscular Re-education/Strengthening  7. Range of Motion (ROM)  8. Therapeutic Activites  9. Therapeutic Exercise/Strengthening  10. Transfer Training  11. modalities   TREATMENT PLAN:  Effective Dates: 17 TO 12/15/17. Frequency/Duration: 3 times a week for 8 weeks  GOALS: (Goals have been discussed and agreed upon with patient.)  Short-Term Functional Goals: Time Frame: 4 weeks  1.  Pt will be independent with range of motion, strength and balance home exercise program.  2. Pt will decrease TUG score by 5 seconds indicating more normalized and safer gait pattern. Discharge Goals: Time Frame: 8 weeks  Pt will show increased range of motion and improved posture to allow for improved safety and ability with all functional mobility. Pt will decrease TUG score by 10 seconds indicating more normalized gait pattern and decrease risk for falls. Pt will increase Perez Balance Scale to 49/56 indicating increased balance and decreased risk for falls. Rehabilitation Potential For Stated Goals: Good  Regarding [place] therapy, I certify that the treatment plan above will be carried out by a therapist or under their direction. Thank you for this referral,  Johnny Portillo, PT                  HISTORY:   GOAL:  Love to be back to doing things independenlty. Would like to get to where I don't have to use the cane. Get back to my own home. Present Symptoms:    38year old  right handed female s/p left CVA 8/3/17 2* to HTN. Short term disability for now. 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 Very physical job. More sued to my left hand. Learning how to function with one hand is difficult. ADLs with just my left hand is difficult. Dynamic sitting balance. I just have to be careluf that my foot is flat especially if I have my shoes off. Tire easily. Buildig up strength. Large base quad cane but not all the time at home. Can't carry a heavy load. PAIN:  Right fingers and shoulder with slight movement. 6/10  History of Present Injury/Illness (Reason for Referral):  See above  Past Medical History/Comorbidities:   Ms. Perry Cutler  has a past medical history of Generalized anxiety disorder; GERD (gastroesophageal reflux disease); Hypertension; Insomnia, unspecified; Iron deficiency anemia (8/23/2013); Left sided lacunar stroke ([facility] [address] (08/2017); Mitral valve prolapse (1993);  Obesity (BMI 30-39.9); and Prediabetes (7/28/2015). Ms. Katina Stern  has a past surgical history that includes tubal ligation (1995) and endoscopy (10/1/04). Social History/Living Environment:     25and 25year old son and daughter. . 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 Staying at [employer] house which is easier to maneuver - low tub - can walk right in. Small wheelchair ramp and then level. Mom helps with driving and household duties - cooking. Dominant Side:         RIGHT    Current Medications:    Current Outpatient Prescriptions:     atorvastatin (LIPITOR) 40 mg tablet, Take 1 Tab by mouth daily. , Disp: 30 Tab, Rfl: 6    diazePAM (VALIUM) 5 mg tablet, Take 0.5 Tabs by mouth every six (6) hours as needed. Max Daily Amount: 10 mg. Indications: MUSCLE SPASM, Disp: 60 Tab, Rfl: 1    tiZANidine (ZANAFLEX) 4 mg tablet, Take 1 Tab by mouth three (3) times daily. , Disp: 90 Tab, Rfl: 6    traZODone (DESYREL) 50 mg tablet, Take 1 Tab by mouth nightly as needed for Sleep. Indications: insomnia associated with depression, Disp: 30 Tab, Rfl: 2    valsartan (DIOVAN) 160 mg tablet, Take 1 Tab by mouth daily. , Disp: 30 Tab, Rfl: 6    hydroCHLOROthiazide (MICROZIDE) 12.5 mg capsule, Take 2 Caps by mouth daily. , Disp: 30 Cap, Rfl: 6    potassium chloride SR (K-TAB) 20 mEq tablet, Take 1 Tab by mouth two (2) times a day., Disp: 60 Tab, Rfl: 6    aspirin (ASPIRIN) 325 mg tablet, Take 1 Tab by mouth daily. , Disp: 30 Tab, Rfl: 12    acetaminophen (TYLENOL) 325 mg tablet, Take 2 Tabs by mouth every six (6) hours as needed for Pain or Fever (headache). , Disp: 30 Tab, Rfl: 0    escitalopram oxalate (LEXAPRO) 20 mg tablet, Take 1 Tab by mouth daily. , Disp: 30 Tab, Rfl: 5   Date Last Reviewed:  9/28/17   Number of Personal Factors/Comorbidities that affect the Plan of Care: 1-2: MODERATE COMPLEXITY EXAMINATION:   ROM:          Painful right shoulder and finger and hands. Shoulder not fully assessed. Seeing OT. Passive Fingers to neutral extension at least.  No overpressure given. Elbow slight decrease as end ranges. Supination ~75% with mild pain. Right lower extremity WFL  Left WFL  Strength:          Right UE:  Almost no active movement noted. Possible scap stabilization and tone is not flaccid. Right hip flexion:  3-/5  Knee extension at least 4/5  Nothing in DF. Functional Mobility:         Gait/Ambulation:  Ambulates with large base quad came with slow small steps with custom hinged AFO on. Transfers:  Independent. occ no WBing on the right with sit to stand        Bed Mobility:  NT  Mental Status:          Alert and oriented x 4;  Pleasant and appropriate  Vision:          No deficits reported. Body Structures Involved:  1. Nerves  2. Bones  3. Joints  4. Muscles  5. Ligaments Body Functions Affected:  1. Sensory/Pain  2. Neuromusculoskeletal  3. Movement Related Activities and Participation Affected:  1. Learning and Applying Knowledge  2. General Tasks and Demands  3. Mobility  4. Self Care  5. Domestic Life  6. Interpersonal Interactions and Relationships  7. Community, Social and [place]   Number of elements that affect the Plan of Care: 4+: HIGH COMPLEXITY   CLINICAL PRESENTATION:   Presentation: Evolving clinical presentation with changing clinical characteristics: MODERATE COMPLEXITY   CLINICAL DECISION MAKING:   Outcome Measure: Tool Used: Perez Balance Scale  Score:  Initial: 46/56 Most Recent: X/56 (Date: -- )   Interpretation of Score: Each section is scored on a 0-4 scale, 0 representing the patients inability to perform the task and 4 representing independence. The scores of each section are added together for a total score of 56. The higher the patients score, the more independent the patient is.   Any score below 45 indicates increased risk for falls.    Score 56 55-45 44-34 33-23 22-12 11-1 0   Modifier CH CI CJ CK CL CM CN       Tool Used: Timed Up and Go (TUG)  Score:  Initial: with cane:  45.24 seconds; without cane 52.01 seconds Most Recent: X seconds (Date: -- )   Interpretation of Score: The test measures, in seconds, the time taken by an individual to stand up from a standard arm chair (seat height 46 cm [18 in], arm height 65 cm [25.6 in]), walk a distance of 3 meters (118 in, approx 10 ft), turn, walk back to the chair and sit down. If the individual takes longer than 14 seconds to complete TUG, this indicates risk for falls. Score 7 7.5-10.5 11-14 14.5-17.5 18-21 21.5-24.5 25+   Modifier CH CI CJ CK CL CM CN     Medical Necessity:   · Skilled intervention continues to be required due to residual right sided hemiparesis. Reason for Services/Other Comments:  · Patient continues to require skilled intervention due to residual right hemiparesis. .   Use of outcome tool(s) and clinical judgement create a POC that gives a: Questionable prediction of patient's progress: MODERATE COMPLEXITY      S:    Yeah my  has been doing some of the things she Ruth Tejeda) showed us and it feels better. TREATMENT:   (In addition to Assessment/Re-Assessment sessions the following treatments were rendered)    NEUROMUSCULAR RE-EDUCATION: (50 minutes):  Exercise/activities per grid below to improve balance, coordination, kinesthetic sense, proprioception and motor control. Required maximal manual, tactile and facilitation cues to perfrom activities. Date:  9/26/17 Date:  9/28/17 Date:   Activity/Exercise Parameters Parameters Parameters   Right UE tone reduction and sensorimotor retraining for increased arm swing and posture during gait Supine passive range of motion, mobilization and facilitation of shoulder, elbow and forearm. Slow movement in shoulder as pt is very sensitive and gets what sounds like some impingement.   Good pectoral stretch and flexion to about 110. Hand left in [place]. Very sensitive fingers. Marked decrease in tone after above. Arm able to rest/hang at side during gait. Right UE relaxed at side of body. Seated in chair (pt is short in stature). Right shoe and brace off Passive to gradual minimal facilitation of Sensorimotor retraining of the foot and knee. Foot slides  Inversion/eversion \"windshielf wipers\"  Foot on 4 kg medicine ball - facilitated foot slides, hip IR and ER with foot on ball, foot circles on ball both directions. Passive to gradual minimal facilitation of Sensorimotor retraining of the foot and knee. Foot slides  Inversion/eversion \"windshielf wipers\"  Foot on 4 kg medicine ball - facilitated foot slides, hip IR and ER with foot on ball, foot circles on ball both directions. Gradually required less stabilization. 15 minutes    Stepping forward and back with left leg at rail Working on right hip and knee release and return to start x 10 with contact facilitation to keep foot positioned safely. Occ mild right knee recurvatum  1.  2 x 10 at rail. Working on right hip and knee release and then return and step back. Good knee control. occ facilitation to maintain knee. 2.  Right forward step and then swing/kick with right. Leg stops short in approximately 110* of knee flexion    Facilitated long arc quad  x20 with PT and stool in front. Working to get pt to place heel on chair in front - offering max/full facilitation at foot and toes. Gait retraining With shoes on and cane x 120' Working on larger left step and decrease the \"step to\" pause. Walking without brace or shoes at track rail. Pt working to release right hip and knee and swing right foot. Emerging HS. EDUCATION:  Instructed in above exercises and home program.  HEP:  No Written HEP given to patient yet. Instructed to owrk on increase step length on the left.      Treatment/Session Assessment:  Excellent increase in motor control around the foot, ankle and knee   Patients response to todays treatment session was tolerated well with no medical complications. .  · Post session pain:  No pain  · Compliance with Program/Exercises: Will assess as treatment progresses. · Recommendations/Intent for next treatment session: \"Next visit will focus on advancements to more challenging activities\".   Total Treatment Duration:  PT Patient Time In/Time Out  Time In: 1140  Time Out: [place] [address] [place] [address]

## 2017-10-02 ENCOUNTER — HOSPITAL ENCOUNTER (OUTPATIENT)
Dept: PHYSICAL THERAPY | Age: 42
Discharge: HOME | End: 2017-10-02
Payer: COMMERCIAL

## 2017-10-02 PROCEDURE — 97112 NEUROMUSCULAR REEDUCATION: CPT

## 2017-10-02 PROCEDURE — 97110 THERAPEUTIC EXERCISES: CPT

## 2017-10-02 NOTE — PROGRESS NOTES
Zoarida pitt  : 1975  Payor: Odalis Perez / Plan: Freddy Schafer RPN / Product Type: Commerical /  [address]  [facility], [address]  [phone]   [phone]           OUTPATIENT PHYSICAL THERAPY:Daily Note 17   ICD-10: Treatment Diagnosis: Difficulty in walking, not elsewhere classified (R26.2); Hemiplegia and hemiparesis following cerebral infarction affecting right dominant side (A98.423)  Precautions/Allergies:   Erythromycin   Fall Risk Score: 0 (? 5 = High Risk)  MD Orders: PT eval MEDICAL/REFERRING DIAGNOSIS:  Stroke (cerebrum) ([facility] [address] [I63.9]   DATE OF ONSET: 8/3/17  REFERRING PHYSICIAN: Kera Handley*  RETURN PHYSICIAN APPOINTMENT: unknown  DATE OF PROGRESS NOTE:    RECERTIFICATION DATE:      INITIAL ASSESSMENT:  Ms. Demi pitt presents with dominant right hemiplegia s/p CVA on 8/3/17. Pt presents with ADL, balance and gait deficits. Pt will benefit from physical therapy to maximize to full potential and safety with all functional mobility. PROBLEM LIST (Impacting functional limitations):  1. Decreased Strength  2. Decreased ADL/Functional Activities  3. Decreased Ambulation Ability/Technique  4. Decreased Balance  5. Increased Pain  6. Decreased Activity Tolerance  7. Decreased Flexibility/Joint Mobility  8. Decreased [place] with Home Exercise Program INTERVENTIONS PLANNED:  1. Balance Exercise  2. Bed Mobility  3. Family Education  4. Gait Training  5. Home Exercise Program (HEP)  6. Neuromuscular Re-education/Strengthening  7. Range of Motion (ROM)  8. Therapeutic Activites  9. Therapeutic Exercise/Strengthening  10. Transfer Training  11. modalities   TREATMENT PLAN:  Effective Dates: 17 TO 12/15/17. Frequency/Duration: 3 times a week for 8 weeks  GOALS: (Goals have been discussed and agreed upon with patient.)  Short-Term Functional Goals: Time Frame: 4 weeks  1.  Pt will be independent with range of motion, strength and balance home exercise program.  2. Pt will decrease TUG score by 5 seconds indicating more normalized and safer gait pattern. Discharge Goals: Time Frame: 8 weeks  Pt will show increased range of motion and improved posture to allow for improved safety and ability with all functional mobility. Pt will decrease TUG score by 10 seconds indicating more normalized gait pattern and decrease risk for falls. Pt will increase Perez Balance Scale to 49/56 indicating increased balance and decreased risk for falls. Rehabilitation Potential For Stated Goals: Good  Regarding [place] therapy, I certify that the treatment plan above will be carried out by a therapist or under their direction. Thank you for this referral,  Yareli Briceno DPT                  HISTORY:   GOAL:  Love to be back to doing things independenlty. Would like to get to where I don't have to use the cane. Get back to my own home. Present Symptoms:    38year old  right handed female s/p left CVA 8/3/17 2* to HTN. Short term disability for now. 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 Very physical job. More sued to my left hand. Learning how to function with one hand is difficult. ADLs with just my left hand is difficult. Dynamic sitting balance. I just have to be careluf that my foot is flat especially if I have my shoes off. Tire easily. Buildig up strength. Large base quad cane but not all the time at home. Can't carry a heavy load. PAIN:  Right fingers and shoulder with slight movement. 6/10  History of Present Injury/Illness (Reason for Referral):  See above  Past Medical History/Comorbidities:   Ms. Souleymane Jain  has a past medical history of Generalized anxiety disorder; GERD (gastroesophageal reflux disease); Hypertension; Insomnia, unspecified; Iron deficiency anemia (8/23/2013); Left sided lacunar stroke ([facility] [address] (08/2017); Mitral valve prolapse (1993);  Obesity (BMI 30-39.9); and Prediabetes (7/28/2015). Ms. Gifty Thompson  has a past surgical history that includes tubal ligation (1995) and endoscopy (10/1/04). Social History/Living Environment:     25and 25year old son and daughter. . Prior Level of Function/Work/Activity:  Worked in [employer] department. 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 One story - Own home:  About 5 steps to get in with bilateral railing. Staying at [facility] house which is easier to maneuver - low tub - can walk right in. Small wheelchair ramp and then level. Mom helps with driving and household duties - cooking. Dominant Side:         RIGHT    Current Medications:    Current Outpatient Prescriptions:     atorvastatin (LIPITOR) 40 mg tablet, Take 1 Tab by mouth daily. , Disp: 30 Tab, Rfl: 6    diazePAM (VALIUM) 5 mg tablet, Take 0.5 Tabs by mouth every six (6) hours as needed. Max Daily Amount: 10 mg. Indications: MUSCLE SPASM, Disp: 60 Tab, Rfl: 1    tiZANidine (ZANAFLEX) 4 mg tablet, Take 1 Tab by mouth three (3) times daily. , Disp: 90 Tab, Rfl: 6    traZODone (DESYREL) 50 mg tablet, Take 1 Tab by mouth nightly as needed for Sleep. Indications: insomnia associated with depression, Disp: 30 Tab, Rfl: 2    valsartan (DIOVAN) 160 mg tablet, Take 1 Tab by mouth daily. , Disp: 30 Tab, Rfl: 6    hydroCHLOROthiazide (MICROZIDE) 12.5 mg capsule, Take 2 Caps by mouth daily. , Disp: 30 Cap, Rfl: 6    potassium chloride SR (K-TAB) 20 mEq tablet, Take 1 Tab by mouth two (2) times a day., Disp: 60 Tab, Rfl: 6    aspirin (ASPIRIN) 325 mg tablet, Take 1 Tab by mouth daily. , Disp: 30 Tab, Rfl: 12    acetaminophen (TYLENOL) 325 mg tablet, Take 2 Tabs by mouth every six (6) hours as needed for Pain or Fever (headache). , Disp: 30 Tab, Rfl: 0    escitalopram oxalate (LEXAPRO) 20 mg tablet, Take 1 Tab by mouth daily. , Disp: 30 Tab, Rfl: 5   Date Last Reviewed:  9/28/17   Number of Personal Factors/Comorbidities that affect the Plan of Care: 1-2: MODERATE COMPLEXITY EXAMINATION:   ROM:          Painful right shoulder and finger and hands. Shoulder not fully assessed. Seeing OT. Passive Fingers to neutral extension at least.  No overpressure given. Elbow slight decrease as end ranges. Supination ~75% with mild pain. Right lower extremity WFL  Left WFL  Strength:          Right UE:  Almost no active movement noted. Possible scap stabilization and tone is not flaccid. Right hip flexion:  3-/5  Knee extension at least 4/5  Nothing in DF. Functional Mobility:         Gait/Ambulation:  Ambulates with large base quad came with slow small steps with custom hinged AFO on. Transfers:  Independent. occ no WBing on the right with sit to stand        Bed Mobility:  NT  Mental Status:          Alert and oriented x 4;  Pleasant and appropriate  Vision:          No deficits reported. Body Structures Involved:  1. Nerves  2. Bones  3. Joints  4. Muscles  5. Ligaments Body Functions Affected:  1. Sensory/Pain  2. Neuromusculoskeletal  3. Movement Related Activities and Participation Affected:  1. Learning and Applying Knowledge  2. General Tasks and Demands  3. Mobility  4. Self Care  5. Domestic Life  6. Interpersonal Interactions and Relationships  7. Community, Social and [place]   Number of elements that affect the Plan of Care: 4+: HIGH COMPLEXITY   CLINICAL PRESENTATION:   Presentation: Evolving clinical presentation with changing clinical characteristics: MODERATE COMPLEXITY   CLINICAL DECISION MAKING:   Outcome Measure: Tool Used: Perez Balance Scale  Score:  Initial: 46/56 Most Recent: X/56 (Date: -- )   Interpretation of Score: Each section is scored on a 0-4 scale, 0 representing the patients inability to perform the task and 4 representing independence. The scores of each section are added together for a total score of 56. The higher the patients score, the more independent the patient is.   Any score below 45 indicates increased risk for falls.    Score 56 55-45 44-34 33-23 22-12 11-1 0   Modifier CH CI CJ CK CL CM CN       Tool Used: Timed Up and Go (TUG)  Score:  Initial: with cane:  45.24 seconds; without cane 52.01 seconds Most Recent: X seconds (Date: -- )   Interpretation of Score: The test measures, in seconds, the time taken by an individual to stand up from a standard arm chair (seat height 46 cm [18 in], arm height 65 cm [25.6 in]), walk a distance of 3 meters (118 in, approx 10 ft), turn, walk back to the chair and sit down. If the individual takes longer than 14 seconds to complete TUG, this indicates risk for falls. Score 7 7.5-10.5 11-14 14.5-17.5 18-21 21.5-24.5 25+   Modifier CH CI CJ CK CL CM CN     Medical Necessity:   · Skilled intervention continues to be required due to residual right sided hemiparesis. Reason for Services/Other Comments:  · Patient continues to require skilled intervention due to residual right hemiparesis. .   Use of outcome tool(s) and clinical judgement create a POC that gives a: Questionable prediction of patient's progress: MODERATE COMPLEXITY      S:       Patient reports no pain today. Says she tried to do some walking without her cane over the weekend. TREATMENT:   (In addition to Assessment/Re-Assessment sessions the following treatments were rendered)    NEUROMUSCULAR RE-EDUCATION: (30 minutes):  Exercise/activities per grid below to improve balance, coordination, kinesthetic sense, proprioception and motor control. Required maximal manual, tactile and facilitation cues to perfrom activities. Date:  9/26/17 Date:  9/28/17 Date:  10/2/17   Activity/Exercise Parameters Parameters Parameters   Right UE tone reduction and sensorimotor retraining for increased arm swing and posture during gait Supine passive range of motion, mobilization and facilitation of shoulder, elbow and forearm. Slow movement in shoulder as pt is very sensitive and gets what sounds like some impingement.   Good pectoral stretch and flexion to about 110. Hand left in [place]. Very sensitive fingers. Marked decrease in tone after above. Arm able to rest/hang at side during gait. Right UE relaxed at side of body. Seated in chair (pt is short in stature). Right shoe and brace off Passive to gradual minimal facilitation of Sensorimotor retraining of the foot and knee. Foot slides  Inversion/eversion \"windshielf wipers\"  Foot on 4 kg medicine ball - facilitated foot slides, hip IR and ER with foot on ball, foot circles on ball both directions. Passive to gradual minimal facilitation of Sensorimotor retraining of the foot and knee. Foot slides  Inversion/eversion \"windshielf wipers\"  Foot on 4 kg medicine ball - facilitated foot slides, hip IR and ER with foot on ball, foot circles on ball both directions. Gradually required less stabilization. 15 minutes Manual facilitation to R ankle/foot to improve muscle activation. Able to get toe extension x 10 reps with facilitation  Foot on 4 kg medicine ball - facilitated foot slides  Foot on mini rockerboard for assisted dorsiflexion/plantarflexion, therapist facilitating       Stepping forward and back with left leg at rail Working on right hip and knee release and return to start x 10 with contact facilitation to keep foot positioned safely. Occ mild right knee recurvatum  1.  2 x 10 at rail. Working on right hip and knee release and then return and step back. Good knee control. occ facilitation to maintain knee. 2.  Right forward step and then swing/kick with right. Leg stops short in approximately 110* of knee flexion    Facilitated long arc quad  x20 with PT and stool in front. Working to get pt to place heel on chair in front - offering max/full facilitation at foot and toes. X 10 reps, able to get full extension    Gait retraining With shoes on and cane x 120' Working on larger left step and decrease the \"step to\" pause.  Walking without brace or shoes at track rail. Pt working to release right hip and knee and swing right foot. Emerging HS. With brace on, no assistive device, 120' with therapist facilitating hip/knee release. EDUCATION:  Instructed in above exercises and home program.  HEP:  No Written HEP given to patient yet. Instructed to owrk on increase step length on the left. Treatment/Session Assessment:  Patient was 10 minutes late for therapy session today. Worked on improving ankle and foot firing with some good activation during seated exercises. Patients response to todays treatment session was tolerated well with no medical complications. .  · Post session pain:  No pain  · Compliance with Program/Exercises: Will assess as treatment progresses. · Recommendations/Intent for next treatment session: \"Next visit will focus on advancements to more challenging activities\".   Total Treatment Duration:  PT Patient Time In/Time Out  Time In: 0855  Time Out: 0930    Len Valverde DPT

## 2017-10-02 NOTE — PROGRESS NOTES
Zoraida pitt  : 1975 [facility]  [address], [address] [facility], [address]  [phone]   [phone]         OUTPATIENT OCCUPATIONAL THERAPY: Daily Note and Treatment Day: 2nd 10/2/2017    ICD-10: Treatment Diagnosis:   Hemiplegia and hemiparesis following cerebral infarction affecting right dominant side (I69.351)  Precautions/Allergies:   Erythromycin   Fall Risk Score: 3 (? 5 = High Risk)  MD Orders: OT evaluate and treat  MEDICAL/REFERRING DIAGNOSIS:   Stroke (cerebrum) ([facility] [address] [I63.9]   DATE OF ONSET: 2017   REFERRING PHYSICIAN: Maryfrances Oppenheim, Amy*  RETURN PHYSICIAN APPOINTMENT: 2017     INITIAL ASSESSMENT:  Ms. Demi pitt presents to outpatient occupational therapy services s/p CVA with R side hemiplegia. Pt arrived to therapy using quad cane for functional mobility. Pt is extremely pleasant and appears motivated to increase independence levels. Pt currently has no active R UE AROM, but does have some scapular movement present at this time. Pt does report pain in the R shoulder above 90 degrees of ROM, but no subluxation present at this time. Pt noted to have some increased tone in the R UE flexors. Pt receptive to education and verbalizes that she has been using techniques and suggestions that she learned in inpatient rehab. Pt has had no falls since returning home but does report some incontinence at times (stating this all started with the use of Zanaflex). Pt has minimal sensory deficits at this time. Pt does have some impaired midline orientation with functional transfers. Pt is currently functioning below baseline for ADL/functional transfers which impacts her overall quality of life. Pt will benefit from OT services to address stated goals and plan of care. PLAN OF CARE:   PROBLEM LIST:  1. Decreased Strength  2. Decreased ADL/Functional Activities  3. Decreased Transfer Abilities  4.  Decreased Ambulation Ability/Technique  5. Decreased Balance  6. Increased Pain  7. Decreased Activity Tolerance  8. Decreased Flexibility/Joint Mobility  9. Decreased [place] with Home Exercise Program INTERVENTIONS PLANNED:  1. Activities of daily living training  2. Adaptive equipment training  3. Balance training  4. Clothing management  5. Donning&doffing training  6. Manual therapy training  7. Modalities  8. Neuromuscular re-eduation  9. Splinting  10. Therapeutic activity  11. Therapeutic exercise   TREATMENT PLAN:  Effective Dates: 9/11/17 TO 12/11/17. Frequency/Duration: 2 times a week for 12 weeks  GOALS: (Goals have been discussed and agreed upon with patient.)  Short-Term Functional Goals: Time Frame:4- 6 weeks  1. Pipo Stauffer will report decreased pain in the R shoulder from 5/10 to 3/10 with passive movement to decrease stiffness for daily activity. 2. Pipo Stauffer will be 100% compliant with daily SROM exercises for improving R UE ROM to decrease risk for contractures. 3. Pipo Stauffer will demonstrate ability to complete weightbearing activities in R UE with moderate facilitation to improve functional use for transfers. 4. Pipo Stauffer will complete basic ADL with supervision to improve independence with functional tasks. 5. Pipo Stauffer will tolerate 15 minutes of dynamic standing balance activity with CGA to decrease risk for falls with ADL and to improve activity tolerance. 6. Discharge Goals: Time Frame: 8-12 weeks   1. Pipo Stauffer will have functional PROM of R shoulder to [facility] to decrease pain and stiffness in R dominant UE. 2. Pipo Stauffer will demonstrate improved strength in the R dominant UE as evidenced by at least 2+ to 3-/5 strength for using R UE as functional assist.   3. Pipo Stauffer will demonstrate ability to functionally grasp and release light objects with supervision and with use of R dominant hand.    4. Pipo Stauffer will be modified independent with all basic ADL to improve overall quality of life. 5. Roxanne Samples will use R hand as functional assist with daily activities/transfers with supervision to improve independence with ADL. Rehabilitation Potential For Stated Goals: Good  Regarding [place] therapy, I certify that the treatment plan above will be carried out by a therapist or under their direction. Thank you for this referral,  Yajaira Bowens OT                 The information in this section was collected on 9/11/17 (except where otherwise noted). OCCUPATIONAL PROFILE & HISTORY:   History of Present Injury/Illness (Reason for Referral):  Pt reports she was at work when she started feeling like she was going to pass out. Pt reports she didn't really stop working and the feeling wouldn't go away. It started at 2pm and left work at 3:30 pm. Pt felt like she was going to fall and pushed a buggy to her car and drove to her daughter's house. Pt then was brought over to the ER at [facility]. Progressively got worse with R sided weakness and woke up the next morning with full parlaysis of the R side. Reports her eyesight was decreasing and needing reading glasses. R eye is a little worse with the blurry vision. Pt wears to reading glasses. Pt using a wheelchair as less as possible. Using wheelchair when out and about. Pt using a quad cane around the home and has AFO to R LE. Tub transfer bench with CGA. Eats at the kitchen table. Pt stayed from Aug 8th- September 6th in inpatient rehab. Pt reports no falls at home. Pt getting dressed using techniques learned for inpatient rehab. Difficulty walking and carrying objects. Pt has some issues with Zanaflex and incontinence (she is unsure if that is related). Pt presents today for outpatient occupational therapy services. Past Medical History/Comorbidities:   Ms. Cady Sargent  has a past medical history of Generalized anxiety disorder; GERD (gastroesophageal reflux disease); Hypertension;  Insomnia, unspecified; Iron deficiency anemia (8/23/2013); Left sided lacunar stroke ([facility] [address] (08/2017); Mitral valve prolapse (1993); Obesity (BMI 30-39.9); and Prediabetes (7/28/2015). Ms. Sharlene Palomo  has a past surgical history that includes tubal ligation (1995) and endoscopy (10/1/04). Social History/Living Environment:   Lives with mom with her  because toilet/tub is lower with more open spaces. Pt also has a son. Pt has 24 hr supervision. Prior Level of Function/Work/Activity: Indpendent and working full time in [employer] at [employer] .   Dominant Side:         RIGHTPersonal Factors: Other factors that influence how disability is experienced by the patient:  Hx of anxiety disorder, HTN, Pre-diabetic    Current Medications:    Current Outpatient Prescriptions:     atorvastatin (LIPITOR) 40 mg tablet, Take 1 Tab by mouth daily. , Disp: 30 Tab, Rfl: 6    diazePAM (VALIUM) 5 mg tablet, Take 0.5 Tabs by mouth every six (6) hours as needed. Max Daily Amount: 10 mg. Indications: MUSCLE SPASM, Disp: 60 Tab, Rfl: 1    tiZANidine (ZANAFLEX) 4 mg tablet, Take 1 Tab by mouth three (3) times daily. , Disp: 90 Tab, Rfl: 6    traZODone (DESYREL) 50 mg tablet, Take 1 Tab by mouth nightly as needed for Sleep. Indications: insomnia associated with depression, Disp: 30 Tab, Rfl: 2    valsartan (DIOVAN) 160 mg tablet, Take 1 Tab by mouth daily. , Disp: 30 Tab, Rfl: 6    hydroCHLOROthiazide (MICROZIDE) 12.5 mg capsule, Take 2 Caps by mouth daily. , Disp: 30 Cap, Rfl: 6    potassium chloride SR (K-TAB) 20 mEq tablet, Take 1 Tab by mouth two (2) times a day., Disp: 60 Tab, Rfl: 6    aspirin (ASPIRIN) 325 mg tablet, Take 1 Tab by mouth daily. , Disp: 30 Tab, Rfl: 12    acetaminophen (TYLENOL) 325 mg tablet, Take 2 Tabs by mouth every six (6) hours as needed for Pain or Fever (headache). , Disp: 30 Tab, Rfl: 0    escitalopram oxalate (LEXAPRO) 20 mg tablet, Take 1 Tab by mouth daily. , Disp: 30 Tab, Rfl: 5            Date Last Reviewed:  9/11/17 Complexity Level : Expanded review of therapy/medical records (1-2):  MODERATE COMPLEXITY   ASSESSMENT OF OCCUPATIONAL PERFORMANCE:     Palpation:  Patient with no palpated subluxation of the R shoulder at this time. Increased pain and tightness with R shoulder flexion/abduction with passive ROM above 90 degrees. Pain along anterior portion of humeral head with anterior tilt. Pain radiates along the middle deltoid. Modified Maggi Scale     Grade Description : Noted in ELBOW FLEXORS, WRIST FLEXORS, FINGER FLEXORS  []0 - No increase in muscle tone  []1 - Slight increase in muscle tone, manifested by a catch and release, or by minimal resistance at the end of the range of motion when the affected part(s) is moved in flexion or extension  [x]1+ - Slight increase in muscle tone, manifested by a catch, followed by minimal resistance throughout the remainder (less than half) of the range of movement (ROM)  []2 - More marked increase in muscle tone through most of ROM, but affected part(s) easily moved  []3 - Considerable increase in muscle tone, passive movement difficult  []4 - Affected part(s) rigid in flexion and extension      ROM/Strength:  L UE WNL; No functional AROM of R UE.            Date:  9/11/17  Date:  9/11/17    PROM  STRENGTH   Movement RIGHT  RIGHT   SCAPULA:      Elevation  Limited  2   Protraction  Limited  2+   Retraction  Limited   2   SHOULDER:      Flexion  100  0   Abduction  90  1   External rotation  70  0   Internal rotation  WNL  0   Horizontal abduction 100  0   Horizontal adduction  WFL  0   Extension       ELBOW:      Flexion  WNL  0   Extension  WNL  0   FOREARM:      Supination  WFL  0   Pronation  WFL  0   WRIST:      Wrist flexion  65  0   Wrist extension  65  0   Wrist radial deviation       Wrist ulnar deviation       HAND:      Finger flexion WFL  0   Finger extension WFL  0   THUMB:       Abduction  WFL  0    Extension WFL  0    Adduction        MCP flexion  WFL  0    IP flexion  WFL  0   Hand Strength:       unable  0   THREE JAW KARY PINCH unable  0   LATERAL PINCH  unable  0     Functional Mobility:         Gait/Ambulation:  CGA to supervision with quad cane; Uses wheelchair occasionally         Bed Mobility:  CGA  Balance:          Good static standing balance but fair to poor dynamic standing balance  Coordination:          Non-functional in R dominant UE  Mental Status: WNL  Vision:          Reading glasses. Reports some blurry vision in R eye. Tracking and visual fields intact. Some decreased speed with saccadic movement to the R. Activities of Daily Living:           Basic ADLs (From Assessment) Complex ADLs (From Assessment)         Grooming/Bathing/Dressing Activities of Daily Living                                   Sensation:         Grossly intact to light touch in B UE. R hand with some decreased light touch sensation. Reports some hypersensitivity to cold. Physical Skills Involved:  1. Range of Motion  2. Balance  3. Strength  4. Activity Tolerance  5. Sensation  6. Fine Motor Control  7. Gross Motor Control  8. Pain (acute) Cognitive Skills Affected (resulting in the inability to perform in a timely and safe manner):  1. WNL Psychosocial Skills Affected:  1. Anxiety Disorder (per chart review)   Number of elements that affect the Plan of Care: 5+:  HIGH COMPLEXITY   CLINICAL DECISION MAKING:   Outcome Measure: Tool Used: Fugl-Goodman Upper Extremity Motor Assessment  Score:  Initial: 8/66 Most Recent: X (Date: -- )   Interpretation of Score: Outcome Measure Conversion Site    ?  Carrying, Moving, and Handling Objects:     - CURRENT STATUS: CM - 80%-99% impaired, limited or restricted    - GOAL STATUS: CK - 40%-59% impaired, limited or restricted    - D/C STATUS:  ---------------To be determined---------------     Fugl-Goodman Upper Extremity Motor Assessment (without reflexes) Date:  9/11 Date:   Date:     Item Description Score Score Score   Wrist circumduction 0     Hook grasp 0     Shoulder flexion to 180°, elbow extended 0     Spherical grasp 0     Lateral prehension 0     Wrist flexion/extension, elbow extended 0     Pronation-supination, elbow extended 0     Wrist stable, elbow extended 0     Movement with normal speed 0     Forearm supination 0     Shoulder abduction to 90°, elbow extended 0     Movement without dysmetria 0     Shoulder external rotation 0     Wrist stable, elbow at 90° 0     Wrist flexion/extension, elbow at 90° 0     Palmar prehension 0     Scapular retraction 1     Pronation-supination, elbow at 90° 0     Shoulder flexion to 90°, elbow extended 0     Hand to lumbar spine 0     Shoulder abduction 0     Elbow extension 0     Forearm pronation 0     Movement without tremor 0     Cylindrical grasp 0     Finger mass extension (relaxation of flexion) 0     Scapular elevation 1     Finger mass flexion 0     Shoulder adduction with internal rotation 0     Elbow flexion 0     Total Score (Max = 60) 2           Medical Necessity:   · Patient demonstrates good rehab potential due to higher previous functional level. Reason for Services/Other Comments:  · Patient continues to require skilled intervention due to decreased functional independence s/p CVA impacting overall quality of life. Clinical Decision-Making Assessment:     Assessment process, impact of co-morbidities, assessment modification\need for assistance, and selection of interventions: Analytical Complexity:HIGH COMPLEXITY   TREATMENT:   (In addition to Assessment/Re-Assessment sessions the following treatments were rendered)    Pre-treatment Symptoms/Complaints:    Pain: Initial:   Post Session:  0/10      Therapeutic Exercise: ( 20 minutes):  Exercises per grid below to improve mobility. Required maximal manual and tactile cues to promote proper body alignment, promote proper body posture and promote proper body mechanics.   Progressed repetitions and complexity of movement as indicated. Date:  9/25/17 Date:  10/2/17 Date:     Activity/Exercise Parameters Parameters Parameters   Shoulder external rotation PROM to tolerance with caregiver education  PROM to tolerance x 10 reps (shoulder adducted in supine)    Shoulder flexion  PROM to tolerance with caregiver education  PROM to ~100 degrees x 10 reps to tolerance (supine)    Shoulder abduction  PROM to tolerance with caregiver education  PROM x 10 reps to tolerance in supine     Shoulder horizontal abduction/adduction  PROM to tolerance with caregiver education      Elbow flexion/extension  PROM to tolerance with caregiver education  PROM into extension with stretch for 2-3 minutes with pressure along biceps tendon     Wrist extension  PROM to tolerance with caregiver education  PROM to tolerance and holding 2-3 minutes             Neuromuscular Re-education: ( 25 minutes):  Exercise/activities per grid below to improve kinesthetic sense and proprioception. Required minimal visual, verbal, manual and tactile cues to promote motor control of right, upper extremity(s). Date:  10/2/17 Date:   Date:     Activity/Exercise Parameters Parameters Parameters   Supine to Sit Pt completed with NDT technique and CGA with encouragement of use of R UE     PNF D1 flexion/extension to total manual cues      Mobile Arm Support 1) Forward reaching with total assistance and pulling back into retraction with maximal assistance; 15 reps  2) External Rotation x 10-15 reps with total assistance     Weightbearing  1) 8 reps into the elbow with minimal verbal/tactile cues  2) 8 reps with prop under R wrist to decrease pain with maximal facilitation at R elbow      Tone Reduction into hand Blood stretching with facilitation of R thumb into extension                             Treatment/Session Assessment:    · Response to Treatment: Patient tolerated session well with minimal complaints of pain.  Pt reported that s/p treatment pt felt much better and less stiff in the R UE. Pt tolerated weightbearing well, but due to limited ROM in the wrist and digits, therapist used a prop. Will continue to work on improving wrist ROM. Pt with good scapular activation, but poor shoulder, elbow, wrist, and hand strength (still 0/5). Pt to continue per plan of care. · Compliance with Program/Exercises: Will assess as treatment progresses. · Recommendations/Intent for next treatment session: \"Next visit will focus on advancements to more challenging activities and reduction in assistance provided\".   Total Treatment Duration:OT Patient Time In/Time Out  Time In: 0930  Time Out: [address], OT

## 2017-10-04 ENCOUNTER — HOSPITAL ENCOUNTER (OUTPATIENT)
Dept: PHYSICAL THERAPY | Age: 42
Discharge: HOME | End: 2017-10-04
Payer: COMMERCIAL

## 2017-10-04 PROBLEM — R79.89 ELEVATED LFTS: Status: ACTIVE | Noted: 2017-10-04

## 2017-10-04 PROCEDURE — 97110 THERAPEUTIC EXERCISES: CPT

## 2017-10-04 PROCEDURE — 97112 NEUROMUSCULAR REEDUCATION: CPT

## 2017-10-04 NOTE — PROGRESS NOTES
Zoraida pitt  : 1975  Payor: Shira [place] / Plan: Amarilis Toribio RPN / Product Type: Commerical /  [id number]  at [facility]  [address], [address], [facility], [address] [facility]  [phone]   [phone]           OUTPATIENT PHYSICAL THERAPY:Daily Note 17   ICD-10: Treatment Diagnosis: Difficulty in walking, not elsewhere classified (R26.2); Hemiplegia and hemiparesis following cerebral infarction affecting right dominant side (N68.033)  Precautions/Allergies:   Erythromycin   Fall Risk Score: 0 (? 5 = High Risk)  MD Orders: PT eval MEDICAL/REFERRING DIAGNOSIS:  Stroke (cerebrum) ([facility] [address] [I63.9]   DATE OF ONSET: 8/3/17  REFERRING PHYSICIAN: Kera Pineda*  RETURN PHYSICIAN APPOINTMENT: unknown  DATE OF PROGRESS NOTE:    RECERTIFICATION DATE:      INITIAL ASSESSMENT:  Ms. Demi pitt presents with dominant right hemiplegia s/p CVA on 8/3/17. Pt presents with ADL, balance and gait deficits. Pt will benefit from physical therapy to maximize to full potential and safety with all functional mobility. PROBLEM LIST (Impacting functional limitations):  1. Decreased Strength  2. Decreased ADL/Functional Activities  3. Decreased Ambulation Ability/Technique  4. Decreased Balance  5. Increased Pain  6. Decreased Activity Tolerance  7. Decreased Flexibility/Joint Mobility  8. Decreased [place] with Home Exercise Program INTERVENTIONS PLANNED:  1. Balance Exercise  2. Bed Mobility  3. Family Education  4. Gait Training  5. Home Exercise Program (HEP)  6. Neuromuscular Re-education/Strengthening  7. Range of Motion (ROM)  8. Therapeutic Activites  9. Therapeutic Exercise/Strengthening  10. Transfer Training  11. modalities   TREATMENT PLAN:  Effective Dates: 17 TO 12/15/17. Frequency/Duration: 3 times a week for 8 weeks  GOALS: (Goals have been discussed and agreed upon with patient.)  Short-Term Functional Goals: Time Frame: 4 weeks  1.  Pt will be independent with range of motion, strength and balance home exercise program.  2. Pt will decrease TUG score by 5 seconds indicating more normalized and safer gait pattern. Discharge Goals: Time Frame: 8 weeks  Pt will show increased range of motion and improved posture to allow for improved safety and ability with all functional mobility. Pt will decrease TUG score by 10 seconds indicating more normalized gait pattern and decrease risk for falls. Pt will increase Perez Balance Scale to 49/56 indicating increased balance and decreased risk for falls. Rehabilitation Potential For Stated Goals: Good  Regarding [place] therapy, I certify that the treatment plan above will be carried out by a therapist or under their direction. Thank you for this referral,  Mitra Ahn DPT                  HISTORY:   GOAL:  Love to be back to doing things independenlty. Would like to get to where I don't have to use the cane. Get back to my own home. Present Symptoms:    38year old  right handed female s/p left CVA 8/3/17 2* to HTN. Short term disability for now. Work in the [employer] at [employer]. Very physical job. More sued to my left hand. Learning how to function with one hand is difficult. ADLs with just my left hand is difficult. Dynamic sitting balance. I just have to be careluf that my foot is flat especially if I have my shoes off. Tire easily. Buildig up strength. Large base quad cane but not all the time at home. Can't carry a heavy load. PAIN:  Right fingers and shoulder with slight movement. 6/10  History of Present Injury/Illness (Reason for Referral):  See above  Past Medical History/Comorbidities:   Ms. Cady Sargent  has a past medical history of Generalized anxiety disorder; GERD (gastroesophageal reflux disease); Hypertension; Insomnia, unspecified; Iron deficiency anemia (8/23/2013); Left sided lacunar stroke ([facility] [address] (08/2017); Mitral valve prolapse (1993);  Obesity (BMI 30-39.9); and Prediabetes (7/28/2015). Ms. Paramjit Flores  has a past surgical history that includes tubal ligation (1995) and endoscopy (10/1/04). Social History/Living Environment:     25and 25year old son and daughter. . Prior Level of Function/Work/Activity:  Worked in [employer] department. 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 One story - Own home:  About 5 steps to get in with bilateral railing. Staying at [facility] house which is easier to maneuver - low tub - can walk right in. Small wheelchair ramp and then level. Mom helps with driving and household duties - cooking. Dominant Side:         RIGHT    Current Medications:    Current Outpatient Prescriptions:     raNITIdine (ZANTAC) 150 mg tablet, Take 1 Tab by mouth two (2) times a day., Disp: 60 Tab, Rfl: 5    diazePAM (VALIUM) 5 mg tablet, Take 0.5 Tabs by mouth every six (6) hours as needed. Max Daily Amount: 10 mg. Indications: MUSCLE SPASM, Disp: 60 Tab, Rfl: 1    tiZANidine (ZANAFLEX) 4 mg tablet, Take 1 Tab by mouth three (3) times daily. , Disp: 90 Tab, Rfl: 6    traZODone (DESYREL) 50 mg tablet, Take 1 Tab by mouth nightly as needed for Sleep. Indications: insomnia associated with depression, Disp: 30 Tab, Rfl: 2    hydroCHLOROthiazide (MICROZIDE) 12.5 mg capsule, Take 2 Caps by mouth daily. , Disp: 30 Cap, Rfl: 6    potassium chloride SR (K-TAB) 20 mEq tablet, Take 1 Tab by mouth two (2) times a day., Disp: 60 Tab, Rfl: 6    aspirin (ASPIRIN) 325 mg tablet, Take 1 Tab by mouth daily. , Disp: 30 Tab, Rfl: 12    acetaminophen (TYLENOL) 325 mg tablet, Take 2 Tabs by mouth every six (6) hours as needed for Pain or Fever (headache). , Disp: 30 Tab, Rfl: 0    escitalopram oxalate (LEXAPRO) 20 mg tablet, Take 1 Tab by mouth daily. , Disp: 30 Tab, Rfl: 5   Date Last Reviewed:  9/28/17   Number of Personal Factors/Comorbidities that affect the Plan of Care: 1-2: MODERATE COMPLEXITY   EXAMINATION:   ROM:          Painful right shoulder and finger and hands. Shoulder not fully assessed. Seeing OT. Passive Fingers to neutral extension at least.  No overpressure given. Elbow slight decrease as end ranges. Supination ~75% with mild pain. Right lower extremity WFL  Left WFL  Strength:          Right UE:  Almost no active movement noted. Possible scap stabilization and tone is not flaccid. Right hip flexion:  3-/5  Knee extension at least 4/5  Nothing in DF. Functional Mobility:         Gait/Ambulation:  Ambulates with large base quad came with slow small steps with custom hinged AFO on. Transfers:  Independent. occ no WBing on the right with sit to stand        Bed Mobility:  NT  Mental Status:          Alert and oriented x 4;  Pleasant and appropriate  Vision:          No deficits reported. Body Structures Involved:  1. Nerves  2. Bones  3. Joints  4. Muscles  5. Ligaments Body Functions Affected:  1. Sensory/Pain  2. Neuromusculoskeletal  3. Movement Related Activities and Participation Affected:  1. Learning and Applying Knowledge  2. General Tasks and Demands  3. Mobility  4. Self Care  5. Domestic Life  6. Interpersonal Interactions and Relationships  7. Community, Social and [place]   Number of elements that affect the Plan of Care: 4+: HIGH COMPLEXITY   CLINICAL PRESENTATION:   Presentation: Evolving clinical presentation with changing clinical characteristics: MODERATE COMPLEXITY   CLINICAL DECISION MAKING:   Outcome Measure: Tool Used: Perez Balance Scale  Score:  Initial: 46/56 Most Recent: X/56 (Date: -- )   Interpretation of Score: Each section is scored on a 0-4 scale, 0 representing the patients inability to perform the task and 4 representing independence. The scores of each section are added together for a total score of 56. The higher the patients score, the more independent the patient is. Any score below 45 indicates increased risk for falls.     Score 56 55-45 44-34 33-23 22-12 11-1 0   Modifier CH CI CJ CK CL CM CN Tool Used: Timed Up and Go (TUG)  Score:  Initial: with cane:  45.24 seconds; without cane 52.01 seconds Most Recent: X seconds (Date: -- )   Interpretation of Score: The test measures, in seconds, the time taken by an individual to stand up from a standard arm chair (seat height 46 cm [18 in], arm height 65 cm [25.6 in]), walk a distance of 3 meters (118 in, approx 10 ft), turn, walk back to the chair and sit down. If the individual takes longer than 14 seconds to complete TUG, this indicates risk for falls. Score 7 7.5-10.5 11-14 14.5-17.5 18-21 21.5-24.5 25+   Modifier CH CI CJ CK CL CM CN     Medical Necessity:   · Skilled intervention continues to be required due to residual right sided hemiparesis. Reason for Services/Other Comments:  · Patient continues to require skilled intervention due to residual right hemiparesis. .   Use of outcome tool(s) and clinical judgement create a POC that gives a: Questionable prediction of patient's progress: MODERATE COMPLEXITY      S:        I have been working on my balance at home. At home, I try and walk without my brace. TREATMENT:   (In addition to Assessment/Re-Assessment sessions the following treatments were rendered)    NEUROMUSCULAR RE-EDUCATION: (30 minutes):  Exercise/activities per grid below to improve balance, coordination, kinesthetic sense, proprioception and motor control. Required maximal manual, tactile and facilitation cues to perfrom activities. Date:  9/28/17 Date:  10/2/17 Date:  10/4/17   Activity/Exercise Parameters Parameters Parameters    Right UE tone reduction and sensorimotor retraining for increased arm swing and posture during gait Right UE relaxed at side of body. Seated in chair (pt is short in stature). Right shoe and brace off Passive to gradual minimal facilitation of Sensorimotor retraining of the foot and knee.   Foot slides  Inversion/eversion \"windshielf wipers\"  Foot on 4 kg medicine ball - facilitated foot slides, hip IR and ER with foot on ball, foot circles on ball both directions. Gradually required less stabilization. 15 minutes Manual facilitation to R ankle/foot to improve muscle activation. Able to get toe extension x 10 reps with facilitation  Foot on 4 kg medicine ball - facilitated foot slides  Foot on mini rockerboard for assisted dorsiflexion/plantarflexion, therapist facilitating     Manual facilitation to R ankle/foot to improve muscle activation. Able to get toe extension 2 x 10 reps with facilitation  Manual facilitation to R calf for heel raises, 1+ strengthen noted with facilitation 2 x 5 reps    Stepping forward and back with left leg at rail 1.  2 x 10 at rail. Working on right hip and knee release and then return and step back. Good knee control. occ facilitation to maintain knee. 2.  Right forward step and then swing/kick with right. Leg stops short in approximately 110* of knee flexion  At wall x 10 reps for hip and knee release    Facilitated long arc quad x20 with PT and stool in front. Working to get pt to place heel on chair in front - offering max/full facilitation at foot and toes. X 10 reps, able to get full extension     LE tone reduction prior to gait    X 5 minutes in supine to decrease tone and synergy pattern. Gait retraining Walking without brace or shoes at track rail. Pt working to release right hip and knee and swing right foot. Emerging HS. With brace on, no assistive device, 120' with therapist facilitating hip/knee release. With brace on, no assistive device, 120' with therapist facilitating hip/knee release. Working on making step length more symmetrical          EDUCATION:  Instructed in above exercises and home program.  HEP:  No Written HEP given to patient yet. Instructed to owrk on increase step length on the left. Treatment/Session Assessment:  Patient was 15 minutes late for treatment today.   She got some good activation of toe extensors and plantarflexors with facilitation, able to carry over for a few reps after facilitation but then dissipated. Patients response to todays treatment session was tolerated well with no medical complications. .  · Post session pain:  No pain  · Compliance with Program/Exercises: Will assess as treatment progresses. · Recommendations/Intent for next treatment session: \"Next visit will focus on advancements to more challenging activities\".   Total Treatment Duration:  PT Patient Time In/Time Out  Time In: 0915  Time Out: [address]

## 2017-10-04 NOTE — PROGRESS NOTES
Zoraida pitt  : 1975 [facility]  [address] [facility], [address] [place] [address], [address]  [phone]   [phone]         OUTPATIENT OCCUPATIONAL THERAPY: Daily Note and Treatment Day: 3rd 10/4/2017    ICD-10: Treatment Diagnosis:   Hemiplegia and hemiparesis following cerebral infarction affecting right dominant side (I69.351)  Precautions/Allergies:   Erythromycin   Fall Risk Score: 3 (? 5 = High Risk)  MD Orders: OT evaluate and treat  MEDICAL/REFERRING DIAGNOSIS:   Stroke (cerebrum) ([address] [I63.9]   DATE OF ONSET: 2017   REFERRING PHYSICIAN: Kera Davis*  RETURN PHYSICIAN APPOINTMENT: 2017     INITIAL ASSESSMENT:  Ms. Demi pitt presents to outpatient occupational therapy services s/p CVA with R side hemiplegia. Pt arrived to therapy using quad cane for functional mobility. Pt is extremely pleasant and appears motivated to increase independence levels. Pt currently has no active R UE AROM, but does have some scapular movement present at this time. Pt does report pain in the R shoulder above 90 degrees of ROM, but no subluxation present at this time. Pt noted to have some increased tone in the R UE flexors. Pt receptive to education and verbalizes that she has been using techniques and suggestions that she learned in inpatient rehab. Pt has had no falls since returning home but does report some incontinence at times (stating this all started with the use of Zanaflex). Pt has minimal sensory deficits at this time. Pt does have some impaired midline orientation with functional transfers. Pt is currently functioning below baseline for ADL/functional transfers which impacts her overall quality of life. Pt will benefit from OT services to address stated goals and plan of care. PLAN OF CARE:   PROBLEM LIST:  1. Decreased Strength  2. Decreased ADL/Functional Activities  3. Decreased Transfer Abilities  4.  Decreased Ambulation Ability/Technique  5. Decreased Balance  6. Increased Pain  7. Decreased Activity Tolerance  8. Decreased Flexibility/Joint Mobility  9. Decreased [place] with Home Exercise Program INTERVENTIONS PLANNED:  1. Activities of daily living training  2. Adaptive equipment training  3. Balance training  4. Clothing management  5. Donning&doffing training  6. Manual therapy training  7. Modalities  8. Neuromuscular re-eduation  9. Splinting  10. Therapeutic activity  11. Therapeutic exercise   TREATMENT PLAN:  Effective Dates: 9/11/17 TO 12/11/17. Frequency/Duration: 2 times a week for 12 weeks  GOALS: (Goals have been discussed and agreed upon with patient.)  Short-Term Functional Goals: Time Frame:4- 6 weeks  1. Pipo Stauffer will report decreased pain in the R shoulder from 5/10 to 3/10 with passive movement to decrease stiffness for daily activity. 2. Pipo Stauffer will be 100% compliant with daily SROM exercises for improving R UE ROM to decrease risk for contractures. 3. Pipo Stauffer will demonstrate ability to complete weightbearing activities in R UE with moderate facilitation to improve functional use for transfers. 4. Pipo Stauffer will complete basic ADL with supervision to improve independence with functional tasks. 5. Pipo Stauffer will tolerate 15 minutes of dynamic standing balance activity with CGA to decrease risk for falls with ADL and to improve activity tolerance. 6. Discharge Goals: Time Frame: 8-12 weeks   1. Pipo Stauffer will have functional PROM of R shoulder to [facility] to decrease pain and stiffness in R dominant UE. 2. Pipo Stauffer will demonstrate improved strength in the R dominant UE as evidenced by at least 2+ to 3-/5 strength for using R UE as functional assist.   3. Pipo Stauffer will demonstrate ability to functionally grasp and release light objects with supervision and with use of R dominant hand.    4. Pipo Stauffer will be modified independent with all basic ADL to improve overall quality of life. 5. Jennie Schumacher will use R hand as functional assist with daily activities/transfers with supervision to improve independence with ADL. Rehabilitation Potential For Stated Goals: Good  Regarding [place] therapy, I certify that the treatment plan above will be carried out by a therapist or under their direction. Thank you for this referral,  Allen Cruz OT                 The information in this section was collected on 9/11/17 (except where otherwise noted). OCCUPATIONAL PROFILE & HISTORY:   History of Present Injury/Illness (Reason for Referral):  Pt reports she was at work when she started feeling like she was going to pass out. Pt reports she didn't really stop working and the feeling wouldn't go away. It started at 2pm and left work at 3:30 pm. Pt felt like she was going to fall and pushed a buggy to her car and drove to her daughter's house. Pt then was brought over to the ER at [facility]. Progressively got worse with R sided weakness and woke up the next morning with full parlaysis of the R side. Reports her eyesight was decreasing and needing reading glasses. R eye is a little worse with the blurry vision. Pt wears to reading glasses. Pt using a wheelchair as less as possible. Using wheelchair when out and about. Pt using a quad cane around the home and has AFO to R LE. Tub transfer bench with CGA. Eats at the kitchen table. Pt stayed from Aug 8th- September 6th in inpatient rehab. Pt reports no falls at home. Pt getting dressed using techniques learned for inpatient rehab. Difficulty walking and carrying objects. Pt has some issues with Zanaflex and incontinence (she is unsure if that is related). Pt presents today for outpatient occupational therapy services. Past Medical History/Comorbidities:   Ms. Mic Wilkins  has a past medical history of Generalized anxiety disorder; GERD (gastroesophageal reflux disease); Hypertension;  Insomnia, unspecified; Iron deficiency anemia (8/23/2013); Left sided lacunar stroke ([facility] (08/2017); Mitral valve prolapse (1993); Obesity (BMI 30-39.9); and Prediabetes (7/28/2015). Ms. Katina Stern  has a past surgical history that includes tubal ligation (1995) and endoscopy (10/1/04). Social History/Living Environment:   Lives with mom with her  because toilet/tub is lower with more open spaces. Pt also has a son. Pt has 24 hr supervision. Prior Level of Function/Work/Activity: Indpendent and working full time in [employer] at [employer] .   Dominant Side:         RIGHTPersonal Factors: Other factors that influence how disability is experienced by the patient:  Hx of anxiety disorder, HTN, Pre-diabetic    Current Medications:    Current Outpatient Prescriptions:     raNITIdine (ZANTAC) 150 mg tablet, Take 1 Tab by mouth two (2) times a day., Disp: 60 Tab, Rfl: 5    diazePAM (VALIUM) 5 mg tablet, Take 0.5 Tabs by mouth every six (6) hours as needed. Max Daily Amount: 10 mg. Indications: MUSCLE SPASM, Disp: 60 Tab, Rfl: 1    tiZANidine (ZANAFLEX) 4 mg tablet, Take 1 Tab by mouth three (3) times daily. , Disp: 90 Tab, Rfl: 6    traZODone (DESYREL) 50 mg tablet, Take 1 Tab by mouth nightly as needed for Sleep. Indications: insomnia associated with depression, Disp: 30 Tab, Rfl: 2    hydroCHLOROthiazide (MICROZIDE) 12.5 mg capsule, Take 2 Caps by mouth daily. , Disp: 30 Cap, Rfl: 6    potassium chloride SR (K-TAB) 20 mEq tablet, Take 1 Tab by mouth two (2) times a day., Disp: 60 Tab, Rfl: 6    aspirin (ASPIRIN) 325 mg tablet, Take 1 Tab by mouth daily. , Disp: 30 Tab, Rfl: 12    acetaminophen (TYLENOL) 325 mg tablet, Take 2 Tabs by mouth every six (6) hours as needed for Pain or Fever (headache). , Disp: 30 Tab, Rfl: 0    escitalopram oxalate (LEXAPRO) 20 mg tablet, Take 1 Tab by mouth daily. , Disp: 30 Tab, Rfl: 5            Date Last Reviewed:  9/11/17   Complexity Level : Expanded review of therapy/medical records (1-2): MODERATE COMPLEXITY   ASSESSMENT OF OCCUPATIONAL PERFORMANCE:     Palpation:  Patient with no palpated subluxation of the R shoulder at this time. Increased pain and tightness with R shoulder flexion/abduction with passive ROM above 90 degrees. Pain along anterior portion of humeral head with anterior tilt. Pain radiates along the middle deltoid. Modified Maggi Scale     Grade Description : Noted in ELBOW FLEXORS, WRIST FLEXORS, FINGER FLEXORS  []0 - No increase in muscle tone  []1 - Slight increase in muscle tone, manifested by a catch and release, or by minimal resistance at the end of the range of motion when the affected part(s) is moved in flexion or extension  [x]1+ - Slight increase in muscle tone, manifested by a catch, followed by minimal resistance throughout the remainder (less than half) of the range of movement (ROM)  []2 - More marked increase in muscle tone through most of ROM, but affected part(s) easily moved  []3 - Considerable increase in muscle tone, passive movement difficult  []4 - Affected part(s) rigid in flexion and extension      ROM/Strength:  L UE WNL; No functional AROM of R UE.            Date:  9/11/17  Date:  9/11/17    PROM  STRENGTH   Movement RIGHT  RIGHT   SCAPULA:      Elevation  Limited  2   Protraction  Limited  2+   Retraction  Limited   2   SHOULDER:      Flexion  100  0   Abduction  90  1   External rotation  70  0   Internal rotation  WNL  0   Horizontal abduction 100  0   Horizontal adduction  WFL  0   Extension       ELBOW:      Flexion  WNL  0   Extension  WNL  0   FOREARM:      Supination  WFL  0   Pronation  WFL  0   WRIST:      Wrist flexion  65  0   Wrist extension  65  0   Wrist radial deviation       Wrist ulnar deviation       HAND:      Finger flexion WFL  0   Finger extension WFL  0   THUMB:       Abduction  WFL  0    Extension WFL  0    Adduction        MCP flexion  WFL  0    IP flexion  WFL  0   Hand Strength:       unable  0   THREE JAW [address] unable  0   LATERAL PINCH  unable  0     Functional Mobility:         Gait/Ambulation:  CGA to supervision with quad cane; Uses wheelchair occasionally         Bed Mobility:  CGA  Balance:          Good static standing balance but fair to poor dynamic standing balance  Coordination:          Non-functional in R dominant UE  Mental Status: WNL  Vision:          Reading glasses. Reports some blurry vision in R eye. Tracking and visual fields intact. Some decreased speed with saccadic movement to the R. Activities of Daily Living:           Basic ADLs (From Assessment) Complex ADLs (From Assessment)         Grooming/Bathing/Dressing Activities of Daily Living                                   Sensation:         Grossly intact to light touch in B UE. R hand with some decreased light touch sensation. Reports some hypersensitivity to cold. Physical Skills Involved:  1. Range of Motion  2. Balance  3. Strength  4. Activity Tolerance  5. Sensation  6. Fine Motor Control  7. Gross Motor Control  8. Pain (acute) Cognitive Skills Affected (resulting in the inability to perform in a timely and safe manner):  1. WNL Psychosocial Skills Affected:  1. Anxiety Disorder (per chart review)   Number of elements that affect the Plan of Care: 5+:  HIGH COMPLEXITY   CLINICAL DECISION MAKING:   Outcome Measure: Tool Used: Fugl-Goodman Upper Extremity Motor Assessment  Score:  Initial: 8/66 Most Recent: X (Date: -- )   Interpretation of Score: Outcome Measure Conversion Site    ?  Carrying, Moving, and Handling Objects:     - CURRENT STATUS: CM - 80%-99% impaired, limited or restricted    - GOAL STATUS: CK - 40%-59% impaired, limited or restricted    - D/C STATUS:  ---------------To be determined---------------     Fugl-Goodman Upper Extremity Motor Assessment (without reflexes) Date:  9/11 Date:   Date:     Item Description Score Score Score   Wrist circumduction 0     Hook grasp 0     Shoulder flexion to 180°, elbow extended 0     Spherical grasp 0     Lateral prehension 0     Wrist flexion/extension, elbow extended 0     Pronation-supination, elbow extended 0     Wrist stable, elbow extended 0     Movement with normal speed 0     Forearm supination 0     Shoulder abduction to 90°, elbow extended 0     Movement without dysmetria 0     Shoulder external rotation 0     Wrist stable, elbow at 90° 0     Wrist flexion/extension, elbow at 90° 0     Palmar prehension 0     Scapular retraction 1     Pronation-supination, elbow at 90° 0     Shoulder flexion to 90°, elbow extended 0     Hand to lumbar spine 0     Shoulder abduction 0     Elbow extension 0     Forearm pronation 0     Movement without tremor 0     Cylindrical grasp 0     Finger mass extension (relaxation of flexion) 0     Scapular elevation 1     Finger mass flexion 0     Shoulder adduction with internal rotation 0     Elbow flexion 0     Total Score (Max = 60) 2           Medical Necessity:   · Patient demonstrates good rehab potential due to higher previous functional level. Reason for Services/Other Comments:  · Patient continues to require skilled intervention due to decreased functional independence s/p CVA impacting overall quality of life. Clinical Decision-Making Assessment:     Assessment process, impact of co-morbidities, assessment modification\need for assistance, and selection of interventions: Analytical Complexity:HIGH COMPLEXITY   TREATMENT:   (In addition to Assessment/Re-Assessment sessions the following treatments were rendered)    Pre-treatment Symptoms/Complaints:    Pain: Initial:   2/10 Post Session:  0/10      Therapeutic Exercise: (  15 minutes):  Exercises per grid below to improve mobility. Required maximal manual and tactile cues to promote proper body alignment, promote proper body posture and promote proper body mechanics. Progressed repetitions and complexity of movement as indicated.    Date:  9/25/17 Date:  10/2/17 Date:  10/4/17   Activity/Exercise Parameters Parameters Parameters   Shoulder external rotation PROM to tolerance with caregiver education  PROM to tolerance x 10 reps (shoulder adducted in supine)    Shoulder flexion  PROM to tolerance with caregiver education  PROM to ~100 degrees x 10 reps to tolerance (supine)    Shoulder abduction  PROM to tolerance with caregiver education  PROM x 10 reps to tolerance in supine     Shoulder horizontal abduction/adduction  PROM to tolerance with caregiver education      Elbow flexion/extension  PROM to tolerance with caregiver education  PROM into extension with stretch for 2-3 minutes with pressure along biceps tendon     Wrist extension  PROM to tolerance with caregiver education  PROM to tolerance and holding 2-3 minutes  Stretch at wrist with hand on the mat and flexing forward at the trunk to allow for extension to tolerance   Shoulder Shrug    1) 10 reps AAROM  2) 10 reps AROM   Scapular Retraction    15 reps AROM   Shoulder Protraction    15 reps AROM     Neuromuscular Re-education: (  30 minutes):  Exercise/activities per grid below to improve kinesthetic sense and proprioception. Required minimal visual, verbal, manual and tactile cues to promote motor control of right, upper extremity(s).     Date:  10/2/17 Date:  10/4/17 Date:     Activity/Exercise Parameters Parameters Parameters   Supine to Sit Pt completed with NDT technique and CGA with encouragement of use of R UE     PNF D1 flexion/extension to total manual cues      Mobile Arm Support 1) Forward reaching with total assistance and pulling back into retraction with maximal assistance; 15 reps  2) External Rotation x 10-15 reps with total assistance 1) Forward reaching with total assistance and pulling back into retraction with maximal assistance; 15 reps  2) External Rotation x 10-15 reps with total assistance    Weightbearing  1) 8 reps into the elbow with minimal verbal/tactile cues  2) 8 reps with prop under R wrist to decrease pain with maximal facilitation at R elbow  15 reps with elbow extended and hand on a ball to serve as a prop    Tone Reduction into hand Blood stretching with facilitation of R thumb into extension Blood stretching with metacarpal mobilizations    Trunk Rotation   5 reps to each side holding 5-10 seconds     Trunk Flexion   10 reps in sitting with hands clasped and with supervision     Weightshiftting   Pt shifting side to side with hands clasped and reaching  X 10 reps to each side                 Treatment/Session Assessment:    · Response to Treatment: Patient initially reporting pain in the R hand with attempts to bring digits into extension. Pt tolerated well with minimal c/o pain after completing tone reduction with stretching the hand as well as completing weightbearing. Pt provided with exercises today for HEP that pt will be able to complete without assistance and to address reducing tone in trunk and improving ROM. Pt motivated during session. Pt to continue per plan of care. · Compliance with Program/Exercises: Will assess as treatment progresses. · Recommendations/Intent for next treatment session: \"Next visit will focus on advancements to more challenging activities and reduction in assistance provided\".   Total Treatment Duration:OT Patient Time In/Time Out  Time In: 0945  Time Out: 400 [facility],

## 2017-10-06 ENCOUNTER — HOSPITAL ENCOUNTER (OUTPATIENT)
Dept: PHYSICAL THERAPY | Age: 42
Discharge: HOME | End: 2017-10-06
Payer: COMMERCIAL

## 2017-10-06 PROCEDURE — 97112 NEUROMUSCULAR REEDUCATION: CPT

## 2017-10-06 NOTE — PROGRESS NOTES
Zoraida pitt  : 1975  Payor: Odalis Perez / Plan: Freddy Schafer RPN / Product Type: Commerical /  [id number]  at [facility] [address], [address] [facility], [address]  [phone]   [phone]           OUTPATIENT PHYSICAL THERAPY:Daily Note 10/6/2017   ICD-10: Treatment Diagnosis: Difficulty in walking, not elsewhere classified (R26.2); Hemiplegia and hemiparesis following cerebral infarction affecting right dominant side (Y99.904)  Precautions/Allergies:   Erythromycin   Fall Risk Score: 0 (? 5 = High Risk)  MD Orders: PT eval MEDICAL/REFERRING DIAGNOSIS:  Stroke (cerebrum) ([facility] [address] [I63.9]   DATE OF ONSET: 8/3/17  REFERRING PHYSICIAN: Kera Handley*  RETURN PHYSICIAN APPOINTMENT: unknown  DATE OF PROGRESS NOTE:    RECERTIFICATION DATE:      INITIAL ASSESSMENT:  Ms. Demi pitt presents with dominant right hemiplegia s/p CVA on 8/3/17. Pt presents with ADL, balance and gait deficits. Pt will benefit from physical therapy to maximize to full potential and safety with all functional mobility. PROBLEM LIST (Impacting functional limitations):  1. Decreased Strength  2. Decreased ADL/Functional Activities  3. Decreased Ambulation Ability/Technique  4. Decreased Balance  5. Increased Pain  6. Decreased Activity Tolerance  7. Decreased Flexibility/Joint Mobility  8. Decreased [place] with Home Exercise Program INTERVENTIONS PLANNED:  1. Balance Exercise  2. Bed Mobility  3. Family Education  4. Gait Training  5. Home Exercise Program (HEP)  6. Neuromuscular Re-education/Strengthening  7. Range of Motion (ROM)  8. Therapeutic Activites  9. Therapeutic Exercise/Strengthening  10. Transfer Training  11. modalities   TREATMENT PLAN:  Effective Dates: 17 TO 12/15/17. Frequency/Duration: 3 times a week for 8 weeks  GOALS: (Goals have been discussed and agreed upon with patient.)  Short-Term Functional Goals: Time Frame: 4 weeks  1.  Pt will be independent with range of motion, strength and balance home exercise program.  2. Pt will decrease TUG score by 5 seconds indicating more normalized and safer gait pattern. Discharge Goals: Time Frame: 8 weeks  Pt will show increased range of motion and improved posture to allow for improved safety and ability with all functional mobility. Pt will decrease TUG score by 10 seconds indicating more normalized gait pattern and decrease risk for falls. Pt will increase Perez Balance Scale to 49/56 indicating increased balance and decreased risk for falls. Rehabilitation Potential For Stated Goals: Good  Regarding [place] therapy, I certify that the treatment plan above will be carried out by a therapist or under their direction. Thank you for this referral,  Katie Hopkins, PT                  HISTORY:   GOAL:  Love to be back to doing things independenlty. Would like to get to where I don't have to use the cane. Get back to my own home. Present Symptoms:    38year old  right handed female s/p left CVA 8/3/17 2* to HTN. Short term disability for now. 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 Very physical job. More sued to my left hand. Learning how to function with one hand is difficult. ADLs with just my left hand is difficult. Dynamic sitting balance. I just have to be careluf that my foot is flat especially if I have my shoes off. Tire easily. Buildig up strength. Large base quad cane but not all the time at home. Can't carry a heavy load. PAIN:  Right fingers and shoulder with slight movement. 6/10  History of Present Injury/Illness (Reason for Referral):  See above  Past Medical History/Comorbidities:   Ms. Mic Wilkins  has a past medical history of Generalized anxiety disorder; GERD (gastroesophageal reflux disease); Hypertension; Insomnia, unspecified; Iron deficiency anemia (8/23/2013); Left sided lacunar stroke ([facility] [address] (08/2017); Mitral valve prolapse (1993);  Obesity (BMI 30-39.9); and Prediabetes (7/28/2015). Ms. Darwin Martinez  has a past surgical history that includes tubal ligation (1995) and endoscopy (10/1/04). Social History/Living Environment:     25and 25year old son and daughter. . Prior Level of Function/Work/Activity:  Worked in [employer] department. 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 One story - Own home:  About 5 steps to get in with bilateral railing. Staying at [facility] house which is easier to maneuver - low tub - can walk right in. Small wheelchair ramp and then level. Mom helps with driving and household duties - cooking. Dominant Side:         RIGHT    Current Medications:    Current Outpatient Prescriptions:     raNITIdine (ZANTAC) 150 mg tablet, Take 1 Tab by mouth two (2) times a day., Disp: 60 Tab, Rfl: 5    diazePAM (VALIUM) 5 mg tablet, Take 0.5 Tabs by mouth every six (6) hours as needed. Max Daily Amount: 10 mg. Indications: MUSCLE SPASM, Disp: 60 Tab, Rfl: 1    tiZANidine (ZANAFLEX) 4 mg tablet, Take 1 Tab by mouth three (3) times daily. , Disp: 90 Tab, Rfl: 6    traZODone (DESYREL) 50 mg tablet, Take 1 Tab by mouth nightly as needed for Sleep. Indications: insomnia associated with depression, Disp: 30 Tab, Rfl: 2    hydroCHLOROthiazide (MICROZIDE) 12.5 mg capsule, Take 2 Caps by mouth daily. , Disp: 30 Cap, Rfl: 6    potassium chloride SR (K-TAB) 20 mEq tablet, Take 1 Tab by mouth two (2) times a day., Disp: 60 Tab, Rfl: 6    aspirin (ASPIRIN) 325 mg tablet, Take 1 Tab by mouth daily. , Disp: 30 Tab, Rfl: 12    acetaminophen (TYLENOL) 325 mg tablet, Take 2 Tabs by mouth every six (6) hours as needed for Pain or Fever (headache). , Disp: 30 Tab, Rfl: 0    escitalopram oxalate (LEXAPRO) 20 mg tablet, Take 1 Tab by mouth daily. , Disp: 30 Tab, Rfl: 5   Date Last Reviewed:  10/6/2017     Number of Personal Factors/Comorbidities that affect the Plan of Care: 1-2: MODERATE COMPLEXITY   EXAMINATION:   ROM:          Painful right shoulder and finger and hands.  Shoulder not fully assessed. Seeing OT. Passive Fingers to neutral extension at least.  No overpressure given. Elbow slight decrease as end ranges. Supination ~75% with mild pain. Right lower extremity WFL  Left WFL  Strength:          Right UE:  Almost no active movement noted. Possible scap stabilization and tone is not flaccid. Right hip flexion:  3-/5  Knee extension at least 4/5  Nothing in DF. Functional Mobility:         Gait/Ambulation:  Ambulates with large base quad came with slow small steps with custom hinged AFO on. Transfers:  Independent. occ no WBing on the right with sit to stand        Bed Mobility:  NT  Mental Status:          Alert and oriented x 4;  Pleasant and appropriate  Vision:          No deficits reported. Body Structures Involved:  1. Nerves  2. Bones  3. Joints  4. Muscles  5. Ligaments Body Functions Affected:  1. Sensory/Pain  2. Neuromusculoskeletal  3. Movement Related Activities and Participation Affected:  1. Learning and Applying Knowledge  2. General Tasks and Demands  3. Mobility  4. Self Care  5. Domestic Life  6. Interpersonal Interactions and Relationships  7. Community, Social and [place]   Number of elements that affect the Plan of Care: 4+: HIGH COMPLEXITY   CLINICAL PRESENTATION:   Presentation: Evolving clinical presentation with changing clinical characteristics: MODERATE COMPLEXITY   CLINICAL DECISION MAKING:   Outcome Measure: Tool Used: Perez Balance Scale  Score:  Initial: 46/56 Most Recent: X/56 (Date: -- )   Interpretation of Score: Each section is scored on a 0-4 scale, 0 representing the patients inability to perform the task and 4 representing independence. The scores of each section are added together for a total score of 56. The higher the patients score, the more independent the patient is. Any score below 45 indicates increased risk for falls.     Score 56 55-45 44-34 33-23 22-12 11-1 0   Modifier CH CI CJ CK CL CM CN       Tool Used: Timed Up and Go (TUG)  Score:  Initial: with cane:  45.24 seconds; without cane 52.01 seconds Most Recent: X seconds (Date: -- )   Interpretation of Score: The test measures, in seconds, the time taken by an individual to stand up from a standard arm chair (seat height 46 cm [18 in], arm height 65 cm [25.6 in]), walk a distance of 3 meters (118 in, approx 10 ft), turn, walk back to the chair and sit down. If the individual takes longer than 14 seconds to complete TUG, this indicates risk for falls. Score 7 7.5-10.5 11-14 14.5-17.5 18-21 21.5-24.5 25+   Modifier CH CI CJ CK CL CM CN     Medical Necessity:   · Skilled intervention continues to be required due to residual right sided hemiparesis. Reason for Services/Other Comments:  · Patient continues to require skilled intervention due to residual right hemiparesis. .   Use of outcome tool(s) and clinical judgement create a POC that gives a: Questionable prediction of patient's progress: MODERATE COMPLEXITY      S:   Worked with a ball. Not as heavy as the one you used. TREATMENT:   (In addition to Assessment/Re-Assessment sessions the following treatments were rendered)    NEUROMUSCULAR RE-EDUCATION: (45 minutes):  Exercise/activities per grid below to improve balance, coordination, kinesthetic sense, proprioception and motor control. Required maximal manual, tactile and facilitation cues to perfrom activities. Date:  10/2/17 Date:  10/4/17 Date:  10/6/17   Activity/Exercise Parameters Parameters     Right UE tone reduction and sensorimotor retraining for increased arm swing and posture during gait      Seated in chair (pt is short in stature). Right shoe and brace off Manual facilitation to R ankle/foot to improve muscle activation.  Able to get toe extension x 10 reps with facilitation  Foot on 4 kg medicine ball - facilitated foot slides  Foot on mini rockerboard for assisted dorsiflexion/plantarflexion, therapist facilitating Manual facilitation to R ankle/foot to improve muscle activation. Able to get toe extension 2 x 10 reps with facilitation  Manual facilitation to R calf for heel raises, 1+ strengthen noted with facilitation 2 x 5 reps  Right foot slides passive to progressively less facilitation on the floor  Then on 4kg ball, yellow therawand,  Heel raises; Full circles clockwise and counterclockwise on ball,   Hip ER/IR with inversion/eversion. Stepping forward and back with left leg at rail  At wall x 10 reps for hip and knee release  x10 with hips and knee release and then into kick/HS on the right   Facilitated long arc quad X 10 reps, able to get full extension   Quad set!!! Able to hold end range knee extension with quad set now. Worked on long arc quads with end range quad set with excellent control   LE tone reduction prior to gait   X 5 minutes in supine to decrease tone and synergy pattern. Gait retraining With brace on, no assistive device, 120' with therapist facilitating hip/knee release. With brace on, no assistive device, 120' with therapist facilitating hip/knee release. Working on making step length more symmetrical  Without shoes with cane:  X 20' working on improved gait pattern, terminal knee extension and heel strike  With shoes and no brace x 20'  With shoes and brace x120' out. Showing some swing and emerging HS. EDUCATION:  Instructed in above exercises and home program.  HEP:  No Written HEP given to patient yet. Instructed to owrk on increase step length on the left. Treatment/Session Assessment: Found her quads. Improving gait pattern with and without brace. .  Patients response to todays treatment session was tolerated well with no medical complications. .  · Post session pain:  No pain  · Compliance with Program/Exercises: Will assess as treatment progresses. · Recommendations/Intent for next treatment session:  \"Next visit will focus on advancements to more challenging activities\".   Total Treatment Duration:  PT Patient Time In/Time Out  Time In: 1345  Time Out: [address], PT

## 2017-10-06 NOTE — PROGRESS NOTES
Zoraida pitt  : 1975 [facility]  [address], [address] [facility], [address]  [phone]   [phone]         OUTPATIENT OCCUPATIONAL THERAPY: Daily Note and Treatment Day: 3rd 10/6/2017    ICD-10: Treatment Diagnosis:   Hemiplegia and hemiparesis following cerebral infarction affecting right dominant side (I69.351)  Precautions/Allergies:   Erythromycin   Fall Risk Score: 3 (? 5 = High Risk)  MD Orders: OT evaluate and treat  MEDICAL/REFERRING DIAGNOSIS:   Stroke (cerebrum) ([facility] [address] [I63.9]   DATE OF ONSET: 2017   REFERRING PHYSICIAN: Kera Fraire*  RETURN PHYSICIAN APPOINTMENT: 2017     INITIAL ASSESSMENT:  Ms. Demi pitt presents to outpatient occupational therapy services s/p CVA with R side hemiplegia. Pt arrived to therapy using quad cane for functional mobility. Pt is extremely pleasant and appears motivated to increase independence levels. Pt currently has no active R UE AROM, but does have some scapular movement present at this time. Pt does report pain in the R shoulder above 90 degrees of ROM, but no subluxation present at this time. Pt noted to have some increased tone in the R UE flexors. Pt receptive to education and verbalizes that she has been using techniques and suggestions that she learned in inpatient rehab. Pt has had no falls since returning home but does report some incontinence at times (stating this all started with the use of Zanaflex). Pt has minimal sensory deficits at this time. Pt does have some impaired midline orientation with functional transfers. Pt is currently functioning below baseline for ADL/functional transfers which impacts her overall quality of life. Pt will benefit from OT services to address stated goals and plan of care. PLAN OF CARE:   PROBLEM LIST:  1. Decreased Strength  2. Decreased ADL/Functional Activities  3. Decreased Transfer Abilities  4.  Decreased Ambulation Ability/Technique  5. Decreased Balance  6. Increased Pain  7. Decreased Activity Tolerance  8. Decreased Flexibility/Joint Mobility  9. Decreased [place] with Home Exercise Program INTERVENTIONS PLANNED:  1. Activities of daily living training  2. Adaptive equipment training  3. Balance training  4. Clothing management  5. Donning&doffing training  6. Manual therapy training  7. Modalities  8. Neuromuscular re-eduation  9. Splinting  10. Therapeutic activity  11. Therapeutic exercise   TREATMENT PLAN:  Effective Dates: 9/11/17 TO 12/11/17. Frequency/Duration: 2 times a week for 12 weeks  GOALS: (Goals have been discussed and agreed upon with patient.)  Short-Term Functional Goals: Time Frame:4- 6 weeks  1. Pipo Stauffer will report decreased pain in the R shoulder from 5/10 to 3/10 with passive movement to decrease stiffness for daily activity. 2. Pipo Stauffer will be 100% compliant with daily SROM exercises for improving R UE ROM to decrease risk for contractures. 3. Pipo Stauffer will demonstrate ability to complete weightbearing activities in R UE with moderate facilitation to improve functional use for transfers. 4. Pipo Stauffer will complete basic ADL with supervision to improve independence with functional tasks. 5. Pipo Stauffer will tolerate 15 minutes of dynamic standing balance activity with CGA to decrease risk for falls with ADL and to improve activity tolerance. 6. Discharge Goals: Time Frame: 8-12 weeks   1. iPpo Stauffer will have functional PROM of R shoulder to [facility] to decrease pain and stiffness in R dominant UE. 2. Pipo Stauffer will demonstrate improved strength in the R dominant UE as evidenced by at least 2+ to 3-/5 strength for using R UE as functional assist.   3. Pipo Stauffer will demonstrate ability to functionally grasp and release light objects with supervision and with use of R dominant hand.    4. Pipo Stauffer will be modified independent with all basic ADL to improve overall quality of life. 5. Roxanne Samples will use R hand as functional assist with daily activities/transfers with supervision to improve independence with ADL. Rehabilitation Potential For Stated Goals: Good  Regarding [facility] therapy, I certify that the treatment plan above will be carried out by a therapist or under their direction. Thank you for this referral,  Yajaira Bowens OT                 The information in this section was collected on 9/11/17 (except where otherwise noted). OCCUPATIONAL PROFILE & HISTORY:   History of Present Injury/Illness (Reason for Referral):  Pt reports she was at work when she started feeling like she was going to pass out. Pt reports she didn't really stop working and the feeling wouldn't go away. It started at 2pm and left work at 3:30 pm. Pt felt like she was going to fall and pushed a buggy to her car and drove to her daughter's house. Pt then was brought over to the ER at [address] [place] [address]. Progressively got worse with R sided weakness and woke up the next morning with full parlaysis of the R side. Reports her eyesight was decreasing and needing reading glasses. R eye is a little worse with the blurry vision. Pt wears to reading glasses. Pt using a wheelchair as less as possible. Using wheelchair when out and about. Pt using a quad cane around the home and has AFO to R LE. Tub transfer bench with CGA. Eats at the kitchen table. Pt stayed from Aug 8th- September 6th in inpatient rehab. Pt reports no falls at home. Pt getting dressed using techniques learned for inpatient rehab. Difficulty walking and carrying objects. Pt has some issues with Zanaflex and incontinence (she is unsure if that is related). Pt presents today for outpatient occupational therapy services. Past Medical History/Comorbidities:   Ms. Cady Sargent  has a past medical history of Generalized anxiety disorder; GERD (gastroesophageal reflux disease); Hypertension;  Insomnia, unspecified; Iron deficiency anemia (8/23/2013); Left sided lacunar stroke ([facility] [address] (08/2017); Mitral valve prolapse (1993); Obesity (BMI 30-39.9); and Prediabetes (7/28/2015). Ms. Jude Tierney  has a past surgical history that includes tubal ligation (1995) and endoscopy (10/1/04). Social History/Living Environment:   Lives with mom with her  because toilet/tub is lower with more open spaces. Pt also has a son. Pt has 24 hr supervision. Prior Level of Function/Work/Activity: Indpendent and working full time in [employer] at [employer] .   Dominant Side:         RIGHTPersonal Factors: Other factors that influence how disability is experienced by the patient:  Hx of anxiety disorder, HTN, Pre-diabetic    Current Medications:    Current Outpatient Prescriptions:     raNITIdine (ZANTAC) 150 mg tablet, Take 1 Tab by mouth two (2) times a day., Disp: 60 Tab, Rfl: 5    diazePAM (VALIUM) 5 mg tablet, Take 0.5 Tabs by mouth every six (6) hours as needed. Max Daily Amount: 10 mg. Indications: MUSCLE SPASM, Disp: 60 Tab, Rfl: 1    tiZANidine (ZANAFLEX) 4 mg tablet, Take 1 Tab by mouth three (3) times daily. , Disp: 90 Tab, Rfl: 6    traZODone (DESYREL) 50 mg tablet, Take 1 Tab by mouth nightly as needed for Sleep. Indications: insomnia associated with depression, Disp: 30 Tab, Rfl: 2    hydroCHLOROthiazide (MICROZIDE) 12.5 mg capsule, Take 2 Caps by mouth daily. , Disp: 30 Cap, Rfl: 6    potassium chloride SR (K-TAB) 20 mEq tablet, Take 1 Tab by mouth two (2) times a day., Disp: 60 Tab, Rfl: 6    aspirin (ASPIRIN) 325 mg tablet, Take 1 Tab by mouth daily. , Disp: 30 Tab, Rfl: 12    acetaminophen (TYLENOL) 325 mg tablet, Take 2 Tabs by mouth every six (6) hours as needed for Pain or Fever (headache). , Disp: 30 Tab, Rfl: 0    escitalopram oxalate (LEXAPRO) 20 mg tablet, Take 1 Tab by mouth daily. , Disp: 30 Tab, Rfl: 5            Date Last Reviewed:  9/11/17   Complexity Level : Expanded review of therapy/medical records (1-2): MODERATE COMPLEXITY   ASSESSMENT OF OCCUPATIONAL PERFORMANCE:     Palpation:  Patient with no palpated subluxation of the R shoulder at this time. Increased pain and tightness with R shoulder flexion/abduction with passive ROM above 90 degrees. Pain along anterior portion of humeral head with anterior tilt. Pain radiates along the middle deltoid. Modified Maggi Scale     Grade Description : Noted in ELBOW FLEXORS, WRIST FLEXORS, FINGER FLEXORS  []0 - No increase in muscle tone  []1 - Slight increase in muscle tone, manifested by a catch and release, or by minimal resistance at the end of the range of motion when the affected part(s) is moved in flexion or extension  [x]1+ - Slight increase in muscle tone, manifested by a catch, followed by minimal resistance throughout the remainder (less than half) of the range of movement (ROM)  []2 - More marked increase in muscle tone through most of ROM, but affected part(s) easily moved  []3 - Considerable increase in muscle tone, passive movement difficult  []4 - Affected part(s) rigid in flexion and extension      ROM/Strength:  L UE WNL; No functional AROM of R UE.            Date:  9/11/17  Date:  9/11/17    PROM  STRENGTH   Movement RIGHT  RIGHT   SCAPULA:      Elevation  Limited  2   Protraction  Limited  2+   Retraction  Limited   2   SHOULDER:      Flexion  100  0   Abduction  90  1   External rotation  70  0   Internal rotation  WNL  0   Horizontal abduction 100  0   Horizontal adduction  WFL  0   Extension       ELBOW:      Flexion  WNL  0   Extension  WNL  0   FOREARM:      Supination  WFL  0   Pronation  WFL  0   WRIST:      Wrist flexion  65  0   Wrist extension  65  0   Wrist radial deviation       Wrist ulnar deviation       HAND:      Finger flexion WFL  0   Finger extension WFL  0   THUMB:       Abduction  WFL  0    Extension WFL  0    Adduction        MCP flexion  WFL  0    IP flexion  WFL  0   Hand Strength:       unable  0   THREE JAW [address] unable  0   LATERAL PINCH  unable  0     Functional Mobility:         Gait/Ambulation:  CGA to supervision with quad cane; Uses wheelchair occasionally         Bed Mobility:  CGA  Balance:          Good static standing balance but fair to poor dynamic standing balance  Coordination:          Non-functional in R dominant UE  Mental Status: WNL  Vision:          Reading glasses. Reports some blurry vision in R eye. Tracking and visual fields intact. Some decreased speed with saccadic movement to the R. Activities of Daily Living:           Basic ADLs (From Assessment) Complex ADLs (From Assessment)         Grooming/Bathing/Dressing Activities of Daily Living                                   Sensation:         Grossly intact to light touch in B UE. R hand with some decreased light touch sensation. Reports some hypersensitivity to cold. Physical Skills Involved:  1. Range of Motion  2. Balance  3. Strength  4. Activity Tolerance  5. Sensation  6. Fine Motor Control  7. Gross Motor Control  8. Pain (acute) Cognitive Skills Affected (resulting in the inability to perform in a timely and safe manner):  1. WNL Psychosocial Skills Affected:  1. Anxiety Disorder (per chart review)   Number of elements that affect the Plan of Care: 5+:  HIGH COMPLEXITY   CLINICAL DECISION MAKING:   Outcome Measure: Tool Used: Fugl-Goodman Upper Extremity Motor Assessment  Score:  Initial: 8/66 Most Recent: X (Date: -- )   Interpretation of Score: Outcome Measure Conversion Site    ?  Carrying, Moving, and Handling Objects:     - CURRENT STATUS: CM - 80%-99% impaired, limited or restricted    - GOAL STATUS: CK - 40%-59% impaired, limited or restricted    - D/C STATUS:  ---------------To be determined---------------     Fugl-Goodman Upper Extremity Motor Assessment (without reflexes) Date:  9/11 Date:   Date:     Item Description Score Score Score   Wrist circumduction 0     Hook grasp 0     Shoulder flexion to 180°, elbow extended 0     Spherical grasp 0     Lateral prehension 0     Wrist flexion/extension, elbow extended 0     Pronation-supination, elbow extended 0     Wrist stable, elbow extended 0     Movement with normal speed 0     Forearm supination 0     Shoulder abduction to 90°, elbow extended 0     Movement without dysmetria 0     Shoulder external rotation 0     Wrist stable, elbow at 90° 0     Wrist flexion/extension, elbow at 90° 0     Palmar prehension 0     Scapular retraction 1     Pronation-supination, elbow at 90° 0     Shoulder flexion to 90°, elbow extended 0     Hand to lumbar spine 0     Shoulder abduction 0     Elbow extension 0     Forearm pronation 0     Movement without tremor 0     Cylindrical grasp 0     Finger mass extension (relaxation of flexion) 0     Scapular elevation 1     Finger mass flexion 0     Shoulder adduction with internal rotation 0     Elbow flexion 0     Total Score (Max = 60) 2           Medical Necessity:   · Patient demonstrates good rehab potential due to higher previous functional level. Reason for Services/Other Comments:  · Patient continues to require skilled intervention due to decreased functional independence s/p CVA impacting overall quality of life. Clinical Decision-Making Assessment:     Assessment process, impact of co-morbidities, assessment modification\need for assistance, and selection of interventions: Analytical Complexity:HIGH COMPLEXITY   TREATMENT:   (In addition to Assessment/Re-Assessment sessions the following treatments were rendered)    Pre-treatment Symptoms/Complaints:    Pain: Initial:   0/10 Post Session:  0/10      Therapeutic Exercise: ( 5 minutes):  Exercises per grid below to improve mobility. Required maximal manual and tactile cues to promote proper body alignment, promote proper body posture and promote proper body mechanics. Progressed repetitions and complexity of movement as indicated.    Date:  9/25/17 Date:  10/2/17 Date:  10/4/17 Date:  10/6/17   Activity/Exercise Parameters Parameters Parameters Parameters   Shoulder external rotation PROM to tolerance with caregiver education  PROM to tolerance x 10 reps (shoulder adducted in supine)     Shoulder flexion  PROM to tolerance with caregiver education  PROM to ~100 degrees x 10 reps to tolerance (supine)     Shoulder abduction  PROM to tolerance with caregiver education  PROM x 10 reps to tolerance in supine      Shoulder horizontal abduction/adduction  PROM to tolerance with caregiver education       Elbow flexion/extension  PROM to tolerance with caregiver education  PROM into extension with stretch for 2-3 minutes with pressure along biceps tendon      Wrist extension  PROM to tolerance with caregiver education  PROM to tolerance and holding 2-3 minutes  Stretch at wrist with hand on the mat and flexing forward at the trunk to allow for extension to tolerance 1) Wrist extension stretch holding for 15 seconds x 10 reps to tolerance with fingers in extended position (unable to fully extend)  2) Stretch at wrist with hand on the mat and flexing forward at the trunk to allow for extension to tolerance x 10 reps    Shoulder Shrug    1) 10 reps AAROM  2) 10 reps AROM    Scapular Retraction    15 reps AROM    Shoulder Protraction    15 reps AROM      Neuromuscular Re-education: (  30 minutes):  Exercise/activities per grid below to improve kinesthetic sense and proprioception. Required minimal visual, verbal, manual and tactile cues to promote motor control of right, upper extremity(s).     Date:  10/2/17 Date:  10/4/17 Date:  10/6/17   Activity/Exercise Parameters Parameters Parameters   Supine to Sit Pt completed with NDT technique and CGA with encouragement of use of R UE     PNF D1 flexion/extension to total manual cues      Mobile Arm Support 1) Forward reaching with total assistance and pulling back into retraction with maximal assistance; 15 reps  2) External Rotation x 10-15 reps with total assistance 1) Forward reaching with total assistance and pulling back into retraction with maximal assistance; 15 reps  2) External Rotation x 10-15 reps with total assistance 1) Forward reaching with maximal assistance and pulling back into retraction with maximal assistance; 15 reps  2) External Rotation x 10-15 reps with maximal to total assistance   Weightbearing  1) 8 reps into the elbow with minimal verbal/tactile cues  2) 8 reps with prop under R wrist to decrease pain with maximal facilitation at R elbow  15 reps with elbow extended and hand on a ball to serve as a prop 15 reps with elbow extended and hand on a ball to serve as a prop   Tone Reduction into hand Blood stretching with facilitation of R thumb into extension Blood stretching with metacarpal mobilizations Blood stretching with metacarpal mobilizations   Trunk Rotation   5 reps to each side holding 5-10 seconds     Trunk Flexion   10 reps in sitting with hands clasped and with supervision     Weightshiftting   Pt shifting side to side with hands clasped and reaching  X 10 reps to each side     Shen Bin    Hand over hand assistance in bean bin with facilitation into supination to scoop beans and release in pronation; hand over hand assistance scooping forwards/backwards in beans bin with some activation of R scapular and shoulder abductors   Putty press   5 reps with total assistance handover hand  Assistance to provide weightbearing into R UE               Treatment/Session Assessment:    · Response to Treatment:  Patient arrived late to today's appointment, therefore session was shortened. Pt tolerated activities well today with good activation of scapular movements during activities and some engagement of R shoulder abductors with activity. Pt reports R UE feels better after activity. Pt to continue per plan of care. · Compliance with Program/Exercises: Will assess as treatment progresses.   · Recommendations/Intent for next treatment session: \"Next visit will focus on advancements to more challenging activities and reduction in assistance provided\".   Total Treatment Duration:OT Patient Time In/Time Out  Time In: 0417  Time Out: [address], OT

## 2017-10-11 ENCOUNTER — HOSPITAL ENCOUNTER (OUTPATIENT)
Dept: PHYSICAL THERAPY | Age: 42
Discharge: HOME | End: 2017-10-11
Payer: COMMERCIAL

## 2017-10-11 PROCEDURE — 97112 NEUROMUSCULAR REEDUCATION: CPT

## 2017-10-11 PROCEDURE — 97110 THERAPEUTIC EXERCISES: CPT

## 2017-10-11 NOTE — PROGRESS NOTES
Zoraida pitt  : 1975 [facility]  [address], [address] [facility], [address]  [phone]   [phone]         OUTPATIENT OCCUPATIONAL THERAPY: Daily Note and Treatment Day: 5th 10/11/2017    ICD-10: Treatment Diagnosis:   Hemiplegia and hemiparesis following cerebral infarction affecting right dominant side (I69.351)  Precautions/Allergies:   Erythromycin   Fall Risk Score: 3 (? 5 = High Risk)  MD Orders: OT evaluate and treat  MEDICAL/REFERRING DIAGNOSIS:   Stroke (cerebrum) ([facility] [address] [I63.9]   DATE OF ONSET: 2017   REFERRING PHYSICIAN: Kera Cardona*  RETURN PHYSICIAN APPOINTMENT: 2017     INITIAL ASSESSMENT:  Ms. Demi pitt presents to outpatient occupational therapy services s/p CVA with R side hemiplegia. Pt arrived to therapy using quad cane for functional mobility. Pt is extremely pleasant and appears motivated to increase independence levels. Pt currently has no active R UE AROM, but does have some scapular movement present at this time. Pt does report pain in the R shoulder above 90 degrees of ROM, but no subluxation present at this time. Pt noted to have some increased tone in the R UE flexors. Pt receptive to education and verbalizes that she has been using techniques and suggestions that she learned in inpatient rehab. Pt has had no falls since returning home but does report some incontinence at times (stating this all started with the use of Zanaflex). Pt has minimal sensory deficits at this time. Pt does have some impaired midline orientation with functional transfers. Pt is currently functioning below baseline for ADL/functional transfers which impacts her overall quality of life. Pt will benefit from OT services to address stated goals and plan of care. PLAN OF CARE:   PROBLEM LIST:  1. Decreased Strength  2. Decreased ADL/Functional Activities  3. Decreased Transfer Abilities  4.  Decreased Ambulation Ability/Technique  5. Decreased Balance  6. Increased Pain  7. Decreased Activity Tolerance  8. Decreased Flexibility/Joint Mobility  9. Decreased [place] with Home Exercise Program INTERVENTIONS PLANNED:  1. Activities of daily living training  2. Adaptive equipment training  3. Balance training  4. Clothing management  5. Donning&doffing training  6. Manual therapy training  7. Modalities  8. Neuromuscular re-eduation  9. Splinting  10. Therapeutic activity  11. Therapeutic exercise   TREATMENT PLAN:  Effective Dates: 9/11/17 TO 12/11/17. Frequency/Duration: 2 times a week for 12 weeks  GOALS: (Goals have been discussed and agreed upon with patient.)  Short-Term Functional Goals: Time Frame:4- 6 weeks  1. Pipo Stauffer will report decreased pain in the R shoulder from 5/10 to 3/10 with passive movement to decrease stiffness for daily activity. 2. Pipo Stauffer will be 100% compliant with daily SROM exercises for improving R UE ROM to decrease risk for contractures. 3. Pipo Stauffer will demonstrate ability to complete weightbearing activities in R UE with moderate facilitation to improve functional use for transfers. 4. Pipo Stauffer will complete basic ADL with supervision to improve independence with functional tasks. 5. Pipo Stauffer will tolerate 15 minutes of dynamic standing balance activity with CGA to decrease risk for falls with ADL and to improve activity tolerance. 6. Discharge Goals: Time Frame: 8-12 weeks   1. Pipo Stauffer will have functional PROM of R shoulder to [facility] to decrease pain and stiffness in R dominant UE. 2. Pipo Stauffer will demonstrate improved strength in the R dominant UE as evidenced by at least 2+ to 3-/5 strength for using R UE as functional assist.   3. Pipo Stauffer will demonstrate ability to functionally grasp and release light objects with supervision and with use of R dominant hand.    4. Pipo Stauffer will be modified independent with all basic ADL to improve overall quality of life. 5. Radha Tran will use R hand as functional assist with daily activities/transfers with supervision to improve independence with ADL. Rehabilitation Potential For Stated Goals: Good  Regarding [place] therapy, I certify that the treatment plan above will be carried out by a therapist or under their direction. Thank you for this referral,  Ana Garcia OT                 The information in this section was collected on 9/11/17 (except where otherwise noted). OCCUPATIONAL PROFILE & HISTORY:   History of Present Injury/Illness (Reason for Referral):  Pt reports she was at work when she started feeling like she was going to pass out. Pt reports she didn't really stop working and the feeling wouldn't go away. It started at 2pm and left work at 3:30 pm. Pt felt like she was going to fall and pushed a buggy to her car and drove to her daughter's house. Pt then was brought over to the ER at [facility]. Progressively got worse with R sided weakness and woke up the next morning with full parlaysis of the R side. Reports her eyesight was decreasing and needing reading glasses. R eye is a little worse with the blurry vision. Pt wears to reading glasses. Pt using a wheelchair as less as possible. Using wheelchair when out and about. Pt using a quad cane around the home and has AFO to R LE. Tub transfer bench with CGA. Eats at the kitchen table. Pt stayed from Aug 8th- September 6th in inpatient rehab. Pt reports no falls at home. Pt getting dressed using techniques learned for inpatient rehab. Difficulty walking and carrying objects. Pt has some issues with Zanaflex and incontinence (she is unsure if that is related). Pt presents today for outpatient occupational therapy services. Past Medical History/Comorbidities:   Ms. Souleymane Jain  has a past medical history of Generalized anxiety disorder; GERD (gastroesophageal reflux disease); Hypertension;  Insomnia, unspecified; Iron deficiency anemia (8/23/2013); Left sided lacunar stroke ([facility] [address] (08/2017); Mitral valve prolapse (1993); Obesity (BMI 30-39.9); and Prediabetes (7/28/2015). Ms. Demi pitt  has a past surgical history that includes tubal ligation (1995) and endoscopy (10/1/04). Social History/Living Environment:   Lives with mom with her  because toilet/tub is lower with more open spaces. Pt also has a son. Pt has 24 hr supervision. Prior Level of Function/Work/Activity: Indpendent and working full time in [employer] at [employer] .   Dominant Side:         RIGHTPersonal Factors: Other factors that influence how disability is experienced by the patient:  Hx of anxiety disorder, HTN, Pre-diabetic    Current Medications:    Current Outpatient Prescriptions:     raNITIdine (ZANTAC) 150 mg tablet, Take 1 Tab by mouth two (2) times a day., Disp: 60 Tab, Rfl: 5    diazePAM (VALIUM) 5 mg tablet, Take 0.5 Tabs by mouth every six (6) hours as needed. Max Daily Amount: 10 mg. Indications: MUSCLE SPASM, Disp: 60 Tab, Rfl: 1    tiZANidine (ZANAFLEX) 4 mg tablet, Take 1 Tab by mouth three (3) times daily. , Disp: 90 Tab, Rfl: 6    traZODone (DESYREL) 50 mg tablet, Take 1 Tab by mouth nightly as needed for Sleep. Indications: insomnia associated with depression, Disp: 30 Tab, Rfl: 2    hydroCHLOROthiazide (MICROZIDE) 12.5 mg capsule, Take 2 Caps by mouth daily. , Disp: 30 Cap, Rfl: 6    potassium chloride SR (K-TAB) 20 mEq tablet, Take 1 Tab by mouth two (2) times a day., Disp: 60 Tab, Rfl: 6    aspirin (ASPIRIN) 325 mg tablet, Take 1 Tab by mouth daily. , Disp: 30 Tab, Rfl: 12    acetaminophen (TYLENOL) 325 mg tablet, Take 2 Tabs by mouth every six (6) hours as needed for Pain or Fever (headache). , Disp: 30 Tab, Rfl: 0    escitalopram oxalate (LEXAPRO) 20 mg tablet, Take 1 Tab by mouth daily. , Disp: 30 Tab, Rfl: 5            Date Last Reviewed:  9/11/17   Complexity Level : Expanded review of therapy/medical records (1-2): MODERATE COMPLEXITY   ASSESSMENT OF OCCUPATIONAL PERFORMANCE:     Palpation:  Patient with no palpated subluxation of the R shoulder at this time. Increased pain and tightness with R shoulder flexion/abduction with passive ROM above 90 degrees. Pain along anterior portion of humeral head with anterior tilt. Pain radiates along the middle deltoid. Modified Maggi Scale     Grade Description : Noted in ELBOW FLEXORS, WRIST FLEXORS, FINGER FLEXORS  []0 - No increase in muscle tone  []1 - Slight increase in muscle tone, manifested by a catch and release, or by minimal resistance at the end of the range of motion when the affected part(s) is moved in flexion or extension  [x]1+ - Slight increase in muscle tone, manifested by a catch, followed by minimal resistance throughout the remainder (less than half) of the range of movement (ROM)  []2 - More marked increase in muscle tone through most of ROM, but affected part(s) easily moved  []3 - Considerable increase in muscle tone, passive movement difficult  []4 - Affected part(s) rigid in flexion and extension      ROM/Strength:  L UE WNL; No functional AROM of R UE.            Date:  9/11/17  Date:  9/11/17    PROM  STRENGTH   Movement RIGHT  RIGHT   SCAPULA:      Elevation  Limited  2   Protraction  Limited  2+   Retraction  Limited   2   SHOULDER:      Flexion  100  0   Abduction  90  1   External rotation  70  0   Internal rotation  WNL  0   Horizontal abduction 100  0   Horizontal adduction  WFL  0   Extension       ELBOW:      Flexion  WNL  0   Extension  WNL  0   FOREARM:      Supination  WFL  0   Pronation  WFL  0   WRIST:      Wrist flexion  65  0   Wrist extension  65  0   Wrist radial deviation       Wrist ulnar deviation       HAND:      Finger flexion WFL  0   Finger extension WFL  0   THUMB:       Abduction  WFL  0    Extension WFL  0    Adduction        MCP flexion  WFL  0    IP flexion  WFL  0   Hand Strength:       unable  0   THREE JAW [address] unable  0   LATERAL PINCH  unable  0     Functional Mobility:         Gait/Ambulation:  CGA to supervision with quad cane; Uses wheelchair occasionally         Bed Mobility:  CGA  Balance:          Good static standing balance but fair to poor dynamic standing balance  Coordination:          Non-functional in R dominant UE  Mental Status: WNL  Vision:          Reading glasses. Reports some blurry vision in R eye. Tracking and visual fields intact. Some decreased speed with saccadic movement to the R. Activities of Daily Living:           Basic ADLs (From Assessment) Complex ADLs (From Assessment)         Grooming/Bathing/Dressing Activities of Daily Living                                   Sensation:         Grossly intact to light touch in B UE. R hand with some decreased light touch sensation. Reports some hypersensitivity to cold. Physical Skills Involved:  1. Range of Motion  2. Balance  3. Strength  4. Activity Tolerance  5. Sensation  6. Fine Motor Control  7. Gross Motor Control  8. Pain (acute) Cognitive Skills Affected (resulting in the inability to perform in a timely and safe manner):  1. WNL Psychosocial Skills Affected:  1. Anxiety Disorder (per chart review)   Number of elements that affect the Plan of Care: 5+:  HIGH COMPLEXITY   CLINICAL DECISION MAKING:   Outcome Measure: Tool Used: Fugl-Goodman Upper Extremity Motor Assessment  Score:  Initial: 8/66 Most Recent: X (Date: -- )   Interpretation of Score: Outcome Measure Conversion Site    ?  Carrying, Moving, and Handling Objects:     - CURRENT STATUS: CM - 80%-99% impaired, limited or restricted    - GOAL STATUS: CK - 40%-59% impaired, limited or restricted    - D/C STATUS:  ---------------To be determined---------------     Fugl-Goodman Upper Extremity Motor Assessment (without reflexes) Date:  9/11 Date:   Date:     Item Description Score Score Score   Wrist circumduction 0     Hook grasp 0     Shoulder flexion to 180°, elbow extended 0     Spherical grasp 0     Lateral prehension 0     Wrist flexion/extension, elbow extended 0     Pronation-supination, elbow extended 0     Wrist stable, elbow extended 0     Movement with normal speed 0     Forearm supination 0     Shoulder abduction to 90°, elbow extended 0     Movement without dysmetria 0     Shoulder external rotation 0     Wrist stable, elbow at 90° 0     Wrist flexion/extension, elbow at 90° 0     Palmar prehension 0     Scapular retraction 1     Pronation-supination, elbow at 90° 0     Shoulder flexion to 90°, elbow extended 0     Hand to lumbar spine 0     Shoulder abduction 0     Elbow extension 0     Forearm pronation 0     Movement without tremor 0     Cylindrical grasp 0     Finger mass extension (relaxation of flexion) 0     Scapular elevation 1     Finger mass flexion 0     Shoulder adduction with internal rotation 0     Elbow flexion 0     Total Score (Max = 60) 2           Medical Necessity:   · Patient demonstrates good rehab potential due to higher previous functional level. Reason for Services/Other Comments:  · Patient continues to require skilled intervention due to decreased functional independence s/p CVA impacting overall quality of life. Clinical Decision-Making Assessment:     Assessment process, impact of co-morbidities, assessment modification\need for assistance, and selection of interventions: Analytical Complexity:HIGH COMPLEXITY   TREATMENT:   (In addition to Assessment/Re-Assessment sessions the following treatments were rendered)    Pre-treatment Symptoms/Complaints:    Pain: Initial: Pain Intensity 1: 5  Pain Location 1: Shoulder  Pain Orientation 1: Right  Post Session:       Therapeutic Exercise: ( 15 minutes):  Exercises per grid below to improve mobility. Required maximal manual and tactile cues to promote proper body alignment, promote proper body posture and promote proper body mechanics.   Progressed repetitions and complexity of movement as indicated. Date:  9/25/17 Date:  10/2/17 Date:  10/4/17 Date:  10/6/17 Date:  10/9/17   Activity/Exercise Parameters Parameters Parameters Parameters    Shoulder external rotation PROM to tolerance with caregiver education  PROM to tolerance x 10 reps (shoulder adducted in supine)   PROM x 15 reps (combined with glenohumeral posterior glide- pt tolerated with greater ROM/less pain)   Shoulder flexion  PROM to tolerance with caregiver education  PROM to ~100 degrees x 10 reps to tolerance (supine)   PROM 8-10 reps to ~100 degrees   Shoulder abduction  PROM to tolerance with caregiver education  PROM x 10 reps to tolerance in supine    PROM 8-10 reps ~70-80 degrees in supine    Shoulder horizontal abduction/adduction  PROM to tolerance with caregiver education        Elbow flexion/extension  PROM to tolerance with caregiver education  PROM into extension with stretch for 2-3 minutes with pressure along biceps tendon    PROM 10 reps with extension stretch for ~2 minutes   Wrist extension  PROM to tolerance with caregiver education  PROM to tolerance and holding 2-3 minutes  Stretch at wrist with hand on the mat and flexing forward at the trunk to allow for extension to tolerance 1) Wrist extension stretch holding for 15 seconds x 10 reps to tolerance with fingers in extended position (unable to fully extend)  2) Stretch at wrist with hand on the mat and flexing forward at the trunk to allow for extension to tolerance x 10 reps  10 reps PROM with minimal complaints    Shoulder Shrug    1) 10 reps AAROM  2) 10 reps AROM     Scapular Retraction    15 reps AROM     Shoulder Protraction    15 reps AROM       Neuromuscular Re-education: (  10 minutes):  Exercise/activities per grid below to improve kinesthetic sense and proprioception. Required minimal visual, verbal, manual and tactile cues to promote motor control of right, upper extremity(s).     Date:  10/2/17 Date:  10/4/17 Date:  10/6/17 Date:  10/11/17 Activity/Exercise Parameters Parameters Parameters    Supine to Sit Pt completed with NDT technique and CGA with encouragement of use of R UE      PNF D1 flexion/extension to total manual cues       Mobile Arm Support 1) Forward reaching with total assistance and pulling back into retraction with maximal assistance; 15 reps  2) External Rotation x 10-15 reps with total assistance 1) Forward reaching with total assistance and pulling back into retraction with maximal assistance; 15 reps  2) External Rotation x 10-15 reps with total assistance 1) Forward reaching with maximal assistance and pulling back into retraction with maximal assistance; 15 reps  2) External Rotation x 10-15 reps with maximal to total assistance Forward reaching with maximal assistance and pulling back into retraction with maximal assistance; 15 reps   Weightbearing  1) 8 reps into the elbow with minimal verbal/tactile cues  2) 8 reps with prop under R wrist to decrease pain with maximal facilitation at R elbow  15 reps with elbow extended and hand on a ball to serve as a prop 15 reps with elbow extended and hand on a ball to serve as a prop 15 reps with elbow extended and hand on a ball to serve as a prop with maximal facilitation at the hand/elbow    Tone Reduction into hand Blood stretching with facilitation of R thumb into extension Blood stretching with metacarpal mobilizations Blood stretching with metacarpal mobilizations Blood stretching with metacarpal mobilizations   Trunk Rotation   5 reps to each side holding 5-10 seconds      Trunk Flexion   10 reps in sitting with hands clasped and with supervision      Weightshiftting   Pt shifting side to side with hands clasped and reaching  X 10 reps to each side      Shen Bin    Hand over hand assistance    Putty press   5 reps with total assistance handover hand                 Treatment/Session Assessment:    · Response to Treatment:  Pt chronically late to appointments.  Not able to complete all that was planned due to lack of time. Pt reports stiffness/pain in the R shoulder but able to tolerate external rotation stretch better with glenohumeral posterior glide. Pt continues to report significant pain with extension of the digits, but less after completing weightbearing. Pt reports R UE feels better after activity. Pt to continue per plan of care. · Compliance with Program/Exercises: Will assess as treatment progresses. · Recommendations/Intent for next treatment session: \"Next visit will focus on advancements to more challenging activities and reduction in assistance provided\".   Total Treatment Duration:OT Patient Time In/Time Out  Time In: 0820  Time Out: [address], OT

## 2017-10-11 NOTE — PROGRESS NOTES
Zoradia pitt  : 1975  Payor: Emi Rosario / Plan: Tameka Laird RPN / Product Type: Commerical /  [address]  at [facility]  [address], [place] [address] [facility]  [phone]   [phone]           OUTPATIENT PHYSICAL THERAPY:Daily Note 10/11/2017   ICD-10: Treatment Diagnosis: Difficulty in walking, not elsewhere classified (R26.2); Hemiplegia and hemiparesis following cerebral infarction affecting right dominant side (G51.573)  Precautions/Allergies:   Erythromycin   Fall Risk Score: 0 (? 5 = High Risk)  MD Orders: PT eval MEDICAL/REFERRING DIAGNOSIS:  Stroke (cerebrum) ([facility] [address] [I63.9]   DATE OF ONSET: 8/3/17  REFERRING PHYSICIAN: Kera Barron*  RETURN PHYSICIAN APPOINTMENT: unknown  DATE OF PROGRESS NOTE:  64  RECERTIFICATION DATE:      INITIAL ASSESSMENT:  Ms. Demi pitt presents with dominant right hemiplegia s/p CVA on 8/3/17. Pt presents with ADL, balance and gait deficits. Pt will benefit from physical therapy to maximize to full potential and safety with all functional mobility. PROBLEM LIST (Impacting functional limitations):  1. Decreased Strength  2. Decreased ADL/Functional Activities  3. Decreased Ambulation Ability/Technique  4. Decreased Balance  5. Increased Pain  6. Decreased Activity Tolerance  7. Decreased Flexibility/Joint Mobility  8. Decreased [place] with Home Exercise Program INTERVENTIONS PLANNED:  1. Balance Exercise  2. Bed Mobility  3. Family Education  4. Gait Training  5. Home Exercise Program (HEP)  6. Neuromuscular Re-education/Strengthening  7. Range of Motion (ROM)  8. Therapeutic Activites  9. Therapeutic Exercise/Strengthening  10. Transfer Training  11. modalities   TREATMENT PLAN:  Effective Dates: 17 TO 12/15/17. Frequency/Duration: 3 times a week for 8 weeks  GOALS: (Goals have been discussed and agreed upon with patient.)  Short-Term Functional Goals: Time Frame: 4 weeks  1.  Pt will be independent with range of motion, strength and balance home exercise program.  2. Pt will decrease TUG score by 5 seconds indicating more normalized and safer gait pattern. Discharge Goals: Time Frame: 8 weeks  Pt will show increased range of motion and improved posture to allow for improved safety and ability with all functional mobility. Pt will decrease TUG score by 10 seconds indicating more normalized gait pattern and decrease risk for falls. Pt will increase Perez Balance Scale to 49/56 indicating increased balance and decreased risk for falls. Rehabilitation Potential For Stated Goals: Good  Regarding [place] therapy, I certify that the treatment plan above will be carried out by a therapist or under their direction. Thank you for this referral,  Romi Mena, PTA                  HISTORY:   GOAL:  Love to be back to doing things independenlty. Would like to get to where I don't have to use the cane. Get back to my own home. Present Symptoms:    38year old  right handed female s/p left CVA 8/3/17 2* to HTN. Short term disability for now. Work in the [employer] at [employer]. Very physical job. More sued to my left hand. Learning how to function with one hand is difficult. ADLs with just my left hand is difficult. Dynamic sitting balance. I just have to be careluf that my foot is flat especially if I have my shoes off. Tire easily. Buildig up strength. Large base quad cane but not all the time at home. Can't carry a heavy load. PAIN:  Right fingers and shoulder with slight movement. 6/10  History of Present Injury/Illness (Reason for Referral):  See above  Past Medical History/Comorbidities:   Ms. Sushil Potts  has a past medical history of Generalized anxiety disorder; GERD (gastroesophageal reflux disease); Hypertension; Insomnia, unspecified; Iron deficiency anemia (8/23/2013); Left sided lacunar stroke ([facility] [address] (08/2017); Mitral valve prolapse (1993);  Obesity (BMI 30-39.9); and Prediabetes (7/28/2015). Ms. Irlanda Varner  has a past surgical history that includes tubal ligation (1995) and endoscopy (10/1/04). Social History/Living Environment:     25and 25year old son and daughter. . Prior Level of Function/Work/Activity:  Worked in [employer] department. 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 One story - Own home:  About 5 steps to get in with bilateral railing. Staying at [facility] house which is easier to maneuver - low tub - can walk right in. Small wheelchair ramp and then level. Mom helps with driving and household duties - cooking. Dominant Side:         RIGHT    Current Medications:    Current Outpatient Prescriptions:     raNITIdine (ZANTAC) 150 mg tablet, Take 1 Tab by mouth two (2) times a day., Disp: 60 Tab, Rfl: 5    diazePAM (VALIUM) 5 mg tablet, Take 0.5 Tabs by mouth every six (6) hours as needed. Max Daily Amount: 10 mg. Indications: MUSCLE SPASM, Disp: 60 Tab, Rfl: 1    tiZANidine (ZANAFLEX) 4 mg tablet, Take 1 Tab by mouth three (3) times daily. , Disp: 90 Tab, Rfl: 6    traZODone (DESYREL) 50 mg tablet, Take 1 Tab by mouth nightly as needed for Sleep. Indications: insomnia associated with depression, Disp: 30 Tab, Rfl: 2    hydroCHLOROthiazide (MICROZIDE) 12.5 mg capsule, Take 2 Caps by mouth daily. , Disp: 30 Cap, Rfl: 6    potassium chloride SR (K-TAB) 20 mEq tablet, Take 1 Tab by mouth two (2) times a day., Disp: 60 Tab, Rfl: 6    aspirin (ASPIRIN) 325 mg tablet, Take 1 Tab by mouth daily. , Disp: 30 Tab, Rfl: 12    acetaminophen (TYLENOL) 325 mg tablet, Take 2 Tabs by mouth every six (6) hours as needed for Pain or Fever (headache). , Disp: 30 Tab, Rfl: 0    escitalopram oxalate (LEXAPRO) 20 mg tablet, Take 1 Tab by mouth daily. , Disp: 30 Tab, Rfl: 5   Date Last Reviewed:  10/11/2017     Number of Personal Factors/Comorbidities that affect the Plan of Care: 1-2: MODERATE COMPLEXITY   EXAMINATION:   ROM:          Painful right shoulder and finger and hands.  Shoulder not fully assessed. Seeing OT. Passive Fingers to neutral extension at least.  No overpressure given. Elbow slight decrease as end ranges. Supination ~75% with mild pain. Right lower extremity WFL  Left WFL  Strength:          Right UE:  Almost no active movement noted. Possible scap stabilization and tone is not flaccid. Right hip flexion:  3-/5  Knee extension at least 4/5  Nothing in DF. Functional Mobility:         Gait/Ambulation:  Ambulates with large base quad came with slow small steps with custom hinged AFO on. Transfers:  Independent. occ no WBing on the right with sit to stand        Bed Mobility:  NT  Mental Status:          Alert and oriented x 4;  Pleasant and appropriate  Vision:          No deficits reported. Body Structures Involved:  1. Nerves  2. Bones  3. Joints  4. Muscles  5. Ligaments Body Functions Affected:  1. Sensory/Pain  2. Neuromusculoskeletal  3. Movement Related Activities and Participation Affected:  1. Learning and Applying Knowledge  2. General Tasks and Demands  3. Mobility  4. Self Care  5. Domestic Life  6. Interpersonal Interactions and Relationships  7. Community, Social and [place]   Number of elements that affect the Plan of Care: 4+: HIGH COMPLEXITY   CLINICAL PRESENTATION:   Presentation: Evolving clinical presentation with changing clinical characteristics: MODERATE COMPLEXITY   CLINICAL DECISION MAKING:   Outcome Measure: Tool Used: Perez Balance Scale  Score:  Initial: 46/56 Most Recent: X/56 (Date: -- )   Interpretation of Score: Each section is scored on a 0-4 scale, 0 representing the patients inability to perform the task and 4 representing independence. The scores of each section are added together for a total score of 56. The higher the patients score, the more independent the patient is. Any score below 45 indicates increased risk for falls.     Score 56 55-45 44-34 33-23 22-12 11-1 0   Modifier CH CI CJ CK CL CM CN       Tool Used: Timed Up and Go (TUG)  Score:  Initial: with cane:  45.24 seconds; without cane 52.01 seconds Most Recent: X seconds (Date: -- )   Interpretation of Score: The test measures, in seconds, the time taken by an individual to stand up from a standard arm chair (seat height 46 cm [18 in], arm height 65 cm [25.6 in]), walk a distance of 3 meters (118 in, approx 10 ft), turn, walk back to the chair and sit down. If the individual takes longer than 14 seconds to complete TUG, this indicates risk for falls. Score 7 7.5-10.5 11-14 14.5-17.5 18-21 21.5-24.5 25+   Modifier CH CI CJ CK CL CM CN     Medical Necessity:   · Skilled intervention continues to be required due to residual right sided hemiparesis. Reason for Services/Other Comments:  · Patient continues to require skilled intervention due to residual right hemiparesis. .   Use of outcome tool(s) and clinical judgement create a POC that gives a: Questionable prediction of patient's progress: MODERATE COMPLEXITY      S:   Patient reports no pain. Patient states working on exercises at home some. TREATMENT:   (In addition to Assessment/Re-Assessment sessions the following treatments were rendered)    NEUROMUSCULAR RE-EDUCATION: (10 minutes):  Exercise/activities per grid below to improve balance, coordination, kinesthetic sense, proprioception and motor control. Required maximal manual, tactile and facilitation cues to perfrom activities. Date:  10/4/17 Date:  10/6/17 Date  10/11/17   Activity/Exercise Parameters      Right UE tone reduction and sensorimotor retraining for increased arm swing and posture during gait      Seated in chair (pt is short in stature). Right shoe and brace off Manual facilitation to R ankle/foot to improve muscle activation.  Able to get toe extension 2 x 10 reps with facilitation  Manual facilitation to R calf for heel raises, 1+ strengthen noted with facilitation 2 x 5 reps  Right foot slides passive to progressively less facilitation on the floor  Then on 4kg ball, yellow therawand,  Heel raises; Full circles clockwise and counterclockwise on ball,   Hip ER/IR with inversion/eversion. Stepping forward and back with left leg at rail At wall x 10 reps for hip and knee release  x10 with hips and knee release and then into kick/HS on the right    Facilitated long arc quad  Quad set!!! Able to hold end range knee extension with quad set now. Worked on long arc quads with end range quad set with excellent control        LE tone reduction prior to gait  X 5 minutes in supine to decrease tone and synergy pattern. X 5 minutes in supine to decrease tone and synergy   Gait retraining With brace on, no assistive device, 120' with therapist facilitating hip/knee release. Working on making step length more symmetrical  Without shoes with cane:  X 20' working on improved gait pattern, terminal knee extension and heel strike  With shoes and no brace x 20'  With shoes and brace x120' out. Showing some swing and emerging HS. With shoes and brace without  AD x 120 feet working on gait pattern, heel strike, terminal knee extension. Therapeutic Exercise: ( 35 minutes):  Exercises per grid below to improve mobility, strength and coordination. Required moderate visual, verbal and manual cues to promote proper body alignment, promote proper body posture and promote proper body breathing techniques. Progressed range and repetitions as indicated.    Date:  10/11/17 Date:   Date:     Activity/Exercise Parameters Parameters Parameters   Piriformis stretch   3 x 30 second hold     Hip capsule stetch   3 x 30 second hold right     Hip flexor stretch   3 x 30 second      Bent knee hip abduction stretch with active adduction   10 reps with 10 second hold     SKTC stretch   3 x 30 second hold right     Heel cord and plantar fascia stretch passive X 5 minutes     Bridging   2 x 10 reps B     LAQ   4 x 5 reps right EDUCATION:  Instructed in above exercises and home program.  HEP:  No Written HEP given to patient yet. Instructed to owrk on increase step length on the left. Treatment/Session Assessment: Patient with good quad contraction today with LAQ exercises. Focus today on stretching  exercises and patient given written handouts and understood exercises. Patient instructed in getting help with exercises at home and patient stated  is very supportive and will be willing to help her . Patients response to todays treatment session was tolerated well with no medical complications. .  · Post session pain:  No pain  · Compliance with Program/Exercises: Will assess as treatment progresses. · Recommendations/Intent for next treatment session: \"Next visit will focus on advancements to more challenging activities\".   Total Treatment Duration:  PT Patient Time In/Time Out  Time In: 0845  Time Out: [facility] [address] TRISTIAN Lomas

## 2017-10-16 ENCOUNTER — APPOINTMENT (OUTPATIENT)
Dept: PHYSICAL THERAPY | Age: 42
End: 2017-10-16
Payer: COMMERCIAL

## 2017-10-17 ENCOUNTER — HOSPITAL ENCOUNTER (OUTPATIENT)
Dept: SLEEP MEDICINE | Age: 42
Discharge: HOME | End: 2017-10-17
Payer: COMMERCIAL

## 2017-10-17 PROCEDURE — 95811 POLYSOM 6/>YRS CPAP 4/> PARM: CPT

## 2017-10-17 PROCEDURE — 95810 POLYSOM 6/> YRS 4/> PARAM: CPT

## 2017-10-20 ENCOUNTER — HOSPITAL ENCOUNTER (OUTPATIENT)
Dept: PHYSICAL THERAPY | Age: 42
Discharge: HOME | End: 2017-10-20
Payer: COMMERCIAL

## 2017-10-20 PROCEDURE — 97112 NEUROMUSCULAR REEDUCATION: CPT

## 2017-10-20 PROCEDURE — 97110 THERAPEUTIC EXERCISES: CPT

## 2017-10-20 NOTE — PROGRESS NOTES
Zoraida pitt  : 1975  Payor: Dorie Craven / Plan: Lizette Loaiza RPN / Product Type: Commerical /  [id number]  [facility] [address] [facility], [place] [address] [address] [facility]  [phone]   [phone]           OUTPATIENT PHYSICAL THERAPY:Daily Note 10/20/2017   ICD-10: Treatment Diagnosis: Difficulty in walking, not elsewhere classified (R26.2); Hemiplegia and hemiparesis following cerebral infarction affecting right dominant side (L61.045)  Precautions/Allergies:   Erythromycin   Fall Risk Score: 0 (? 5 = High Risk)  MD Orders: PT eval MEDICAL/REFERRING DIAGNOSIS:  Stroke (cerebrum) ([facility] [address] [I63.9]   DATE OF ONSET: 8/3/17  REFERRING PHYSICIAN: Kera Roman*  RETURN PHYSICIAN APPOINTMENT: unknown  DATE OF PROGRESS NOTE:    RECERTIFICATION DATE:      INITIAL ASSESSMENT:  Ms. Demi pitt presents with dominant right hemiplegia s/p CVA on 8/3/17. Pt presents with ADL, balance and gait deficits. Pt will benefit from physical therapy to maximize to full potential and safety with all functional mobility. PROBLEM LIST (Impacting functional limitations):  1. Decreased Strength  2. Decreased ADL/Functional Activities  3. Decreased Ambulation Ability/Technique  4. Decreased Balance  5. Increased Pain  6. Decreased Activity Tolerance  7. Decreased Flexibility/Joint Mobility  8. Decreased [place] with Home Exercise Program INTERVENTIONS PLANNED:  1. Balance Exercise  2. Bed Mobility  3. Family Education  4. Gait Training  5. Home Exercise Program (HEP)  6. Neuromuscular Re-education/Strengthening  7. Range of Motion (ROM)  8. Therapeutic Activites  9. Therapeutic Exercise/Strengthening  10. Transfer Training  11. modalities   TREATMENT PLAN:  Effective Dates: 17 TO 12/15/17. Frequency/Duration: 3 times a week for 8 weeks  GOALS: (Goals have been discussed and agreed upon with patient.)  Short-Term Functional Goals: Time Frame: 4 weeks  1.  Pt will be independent with range of motion, strength and balance home exercise program.  2. Pt will decrease TUG score by 5 seconds indicating more normalized and safer gait pattern. Discharge Goals: Time Frame: 8 weeks  Pt will show increased range of motion and improved posture to allow for improved safety and ability with all functional mobility. Pt will decrease TUG score by 10 seconds indicating more normalized gait pattern and decrease risk for falls. Pt will increase Perez Balance Scale to 49/56 indicating increased balance and decreased risk for falls. Rehabilitation Potential For Stated Goals: Good  Regarding [place] therapy, I certify that the treatment plan above will be carried out by a therapist or under their direction. Thank you for this referral,  Catrina Garcia DPT                  HISTORY:   GOAL:  Love to be back to doing things independenlty. Would like to get to where I don't have to use the cane. Get back to my own home. Present Symptoms:    38year old  right handed female s/p left CVA 8/3/17 2* to HTN. Short term disability for now. Work in the [employer] at [employer]. Very physical job. More sued to my left hand. Learning how to function with one hand is difficult. ADLs with just my left hand is difficult. Dynamic sitting balance. I just have to be careluf that my foot is flat especially if I have my shoes off. Tire easily. Buildig up strength. Large base quad cane but not all the time at home. Can't carry a heavy load. PAIN:  Right fingers and shoulder with slight movement. 6/10  History of Present Injury/Illness (Reason for Referral):  See above  Past Medical History/Comorbidities:   Ms. Anuradha Alfonso  has a past medical history of Generalized anxiety disorder; GERD (gastroesophageal reflux disease); Hypertension; Insomnia, unspecified; Iron deficiency anemia (8/23/2013); Left sided lacunar stroke ([facility] [address] (08/2017); Mitral valve prolapse (1993);  Obesity (BMI 30-39.9); and Prediabetes (7/28/2015). Ms. Paramjit Flores  has a past surgical history that includes tubal ligation (1995) and endoscopy (10/1/04). Social History/Living Environment:     25and 25year old son and daughter. . Prior Level of Function/Work/Activity:  Worked in [employer] department. 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 One story - Own home:  About 5 steps to get in with bilateral railing. Staying at [employer] house which is easier to maneuver - low tub - can walk right in. Small wheelchair ramp and then level. Mom helps with driving and household duties - cooking. Dominant Side:         RIGHT    Current Medications:    Current Outpatient Prescriptions:     oxybutynin chloride XL (DITROPAN XL) 10 mg CR tablet, Take 1 Tab by mouth daily. Indications: URINARY URGE INCONTINENCE, Disp: 30 Tab, Rfl: 6    raNITIdine (ZANTAC) 150 mg tablet, Take 1 Tab by mouth two (2) times a day., Disp: 60 Tab, Rfl: 5    diazePAM (VALIUM) 5 mg tablet, Take 0.5 Tabs by mouth every six (6) hours as needed. Max Daily Amount: 10 mg. Indications: MUSCLE SPASM, Disp: 60 Tab, Rfl: 1    tiZANidine (ZANAFLEX) 4 mg tablet, Take 1 Tab by mouth three (3) times daily. , Disp: 90 Tab, Rfl: 6    traZODone (DESYREL) 50 mg tablet, Take 1 Tab by mouth nightly as needed for Sleep. Indications: insomnia associated with depression, Disp: 30 Tab, Rfl: 2    hydroCHLOROthiazide (MICROZIDE) 12.5 mg capsule, Take 2 Caps by mouth daily. , Disp: 30 Cap, Rfl: 6    potassium chloride SR (K-TAB) 20 mEq tablet, Take 1 Tab by mouth two (2) times a day., Disp: 60 Tab, Rfl: 6    aspirin (ASPIRIN) 325 mg tablet, Take 1 Tab by mouth daily. , Disp: 30 Tab, Rfl: 12    acetaminophen (TYLENOL) 325 mg tablet, Take 2 Tabs by mouth every six (6) hours as needed for Pain or Fever (headache). , Disp: 30 Tab, Rfl: 0    escitalopram oxalate (LEXAPRO) 20 mg tablet, Take 1 Tab by mouth daily. , Disp: 30 Tab, Rfl: 5   Date Last Reviewed:  10/20/2017     Number of Personal Factors/Comorbidities that affect the Plan of Care: 1-2: MODERATE COMPLEXITY   EXAMINATION:   ROM:          Painful right shoulder and finger and hands. Shoulder not fully assessed. Seeing OT. Passive Fingers to neutral extension at least.  No overpressure given. Elbow slight decrease as end ranges. Supination ~75% with mild pain. Right lower extremity WFL  Left WFL  Strength:          Right UE:  Almost no active movement noted. Possible scap stabilization and tone is not flaccid. Right hip flexion:  3-/5  Knee extension at least 4/5  Nothing in DF. Functional Mobility:         Gait/Ambulation:  Ambulates with large base quad came with slow small steps with custom hinged AFO on. Transfers:  Independent. occ no WBing on the right with sit to stand        Bed Mobility:  NT  Mental Status:          Alert and oriented x 4;  Pleasant and appropriate  Vision:          No deficits reported. Body Structures Involved:  1. Nerves  2. Bones  3. Joints  4. Muscles  5. Ligaments Body Functions Affected:  1. Sensory/Pain  2. Neuromusculoskeletal  3. Movement Related Activities and Participation Affected:  1. Learning and Applying Knowledge  2. General Tasks and Demands  3. Mobility  4. Self Care  5. Domestic Life  6. Interpersonal Interactions and Relationships  7. Community, Social and [place]   Number of elements that affect the Plan of Care: 4+: HIGH COMPLEXITY   CLINICAL PRESENTATION:   Presentation: Evolving clinical presentation with changing clinical characteristics: MODERATE COMPLEXITY   CLINICAL DECISION MAKING:   Outcome Measure: Tool Used: Perez Balance Scale  Score:  Initial: 46/56 Most Recent: X/56 (Date: -- )   Interpretation of Score: Each section is scored on a 0-4 scale, 0 representing the patients inability to perform the task and 4 representing independence. The scores of each section are added together for a total score of 56.   The higher the patients score, the more independent the patient is. Any score below 45 indicates increased risk for falls. Score 56 55-45 44-34 33-23 22-12 11-1 0   Modifier CH CI CJ CK CL CM CN       Tool Used: Timed Up and Go (TUG)  Score:  Initial: with cane:  45.24 seconds; without cane 52.01 seconds Most Recent: X seconds (Date: -- )   Interpretation of Score: The test measures, in seconds, the time taken by an individual to stand up from a standard arm chair (seat height 46 cm [18 in], arm height 65 cm [25.6 in]), walk a distance of 3 meters (118 in, approx 10 ft), turn, walk back to the chair and sit down. If the individual takes longer than 14 seconds to complete TUG, this indicates risk for falls. Score 7 7.5-10.5 11-14 14.5-17.5 18-21 21.5-24.5 25+   Modifier CH CI CJ CK CL CM CN     Medical Necessity:   · Skilled intervention continues to be required due to residual right sided hemiparesis. Reason for Services/Other Comments:  · Patient continues to require skilled intervention due to residual right hemiparesis. .   Use of outcome tool(s) and clinical judgement create a POC that gives a: Questionable prediction of patient's progress: MODERATE COMPLEXITY      S:   Patient reports no pain today. Says she would like to find an exercise bike for home that she can use. TREATMENT:   (In addition to Assessment/Re-Assessment sessions the following treatments were rendered)    NEUROMUSCULAR RE-EDUCATION: (40 minutes):  Exercise/activities per grid below to improve balance, coordination, kinesthetic sense, proprioception and motor control. Required maximal manual, tactile and facilitation cues to perfrom activities. Date:  10/6/17 Date  10/11/17 Date:  10/20/17   Activity/Exercise   Parameters    Right UE tone reduction and sensorimotor retraining for increased arm swing and posture during gait      Seated in chair (pt is short in stature).   Right shoe and brace off Right foot slides passive to progressively less facilitation on the floor  Then on 4kg ball, yellow therawand,  Heel raises; Full circles clockwise and counterclockwise on ball,   Hip ER/IR with inversion/eversion. Neuro-JAKE tactile stimulation to R foot to stimulate dorsiflexion x 10 reps    Stepping forward and back with left leg at rail x10 with hips and knee release and then into kick/HS on the right     Facilitated long arc quad Quad set!!! Able to hold end range knee extension with quad set now. Worked on long arc quads with end range quad set with excellent control      Quad set at end range x 10 reps with 5 second hold. Facilitation needed initially but then patient able to hold contraction    LE tone reduction prior to gait   X 5 minutes in supine to decrease tone and synergy X 5 minutes sitting to foot    Gait retraining Without shoes with cane:  X 20' working on improved gait pattern, terminal knee extension and heel strike  With shoes and no brace x 20'  With shoes and brace x120' out. Showing some swing and emerging HS. With shoes and brace without  AD x 120 feet working on gait pattern, heel strike, terminal knee extension. With brace on and no AD 2 x 50' with verbal cues for R knee extension during stance phase    Mini squats on door with therapist facilitating knee extension      2 x 10 reps with good symmetrical control  Then with 1\" block under L foot for increased R weight bearing x 10 reps    Standing in parallel bars, 3\" block under R foot   Therapist assisting with elevating up and then working on slow control on the way day for quad and plantarflexor eccentric control x 10 reps        Therapeutic Exercise: ( 35 minutes):  Exercises per grid below to improve mobility, strength and coordination. Required moderate visual, verbal and manual cues to promote proper body alignment, promote proper body posture and promote proper body breathing techniques. Progressed range and repetitions as indicated.    Date:  10/11/17 Date:  10/20/17 Date: Activity/Exercise Parameters Parameters Parameters   Piriformis stretch   3 x 30 second hold     Hip capsule stetch   3 x 30 second hold right     Hip flexor stretch   3 x 30 second      Bent knee hip abduction stretch with active adduction   10 reps with 10 second hold     SKTC stretch   3 x 30 second hold right     Heel cord and plantar fascia stretch passive X 5 minutes     Bridging   2 x 10 reps B     LAQ   4 x 5 reps right                             EDUCATION:  Instructed in above exercises and home program.  HEP:  No Written HEP given to patient yet. Instructed to owrk on increase step length on the left. Treatment/Session Assessment: Patient  Patient was 10 minutes late for treatment today. Treatment focused on improving stance control during gait. She continues to need the use of there assistive device and cane for safety. Patients response to todays treatment session was tolerated well with no medical complications. .  · Post session pain:  No pain  · Compliance with Program/Exercises: Will assess as treatment progresses. · Recommendations/Intent for next treatment session: \"Next visit will focus on advancements to more challenging activities\".   Total Treatment Duration:  PT Patient Time In/Time Out  Time In: 1355  Time Out: [facility], [facility]

## 2017-10-23 ENCOUNTER — APPOINTMENT (OUTPATIENT)
Dept: PHYSICAL THERAPY | Age: 42
End: 2017-10-23
Payer: COMMERCIAL

## 2017-10-25 ENCOUNTER — HOSPITAL ENCOUNTER (OUTPATIENT)
Dept: PHYSICAL THERAPY | Age: 42
Discharge: HOME | End: 2017-10-25
Payer: COMMERCIAL

## 2017-10-25 PROCEDURE — 97110 THERAPEUTIC EXERCISES: CPT

## 2017-10-25 PROCEDURE — 97112 NEUROMUSCULAR REEDUCATION: CPT

## 2017-10-25 NOTE — PROGRESS NOTES
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 5 = High Risk)  MD Orders: PT eval MEDICAL/REFERRING DIAGNOSIS:  Stroke (cerebrum) ([address] [I63.9]   DATE OF ONSET: 8/3/17  REFERRING PHYSICIAN: Kera Cox*  RETURN PHYSICIAN APPOINTMENT: unknown  DATE OF PROGRESS NOTE:    RECERTIFICATION DATE:      INITIAL ASSESSMENT:  Ms. Demi pitt presents with dominant right hemiplegia s/p CVA on 8/3/17. Pt presents with ADL, balance and gait deficits. Pt will benefit from physical therapy to maximize to full potential and safety with all functional mobility. PROBLEM LIST (Impacting functional limitations):  1. Decreased Strength  2. Decreased ADL/Functional Activities  3. Decreased Ambulation Ability/Technique  4. Decreased Balance  5. Increased Pain  6. Decreased Activity Tolerance  7. Decreased Flexibility/Joint Mobility  8. Decreased [place] with Home Exercise Program INTERVENTIONS PLANNED:  1. Balance Exercise  2. Bed Mobility  3. Family Education  4. Gait Training  5. Home Exercise Program (HEP)  6. Neuromuscular Re-education/Strengthening  7. Range of Motion (ROM)  8. Therapeutic Activites  9. Therapeutic Exercise/Strengthening  10. Transfer Training  11. modalities   TREATMENT PLAN:  Effective Dates: 17 TO 12/15/17. Frequency/Duration: 3 times a week for 8 weeks  GOALS: (Goals have been discussed and agreed upon with patient.)  Short-Term Functional Goals: Time Frame: 4 weeks  1.  Pt will be independent with range of motion, strength and balance home exercise program.  2. Pt will decrease TUG score by 5 seconds indicating more normalized and safer gait pattern. Discharge Goals: Time Frame: 8 weeks  Pt will show increased range of motion and improved posture to allow for improved safety and ability with all functional mobility. Pt will decrease TUG score by 10 seconds indicating more normalized gait pattern and decrease risk for falls. Pt will increase Perez Balance Scale to 49/56 indicating increased balance and decreased risk for falls. Rehabilitation Potential For Stated Goals: Good  Regarding [place] therapy, I certify that the treatment plan above will be carried out by a therapist or under their direction. Thank you for this referral,  Carolyn Carrillo, PTA                  HISTORY:   GOAL:  Love to be back to doing things independenlty. Would like to get to where I don't have to use the cane. Get back to my own home. Present Symptoms:    38year old  right handed female s/p left CVA 8/3/17 2* to HTN. Short term disability for now. 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 Very physical job. More sued to my left hand. Learning how to function with one hand is difficult. ADLs with just my left hand is difficult. Dynamic sitting balance. I just have to be careluf that my foot is flat especially if I have my shoes off. Tire easily. Buildig up strength. Large base quad cane but not all the time at home. Can't carry a heavy load. PAIN:  Right fingers and shoulder with slight movement. 6/10  History of Present Injury/Illness (Reason for Referral):  See above  Past Medical History/Comorbidities:   Ms. Mic Wilkins  has a past medical history of Generalized anxiety disorder; GERD (gastroesophageal reflux disease); Hypertension; Insomnia, unspecified; Iron deficiency anemia (8/23/2013); [facility] (08/2017); Mitral valve prolapse (1993);  Obesity (BMI 30-39.9); RADHA on CPAP (10/2017); and Prediabetes (7/28/2015). Ms. Souleymane Jain  has a past surgical history that includes tubal ligation (1995) and endoscopy (10/1/04). Social History/Living Environment:     25and 25year old son and daughter. . Prior Level of Function/Work/Activity:  Worked in [employer] department. 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 One story - Own home:  About 5 steps to get in with bilateral railing. Staying at [facility], [place] house which is easier to maneuver - low tub - can walk right in. Small wheelchair ramp and then level. Mom helps with driving and household duties - cooking. Dominant Side:         RIGHT    Current Medications:    Current Outpatient Prescriptions:     oxybutynin chloride XL (DITROPAN XL) 10 mg CR tablet, Take 1 Tab by mouth daily. Indications: URINARY URGE INCONTINENCE, Disp: 30 Tab, Rfl: 6    raNITIdine (ZANTAC) 150 mg tablet, Take 1 Tab by mouth two (2) times a day., Disp: 60 Tab, Rfl: 5    diazePAM (VALIUM) 5 mg tablet, Take 0.5 Tabs by mouth every six (6) hours as needed. Max Daily Amount: 10 mg. Indications: MUSCLE SPASM, Disp: 60 Tab, Rfl: 1    tiZANidine (ZANAFLEX) 4 mg tablet, Take 1 Tab by mouth three (3) times daily. , Disp: 90 Tab, Rfl: 6    traZODone (DESYREL) 50 mg tablet, Take 1 Tab by mouth nightly as needed for Sleep. Indications: insomnia associated with depression, Disp: 30 Tab, Rfl: 2    hydroCHLOROthiazide (MICROZIDE) 12.5 mg capsule, Take 2 Caps by mouth daily. , Disp: 30 Cap, Rfl: 6    potassium chloride SR (K-TAB) 20 mEq tablet, Take 1 Tab by mouth two (2) times a day., Disp: 60 Tab, Rfl: 6    aspirin (ASPIRIN) 325 mg tablet, Take 1 Tab by mouth daily. , Disp: 30 Tab, Rfl: 12    acetaminophen (TYLENOL) 325 mg tablet, Take 2 Tabs by mouth every six (6) hours as needed for Pain or Fever (headache). , Disp: 30 Tab, Rfl: 0    escitalopram oxalate (LEXAPRO) 20 mg tablet, Take 1 Tab by mouth daily. , Disp: 30 Tab, Rfl: 5   Date Last Reviewed:  10/25/2017 Number of Personal Factors/Comorbidities that affect the Plan of Care: 1-2: MODERATE COMPLEXITY   EXAMINATION:   ROM:          Painful right shoulder and finger and hands. Shoulder not fully assessed. Seeing OT. Passive Fingers to neutral extension at least.  No overpressure given. Elbow slight decrease as end ranges. Supination ~75% with mild pain. Right lower extremity WFL  Left WFL  Strength:          Right UE:  Almost no active movement noted. Possible scap stabilization and tone is not flaccid. Right hip flexion:  3-/5  Knee extension at least 4/5  Nothing in DF. Functional Mobility:         Gait/Ambulation:  Ambulates with large base quad came with slow small steps with custom hinged AFO on. Transfers:  Independent. occ no WBing on the right with sit to stand        Bed Mobility:  NT  Mental Status:          Alert and oriented x 4;  Pleasant and appropriate  Vision:          No deficits reported. Body Structures Involved:  1. Nerves  2. Bones  3. Joints  4. Muscles  5. Ligaments Body Functions Affected:  1. Sensory/Pain  2. Neuromusculoskeletal  3. Movement Related Activities and Participation Affected:  1. Learning and Applying Knowledge  2. General Tasks and Demands  3. Mobility  4. Self Care  5. Domestic Life  6. Interpersonal Interactions and Relationships  7. Community, Social and [place]   Number of elements that affect the Plan of Care: 4+: HIGH COMPLEXITY   CLINICAL PRESENTATION:   Presentation: Evolving clinical presentation with changing clinical characteristics: MODERATE COMPLEXITY   CLINICAL DECISION MAKING:   Outcome Measure: Tool Used: Perez Balance Scale  Score:  Initial: 46/56 Most Recent: X/56 (Date: -- )   Interpretation of Score: Each section is scored on a 0-4 scale, 0 representing the patients inability to perform the task and 4 representing independence. The scores of each section are added together for a total score of 56.   The higher the patients score, the more independent the patient is. Any score below 45 indicates increased risk for falls. Score 56 55-45 44-34 33-23 22-12 11-1 0   Modifier CH CI CJ CK CL CM CN       Tool Used: Timed Up and Go (TUG)  Score:  Initial: with cane:  45.24 seconds; without cane 52.01 seconds Most Recent: X seconds (Date: -- )   Interpretation of Score: The test measures, in seconds, the time taken by an individual to stand up from a standard arm chair (seat height 46 cm [18 in], arm height 65 cm [25.6 in]), walk a distance of 3 meters (118 in, approx 10 ft), turn, walk back to the chair and sit down. If the individual takes longer than 14 seconds to complete TUG, this indicates risk for falls. Score 7 7.5-10.5 11-14 14.5-17.5 18-21 21.5-24.5 25+   Modifier CH CI CJ CK CL CM CN     Medical Necessity:   · Skilled intervention continues to be required due to residual right sided hemiparesis. Reason for Services/Other Comments:  · Patient continues to require skilled intervention due to residual right hemiparesis. .   Use of outcome tool(s) and clinical judgement create a POC that gives a: Questionable prediction of patient's progress: MODERATE COMPLEXITY      S:   Patient reports no pain today. Patient 10 minutes late today. TREATMENT:   (In addition to Assessment/Re-Assessment sessions the following treatments were rendered)    NEUROMUSCULAR RE-EDUCATION: ( minutes):  Exercise/activities per grid below to improve balance, coordination, kinesthetic sense, proprioception and motor control. Required maximal manual, tactile and facilitation cues to perfrom activities. Date:  10/6/17 Date  10/11/17 Date:  10/20/17   Activity/Exercise   Parameters    Right UE tone reduction and sensorimotor retraining for increased arm swing and posture during gait      Seated in chair (pt is short in stature).   Right shoe and brace off Right foot slides passive to progressively less facilitation on the floor  Then on 4kg ball, yellow therawand,  Heel raises; Full circles clockwise and counterclockwise on ball,   Hip ER/IR with inversion/eversion. Neuro-JAKE tactile stimulation to R foot to stimulate dorsiflexion x 10 reps    Stepping forward and back with left leg at rail x10 with hips and knee release and then into kick/HS on the right     Facilitated long arc quad Quad set!!! Able to hold end range knee extension with quad set now. Worked on long arc quads with end range quad set with excellent control      Quad set at end range x 10 reps with 5 second hold. Facilitation needed initially but then patient able to hold contraction    LE tone reduction prior to gait   X 5 minutes in supine to decrease tone and synergy X 5 minutes sitting to foot    Gait retraining Without shoes with cane:  X 20' working on improved gait pattern, terminal knee extension and heel strike  With shoes and no brace x 20'  With shoes and brace x120' out. Showing some swing and emerging HS. With shoes and brace without  AD x 120 feet working on gait pattern, heel strike, terminal knee extension. With brace on and no AD 2 x 50' with verbal cues for R knee extension during stance phase    Mini squats on door with therapist facilitating knee extension      2 x 10 reps with good symmetrical control  Then with 1\" block under L foot for increased R weight bearing x 10 reps    Standing in parallel bars, 3\" block under R foot   Therapist assisting with elevating up and then working on slow control on the way day for quad and plantarflexor eccentric control x 10 reps        Therapeutic Exercise: ( 20 minutes):  Exercises per grid below to improve mobility, strength and coordination. Required moderate visual, verbal and manual cues to promote proper body alignment, promote proper body posture and promote proper body breathing techniques. Progressed range and repetitions as indicated.    Date:  10/11/17 Date:  10/20/17 Date:  10/26/17   Activity/Exercise Parameters Parameters Parameters   Piriformis stretch   3 x 30 second hold     Hip capsule stetch   3 x 30 second hold right     Hip flexor stretch   3 x 30 second      Bent knee hip abduction stretch with active adduction   10 reps with 10 second hold     SKTC stretch   3 x 30 second hold right  3 x 30 second hold right   Heel cord and plantar fascia stretch passive X 5 minutes  X 5 minutes   Bridging   2 x 10 reps B     LAQ   4 x 5 reps right  4 x 5 reps right   Right ankle dorsiflexion    During russian e-stim x 10 minutes    Manual facilitation of right dorsiflexors   Working with patient on active dorsiflexion with manual and verbal cues with patient supine     Modalities: (10 Minutes) patient supine with small wedge under knee and patient in modified long sitting to be able to look at right foot during electrical stimulation. Patient received [place] e-stim to right peroneal to help initiate dorsiflexion and assist with active dorsiflexion with 30 seconds off/10 seconds on for 10 minutes. EDUCATION:  Instructed in above exercises and home program.  HEP:  No Written HEP given to patient yet. Instructed to owrk on increase step length on the left. Treatment/Session Assessment: Patient  Patient was 10 minutes late for treatment today. Treatment focused on facilitating active movement of right dorsiflexors today. Continue per plan of care. Patient with slightly more active dorsiflexion following treatment today. Patients response to todays treatment session was tolerated well with no medical complications. .  · Post session pain:  No pain  · Compliance with Program/Exercises: Will assess as treatment progresses. · Recommendations/Intent for next treatment session: \"Next visit will focus on advancements to more challenging activities\".   Total Treatment Duration:  PT Patient Time In/Time Out  Time In: 1315  Time Out: [address], PTA

## 2017-10-25 NOTE — PROGRESS NOTES
Zoraida Blanco  : 1975 [facility], [facility], [address]  [phone]   [phone]         OUTPATIENT OCCUPATIONAL THERAPY: Daily Note and Progress Report 10/27/2017    ICD-10: Treatment Diagnosis:   Hemiplegia and hemiparesis following cerebral infarction affecting right dominant side (F76.609)  Precautions/Allergies:   Erythromycin   Fall Risk Score: 3 (? 5 = High Risk)  MD Orders: OT evaluate and treat  MEDICAL/REFERRING DIAGNOSIS:   Stroke (cerebrum) ([facility] [address] [I63.9]   DATE OF ONSET: 2017   REFERRING PHYSICIAN: Kera Godwin*  RETURN PHYSICIAN APPOINTMENT: 2017     INITIAL ASSESSMENT:  Ms. Elijah Blanco has been seen by OT for a total of 7 visits. Pt reports she has been using saebo flex brace for R UE, but reports pain after prolonged wear. Pt has now started alternating between resting hand splint and saebo flex brace with decreased pain. Pt reports severe pain with attempts to passively extend digits. Pt is demonstrating some increased tone in the R UE. Pt reports pain in R shoulder but no presence of subluxation. Pt has been working on weightbearing to help improve strength, function, and decrease tone in the R dominant UE. Pt demonstrates good progress with scapular strength. Pt also has some activation at the elbow flexors/shoulder abductors with movement in flexor synergy. Pt making slow progress and is 10-15 minutes late to most all appointments which limits progress as well. Pt does report decreased pain and improved positioning of R UE after treatment. Pt to continue per plan of care. PLAN OF CARE:   PROBLEM LIST:  1. Decreased Strength  2. Decreased ADL/Functional Activities  3. Decreased Transfer Abilities  4. Decreased Ambulation Ability/Technique  5. Decreased Balance  6. Increased Pain  7. Decreased Activity Tolerance  8. Decreased Flexibility/Joint Mobility  9.  Decreased Stanley with Home Exercise Program INTERVENTIONS PLANNED:  1. Activities of daily living training  2. Adaptive equipment training  3. Balance training  4. Clothing management  5. Donning&doffing training  6. Manual therapy training  7. Modalities  8. Neuromuscular re-eduation  9. Splinting  10. Therapeutic activity  11. Therapeutic exercise   TREATMENT PLAN:  Effective Dates: 9/11/17 TO 12/11/17. Frequency/Duration: 2 times a week for 12 weeks  GOALS: (Goals have been discussed and agreed upon with patient.)  Short-Term Functional Goals: Time Frame:4- 6 weeks  1. Pipo Stauffer will report decreased pain in the R shoulder from 5/10 to 3/10 with passive movement to decrease stiffness for daily activity. CONTINUE  2. Pipo Stauffer will be 100% compliant with daily SROM exercises for improving R UE ROM to decrease risk for contractures. CONTINUE  3. Pipo Stauffer will demonstrate ability to complete weightbearing activities in R UE with moderate facilitation to improve functional use for transfers. MET  4. Pipo Stauffer will complete basic ADL with supervision to improve independence with functional tasks. CONTINUE  5. Pipo Stauffer will tolerate 15 minutes of dynamic standing balance activity with CGA to decrease risk for falls with ADL and to improve activity tolerance. NOT ADDRESSED  6. Discharge Goals: Time Frame: 8-12 weeks   1. Pipo Stauffer will have functional PROM of R shoulder to [facility] to decrease pain and stiffness in R dominant UE. CONTINUE  2. Pipo Stauffer will demonstrate improved strength in the R dominant UE as evidenced by at least 2+ to 3-/5 strength for using R UE as functional assist. CONTINUE  3. Pipo Stauffer will demonstrate ability to functionally grasp and release light objects with supervision and with use of R dominant hand. CONTINUE  4. Pipo Stauffer will be modified independent with all basic ADL to improve overall quality of life. CONTINUE  5.  Pipo Stauffer will use R hand as functional assist with daily activities/transfers with supervision to improve independence with ADL. CONTINUE   Rehabilitation Potential For Stated Goals: Good  Regarding [place] therapy, I certify that the treatment plan above will be carried out by a therapist or under their direction. Thank you for this referral,  Gerri Craig OT                 The information in this section was collected on 9/11/17 (except where otherwise noted). OCCUPATIONAL PROFILE & HISTORY:   History of Present Injury/Illness (Reason for Referral):  Pt reports she was at work when she started feeling like she was going to pass out. Pt reports she didn't really stop working and the feeling wouldn't go away. It started at 2pm and left work at 3:30 pm. Pt felt like she was going to fall and pushed a buggy to her car and drove to her daughter's house. Pt then was brought over to the ER at [facility]. Progressively got worse with R sided weakness and woke up the next morning with full parlaysis of the R side. Reports her eyesight was decreasing and needing reading glasses. R eye is a little worse with the blurry vision. Pt wears to reading glasses. Pt using a wheelchair as less as possible. Using wheelchair when out and about. Pt using a quad cane around the home and has AFO to R LE. Tub transfer bench with CGA. Eats at the kitchen table. Pt stayed from Aug 8th- September 6th in inpatient rehab. Pt reports no falls at home. Pt getting dressed using techniques learned for inpatient rehab. Difficulty walking and carrying objects. Pt has some issues with Zanaflex and incontinence (she is unsure if that is related). Pt presents today for outpatient occupational therapy services. Past Medical History/Comorbidities:   Ms. Rosmery Alegre  has a past medical history of Generalized anxiety disorder; GERD (gastroesophageal reflux disease); Hypertension; Insomnia, unspecified; Iron deficiency anemia (8/23/2013);  Left sided lacunar stroke ([facility] [address] (08/2017); Mitral valve prolapse (1993); Obesity (BMI 30-39.9); RADHA on CPAP (10/2017); and Prediabetes (7/28/2015). Ms. Sharlene Palomo  has a past surgical history that includes tubal ligation (1995) and endoscopy (10/1/04). Social History/Living Environment:   Lives with mom with her  because toilet/tub is lower with more open spaces. Pt also has a son. Pt has 24 hr supervision. Prior Level of Function/Work/Activity: Indpendent and working full time in [employer] .   Dominant Side:         RIGHTPersonal Factors: Other factors that influence how disability is experienced by the patient:  Hx of anxiety disorder, HTN, Pre-diabetic    Current Medications:    Current Outpatient Prescriptions:     oxybutynin chloride XL (DITROPAN XL) 10 mg CR tablet, Take 1 Tab by mouth daily. Indications: URINARY URGE INCONTINENCE, Disp: 30 Tab, Rfl: 6    raNITIdine (ZANTAC) 150 mg tablet, Take 1 Tab by mouth two (2) times a day., Disp: 60 Tab, Rfl: 5    diazePAM (VALIUM) 5 mg tablet, Take 0.5 Tabs by mouth every six (6) hours as needed. Max Daily Amount: 10 mg. Indications: MUSCLE SPASM, Disp: 60 Tab, Rfl: 1    tiZANidine (ZANAFLEX) 4 mg tablet, Take 1 Tab by mouth three (3) times daily. , Disp: 90 Tab, Rfl: 6    traZODone (DESYREL) 50 mg tablet, Take 1 Tab by mouth nightly as needed for Sleep. Indications: insomnia associated with depression, Disp: 30 Tab, Rfl: 2    hydroCHLOROthiazide (MICROZIDE) 12.5 mg capsule, Take 2 Caps by mouth daily. , Disp: 30 Cap, Rfl: 6    potassium chloride SR (K-TAB) 20 mEq tablet, Take 1 Tab by mouth two (2) times a day., Disp: 60 Tab, Rfl: 6    aspirin (ASPIRIN) 325 mg tablet, Take 1 Tab by mouth daily. , Disp: 30 Tab, Rfl: 12    acetaminophen (TYLENOL) 325 mg tablet, Take 2 Tabs by mouth every six (6) hours as needed for Pain or Fever (headache). , Disp: 30 Tab, Rfl: 0    escitalopram oxalate (LEXAPRO) 20 mg tablet, Take 1 Tab by mouth daily. , Disp: 30 Tab, Rfl: 5            Date Last Reviewed:  9/11/17   Complexity Level : Expanded review of therapy/medical records (1-2):  MODERATE COMPLEXITY   ASSESSMENT OF OCCUPATIONAL PERFORMANCE:     Palpation:  Patient with no palpated subluxation of the R shoulder at this time. Increased pain and tightness with R shoulder flexion/abduction with passive ROM above 90 degrees. Pain along anterior portion of humeral head with anterior tilt. Pain radiates along the middle deltoid. Modified Maggi Scale     Grade Description : Noted in ELBOW FLEXORS (1)  WRIST FLEXORS(2) , FINGER FLEXORS (1+)  []0 - No increase in muscle tone  []1 - Slight increase in muscle tone, manifested by a catch and release, or by minimal resistance at the end of the range of motion when the affected part(s) is moved in flexion or extension  [x]1+ - Slight increase in muscle tone, manifested by a catch, followed by minimal resistance throughout the remainder (less than half) of the range of movement (ROM)  []2 - More marked increase in muscle tone through most of ROM, but affected part(s) easily moved  []3 - Considerable increase in muscle tone, passive movement difficult  []4 - Affected part(s) rigid in flexion and extension      ROM/Strength:  L UE WNL; No functional AROM of R UE.            Date:  9/11/17  Date:  9/11/17 Date:  10/25/17 Date:  10/25/17    PROM  STRENGTH PROM Strength   Movement RIGHT  RIGHT Right Right    SCAPULA:        Elevation  Limited  2 limited 2   Protraction  Limited  2+ limited 2+   Retraction  Limited   2 WFL 3   SHOULDER:        Flexion  100  0 115 PROM    Abduction  90  1 20-25 AROM  2   External rotation  70  0 30 PROM 0   Internal rotation  WNL  0 WFL PROM 0   Horizontal abduction 100  0  0   Horizontal adduction  WFL  0  0   Extension         ELBOW:        Flexion  WNL  0 75 AROM 2   Extension  WNL  0     FOREARM:        Supination  WFL  0  0   Pronation  WFL  0  0   WRIST:        Wrist flexion  65  0 55 PROM 0   Wrist extension  65  0 55 PROM 0 Wrist radial deviation         Wrist ulnar deviation         HAND:        Finger flexion WFL  0 WFL 0   Finger extension WFL  0 WFL (pain) 0   THUMB:         Abduction  WFL  0 WFL 0    Extension WFL  0 WFL 0    Adduction     WFL 0    MCP flexion  WFL  0      IP flexion  WFL  0     Hand Strength:         unable  0     THREE JAW KARY PINCH unable  0     LATERAL PINCH  unable  0       Functional Mobility:         Gait/Ambulation:  CGA to supervision with quad cane; Uses wheelchair occasionally         Bed Mobility:  CGA  Balance:          Good static standing balance but fair to poor dynamic standing balance  Coordination:          Non-functional in R dominant UE  Mental Status: WNL  Vision:          Reading glasses. Reports some blurry vision in R eye. Tracking and visual fields intact. Some decreased speed with saccadic movement to the R. Activities of Daily Living:           Basic ADLs (From Assessment) Complex ADLs (From Assessment)         Grooming/Bathing/Dressing Activities of Daily Living                                   Sensation:         Grossly intact to light touch in B UE. R hand with some decreased light touch sensation. Reports some hypersensitivity to cold. Physical Skills Involved:  1. Range of Motion  2. Balance  3. Strength  4. Activity Tolerance  5. Sensation  6. Fine Motor Control  7. Gross Motor Control  8. Pain (acute) Cognitive Skills Affected (resulting in the inability to perform in a timely and safe manner):  1. WNL Psychosocial Skills Affected:  1. Anxiety Disorder (per chart review)   Number of elements that affect the Plan of Care: 5+:  HIGH COMPLEXITY   CLINICAL DECISION MAKING:   Outcome Measure: Tool Used: Fugl-Goodman Upper Extremity Motor Assessment  Score:  Initial: 8/66 Most Recent: X (Date: -- )   Interpretation of Score: Outcome Measure Conversion Site    ?  Carrying, Moving, and Handling Objects:     - CURRENT STATUS: CM - 80%-99% impaired, limited or restricted    - GOAL STATUS: CK - 40%-59% impaired, limited or restricted    - D/C STATUS:  ---------------To be determined---------------     Fugl-Goodman Upper Extremity Motor Assessment (without reflexes) Date:  9/11 Date:   Date:     Item Description Score Score Score   Wrist circumduction 0     Hook grasp 0     Shoulder flexion to 180°, elbow extended 0     Spherical grasp 0     Lateral prehension 0     Wrist flexion/extension, elbow extended 0     Pronation-supination, elbow extended 0     Wrist stable, elbow extended 0     Movement with normal speed 0     Forearm supination 0     Shoulder abduction to 90°, elbow extended 0     Movement without dysmetria 0     Shoulder external rotation 0     Wrist stable, elbow at 90° 0     Wrist flexion/extension, elbow at 90° 0     Palmar prehension 0     Scapular retraction 1     Pronation-supination, elbow at 90° 0     Shoulder flexion to 90°, elbow extended 0     Hand to lumbar spine 0     Shoulder abduction 0     Elbow extension 0     Forearm pronation 0     Movement without tremor 0     Cylindrical grasp 0     Finger mass extension (relaxation of flexion) 0     Scapular elevation 1     Finger mass flexion 0     Shoulder adduction with internal rotation 0     Elbow flexion 0     Total Score (Max = 60) 2           Medical Necessity:   · Patient demonstrates good rehab potential due to higher previous functional level. Reason for Services/Other Comments:  · Patient continues to require skilled intervention due to decreased functional independence s/p CVA impacting overall quality of life.   Clinical Decision-Making Assessment:     Assessment process, impact of co-morbidities, assessment modification\need for assistance, and selection of interventions: Analytical Complexity:HIGH COMPLEXITY   TREATMENT:   (In addition to Assessment/Re-Assessment sessions the following treatments were rendered)    Pre-treatment Symptoms/Complaints:    Pain: Initial: Pain Intensity 1: 4  Pain Location 1: Shoulder  Pain Orientation 1: Right 4/10 R shoulder  Post Session: 1-2 after treatment     Therapeutic Exercise: ( 25 minutes):  Exercises per grid below to improve mobility. Required maximal manual and tactile cues to promote proper body alignment, promote proper body posture and promote proper body mechanics. Progressed repetitions and complexity of movement as indicated.    Date:  9/25/17 Date:  10/2/17 Date:  10/4/17 Date:  10/6/17 Date:  10/9/17 Date:  10/20/17 Date:  10/25/17   Activity/Exercise Parameters Parameters Parameters Parameters      Shoulder external rotation PROM to tolerance with caregiver education  PROM to tolerance x 10 reps (shoulder adducted in supine)   PROM x 15 reps (combined with glenohumeral posterior glide- pt tolerated with greater ROM/less pain) PROM X 10 reps combined with glenohumeral posterior glides (pain) PROM x 5-10 reps    Shoulder flexion  PROM to tolerance with caregiver education  PROM to ~100 degrees x 10 reps to tolerance (supine)   PROM 8-10 reps to ~100 degrees PROM 8-10 reps to ~100 degrees PROM in sitting x 5-10 reps   Shoulder abduction  PROM to tolerance with caregiver education  PROM x 10 reps to tolerance in supine    PROM 8-10 reps ~70-80 degrees in supine  PROM 5 reps to 60-70 pain in supine PROM in sitting x 5-10 reps    Shoulder horizontal abduction/adduction  PROM to tolerance with caregiver education      2 sets holding 30 seconds combined with deep breathing     Elbow flexion/extension  PROM to tolerance with caregiver education  PROM into extension with stretch for 2-3 minutes with pressure along biceps tendon    PROM 10 reps with extension stretch for ~2 minutes 1) 5 reps AROM (unable to extend)  2) PROM x 10 reps    Wrist extension  PROM to tolerance with caregiver education  PROM to tolerance and holding 2-3 minutes  Stretch at wrist with hand on the mat and flexing forward at the trunk to allow for extension to tolerance 1) Wrist extension stretch holding for 15 seconds x 10 reps to tolerance with fingers in extended position (unable to fully extend)  2) Stretch at wrist with hand on the mat and flexing forward at the trunk to allow for extension to tolerance x 10 reps  10 reps PROM with minimal complaints  10 reps PROM with minimal complaints  PROM x 10-15 reps in sitting    Forearm supination/pronation       PROM x 10 reps with some pain reported into supination     Shoulder Shrug    1) 10 reps AAROM  2) 10 reps AROM       Scapular Retraction    15 reps AROM       Shoulder Protraction    15 reps AROM    1) 5 reps at table with towel  Forwards and diagonally to each side    Finger flexion/extension        10-15 reps each hand (passively for R hand)     Neuromuscular Re-education: (  20 minutes):  Exercise/activities per grid below to improve kinesthetic sense and proprioception. Required minimal visual, verbal, manual and tactile cues to promote motor control of right, upper extremity(s).     Date:  10/2/17 Date:  10/4/17 Date:  10/6/17 Date:  10/11/17 Date:  10/20/17 Date:  10/25/17   Activity/Exercise Parameters Parameters Parameters      Supine to Sit Pt completed with NDT technique and CGA with encouragement of use of R UE    Pt worked on NDT technique for 5 reps working on use R UE as a functional assist with minimal to moderate verbal cues and minimal tactile cues (difficulty pulling up L LE) Pt completed one rep with modified independence and improved use of R elbow to assist    Sit to Stand/Stand to sit      Pt worked on placing hand on R arm rest with moderate guidance and then working on weightbearing through R UE in sit to stand/stand to sit (pt also worked on pulling hand from armrest and placing in lap with moderate guidance)     PNF D1 flexion/extension to total manual cues         Mobile Arm Support 1) Forward reaching with total assistance and pulling back into retraction with maximal assistance; 15 reps  2) External Rotation x 10-15 reps with total assistance 1) Forward reaching with total assistance and pulling back into retraction with maximal assistance; 15 reps  2) External Rotation x 10-15 reps with total assistance 1) Forward reaching with maximal assistance and pulling back into retraction with maximal assistance; 15 reps  2) External Rotation x 10-15 reps with maximal to total assistance Forward reaching with maximal assistance and pulling back into retraction with maximal assistance; 15 reps  1) forward press x 10 reps with moderate assistance  2) external rotation x 15 reps   3) placement of elbow extension brace with horizontal abd/add with mobile arm support    Weightbearing  1) 8 reps into the elbow with minimal verbal/tactile cues  2) 8 reps with prop under R wrist to decrease pain with maximal facilitation at R elbow  15 reps with elbow extended and hand on a ball to serve as a prop 15 reps with elbow extended and hand on a ball to serve as a prop 15 reps with elbow extended and hand on a ball to serve as a prop with maximal facilitation at the hand/elbow   1)15 reps into ball with moderate facilitaiton  2) 15 reps with wrist placed on edge of mat with moderate facilitation   Tone Reduction into hand Blood stretching with facilitation of R thumb into extension Blood stretching with metacarpal mobilizations Blood stretching with metacarpal mobilizations Blood stretching with metacarpal mobilizations Blood press in the R hand into lap to help with tone reduction at the hand/fingers with minimal tactile cues x 15 reps     Trunk Rotation   5 reps to each side holding 5-10 seconds        Trunk Flexion   10 reps in sitting with hands clasped and with supervision        Weightshiftting   Pt shifting side to side with hands clasped and reaching  X 10 reps to each side        Shen Bin    Hand over hand assistance      Putty press   5 reps with total assistance handover hand       Ball Lift      Using B hands to press into ball and lift ball into elbow flexion/extesion to work on bimanual task with moderate facilitation for placement of R hand; 10-15 reps with additional time               Treatment/Session Assessment:    · Response to Treatment:  See above. · Compliance with Program/Exercises: Will assess as treatment progresses. · Recommendations/Intent for next treatment session: \"Next visit will focus on advancements to more challenging activities and reduction in assistance provided\".   Total Treatment Duration:OT Patient Time In/Time Out  Time In: 0097  Time Out: Liam 48, OT

## 2017-10-27 ENCOUNTER — HOSPITAL ENCOUNTER (OUTPATIENT)
Dept: PHYSICAL THERAPY | Age: 42
Discharge: HOME | End: 2017-10-27
Payer: COMMERCIAL

## 2017-10-27 PROCEDURE — 97112 NEUROMUSCULAR REEDUCATION: CPT

## 2017-10-27 PROCEDURE — 97140 MANUAL THERAPY 1/> REGIONS: CPT

## 2017-10-27 PROCEDURE — 97530 THERAPEUTIC ACTIVITIES: CPT

## 2017-10-27 NOTE — PROGRESS NOTES
Zoraida Victor  : 1975  Payor: Ella Mathis / Plan: Zaki Brice RPN / Product Type: Commerical /  [address]  at [facility]  [address], [place] [address] [address] [facility]  [phone]   [phone]           OUTPATIENT PHYSICAL THERAPY:Progress Report 10/27/2017   ICD-10: Treatment Diagnosis: Difficulty in walking, not elsewhere classified (R26.2); Hemiplegia and hemiparesis following cerebral infarction affecting right dominant side (K33.204)  Precautions/Allergies:   Erythromycin   Fall Risk Score: 0 (? 5 = High Risk)  MD Orders: PT eval MEDICAL/REFERRING DIAGNOSIS:  Stroke (cerebrum) ([facility] [address] [I63.9]   DATE OF ONSET: 8/3/17  REFERRING PHYSICIAN: Kera Fraire*  RETURN PHYSICIAN APPOINTMENT: unknown  DATE OF PROGRESS NOTE:    RECERTIFICATION DATE:      PROGRESS NOTE:  Pt has attended 10 physical therapy sessions from 9/15/17 to date including initial assessment. Sessions consist of range of motion, therapeutic activity, therapeutic exercise, balance and gait refinement. Pt has shown improvement in balance and gait ability. Pt has increased Perez Balance Score by 2 points indicating improved static standing balance and decreased risk for fall. Pt has decreased TUG time by over 14 seconds with her cane and almost 22 seconds without her cane indicating more normalized walking pattern. Her gait pattern has improved but she does not take normal step length on the left because she is trying to keep her right knee from hyperextending in stance on the right with her AFO on. I feel the AFO may be a little short so I placed a heel lift in her right shoe with further improvement in her TUG time by 3 more seconds for an overall increase of 17 second improvement with her cane. We will have [facility] look at her AFO to see if it needs adjustments.   Pt will continue to benefit from physical therapy for at least 4 more weeks to maximize functional ability. INITIAL ASSESSMENT:  Ms. Terri Sanders presents with dominant right hemiplegia s/p CVA on 8/3/17. Pt presents with ADL, balance and gait deficits. Pt will benefit from physical therapy to maximize to full potential and safety with all functional mobility. PROBLEM LIST (Impacting functional limitations):  1. Decreased Strength  2. Decreased ADL/Functional Activities  3. Decreased Ambulation Ability/Technique  4. Decreased Balance  5. Increased Pain  6. Decreased Activity Tolerance  7. Decreased Flexibility/Joint Mobility  8. Decreased [place] with Home Exercise Program INTERVENTIONS PLANNED:  1. Balance Exercise  2. Bed Mobility  3. Family Education  4. Gait Training  5. Home Exercise Program (HEP)  6. Neuromuscular Re-education/Strengthening  7. Range of Motion (ROM)  8. Therapeutic Activites  9. Therapeutic Exercise/Strengthening  10. Transfer Training  11. modalities   TREATMENT PLAN:  Effective Dates: 9/22/17 TO 12/15/17. Frequency/Duration: 3 times a week for 8 weeks  GOALS: (Goals have been discussed and agreed upon with patient.)  Short-Term Functional Goals: Time Frame: 4 weeks  1. Pt will be independent with range of motion, strength and balance home exercise program.  STATUS:  MET  2. Pt will decrease TUG score by 5 seconds indicating more normalized and safer gait pattern. STATUS:  MET  Discharge Goals: Time Frame: 8 weeks  Pt will show increased range of motion and improved posture to allow for improved safety and ability with all functional mobility. .  STATUS:  PROGRESSING, CONTINUE 4 WEEKS. Pt will decrease TUG score by 10 seconds indicating more normalized gait pattern and decrease risk for falls. STATUS:  MET  NEW GOAL:  Pt will decrease TUG score by 5 more seconds indicating more normalized gait pattern and decrease risk for falls. NEW GOAL  Pt will increase Perez Balance Scale to 49/56 indicating increased balance and decreased risk for falls.   STATUS:  NOT MET, CONT 4 WEEKS.    Rehabilitation Potential For Stated Goals: Good  Regarding [place] therapy, I certify that the treatment plan above will be carried out by a therapist or under their direction. Thank you for this referral,  Megan Valente, PT                  HISTORY:   GOAL:  Love to be back to doing things independenlty. Would like to get to where I don't have to use the cane. Get back to my own home. 10/27/2017  I definitely cant go back to work right now. Its slow progress I definitely cna;t drive at the [employer]. I have gotten stornger as far as my blance and in my leg. More ocmrortalble doing suff around the kitchen . I can take a dish and put it in the sink and rinse it off. I keep it near me in sonia I get tired. I will move aorund owthout it. Present Symptoms:    38year old  right handed female s/p left CVA 8/3/17 2* to HTN. Short term disability for now. Work in the [employer] at [employer]. Very physical job. More sued to my left hand. Learning how to function with one hand is difficult. ADLs with just my left hand is difficult. Dynamic sitting balance. I just have to be careluf that my foot is flat especially if I have my shoes off. Tire easily. Buildig up strength. Large base quad cane but not all the time at home. Can't carry a heavy load. PAIN:  Right fingers and shoulder with slight movement. 6/10  History of Present Injury/Illness (Reason for Referral):  See above  Past Medical History/Comorbidities:   Ms. Fran Martin  has a past medical history of Generalized anxiety disorder; GERD (gastroesophageal reflux disease); Hypertension; Insomnia, unspecified; Iron deficiency anemia (8/23/2013); Left sided lacunar stroke ([facility] [address] (08/2017); Mitral valve prolapse (1993); Obesity (BMI 30-39.9); RADHA on CPAP (10/2017); and Prediabetes (7/28/2015). Ms. Fran Martin  has a past surgical history that includes tubal ligation (1995) and endoscopy (10/1/04).   Social History/Living Environment:     25and 25year old son and daughter. . Prior Level of Function/Work/Activity:  Worked in [employer] department. 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 One story - Own home:  About 5 steps to get in with bilateral railing. Staying at [facility] house which is easier to maneuver - low tub - can walk right in. Small wheelchair ramp and then level. Mom helps with driving and household duties - cooking. Dominant Side:         RIGHT    Current Medications:    Current Outpatient Prescriptions:     oxybutynin chloride XL (DITROPAN XL) 10 mg CR tablet, Take 1 Tab by mouth daily. Indications: URINARY URGE INCONTINENCE, Disp: 30 Tab, Rfl: 6    raNITIdine (ZANTAC) 150 mg tablet, Take 1 Tab by mouth two (2) times a day., Disp: 60 Tab, Rfl: 5    diazePAM (VALIUM) 5 mg tablet, Take 0.5 Tabs by mouth every six (6) hours as needed. Max Daily Amount: 10 mg. Indications: MUSCLE SPASM, Disp: 60 Tab, Rfl: 1    tiZANidine (ZANAFLEX) 4 mg tablet, Take 1 Tab by mouth three (3) times daily. , Disp: 90 Tab, Rfl: 6    traZODone (DESYREL) 50 mg tablet, Take 1 Tab by mouth nightly as needed for Sleep. Indications: insomnia associated with depression, Disp: 30 Tab, Rfl: 2    hydroCHLOROthiazide (MICROZIDE) 12.5 mg capsule, Take 2 Caps by mouth daily. , Disp: 30 Cap, Rfl: 6    potassium chloride SR (K-TAB) 20 mEq tablet, Take 1 Tab by mouth two (2) times a day., Disp: 60 Tab, Rfl: 6    aspirin (ASPIRIN) 325 mg tablet, Take 1 Tab by mouth daily. , Disp: 30 Tab, Rfl: 12    acetaminophen (TYLENOL) 325 mg tablet, Take 2 Tabs by mouth every six (6) hours as needed for Pain or Fever (headache). , Disp: 30 Tab, Rfl: 0    escitalopram oxalate (LEXAPRO) 20 mg tablet, Take 1 Tab by mouth daily. , Disp: 30 Tab, Rfl: 5   Date Last Reviewed:  10/27/2017     Number of Personal Factors/Comorbidities that affect the Plan of Care: 1-2: MODERATE COMPLEXITY   EXAMINATION:   ROM:          Painful right shoulder and finger and hands.   Shoulder not fully assessed. Seeing OT. Passive Fingers to neutral extension at least.  No overpressure given. Elbow slight decrease as end ranges. Supination ~75% with mild pain. Right lower extremity WFL  Minimal pain in my right shoulder. OT has taped it and that helps. Left WFL  Strength:          Right UE:  Almost no active movement noted. Possible scap stabilization and tone is not flaccid. Right hip flexion:  3-/5  Knee extension at least 4/5  Nothing in DF. Functional Mobility:         Gait/Ambulation:  Ambulates with large base quad came with slow small steps with custom hinged AFO on. Transfers:  Independent. occ no WBing on the right with sit to stand        Bed Mobility:  NT  Mental Status:          Alert and oriented x 4;  Pleasant and appropriate  Vision:          No deficits reported. Body Structures Involved:  1. Nerves  2. Bones  3. Joints  4. Muscles  5. Ligaments Body Functions Affected:  1. Sensory/Pain  2. Neuromusculoskeletal  3. Movement Related Activities and Participation Affected:  1. Learning and Applying Knowledge  2. General Tasks and Demands  3. Mobility  4. Self Care  5. Domestic Life  6. Interpersonal Interactions and Relationships  7. Community, Social and [place]   Number of elements that affect the Plan of Care: 4+: HIGH COMPLEXITY   CLINICAL PRESENTATION:   Presentation: Evolving clinical presentation with changing clinical characteristics: MODERATE COMPLEXITY   CLINICAL DECISION MAKING:   Outcome Measure: Tool Used: Perez Balance Scale  Score:  Initial: 46/56 Most Recent: 48/56 (Date: -- )   Interpretation of Score: Each section is scored on a 0-4 scale, 0 representing the patients inability to perform the task and 4 representing independence. The scores of each section are added together for a total score of 56. The higher the patients score, the more independent the patient is. Any score below 45 indicates increased risk for falls.     Score 56 55-45 44-34 33-23 22-12 11-1 0   Modifier CH CI CJ CK CL CM CN       Tool Used: Timed Up and Go (TUG)  Score:  Initial: with cane:  45.24 seconds; without cane 52.01 seconds Most Recent: 31.05 seconds with cane:  30.05 without cane  With 8 mm heel lift in shoe with cane:  27.85 (Date: 10/27/17)   Interpretation of Score: The test measures, in seconds, the time taken by an individual to stand up from a standard arm chair (seat height 46 cm [18 in], arm height 65 cm [25.6 in]), walk a distance of 3 meters (118 in, approx 10 ft), turn, walk back to the chair and sit down. If the individual takes longer than 14 seconds to complete TUG, this indicates risk for falls. Score 7 7.5-10.5 11-14 14.5-17.5 18-21 21.5-24.5 25+   Modifier CH CI CJ CK CL CM CN     Medical Necessity:   · Skilled intervention continues to be required due to residual right sided hemiparesis. Reason for Services/Other Comments:  · Patient continues to require skilled intervention due to residual right hemiparesis. .   Use of outcome tool(s) and clinical judgement create a POC that gives a: Questionable prediction of patient's progress: MODERATE COMPLEXITY      S:   See above at goals. TREATMENT:   (In addition to Assessment/Re-Assessment sessions the following treatments were rendered)  THERAPEUTIC ACTIVITY: ( 30 minutes): Therapeutic activities per above and  grid below to improve mobility, strength, balance and coordination. Required minimal verbal and tactile cues to perform activities correctly. TUG  GALINDO  8 mm shoe lift placed in left heel with improved right step length and left knee control. May work for now. AFO is about 3 - 4'' below fibular head which may be why knee control is less. NEUROMUSCULAR RE-EDUCATION: ( minutes):  Exercise/activities per grid below to improve balance, coordination, kinesthetic sense, proprioception and motor control. Required maximal manual, tactile and facilitation cues to perfrom activities.      Date:  10/6/17 Date  10/11/17 Date:  10/20/17   Activity/Exercise   Parameters    Right UE tone reduction and sensorimotor retraining for increased arm swing and posture during gait      Seated in chair (pt is short in stature). Right shoe and brace off Right foot slides passive to progressively less facilitation on the floor  Then on 4kg ball, yellow therawand,  Heel raises; Full circles clockwise and counterclockwise on ball,   Hip ER/IR with inversion/eversion. Neuro-JAKE tactile stimulation to R foot to stimulate dorsiflexion x 10 reps    Stepping forward and back with left leg at rail x10 with hips and knee release and then into kick/HS on the right     Facilitated long arc quad Quad set!!! Able to hold end range knee extension with quad set now. Worked on long arc quads with end range quad set with excellent control      Quad set at end range x 10 reps with 5 second hold. Facilitation needed initially but then patient able to hold contraction    LE tone reduction prior to gait   X 5 minutes in supine to decrease tone and synergy X 5 minutes sitting to foot    Gait retraining Without shoes with cane:  X 20' working on improved gait pattern, terminal knee extension and heel strike  With shoes and no brace x 20'  With shoes and brace x120' out. Showing some swing and emerging HS. With shoes and brace without  AD x 120 feet working on gait pattern, heel strike, terminal knee extension.   With brace on and no AD 2 x 50' with verbal cues for R knee extension during stance phase    Mini squats on door with therapist facilitating knee extension      2 x 10 reps with good symmetrical control  Then with 1\" block under L foot for increased R weight bearing x 10 reps    Standing in parallel bars, 3\" block under R foot   Therapist assisting with elevating up and then working on slow control on the way day for quad and plantarflexor eccentric control x 10 reps        Therapeutic Exercise: ( 0 minutes):  Exercises per grid below to improve mobility, strength and coordination. Required moderate visual, verbal and manual cues to promote proper body alignment, promote proper body posture and promote proper body breathing techniques. Progressed range and repetitions as indicated. Date:  10/11/17 Date:  10/20/17 Date:  10/26/17   Activity/Exercise Parameters Parameters Parameters   Piriformis stretch   3 x 30 second hold     Hip capsule stetch   3 x 30 second hold right     Hip flexor stretch   3 x 30 second      Bent knee hip abduction stretch with active adduction   10 reps with 10 second hold     SKTC stretch   3 x 30 second hold right  3 x 30 second hold right   Heel cord and plantar fascia stretch passive X 5 minutes  X 5 minutes   Bridging   2 x 10 reps B     LAQ   4 x 5 reps right  4 x 5 reps right   Right ankle dorsiflexion    During russian e-stim x 10 minutes    Manual facilitation of right dorsiflexors   Working with patient on active dorsiflexion with manual and verbal cues with patient supine     Modalities: (0 Minutes) patient supine with small wedge under knee and patient in modified long sitting to be able to look at right foot during electrical stimulation. Patient received [place] e-stim to right peroneal to help initiate dorsiflexion and assist with active dorsiflexion with 30 seconds off/10 seconds on for 10 minutes. EDUCATION:  Instructed in above exercises and home program.  HEP:  No Written HEP given to patient yet. Instructed to owrk on increase step length on the left. Treatment/Session Assessment: See progress note. Patients response to todays treatment session was tolerated well with no medical complications. .  · Post session pain:  No pain  · Compliance with Program/Exercises: Will assess as treatment progresses. · Recommendations/Intent for next treatment session: \"Next visit will focus on advancements to more challenging activities\".   Total Treatment Duration:  PT Patient Time In/Time Out  Time In: 1035  Time Out: [facility] [address] [place]

## 2017-10-27 NOTE — PROGRESS NOTES
Zoraida pitt  : 1975 [facility]  [address], [address] [facility], [address]  [phone]   [phone]         OUTPATIENT OCCUPATIONAL THERAPY: Daily Note 10/27/2017    ICD-10: Treatment Diagnosis:   Hemiplegia and hemiparesis following cerebral infarction affecting right dominant side (I69.351)  Precautions/Allergies:   Erythromycin   Fall Risk Score: 3 (? 5 = High Risk)  MD Orders: OT evaluate and treat  MEDICAL/REFERRING DIAGNOSIS:   Stroke (cerebrum) ([facility] [address] [I63.9]   DATE OF ONSET: 2017   REFERRING PHYSICIAN: Kera Guardado*  RETURN PHYSICIAN APPOINTMENT: 2017     INITIAL ASSESSMENT:  Ms. Demi pitt has been seen by OT for a total of 7 visits. Pt reports she has been using saebo flex brace for R UE, but reports pain after prolonged wear. Pt has now started alternating between resting hand splint and saebo flex brace with decreased pain. Pt reports severe pain with attempts to passively extend digits. Pt is demonstrating some increased tone in the R UE. Pt reports pain in R shoulder but no presence of subluxation. Pt has been working on weightbearing to help improve strength, function, and decrease tone in the R dominant UE. Pt demonstrates good progress with scapular strength. Pt also has some activation at the elbow flexors/shoulder abductors with movement in flexor synergy. Pt making slow progress and is 10-15 minutes late to most all appointments which limits progress as well. Pt does report decreased pain and improved positioning of R UE after treatment. Pt to continue per plan of care. PLAN OF CARE:   PROBLEM LIST:  1. Decreased Strength  2. Decreased ADL/Functional Activities  3. Decreased Transfer Abilities  4. Decreased Ambulation Ability/Technique  5. Decreased Balance  6. Increased Pain  7. Decreased Activity Tolerance  8. Decreased Flexibility/Joint Mobility  9.  Decreased Homer with Home Exercise Program INTERVENTIONS PLANNED:  1. Activities of daily living training  2. Adaptive equipment training  3. Balance training  4. Clothing management  5. Donning&doffing training  6. Manual therapy training  7. Modalities  8. Neuromuscular re-eduation  9. Splinting  10. Therapeutic activity  11. Therapeutic exercise   TREATMENT PLAN:  Effective Dates: 9/11/17 TO 12/11/17. Frequency/Duration: 2 times a week for 12 weeks  GOALS: (Goals have been discussed and agreed upon with patient.)  Short-Term Functional Goals: Time Frame:4- 6 weeks  1. Pipo Stauffer will report decreased pain in the R shoulder from 5/10 to 3/10 with passive movement to decrease stiffness for daily activity. CONTINUE  2. Pipo Stauffer will be 100% compliant with daily SROM exercises for improving R UE ROM to decrease risk for contractures. CONTINUE  3. Pipo Stauffer will demonstrate ability to complete weightbearing activities in R UE with moderate facilitation to improve functional use for transfers. MET  4. Pipo Stauffer will complete basic ADL with supervision to improve independence with functional tasks. CONTINUE  5. Pipo Stauffer will tolerate 15 minutes of dynamic standing balance activity with CGA to decrease risk for falls with ADL and to improve activity tolerance. NOT ADDRESSED  6. Discharge Goals: Time Frame: 8-12 weeks   1. Pipo Stauffer will have functional PROM of R shoulder to [facility] to decrease pain and stiffness in R dominant UE. CONTINUE  2. Pipo Stauffer will demonstrate improved strength in the R dominant UE as evidenced by at least 2+ to 3-/5 strength for using R UE as functional assist. CONTINUE  3. Pipo Stauffer will demonstrate ability to functionally grasp and release light objects with supervision and with use of R dominant hand. CONTINUE  4. Pipo Stauffer will be modified independent with all basic ADL to improve overall quality of life. CONTINUE  5.  Pipo Stauffer will use R hand as functional assist with daily activities/transfers with supervision to improve independence with ADL. CONTINUE   Rehabilitation Potential For Stated Goals: Good  Regarding [place] therapy, I certify that the treatment plan above will be carried out by a therapist or under their direction. Thank you for this referral,  Honey Hanks OT                 The information in this section was collected on 9/11/17 (except where otherwise noted). OCCUPATIONAL PROFILE & HISTORY:   History of Present Injury/Illness (Reason for Referral):  Pt reports she was at work when she started feeling like she was going to pass out. Pt reports she didn't really stop working and the feeling wouldn't go away. It started at 2pm and left work at 3:30 pm. Pt felt like she was going to fall and pushed a buggy to her car and drove to her daughter's house. Pt then was brought over to the ER at [facility]. Progressively got worse with R sided weakness and woke up the next morning with full parlaysis of the R side. Reports her eyesight was decreasing and needing reading glasses. R eye is a little worse with the blurry vision. Pt wears to reading glasses. Pt using a wheelchair as less as possible. Using wheelchair when out and about. Pt using a quad cane around the home and has AFO to R LE. Tub transfer bench with CGA. Eats at the kitchen table. Pt stayed from Aug 8th- September 6th in inpatient rehab. Pt reports no falls at home. Pt getting dressed using techniques learned for inpatient rehab. Difficulty walking and carrying objects. Pt has some issues with Zanaflex and incontinence (she is unsure if that is related). Pt presents today for outpatient occupational therapy services. Past Medical History/Comorbidities:   Ms. Elijah Blanco  has a past medical history of Generalized anxiety disorder; GERD (gastroesophageal reflux disease); Hypertension; Insomnia, unspecified; Iron deficiency anemia (8/23/2013); Left sided lacunar stroke ([facility] [address] (08/2017);  Mitral valve prolapse (1993); Obesity (BMI 30-39.9); RADHA on CPAP (10/2017); and Prediabetes (7/28/2015). Ms. Iesha Bond  has a past surgical history that includes tubal ligation (1995) and endoscopy (10/1/04). Social History/Living Environment:   Lives with mom with her  because toilet/tub is lower with more open spaces. Pt also has a son. Pt has 24 hr supervision. Prior Level of Function/Work/Activity: Indpendent and working full time in [employer] at [employer] .   Dominant Side:         RIGHTPersonal Factors: Other factors that influence how disability is experienced by the patient:  Hx of anxiety disorder, HTN, Pre-diabetic    Current Medications:    Current Outpatient Prescriptions:     oxybutynin chloride XL (DITROPAN XL) 10 mg CR tablet, Take 1 Tab by mouth daily. Indications: URINARY URGE INCONTINENCE, Disp: 30 Tab, Rfl: 6    raNITIdine (ZANTAC) 150 mg tablet, Take 1 Tab by mouth two (2) times a day., Disp: 60 Tab, Rfl: 5    diazePAM (VALIUM) 5 mg tablet, Take 0.5 Tabs by mouth every six (6) hours as needed. Max Daily Amount: 10 mg. Indications: MUSCLE SPASM, Disp: 60 Tab, Rfl: 1    tiZANidine (ZANAFLEX) 4 mg tablet, Take 1 Tab by mouth three (3) times daily. , Disp: 90 Tab, Rfl: 6    traZODone (DESYREL) 50 mg tablet, Take 1 Tab by mouth nightly as needed for Sleep. Indications: insomnia associated with depression, Disp: 30 Tab, Rfl: 2    hydroCHLOROthiazide (MICROZIDE) 12.5 mg capsule, Take 2 Caps by mouth daily. , Disp: 30 Cap, Rfl: 6    potassium chloride SR (K-TAB) 20 mEq tablet, Take 1 Tab by mouth two (2) times a day., Disp: 60 Tab, Rfl: 6    aspirin (ASPIRIN) 325 mg tablet, Take 1 Tab by mouth daily. , Disp: 30 Tab, Rfl: 12    acetaminophen (TYLENOL) 325 mg tablet, Take 2 Tabs by mouth every six (6) hours as needed for Pain or Fever (headache). , Disp: 30 Tab, Rfl: 0    escitalopram oxalate (LEXAPRO) 20 mg tablet, Take 1 Tab by mouth daily. , Disp: 30 Tab, Rfl: 5            Date Last Reviewed:  9/11/17   Complexity Level : Expanded review of therapy/medical records (1-2):  MODERATE COMPLEXITY   ASSESSMENT OF OCCUPATIONAL PERFORMANCE:     Palpation:  Patient with no palpated subluxation of the R shoulder at this time. Increased pain and tightness with R shoulder flexion/abduction with passive ROM above 90 degrees. Pain along anterior portion of humeral head with anterior tilt. Pain radiates along the middle deltoid. Modified Maggi Scale     Grade Description : Noted in ELBOW FLEXORS (1)  WRIST FLEXORS(2) , FINGER FLEXORS (1+)  []0 - No increase in muscle tone  []1 - Slight increase in muscle tone, manifested by a catch and release, or by minimal resistance at the end of the range of motion when the affected part(s) is moved in flexion or extension  [x]1+ - Slight increase in muscle tone, manifested by a catch, followed by minimal resistance throughout the remainder (less than half) of the range of movement (ROM)  []2 - More marked increase in muscle tone through most of ROM, but affected part(s) easily moved  []3 - Considerable increase in muscle tone, passive movement difficult  []4 - Affected part(s) rigid in flexion and extension      ROM/Strength:  L UE WNL; No functional AROM of R UE.            Date:  9/11/17  Date:  9/11/17 Date:  10/25/17 Date:  10/25/17    PROM  STRENGTH PROM Strength   Movement RIGHT  RIGHT Right Right    SCAPULA:        Elevation  Limited  2 limited 2   Protraction  Limited  2+ limited 2+   Retraction  Limited   2 WFL 3   SHOULDER:        Flexion  100  0 115 PROM    Abduction  90  1 20-25 AROM  2   External rotation  70  0 30 PROM 0   Internal rotation  WNL  0 WFL PROM 0   Horizontal abduction 100  0  0   Horizontal adduction  WFL  0  0   Extension         ELBOW:        Flexion  WNL  0 75 AROM 2   Extension  WNL  0     FOREARM:        Supination  WFL  0  0   Pronation  WFL  0  0   WRIST:        Wrist flexion  65  0 55 PROM 0   Wrist extension  65  0 55 PROM 0 Wrist radial deviation         Wrist ulnar deviation         HAND:        Finger flexion WFL  0 WFL 0   Finger extension WFL  0 WFL (pain) 0   THUMB:         Abduction  WFL  0 WFL 0    Extension WFL  0 WFL 0    Adduction     WFL 0    MCP flexion  WFL  0      IP flexion  WFL  0     Hand Strength:         unable  0     THREE JAW KARY PINCH unable  0     LATERAL PINCH  unable  0       Functional Mobility:         Gait/Ambulation:  CGA to supervision with quad cane; Uses wheelchair occasionally         Bed Mobility:  CGA  Balance:          Good static standing balance but fair to poor dynamic standing balance  Coordination:          Non-functional in R dominant UE  Mental Status: WNL  Vision:          Reading glasses. Reports some blurry vision in R eye. Tracking and visual fields intact. Some decreased speed with saccadic movement to the R. Activities of Daily Living:           Basic ADLs (From Assessment) Complex ADLs (From Assessment)         Grooming/Bathing/Dressing Activities of Daily Living                                   Sensation:         Grossly intact to light touch in B UE. R hand with some decreased light touch sensation. Reports some hypersensitivity to cold. Physical Skills Involved:  1. Range of Motion  2. Balance  3. Strength  4. Activity Tolerance  5. Sensation  6. Fine Motor Control  7. Gross Motor Control  8. Pain (acute) Cognitive Skills Affected (resulting in the inability to perform in a timely and safe manner):  1. WNL Psychosocial Skills Affected:  1. Anxiety Disorder (per chart review)   Number of elements that affect the Plan of Care: 5+:  HIGH COMPLEXITY   CLINICAL DECISION MAKING:   Outcome Measure: Tool Used: Fugl-Goodman Upper Extremity Motor Assessment  Score:  Initial: 8/66 Most Recent: X (Date: -- )   Interpretation of Score: Outcome Measure Conversion Site    ?  Carrying, Moving, and Handling Objects:     - CURRENT STATUS: CM - 80%-99% impaired, limited or restricted    - GOAL STATUS: CK - 40%-59% impaired, limited or restricted    - D/C STATUS:  ---------------To be determined---------------     Fugl-Goodman Upper Extremity Motor Assessment (without reflexes) Date:  9/11 Date:   Date:     Item Description Score Score Score   Wrist circumduction 0     Hook grasp 0     Shoulder flexion to 180°, elbow extended 0     Spherical grasp 0     Lateral prehension 0     Wrist flexion/extension, elbow extended 0     Pronation-supination, elbow extended 0     Wrist stable, elbow extended 0     Movement with normal speed 0     Forearm supination 0     Shoulder abduction to 90°, elbow extended 0     Movement without dysmetria 0     Shoulder external rotation 0     Wrist stable, elbow at 90° 0     Wrist flexion/extension, elbow at 90° 0     Palmar prehension 0     Scapular retraction 1     Pronation-supination, elbow at 90° 0     Shoulder flexion to 90°, elbow extended 0     Hand to lumbar spine 0     Shoulder abduction 0     Elbow extension 0     Forearm pronation 0     Movement without tremor 0     Cylindrical grasp 0     Finger mass extension (relaxation of flexion) 0     Scapular elevation 1     Finger mass flexion 0     Shoulder adduction with internal rotation 0     Elbow flexion 0     Total Score (Max = 60) 2           Medical Necessity:   · Patient demonstrates good rehab potential due to higher previous functional level. Reason for Services/Other Comments:  · Patient continues to require skilled intervention due to decreased functional independence s/p CVA impacting overall quality of life.   Clinical Decision-Making Assessment:     Assessment process, impact of co-morbidities, assessment modification\need for assistance, and selection of interventions: Analytical Complexity:HIGH COMPLEXITY   TREATMENT:   (In addition to Assessment/Re-Assessment sessions the following treatments were rendered)    Pre-treatment Symptoms/Complaints:    Pain: Initial: Pain Intensity 1: 2  Pain Location 1: Shoulder  Pain Orientation 1: Right  Post Session: 1/10      Manual Therapy (10): Pt received leuko rigid taping along deltoid and supraspinatus to address support for soft tissue pain in R shoulder. Pt then taped with guidance of humeral head posteriorly. Pt reports no complaints. Therapeutic Exercise: ( 0 minutes):  Exercises per grid below to improve mobility. Required maximal manual and tactile cues to promote proper body alignment, promote proper body posture and promote proper body mechanics. Progressed repetitions and complexity of movement as indicated. Neuromuscular Re-education: (  20 minutes):  Exercise/activities per grid below to improve kinesthetic sense and proprioception. Required minimal visual, verbal, manual and tactile cues to promote motor control of right, upper extremity(s).     Date:  10/20/17 Date:  10/25/17 Date:  10/28/17   Activity/Exercise      Supine to Sit Pt worked on NDT technique for 5 reps working on use R UE as a functional assist with minimal to moderate verbal cues and minimal tactile cues (difficulty pulling up L LE) Pt completed one rep with modified independence    Sit to Stand/Stand to sit  Pt worked on placing hand on R arm rest with moderate guidance and then working on weightbearing through R UE in sit to stand/stand to sit (pt also worked on pulling hand from armrest and placing in lap with moderate guidance)      Towel Glides   Pt worked on activating movement at table in paddle and on washcloth pressing forward with moderate guide x 5-8 reps and in circumduction at table for 5-8 reps with additional time and minimal guidance    Mobile Arm Support  1) forward press x 10 reps with moderate assistance  2) external rotation x 15 reps   3) placement of elbow extension brace with horizontal abd/add with mobile arm support     Weightbearing   15 reps into ball  15 reps with wrist placed on edge of mat  Pt completed weightbearing into the R UE with arm fully extended x10 reps with pt tolerating elbow extension f   Tone Reduction into hand Krueger press in the R hand into lap to help with tone reduction at the hand/fingers with minimal tactile cues x 15 reps   Pt received krueger stretching combined with metacarpal mobilizations to R hand to decrease pain/tone; Gentle distraction to digits at the PIP joints with some pain reported to promote finger extension; Placement in paddle with pt tolerating well    Trunk Rotation       Trunk Flexion       Weightshiftting       Shen Bin       Putty press      Ball Lift  Using B hands to press into ball and lift ball into elbow flexion/extesion to work on bimanual task with moderate facilitation for placement of R hand; 10-15 reps with additional time                Treatment/Session Assessment:    · Response to Treatment:  Pt reports increased pain in R shoulder is hanging down with tendency to hold arm adducted and internally rotated at side. Taped R shoulder to address pain and to provide increased support. Pt educated on precautions of tape with pt verbalizing understanding. Pt reports decreased pain and improved support with taping. Pt continues to have poor activation of R UE with activity. Will plan to address use of NMES in the future. · Compliance with Program/Exercises: Will assess as treatment progresses. · Recommendations/Intent for next treatment session: \"Next visit will focus on advancements to more challenging activities and reduction in assistance provided\".   Total Treatment Duration:OT Patient Time In/Time Out  Time In: 0945  Time Out: [address], OT

## 2017-10-30 ENCOUNTER — HOSPITAL ENCOUNTER (OUTPATIENT)
Dept: PHYSICAL THERAPY | Age: 42
Discharge: HOME | End: 2017-10-30
Payer: COMMERCIAL

## 2017-10-30 PROCEDURE — 97112 NEUROMUSCULAR REEDUCATION: CPT

## 2017-10-30 PROCEDURE — 97530 THERAPEUTIC ACTIVITIES: CPT

## 2017-10-30 PROCEDURE — 97110 THERAPEUTIC EXERCISES: CPT

## 2017-10-30 NOTE — PROGRESS NOTES
[place]  : 1975  Payor: Dorie Craven / Plan: Linaga Lung / Product Type: PPO /  [address]  at [facility]  [address] [facility], [address]  [phone]   [phone]           OUTPATIENT PHYSICAL THERAPY:Daily Note 10/30/2017   ICD-10: Treatment Diagnosis: Difficulty in walking, not elsewhere classified (R26.2); Hemiplegia and hemiparesis following cerebral infarction affecting right dominant side (H26.408)  Precautions/Allergies:   Erythromycin   Fall Risk Score: 0 (? 5 = High Risk)  MD Orders: PT eval MEDICAL/REFERRING DIAGNOSIS:  Stroke (cerebrum) ([facility] [address] [I63.9]   DATE OF ONSET: 8/3/17  REFERRING PHYSICIAN: Kera Roman*  RETURN PHYSICIAN APPOINTMENT: unknown  DATE OF PROGRESS NOTE:    RECERTIFICATION DATE:      PROGRESS NOTE:  Pt has attended 10 physical therapy sessions from 9/15/17 to date including initial assessment. Sessions consist of range of motion, therapeutic activity, therapeutic exercise, balance and gait refinement. Pt has shown improvement in balance and gait ability. Pt has increased Perez Balance Score by 2 points indicating improved static standing balance and decreased risk for fall. Pt has decreased TUG time by over 14 seconds with her cane and almost 22 seconds without her cane indicating more normalized walking pattern. Her gait pattern has improved but she does not take normal step length on the left because she is trying to keep her right knee from hyperextending in stance on the right with her AFO on. I feel the AFO may be a little short so I placed a heel lift in her right shoe with further improvement in her TUG time by 3 more seconds for an overall increase of 17 second improvement with her cane. We will have [facility] look at her AFO to see if it needs adjustments. Pt will continue to benefit from physical therapy for at least 4 more weeks to maximize functional ability. INITIAL ASSESSMENT:  Ms. Herminio Bonilla presents with dominant right hemiplegia s/p CVA on 8/3/17. Pt presents with ADL, balance and gait deficits. Pt will benefit from physical therapy to maximize to full potential and safety with all functional mobility. PROBLEM LIST (Impacting functional limitations):  1. Decreased Strength  2. Decreased ADL/Functional Activities  3. Decreased Ambulation Ability/Technique  4. Decreased Balance  5. Increased Pain  6. Decreased Activity Tolerance  7. Decreased Flexibility/Joint Mobility  8. Decreased [place] with Home Exercise Program INTERVENTIONS PLANNED:  1. Balance Exercise  2. Bed Mobility  3. Family Education  4. Gait Training  5. Home Exercise Program (HEP)  6. Neuromuscular Re-education/Strengthening  7. Range of Motion (ROM)  8. Therapeutic Activites  9. Therapeutic Exercise/Strengthening  10. Transfer Training  11. modalities   TREATMENT PLAN:  Effective Dates: 9/22/17 TO 12/15/17. Frequency/Duration: 3 times a week for 8 weeks  GOALS: (Goals have been discussed and agreed upon with patient.)  Short-Term Functional Goals: Time Frame: 4 weeks  1. Pt will be independent with range of motion, strength and balance home exercise program.  STATUS:  MET  2. Pt will decrease TUG score by 5 seconds indicating more normalized and safer gait pattern. STATUS:  MET  Discharge Goals: Time Frame: 8 weeks  Pt will show increased range of motion and improved posture to allow for improved safety and ability with all functional mobility. .  STATUS:  PROGRESSING, CONTINUE 4 WEEKS. Pt will decrease TUG score by 10 seconds indicating more normalized gait pattern and decrease risk for falls. STATUS:  MET  NEW GOAL:  Pt will decrease TUG score by 5 more seconds indicating more normalized gait pattern and decrease risk for falls. NEW GOAL  Pt will increase Perez Balance Scale to 49/56 indicating increased balance and decreased risk for falls.   STATUS:  NOT MET, CONT 4 WEEKS.    Rehabilitation Potential For Stated Goals: Good  Regarding [place] therapy, I certify that the treatment plan above will be carried out by a therapist or under their direction. Thank you for this referral,  Liudmila Harrington, PT                  HISTORY:   GOAL:  Love to be back to doing things independenlty. Would like to get to where I don't have to use the cane. Get back to my own home. 10/30/2017  I definitely cant go back to work right now. Its slow progress I definitely cna;t drive at the [employer]. I have gotten stornger as far as my blance and in my leg. More ocmrortalble doing suff around the kitchen . I can take a dish and put it in the sink and rinse it off. I keep it near me in sonia I get tired. I will move aorund owthout it. Present Symptoms:    38year old  right handed female s/p left CVA 8/3/17 2* to HTN. Short term disability for now. Work in the [employer] Floor at [employer]. Very physical job. More sued to my left hand. Learning how to function with one hand is difficult. ADLs with just my left hand is difficult. Dynamic sitting balance. I just have to be careluf that my foot is flat especially if I have my shoes off. Tire easily. Buildig up strength. Large base quad cane but not all the time at home. Can't carry a heavy load. PAIN:  Right fingers and shoulder with slight movement. 6/10  History of Present Injury/Illness (Reason for Referral):  See above  Past Medical History/Comorbidities:   Ms. Gifty Thompson  has a past medical history of Generalized anxiety disorder; GERD (gastroesophageal reflux disease); Hypertension; Insomnia, unspecified; Iron deficiency anemia (8/23/2013); Left sided lacunar stroke ([facility] [address] (08/2017); Mitral valve prolapse (1993); Obesity (BMI 30-39.9); RADHA on CPAP (10/2017); and Prediabetes (7/28/2015). Ms. Gifty Thompson  has a past surgical history that includes tubal ligation (1995) and endoscopy (10/1/04).   Social History/Living Environment:     25and 25year old son and daughter. . Prior Level of Function/Work/Activity:  Worked in [employer] department. 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 One story - Own home:  About 5 steps to get in with bilateral railing. Staying at [facility] house which is easier to maneuver - low tub - can walk right in. Small wheelchair ramp and then level. Mom helps with driving and household duties - cooking. Dominant Side:         RIGHT    Current Medications:    Current Outpatient Prescriptions:     oxybutynin chloride XL (DITROPAN XL) 10 mg CR tablet, Take 1 Tab by mouth daily. Indications: URINARY URGE INCONTINENCE, Disp: 30 Tab, Rfl: 6    raNITIdine (ZANTAC) 150 mg tablet, Take 1 Tab by mouth two (2) times a day., Disp: 60 Tab, Rfl: 5    diazePAM (VALIUM) 5 mg tablet, Take 0.5 Tabs by mouth every six (6) hours as needed. Max Daily Amount: 10 mg. Indications: MUSCLE SPASM, Disp: 60 Tab, Rfl: 1    tiZANidine (ZANAFLEX) 4 mg tablet, Take 1 Tab by mouth three (3) times daily. , Disp: 90 Tab, Rfl: 6    hydroCHLOROthiazide (MICROZIDE) 12.5 mg capsule, Take 2 Caps by mouth daily. , Disp: 30 Cap, Rfl: 6    potassium chloride SR (K-TAB) 20 mEq tablet, Take 1 Tab by mouth two (2) times a day., Disp: 60 Tab, Rfl: 6    aspirin (ASPIRIN) 325 mg tablet, Take 1 Tab by mouth daily. , Disp: 30 Tab, Rfl: 12    acetaminophen (TYLENOL) 325 mg tablet, Take 2 Tabs by mouth every six (6) hours as needed for Pain or Fever (headache). , Disp: 30 Tab, Rfl: 0    escitalopram oxalate (LEXAPRO) 20 mg tablet, Take 1 Tab by mouth daily. , Disp: 30 Tab, Rfl: 5   Date Last Reviewed:  10/30/2017     Number of Personal Factors/Comorbidities that affect the Plan of Care: 1-2: MODERATE COMPLEXITY   EXAMINATION:   ROM:          Painful right shoulder and finger and hands. Shoulder not fully assessed. Seeing OT. Passive Fingers to neutral extension at least.  No overpressure given. Elbow slight decrease as end ranges.   Supination ~75% with mild pain.  Right lower extremity WFL  Minimal pain in my right shoulder. OT has taped it and that helps. Left WFL  Strength:          Right UE:  Almost no active movement noted. Possible scap stabilization and tone is not flaccid. Right hip flexion:  3-/5  Knee extension at least 4/5  Nothing in DF. Functional Mobility:         Gait/Ambulation:  Ambulates with large base quad came with slow small steps with custom hinged AFO on. Transfers:  Independent. occ no WBing on the right with sit to stand        Bed Mobility:  NT  Mental Status:          Alert and oriented x 4;  Pleasant and appropriate  Vision:          No deficits reported. Body Structures Involved:  1. Nerves  2. Bones  3. Joints  4. Muscles  5. Ligaments Body Functions Affected:  1. Sensory/Pain  2. Neuromusculoskeletal  3. Movement Related Activities and Participation Affected:  1. Learning and Applying Knowledge  2. General Tasks and Demands  3. Mobility  4. Self Care  5. Domestic Life  6. Interpersonal Interactions and Relationships  7. Community, Social and [place]   Number of elements that affect the Plan of Care: 4+: HIGH COMPLEXITY   CLINICAL PRESENTATION:   Presentation: Evolving clinical presentation with changing clinical characteristics: MODERATE COMPLEXITY   CLINICAL DECISION MAKING:   Outcome Measure: Tool Used: Perez Balance Scale  Score:  Initial: 46/56 Most Recent: 48/56 (Date: -- )   Interpretation of Score: Each section is scored on a 0-4 scale, 0 representing the patients inability to perform the task and 4 representing independence. The scores of each section are added together for a total score of 56. The higher the patients score, the more independent the patient is. Any score below 45 indicates increased risk for falls.     Score 56 55-45 44-34 33-23 22-12 11-1 0   Modifier CH CI CJ CK CL CM CN       Tool Used: Timed Up and Go (TUG)  Score:  Initial: with cane:  45.24 seconds; without cane 52.01 seconds Most Recent: 31.05 seconds with cane:  30.05 without cane  With 8 mm heel lift in shoe with cane:  27.85 (Date: 10/27/17)   Interpretation of Score: The test measures, in seconds, the time taken by an individual to stand up from a standard arm chair (seat height 46 cm [18 in], arm height 65 cm [25.6 in]), walk a distance of 3 meters (118 in, approx 10 ft), turn, walk back to the chair and sit down. If the individual takes longer than 14 seconds to complete TUG, this indicates risk for falls. Score 7 7.5-10.5 11-14 14.5-17.5 18-21 21.5-24.5 25+   Modifier CH CI CJ CK CL CM CN     Medical Necessity:   · Skilled intervention continues to be required due to residual right sided hemiparesis. Reason for Services/Other Comments:  · Patient continues to require skilled intervention due to residual right hemiparesis. .   Use of outcome tool(s) and clinical judgement create a POC that gives a: Questionable prediction of patient's progress: MODERATE COMPLEXITY      S:  Definitely helping with my knee (re: heel lift). The office called. ([facility])   TREATMENT:   (In addition to Assessment/Re-Assessment sessions the following treatments were rendered)  THERAPEUTIC ACTIVITY: ( 30 minutes): Therapeutic activities per above and  grid below to improve mobility, strength, balance and coordination. Required minimal verbal and tactile cues to perform activities correctly. NEUROMUSCULAR RE-EDUCATION: (15 minutes):  Exercise/activities per grid below to improve balance, coordination, kinesthetic sense, proprioception and motor control. Required maximal manual, tactile and facilitation cues to perfrom activities. Date:  10/30/17   Activity/Exercise    Right UE tone reduction and sensorimotor retraining for increased arm swing and posture during gait    Seated in chair (pt is short in stature).   Right shoe and brace off Right foot slides passive to progressively less facilitation on the floor  Then on 4kg ball, Heel raises; Full circles clockwise and counterclockwise on ball,   Hip ER/IR with inversion/eversion. Stepping forward and back with left leg at rail x10 with hips and knee release and then into kick/HS on the right   Facilitated long arc quad Quad set!!! Able to hold end range knee extension with quad set now. Worked on long arc quads with end range quad set with excellent control   LE tone reduction prior to gait     Gait retraining Without shoes with cane:  X 20' working on improved gait pattern, terminal knee extension and heel strike  With shoes and no brace x 20'  With shoes and brace x120' out. Showing some swing and emerging HS. Mini squats on door with therapist facilitating knee extension       Standing in parallel bars, 3\" block under R foot        Therapeutic Exercise: ( 0 minutes):  Exercises per grid below to improve mobility, strength and coordination. Required moderate visual, verbal and manual cues to promote proper body alignment, promote proper body posture and promote proper body breathing techniques. Progressed range and repetitions as indicated.    Date:  10/11/17 Date:  10/20/17 Date:  10/26/17   Activity/Exercise Parameters Parameters Parameters   Piriformis stretch   3 x 30 second hold     Hip capsule stetch   3 x 30 second hold right     Hip flexor stretch   3 x 30 second      Bent knee hip abduction stretch with active adduction   10 reps with 10 second hold     SKTC stretch   3 x 30 second hold right  3 x 30 second hold right   Heel cord and plantar fascia stretch passive X 5 minutes  X 5 minutes   Bridging   2 x 10 reps B     LAQ   4 x 5 reps right  4 x 5 reps right   Right ankle dorsiflexion    During russian e-stim x 10 minutes    Manual facilitation of right dorsiflexors   Working with patient on active dorsiflexion with manual and verbal cues with patient supine     Modalities: (0 Minutes) patient supine with small wedge under knee and patient in modified long sitting to be able to look at right foot during electrical stimulation. Patient received [place] e-stim to right peroneal to help initiate dorsiflexion and assist with active dorsiflexion with 30 seconds off/10 seconds on for 10 minutes. EDUCATION:  Instructed in above exercises and home program.  HEP:  No Written HEP given to patient yet. Instructed to owrk on increase step length on the left. Treatment/Session Assessment: Continued steady progress with right LE after facilitation. Patients response to todays treatment session was tolerated well with no medical complications. .  · Post session pain:  No pain  · Compliance with Program/Exercises: Will assess as treatment progresses. · Recommendations/Intent for next treatment session: \"Next visit will focus on advancements to more challenging activities\".   Total Treatment Duration:  PT Patient Time In/Time Out  Time In: 7965  Time Out: [place], PT

## 2017-11-01 ENCOUNTER — HOSPITAL ENCOUNTER (OUTPATIENT)
Dept: PHYSICAL THERAPY | Age: 42
Discharge: HOME | End: 2017-11-01
Payer: COMMERCIAL

## 2017-11-01 ENCOUNTER — HOSPITAL ENCOUNTER (OUTPATIENT)
Dept: ULTRASOUND IMAGING | Age: 42
Discharge: HOME | End: 2017-11-01
Attending: FAMILY MEDICINE
Payer: COMMERCIAL

## 2017-11-01 DIAGNOSIS — R79.89 ELEVATED LFTS: ICD-10-CM

## 2017-11-01 PROCEDURE — 97110 THERAPEUTIC EXERCISES: CPT

## 2017-11-01 PROCEDURE — 97112 NEUROMUSCULAR REEDUCATION: CPT

## 2017-11-01 PROCEDURE — 76705 ECHO EXAM OF ABDOMEN: CPT

## 2017-11-01 NOTE — PROGRESS NOTES
Zoraida pitt  : 1975 [facility]  [address], [address] [facility], [address]  [phone]   [phone]         OUTPATIENT OCCUPATIONAL THERAPY: Daily Note 2017    ICD-10: Treatment Diagnosis:   Hemiplegia and hemiparesis following cerebral infarction affecting right dominant side (I69.351)  Precautions/Allergies:   Erythromycin   Fall Risk Score: 3 (? 5 = High Risk)  MD Orders: OT evaluate and treat  MEDICAL/REFERRING DIAGNOSIS:   Stroke (cerebrum) ([facility] [address] [I63.9]   DATE OF ONSET: 2017   REFERRING PHYSICIAN: Kera Tello*  RETURN PHYSICIAN APPOINTMENT: 2017     INITIAL ASSESSMENT:  Ms. Demi pitt has been seen by OT for a total of 7 visits. Pt reports she has been using saebo flex brace for R UE, but reports pain after prolonged wear. Pt has now started alternating between resting hand splint and saebo flex brace with decreased pain. Pt reports severe pain with attempts to passively extend digits. Pt is demonstrating some increased tone in the R UE. Pt reports pain in R shoulder but no presence of subluxation. Pt has been working on weightbearing to help improve strength, function, and decrease tone in the R dominant UE. Pt demonstrates good progress with scapular strength. Pt also has some activation at the elbow flexors/shoulder abductors with movement in flexor synergy. Pt making slow progress and is 10-15 minutes late to most all appointments which limits progress as well. Pt does report decreased pain and improved positioning of R UE after treatment. Pt to continue per plan of care. PLAN OF CARE:   PROBLEM LIST:  1. Decreased Strength  2. Decreased ADL/Functional Activities  3. Decreased Transfer Abilities  4. Decreased Ambulation Ability/Technique  5. Decreased Balance  6. Increased Pain  7. Decreased Activity Tolerance  8. Decreased Flexibility/Joint Mobility  9.  Decreased [place] with Home Exercise Program INTERVENTIONS PLANNED:  1. Activities of daily living training  2. Adaptive equipment training  3. Balance training  4. Clothing management  5. Donning&doffing training  6. Manual therapy training  7. Modalities  8. Neuromuscular re-eduation  9. Splinting  10. Therapeutic activity  11. Therapeutic exercise   TREATMENT PLAN:  Effective Dates: 9/11/17 TO 12/11/17. Frequency/Duration: 2 times a week for 12 weeks  GOALS: (Goals have been discussed and agreed upon with patient.)  Short-Term Functional Goals: Time Frame:4- 6 weeks  1. Pipo Stauffer will report decreased pain in the R shoulder from 5/10 to 3/10 with passive movement to decrease stiffness for daily activity. CONTINUE  2. Pipo Stauffer will be 100% compliant with daily SROM exercises for improving R UE ROM to decrease risk for contractures. CONTINUE  3. Pipo Stauffer will demonstrate ability to complete weightbearing activities in R UE with moderate facilitation to improve functional use for transfers. MET  4. Pipo Stauffer will complete basic ADL with supervision to improve independence with functional tasks. CONTINUE  5. Pipo Stauffer will tolerate 15 minutes of dynamic standing balance activity with CGA to decrease risk for falls with ADL and to improve activity tolerance. NOT ADDRESSED  6. Discharge Goals: Time Frame: 8-12 weeks   1. Pipo Stauffer will have functional PROM of R shoulder to [facility] to decrease pain and stiffness in R dominant UE. CONTINUE  2. Pipo Stauffer will demonstrate improved strength in the R dominant UE as evidenced by at least 2+ to 3-/5 strength for using R UE as functional assist. CONTINUE  3. Pipo Stauffer will demonstrate ability to functionally grasp and release light objects with supervision and with use of R dominant hand. CONTINUE  4. Pipo Stauffer will be modified independent with all basic ADL to improve overall quality of life. CONTINUE  5.  Pipo Stauffer will use R hand as functional assist with daily activities/transfers with supervision to improve independence with ADL. CONTINUE   Rehabilitation Potential For Stated Goals: Good  Regarding [place] therapy, I certify that the treatment plan above will be carried out by a therapist or under their direction. Thank you for this referral,  Annette Duncan OT                 The information in this section was collected on 9/11/17 (except where otherwise noted). OCCUPATIONAL PROFILE & HISTORY:   History of Present Injury/Illness (Reason for Referral):  Pt reports she was at work when she started feeling like she was going to pass out. Pt reports she didn't really stop working and the feeling wouldn't go away. It started at 2pm and left work at 3:30 pm. Pt felt like she was going to fall and pushed a buggy to her car and drove to her daughter's house. [facility] then [facility]. Progressively got worse with R sided weakness and woke up the next morning with full parlaysis of the R side. Reports her eyesight was decreasing and needing reading glasses. R eye is a little worse with the blurry vision. Pt wears to reading glasses. Pt using a wheelchair as less as possible. Using wheelchair when out and about. Pt using a quad cane around the home and has AFO to R LE. Tub transfer bench with CGA. Eats at the kitchen table. Pt stayed from Aug 8th- September 6th in inpatient rehab. Pt reports no falls at home. Pt getting dressed using techniques learned for inpatient rehab. Difficulty walking and carrying objects. Pt has some issues with Zanaflex and incontinence (she is unsure if that is related). Pt presents today for outpatient occupational therapy services. Past Medical History/Comorbidities:   Ms. Andrea Sanchez  has a past medical history of Generalized anxiety disorder; GERD (gastroesophageal reflux disease); Hypertension; Insomnia, unspecified; Iron deficiency anemia (8/23/2013); Left sided lacunar stroke ([facility] [address] (08/2017);  Mitral valve prolapse (1993); Obesity (BMI 30-39.9); RADHA on CPAP (10/2017); and Prediabetes (7/28/2015). Ms. Darwin Martinez  has a past surgical history that includes tubal ligation (1995) and endoscopy (10/1/04). Social History/Living Environment:   Lives with mom with her  because toilet/tub is lower with more open spaces. Pt also has a son. Pt has 24 hr supervision. Prior Level of Function/Work/Activity: Indpendent and working full time in [employer] at [employer] .   Dominant Side:         RIGHTPersonal Factors: Other factors that influence how disability is experienced by the patient:  Hx of anxiety disorder, HTN, Pre-diabetic    Current Medications:    Current Outpatient Prescriptions:     oxybutynin chloride XL (DITROPAN XL) 10 mg CR tablet, Take 1 Tab by mouth daily. Indications: URINARY URGE INCONTINENCE, Disp: 30 Tab, Rfl: 6    raNITIdine (ZANTAC) 150 mg tablet, Take 1 Tab by mouth two (2) times a day., Disp: 60 Tab, Rfl: 5    diazePAM (VALIUM) 5 mg tablet, Take 0.5 Tabs by mouth every six (6) hours as needed. Max Daily Amount: 10 mg. Indications: MUSCLE SPASM, Disp: 60 Tab, Rfl: 1    tiZANidine (ZANAFLEX) 4 mg tablet, Take 1 Tab by mouth three (3) times daily. , Disp: 90 Tab, Rfl: 6    hydroCHLOROthiazide (MICROZIDE) 12.5 mg capsule, Take 2 Caps by mouth daily. , Disp: 30 Cap, Rfl: 6    potassium chloride SR (K-TAB) 20 mEq tablet, Take 1 Tab by mouth two (2) times a day., Disp: 60 Tab, Rfl: 6    aspirin (ASPIRIN) 325 mg tablet, Take 1 Tab by mouth daily. , Disp: 30 Tab, Rfl: 12    acetaminophen (TYLENOL) 325 mg tablet, Take 2 Tabs by mouth every six (6) hours as needed for Pain or Fever (headache). , Disp: 30 Tab, Rfl: 0    escitalopram oxalate (LEXAPRO) 20 mg tablet, Take 1 Tab by mouth daily. , Disp: 30 Tab, Rfl: 5            Date Last Reviewed:  9/11/17   Complexity Level : Expanded review of therapy/medical records (1-2):  MODERATE COMPLEXITY   ASSESSMENT OF OCCUPATIONAL PERFORMANCE: Palpation:  Patient with no palpated subluxation of the R shoulder at this time. Increased pain and tightness with R shoulder flexion/abduction with passive ROM above 90 degrees. Pain along anterior portion of humeral head with anterior tilt. Pain radiates along the middle deltoid. Modified Maggi Scale     Grade Description : Noted in ELBOW FLEXORS (1)  WRIST FLEXORS(2) , FINGER FLEXORS (1+)  []0 - No increase in muscle tone  []1 - Slight increase in muscle tone, manifested by a catch and release, or by minimal resistance at the end of the range of motion when the affected part(s) is moved in flexion or extension  [x]1+ - Slight increase in muscle tone, manifested by a catch, followed by minimal resistance throughout the remainder (less than half) of the range of movement (ROM)  []2 - More marked increase in muscle tone through most of ROM, but affected part(s) easily moved  []3 - Considerable increase in muscle tone, passive movement difficult  []4 - Affected part(s) rigid in flexion and extension      ROM/Strength:  L UE WNL; No functional AROM of R UE.            Date:  9/11/17  Date:  9/11/17 Date:  10/25/17 Date:  10/25/17    PROM  STRENGTH PROM Strength   Movement RIGHT  RIGHT Right Right    SCAPULA:        Elevation  Limited  2 limited 2   Protraction  Limited  2+ limited 2+   Retraction  Limited   2 WFL 3   SHOULDER:        Flexion  100  0 115 PROM    Abduction  90  1 20-25 AROM  2   External rotation  70  0 30 PROM 0   Internal rotation  WNL  0 WFL PROM 0   Horizontal abduction 100  0  0   Horizontal adduction  WFL  0  0   Extension         ELBOW:        Flexion  WNL  0 75 AROM 2   Extension  WNL  0     FOREARM:        Supination  WFL  0  0   Pronation  WFL  0  0   WRIST:        Wrist flexion  65  0 55 PROM 0   Wrist extension  65  0 55 PROM 0   Wrist radial deviation         Wrist ulnar deviation         HAND:        Finger flexion WFL  0 WFL 0   Finger extension WFL  0 WFL (pain) 0   THUMB: Abduction  WFL  0 WFL 0    Extension WFL  0 WFL 0    Adduction     WFL 0    MCP flexion  WFL  0      IP flexion  WFL  0     Hand Strength:         unable  0     THREE JAW KARY PINCH unable  0     LATERAL PINCH  unable  0       Functional Mobility:         Gait/Ambulation:  CGA to supervision with quad cane; Uses wheelchair occasionally         Bed Mobility:  CGA  Balance:          Good static standing balance but fair to poor dynamic standing balance  Coordination:          Non-functional in R dominant UE  Mental Status: WNL  Vision:          Reading glasses. Reports some blurry vision in R eye. Tracking and visual fields intact. Some decreased speed with saccadic movement to the R. Activities of Daily Living:           Basic ADLs (From Assessment) Complex ADLs (From Assessment)         Grooming/Bathing/Dressing Activities of Daily Living                                   Sensation:         Grossly intact to light touch in B UE. R hand with some decreased light touch sensation. Reports some hypersensitivity to cold. Physical Skills Involved:  1. Range of Motion  2. Balance  3. Strength  4. Activity Tolerance  5. Sensation  6. Fine Motor Control  7. Gross Motor Control  8. Pain (acute) Cognitive Skills Affected (resulting in the inability to perform in a timely and safe manner):  1. WNL Psychosocial Skills Affected:  1. Anxiety Disorder (per chart review)   Number of elements that affect the Plan of Care: 5+:  HIGH COMPLEXITY   CLINICAL DECISION MAKING:   Outcome Measure: Tool Used: Fugl-Goodman Upper Extremity Motor Assessment  Score:  Initial: 8/66 Most Recent: X (Date: -- )   Interpretation of Score: Outcome Measure Conversion Site    ?  Carrying, Moving, and Handling Objects:     - CURRENT STATUS: CM - 80%-99% impaired, limited or restricted    - GOAL STATUS: CK - 40%-59% impaired, limited or restricted    - D/C STATUS:  ---------------To be determined--------------- Fugl-Goodman Upper Extremity Motor Assessment (without reflexes) Date:  9/11 Date:   Date:     Item Description Score Score Score   Wrist circumduction 0     Hook grasp 0     Shoulder flexion to 180°, elbow extended 0     Spherical grasp 0     Lateral prehension 0     Wrist flexion/extension, elbow extended 0     Pronation-supination, elbow extended 0     Wrist stable, elbow extended 0     Movement with normal speed 0     Forearm supination 0     Shoulder abduction to 90°, elbow extended 0     Movement without dysmetria 0     Shoulder external rotation 0     Wrist stable, elbow at 90° 0     Wrist flexion/extension, elbow at 90° 0     Palmar prehension 0     Scapular retraction 1     Pronation-supination, elbow at 90° 0     Shoulder flexion to 90°, elbow extended 0     Hand to lumbar spine 0     Shoulder abduction 0     Elbow extension 0     Forearm pronation 0     Movement without tremor 0     Cylindrical grasp 0     Finger mass extension (relaxation of flexion) 0     Scapular elevation 1     Finger mass flexion 0     Shoulder adduction with internal rotation 0     Elbow flexion 0     Total Score (Max = 60) 2           Medical Necessity:   · Patient demonstrates good rehab potential due to higher previous functional level. Reason for Services/Other Comments:  · Patient continues to require skilled intervention due to decreased functional independence s/p CVA impacting overall quality of life.   Clinical Decision-Making Assessment:     Assessment process, impact of co-morbidities, assessment modification\need for assistance, and selection of interventions: Analytical Complexity:HIGH COMPLEXITY   TREATMENT:   (In addition to Assessment/Re-Assessment sessions the following treatments were rendered)    Pre-treatment Symptoms/Complaints:    Pain: Initial: Pain Intensity 1: 7  Pain Location 1: Hand, Shoulder (hand)  Pain Orientation 1: Right  Post Session: 1/10      Manual Therapy (0): Pt received leuko rigid taping along deltoid and supraspinatus to address support for soft tissue pain in R shoulder. Pt then taped with guidance of humeral head posteriorly. Pt reports no complaints. Therapeutic Exercise: ( 20 minutes):  Exercises per grid below to improve mobility. Required maximal manual and tactile cues to promote proper body alignment, promote proper body posture and promote proper body mechanics. Progressed repetitions and complexity of movement as indicated. Date:  10/30/17 Date:  11/1/17 Date:     Activity/Exercise Parameters Parameters Parameters   External Rotation  10-15 reps with glenohumeral mobilizations; 15-20 reps with dowel with minimal tactile cues  1)10-15 reps with glenohumeral mobilizations (supine)  2) 10 reps SROM with dowel and moderate tactile cues  3) 10 reps from side-lying position     Shoulder Flexion  10 reps tolerated to 130 ; SROM x 5 reps with addditional time 10 reps with dowel to ~90 degrees with minimal assistance in supine     Scapular Press 10 reps supine with assistance of L UE     Shoulder abduction  10 reps with glenohumeral mobilization with minimal c/o pain to ~130 Attempted 2-3 reps but too painful today     Horizontal Abduction/add 5-8 reps with pain with horizontal abd     Scapular Mobilizations  AAROM in sidelying for scapular elevation/depression with maximal tactile cues     Backward shoulder rolls   5-8 reps with moderate assistance with cues to press down into depression        Neuromuscular Re-education: (  25 minutes):  Exercise/activities per grid below to improve kinesthetic sense and proprioception. Required minimal visual, verbal, manual and tactile cues to promote motor control of right, upper extremity(s).     Date:  10/28/17 Date:  10/30/17 Date:  11/1/17   Activity/Exercise      Supine to Sit  Supervision with minimal verbal cues     Sit to Stand/Stand to sit       Towel Glides Pt worked on activating movement at table in paddle and on washcloth pressing forward with moderate guide x 5-8 reps and in circumduction at table for 5-8 reps with additional time and minimal guidance   Moving to the side and forwards with minimal active movement and maximal assistance to guide arm forward   Mobile Arm Support      Weightbearing  Pt completed weightbearing into the R UE with arm fully extended x10 reps with pt tolerating elbow extension f 10-15 reps with prop to decrease wrist pain with ability to rotate shoulder out and tolerated with supervision 1) sitting and weightbearing into elbow with minimal to moderate verbal cues for trunk positioning  2) Weightbearing with prop under R hand/wrist x 10 reps with moderate assistance at the elbow  3) Standing at table and reaching to R of midline while weightbearing into R UE x10-15 reps    Tone Reduction into hand Pt received krueger stretching combined with metacarpal mobilizations to R hand to decrease pain/tone; Gentle distraction to digits at the PIP joints with some pain reported to promote finger extension; Placement in paddle with pt tolerating well  Pt completed krueger stretching and finger extension with mobilizations at PIP joints  Krueger stretching with metacarpal mobilizations and stretching of digits into abduction (painful today)   Prone on Elbows   10 reps shfft weight side to side with pain in R shoulder     Pectoral glides  NDT facilitation x 15 reps     Elbow Flexion   Movement in synergy blocking scapular elevation x 5 reps with fatigue    Guided Movement   1) Forward flexion with elbow extension and wrist extension x 10 reps with total assistance  2) 2 sets x 5 guided movement to grasp and release cups with total assistance   Putty press      Ball Lift                 Treatment/Session Assessment:    · Response to Treatment:  Patient reports that over the past two days she was having increased tone in the R UE and even tone and pain in the R LE. Clonus was noted today for the first time.  Same ROM/activity that was tolerated at last session fairly well was not tolerated today. Pt to continue to work on tone reduction. No UE active motion at this point under than scapular and some flexor synergy. Pt continues to report decreased stiffness after treatment. Pt to continue per plan of care. · Compliance with Program/Exercises: Will assess as treatment progresses. · Recommendations/Intent for next treatment session: \"Next visit will focus on advancements to more challenging activities and reduction in assistance provided\".   Total Treatment Duration:OT Patient Time In/Time Out  Time In: 6528  Time Out: Liam 48, OT

## 2017-11-01 NOTE — PROGRESS NOTES
Zoraida [place]  : 1975  Payor: Apolinar Herring / Plan: [address] / Product Type: PPO /  [address]  at [facility]  [address] [facility], [place] [address] [address] [facility]  [phone]   [phone]           OUTPATIENT PHYSICAL THERAPY:Daily Note 2017   ICD-10: Treatment Diagnosis: Difficulty in walking, not elsewhere classified (R26.2); Hemiplegia and hemiparesis following cerebral infarction affecting right dominant side (D90.198)  Precautions/Allergies:   Erythromycin   Fall Risk Score: 0 (? 5 = High Risk)  MD Orders: PT eval MEDICAL/REFERRING DIAGNOSIS:  Stroke (cerebrum) ([facility] [address] [I63.9]   DATE OF ONSET: 8/3/17  REFERRING PHYSICIAN: Kera Henry*  RETURN PHYSICIAN APPOINTMENT: unknown  DATE OF PROGRESS NOTE:    RECERTIFICATION DATE: 71     PROGRESS NOTE:  Pt has attended 10 physical therapy sessions from 9/15/17 to date including initial assessment. Sessions consist of range of motion, therapeutic activity, therapeutic exercise, balance and gait refinement. Pt has shown improvement in balance and gait ability. Pt has increased Perez Balance Score by 2 points indicating improved static standing balance and decreased risk for fall. Pt has decreased TUG time by over 14 seconds with her cane and almost 22 seconds without her cane indicating more normalized walking pattern. Her gait pattern has improved but she does not take normal step length on the left because she is trying to keep her right knee from hyperextending in stance on the right with her AFO on. I feel the AFO may be a little short so I placed a heel lift in her right shoe with further improvement in her TUG time by 3 more seconds for an overall increase of 17 second improvement with her cane. We will have [facility] look at her AFO to see if it needs adjustments. Pt will continue to benefit from physical therapy for at least 4 more weeks to maximize functional ability. INITIAL ASSESSMENT:  Ms. Lind Peers presents with dominant right hemiplegia s/p CVA on 8/3/17. Pt presents with ADL, balance and gait deficits. Pt will benefit from physical therapy to maximize to full potential and safety with all functional mobility. PROBLEM LIST (Impacting functional limitations):  1. Decreased Strength  2. Decreased ADL/Functional Activities  3. Decreased Ambulation Ability/Technique  4. Decreased Balance  5. Increased Pain  6. Decreased Activity Tolerance  7. Decreased Flexibility/Joint Mobility  8. Decreased Orange with Home Exercise Program INTERVENTIONS PLANNED:  1. Balance Exercise  2. Bed Mobility  3. Family Education  4. Gait Training  5. Home Exercise Program (HEP)  6. Neuromuscular Re-education/Strengthening  7. Range of Motion (ROM)  8. Therapeutic Activites  9. Therapeutic Exercise/Strengthening  10. Transfer Training  11. modalities   TREATMENT PLAN:  Effective Dates: 9/22/17 TO 12/15/17. Frequency/Duration: 3 times a week for 8 weeks  GOALS: (Goals have been discussed and agreed upon with patient.)  Short-Term Functional Goals: Time Frame: 4 weeks  1. Pt will be independent with range of motion, strength and balance home exercise program.  STATUS:  MET  2. Pt will decrease TUG score by 5 seconds indicating more normalized and safer gait pattern. STATUS:  MET  Discharge Goals: Time Frame: 8 weeks  Pt will show increased range of motion and improved posture to allow for improved safety and ability with all functional mobility. .  STATUS:  PROGRESSING, CONTINUE 4 WEEKS. Pt will decrease TUG score by 10 seconds indicating more normalized gait pattern and decrease risk for falls. STATUS:  MET  NEW GOAL:  Pt will decrease TUG score by 5 more seconds indicating more normalized gait pattern and decrease risk for falls. NEW GOAL  Pt will increase Perez Balance Scale to 49/56 indicating increased balance and decreased risk for falls.   STATUS:  NOT MET, CONT 4 WEEKS.    Rehabilitation Potential For Stated Goals: Good  Regarding [place] therapy, I certify that the treatment plan above will be carried out by a therapist or under their direction. Thank you for this referral,  Azeem Benoit DPT                  HISTORY:   GOAL:  Love to be back to doing things independenlty. Would like to get to where I don't have to use the cane. Get back to my own home. 11/1/2017  I definitely cant go back to work right now. Its slow progress I definitely cna;t drive at the [employer]. I have gotten stornger as far as my blance and in my leg. More ocmrortalble doing suff around the kitchen . I can take a dish and put it in the sink and rinse it off. I keep it near me in sonia I get tired. I will move aorund owthout it. Present Symptoms:    38year old  right handed female s/p left CVA 8/3/17 2* to HTN. Short term disability for now. Work in the [address] at [email or web address]. Very physical job. More sued to my left hand. Learning how to function with one hand is difficult. ADLs with just my left hand is difficult. Dynamic sitting balance. I just have to be careluf that my foot is flat especially if I have my shoes off. Tire easily. Buildig up strength. Large base quad cane but not all the time at home. Can't carry a heavy load. PAIN:  Right fingers and shoulder with slight movement. 6/10  History of Present Injury/Illness (Reason for Referral):  See above  Past Medical History/Comorbidities:   Ms. Mica Liu  has a past medical history of Generalized anxiety disorder; GERD (gastroesophageal reflux disease); Hypertension; Insomnia, unspecified; Iron deficiency anemia (8/23/2013); Left sided lacunar stroke ([facility] [address] (08/2017); Mitral valve prolapse (1993); Obesity (BMI 30-39.9); RADHA on CPAP (10/2017); and Prediabetes (7/28/2015). Ms. Mica Liu  has a past surgical history that includes tubal ligation (1995) and endoscopy (10/1/04).   Social History/Living Environment:     25and 25year old son and daughter. . Prior Level of Function/Work/Activity:  Worked in [employer] department. 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 One story - Own home:  About 5 steps to get in with bilateral railing. Staying at [facility] house which is easier to maneuver - low tub - can walk right in. Small wheelchair ramp and then level. Mom helps with driving and household duties - cooking. Dominant Side:         RIGHT    Current Medications:    Current Outpatient Prescriptions:     oxybutynin chloride XL (DITROPAN XL) 10 mg CR tablet, Take 1 Tab by mouth daily. Indications: URINARY URGE INCONTINENCE, Disp: 30 Tab, Rfl: 6    raNITIdine (ZANTAC) 150 mg tablet, Take 1 Tab by mouth two (2) times a day., Disp: 60 Tab, Rfl: 5    diazePAM (VALIUM) 5 mg tablet, Take 0.5 Tabs by mouth every six (6) hours as needed. Max Daily Amount: 10 mg. Indications: MUSCLE SPASM, Disp: 60 Tab, Rfl: 1    tiZANidine (ZANAFLEX) 4 mg tablet, Take 1 Tab by mouth three (3) times daily. , Disp: 90 Tab, Rfl: 6    hydroCHLOROthiazide (MICROZIDE) 12.5 mg capsule, Take 2 Caps by mouth daily. , Disp: 30 Cap, Rfl: 6    potassium chloride SR (K-TAB) 20 mEq tablet, Take 1 Tab by mouth two (2) times a day., Disp: 60 Tab, Rfl: 6    aspirin (ASPIRIN) 325 mg tablet, Take 1 Tab by mouth daily. , Disp: 30 Tab, Rfl: 12    acetaminophen (TYLENOL) 325 mg tablet, Take 2 Tabs by mouth every six (6) hours as needed for Pain or Fever (headache). , Disp: 30 Tab, Rfl: 0    escitalopram oxalate (LEXAPRO) 20 mg tablet, Take 1 Tab by mouth daily. , Disp: 30 Tab, Rfl: 5   Date Last Reviewed:  11/1/2017     Number of Personal Factors/Comorbidities that affect the Plan of Care: 1-2: MODERATE COMPLEXITY   EXAMINATION:   ROM:          Painful right shoulder and finger and hands. Shoulder not fully assessed. Seeing OT. Passive Fingers to neutral extension at least.  No overpressure given. Elbow slight decrease as end ranges.   Supination ~75% with mild pain.  Right lower extremity WFL  Minimal pain in my right shoulder. OT has taped it and that helps. Left WFL  Strength:          Right UE:  Almost no active movement noted. Possible scap stabilization and tone is not flaccid. Right hip flexion:  3-/5  Knee extension at least 4/5  Nothing in DF. Functional Mobility:         Gait/Ambulation:  Ambulates with large base quad came with slow small steps with custom hinged AFO on. Transfers:  Independent. occ no WBing on the right with sit to stand        Bed Mobility:  NT  Mental Status:          Alert and oriented x 4;  Pleasant and appropriate  Vision:          No deficits reported. Body Structures Involved:  1. Nerves  2. Bones  3. Joints  4. Muscles  5. Ligaments Body Functions Affected:  1. Sensory/Pain  2. Neuromusculoskeletal  3. Movement Related Activities and Participation Affected:  1. Learning and Applying Knowledge  2. General Tasks and Demands  3. Mobility  4. Self Care  5. Domestic Life  6. Interpersonal Interactions and Relationships  7. Community, Social and [place]   Number of elements that affect the Plan of Care: 4+: HIGH COMPLEXITY   CLINICAL PRESENTATION:   Presentation: Evolving clinical presentation with changing clinical characteristics: MODERATE COMPLEXITY   CLINICAL DECISION MAKING:   Outcome Measure: Tool Used: Perez Balance Scale  Score:  Initial: 46/56 Most Recent: 48/56 (Date: -- )   Interpretation of Score: Each section is scored on a 0-4 scale, 0 representing the patients inability to perform the task and 4 representing independence. The scores of each section are added together for a total score of 56. The higher the patients score, the more independent the patient is. Any score below 45 indicates increased risk for falls.     Score 56 55-45 44-34 33-23 22-12 11-1 0   Modifier CH CI CJ CK CL CM CN       Tool Used: Timed Up and Go (TUG)  Score:  Initial: with cane:  45.24 seconds; without cane 52.01 seconds Most Recent: 31.05 seconds with cane:  30.05 without cane  With 8 mm heel lift in shoe with cane:  27.85 (Date: 10/27/17)   Interpretation of Score: The test measures, in seconds, the time taken by an individual to stand up from a standard arm chair (seat height 46 cm [18 in], arm height 65 cm [25.6 in]), walk a distance of 3 meters (118 in, approx 10 ft), turn, walk back to the chair and sit down. If the individual takes longer than 14 seconds to complete TUG, this indicates risk for falls. Score 7 7.5-10.5 11-14 14.5-17.5 18-21 21.5-24.5 25+   Modifier CH CI CJ CK CL CM CN     Medical Necessity:   · Skilled intervention continues to be required due to residual right sided hemiparesis. Reason for Services/Other Comments:  · Patient continues to require skilled intervention due to residual right hemiparesis. .   Use of outcome tool(s) and clinical judgement create a POC that gives a: Questionable prediction of patient's progress: MODERATE COMPLEXITY      S:   I am going to make an appointment for my brace next week. I have to have a liver test today. TREATMENT:   (In addition to Assessment/Re-Assessment sessions the following treatments were rendered)       NEUROMUSCULAR RE-EDUCATION: ( 40 minutes):  Exercise/activities per grid below to improve balance, coordination, kinesthetic sense, proprioception and motor control. Required maximal manual, tactile and facilitation cues to perfrom activities. Date  10/11/17 Date:  10/20/17 Date:  11/1/17   Activity/Exercise  Parameters  Parameters    Right UE tone reduction and sensorimotor retraining for increased arm swing and posture during gait      Seated in chair (pt is short in stature).   Right shoe and brace off  Neuro-JAKE tactile stimulation to R foot to stimulate dorsiflexion x 10 reps  Neuro-JAKE tactile stimulation to R foot to stimulate dorsiflexion x 10 reps   rockerboard under R foot working on small range plantarflexion 3 x 5 reps with facilitation Kicking soccer ball in bars for tone reduction and improved swing pattern    2 x 10 reps each leg    Facilitated long arc quad      Quad set at end range x 10 reps with 5 second hold. Facilitation needed initially but then patient able to hold contraction     LE tone reduction prior to gait  X 5 minutes in supine to decrease tone and synergy X 5 minutes sitting to foot     Gait retraining With shoes and brace without  AD x 120 feet working on gait pattern, heel strike, terminal knee extension. With brace on and no AD 2 x 50' with verbal cues for R knee extension during stance phase  In the bars working on hip/knee release and stepping pattern x 10 minutes   Then with quad cane working on kicking out and putting weight down with forward momentum    Mini squats on door with therapist facilitating knee extension     2 x 10 reps with good symmetrical control  Then with 1\" block under L foot for increased R weight bearing x 10 reps     Standing in parallel bars, 1\" block under R foot  Therapist assisting with elevating up and then working on slow control on the way day for quad and plantarflexor eccentric control x 10 reps  Therapist assisting with elevating up and then working on slow control on the way day for quad and plantarflexor eccentric control 2 x 10 reps        Therapeutic Exercise: ( 0 minutes):  Exercises per grid below to improve mobility, strength and coordination. Required moderate visual, verbal and manual cues to promote proper body alignment, promote proper body posture and promote proper body breathing techniques. Progressed range and repetitions as indicated.    Date:  10/11/17 Date:  10/20/17 Date:  10/26/17   Activity/Exercise Parameters Parameters Parameters   Piriformis stretch   3 x 30 second hold     Hip capsule stetch   3 x 30 second hold right     Hip flexor stretch   3 x 30 second      Bent knee hip abduction stretch with active adduction   10 reps with 10 second hold     SKTC stretch 3 x 30 second hold right  3 x 30 second hold right   Heel cord and plantar fascia stretch passive X 5 minutes  X 5 minutes   Bridging   2 x 10 reps B     LAQ   4 x 5 reps right  4 x 5 reps right   Right ankle dorsiflexion    During russian e-stim x 10 minutes    Manual facilitation of right dorsiflexors   Working with patient on active dorsiflexion with manual and verbal cues with patient supine       EDUCATION:  Instructed in above exercises and home program.  HEP:  No Written HEP given to patient yet. Instructed to owrk on increase step length on the left. Treatment/Session Assessment:  Patient was able to correct gait pattern with cues. Will assess for carryover at future visits. Patients response to todays treatment session was tolerated well with no medical complications. .  · Post session pain:  No pain  · Compliance with Program/Exercises: Will assess as treatment progresses. · Recommendations/Intent for next treatment session: \"Next visit will focus on advancements to more challenging activities\".   Total Treatment Duration:  PT Patient Time In/Time Out  Time In: 1300  Time Out: Adriana 12, DPT

## 2017-11-03 ENCOUNTER — HOSPITAL ENCOUNTER (OUTPATIENT)
Dept: PHYSICAL THERAPY | Age: 42
Discharge: HOME | End: 2017-11-03
Payer: COMMERCIAL

## 2017-11-03 PROCEDURE — 97112 NEUROMUSCULAR REEDUCATION: CPT

## 2017-11-03 PROCEDURE — 97530 THERAPEUTIC ACTIVITIES: CPT

## 2017-11-03 NOTE — PROGRESS NOTES
Zoraida Kouts  : 1975  Payor: Jerson Mcintyre / Plan: [facility] / Product Type: PPO /  [address] at [address] [facility] [address], [address] [facility], [place] [address] [address] [facility]  [phone]   [phone]           OUTPATIENT PHYSICAL THERAPY:Daily Note 11/3/2017   ICD-10: Treatment Diagnosis: Difficulty in walking, not elsewhere classified (R26.2); Hemiplegia and hemiparesis following cerebral infarction affecting right dominant side (H18.269)  Precautions/Allergies:   Erythromycin   Fall Risk Score: 0 (? 5 = High Risk)  MD Orders: PT eval MEDICAL/REFERRING DIAGNOSIS:  Stroke (cerebrum) ([facility] [address] [I63.9]   DATE OF ONSET: 8/3/17  REFERRING PHYSICIAN: Kera Lee*  RETURN PHYSICIAN APPOINTMENT: unknown  DATE OF PROGRESS NOTE:    RECERTIFICATION DATE:      PROGRESS NOTE:  Pt has attended 10 physical therapy sessions from 9/15/17 to date including initial assessment. Sessions consist of range of motion, therapeutic activity, therapeutic exercise, balance and gait refinement. Pt has shown improvement in balance and gait ability. Pt has increased Perez Balance Score by 2 points indicating improved static standing balance and decreased risk for fall. Pt has decreased TUG time by over 14 seconds with her cane and almost 22 seconds without her cane indicating more normalized walking pattern. Her gait pattern has improved but she does not take normal step length on the left because she is trying to keep her right knee from hyperextending in stance on the right with her AFO on. I feel the AFO may be a little short so I placed a heel lift in her right shoe with further improvement in her TUG time by 3 more seconds for an overall increase of 17 second improvement with her cane. We will have [facility] look at her AFO to see if it needs adjustments. Pt will continue to benefit from physical therapy for at least 4 more weeks to maximize functional ability. INITIAL ASSESSMENT:  Ms. Paramjit Flores presents with dominant right hemiplegia s/p CVA on 8/3/17. Pt presents with ADL, balance and gait deficits. Pt will benefit from physical therapy to maximize to full potential and safety with all functional mobility. PROBLEM LIST (Impacting functional limitations):  1. Decreased Strength  2. Decreased ADL/Functional Activities  3. Decreased Ambulation Ability/Technique  4. Decreased Balance  5. Increased Pain  6. Decreased Activity Tolerance  7. Decreased Flexibility/Joint Mobility  8. Decreased [place] with Home Exercise Program INTERVENTIONS PLANNED:  1. Balance Exercise  2. Bed Mobility  3. Family Education  4. Gait Training  5. Home Exercise Program (HEP)  6. Neuromuscular Re-education/Strengthening  7. Range of Motion (ROM)  8. Therapeutic Activites  9. Therapeutic Exercise/Strengthening  10. Transfer Training  11. modalities   TREATMENT PLAN:  Effective Dates: 9/22/17 TO 12/15/17. Frequency/Duration: 3 times a week for 8 weeks  GOALS: (Goals have been discussed and agreed upon with patient.)  Short-Term Functional Goals: Time Frame: 4 weeks  1. Pt will be independent with range of motion, strength and balance home exercise program.  STATUS:  MET  2. Pt will decrease TUG score by 5 seconds indicating more normalized and safer gait pattern. STATUS:  MET  Discharge Goals: Time Frame: 8 weeks  Pt will show increased range of motion and improved posture to allow for improved safety and ability with all functional mobility. .  STATUS:  PROGRESSING, CONTINUE 4 WEEKS. Pt will decrease TUG score by 10 seconds indicating more normalized gait pattern and decrease risk for falls. STATUS:  MET  NEW GOAL:  Pt will decrease TUG score by 5 more seconds indicating more normalized gait pattern and decrease risk for falls. NEW GOAL  Pt will increase Perez Balance Scale to 49/56 indicating increased balance and decreased risk for falls.   STATUS:  NOT MET, CONT 4 WEEKS.    Rehabilitation Potential For Stated Goals: Good  Regarding [place] therapy, I certify that the treatment plan above will be carried out by a therapist or under their direction. Thank you for this referral,  Liudmila Harrington, PT                  HISTORY:   GOAL:  Love to be back to doing things independenlty. Would like to get to where I don't have to use the cane. Get back to my own home. 11/3/2017  I definitely cant go back to work right now. Its slow progress I definitely cna;t drive at the [employer]. I have gotten stornger as far as my blance and in my leg. More ocmrortalble doing suff around the kitchen . I can take a dish and put it in the sink and rinse it off. I keep it near me in sonia I get tired. I will move aorund owthout it. Present Symptoms:    38year old  right handed female s/p left CVA 8/3/17 2* to HTN. Short term disability for now. Work in the [address] [employer] at [employer]. Very physical job. More sued to my left hand. Learning how to function with one hand is difficult. ADLs with just my left hand is difficult. Dynamic sitting balance. I just have to be careluf that my foot is flat especially if I have my shoes off. Tire easily. Buildig up strength. Large base quad cane but not all the time at home. Can't carry a heavy load. PAIN:  Right fingers and shoulder with slight movement. 6/10  History of Present Injury/Illness (Reason for Referral):  See above  Past Medical History/Comorbidities:   Ms. Gifty Thompson  has a past medical history of Generalized anxiety disorder; GERD (gastroesophageal reflux disease); Hypertension; Insomnia, unspecified; Iron deficiency anemia (8/23/2013); Left sided lacunar stroke ([facility] [address] (08/2017); Mitral valve prolapse (1993); Obesity (BMI 30-39.9); RADHA on CPAP (10/2017); and Prediabetes (7/28/2015). Ms. Gifty Thompson  has a past surgical history that includes tubal ligation (1995) and endoscopy (10/1/04).   Social History/Living Environment:     25and 25year old son and daughter. . Prior Level of Function/Work/Activity:  Worked in [employer] department. 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 One story - Own home:  About 5 steps to get in with bilateral railing. Staying at [facility] house which is easier to maneuver - low tub - can walk right in. Small wheelchair ramp and then level. Mom helps with driving and household duties - cooking. Dominant Side:         RIGHT    Current Medications:    Current Outpatient Prescriptions:     oxybutynin chloride XL (DITROPAN XL) 10 mg CR tablet, Take 1 Tab by mouth daily. Indications: URINARY URGE INCONTINENCE, Disp: 30 Tab, Rfl: 6    raNITIdine (ZANTAC) 150 mg tablet, Take 1 Tab by mouth two (2) times a day., Disp: 60 Tab, Rfl: 5    diazePAM (VALIUM) 5 mg tablet, Take 0.5 Tabs by mouth every six (6) hours as needed. Max Daily Amount: 10 mg. Indications: MUSCLE SPASM, Disp: 60 Tab, Rfl: 1    tiZANidine (ZANAFLEX) 4 mg tablet, Take 1 Tab by mouth three (3) times daily. , Disp: 90 Tab, Rfl: 6    hydroCHLOROthiazide (MICROZIDE) 12.5 mg capsule, Take 2 Caps by mouth daily. , Disp: 30 Cap, Rfl: 6    potassium chloride SR (K-TAB) 20 mEq tablet, Take 1 Tab by mouth two (2) times a day., Disp: 60 Tab, Rfl: 6    aspirin (ASPIRIN) 325 mg tablet, Take 1 Tab by mouth daily. , Disp: 30 Tab, Rfl: 12    acetaminophen (TYLENOL) 325 mg tablet, Take 2 Tabs by mouth every six (6) hours as needed for Pain or Fever (headache). , Disp: 30 Tab, Rfl: 0    escitalopram oxalate (LEXAPRO) 20 mg tablet, Take 1 Tab by mouth daily. , Disp: 30 Tab, Rfl: 5   Date Last Reviewed:  11/3/2017     Number of Personal Factors/Comorbidities that affect the Plan of Care: 1-2: MODERATE COMPLEXITY   EXAMINATION:   ROM:          Painful right shoulder and finger and hands. Shoulder not fully assessed. Seeing OT. Passive Fingers to neutral extension at least.  No overpressure given. Elbow slight decrease as end ranges.   Supination ~75% with mild pain.  Right lower extremity WFL  Minimal pain in my right shoulder. OT has taped it and that helps. Left WFL  Strength:          Right UE:  Almost no active movement noted. Possible scap stabilization and tone is not flaccid. Right hip flexion:  3-/5  Knee extension at least 4/5  Nothing in DF. Functional Mobility:         Gait/Ambulation:  Ambulates with large base quad came with slow small steps with custom hinged AFO on. Transfers:  Independent. occ no WBing on the right with sit to stand        Bed Mobility:  NT  Mental Status:          Alert and oriented x 4;  Pleasant and appropriate  Vision:          No deficits reported. Body Structures Involved:  1. Nerves  2. Bones  3. Joints  4. Muscles  5. Ligaments Body Functions Affected:  1. Sensory/Pain  2. Neuromusculoskeletal  3. Movement Related Activities and Participation Affected:  1. Learning and Applying Knowledge  2. General Tasks and Demands  3. Mobility  4. Self Care  5. Domestic Life  6. Interpersonal Interactions and Relationships  7. Community, Social and [place]   Number of elements that affect the Plan of Care: 4+: HIGH COMPLEXITY   CLINICAL PRESENTATION:   Presentation: Evolving clinical presentation with changing clinical characteristics: MODERATE COMPLEXITY   CLINICAL DECISION MAKING:   Outcome Measure: Tool Used: Perez Balance Scale  Score:  Initial: 46/56 Most Recent: 48/56 (Date: -- )   Interpretation of Score: Each section is scored on a 0-4 scale, 0 representing the patients inability to perform the task and 4 representing independence. The scores of each section are added together for a total score of 56. The higher the patients score, the more independent the patient is. Any score below 45 indicates increased risk for falls.     Score 56 55-45 44-34 33-23 22-12 11-1 0   Modifier CH CI CJ CK CL CM CN       Tool Used: Timed Up and Go (TUG)  Score:  Initial: with cane:  45.24 seconds; without cane 52.01 seconds Most Recent: 31.05 seconds with cane:  30.05 without cane  With 8 mm heel lift in shoe with cane:  27.85 (Date: 10/27/17)   Interpretation of Score: The test measures, in seconds, the time taken by an individual to stand up from a standard arm chair (seat height 46 cm [18 in], arm height 65 cm [25.6 in]), walk a distance of 3 meters (118 in, approx 10 ft), turn, walk back to the chair and sit down. If the individual takes longer than 14 seconds to complete TUG, this indicates risk for falls. Score 7 7.5-10.5 11-14 14.5-17.5 18-21 21.5-24.5 25+   Modifier CH CI CJ CK CL CM CN     Medical Necessity:   · Skilled intervention continues to be required due to residual right sided hemiparesis. Reason for Services/Other Comments:  · Patient continues to require skilled intervention due to residual right hemiparesis. .   Use of outcome tool(s) and clinical judgement create a POC that gives a: Questionable prediction of patient's progress: MODERATE COMPLEXITY      S:   I am going to make an appointment for my brace next week. I have to have a liver test today. Still painful at night with my arm ans sleeping. TREATMENT:   (In addition to Assessment/Re-Assessment sessions the following treatments were rendered)     estim unit  Shoulder  Arm slides  NEUROMUSCULAR RE-EDUCATION: (20 minutes):  Exercise/activities per grid below to improve balance, coordination, kinesthetic sense, proprioception and motor control. Required maximal manual, tactile and facilitation cues to perfrom activities. THERAPEUTIC ACTIVITY: ( 25 minutes): Therapeutic activities per grid below to improve mobility, strength and motor control. Required minimal verbal and tactile cues to perform correctly. Pt brings a personal electrical/TENS stimulation unit. Appears to be more for TENS/massage pain relief although pamphlet indicates can be used for muscle stimulation. Attempted to place for anterior tibialis stimulation.   Multiple tries and placements to try and get comfortable contraction. Able to achieve pulsing massage but very small intensity. No adjustability except intensity. With one more notch up on intensity, noxious pain at fibular head placement. Will try again at another visit. Instructed in placement of a rolled pillow case deep into arm pit to allow for some traction and lateral distraction of the humeral head when in left sidelying. Pt reported increased comfort. Date  11/3/17   Activity/Exercise    Right UE tone reduction and sensorimotor retraining for increased arm swing and posture during gait Left sidelying Feldenkrais arm slides and hand and elbow tone reduction. Hand relaxed, shoulder with increased self range and ER. More comfortable during gait. Seated in chair (pt is short in stature). Right shoe and brace off    Kicking soccer ball in bars for tone reduction and improved swing pattern     Facilitated long arc quad        LE tone reduction prior to gait  X 5 minutes in supine to decrease tone and synergy   Gait retraining With shoes but no brace with large based quad cane x 120 feet working on gait pattern, heel strike, terminal knee extension. Mini squats on door with therapist facilitating knee extension       Standing in parallel bars, 1\" block under R foot        Therapeutic Exercise: ( 0 minutes):  Exercises per grid below to improve mobility, strength and coordination. Required moderate visual, verbal and manual cues to promote proper body alignment, promote proper body posture and promote proper body breathing techniques. Progressed range and repetitions as indicated.    Date:  10/11/17 Date:  10/20/17 Date:  10/26/17   Activity/Exercise Parameters Parameters Parameters   Piriformis stretch   3 x 30 second hold     Hip capsule stetch   3 x 30 second hold right     Hip flexor stretch   3 x 30 second      Bent knee hip abduction stretch with active adduction   10 reps with 10 second hold     [facility] stretch   3 x 30 second hold right  3 x 30 second hold right   Heel cord and plantar fascia stretch passive X 5 minutes  X 5 minutes   Bridging   2 x 10 reps B     LAQ   4 x 5 reps right  4 x 5 reps right   Right ankle dorsiflexion    During russian e-stim x 10 minutes    Manual facilitation of right dorsiflexors   Working with patient on active dorsiflexion with manual and verbal cues with patient supine       EDUCATION:  Instructed in above exercises and home program.  HEP:  No written HEP given to patient yet. Instructed to work on increase step length on the left. Treatment/Session Assessment:  Estim has some potential for sensorimotor increase. Arm and shoulder felt better after tone reduction. Patients response to todays treatment session was tolerated well with no medical complications. .  · Post session pain:  No pain  · Compliance with Program/Exercises: Will assess as treatment progresses. · Recommendations/Intent for next treatment session: \"Next visit will focus on advancements to more challenging activities\".   Total Treatment Duration:  PT Patient Time In/Time Out  Time In: 0930  Time Out: [address] [place] [address], PT

## 2017-11-03 NOTE — PROGRESS NOTES
Zoraida pitt  : 1975 [facility]  [address] [facility], [address] [place] [address], [address]  [phone]   [phone]         OUTPATIENT OCCUPATIONAL THERAPY: Daily Note 11/3/2017    ICD-10: Treatment Diagnosis:   Hemiplegia and hemiparesis following cerebral infarction affecting right dominant side (I69.351)  Precautions/Allergies:   Erythromycin   Fall Risk Score: 3 (? 5 = High Risk)  MD Orders: OT evaluate and treat  MEDICAL/REFERRING DIAGNOSIS:   Stroke (cerebrum) ([address] [I63.9]   DATE OF ONSET: 2017   REFERRING PHYSICIAN: Kera Del Valle*  RETURN PHYSICIAN APPOINTMENT: 2017     INITIAL ASSESSMENT:  Ms. Demi pitt has been seen by OT for a total of 7 visits. Pt reports she has been using saebo flex brace for R UE, but reports pain after prolonged wear. Pt has now started alternating between resting hand splint and saebo flex brace with decreased pain. Pt reports severe pain with attempts to passively extend digits. Pt is demonstrating some increased tone in the R UE. Pt reports pain in R shoulder but no presence of subluxation. Pt has been working on weightbearing to help improve strength, function, and decrease tone in the R dominant UE. Pt demonstrates good progress with scapular strength. Pt also has some activation at the elbow flexors/shoulder abductors with movement in flexor synergy. Pt making slow progress and is 10-15 minutes late to most all appointments which limits progress as well. Pt does report decreased pain and improved positioning of R UE after treatment. Pt to continue per plan of care. PLAN OF CARE:   PROBLEM LIST:  1. Decreased Strength  2. Decreased ADL/Functional Activities  3. Decreased Transfer Abilities  4. Decreased Ambulation Ability/Technique  5. Decreased Balance  6. Increased Pain  7. Decreased Activity Tolerance  8. Decreased Flexibility/Joint Mobility  9.  Decreased [place] with Home Exercise Program INTERVENTIONS PLANNED:  1. Activities of daily living training  2. Adaptive equipment training  3. Balance training  4. Clothing management  5. Donning&doffing training  6. Manual therapy training  7. Modalities  8. Neuromuscular re-eduation  9. Splinting  10. Therapeutic activity  11. Therapeutic exercise   TREATMENT PLAN:  Effective Dates: 9/11/17 TO 12/11/17. Frequency/Duration: 2 times a week for 12 weeks  GOALS: (Goals have been discussed and agreed upon with patient.)  Short-Term Functional Goals: Time Frame:4- 6 weeks  1. Pipo Stauffer will report decreased pain in the R shoulder from 5/10 to 3/10 with passive movement to decrease stiffness for daily activity. CONTINUE  2. Pipo Stauffer will be 100% compliant with daily SROM exercises for improving R UE ROM to decrease risk for contractures. CONTINUE  3. Pipo Stauffer will demonstrate ability to complete weightbearing activities in R UE with moderate facilitation to improve functional use for transfers. MET  4. Pipo Stauffer will complete basic ADL with supervision to improve independence with functional tasks. CONTINUE  5. Pipo Stauffer will tolerate 15 minutes of dynamic standing balance activity with CGA to decrease risk for falls with ADL and to improve activity tolerance. NOT ADDRESSED  6. Discharge Goals: Time Frame: 8-12 weeks   1. Pipo Stauffer will have functional PROM of R shoulder to [facility] to decrease pain and stiffness in R dominant UE. CONTINUE  2. Pipo Stauffer will demonstrate improved strength in the R dominant UE as evidenced by at least 2+ to 3-/5 strength for using R UE as functional assist. CONTINUE  3. Pipo Stauffer will demonstrate ability to functionally grasp and release light objects with supervision and with use of R dominant hand. CONTINUE  4. Pipo Stauffer will be modified independent with all basic ADL to improve overall quality of life. CONTINUE  5.  Pipo Stauffer will use R hand as functional assist with daily activities/transfers with supervision to improve independence with ADL. CONTINUE   Rehabilitation Potential For Stated Goals: Good  Regarding [place] therapy, I certify that the treatment plan above will be carried out by a therapist or under their direction. Thank you for this referral,  Jerrell Corcoran OT                 The information in this section was collected on 9/11/17 (except where otherwise noted). OCCUPATIONAL PROFILE & HISTORY:   History of Present Injury/Illness (Reason for Referral):  Pt reports she was at work when she started feeling like she was going to pass out. Pt reports she didn't really stop working and the feeling wouldn't go away. It started at 2pm and left work at 3:30 pm. Pt felt like she was going to fall and pushed a buggy to her car and drove to her daughter's house. Pt then was brought over to the ER at [facility]. Progressively got worse with R sided weakness and woke up the next morning with full parlaysis of the R side. Reports her eyesight was decreasing and needing reading glasses. R eye is a little worse with the blurry vision. Pt wears to reading glasses. Pt using a wheelchair as less as possible. Using wheelchair when out and about. Pt using a quad cane around the home and has AFO to R LE. Tub transfer bench with CGA. Eats at the kitchen table. Pt stayed from Aug 8th- September 6th in inpatient rehab. Pt reports no falls at home. Pt getting dressed using techniques learned for inpatient rehab. Difficulty walking and carrying objects. Pt has some issues with Zanaflex and incontinence (she is unsure if that is related). Pt presents today for outpatient occupational therapy services. Past Medical History/Comorbidities:   Ms. Gifty Thompson  has a past medical history of Generalized anxiety disorder; GERD (gastroesophageal reflux disease); Hypertension; Insomnia, unspecified; Iron deficiency anemia (8/23/2013); Left sided lacunar stroke ([facility] [address] (08/2017);  Mitral valve prolapse (1993); Obesity (BMI 30-39.9); RADHA on CPAP (10/2017); and Prediabetes (7/28/2015). Ms. Jude Tierney  has a past surgical history that includes tubal ligation (1995) and endoscopy (10/1/04). Social History/Living Environment:   Lives with mom with her  because toilet/tub is lower with more open spaces. Pt also has a son. Pt has 24 hr supervision. Prior Level of Function/Work/Activity: Indpendent and working full time in [employer] at [employer] .   Dominant Side:         RIGHTPersonal Factors: Other factors that influence how disability is experienced by the patient:  Hx of anxiety disorder, HTN, Pre-diabetic    Current Medications:    Current Outpatient Prescriptions:     oxybutynin chloride XL (DITROPAN XL) 10 mg CR tablet, Take 1 Tab by mouth daily. Indications: URINARY URGE INCONTINENCE, Disp: 30 Tab, Rfl: 6    raNITIdine (ZANTAC) 150 mg tablet, Take 1 Tab by mouth two (2) times a day., Disp: 60 Tab, Rfl: 5    diazePAM (VALIUM) 5 mg tablet, Take 0.5 Tabs by mouth every six (6) hours as needed. Max Daily Amount: 10 mg. Indications: MUSCLE SPASM, Disp: 60 Tab, Rfl: 1    tiZANidine (ZANAFLEX) 4 mg tablet, Take 1 Tab by mouth three (3) times daily. , Disp: 90 Tab, Rfl: 6    hydroCHLOROthiazide (MICROZIDE) 12.5 mg capsule, Take 2 Caps by mouth daily. , Disp: 30 Cap, Rfl: 6    potassium chloride SR (K-TAB) 20 mEq tablet, Take 1 Tab by mouth two (2) times a day., Disp: 60 Tab, Rfl: 6    aspirin (ASPIRIN) 325 mg tablet, Take 1 Tab by mouth daily. , Disp: 30 Tab, Rfl: 12    acetaminophen (TYLENOL) 325 mg tablet, Take 2 Tabs by mouth every six (6) hours as needed for Pain or Fever (headache). , Disp: 30 Tab, Rfl: 0    escitalopram oxalate (LEXAPRO) 20 mg tablet, Take 1 Tab by mouth daily. , Disp: 30 Tab, Rfl: 5            Date Last Reviewed:  9/11/17   Complexity Level : Expanded review of therapy/medical records (1-2):  MODERATE COMPLEXITY   ASSESSMENT OF OCCUPATIONAL PERFORMANCE: Palpation:  Patient with no palpated subluxation of the R shoulder at this time. Increased pain and tightness with R shoulder flexion/abduction with passive ROM above 90 degrees. Pain along anterior portion of humeral head with anterior tilt. Pain radiates along the middle deltoid. Modified Maggi Scale     Grade Description : Noted in ELBOW FLEXORS (1)  WRIST FLEXORS(2) , FINGER FLEXORS (1+)  []0 - No increase in muscle tone  []1 - Slight increase in muscle tone, manifested by a catch and release, or by minimal resistance at the end of the range of motion when the affected part(s) is moved in flexion or extension  [x]1+ - Slight increase in muscle tone, manifested by a catch, followed by minimal resistance throughout the remainder (less than half) of the range of movement (ROM)  []2 - More marked increase in muscle tone through most of ROM, but affected part(s) easily moved  []3 - Considerable increase in muscle tone, passive movement difficult  []4 - Affected part(s) rigid in flexion and extension      ROM/Strength:  L UE WNL; No functional AROM of R UE.            Date:  9/11/17  Date:  9/11/17 Date:  10/25/17 Date:  10/25/17    PROM  STRENGTH PROM Strength   Movement RIGHT  RIGHT Right Right    SCAPULA:        Elevation  Limited  2 limited 2   Protraction  Limited  2+ limited 2+   Retraction  Limited   2 WFL 3   SHOULDER:        Flexion  100  0 115 PROM    Abduction  90  1 20-25 AROM  2   External rotation  70  0 30 PROM 0   Internal rotation  WNL  0 WFL PROM 0   Horizontal abduction 100  0  0   Horizontal adduction  WFL  0  0   Extension         ELBOW:        Flexion  WNL  0 75 AROM 2   Extension  WNL  0     FOREARM:        Supination  WFL  0  0   Pronation  WFL  0  0   WRIST:        Wrist flexion  65  0 55 PROM 0   Wrist extension  65  0 55 PROM 0   Wrist radial deviation         Wrist ulnar deviation         HAND:        Finger flexion WFL  0 WFL 0   Finger extension WFL  0 WFL (pain) 0   THUMB: Abduction  WFL  0 WFL 0    Extension WFL  0 WFL 0    Adduction     WFL 0    MCP flexion  WFL  0      IP flexion  WFL  0     Hand Strength:         unable  0     THREE JAW KARY PINCH unable  0     LATERAL PINCH  unable  0       Functional Mobility:         Gait/Ambulation:  CGA to supervision with quad cane; Uses wheelchair occasionally         Bed Mobility:  CGA  Balance:          Good static standing balance but fair to poor dynamic standing balance  Coordination:          Non-functional in R dominant UE  Mental Status: WNL  Vision:          Reading glasses. Reports some blurry vision in R eye. Tracking and visual fields intact. Some decreased speed with saccadic movement to the R. Activities of Daily Living:           Basic ADLs (From Assessment) Complex ADLs (From Assessment)         Grooming/Bathing/Dressing Activities of Daily Living                                   Sensation:         Grossly intact to light touch in B UE. R hand with some decreased light touch sensation. Reports some hypersensitivity to cold. Physical Skills Involved:  1. Range of Motion  2. Balance  3. Strength  4. Activity Tolerance  5. Sensation  6. Fine Motor Control  7. Gross Motor Control  8. Pain (acute) Cognitive Skills Affected (resulting in the inability to perform in a timely and safe manner):  1. WNL Psychosocial Skills Affected:  1. Anxiety Disorder (per chart review)   Number of elements that affect the Plan of Care: 5+:  HIGH COMPLEXITY   CLINICAL DECISION MAKING:   Outcome Measure: Tool Used: Fugl-Goodman Upper Extremity Motor Assessment  Score:  Initial: 8/66 Most Recent: X (Date: -- )   Interpretation of Score: Outcome Measure Conversion Site    ?  Carrying, Moving, and Handling Objects:     - CURRENT STATUS: CM - 80%-99% impaired, limited or restricted    - GOAL STATUS: CK - 40%-59% impaired, limited or restricted    - D/C STATUS:  ---------------To be determined--------------- Fugl-Goodman Upper Extremity Motor Assessment (without reflexes) Date:  9/11 Date:   Date:     Item Description Score Score Score   Wrist circumduction 0     Hook grasp 0     Shoulder flexion to 180°, elbow extended 0     Spherical grasp 0     Lateral prehension 0     Wrist flexion/extension, elbow extended 0     Pronation-supination, elbow extended 0     Wrist stable, elbow extended 0     Movement with normal speed 0     Forearm supination 0     Shoulder abduction to 90°, elbow extended 0     Movement without dysmetria 0     Shoulder external rotation 0     Wrist stable, elbow at 90° 0     Wrist flexion/extension, elbow at 90° 0     Palmar prehension 0     Scapular retraction 1     Pronation-supination, elbow at 90° 0     Shoulder flexion to 90°, elbow extended 0     Hand to lumbar spine 0     Shoulder abduction 0     Elbow extension 0     Forearm pronation 0     Movement without tremor 0     Cylindrical grasp 0     Finger mass extension (relaxation of flexion) 0     Scapular elevation 1     Finger mass flexion 0     Shoulder adduction with internal rotation 0     Elbow flexion 0     Total Score (Max = 60) 2           Medical Necessity:   · Patient demonstrates good rehab potential due to higher previous functional level. Reason for Services/Other Comments:  · Patient continues to require skilled intervention due to decreased functional independence s/p CVA impacting overall quality of life. Clinical Decision-Making Assessment:     Assessment process, impact of co-morbidities, assessment modification\need for assistance, and selection of interventions: Analytical Complexity:HIGH COMPLEXITY   TREATMENT:   (In addition to Assessment/Re-Assessment sessions the following treatments were rendered)    Pre-treatment Symptoms/Complaints:    Pain: Initial:    Post Session: 0/10      Manual Therapy (0): Pt received leuko rigid taping along deltoid and supraspinatus to address support for soft tissue pain in R shoulder.  Pt then taped with guidance of humeral head posteriorly. Pt reports no complaints. Therapeutic Exercise: (0 minutes):  Exercises per grid below to improve mobility. Required maximal manual and tactile cues to promote proper body alignment, promote proper body posture and promote proper body mechanics. Progressed repetitions and complexity of movement as indicated. Date:  10/30/17 Date:  11/1/17 Date:     Activity/Exercise Parameters Parameters Parameters   External Rotation  10-15 reps with glenohumeral mobilizations; 15-20 reps with dowel with minimal tactile cues  1)10-15 reps with glenohumeral mobilizations (supine)  2) 10 reps SROM with dowel and moderate tactile cues  3) 10 reps from side-lying position     Shoulder Flexion  10 reps tolerated to 130 ; SROM x 5 reps with addditional time 10 reps with dowel to ~90 degrees with minimal assistance in supine     Scapular Press 10 reps supine with assistance of L UE     Shoulder abduction  10 reps with glenohumeral mobilization with minimal c/o pain to ~130 Attempted 2-3 reps but too painful today     Horizontal Abduction/add 5-8 reps with pain with horizontal abd     Scapular Mobilizations  AAROM in sidelying for scapular elevation/depression with maximal tactile cues     Backward shoulder rolls   5-8 reps with moderate assistance with cues to press down into depression        Neuromuscular Re-education: ( 38 minutes):  Exercise/activities per grid below to improve kinesthetic sense and proprioception. Required minimal visual, verbal, manual and tactile cues to promote motor control of right, upper extremity(s).     Date:  10/28/17 Date:  10/30/17 Date:  11/1/17 Date:  11/3/17   Activity/Exercise       Supine to Sit  Supervision with minimal verbal cues      Stimulation combined with attempts to activate movement     1) shoulder internal/external rotators x 5 minutes  2) Shoulder abductors x 5 minutes  3) Wrist extensors x 5 minutes  4) Finger extensors x 5 minutes    Towel Glides Pt worked on activating movement at table in paddle and on washcloth pressing forward with moderate guide x 5-8 reps and in circumduction at table for 5-8 reps with additional time and minimal guidance   Moving to the side and forwards with minimal active movement and maximal assistance to guide arm forward    Forward reach    Side-lying working with Darlene technique with cues to assist with movement at the shoulder into forward reach with cues to assist with scapular motion x 10 reps    Weightbearing  Pt completed weightbearing into the R UE with arm fully extended x10 reps with pt tolerating elbow extension f 10-15 reps with prop to decrease wrist pain with ability to rotate shoulder out and tolerated with supervision 1) sitting and weightbearing into elbow with minimal to moderate verbal cues for trunk positioning  2) Weightbearing with prop under R hand/wrist x 10 reps with moderate assistance at the elbow  3) Standing at table and reaching to R of midline while weightbearing into R UE x10-15 reps     Tone Reduction into hand Pt received krueger stretching combined with metacarpal mobilizations to R hand to decrease pain/tone; Gentle distraction to digits at the PIP joints with some pain reported to promote finger extension; Placement in paddle with pt tolerating well  Pt completed krueger stretching and finger extension with mobilizations at PIP joints  Krueger stretching with metacarpal mobilizations and stretching of digits into abduction (painful today)    Prone on Elbows   10 reps shfft weight side to side with pain in R shoulder      Pectoral glides  NDT facilitation x 15 reps      Elbow Flexion   Movement in synergy blocking scapular elevation x 5 reps with fatigue     Guided Movement   1) Forward flexion with elbow extension and wrist extension x 10 reps with total assistance  2) 2 sets x 5 guided movement to grasp and release cups with total assistance    Shoulder abduction Placing R hand in weightbearing position from side-lying and rotating arm from adduction to abduction with maximal facilitaiton   Shoulder Rotational Movement     Pt completed combined with wrist stretch with rotational movement from internal to external ROM             Treatment/Session Assessment:    · Response to Treatment:  Patient brought in massage/electrical stimulation machine from home. Pt was educated on appropriate pad placement for anterior/posterior shoulder, medial arm, and several placements along the forearm. Pt got excellent activation at the shoulder and especially at the wrist/hand/thumb. Pt educated on use of machine for HEP building up to 80 minutes per day. Pt also tolerated neuromuscular re-education for shoulder rotation and wrist mobilization. Pt with no c/o pain in the R arm by end of session. Pt to continue per plan of care. · Compliance with Program/Exercises: Will assess as treatment progresses. · Recommendations/Intent for next treatment session: \"Next visit will focus on advancements to more challenging activities and reduction in assistance provided\".   Total Treatment Duration:OT Patient Time In/Time Out  Time In: 7866  Time Out: Amanda Zhegn [address], OT

## 2017-11-05 PROBLEM — R53.1 WEAKNESS OF RIGHT SIDE OF BODY: Status: RESOLVED | Noted: 2017-08-03 | Resolved: 2017-11-05

## 2017-11-06 ENCOUNTER — HOSPITAL ENCOUNTER (OUTPATIENT)
Dept: PHYSICAL THERAPY | Age: 42
Discharge: HOME | End: 2017-11-06
Payer: COMMERCIAL

## 2017-11-06 PROCEDURE — 97112 NEUROMUSCULAR REEDUCATION: CPT

## 2017-11-06 PROCEDURE — 97530 THERAPEUTIC ACTIVITIES: CPT

## 2017-11-06 PROCEDURE — 97110 THERAPEUTIC EXERCISES: CPT

## 2017-11-06 NOTE — PROGRESS NOTES
Zoraida pitt  : 1975 [facility]  [address], [address] [facility], [address]  [phone]   [phone]         OUTPATIENT OCCUPATIONAL THERAPY: Daily Note 2017    ICD-10: Treatment Diagnosis:   Hemiplegia and hemiparesis following cerebral infarction affecting right dominant side (I69.351)  Precautions/Allergies:   Erythromycin   Fall Risk Score: 3 (? 5 = High Risk)  MD Orders: OT evaluate and treat  MEDICAL/REFERRING DIAGNOSIS:   Stroke (cerebrum) ([facility] [address] [I63.9]   DATE OF ONSET: 2017   REFERRING PHYSICIAN: Kera Gutierrez*  RETURN PHYSICIAN APPOINTMENT: 2017     INITIAL ASSESSMENT:  Ms. Demi pitt has been seen by OT for a total of 7 visits. Pt reports she has been using saebo flex brace for R UE, but reports pain after prolonged wear. Pt has now started alternating between resting hand splint and saebo flex brace with decreased pain. Pt reports severe pain with attempts to passively extend digits. Pt is demonstrating some increased tone in the R UE. Pt reports pain in R shoulder but no presence of subluxation. Pt has been working on weightbearing to help improve strength, function, and decrease tone in the R dominant UE. Pt demonstrates good progress with scapular strength. Pt also has some activation at the elbow flexors/shoulder abductors with movement in flexor synergy. Pt making slow progress and is 10-15 minutes late to most all appointments which limits progress as well. Pt does report decreased pain and improved positioning of R UE after treatment. Pt to continue per plan of care. PLAN OF CARE:   PROBLEM LIST:  1. Decreased Strength  2. Decreased ADL/Functional Activities  3. Decreased Transfer Abilities  4. Decreased Ambulation Ability/Technique  5. Decreased Balance  6. Increased Pain  7. Decreased Activity Tolerance  8. Decreased Flexibility/Joint Mobility  9.  Decreased [place] with Home Exercise Program INTERVENTIONS PLANNED:  1. Activities of daily living training  2. Adaptive equipment training  3. Balance training  4. Clothing management  5. Donning&doffing training  6. Manual therapy training  7. Modalities  8. Neuromuscular re-eduation  9. Splinting  10. Therapeutic activity  11. Therapeutic exercise   TREATMENT PLAN:  Effective Dates: 9/11/17 TO 12/11/17. Frequency/Duration: 2 times a week for 12 weeks  GOALS: (Goals have been discussed and agreed upon with patient.)  Short-Term Functional Goals: Time Frame:4- 6 weeks  1. Pipo Stauffer will report decreased pain in the R shoulder from 5/10 to 3/10 with passive movement to decrease stiffness for daily activity. CONTINUE  2. Pipo Stauffer will be 100% compliant with daily SROM exercises for improving R UE ROM to decrease risk for contractures. CONTINUE  3. Pipo Stauffer will demonstrate ability to complete weightbearing activities in R UE with moderate facilitation to improve functional use for transfers. MET  4. Pipo Stauffer will complete basic ADL with supervision to improve independence with functional tasks. CONTINUE  5. Pipo Stauffer will tolerate 15 minutes of dynamic standing balance activity with CGA to decrease risk for falls with ADL and to improve activity tolerance. NOT ADDRESSED  6. Discharge Goals: Time Frame: 8-12 weeks   1. Pipo Stauffer will have functional PROM of R shoulder to [facility] to decrease pain and stiffness in R dominant UE. CONTINUE  2. Pipo Stauffer will demonstrate improved strength in the R dominant UE as evidenced by at least 2+ to 3-/5 strength for using R UE as functional assist. CONTINUE  3. Pipo Stauffer will demonstrate ability to functionally grasp and release light objects with supervision and with use of R dominant hand. CONTINUE  4. Pipo Stauffer will be modified independent with all basic ADL to improve overall quality of life. CONTINUE  5.  Pipo Stauffer will use R hand as functional assist with daily activities/transfers with supervision to improve independence with ADL. CONTINUE   Rehabilitation Potential For Stated Goals: Good  Regarding [place] therapy, I certify that the treatment plan above will be carried out by a therapist or under their direction. Thank you for this referral,  Allen Cruz OT                 The information in this section was collected on 9/11/17 (except where otherwise noted). OCCUPATIONAL PROFILE & HISTORY:   History of Present Injury/Illness (Reason for Referral):  Pt reports she was at work when she started feeling like she was going to pass out. Pt reports she didn't really stop working and the feeling wouldn't go away. It started at 2pm and left work at 3:30 pm. Pt felt like she was going to fall and pushed a buggy to her car and drove to her daughter's house. Pt then was brought over to the ER at [address] [place] [address]. Progressively got worse with R sided weakness and woke up the next morning with full parlaysis of the R side. Reports her eyesight was decreasing and needing reading glasses. R eye is a little worse with the blurry vision. Pt wears to reading glasses. Pt using a wheelchair as less as possible. Using wheelchair when out and about. Pt using a quad cane around the home and has AFO to R LE. Tub transfer bench with CGA. Eats at the kitchen table. Pt stayed from Aug 8th- September 6th in inpatient rehab. Pt reports no falls at home. Pt getting dressed using techniques learned for inpatient rehab. Difficulty walking and carrying objects. Pt has some issues with Zanaflex and incontinence (she is unsure if that is related). Pt presents today for outpatient occupational therapy services. Past Medical History/Comorbidities:   Ms. Mic Wilkins  has a past medical history of Generalized anxiety disorder; GERD (gastroesophageal reflux disease); Hypertension; Insomnia, unspecified; Iron deficiency anemia (8/23/2013); Left sided lacunar stroke ([facility] [address] (08/2017);  Mitral valve prolapse (1993); Obesity (BMI 30-39.9); RADHA on CPAP (10/2017); and Prediabetes (7/28/2015). Ms. Demi pitt  has a past surgical history that includes tubal ligation (1995) and endoscopy (10/1/04). Social History/Living Environment:   Lives with mom with her  because toilet/tub is lower with more open spaces. Pt also has a son. Pt has 24 hr supervision. Prior Level of Function/Work/Activity: Indpendent and working full time in [employer] at [email or web address] .   Dominant Side:         RIGHTPersonal Factors: Other factors that influence how disability is experienced by the patient:  Hx of anxiety disorder, HTN, Pre-diabetic    Current Medications:    Current Outpatient Prescriptions:     oxybutynin chloride XL (DITROPAN XL) 10 mg CR tablet, Take 1 Tab by mouth daily. Indications: URINARY URGE INCONTINENCE, Disp: 30 Tab, Rfl: 6    raNITIdine (ZANTAC) 150 mg tablet, Take 1 Tab by mouth two (2) times a day., Disp: 60 Tab, Rfl: 5    diazePAM (VALIUM) 5 mg tablet, Take 0.5 Tabs by mouth every six (6) hours as needed. Max Daily Amount: 10 mg. Indications: MUSCLE SPASM, Disp: 60 Tab, Rfl: 1    tiZANidine (ZANAFLEX) 4 mg tablet, Take 1 Tab by mouth three (3) times daily. , Disp: 90 Tab, Rfl: 6    hydroCHLOROthiazide (MICROZIDE) 12.5 mg capsule, Take 2 Caps by mouth daily. , Disp: 30 Cap, Rfl: 6    potassium chloride SR (K-TAB) 20 mEq tablet, Take 1 Tab by mouth two (2) times a day., Disp: 60 Tab, Rfl: 6    aspirin (ASPIRIN) 325 mg tablet, Take 1 Tab by mouth daily. , Disp: 30 Tab, Rfl: 12    acetaminophen (TYLENOL) 325 mg tablet, Take 2 Tabs by mouth every six (6) hours as needed for Pain or Fever (headache). , Disp: 30 Tab, Rfl: 0    escitalopram oxalate (LEXAPRO) 20 mg tablet, Take 1 Tab by mouth daily. , Disp: 30 Tab, Rfl: 5            Date Last Reviewed:  9/11/17   Complexity Level : Expanded review of therapy/medical records (1-2):  MODERATE COMPLEXITY   ASSESSMENT OF OCCUPATIONAL PERFORMANCE: Palpation:  Patient with no palpated subluxation of the R shoulder at this time. Increased pain and tightness with R shoulder flexion/abduction with passive ROM above 90 degrees. Pain along anterior portion of humeral head with anterior tilt. Pain radiates along the middle deltoid. Modified Maggi Scale     Grade Description : Noted in ELBOW FLEXORS (1)  WRIST FLEXORS(2) , FINGER FLEXORS (1+)  []0 - No increase in muscle tone  []1 - Slight increase in muscle tone, manifested by a catch and release, or by minimal resistance at the end of the range of motion when the affected part(s) is moved in flexion or extension  [x]1+ - Slight increase in muscle tone, manifested by a catch, followed by minimal resistance throughout the remainder (less than half) of the range of movement (ROM)  []2 - More marked increase in muscle tone through most of ROM, but affected part(s) easily moved  []3 - Considerable increase in muscle tone, passive movement difficult  []4 - Affected part(s) rigid in flexion and extension      ROM/Strength:  L UE WNL; No functional AROM of R UE.            Date:  9/11/17  Date:  9/11/17 Date:  10/25/17 Date:  10/25/17    PROM  STRENGTH PROM Strength   Movement RIGHT  RIGHT Right Right    SCAPULA:        Elevation  Limited  2 limited 2   Protraction  Limited  2+ limited 2+   Retraction  Limited   2 WFL 3   SHOULDER:        Flexion  100  0 115 PROM    Abduction  90  1 20-25 AROM  2   External rotation  70  0 30 PROM 0   Internal rotation  WNL  0 WFL PROM 0   Horizontal abduction 100  0  0   Horizontal adduction  WFL  0  0   Extension         ELBOW:        Flexion  WNL  0 75 AROM 2   Extension  WNL  0     FOREARM:        Supination  WFL  0  0   Pronation  WFL  0  0   WRIST:        Wrist flexion  65  0 55 PROM 0   Wrist extension  65  0 55 PROM 0   Wrist radial deviation         Wrist ulnar deviation         HAND:        Finger flexion WFL  0 WFL 0   Finger extension WFL  0 WFL (pain) 0   THUMB: Abduction  WFL  0 WFL 0    Extension WFL  0 WFL 0    Adduction     WFL 0    MCP flexion  WFL  0      IP flexion  WFL  0     Hand Strength:         unable  0     THREE JAW KARY PINCH unable  0     LATERAL PINCH  unable  0       Functional Mobility:         Gait/Ambulation:  CGA to supervision with quad cane; Uses wheelchair occasionally         Bed Mobility:  CGA  Balance:          Good static standing balance but fair to poor dynamic standing balance  Coordination:          Non-functional in R dominant UE  Mental Status: WNL  Vision:          Reading glasses. Reports some blurry vision in R eye. Tracking and visual fields intact. Some decreased speed with saccadic movement to the R. Activities of Daily Living:           Basic ADLs (From Assessment) Complex ADLs (From Assessment)         Grooming/Bathing/Dressing Activities of Daily Living                                   Sensation:         Grossly intact to light touch in B UE. R hand with some decreased light touch sensation. Reports some hypersensitivity to cold. Physical Skills Involved:  1. Range of Motion  2. Balance  3. Strength  4. Activity Tolerance  5. Sensation  6. Fine Motor Control  7. Gross Motor Control  8. Pain (acute) Cognitive Skills Affected (resulting in the inability to perform in a timely and safe manner):  1. WNL Psychosocial Skills Affected:  1. Anxiety Disorder (per chart review)   Number of elements that affect the Plan of Care: 5+:  HIGH COMPLEXITY   CLINICAL DECISION MAKING:   Outcome Measure: Tool Used: Fugl-Goodman Upper Extremity Motor Assessment  Score:  Initial: 8/66 Most Recent: X (Date: -- )   Interpretation of Score: Outcome Measure Conversion Site    ?  Carrying, Moving, and Handling Objects:     - CURRENT STATUS: CM - 80%-99% impaired, limited or restricted    - GOAL STATUS: CK - 40%-59% impaired, limited or restricted    - D/C STATUS:  ---------------To be determined--------------- Fugl-Goodman Upper Extremity Motor Assessment (without reflexes) Date:  9/11 Date:   Date:     Item Description Score Score Score   Wrist circumduction 0     Hook grasp 0     Shoulder flexion to 180°, elbow extended 0     Spherical grasp 0     Lateral prehension 0     Wrist flexion/extension, elbow extended 0     Pronation-supination, elbow extended 0     Wrist stable, elbow extended 0     Movement with normal speed 0     Forearm supination 0     Shoulder abduction to 90°, elbow extended 0     Movement without dysmetria 0     Shoulder external rotation 0     Wrist stable, elbow at 90° 0     Wrist flexion/extension, elbow at 90° 0     Palmar prehension 0     Scapular retraction 1     Pronation-supination, elbow at 90° 0     Shoulder flexion to 90°, elbow extended 0     Hand to lumbar spine 0     Shoulder abduction 0     Elbow extension 0     Forearm pronation 0     Movement without tremor 0     Cylindrical grasp 0     Finger mass extension (relaxation of flexion) 0     Scapular elevation 1     Finger mass flexion 0     Shoulder adduction with internal rotation 0     Elbow flexion 0     Total Score (Max = 60) 2           Medical Necessity:   · Patient demonstrates good rehab potential due to higher previous functional level. Reason for Services/Other Comments:  · Patient continues to require skilled intervention due to decreased functional independence s/p CVA impacting overall quality of life. Clinical Decision-Making Assessment:     Assessment process, impact of co-morbidities, assessment modification\need for assistance, and selection of interventions: Analytical Complexity:HIGH COMPLEXITY   TREATMENT:   (In addition to Assessment/Re-Assessment sessions the following treatments were rendered)    Pre-treatment Symptoms/Complaints:    Pain: Initial:    Post Session: 0/10      Manual Therapy (0): Pt received leuko rigid taping along deltoid and supraspinatus to address support for soft tissue pain in R shoulder.  Pt then taped with guidance of humeral head posteriorly. Pt reports no complaints. Therapeutic Exercise: (0 minutes):  Exercises per grid below to improve mobility. Required maximal manual and tactile cues to promote proper body alignment, promote proper body posture and promote proper body mechanics. Progressed repetitions and complexity of movement as indicated. Date:  10/30/17 Date:  11/1/17 Date:  11/6/17   Activity/Exercise Parameters Parameters Parameters   External Rotation  10-15 reps with glenohumeral mobilizations; 15-20 reps with dowel with minimal tactile cues  1)10-15 reps with glenohumeral mobilizations (supine)  2) 10 reps SROM with dowel and moderate tactile cues  3) 10 reps from side-lying position  Pt completed x 15-10 reps from supine position with glenohumeral mobilizations    Shoulder Flexion  10 reps tolerated to 130 ; SROM x 5 reps with addditional time 10 reps with dowel to ~90 degrees with minimal assistance in supine     Scapular Press 10 reps supine with assistance of L UE     Shoulder abduction  10 reps with glenohumeral mobilization with minimal c/o pain to ~130 Attempted 2-3 reps but too painful today  8-10 reps to ~80 degrees with inferior glide and less reports of pain    Horizontal Abduction/add 5-8 reps with pain with horizontal abd     Scapular Mobilizations  AAROM in sidelying for scapular elevation/depression with maximal tactile cues  Scapular depression from side-lying x 10-15 reps    Backward shoulder rolls   5-8 reps with moderate assistance with cues to press down into depression        Neuromuscular Re-education: ( 20 minutes):  Exercise/activities per grid below to improve kinesthetic sense and proprioception. Required minimal visual, verbal, manual and tactile cues to promote motor control of right, upper extremity(s).     Date:  10/28/17 Date:  10/30/17 Date:  11/1/17 Date:  11/3/17 Date:  11/6/17   Activity/Exercise        Supine to Sit  Supervision with minimal verbal cues       Stimulation combined with attempts to activate movement     1) shoulder internal/external rotators x 5 minutes  2) Shoulder abductors x 5 minutes  3) Wrist extensors x 5 minutes  4) Finger extensors x 5 minutes     Aleksey Basurto Pt worked on activating movement at table in paddle and on washcloth pressing forward with moderate guide x 5-8 reps and in circumduction at table for 5-8 reps with additional time and minimal guidance   Moving to the side and forwards with minimal active movement and maximal assistance to guide arm forward     Arm Slides      Pt completed in side-lying on L side;  1) 10 reps working on scapular movement and maximal facilitation reaching forward  2) Arm slides with palms up position x 10 reps with maximal facilitation  3) Arm slides along thumb position with maximal facilitation x 10 reps    Forward reach    Side-lying working with Darlene technique with cues to assist with movement at the shoulder into forward reach with cues to assist with scapular motion x 10 reps  Sitting for 3-5 reps with maximal facilitation (decreased ability to press through the R hand)   Weightbearing  Pt completed weightbearing into the R UE with arm fully extended x10 reps with pt tolerating elbow extension f 10-15 reps with prop to decrease wrist pain with ability to rotate shoulder out and tolerated with supervision 1) sitting and weightbearing into elbow with minimal to moderate verbal cues for trunk positioning  2) Weightbearing with prop under R hand/wrist x 10 reps with moderate assistance at the elbow  3) Standing at table and reaching to R of midline while weightbearing into R UE x10-15 reps   1) Sitting and weightbearing into elbow with minimal to moderate verbal cues for trunk positioning  2) Weightbearing with prop under R hand/wrist x 10 reps and maximal assistance under the wrist    Tone Reduction into hand Pt received krueger stretching combined with metacarpal mobilizations to R hand to decrease pain/tone; Gentle distraction to digits at the PIP joints with some pain reported to promote finger extension; Placement in paddle with pt tolerating well  Pt completed krueger stretching and finger extension with mobilizations at PIP joints  Krueger stretching with metacarpal mobilizations and stretching of digits into abduction (painful today)     Prone on Elbows   10 reps shfft weight side to side with pain in R shoulder       Pectoral glides  NDT facilitation x 15 reps       Elbow Flexion   Movement in synergy blocking scapular elevation x 5 reps with fatigue      Guided Movement   1) Forward flexion with elbow extension and wrist extension x 10 reps with total assistance  2) 2 sets x 5 guided movement to grasp and release cups with total assistance     Shoulder abduction      Placing R hand in weightbearing position from side-lying and rotating arm from adduction to abduction with maximal facilitaiton Placing R hand in weightbearing position from side-lying and rotating arm from adduction to abduction with maximal facilitation x 10 reps    Shoulder Rotational Movement     Pt completed combined with wrist stretch with rotational movement from internal to external ROM    PNF     Total facilitation to within tolerable ROM for 10 reps in D1/D2 flexion/extension              Treatment/Session Assessment:    · Response to Treatment: Patient was again 15 minutes late to appointment today but typically does not acknowledge that she is late. Patient reports significant pain in shoulder with attempts to move shoulder with facilitation but afterwards stated her R arm felt much better. Limited time during today's session with pt making slow progress overall. Pt to continue per plan of care. · Compliance with Program/Exercises: Will assess as treatment progresses. · Recommendations/Intent for next treatment session:  \"Next visit will focus on advancements to more challenging activities and reduction in assistance provided\".   Total Treatment Duration:OT Patient Time In/Time Out  Time In: 0945  Time Out: [address], OT

## 2017-11-06 NOTE — PROGRESS NOTES
Zoraida Austin  : 1975  Payor: Wander Muniz / Plan: [address] / Product Type: PPO /  [address] at [address] [facility] [address], [address] [facility], [place] [address] [address] [facility]  [phone]   [phone]           OUTPATIENT PHYSICAL THERAPY:Daily Note 2017   ICD-10: Treatment Diagnosis: Difficulty in walking, not elsewhere classified (R26.2); Hemiplegia and hemiparesis following cerebral infarction affecting right dominant side (J06.008)  Precautions/Allergies:   Erythromycin   Fall Risk Score: 0 (? 5 = High Risk)  MD Orders: PT eval MEDICAL/REFERRING DIAGNOSIS:  Stroke (cerebrum) ([facility] [address] [I63.9]   DATE OF ONSET: 8/3/17  REFERRING PHYSICIAN: Kera Mixon*  RETURN PHYSICIAN APPOINTMENT: unknown  DATE OF PROGRESS NOTE:    RECERTIFICATION DATE:      PROGRESS NOTE:  Pt has attended 10 physical therapy sessions from 9/15/17 to date including initial assessment. Sessions consist of range of motion, therapeutic activity, therapeutic exercise, balance and gait refinement. Pt has shown improvement in balance and gait ability. Pt has increased Perez Balance Score by 2 points indicating improved static standing balance and decreased risk for fall. Pt has decreased TUG time by over 14 seconds with her cane and almost 22 seconds without her cane indicating more normalized walking pattern. Her gait pattern has improved but she does not take normal step length on the left because she is trying to keep her right knee from hyperextending in stance on the right with her AFO on. I feel the AFO may be a little short so I placed a heel lift in her right shoe with further improvement in her TUG time by 3 more seconds for an overall increase of 17 second improvement with her cane. We will have [facility] look at her AFO to see if it needs adjustments. Pt will continue to benefit from physical therapy for at least 4 more weeks to maximize functional ability. INITIAL ASSESSMENT:  Ms. Roque Sos presents with dominant right hemiplegia s/p CVA on 8/3/17. Pt presents with ADL, balance and gait deficits. Pt will benefit from physical therapy to maximize to full potential and safety with all functional mobility. PROBLEM LIST (Impacting functional limitations):  1. Decreased Strength  2. Decreased ADL/Functional Activities  3. Decreased Ambulation Ability/Technique  4. Decreased Balance  5. Increased Pain  6. Decreased Activity Tolerance  7. Decreased Flexibility/Joint Mobility  8. Decreased [place] with Home Exercise Program INTERVENTIONS PLANNED:  1. Balance Exercise  2. Bed Mobility  3. Family Education  4. Gait Training  5. Home Exercise Program (HEP)  6. Neuromuscular Re-education/Strengthening  7. Range of Motion (ROM)  8. Therapeutic Activites  9. Therapeutic Exercise/Strengthening  10. Transfer Training  11. modalities   TREATMENT PLAN:  Effective Dates: 9/22/17 TO 12/15/17. Frequency/Duration: 3 times a week for 8 weeks  GOALS: (Goals have been discussed and agreed upon with patient.)  Short-Term Functional Goals: Time Frame: 4 weeks  1. Pt will be independent with range of motion, strength and balance home exercise program.  STATUS:  MET  2. Pt will decrease TUG score by 5 seconds indicating more normalized and safer gait pattern. STATUS:  MET  Discharge Goals: Time Frame: 8 weeks  Pt will show increased range of motion and improved posture to allow for improved safety and ability with all functional mobility. .  STATUS:  PROGRESSING, CONTINUE 4 WEEKS. Pt will decrease TUG score by 10 seconds indicating more normalized gait pattern and decrease risk for falls. STATUS:  MET  NEW GOAL:  Pt will decrease TUG score by 5 more seconds indicating more normalized gait pattern and decrease risk for falls. NEW GOAL  Pt will increase Perez Balance Scale to 49/56 indicating increased balance and decreased risk for falls.   STATUS:  NOT MET, CONT 4 WEEKS.    Rehabilitation Potential For Stated Goals: Good  Regarding [place] therapy, I certify that the treatment plan above will be carried out by a therapist or under their direction. Thank you for this referral,  Blanca Pavon, PT                  HISTORY:   GOAL:  Love to be back to doing things independenlty. Would like to get to where I don't have to use the cane. Get back to my own home. 11/6/2017  I definitely cant go back to work right now. Its slow progress I definitely cna;t drive at the [employer]. I have gotten stornger as far as my blance and in my leg. More ocmrortalble doing suff around the kitchen . I can take a dish and put it in the sink and rinse it off. I keep it near me in sonia I get tired. I will move aorund owthout it. Present Symptoms:    38year old  right handed female s/p left CVA 8/3/17 2* to HTN. Short term disability for now. 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 Very physical job. More sued to my left hand. Learning how to function with one hand is difficult. ADLs with just my left hand is difficult. Dynamic sitting balance. I just have to be careluf that my foot is flat especially if I have my shoes off. Tire easily. Buildig up strength. Large base quad cane but not all the time at home. Can't carry a heavy load. PAIN:  Right fingers and shoulder with slight movement. 6/10  History of Present Injury/Illness (Reason for Referral):  See above  Past Medical History/Comorbidities:   Ms. Tiki Lugo  has a past medical history of Generalized anxiety disorder; GERD (gastroesophageal reflux disease); Hypertension; Insomnia, unspecified; Iron deficiency anemia (8/23/2013); Left sided lacunar stroke ([facility] [address] (08/2017); Mitral valve prolapse (1993); Obesity (BMI 30-39.9); RADHA on CPAP (10/2017); and Prediabetes (7/28/2015). Ms. Tiki Lugo  has a past surgical history that includes tubal ligation (1995) and endoscopy (10/1/04).   Social History/Living Environment:     25and 25year old son and daughter. . Prior Level of Function/Work/Activity:  Worked in [employer] department. 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 One story - Own home:  About 5 steps to get in with bilateral railing. Staying at [facility] house which is easier to maneuver - low tub - can walk right in. Small wheelchair ramp and then level. Mom helps with driving and household duties - cooking. Dominant Side:         RIGHT    Current Medications:    Current Outpatient Prescriptions:     oxybutynin chloride XL (DITROPAN XL) 10 mg CR tablet, Take 1 Tab by mouth daily. Indications: URINARY URGE INCONTINENCE, Disp: 30 Tab, Rfl: 6    raNITIdine (ZANTAC) 150 mg tablet, Take 1 Tab by mouth two (2) times a day., Disp: 60 Tab, Rfl: 5    diazePAM (VALIUM) 5 mg tablet, Take 0.5 Tabs by mouth every six (6) hours as needed. Max Daily Amount: 10 mg. Indications: MUSCLE SPASM, Disp: 60 Tab, Rfl: 1    tiZANidine (ZANAFLEX) 4 mg tablet, Take 1 Tab by mouth three (3) times daily. , Disp: 90 Tab, Rfl: 6    hydroCHLOROthiazide (MICROZIDE) 12.5 mg capsule, Take 2 Caps by mouth daily. , Disp: 30 Cap, Rfl: 6    potassium chloride SR (K-TAB) 20 mEq tablet, Take 1 Tab by mouth two (2) times a day., Disp: 60 Tab, Rfl: 6    aspirin (ASPIRIN) 325 mg tablet, Take 1 Tab by mouth daily. , Disp: 30 Tab, Rfl: 12    acetaminophen (TYLENOL) 325 mg tablet, Take 2 Tabs by mouth every six (6) hours as needed for Pain or Fever (headache). , Disp: 30 Tab, Rfl: 0    escitalopram oxalate (LEXAPRO) 20 mg tablet, Take 1 Tab by mouth daily. , Disp: 30 Tab, Rfl: 5   Date Last Reviewed:  11/6/2017     Number of Personal Factors/Comorbidities that affect the Plan of Care: 1-2: MODERATE COMPLEXITY   EXAMINATION:   ROM:          Painful right shoulder and finger and hands. Shoulder not fully assessed. Seeing OT. Passive Fingers to neutral extension at least.  No overpressure given. Elbow slight decrease as end ranges.   Supination ~75% with mild pain.  Right lower extremity WFL  Minimal pain in my right shoulder. OT has taped it and that helps. Left WFL  Strength:          Right UE:  Almost no active movement noted. Possible scap stabilization and tone is not flaccid. Right hip flexion:  3-/5  Knee extension at least 4/5  Nothing in DF. Functional Mobility:         Gait/Ambulation:  Ambulates with large base quad came with slow small steps with custom hinged AFO on. Transfers:  Independent. occ no WBing on the right with sit to stand        Bed Mobility:  NT  Mental Status:          Alert and oriented x 4;  Pleasant and appropriate  Vision:          No deficits reported. Body Structures Involved:  1. Nerves  2. Bones  3. Joints  4. Muscles  5. Ligaments Body Functions Affected:  1. Sensory/Pain  2. Neuromusculoskeletal  3. Movement Related Activities and Participation Affected:  1. Learning and Applying Knowledge  2. General Tasks and Demands  3. Mobility  4. Self Care  5. Domestic Life  6. Interpersonal Interactions and Relationships  7. Community, Social and [place]   Number of elements that affect the Plan of Care: 4+: HIGH COMPLEXITY   CLINICAL PRESENTATION:   Presentation: Evolving clinical presentation with changing clinical characteristics: MODERATE COMPLEXITY   CLINICAL DECISION MAKING:   Outcome Measure: Tool Used: Perez Balance Scale  Score:  Initial: 46/56 Most Recent: 48/56 (Date: -- )   Interpretation of Score: Each section is scored on a 0-4 scale, 0 representing the patients inability to perform the task and 4 representing independence. The scores of each section are added together for a total score of 56. The higher the patients score, the more independent the patient is. Any score below 45 indicates increased risk for falls.     Score 56 55-45 44-34 33-23 22-12 11-1 0   Modifier CH CI CJ CK CL CM CN       Tool Used: Timed Up and Go (TUG)  Score:  Initial: with cane:  45.24 seconds; without cane 52.01 seconds Most Recent: 31.05 seconds with cane:  30.05 without cane  With 8 mm heel lift in shoe with cane:  27.85 (Date: 10/27/17)   Interpretation of Score: The test measures, in seconds, the time taken by an individual to stand up from a standard arm chair (seat height 46 cm [18 in], arm height 65 cm [25.6 in]), walk a distance of 3 meters (118 in, approx 10 ft), turn, walk back to the chair and sit down. If the individual takes longer than 14 seconds to complete TUG, this indicates risk for falls. Score 7 7.5-10.5 11-14 14.5-17.5 18-21 21.5-24.5 25+   Modifier CH CI CJ CK CL CM CN     Medical Necessity:   · Skilled intervention continues to be required due to residual right sided hemiparesis. Reason for Services/Other Comments:  · Patient continues to require skilled intervention due to residual right hemiparesis. .   Use of outcome tool(s) and clinical judgement create a POC that gives a: Questionable prediction of patient's progress: MODERATE COMPLEXITY      S:   The roll under my arm helped a lot. We used a shirt of mine to make it a little longer and that worked well. I feel like I have improved with not limping any more. That has improved. Calling Josep today to see about my brace. Went up a long flight of steps to my daughters 2nd floor. That was an accomplishment. TREATMENT:   (In addition to Assessment/Re-Assessment sessions the following treatments were rendered)     estim unit  Shoulder  Arm slides  NEUROMUSCULAR RE-EDUCATION: ( 40 minutes):  Exercise/activities per grid below to improve balance, coordination, kinesthetic sense, proprioception and motor control. Required maximal manual, tactile and facilitation cues to perfrom activities. Date  10/11/17 Date:  10/20/17 Date:  11/1/17   Activity/Exercise  Parameters  Parameters    Right UE tone reduction and sensorimotor retraining for increased arm swing and posture during gait      Seated in chair (pt is short in stature).   Right shoe and brace off  Neuro-JAKE tactile stimulation to R foot to stimulate dorsiflexion x 10 reps  Neuro-JAKE tactile stimulation to R foot to stimulate dorsiflexion x 10 reps   rockerboard under R foot working on small range plantarflexion 3 x 5 reps with facilitation    Kicking soccer ball in bars for tone reduction and improved swing pattern    2 x 10 reps each leg    Facilitated long arc quad      Quad set at end range x 10 reps with 5 second hold. Facilitation needed initially but then patient able to hold contraction     LE tone reduction prior to gait  X 5 minutes in supine to decrease tone and synergy X 5 minutes sitting to foot     Gait retraining With shoes and brace without  AD x 120 feet working on gait pattern, heel strike, terminal knee extension. With brace on and no AD 2 x 50' with verbal cues for R knee extension during stance phase  In the bars working on hip/knee release and stepping pattern x 10 minutes   Then with quad cane working on kicking out and putting weight down with forward momentum    Mini squats on door with therapist facilitating knee extension     2 x 10 reps with good symmetrical control  Then with 1\" block under L foot for increased R weight bearing x 10 reps     Standing in parallel bars, 1\" block under R foot  Therapist assisting with elevating up and then working on slow control on the way day for quad and plantarflexor eccentric control x 10 reps  Therapist assisting with elevating up and then working on slow control on the way day for quad and plantarflexor eccentric control 2 x 10 reps        Therapeutic Exercise: ( 0 minutes):  Exercises per grid below to improve mobility, strength and coordination. Required moderate visual, verbal and manual cues to promote proper body alignment, promote proper body posture and promote proper body breathing techniques. Progressed range and repetitions as indicated.    Date:  10/11/17 Date:  10/20/17 Date:  10/26/17   Activity/Exercise Parameters Parameters Parameters   Piriformis stretch   3 x 30 second hold     Hip capsule stetch   3 x 30 second hold right     Hip flexor stretch   3 x 30 second      Bent knee hip abduction stretch with active adduction   10 reps with 10 second hold     SKTC stretch   3 x 30 second hold right  3 x 30 second hold right   Heel cord and plantar fascia stretch passive X 5 minutes  X 5 minutes   Bridging   2 x 10 reps B     LAQ   4 x 5 reps right  4 x 5 reps right   Right ankle dorsiflexion    During russian e-stim x 10 minutes    Manual facilitation of right dorsiflexors   Working with patient on active dorsiflexion with manual and verbal cues with patient supine       EDUCATION:  Instructed in above exercises and home program.  HEP:  No Written HEP given to patient yet. Instructed to owrk on increase step length on the left. Treatment/Session Assessment:  Patient was able to correct gait pattern with cues. Will assess for carryover at future visits. Patients response to todays treatment session was tolerated well with no medical complications. .  · Post session pain:  No pain  · Compliance with Program/Exercises: Will assess as treatment progresses. · Recommendations/Intent for next treatment session: \"Next visit will focus on advancements to more challenging activities\".   Total Treatment Duration:       Katie Hopkins, PT

## 2017-11-08 ENCOUNTER — HOSPITAL ENCOUNTER (OUTPATIENT)
Dept: PHYSICAL THERAPY | Age: 42
Discharge: HOME | End: 2017-11-08
Payer: COMMERCIAL

## 2017-11-08 NOTE — PROGRESS NOTES
11/8/2017  Patient arrived to PT/OT appointments today, however during PT session pt reports she isn't feeling well (increased blurry vision, nausea, minor dizziness) with elevated BP/HR noted. Pt reports she has had this feeling from time to time since her CVA. Pt educated on further symptoms of CVA and risk factors. Pt advised to 911 in event that symptoms worsen. Pt not seen today due to the above stated. Will follow-up with patient at her next scheduled appointment.    Thank you,  Phoenix Dobbins, OT

## 2017-11-08 NOTE — PROGRESS NOTES
Zoraida Green[place]  : 1975 [facility] at [address] [place] [address] 68, [address] [facility], [address]  [phone]   [phone]          OUTPATIENT DAILY NOTE    NAME/AGE/GENDER: Jennie Schumacher is a 38 y.o. female. DATE: 2017    Attempted to see patient today for PT. Patient arrived stating her vision was blurry, she felt nauseous, and just felt overall weak today. She states she has gotten this way several times since her CVA. Today is no worse than when she has gotten it before. Her BP was 124/102 mmHg and  bpm at rest.  She states that she has been nervous about her liver issues. She was educated on signs and symptoms of a CVA and told to go to the ER is symptoms worsened. No treatment was provided today and the patient was sent home with her .       RONDA MerrittT

## 2017-11-10 ENCOUNTER — HOSPITAL ENCOUNTER (OUTPATIENT)
Dept: PHYSICAL THERAPY | Age: 42
Discharge: HOME | End: 2017-11-10
Payer: COMMERCIAL

## 2017-11-10 PROCEDURE — 97110 THERAPEUTIC EXERCISES: CPT

## 2017-11-10 PROCEDURE — 97112 NEUROMUSCULAR REEDUCATION: CPT

## 2017-11-10 PROCEDURE — 97530 THERAPEUTIC ACTIVITIES: CPT

## 2017-11-10 PROCEDURE — 97164 PT RE-EVAL EST PLAN CARE: CPT

## 2017-11-10 NOTE — PROGRESS NOTES
Zoraida pitt  : 1975 [facility]  [address], [address] [facility] [place], [address]  [phone]   [phone]         OUTPATIENT OCCUPATIONAL THERAPY: Daily Note 2017    ICD-10: Treatment Diagnosis:   Hemiplegia and hemiparesis following cerebral infarction affecting right dominant side (I69.351)  Precautions/Allergies:   Erythromycin   Fall Risk Score: 3 (? 5 = High Risk)  MD Orders: OT evaluate and treat  MEDICAL/REFERRING DIAGNOSIS:   Stroke (cerebrum) ([facility] [address] [I63.9]   DATE OF ONSET: 2017   REFERRING PHYSICIAN: Kera Lopez*  RETURN PHYSICIAN APPOINTMENT: 2017     INITIAL ASSESSMENT:  Ms. Demi pitt has been seen by OT for a total of 7 visits. Pt reports she has been using saebo flex brace for R UE, but reports pain after prolonged wear. Pt has now started alternating between resting hand splint and saebo flex brace with decreased pain. Pt reports severe pain with attempts to passively extend digits. Pt is demonstrating some increased tone in the R UE. Pt reports pain in R shoulder but no presence of subluxation. Pt has been working on weightbearing to help improve strength, function, and decrease tone in the R dominant UE. Pt demonstrates good progress with scapular strength. Pt also has some activation at the elbow flexors/shoulder abductors with movement in flexor synergy. Pt making slow progress and is 10-15 minutes late to most all appointments which limits progress as well. Pt does report decreased pain and improved positioning of R UE after treatment. Pt to continue per plan of care. PLAN OF CARE:   PROBLEM LIST:  1. Decreased Strength  2. Decreased ADL/Functional Activities  3. Decreased Transfer Abilities  4. Decreased Ambulation Ability/Technique  5. Decreased Balance  6. Increased Pain  7. Decreased Activity Tolerance  8. Decreased Flexibility/Joint Mobility  9.  Decreased Wallace with Home Exercise Program INTERVENTIONS PLANNED:  1. Activities of daily living training  2. Adaptive equipment training  3. Balance training  4. Clothing management  5. Donning&doffing training  6. Manual therapy training  7. Modalities  8. Neuromuscular re-eduation  9. Splinting  10. Therapeutic activity  11. Therapeutic exercise   TREATMENT PLAN:  Effective Dates: 9/11/17 TO 12/11/17. Frequency/Duration: 2 times a week for 12 weeks  GOALS: (Goals have been discussed and agreed upon with patient.)  Short-Term Functional Goals: Time Frame:4- 6 weeks  1. Pipo Stauffer will report decreased pain in the R shoulder from 5/10 to 3/10 with passive movement to decrease stiffness for daily activity. CONTINUE  2. Pipo Stauffer will be 100% compliant with daily SROM exercises for improving R UE ROM to decrease risk for contractures. CONTINUE  3. Pipo Stauffer will demonstrate ability to complete weightbearing activities in R UE with moderate facilitation to improve functional use for transfers. MET  4. Pipo Stauffer will complete basic ADL with supervision to improve independence with functional tasks. CONTINUE  5. Pipo Stauffer will tolerate 15 minutes of dynamic standing balance activity with CGA to decrease risk for falls with ADL and to improve activity tolerance. NOT ADDRESSED  6. Discharge Goals: Time Frame: 8-12 weeks   1. Pipo Stauffer will have functional PROM of R shoulder to [facility] to decrease pain and stiffness in R dominant UE. CONTINUE  2. Pipo Stauffer will demonstrate improved strength in the R dominant UE as evidenced by at least 2+ to 3-/5 strength for using R UE as functional assist. CONTINUE  3. Pipo Stauffer will demonstrate ability to functionally grasp and release light objects with supervision and with use of R dominant hand. CONTINUE  4. Pipo Stauffer will be modified independent with all basic ADL to improve overall quality of life. CONTINUE  5.  Pipo Stauffer will use R hand as functional assist with daily activities/transfers with supervision to improve independence with ADL. CONTINUE   Rehabilitation Potential For Stated Goals: Good  Regarding [facility] therapy, I certify that the treatment plan above will be carried out by a therapist or under their direction. Thank you for this referral,  Gerri Craig OT                 The information in this section was collected on 9/11/17 (except where otherwise noted). OCCUPATIONAL PROFILE & HISTORY:   History of Present Injury/Illness (Reason for Referral):  Pt reports she was at work when she started feeling like she was going to pass out. Pt reports she didn't really stop working and the feeling wouldn't go away. It started at 2pm and left work at 3:30 pm. Pt felt like she was going to fall and pushed a buggy to her car and drove to her daughter's house. Pt then was brought over to the ER at [address] [place] [address]. Progressively got worse with R sided weakness and woke up the next morning with full parlaysis of the R side. Reports her eyesight was decreasing and needing reading glasses. R eye is a little worse with the blurry vision. Pt wears to reading glasses. Pt using a wheelchair as less as possible. Using wheelchair when out and about. Pt using a quad cane around the home and has AFO to R LE. Tub transfer bench with CGA. Eats at the kitchen table. Pt stayed from Aug 8th- September 6th in inpatient rehab. Pt reports no falls at home. Pt getting dressed using techniques learned for inpatient rehab. Difficulty walking and carrying objects. Pt has some issues with Zanaflex and incontinence (she is unsure if that is related). Pt presents today for outpatient occupational therapy services. Past Medical History/Comorbidities:   Ms. Rosmery Alegre  has a past medical history of Generalized anxiety disorder; GERD (gastroesophageal reflux disease); Hypertension; Insomnia, unspecified; Iron deficiency anemia (8/23/2013); Left sided lacunar stroke ([facility] [address] (08/2017);  Mitral valve prolapse (1993); Obesity (BMI 30-39.9); RADHA on CPAP (10/2017); and Prediabetes (7/28/2015). Ms. Katina Stern  has a past surgical history that includes tubal ligation (1995) and endoscopy (10/1/04). Social History/Living Environment:   Lives with mom with her  because toilet/tub is lower with more open spaces. Pt also has a son. Pt has 24 hr supervision. Prior Level of Function/Work/Activity: Indpendent and working full time in [employer] at [employer] .   Dominant Side:         RIGHTPersonal Factors: Other factors that influence how disability is experienced by the patient:  Hx of anxiety disorder, HTN, Pre-diabetic    Current Medications:    Current Outpatient Prescriptions:     diazePAM (VALIUM) 5 mg tablet, Take 0.5 Tabs by mouth every six (6) hours as needed. Max Daily Amount: 10 mg. Indications: Muscle Spasm, Disp: 60 Tab, Rfl: 2    tiZANidine (ZANAFLEX) 2 mg tablet, Take 1 Tab by mouth three (3) times daily. Indications: Muscle Spasticity of Cerebral Origin, Disp: 90 Tab, Rfl: 4    oxybutynin chloride XL (DITROPAN XL) 10 mg CR tablet, Take 1 Tab by mouth daily. Indications: URINARY URGE INCONTINENCE, Disp: 30 Tab, Rfl: 6    raNITIdine (ZANTAC) 150 mg tablet, Take 1 Tab by mouth two (2) times a day., Disp: 60 Tab, Rfl: 5    hydroCHLOROthiazide (MICROZIDE) 12.5 mg capsule, Take 2 Caps by mouth daily. , Disp: 30 Cap, Rfl: 6    potassium chloride SR (K-TAB) 20 mEq tablet, Take 1 Tab by mouth two (2) times a day., Disp: 60 Tab, Rfl: 6    aspirin (ASPIRIN) 325 mg tablet, Take 1 Tab by mouth daily. , Disp: 30 Tab, Rfl: 12    acetaminophen (TYLENOL) 325 mg tablet, Take 2 Tabs by mouth every six (6) hours as needed for Pain or Fever (headache). , Disp: 30 Tab, Rfl: 0    escitalopram oxalate (LEXAPRO) 20 mg tablet, Take 1 Tab by mouth daily. , Disp: 30 Tab, Rfl: 5            Date Last Reviewed:  9/11/17   Complexity Level : Expanded review of therapy/medical records (1-2):  MODERATE COMPLEXITY ASSESSMENT OF OCCUPATIONAL PERFORMANCE:     Palpation:  Patient with no palpated subluxation of the R shoulder at this time. Increased pain and tightness with R shoulder flexion/abduction with passive ROM above 90 degrees. Pain along anterior portion of humeral head with anterior tilt. Pain radiates along the middle deltoid. Modified Maggi Scale     Grade Description : Noted in ELBOW FLEXORS (1)  WRIST FLEXORS(2) , FINGER FLEXORS (1+)  []0 - No increase in muscle tone  []1 - Slight increase in muscle tone, manifested by a catch and release, or by minimal resistance at the end of the range of motion when the affected part(s) is moved in flexion or extension  [x]1+ - Slight increase in muscle tone, manifested by a catch, followed by minimal resistance throughout the remainder (less than half) of the range of movement (ROM)  []2 - More marked increase in muscle tone through most of ROM, but affected part(s) easily moved  []3 - Considerable increase in muscle tone, passive movement difficult  []4 - Affected part(s) rigid in flexion and extension      ROM/Strength:  L UE WNL; No functional AROM of R UE.            Date:  9/11/17  Date:  9/11/17 Date:  10/25/17 Date:  10/25/17    PROM  STRENGTH PROM Strength   Movement RIGHT  RIGHT Right Right    SCAPULA:        Elevation  Limited  2 limited 2   Protraction  Limited  2+ limited 2+   Retraction  Limited   2 WFL 3   SHOULDER:        Flexion  100  0 115 PROM    Abduction  90  1 20-25 AROM  2   External rotation  70  0 30 PROM 0   Internal rotation  WNL  0 WFL PROM 0   Horizontal abduction 100  0  0   Horizontal adduction  WFL  0  0   Extension         ELBOW:        Flexion  WNL  0 75 AROM 2   Extension  WNL  0     FOREARM:        Supination  WFL  0  0   Pronation  WFL  0  0   WRIST:        Wrist flexion  65  0 55 PROM 0   Wrist extension  65  0 55 PROM 0   Wrist radial deviation         Wrist ulnar deviation         HAND:        Finger flexion WFL  0 WFL 0   Finger extension WFL  0 WFL (pain) 0   THUMB:         Abduction  WFL  0 WFL 0    Extension WFL  0 WFL 0    Adduction     WFL 0    MCP flexion  WFL  0      IP flexion  WFL  0     Hand Strength:         unable  0     THREE JAW KARY PINCH unable  0     LATERAL PINCH  unable  0       Functional Mobility:         Gait/Ambulation:  CGA to supervision with quad cane; Uses wheelchair occasionally         Bed Mobility:  CGA  Balance:          Good static standing balance but fair to poor dynamic standing balance  Coordination:          Non-functional in R dominant UE  Mental Status: WNL  Vision:          Reading glasses. Reports some blurry vision in R eye. Tracking and visual fields intact. Some decreased speed with saccadic movement to the R. Activities of Daily Living:           Basic ADLs (From Assessment) Complex ADLs (From Assessment)         Grooming/Bathing/Dressing Activities of Daily Living                                   Sensation:         Grossly intact to light touch in B UE. R hand with some decreased light touch sensation. Reports some hypersensitivity to cold. Physical Skills Involved:  1. Range of Motion  2. Balance  3. Strength  4. Activity Tolerance  5. Sensation  6. Fine Motor Control  7. Gross Motor Control  8. Pain (acute) Cognitive Skills Affected (resulting in the inability to perform in a timely and safe manner):  1. WNL Psychosocial Skills Affected:  1. Anxiety Disorder (per chart review)   Number of elements that affect the Plan of Care: 5+:  HIGH COMPLEXITY   CLINICAL DECISION MAKING:   Outcome Measure: Tool Used: Fugl-Goodman Upper Extremity Motor Assessment  Score:  Initial: 8/66 Most Recent: X (Date: -- )   Interpretation of Score: Outcome Measure Conversion Site    ?  Carrying, Moving, and Handling Objects:     - CURRENT STATUS: CM - 80%-99% impaired, limited or restricted    - GOAL STATUS: CK - 40%-59% impaired, limited or restricted    - D/C STATUS:  ---------------To be determined---------------     Fugl-Goodman Upper Extremity Motor Assessment (without reflexes) Date:  9/11 Date:   Date:     Item Description Score Score Score   Wrist circumduction 0     Hook grasp 0     Shoulder flexion to 180°, elbow extended 0     Spherical grasp 0     Lateral prehension 0     Wrist flexion/extension, elbow extended 0     Pronation-supination, elbow extended 0     Wrist stable, elbow extended 0     Movement with normal speed 0     Forearm supination 0     Shoulder abduction to 90°, elbow extended 0     Movement without dysmetria 0     Shoulder external rotation 0     Wrist stable, elbow at 90° 0     Wrist flexion/extension, elbow at 90° 0     Palmar prehension 0     Scapular retraction 1     Pronation-supination, elbow at 90° 0     Shoulder flexion to 90°, elbow extended 0     Hand to lumbar spine 0     Shoulder abduction 0     Elbow extension 0     Forearm pronation 0     Movement without tremor 0     Cylindrical grasp 0     Finger mass extension (relaxation of flexion) 0     Scapular elevation 1     Finger mass flexion 0     Shoulder adduction with internal rotation 0     Elbow flexion 0     Total Score (Max = 60) 2           Medical Necessity:   · Patient demonstrates good rehab potential due to higher previous functional level. Reason for Services/Other Comments:  · Patient continues to require skilled intervention due to decreased functional independence s/p CVA impacting overall quality of life.   Clinical Decision-Making Assessment:     Assessment process, impact of co-morbidities, assessment modification\need for assistance, and selection of interventions: Analytical Complexity:HIGH COMPLEXITY   TREATMENT:   (In addition to Assessment/Re-Assessment sessions the following treatments were rendered)    Pre-treatment Symptoms/Complaints:    Pain: Initial: Pain Intensity 1: 0  Post Session: 0/10      Manual Therapy (0): Pt received leuko rigid taping along deltoid and supraspinatus to address support for soft tissue pain in R shoulder. Pt then taped with guidance of humeral head posteriorly. Pt reports no complaints. Therapeutic Exercise: (20 minutes):  Exercises per grid below to improve mobility. Required maximal manual and tactile cues to promote proper body alignment, promote proper body posture and promote proper body mechanics. Progressed repetitions and complexity of movement as indicated.    Date:  10/30/17 Date:  11/1/17 Date:  11/6/17 Date:  11/10/17   Activity/Exercise Parameters Parameters Parameters    External Rotation  10-15 reps with glenohumeral mobilizations; 15-20 reps with dowel with minimal tactile cues  1)10-15 reps with glenohumeral mobilizations (supine)  2) 10 reps SROM with dowel and moderate tactile cues  3) 10 reps from side-lying position  Pt completed x 15-10 reps from supine position with glenohumeral mobilizations  10-15 reps with shoulder in adduction;  10-15 reps in scaption with arm at 60 degrees   Shoulder Flexion  10 reps tolerated to 130 ; SROM x 5 reps with addditional time 10 reps with dowel to ~90 degrees with minimal assistance in supine      Scapular Press 10 reps supine with assistance of L UE      Shoulder abduction  10 reps with glenohumeral mobilization with minimal c/o pain to ~130 Attempted 2-3 reps but too painful today  8-10 reps to ~80 degrees with inferior glide and less reports of pain     Horizontal Abduction/add 5-8 reps with pain with horizontal abd      Scapular Mobilizations  AAROM in sidelying for scapular elevation/depression with maximal tactile cues  Scapular depression from side-lying x 10-15 reps  Scapular depression from side-lying x 10-15 reps    Backward shoulder rolls   5-8 reps with moderate assistance with cues to press down into depression     Knee slides     10 reps to each side with hands clasped       Neuromuscular Re-education: ( 25 minutes):  Exercise/activities per grid below to improve kinesthetic sense and proprioception. Required minimal visual, verbal, manual and tactile cues to promote motor control of right, upper extremity(s).     Date:  10/28/17 Date:  10/30/17 Date:  11/1/17 Date:  11/3/17 Date:  11/6/17 Date:  11/10/17   Activity/Exercise         Supine to Sit  Supervision with minimal verbal cues        Stimulation combined with attempts to activate movement     1) shoulder internal/external rotators x 5 minutes  2) Shoulder abductors x 5 minutes  3) Wrist extensors x 5 minutes  4) Finger extensors x 5 minutes      Towel Glides Pt worked on activating movement at table in paddle and on washcloth pressing forward with moderate guide x 5-8 reps and in circumduction at table for 5-8 reps with additional time and minimal guidance   Moving to the side and forwards with minimal active movement and maximal assistance to guide arm forward      Arm Slides      Pt completed in side-lying on L side;  1) 10 reps working on scapular movement and maximal facilitation reaching forward  2) Arm slides with palms up position x 10 reps with maximal facilitation  3) Arm slides along thumb position with maximal facilitation x 10 reps  Pt completed in side-lying 10-15 reps with R UE palms up, thumbs down, palms down exercises with facilitation at scapula    Forward reach    Side-lying working with Feldenkrais technique with cues to assist with movement at the shoulder into forward reach with cues to assist with scapular motion x 10 reps  Sitting for 3-5 reps with maximal facilitation (decreased ability to press through the R hand)    Weightbearing  Pt completed weightbearing into the R UE with arm fully extended x10 reps with pt tolerating elbow extension f 10-15 reps with prop to decrease wrist pain with ability to rotate shoulder out and tolerated with supervision 1) sitting and weightbearing into elbow with minimal to moderate verbal cues for trunk positioning  2) Weightbearing with prop under R hand/wrist x 10 reps with moderate assistance at the elbow  3) Standing at table and reaching to R of midline while weightbearing into R UE x10-15 reps   1) Sitting and weightbearing into elbow with minimal to moderate verbal cues for trunk positioning  2) Weightbearing with prop under R hand/wrist x 10 reps and maximal assistance under the wrist  1) Rolling onto R shoulder/side x 5 reps with facilitation of scapula into protaction  2) weightbearing with arm in extension on ball x 10-15 reps    Tone Reduction into hand Pt received krueger stretching combined with metacarpal mobilizations to R hand to decrease pain/tone; Gentle distraction to digits at the PIP joints with some pain reported to promote finger extension; Placement in paddle with pt tolerating well  Pt completed krueger stretching and finger extension with mobilizations at PIP joints  Krueger stretching with metacarpal mobilizations and stretching of digits into abduction (painful today)   Krueger spread with stretching of fingers into extension with MCP in flexion    Tone reduction at the wrist       Mobilizations at the wrist with stretch into extension    Prone on Elbows   10 reps shfft weight side to side with pain in R shoulder        Pectoral glides  NDT facilitation x 15 reps     NDT facilitation x 15 reps in sitting    Elbow Flexion   Movement in synergy blocking scapular elevation x 5 reps with fatigue       Guided Movement   1) Forward flexion with elbow extension and wrist extension x 10 reps with total assistance  2) 2 sets x 5 guided movement to grasp and release cups with total assistance   1) guiding movement on foam roll guiding out into abduction  Moving wrist and fingers from flexed/extended position  2) moving into flexion from standing position with foam roll with moderate facilitation    Shoulder abduction      Placing R hand in weightbearing position from side-lying and rotating arm from adduction to abduction with maximal facilitaiton Placing R hand in weightbearing position from side-lying and rotating arm from adduction to abduction with maximal facilitation x 10 reps  Pt completed with arm on red therapy ball combined with stretch at the trunk and allowing for shoulder abduction x15 reps with no pain    Shoulder Rotational Movement     Pt completed combined with wrist stretch with rotational movement from internal to external ROM     PNF     Total facilitation to within tolerable ROM for 10 reps in D1/D2 flexion/extension         Therapeutic Activity (   10): Therapeutic activities per grid below to improve strength, coordination and functional use of hemiplegic UE. Required minimal visual, verbal and tactile cues to promote motor control of right, upper extremity(s). Patient worked on standing and filling out form using R UE as a functional stabilizer for completing form. Treatment/Session Assessment:    · Response to Treatment: Patient tolerated session well with minimal complaints today of pain. Pt demonstrated decrease tone in the shoulder, elbow, and hand by end of session. Pt to continue per plan of care. · Compliance with Program/Exercises: Will assess as treatment progresses. · Recommendations/Intent for next treatment session: \"Next visit will focus on advancements to more challenging activities and reduction in assistance provided\".   Total Treatment Duration:OT Patient Time In/Time Out  Time In: 1144  Time Out: [address], OT

## 2017-11-10 NOTE — PROGRESS NOTES
Zoraida Eden  : 1975  Payor: Jovanni Cunningham / Plan: [employer] OTHER / Product Type: PPO /  [address]  at [facility]  [address] [facility], [place] [address] [address] [facility]  [phone]   [phone]           OUTPATIENT PHYSICAL THERAPY:Re-evaluation 11/10/2017   ICD-10: Treatment Diagnosis: Difficulty in walking, not elsewhere classified (R26.2); Hemiplegia and hemiparesis following cerebral infarction affecting right dominant side (I69.351); Dizziness and giddiness (R42)      Precautions/Allergies:   Erythromycin   Fall Risk Score: 0 (? 5 = High Risk)  MD Orders: PT eval MEDICAL/REFERRING DIAGNOSIS:  Stroke (cerebrum) ([facility] [address] [I63.9]   DATE OF ONSET: 8/3/17  REFERRING PHYSICIAN: MIK Bradshaw [address]: unknown  DATE OF PROGRESS NOTE:    RECERTIFICATION DATE:    REEVALUATION:  Pt has attended 15 physical therapy sessions from 9/15/17 to date including initial assessment. Pt has complained several times of motion sickness, nausea and occasional dizziness that seems to be chronic. Binocular vision exam is abnormal.  Pt cannot tolerate more that 5 reps of tracking and saccadic exercises without having some dizziness. Pt will benefit from vison exercises and possible vestibular exercise as well to try to decrease dizziness. Dizziness Handicap Inventory Score is 66% disability. PROGRESS NOTE:  Pt has attended 10 physical therapy sessions from 9/15/17 to date including initial assessment. Sessions consist of range of motion, therapeutic activity, therapeutic exercise, balance and gait refinement. Pt has shown improvement in balance and gait ability. Pt has increased Perez Balance Score by 2 points indicating improved static standing balance and decreased risk for fall. Pt has decreased TUG time by over 14 seconds with her cane and almost 22 seconds without her cane indicating more normalized walking pattern.  Her gait pattern has improved but she does not take normal step length on the left because she is trying to keep her right knee from hyperextending in stance on the right with her AFO on. I feel the AFO may be a little short so I placed a heel lift in her right shoe with further improvement in her TUG time by 3 more seconds for an overall increase of 17 second improvement with her cane. We will have [facility] look at her AFO to see if it needs adjustments. Pt will continue to benefit from physical therapy for at least 4 more weeks to maximize functional ability. INITIAL ASSESSMENT:  Ms. Katina Stern presents with dominant right hemiplegia s/p CVA on 8/3/17. Pt presents with ADL, balance and gait deficits. Pt will benefit from physical therapy to maximize to full potential and safety with all functional mobility. PROBLEM LIST (Impacting functional limitations):  1. Decreased Strength  2. Decreased ADL/Functional Activities  3. Decreased Ambulation Ability/Technique  4. Decreased Balance  5. Increased Pain  6. Decreased Activity Tolerance  7. Decreased Flexibility/Joint Mobility  8. Decreased [place] with Home Exercise Program INTERVENTIONS PLANNED:  1. Balance Exercise  2. Bed Mobility  3. Family Education  4. Gait Training  5. Home Exercise Program (HEP)  6. Neuromuscular Re-education/Strengthening  7. Range of Motion (ROM)  8. Therapeutic Activites  9. Therapeutic Exercise/Strengthening  10. Transfer Training  11. modalities   TREATMENT PLAN:  Effective Dates: 9/22/17 TO 12/15/17. Frequency/Duration: 3 times a week for 8 weeks  GOALS: (Goals have been discussed and agreed upon with patient.)  Short-Term Functional Goals: Time Frame: 4 weeks  1. Pt will be independent with range of motion, strength and balance home exercise program.  STATUS:  MET  2. Pt will decrease TUG score by 5 seconds indicating more normalized and safer gait pattern.   STATUS:  MET  Discharge Goals: Time Frame: 8 weeks  Pt will show increased range of motion and improved posture to allow for improved safety and ability with all functional mobility. .  STATUS:  PROGRESSING, CONTINUE 4 WEEKS. Pt will decrease TUG score by 10 seconds indicating more normalized gait pattern and decrease risk for falls. STATUS:  MET  NEW GOAL:  Pt will decrease TUG score by 5 more seconds indicating more normalized gait pattern and decrease risk for falls. NEW GOAL  Pt will increase Perez Balance Scale to 49/56 indicating increased balance and decreased risk for falls. STATUS:  NOT MET, CONT 4 WEEKS. 5.  NEW GOAL (2 weeks from 11/10/17): Pt will be able to tolerate 10 reps of each eye exercise with minimal to no dizziness indicating increased binocular strength. 6.  NEW GOAL (4 weeks from 11;10/17): Pt will report no more than 20% disability on the Dizziness Handicap Inventory Scale indicating decreased dizziness and increased functional activity and quality of life. Rehabilitation Potential For Stated Goals: Good  Regarding [place] therapy, I certify that the treatment plan above will be carried out by a therapist or under their direction. Thank you for this referral,  Liudmila Harrington, PT                  HISTORY:   GOAL:  Love to be back to doing things independenlty. Would like to get to where I don't have to use the cane. Get back to my own home. 10/27/2017  I definitely cant go back to work right now. Its slow progress I definitely cna;t drive at the [place]. I have gotten stornger as far as my blance and in my leg. More ocmrortalble doing suff around the kitchen . I can take a dish and put it in the sink and rinse it off. I keep it near me in sonia I get tired. I will move aorund owthout it. Present Symptoms:    38year old  right handed female s/p left CVA 8/3/17 2* to HTN. Short term disability for now. Work in the [address] at [employer]. Very physical job. More sued to my left hand.   Learning how to function with one hand is difficult. ADLs with just my left hand is difficult. Dynamic sitting balance. I just have to be careluf that my foot is flat especially if I have my shoes off. Tire easily. Buildig up strength. Large base quad cane but not all the time at home. Can't carry a heavy load. PAIN:  Right fingers and shoulder with slight movement. 6/10  History of Present Injury/Illness (Reason for Referral):  See above  Past Medical History/Comorbidities:   Ms. Gifty Thompson  has a past medical history of Generalized anxiety disorder; GERD (gastroesophageal reflux disease); Hypertension; Insomnia, unspecified; Iron deficiency anemia (8/23/2013); Left sided lacunar stroke ([facility] [address] (08/2017); Mitral valve prolapse (1993); Obesity (BMI 30-39.9); RADHA on CPAP (10/2017); and Prediabetes (7/28/2015). Ms. Gifty Thompson  has a past surgical history that includes tubal ligation (1995) and endoscopy (10/1/04). Social History/Living Environment:     25and 25year old son and daughter. . Prior Level of Function/Work/Activity:  Worked in bakery department. 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 One story - Own home:  About 5 steps to get in with bilateral railing. Staying at [facility] house which is easier to maneuver - low tub - can walk right in. Small wheelchair ramp and then level. Mom helps with driving and household duties - cooking. Dominant Side:         RIGHT    Current Medications:    Current Outpatient Prescriptions:     oxybutynin chloride XL (DITROPAN XL) 10 mg CR tablet, Take 1 Tab by mouth daily. Indications: URINARY URGE INCONTINENCE, Disp: 30 Tab, Rfl: 6    raNITIdine (ZANTAC) 150 mg tablet, Take 1 Tab by mouth two (2) times a day., Disp: 60 Tab, Rfl: 5    diazePAM (VALIUM) 5 mg tablet, Take 0.5 Tabs by mouth every six (6) hours as needed. Max Daily Amount: 10 mg. Indications: MUSCLE SPASM, Disp: 60 Tab, Rfl: 1    tiZANidine (ZANAFLEX) 4 mg tablet, Take 1 Tab by mouth three (3) times daily. , Disp: 90 Tab, Rfl: 6    hydroCHLOROthiazide (MICROZIDE) 12.5 mg capsule, Take 2 Caps by mouth daily. , Disp: 30 Cap, Rfl: 6    potassium chloride SR (K-TAB) 20 mEq tablet, Take 1 Tab by mouth two (2) times a day., Disp: 60 Tab, Rfl: 6    aspirin (ASPIRIN) 325 mg tablet, Take 1 Tab by mouth daily. , Disp: 30 Tab, Rfl: 12    acetaminophen (TYLENOL) 325 mg tablet, Take 2 Tabs by mouth every six (6) hours as needed for Pain or Fever (headache). , Disp: 30 Tab, Rfl: 0    escitalopram oxalate (LEXAPRO) 20 mg tablet, Take 1 Tab by mouth daily. , Disp: 30 Tab, Rfl: 5   Date Last Reviewed:  11/10/2017     Number of Personal Factors/Comorbidities that affect the Plan of Care: 1-2: MODERATE COMPLEXITY   EXAMINATION:   ROM:          Painful right shoulder and finger and hands. Shoulder not fully assessed. Seeing OT. Passive Fingers to neutral extension at least.  No overpressure given. Elbow slight decrease as end ranges. Supination ~75% with mild pain. Right lower extremity WFL  Minimal pain in my right shoulder. OT has taped it and that helps. Left WFL  Strength:          Right UE:  Almost no active movement noted. Possible scap stabilization and tone is not flaccid. Right hip flexion:  3-/5  Knee extension at least 4/5  Nothing in DF. Functional Mobility:         Gait/Ambulation:  Ambulates with large base quad came with slow small steps with custom hinged AFO on. Transfers:  Independent. occ no WBing on the right with sit to stand        Bed Mobility:  NT  Mental Status:          Alert and oriented x 4;  Pleasant and appropriate  Vision:          No deficits reported. Body Structures Involved:  1. Nerves  2. Bones  3. Joints  4. Muscles  5. Ligaments Body Functions Affected:  1. Sensory/Pain  2. Neuromusculoskeletal  3. Movement Related Activities and Participation Affected:  1. Learning and Applying Knowledge  2. General Tasks and Demands  3. Mobility  4. Self Care  5. Domestic Life  6.  Interpersonal Interactions and Relationships  7. Community, Social and [place]   Number of elements that affect the Plan of Care: 4+: HIGH COMPLEXITY   CLINICAL PRESENTATION:   Presentation: Evolving clinical presentation with changing clinical characteristics: MODERATE COMPLEXITY   CLINICAL DECISION MAKING:   Outcome Measure: Tool Used: Perez Balance Scale  Score:  Initial: 46/56 Most Recent: 48/56 (Date: -- )   Interpretation of Score: Each section is scored on a 0-4 scale, 0 representing the patients inability to perform the task and 4 representing independence. The scores of each section are added together for a total score of 56. The higher the patients score, the more independent the patient is. Any score below 45 indicates increased risk for falls. Score 56 55-45 44-34 33-23 22-12 11-1 0   Modifier CH CI CJ CK CL CM CN       Tool Used: Timed Up and Go (TUG)  Score:  Initial: with cane:  45.24 seconds; without cane 52.01 seconds Most Recent: 31.05 seconds with cane:  30.05 without cane  With 8 mm heel lift in shoe with cane:  27.85 (Date: 10/27/17)   Interpretation of Score: The test measures, in seconds, the time taken by an individual to stand up from a standard arm chair (seat height 46 cm [18 in], arm height 65 cm [25.6 in]), walk a distance of 3 meters (118 in, approx 10 ft), turn, walk back to the chair and sit down. If the individual takes longer than 14 seconds to complete TUG, this indicates risk for falls. Score 7 7.5-10.5 11-14 14.5-17.5 18-21 21.5-24.5 25+   Modifier CH CI CJ CK CL CM CN     Outcome Measure: Tool Used: Dizziness Handicap Inventory  Score:  Initial: 66% Most Recent:    Interpretation of Score: The test rates the patients perception of handicap that dizziness plays in their ADLs.   100 - 70=severe perception of handicap, 71- 40=moderate perception of handicap;  44 - 0=low perception of handicap    Medical Necessity:   · Skilled intervention continues to be required due to residual right sided hemiparesis. Reason for Services/Other Comments:  · Patient continues to require skilled intervention due to residual right hemiparesis. .   Use of outcome tool(s) and clinical judgement create a POC that gives a: Questionable prediction of patient's progress: MODERATE COMPLEXITY      S:  I feel better today. Took it easy aftetr the other day and yesterday. They don't really know why I am feeling sick a lot. My US for my liver is negative but my levels are still up. So could be my meds; just trying to rule things out. I am nauseous a lot. Since the stroke I have had this motion sickness and some dizziness. Thought it was my meds. TREATMENT:   (In addition to Assessment/Re-Assessment sessions the following treatments were rendered)  REEVALUATION:     Binocular vision assessment  Nausea: every day 4/10. Tracking:  horizontaL  :  Room spinning at around 4th rep. Vertical at about 4th rep. D1:  Starts to feel dizzy/sensation of the whole room moving at about 4th - 5th rep  D2: At about 6th rep. Want to close my eyes so the rail in the background will stop moving and so I can make that movement stop    Saccades: horizontal:  Like there is a shadow of the target that follows,  Not really double vision but a shadow. Makes me feel dizzy. Like something is not stable   Vertical:  Decreased smoothness either eye. Maybe more on the right eye. Right bounces when coming down to target. Single eye:  Left no complaints. Right eye see shadow. D1: some motion. And shadow  D2: \"some motion\"     Dizziness shortly after getting up 5 days a week lately. Nausea is constant. THERAPEUTIC EXERCISE: (30  minutes):  Exercises per grid below to improve strength and coordination. Required minimal visual and verbal cues to to perform correctly. Progressed repetitions as indicated.       NEUROMUSCULAR RE-EDUCATION: (0 minutes):  Exercise/activities per grid below to improve balance, coordination, kinesthetic sense, proprioception and motor control. Required maximal manual, tactile and facilitation cues to perfrom activities. Date:  10/20/17 Date:  11/1/17 Date:  11/10/17   Activity/Exercise Parameters  Parameters     Tracking - horizontal   3 x 5   Vertical     2x5   D1   2x5   D2   2x5   Saccades - horizontal   2x5   vertical   2x5   D1   2x5   D2   2x5         Right UE tone reduction and sensorimotor retraining for increased arm swing and posture during gait      Seated in chair (pt is short in stature). Right shoe and brace off Neuro-JAKE tactile stimulation to R foot to stimulate dorsiflexion x 10 reps  Neuro-JAKE tactile stimulation to R foot to stimulate dorsiflexion x 10 reps   rockerboard under R foot working on small range plantarflexion 3 x 5 reps with facilitation     Kicking soccer ball in bars for tone reduction and improved swing pattern   2 x 10 reps each leg     Facilitated long arc quad Quad set at end range x 10 reps with 5 second hold.   Facilitation needed initially but then patient able to hold contraction      LE tone reduction prior to gait  X 5 minutes sitting to foot      Gait retraining With brace on and no AD 2 x 50' with verbal cues for R knee extension during stance phase  In the bars working on hip/knee release and stepping pattern x 10 minutes   Then with quad cane working on kicking out and putting weight down with forward momentum     Mini squats on door with therapist facilitating knee extension    2 x 10 reps with good symmetrical control  Then with 1\" block under L foot for increased R weight bearing x 10 reps      Standing in parallel bars, 1\" block under R foot Therapist assisting with elevating up and then working on slow control on the way day for quad and plantarflexor eccentric control x 10 reps  Therapist assisting with elevating up and then working on slow control on the way day for quad and plantarflexor eccentric control 2 x 10 reps         Therapeutic Exercise: ( 0 minutes):  Exercises per grid below to improve mobility, strength and coordination. Required moderate visual, verbal and manual cues to promote proper body alignment, promote proper body posture and promote proper body breathing techniques. Progressed range and repetitions as indicated. Date:  10/11/17 Date:  10/20/17 Date:  10/26/17   Activity/Exercise Parameters Parameters Parameters   Piriformis stretch   3 x 30 second hold     Hip capsule stetch   3 x 30 second hold right     Hip flexor stretch   3 x 30 second      Bent knee hip abduction stretch with active adduction   10 reps with 10 second hold     SKTC stretch   3 x 30 second hold right  3 x 30 second hold right   Heel cord and plantar fascia stretch passive X 5 minutes  X 5 minutes   Bridging   2 x 10 reps B     LAQ   4 x 5 reps right  4 x 5 reps right   Right ankle dorsiflexion    During russian e-stim x 10 minutes    Manual facilitation of right dorsiflexors   Working with patient on active dorsiflexion with manual and verbal cues with patient supine       EDUCATION:  Instructed in above exercises and home program.  HEP:  Written HEP given to patient. Instructed to perform 3 times per day and to increase reps as tolerated. .     Treatment/Session Assessment: See reevaluation assessment. Patients response to todays treatment session was tolerated well with no medical complications. .  · Post session pain:  No pain  · Compliance with Program/Exercises: Will assess as treatment progresses. · Recommendations/Intent for next treatment session: \"Next visit will focus on advancements to more challenging activities\".   Total Treatment Duration:  PT Patient Time In/Time Out  Time In: 1315  Time Out: [address],

## 2017-11-13 ENCOUNTER — HOSPITAL ENCOUNTER (OUTPATIENT)
Dept: PHYSICAL THERAPY | Age: 42
Discharge: HOME | End: 2017-11-13
Payer: COMMERCIAL

## 2017-11-13 NOTE — PROGRESS NOTES
Zoraida Warren  : 1975 [facility]  [address], [address] [facility], [address]  [phone]   [phone]          OUTPATIENT DAILY NOTE    NAME/AGE/GENDER: Jorge Guillen is a 38 y.o. female. DATE: 2017    SUBJECTIVE:  Patient cancelled for appointment today due to not feeling well. Will plan to follow up on next scheduled visit.     Len Valverde DPT

## 2017-11-13 NOTE — PROGRESS NOTES
11/13/2017  Patient's spouse called to cancel pt's appointment due to pt not feeling well today. Will follow-up with patient at her next scheduled appointment.    Thank you,  Melanie Cowart, OT

## 2017-11-15 ENCOUNTER — HOSPITAL ENCOUNTER (OUTPATIENT)
Dept: PHYSICAL THERAPY | Age: 42
Discharge: HOME | End: 2017-11-15
Payer: COMMERCIAL

## 2017-11-15 PROCEDURE — 97112 NEUROMUSCULAR REEDUCATION: CPT

## 2017-11-15 PROCEDURE — 97110 THERAPEUTIC EXERCISES: CPT

## 2017-11-15 NOTE — PROGRESS NOTES
Zoraida pitt  : 1975 [facility]  [address], [address] [facility], [address]  [phone]   [phone]         OUTPATIENT OCCUPATIONAL THERAPY: Daily Note 11/15/2017    ICD-10: Treatment Diagnosis:   Hemiplegia and hemiparesis following cerebral infarction affecting right dominant side (I69.351)  Precautions/Allergies:   Erythromycin   Fall Risk Score: 3 (? 5 = High Risk)  MD Orders: OT evaluate and treat  MEDICAL/REFERRING DIAGNOSIS:   Stroke (cerebrum) ([facility] [address] [I63.9]   DATE OF ONSET: 2017   REFERRING PHYSICIAN: Kera Pineda*  RETURN PHYSICIAN APPOINTMENT: 2017     INITIAL ASSESSMENT:  Ms. Demi pitt has been seen by OT for a total of 7 visits. Pt reports she has been using saebo flex brace for R UE, but reports pain after prolonged wear. Pt has now started alternating between resting hand splint and saebo flex brace with decreased pain. Pt reports severe pain with attempts to passively extend digits. Pt is demonstrating some increased tone in the R UE. Pt reports pain in R shoulder but no presence of subluxation. Pt has been working on weightbearing to help improve strength, function, and decrease tone in the R dominant UE. Pt demonstrates good progress with scapular strength. Pt also has some activation at the elbow flexors/shoulder abductors with movement in flexor synergy. Pt making slow progress and is 10-15 minutes late to most all appointments which limits progress as well. Pt does report decreased pain and improved positioning of R UE after treatment. Pt to continue per plan of care. PLAN OF CARE:   PROBLEM LIST:  1. Decreased Strength  2. Decreased ADL/Functional Activities  3. Decreased Transfer Abilities  4. Decreased Ambulation Ability/Technique  5. Decreased Balance  6. Increased Pain  7. Decreased Activity Tolerance  8. Decreased Flexibility/Joint Mobility  9.  Decreased [facility] with Home Exercise Program INTERVENTIONS PLANNED:  1. Activities of daily living training  2. Adaptive equipment training  3. Balance training  4. Clothing management  5. Donning&doffing training  6. Manual therapy training  7. Modalities  8. Neuromuscular re-eduation  9. Splinting  10. Therapeutic activity  11. Therapeutic exercise   TREATMENT PLAN:  Effective Dates: 9/11/17 TO 12/11/17. Frequency/Duration: 2 times a week for 12 weeks  GOALS: (Goals have been discussed and agreed upon with patient.)  Short-Term Functional Goals: Time Frame:4- 6 weeks  1. Pipo Stauffer will report decreased pain in the R shoulder from 5/10 to 3/10 with passive movement to decrease stiffness for daily activity. CONTINUE  2. Pipo Stauffer will be 100% compliant with daily SROM exercises for improving R UE ROM to decrease risk for contractures. CONTINUE  3. Pipo Stauffer will demonstrate ability to complete weightbearing activities in R UE with moderate facilitation to improve functional use for transfers. MET  4. Pipo Stauffer will complete basic ADL with supervision to improve independence with functional tasks. CONTINUE  5. Pipo Stauffer will tolerate 15 minutes of dynamic standing balance activity with CGA to decrease risk for falls with ADL and to improve activity tolerance. NOT ADDRESSED  6. Discharge Goals: Time Frame: 8-12 weeks   1. Pipo Stauffer will have functional PROM of R shoulder to [facility] to decrease pain and stiffness in R dominant UE. CONTINUE  2. Pipo Stauffer will demonstrate improved strength in the R dominant UE as evidenced by at least 2+ to 3-/5 strength for using R UE as functional assist. CONTINUE  3. Pipo Stauffer will demonstrate ability to functionally grasp and release light objects with supervision and with use of R dominant hand. CONTINUE  4. Pipo Stauffer will be modified independent with all basic ADL to improve overall quality of life. CONTINUE  5.  Pipo Stauffer will use R hand as functional assist with daily activities/transfers with supervision to improve independence with ADL. CONTINUE   Rehabilitation Potential For Stated Goals: Good  Regarding [place] therapy, I certify that the treatment plan above will be carried out by a therapist or under their direction. Thank you for this referral,  Dena Fox OT                 The information in this section was collected on 9/11/17 (except where otherwise noted). OCCUPATIONAL PROFILE & HISTORY:   History of Present Injury/Illness (Reason for Referral):  Pt reports she was at work when she started feeling like she was going to pass out. Pt reports she didn't really stop working and the feeling wouldn't go away. It started at 2pm and left work at 3:30 pm. Pt felt like she was going to fall and pushed a buggy to her car and drove to her daughter's house. Pt then was brought over to the ER at [facility]. Progressively got worse with R sided weakness and woke up the next morning with full parlaysis of the R side. Reports her eyesight was decreasing and needing reading glasses. R eye is a little worse with the blurry vision. Pt wears to reading glasses. Pt using a wheelchair as less as possible. Using wheelchair when out and about. Pt using a quad cane around the home and has AFO to R LE. Tub transfer bench with CGA. Eats at the kitchen table. Pt stayed from Aug 8th- September 6th in inpatient rehab. Pt reports no falls at home. Pt getting dressed using techniques learned for inpatient rehab. Difficulty walking and carrying objects. Pt has some issues with Zanaflex and incontinence (she is unsure if that is related). Pt presents today for outpatient occupational therapy services. Past Medical History/Comorbidities:   Ms. Lori Munoz  has a past medical history of Generalized anxiety disorder; GERD (gastroesophageal reflux disease); Hypertension; Insomnia, unspecified; Iron deficiency anemia (8/23/2013); Left sided lacunar stroke ([address] (08/2017);  Mitral valve prolapse (1993); Obesity (BMI 30-39.9); RADHA on CPAP (10/2017); and Prediabetes (7/28/2015). Ms. Mic Wilkins  has a past surgical history that includes tubal ligation (1995) and endoscopy (10/1/04). Social History/Living Environment:   Lives with mom with her  because toilet/tub is lower with more open spaces. Pt also has a son. Pt has 24 hr supervision. Prior Level of Function/Work/Activity: Indpendent and working full time in [employer] at [employer] .   Dominant Side:         RIGHTPersonal Factors: Other factors that influence how disability is experienced by the patient:  Hx of anxiety disorder, HTN, Pre-diabetic    Current Medications:    Current Outpatient Prescriptions:     diazePAM (VALIUM) 5 mg tablet, Take 0.5 Tabs by mouth every six (6) hours as needed. Max Daily Amount: 10 mg. Indications: Muscle Spasm, Disp: 60 Tab, Rfl: 2    oxybutynin chloride XL (DITROPAN XL) 10 mg CR tablet, Take 1 Tab by mouth daily. Indications: URINARY URGE INCONTINENCE, Disp: 30 Tab, Rfl: 6    raNITIdine (ZANTAC) 150 mg tablet, Take 1 Tab by mouth two (2) times a day., Disp: 60 Tab, Rfl: 5    tiZANidine (ZANAFLEX) 4 mg tablet, Take 1 Tab by mouth three (3) times daily. , Disp: 90 Tab, Rfl: 6    hydroCHLOROthiazide (MICROZIDE) 12.5 mg capsule, Take 2 Caps by mouth daily. , Disp: 30 Cap, Rfl: 6    potassium chloride SR (K-TAB) 20 mEq tablet, Take 1 Tab by mouth two (2) times a day., Disp: 60 Tab, Rfl: 6    aspirin (ASPIRIN) 325 mg tablet, Take 1 Tab by mouth daily. , Disp: 30 Tab, Rfl: 12    acetaminophen (TYLENOL) 325 mg tablet, Take 2 Tabs by mouth every six (6) hours as needed for Pain or Fever (headache). , Disp: 30 Tab, Rfl: 0    escitalopram oxalate (LEXAPRO) 20 mg tablet, Take 1 Tab by mouth daily. , Disp: 30 Tab, Rfl: 5            Date Last Reviewed:  9/11/17   Complexity Level : Expanded review of therapy/medical records (1-2):  MODERATE COMPLEXITY   ASSESSMENT OF OCCUPATIONAL PERFORMANCE: Palpation:  Patient with no palpated subluxation of the R shoulder at this time. Increased pain and tightness with R shoulder flexion/abduction with passive ROM above 90 degrees. Pain along anterior portion of humeral head with anterior tilt. Pain radiates along the middle deltoid. Modified Maggi Scale     Grade Description : Noted in ELBOW FLEXORS (1)  WRIST FLEXORS(2) , FINGER FLEXORS (1+)  []0 - No increase in muscle tone  []1 - Slight increase in muscle tone, manifested by a catch and release, or by minimal resistance at the end of the range of motion when the affected part(s) is moved in flexion or extension  [x]1+ - Slight increase in muscle tone, manifested by a catch, followed by minimal resistance throughout the remainder (less than half) of the range of movement (ROM)  []2 - More marked increase in muscle tone through most of ROM, but affected part(s) easily moved  []3 - Considerable increase in muscle tone, passive movement difficult  []4 - Affected part(s) rigid in flexion and extension      ROM/Strength:  L UE WNL; No functional AROM of R UE.            Date:  9/11/17  Date:  9/11/17 Date:  10/25/17 Date:  10/25/17    PROM  STRENGTH PROM Strength   Movement RIGHT  RIGHT Right Right    SCAPULA:        Elevation  Limited  2 limited 2   Protraction  Limited  2+ limited 2+   Retraction  Limited   2 WFL 3   SHOULDER:        Flexion  100  0 115 PROM    Abduction  90  1 20-25 AROM  2   External rotation  70  0 30 PROM 0   Internal rotation  WNL  0 WFL PROM 0   Horizontal abduction 100  0  0   Horizontal adduction  WFL  0  0   Extension         ELBOW:        Flexion  WNL  0 75 AROM 2   Extension  WNL  0     FOREARM:        Supination  WFL  0  0   Pronation  WFL  0  0   WRIST:        Wrist flexion  65  0 55 PROM 0   Wrist extension  65  0 55 PROM 0   Wrist radial deviation         Wrist ulnar deviation         HAND:        Finger flexion WFL  0 WFL 0   Finger extension WFL  0 WFL (pain) 0   THUMB: Abduction  WFL  0 WFL 0    Extension WFL  0 WFL 0    Adduction     WFL 0    MCP flexion  WFL  0      IP flexion  WFL  0     Hand Strength:         unable  0     THREE JAW KARY PINCH unable  0     LATERAL PINCH  unable  0       Functional Mobility:         Gait/Ambulation:  CGA to supervision with quad cane; Uses wheelchair occasionally         Bed Mobility:  CGA  Balance:          Good static standing balance but fair to poor dynamic standing balance  Coordination:          Non-functional in R dominant UE  Mental Status: WNL  Vision:          Reading glasses. Reports some blurry vision in R eye. Tracking and visual fields intact. Some decreased speed with saccadic movement to the R. Activities of Daily Living:           Basic ADLs (From Assessment) Complex ADLs (From Assessment)         Grooming/Bathing/Dressing Activities of Daily Living                                   Sensation:         Grossly intact to light touch in B UE. R hand with some decreased light touch sensation. Reports some hypersensitivity to cold. Physical Skills Involved:  1. Range of Motion  2. Balance  3. Strength  4. Activity Tolerance  5. Sensation  6. Fine Motor Control  7. Gross Motor Control  8. Pain (acute) Cognitive Skills Affected (resulting in the inability to perform in a timely and safe manner):  1. WNL Psychosocial Skills Affected:  1. Anxiety Disorder (per chart review)   Number of elements that affect the Plan of Care: 5+:  HIGH COMPLEXITY   CLINICAL DECISION MAKING:   Outcome Measure: Tool Used: Fugl-Goodman Upper Extremity Motor Assessment  Score:  Initial: 8/66 Most Recent: X (Date: -- )   Interpretation of Score: Outcome Measure Conversion Site    ?  Carrying, Moving, and Handling Objects:     - CURRENT STATUS: CM - 80%-99% impaired, limited or restricted    - GOAL STATUS: CK - 40%-59% impaired, limited or restricted    - D/C STATUS:  ---------------To be determined--------------- Fugl-Goodman Upper Extremity Motor Assessment (without reflexes) Date:  9/11 Date:   Date:     Item Description Score Score Score   Wrist circumduction 0     Hook grasp 0     Shoulder flexion to 180°, elbow extended 0     Spherical grasp 0     Lateral prehension 0     Wrist flexion/extension, elbow extended 0     Pronation-supination, elbow extended 0     Wrist stable, elbow extended 0     Movement with normal speed 0     Forearm supination 0     Shoulder abduction to 90°, elbow extended 0     Movement without dysmetria 0     Shoulder external rotation 0     Wrist stable, elbow at 90° 0     Wrist flexion/extension, elbow at 90° 0     Palmar prehension 0     Scapular retraction 1     Pronation-supination, elbow at 90° 0     Shoulder flexion to 90°, elbow extended 0     Hand to lumbar spine 0     Shoulder abduction 0     Elbow extension 0     Forearm pronation 0     Movement without tremor 0     Cylindrical grasp 0     Finger mass extension (relaxation of flexion) 0     Scapular elevation 1     Finger mass flexion 0     Shoulder adduction with internal rotation 0     Elbow flexion 0     Total Score (Max = 60) 2           Medical Necessity:   · Patient demonstrates good rehab potential due to higher previous functional level. Reason for Services/Other Comments:  · Patient continues to require skilled intervention due to decreased functional independence s/p CVA impacting overall quality of life.   Clinical Decision-Making Assessment:     Assessment process, impact of co-morbidities, assessment modification\need for assistance, and selection of interventions: Analytical Complexity:HIGH COMPLEXITY   TREATMENT:   (In addition to Assessment/Re-Assessment sessions the following treatments were rendered)    Pre-treatment Symptoms/Complaints:    Pain: Initial: Pain Intensity 1: 0  Post Session: 0/10      Manual Therapy (0): Pt received leuko rigid taping along deltoid and supraspinatus to address support for soft tissue pain in R shoulder. Pt then taped with guidance of humeral head posteriorly. Pt reports no complaints. Therapeutic Exercise: (5 minutes):  Exercises per grid below to improve mobility. Required maximal manual and tactile cues to promote proper body alignment, promote proper body posture and promote proper body mechanics. Progressed repetitions and complexity of movement as indicated. Date:  10/30/17 Date:  11/1/17 Date:  11/6/17 Date:  11/15/17   Activity/Exercise Parameters Parameters Parameters    External Rotation  10-15 reps with glenohumeral mobilizations; 15-20 reps with dowel with minimal tactile cues  1)10-15 reps with glenohumeral mobilizations (supine)  2) 10 reps SROM with dowel and moderate tactile cues  3) 10 reps from side-lying position  Pt completed x 15-10 reps from supine position with glenohumeral mobilizations  15-20 reps with dowel and minimal facilitation from supine position (assistance to position elbow at side)   Shoulder Flexion  10 reps tolerated to 130 ; SROM x 5 reps with addditional time 10 reps with dowel to ~90 degrees with minimal assistance in supine      Scapular Press 10 reps supine with assistance of L UE      Shoulder abduction  10 reps with glenohumeral mobilization with minimal c/o pain to ~130 Attempted 2-3 reps but too painful today  8-10 reps to ~80 degrees with inferior glide and less reports of pain     Horizontal Abduction/add 5-8 reps with pain with horizontal abd      Scapular Mobilizations  AAROM in sidelying for scapular elevation/depression with maximal tactile cues  Scapular depression from side-lying x 10-15 reps     Backward shoulder rolls   5-8 reps with moderate assistance with cues to press down into depression         Neuromuscular Re-education: ( 40 minutes):  Exercise/activities per grid below to improve kinesthetic sense and proprioception. Required minimal visual, verbal, manual and tactile cues to promote motor control of right, upper extremity(s). Date:  10/28/17 Date:  10/30/17 Date:  11/1/17 Date:  11/3/17 Date:  11/6/17 Date:  11/15/17   Activity/Exercise         Supine to Sit  Supervision with minimal verbal cues     Supervision with some additional time    Trunk Rotation       Supine with pt tolerating abduction to about 60 degrees in R UE and rotating the trunk 2 reps each direction and holding 20 seconds    Stimulation combined with attempts to activate movement     1) shoulder internal/external rotators x 5 minutes  2) Shoulder abductors x 5 minutes  3) Wrist extensors x 5 minutes  4) Finger extensors x 5 minutes      Towel Sherine Pt worked on activating movement at table in paddle and on washcloth pressing forward with moderate guide x 5-8 reps and in circumduction at table for 5-8 reps with additional time and minimal guidance   Moving to the side and forwards with minimal active movement and maximal assistance to guide arm forward   Pt completed in sitting  1) Shoulder flexion with some incline with assistance up and cues to slowly lower down x 10-15 reps with moderate assistance  2) Shoulder scaption with some incline with  assistance up and cues to slowly lower with moderate assistance    Arm Slides      Pt completed in side-lying on L side;  1) 10 reps working on scapular movement and maximal facilitation reaching forward  2) Arm slides with palms up position x 10 reps with maximal facilitation  3) Arm slides along thumb position with maximal facilitation x 10 reps  Pt completed in side-lying on L side  1) 10-15 reps working from palms up position with moderate facilitation   2) thumbs down position x 10-15 reps with moderate facilitation   3) palms down position x 10-15 reps with moderate facilitation   4) Pressing forwards/backwars with hand on ball x 10-15 reps with moderate facilitation    Forward reach    Side-lying working with Feldenkrais technique with cues to assist with movement at the shoulder into forward reach with cues to assist with scapular motion x 10 reps  Sitting for 3-5 reps with maximal facilitation (decreased ability to press through the R hand)    Weightbearing  Pt completed weightbearing into the R UE with arm fully extended x10 reps with pt tolerating elbow extension f 10-15 reps with prop to decrease wrist pain with ability to rotate shoulder out and tolerated with supervision 1) sitting and weightbearing into elbow with minimal to moderate verbal cues for trunk positioning  2) Weightbearing with prop under R hand/wrist x 10 reps with moderate assistance at the elbow  3) Standing at table and reaching to R of midline while weightbearing into R UE x10-15 reps   1) Sitting and weightbearing into elbow with minimal to moderate verbal cues for trunk positioning  2) Weightbearing with prop under R hand/wrist x 10 reps and maximal assistance under the wrist  Pt tolerated with wrist in paddle and hand flat on mat x 10 reps with moderate facilitation of the elbow into extension    Tone Reduction into hand Pt received krueger stretching combined with metacarpal mobilizations to R hand to decrease pain/tone; Gentle distraction to digits at the PIP joints with some pain reported to promote finger extension; Placement in paddle with pt tolerating well  Pt completed krueger stretching and finger extension with mobilizations at PIP joints  Krueger stretching with metacarpal mobilizations and stretching of digits into abduction (painful today)   Krueger stretching/finger extension with placement of R hand in paddle    Prone on Elbows   10 reps shfft weight side to side with pain in R shoulder        Pectoral glides  NDT facilitation x 15 reps        Elbow Flexion   Movement in synergy blocking scapular elevation x 5 reps with fatigue       Guided Movement   1) Forward flexion with elbow extension and wrist extension x 10 reps with total assistance  2) 2 sets x 5 guided movement to grasp and release cups with total assistance      Shoulder abduction      Placing R hand in weightbearing position from side-lying and rotating arm from adduction to abduction with maximal facilitaiton Placing R hand in weightbearing position from side-lying and rotating arm from adduction to abduction with maximal facilitation x 10 reps     Shoulder Rotational Movement     Pt completed combined with wrist stretch with rotational movement from internal to external ROM     PNF     Total facilitation to within tolerable ROM for 10 reps in D1/D2 flexion/extension               Treatment/Session Assessment:    · Response to Treatment: BP 90/56 at beginning of session. Pt not feeling well but states she can participate with therapy. Pt is going to MD today luckily because she has been feeling bad over the past few weeks. Pt with less tone in the R UE and hand today with pt tolerating activities well with no complaints of pain. Pt to continue per plan of care. · Compliance with Program/Exercises: Will assess as treatment progresses. · Recommendations/Intent for next treatment session: \"Next visit will focus on advancements to more challenging activities and reduction in assistance provided\".   Total Treatment Duration:OT Patient Time In/Time Out  Time In: 0845  Time Out: 462 KELVIN Orellana, OT

## 2017-11-15 NOTE — PROGRESS NOTES
Zoraida Holt  : 1975  Payor: Treva Robertson / Plan: [address] / Product Type: PPO /  [id number]  at [facility]  [address] [facility], [place] [address] [address] [facility]  [phone]   [phone]           OUTPATIENT PHYSICAL THERAPY:Daily Note 11/15/2017   ICD-10: Treatment Diagnosis: Difficulty in walking, not elsewhere classified (R26.2); Hemiplegia and hemiparesis following cerebral infarction affecting right dominant side (I69.351); Dizziness and giddiness (R42)      Precautions/Allergies:   Erythromycin   Fall Risk Score: 0 (? 5 = High Risk)  MD Orders: PT eval MEDICAL/REFERRING DIAGNOSIS:  Stroke (cerebrum) ([facility] [address] [I63.9]   DATE OF ONSET: 8/3/17  REFERRING PHYSICIAN: MIK Cartwright [address]: unknown  DATE OF PROGRESS NOTE:    RECERTIFICATION DATE:    REEVALUATION:  Pt has attended 15 physical therapy sessions from 9/15/17 to date including initial assessment. Pt has complained several times of motion sickness, nausea and occasional dizziness that seems to be chronic. Binocular vision exam is abnormal.  Pt cannot tolerate more that 5 reps of tracking and saccadic exercises without having some dizziness. Pt will benefit from vison exercises and possible vestibular exercise as well to try to decrease dizziness. Dizziness Handicap Inventory Score is 66% disability. PROGRESS NOTE:  Pt has attended 10 physical therapy sessions from 9/15/17 to date including initial assessment. Sessions consist of range of motion, therapeutic activity, therapeutic exercise, balance and gait refinement. Pt has shown improvement in balance and gait ability. Pt has increased Perez Balance Score by 2 points indicating improved static standing balance and decreased risk for fall. Pt has decreased TUG time by over 14 seconds with her cane and almost 22 seconds without her cane indicating more normalized walking pattern.  Her gait pattern has improved but she does not take normal step length on the left because she is trying to keep her right knee from hyperextending in stance on the right with her AFO on. I feel the AFO may be a little short so I placed a heel lift in her right shoe with further improvement in her TUG time by 3 more seconds for an overall increase of 17 second improvement with her cane. We will have [facility] look at her AFO to see if it needs adjustments. Pt will continue to benefit from physical therapy for at least 4 more weeks to maximize functional ability. INITIAL ASSESSMENT:  Ms. Dori Burkett presents with dominant right hemiplegia s/p CVA on 8/3/17. Pt presents with ADL, balance and gait deficits. Pt will benefit from physical therapy to maximize to full potential and safety with all functional mobility. PROBLEM LIST (Impacting functional limitations):  1. Decreased Strength  2. Decreased ADL/Functional Activities  3. Decreased Ambulation Ability/Technique  4. Decreased Balance  5. Increased Pain  6. Decreased Activity Tolerance  7. Decreased Flexibility/Joint Mobility  8. Decreased [place] with Home Exercise Program INTERVENTIONS PLANNED:  1. Balance Exercise  2. Bed Mobility  3. Family Education  4. Gait Training  5. Home Exercise Program (HEP)  6. Neuromuscular Re-education/Strengthening  7. Range of Motion (ROM)  8. Therapeutic Activites  9. Therapeutic Exercise/Strengthening  10. Transfer Training  11. modalities   TREATMENT PLAN:  Effective Dates: 9/22/17 TO 12/15/17. Frequency/Duration: 3 times a week for 8 weeks  GOALS: (Goals have been discussed and agreed upon with patient.)  Short-Term Functional Goals: Time Frame: 4 weeks  1. Pt will be independent with range of motion, strength and balance home exercise program.  STATUS:  MET  2. Pt will decrease TUG score by 5 seconds indicating more normalized and safer gait pattern.   STATUS:  MET  Discharge Goals: Time Frame: 8 weeks  Pt will show increased range of motion and improved posture to allow for improved safety and ability with all functional mobility. .  STATUS:  PROGRESSING, CONTINUE 4 WEEKS. Pt will decrease TUG score by 10 seconds indicating more normalized gait pattern and decrease risk for falls. STATUS:  MET  NEW GOAL:  Pt will decrease TUG score by 5 more seconds indicating more normalized gait pattern and decrease risk for falls. NEW GOAL  Pt will increase Perez Balance Scale to 49/56 indicating increased balance and decreased risk for falls. STATUS:  NOT MET, CONT 4 WEEKS. 5.  NEW GOAL (2 weeks from 11/10/17): Pt will be able to tolerate 10 reps of each eye exercise with minimal to no dizziness indicating increased binocular strength. 6.  NEW GOAL (4 weeks from 11;10/17): Pt will report no more than 20% disability on the Dizziness Handicap Inventory Scale indicating decreased dizziness and increased functional activity and quality of life. Rehabilitation Potential For Stated Goals: Good  Regarding [place] therapy, I certify that the treatment plan above will be carried out by a therapist or under their direction. Thank you for this referral,  Myah Peterson, DPT                  HISTORY:   GOAL:  Love to be back to doing things independenlty. Would like to get to where I don't have to use the cane. Get back to my own home. 10/27/2017  I definitely cant go back to work right now. Its slow progress I definitely cna;t drive at the [place]. I have gotten stornger as far as my blance and in my leg. More ocmrortalble doing suff around the kitchen . I can take a dish and put it in the sink and rinse it off. I keep it near me in sonia I get tired. I will move aorund owthout it. Present Symptoms:    38year old  right handed female s/p left CVA 8/3/17 2* to HTN. Short term disability for now. Work in the [address] at [employer]. Very physical job. More sued to my left hand.   Learning how to function with one hand is difficult. ADLs with just my left hand is difficult. Dynamic sitting balance. I just have to be careluf that my foot is flat especially if I have my shoes off. Tire easily. Buildig up strength. Large base quad cane but not all the time at home. Can't carry a heavy load. PAIN:  Right fingers and shoulder with slight movement. 6/10  History of Present Injury/Illness (Reason for Referral):  See above  Past Medical History/Comorbidities:   Ms. Sanjana Grey  has a past medical history of Generalized anxiety disorder; GERD (gastroesophageal reflux disease); Hypertension; Insomnia, unspecified; Iron deficiency anemia (8/23/2013); Left sided lacunar stroke ([facility] [address] (08/2017); Mitral valve prolapse (1993); Obesity (BMI 30-39.9); RADHA on CPAP (10/2017); and Prediabetes (7/28/2015). Ms. Sanjana Grey  has a past surgical history that includes tubal ligation (1995) and endoscopy (10/1/04). Social History/Living Environment:     25and 25year old son and daughter. . Prior Level of Function/Work/Activity:  Worked in bakery department. 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 One story - Own home:  About 5 steps to get in with bilateral railing. Staying at [facility] house which is easier to maneuver - low tub - can walk right in. Small wheelchair ramp and then level. Mom helps with driving and household duties - cooking. Dominant Side:         RIGHT    Current Medications:    Current Outpatient Prescriptions:     oxybutynin chloride XL (DITROPAN XL) 10 mg CR tablet, Take 1 Tab by mouth daily. Indications: URINARY URGE INCONTINENCE, Disp: 30 Tab, Rfl: 6    raNITIdine (ZANTAC) 150 mg tablet, Take 1 Tab by mouth two (2) times a day., Disp: 60 Tab, Rfl: 5    diazePAM (VALIUM) 5 mg tablet, Take 0.5 Tabs by mouth every six (6) hours as needed. Max Daily Amount: 10 mg. Indications: MUSCLE SPASM, Disp: 60 Tab, Rfl: 1    tiZANidine (ZANAFLEX) 4 mg tablet, Take 1 Tab by mouth three (3) times daily. , Disp: 90 Tab, Rfl: 6    hydroCHLOROthiazide (MICROZIDE) 12.5 mg capsule, Take 2 Caps by mouth daily. , Disp: 30 Cap, Rfl: 6    potassium chloride SR (K-TAB) 20 mEq tablet, Take 1 Tab by mouth two (2) times a day., Disp: 60 Tab, Rfl: 6    aspirin (ASPIRIN) 325 mg tablet, Take 1 Tab by mouth daily. , Disp: 30 Tab, Rfl: 12    acetaminophen (TYLENOL) 325 mg tablet, Take 2 Tabs by mouth every six (6) hours as needed for Pain or Fever (headache). , Disp: 30 Tab, Rfl: 0    escitalopram oxalate (LEXAPRO) 20 mg tablet, Take 1 Tab by mouth daily. , Disp: 30 Tab, Rfl: 5   Date Last Reviewed:  11/15/2017     Number of Personal Factors/Comorbidities that affect the Plan of Care: 1-2: MODERATE COMPLEXITY   EXAMINATION:   ROM:          Painful right shoulder and finger and hands. Shoulder not fully assessed. Seeing OT. Passive Fingers to neutral extension at least.  No overpressure given. Elbow slight decrease as end ranges. Supination ~75% with mild pain. Right lower extremity WFL  Minimal pain in my right shoulder. OT has taped it and that helps. Left WFL  Strength:          Right UE:  Almost no active movement noted. Possible scap stabilization and tone is not flaccid. Right hip flexion:  3-/5  Knee extension at least 4/5  Nothing in DF. Functional Mobility:         Gait/Ambulation:  Ambulates with large base quad came with slow small steps with custom hinged AFO on. Transfers:  Independent. occ no WBing on the right with sit to stand        Bed Mobility:  NT  Mental Status:          Alert and oriented x 4;  Pleasant and appropriate  Vision:          No deficits reported. Body Structures Involved:  1. Nerves  2. Bones  3. Joints  4. Muscles  5. Ligaments Body Functions Affected:  1. Sensory/Pain  2. Neuromusculoskeletal  3. Movement Related Activities and Participation Affected:  1. Learning and Applying Knowledge  2. General Tasks and Demands  3. Mobility  4. Self Care  5. Domestic Life  6.  Interpersonal Interactions and Relationships  7. Community, Social and [place]   Number of elements that affect the Plan of Care: 4+: HIGH COMPLEXITY   CLINICAL PRESENTATION:   Presentation: Evolving clinical presentation with changing clinical characteristics: MODERATE COMPLEXITY   CLINICAL DECISION MAKING:   Outcome Measure: Tool Used: Perez Balance Scale  Score:  Initial: 46/56 Most Recent: 48/56 (Date: -- )   Interpretation of Score: Each section is scored on a 0-4 scale, 0 representing the patients inability to perform the task and 4 representing independence. The scores of each section are added together for a total score of 56. The higher the patients score, the more independent the patient is. Any score below 45 indicates increased risk for falls. Score 56 55-45 44-34 33-23 22-12 11-1 0   Modifier CH CI CJ CK CL CM CN       Tool Used: Timed Up and Go (TUG)  Score:  Initial: with cane:  45.24 seconds; without cane 52.01 seconds Most Recent: 31.05 seconds with cane:  30.05 without cane  With 8 mm heel lift in shoe with cane:  27.85 (Date: 10/27/17)   Interpretation of Score: The test measures, in seconds, the time taken by an individual to stand up from a standard arm chair (seat height 46 cm [18 in], arm height 65 cm [25.6 in]), walk a distance of 3 meters (118 in, approx 10 ft), turn, walk back to the chair and sit down. If the individual takes longer than 14 seconds to complete TUG, this indicates risk for falls. Score 7 7.5-10.5 11-14 14.5-17.5 18-21 21.5-24.5 25+   Modifier CH CI CJ CK CL CM CN     Outcome Measure: Tool Used: Dizziness Handicap Inventory  Score:  Initial: 66% Most Recent:    Interpretation of Score: The test rates the patients perception of handicap that dizziness plays in their ADLs.   100 - 70=severe perception of handicap, 71- 40=moderate perception of handicap;  44 - 0=low perception of handicap    Medical Necessity:   · Skilled intervention continues to be required due to residual right sided hemiparesis. Reason for Services/Other Comments:  · Patient continues to require skilled intervention due to residual right hemiparesis. .   Use of outcome tool(s) and clinical judgement create a POC that gives a: Questionable prediction of patient's progress: MODERATE COMPLEXITY      S:  I am feeling a little off today. TREATMENT:   (In addition to Assessment/Re-Assessment sessions the following treatments were rendered)  BP pre treatment in sittin/59 mmHg   Standing 78/57 mmHg      THERAPEUTIC EXERCISE: (30  minutes):  Exercises per grid below to improve strength and coordination. Required minimal visual and verbal cues to to perform correctly. Progressed repetitions as indicated. NEUROMUSCULAR RE-EDUCATION: (8 minutes):  Exercise/activities per grid below to improve balance, coordination, kinesthetic sense, proprioception and motor control. Required maximal manual, tactile and facilitation cues to perfrom activities. Date:  17 Date:  11/10/17 Date:  11/15/17   Activity/Exercise Parameters      Tracking - horizontal  3 x 5 X 10 reps   Vertical    2x5 X 10 reps    D1  2x5 X 10 reps   D2  2x5 X 10 reps   Saccades - horizontal  2x5 2 x 10 reps   vertical  2x5 2 x 10 reps   D1  2x5 X 10 reps with difficulty   D2  2x5 X 10 reps with difficulty          Right LE tone reduction and sensorimotor retraining for improved gait and standing balance   In supine tone reduction x 8 minutes   Seated in chair (pt is short in stature).   Right shoe and brace off Neuro-JAKE tactile stimulation to R foot to stimulate dorsiflexion x 10 reps   rockerboard under R foot working on small range plantarflexion 3 x 5 reps with facilitation      Kicking soccer ball in bars for tone reduction and improved swing pattern  2 x 10 reps each leg      Facilitated long arc quad      LE tone reduction prior to gait       Gait retraining In the bars working on hip/knee release and stepping pattern x 10 minutes   Then with quad cane working on kicking out and putting weight down with forward momentum      Mini squats on door with therapist facilitating knee extension         Standing in parallel bars, 1\" block under R foot Therapist assisting with elevating up and then working on slow control on the way day for quad and plantarflexor eccentric control 2 x 10 reps          Therapeutic Exercise: ( 0 minutes):  Exercises per grid below to improve mobility, strength and coordination. Required moderate visual, verbal and manual cues to promote proper body alignment, promote proper body posture and promote proper body breathing techniques. Progressed range and repetitions as indicated. Date:  10/11/17 Date:  10/20/17 Date:  10/26/17   Activity/Exercise Parameters Parameters Parameters   Piriformis stretch   3 x 30 second hold     Hip capsule stetch   3 x 30 second hold right     Hip flexor stretch   3 x 30 second      Bent knee hip abduction stretch with active adduction   10 reps with 10 second hold     SKTC stretch   3 x 30 second hold right  3 x 30 second hold right   Heel cord and plantar fascia stretch passive X 5 minutes  X 5 minutes   Bridging   2 x 10 reps B     LAQ   4 x 5 reps right  4 x 5 reps right   Right ankle dorsiflexion    During russian e-stim x 10 minutes    Manual facilitation of right dorsiflexors   Working with patient on active dorsiflexion with manual and verbal cues with patient supine       EDUCATION:  Instructed in above exercises and home program.  HEP:  Written HEP given to patient. Instructed to perform 3 times per day and to increase reps as tolerated. .     Treatment/Session Assessment: patient had low BP today and reported feeling weak and \"jello legs\". Treatment focused on seated exercises and tone reduction to improve balance and stability.   Patient has an MD appointment at 11:00 this am.  Wrote down her BPs and patient is to discuss with the MD.    Patients response to todays treatment session was tolerated well with no medical complications. .  · Post session pain:  No pain  · Compliance with Program/Exercises: Will assess as treatment progresses. · Recommendations/Intent for next treatment session: \"Next visit will focus on advancements to more challenging activities\".   Total Treatment Duration:  PT Patient Time In/Time Out  Time In: 0930  Time Out: UlGreg George [address], DPT

## 2017-11-17 ENCOUNTER — HOSPITAL ENCOUNTER (OUTPATIENT)
Dept: PHYSICAL THERAPY | Age: 42
Discharge: HOME | End: 2017-11-17
Payer: COMMERCIAL

## 2017-11-17 PROCEDURE — 97112 NEUROMUSCULAR REEDUCATION: CPT

## 2017-11-17 PROCEDURE — 97110 THERAPEUTIC EXERCISES: CPT

## 2017-11-17 NOTE — PROGRESS NOTES
Zoraida pitt  : 1975 [facility]  [address], [address] [facility], [address]  [phone]   [phone]         OUTPATIENT OCCUPATIONAL THERAPY: Daily Note 2017    ICD-10: Treatment Diagnosis:   Hemiplegia and hemiparesis following cerebral infarction affecting right dominant side (I69.351)  Precautions/Allergies:   Erythromycin   Fall Risk Score: 3 (? 5 = High Risk)  MD Orders: OT evaluate and treat  MEDICAL/REFERRING DIAGNOSIS:   Stroke (cerebrum) ([facility] [address] [I63.9]   DATE OF ONSET: 2017   REFERRING PHYSICIAN: Kera Pineda*  RETURN PHYSICIAN APPOINTMENT: 2017     INITIAL ASSESSMENT:  Ms. Demi pitt has been seen by OT for a total of 7 visits. Pt reports she has been using saebo flex brace for R UE, but reports pain after prolonged wear. Pt has now started alternating between resting hand splint and saebo flex brace with decreased pain. Pt reports severe pain with attempts to passively extend digits. Pt is demonstrating some increased tone in the R UE. Pt reports pain in R shoulder but no presence of subluxation. Pt has been working on weightbearing to help improve strength, function, and decrease tone in the R dominant UE. Pt demonstrates good progress with scapular strength. Pt also has some activation at the elbow flexors/shoulder abductors with movement in flexor synergy. Pt making slow progress and is 10-15 minutes late to most all appointments which limits progress as well. Pt does report decreased pain and improved positioning of R UE after treatment. Pt to continue per plan of care. PLAN OF CARE:   PROBLEM LIST:  1. Decreased Strength  2. Decreased ADL/Functional Activities  3. Decreased Transfer Abilities  4. Decreased Ambulation Ability/Technique  5. Decreased Balance  6. Increased Pain  7. Decreased Activity Tolerance  8. Decreased Flexibility/Joint Mobility  9.  Decreased [place] with Home Exercise Program INTERVENTIONS PLANNED:  1. Activities of daily living training  2. Adaptive equipment training  3. Balance training  4. Clothing management  5. Donning&doffing training  6. Manual therapy training  7. Modalities  8. Neuromuscular re-eduation  9. Splinting  10. Therapeutic activity  11. Therapeutic exercise   TREATMENT PLAN:  Effective Dates: 9/11/17 TO 12/11/17. Frequency/Duration: 2 times a week for 12 weeks  GOALS: (Goals have been discussed and agreed upon with patient.)  Short-Term Functional Goals: Time Frame:4- 6 weeks  1. Pipo Stauffer will report decreased pain in the R shoulder from 5/10 to 3/10 with passive movement to decrease stiffness for daily activity. CONTINUE  2. Pipo Stauffer will be 100% compliant with daily SROM exercises for improving R UE ROM to decrease risk for contractures. CONTINUE  3. Pipo Stauffer will demonstrate ability to complete weightbearing activities in R UE with moderate facilitation to improve functional use for transfers. MET  4. Pipo Stauffer will complete basic ADL with supervision to improve independence with functional tasks. CONTINUE  5. Pipo Stauffer will tolerate 15 minutes of dynamic standing balance activity with CGA to decrease risk for falls with ADL and to improve activity tolerance. NOT ADDRESSED  6. Discharge Goals: Time Frame: 8-12 weeks   1. Pipo Stauffer will have functional PROM of R shoulder to [facility] to decrease pain and stiffness in R dominant UE. CONTINUE  2. Pipo Stauffer will demonstrate improved strength in the R dominant UE as evidenced by at least 2+ to 3-/5 strength for using R UE as functional assist. CONTINUE  3. Pipo Stauffer will demonstrate ability to functionally grasp and release light objects with supervision and with use of R dominant hand. CONTINUE  4. Pipo Stauffer will be modified independent with all basic ADL to improve overall quality of life. CONTINUE  5.  Pipo Stauffer will use R hand as functional assist with daily activities/transfers with supervision to improve independence with ADL. CONTINUE   Rehabilitation Potential For Stated Goals: Good  Regarding [place] therapy, I certify that the treatment plan above will be carried out by a therapist or under their direction. Thank you for this referral,  Aletta Dancer, OT                 The information in this section was collected on 9/11/17 (except where otherwise noted). OCCUPATIONAL PROFILE & HISTORY:   History of Present Injury/Illness (Reason for Referral):  Pt reports she was at work when she started feeling like she was going to pass out. Pt reports she didn't really stop working and the feeling wouldn't go away. It started at 2pm and left work at 3:30 pm. Pt felt like she was going to fall and pushed a buggy to her car and drove to her daughter's house. Pt then was brought over to the ER at [facility]. Progressively got worse with R sided weakness and woke up the next morning with full parlaysis of the R side. Reports her eyesight was decreasing and needing reading glasses. R eye is a little worse with the blurry vision. Pt wears to reading glasses. Pt using a wheelchair as less as possible. Using wheelchair when out and about. Pt using a quad cane around the home and has AFO to R LE. Tub transfer bench with CGA. Eats at the kitchen table. Pt stayed from Aug 8th- September 6th in inpatient rehab. Pt reports no falls at home. Pt getting dressed using techniques learned for inpatient rehab. Difficulty walking and carrying objects. Pt has some issues with Zanaflex and incontinence (she is unsure if that is related). Pt presents today for outpatient occupational therapy services. Past Medical History/Comorbidities:   Ms. Irlanda Varner  has a past medical history of Generalized anxiety disorder; GERD (gastroesophageal reflux disease); Hypertension; Insomnia, unspecified; Iron deficiency anemia (8/23/2013); Left sided lacunar stroke ([facility] [address] (08/2017);  Mitral valve prolapse (1993); Obesity (BMI 30-39.9); RADHA on CPAP (10/2017); and Prediabetes (7/28/2015). Ms. Sharlene Palomo  has a past surgical history that includes tubal ligation (1995) and endoscopy (10/1/04). Social History/Living Environment:   Lives with mom with her  because toilet/tub is lower with more open spaces. Pt also has a son. Pt has 24 hr supervision. Prior Level of Function/Work/Activity: Indpendent and working full time in [employer] at [employer] .   Dominant Side:         RIGHTPersonal Factors: Other factors that influence how disability is experienced by the patient:  Hx of anxiety disorder, HTN, Pre-diabetic    Current Medications:    Current Outpatient Prescriptions:     diazePAM (VALIUM) 5 mg tablet, Take 0.5 Tabs by mouth every six (6) hours as needed. Max Daily Amount: 10 mg. Indications: Muscle Spasm, Disp: 60 Tab, Rfl: 2    tiZANidine (ZANAFLEX) 2 mg tablet, Take 1 Tab by mouth three (3) times daily. Indications: Muscle Spasticity of Cerebral Origin, Disp: 90 Tab, Rfl: 4    raNITIdine (ZANTAC) 150 mg tablet, Take 1 Tab by mouth two (2) times a day., Disp: 60 Tab, Rfl: 5    hydroCHLOROthiazide (MICROZIDE) 12.5 mg capsule, Take 2 Caps by mouth daily. , Disp: 30 Cap, Rfl: 6    potassium chloride SR (K-TAB) 20 mEq tablet, Take 1 Tab by mouth two (2) times a day., Disp: 60 Tab, Rfl: 6    aspirin (ASPIRIN) 325 mg tablet, Take 1 Tab by mouth daily. , Disp: 30 Tab, Rfl: 12    acetaminophen (TYLENOL) 325 mg tablet, Take 2 Tabs by mouth every six (6) hours as needed for Pain or Fever (headache). , Disp: 30 Tab, Rfl: 0    escitalopram oxalate (LEXAPRO) 20 mg tablet, Take 1 Tab by mouth daily. , Disp: 30 Tab, Rfl: 5            Date Last Reviewed:  9/11/17   Complexity Level : Expanded review of therapy/medical records (1-2):  MODERATE COMPLEXITY   ASSESSMENT OF OCCUPATIONAL PERFORMANCE:     Palpation:  Patient with no palpated subluxation of the R shoulder at this time.  Increased pain and tightness with R shoulder flexion/abduction with passive ROM above 90 degrees. Pain along anterior portion of humeral head with anterior tilt. Pain radiates along the middle deltoid. Modified Maggi Scale     Grade Description : Noted in ELBOW FLEXORS (1)  WRIST FLEXORS(2) , FINGER FLEXORS (1+)  []0 - No increase in muscle tone  []1 - Slight increase in muscle tone, manifested by a catch and release, or by minimal resistance at the end of the range of motion when the affected part(s) is moved in flexion or extension  [x]1+ - Slight increase in muscle tone, manifested by a catch, followed by minimal resistance throughout the remainder (less than half) of the range of movement (ROM)  []2 - More marked increase in muscle tone through most of ROM, but affected part(s) easily moved  []3 - Considerable increase in muscle tone, passive movement difficult  []4 - Affected part(s) rigid in flexion and extension      ROM/Strength:  L UE WNL; No functional AROM of R UE.            Date:  9/11/17  Date:  9/11/17 Date:  10/25/17 Date:  10/25/17    PROM  STRENGTH PROM Strength   Movement RIGHT  RIGHT Right Right    SCAPULA:        Elevation  Limited  2 limited 2   Protraction  Limited  2+ limited 2+   Retraction  Limited   2 WFL 3   SHOULDER:        Flexion  100  0 115 PROM    Abduction  90  1 20-25 AROM  2   External rotation  70  0 30 PROM 0   Internal rotation  WNL  0 WFL PROM 0   Horizontal abduction 100  0  0   Horizontal adduction  WFL  0  0   Extension         ELBOW:        Flexion  WNL  0 75 AROM 2   Extension  WNL  0     FOREARM:        Supination  WFL  0  0   Pronation  WFL  0  0   WRIST:        Wrist flexion  65  0 55 PROM 0   Wrist extension  65  0 55 PROM 0   Wrist radial deviation         Wrist ulnar deviation         HAND:        Finger flexion WFL  0 WFL 0   Finger extension WFL  0 WFL (pain) 0   THUMB:         Abduction  WFL  0 WFL 0    Extension WFL  0 WFL 0    Adduction     WFL 0    MCP flexion  WFL  0 IP flexion  WFL  0     Hand Strength:         unable  0     THREE JAW KARY PINCH unable  0     LATERAL PINCH  unable  0       Functional Mobility:         Gait/Ambulation:  CGA to supervision with quad cane; Uses wheelchair occasionally         Bed Mobility:  CGA  Balance:          Good static standing balance but fair to poor dynamic standing balance  Coordination:          Non-functional in R dominant UE  Mental Status: WNL  Vision:          Reading glasses. Reports some blurry vision in R eye. Tracking and visual fields intact. Some decreased speed with saccadic movement to the R. Activities of Daily Living:           Basic ADLs (From Assessment) Complex ADLs (From Assessment)         Grooming/Bathing/Dressing Activities of Daily Living                                   Sensation:         Grossly intact to light touch in B UE. R hand with some decreased light touch sensation. Reports some hypersensitivity to cold. Physical Skills Involved:  1. Range of Motion  2. Balance  3. Strength  4. Activity Tolerance  5. Sensation  6. Fine Motor Control  7. Gross Motor Control  8. Pain (acute) Cognitive Skills Affected (resulting in the inability to perform in a timely and safe manner):  1. WNL Psychosocial Skills Affected:  1. Anxiety Disorder (per chart review)   Number of elements that affect the Plan of Care: 5+:  HIGH COMPLEXITY   CLINICAL DECISION MAKING:   Outcome Measure: Tool Used: Fugl-Goodman Upper Extremity Motor Assessment  Score:  Initial: 8/66 Most Recent: X (Date: -- )   Interpretation of Score: Outcome Measure Conversion Site    ?  Carrying, Moving, and Handling Objects:     - CURRENT STATUS: CM - 80%-99% impaired, limited or restricted    - GOAL STATUS: CK - 40%-59% impaired, limited or restricted    - D/C STATUS:  ---------------To be determined---------------     Fugl-Goodman Upper Extremity Motor Assessment (without reflexes) Date:  9/11 Date:   Date:     Item Description Score Score Score   Wrist circumduction 0     Hook grasp 0     Shoulder flexion to 180°, elbow extended 0     Spherical grasp 0     Lateral prehension 0     Wrist flexion/extension, elbow extended 0     Pronation-supination, elbow extended 0     Wrist stable, elbow extended 0     Movement with normal speed 0     Forearm supination 0     Shoulder abduction to 90°, elbow extended 0     Movement without dysmetria 0     Shoulder external rotation 0     Wrist stable, elbow at 90° 0     Wrist flexion/extension, elbow at 90° 0     Palmar prehension 0     Scapular retraction 1     Pronation-supination, elbow at 90° 0     Shoulder flexion to 90°, elbow extended 0     Hand to lumbar spine 0     Shoulder abduction 0     Elbow extension 0     Forearm pronation 0     Movement without tremor 0     Cylindrical grasp 0     Finger mass extension (relaxation of flexion) 0     Scapular elevation 1     Finger mass flexion 0     Shoulder adduction with internal rotation 0     Elbow flexion 0     Total Score (Max = 60) 2           Medical Necessity:   · Patient demonstrates good rehab potential due to higher previous functional level. Reason for Services/Other Comments:  · Patient continues to require skilled intervention due to decreased functional independence s/p CVA impacting overall quality of life. Clinical Decision-Making Assessment:     Assessment process, impact of co-morbidities, assessment modification\need for assistance, and selection of interventions: Analytical Complexity:HIGH COMPLEXITY   TREATMENT:   (In addition to Assessment/Re-Assessment sessions the following treatments were rendered)    Pre-treatment Symptoms/Complaints:    Pain: Initial:    Post Session: 0/10      Manual Therapy (0): Pt received leuko rigid taping along deltoid and supraspinatus to address support for soft tissue pain in R shoulder. Pt then taped with guidance of humeral head posteriorly. Pt reports no complaints.    Therapeutic Exercise: (8 minutes):  Exercises per grid below to improve mobility. Required maximal manual and tactile cues to promote proper body alignment, promote proper body posture and promote proper body mechanics. Progressed repetitions and complexity of movement as indicated. Date:  10/30/17 Date:  11/1/17 Date:  11/6/17 Date:  11/15/17 Date:  11/17/17   Activity/Exercise Parameters Parameters Parameters     External Rotation  10-15 reps with glenohumeral mobilizations; 15-20 reps with dowel with minimal tactile cues  1)10-15 reps with glenohumeral mobilizations (supine)  2) 10 reps SROM with dowel and moderate tactile cues  3) 10 reps from side-lying position  Pt completed x 15-10 reps from supine position with glenohumeral mobilizations  15-20 reps with dowel and minimal facilitation from supine position (assistance to position elbow at side) 15-20 reps with dowel and minimal facilitation from supine position (assistance to position elbow at side)   Shoulder Flexion  10 reps tolerated to 130 ; SROM x 5 reps with addditional time 10 reps with dowel to ~90 degrees with minimal assistance in supine       Scapular Press 10 reps supine with assistance of L UE       Shoulder abduction  10 reps with glenohumeral mobilization with minimal c/o pain to ~130 Attempted 2-3 reps but too painful today  8-10 reps to ~80 degrees with inferior glide and less reports of pain      Horizontal Abduction/add 5-8 reps with pain with horizontal abd       Scapular Mobilizations  AAROM in sidelying for scapular elevation/depression with maximal tactile cues  Scapular depression from side-lying x 10-15 reps   Scapular mobilizations from sidelying for 15 reps AAROM    Backward shoulder rolls   5-8 reps with moderate assistance with cues to press down into depression          Neuromuscular Re-education: (  32 minutes):  Exercise/activities per grid below to improve kinesthetic sense and proprioception.   Required minimal visual, verbal, manual and tactile cues to promote motor control of right, upper extremity(s).     Date:  10/28/17 Date:  10/30/17 Date:  11/1/17 Date:  11/3/17 Date:  11/6/17 Date:  11/15/17 Date:  11/17/17   Activity/Exercise          Supine to Sit  Supervision with minimal verbal cues     Supervision with some additional time     Trunk Rotation       Supine with pt tolerating abduction to about 60 degrees in R UE and rotating the trunk 2 reps each direction and holding 20 seconds     Stimulation combined with attempts to activate movement     1) shoulder internal/external rotators x 5 minutes  2) Shoulder abductors x 5 minutes  3) Wrist extensors x 5 minutes  4) Finger extensors x 5 minutes       Towel Glides Pt worked on activating movement at table in paddle and on washcloth pressing forward with moderate guide x 5-8 reps and in circumduction at table for 5-8 reps with additional time and minimal guidance   Moving to the side and forwards with minimal active movement and maximal assistance to guide arm forward   Pt completed in sitting  1) Shoulder flexion with some incline with assistance up and cues to slowly lower down x 10-15 reps with moderate assistance  2) Shoulder scaption with some incline with  assistance up and cues to slowly lower with moderate assistance  Pt completed in sitting with no incline with moderate tactile cues and pt pressing forward into shoulder flexion for 10-15 reps    Arm Slides      Pt completed in side-lying on L side;  1) 10 reps working on scapular movement and maximal facilitation reaching forward  2) Arm slides with palms up position x 10 reps with maximal facilitation  3) Arm slides along thumb position with maximal facilitation x 10 reps  Pt completed in side-lying on L side  1) 10-15 reps working from palms up position with moderate facilitation   2) thumbs down position x 10-15 reps with moderate facilitation   3) palms down position x 10-15 reps with moderate facilitation   4) Pressing forwards/backwars with hand on ball x 10-15 reps with moderate facilitation  Pt completed in side-lying on L side  1) 10-15 reps working from palms up position with moderate facilitation   2) thumbs down position x 10-15 reps with moderate facilitation    Forward reach    Side-lying working with Feldalexusais technique with cues to assist with movement at the shoulder into forward reach with cues to assist with scapular motion x 10 reps  Sitting for 3-5 reps with maximal facilitation (decreased ability to press through the R hand)     Weightbearing  Pt completed weightbearing into the R UE with arm fully extended x10 reps with pt tolerating elbow extension f 10-15 reps with prop to decrease wrist pain with ability to rotate shoulder out and tolerated with supervision 1) sitting and weightbearing into elbow with minimal to moderate verbal cues for trunk positioning  2) Weightbearing with prop under R hand/wrist x 10 reps with moderate assistance at the elbow  3) Standing at table and reaching to R of midline while weightbearing into R UE x10-15 reps   1) Sitting and weightbearing into elbow with minimal to moderate verbal cues for trunk positioning  2) Weightbearing with prop under R hand/wrist x 10 reps and maximal assistance under the wrist  Pt tolerated with wrist in paddle and hand flat on mat x 10 reps with moderate facilitation of the elbow into extension  Pt tolerated with wrist in paddle and hand flat on mat x 10 reps with moderate facilitation of the elbow into extension    Tone Reduction into hand Pt received krueger stretching combined with metacarpal mobilizations to R hand to decrease pain/tone; Gentle distraction to digits at the PIP joints with some pain reported to promote finger extension; Placement in paddle with pt tolerating well  Pt completed krueger stretching and finger extension with mobilizations at PIP joints  Krueger stretching with metacarpal mobilizations and stretching of digits into abduction (painful today)   Blood stretching/finger extension with placement of R hand in paddle  [employer] stretching/finger extension with placement of R hand in paddle    Prone on Elbows   10 reps shfft weight side to side with pain in R shoulder         Pectoral glides  NDT facilitation x 15 reps         Elbow Flexion   Movement in synergy blocking scapular elevation x 5 reps with fatigue     Pt completed with holding ball in B hands (strap in place) and bending B UEs into elbow flexion/extension x 10 reps with minimal tactile cues    Guided Movement   1) Forward flexion with elbow extension and wrist extension x 10 reps with total assistance  2) 2 sets x 5 guided movement to grasp and release cups with total assistance       Shoulder abduction      Placing R hand in weightbearing position from side-lying and rotating arm from adduction to abduction with maximal facilitaiton Placing R hand in weightbearing position from side-lying and rotating arm from adduction to abduction with maximal facilitation x 10 reps   Glides on board with paddle and hand towel x 10 reps to improve ROM and to provide motor feedback    Shoulder Rotational Movement     Pt completed combined with wrist stretch with rotational movement from internal to external ROM      PNF     Total facilitation to within tolerable ROM for 10 reps in D1/D2 flexion/extension                Treatment/Session Assessment:    · Response to Treatment: Pt appears to be feeling a little better today. Pt went to MD on Wednesday and labs were drawn. Pt also being sent for follow-up with neurology. Pt reports that over the past day she has noticed tone and stiffness in R UE/LE with the cold weather. Pt tolerated session well but with some increased tone at the shoulder and hand as compared to previous visit. Pt is significantly less pain in the hand at the digits with minimal to no pain in the hand with facilitation into extension. Pt to continue per plan of care. · Compliance with Program/Exercises: Will assess as treatment progresses. · Recommendations/Intent for next treatment session: \"Next visit will focus on advancements to more challenging activities and reduction in assistance provided\".   Total Treatment Duration:OT Patient Time In/Time Out  Time In: 1300  Time Out: CLAIRE Nunez

## 2017-11-20 ENCOUNTER — HOSPITAL ENCOUNTER (OUTPATIENT)
Dept: PHYSICAL THERAPY | Age: 42
Discharge: HOME | End: 2017-11-20
Payer: COMMERCIAL

## 2017-11-20 PROCEDURE — 97110 THERAPEUTIC EXERCISES: CPT

## 2017-11-20 PROCEDURE — 97112 NEUROMUSCULAR REEDUCATION: CPT

## 2017-11-20 PROCEDURE — 97530 THERAPEUTIC ACTIVITIES: CPT

## 2017-11-20 NOTE — PROGRESS NOTES
Zoraida Simms [place]  : 1975  Primary: [employer] Other  Secondary:  [address]  at [facility]  [address], [place], [facility]  [phone]   [phone]        OUTPATIENT PHYSICAL THERAPY:Daily Note 2017   ICD-10: Treatment Diagnosis: Difficulty in walking, not elsewhere classified (R26.2); Hemiplegia and hemiparesis following cerebral infarction affecting right dominant side (I69.351); Dizziness and giddiness (R42)      Precautions/Allergies:   Erythromycin   Fall Risk Score: 0 (? 5 = High Risk)  MD Orders: PT eval MEDICAL/REFERRING DIAGNOSIS:  Stroke (cerebrum) ([facility] [address] [I63.9]   DATE OF ONSET: 8/3/17  REFERRING PHYSICIAN: MIK Dunne [address]: unknown  DATE OF PROGRESS NOTE:    RECERTIFICATION DATE:    REEVALUATION:  Pt has attended 15 physical therapy sessions from 9/15/17 to date including initial assessment. Pt has complained several times of motion sickness, nausea and occasional dizziness that seems to be chronic. Binocular vision exam is abnormal.  Pt cannot tolerate more that 5 reps of tracking and saccadic exercises without having some dizziness. Pt will benefit from vison exercises and possible vestibular exercise as well to try to decrease dizziness. Dizziness Handicap Inventory Score is 66% disability. PROGRESS NOTE:  Pt has attended 10 physical therapy sessions from 9/15/17 to date including initial assessment. Sessions consist of range of motion, therapeutic activity, therapeutic exercise, balance and gait refinement. Pt has shown improvement in balance and gait ability. Pt has increased Perez Balance Score by 2 points indicating improved static standing balance and decreased risk for fall. Pt has decreased TUG time by over 14 seconds with her cane and almost 22 seconds without her cane indicating more normalized walking pattern.  Her gait pattern has improved but she does not take normal step length on the left because she is trying to keep her right knee from hyperextending in stance on the right with her AFO on. I feel the AFO may be a little short so I placed a heel lift in her right shoe with further improvement in her TUG time by 3 more seconds for an overall increase of 17 second improvement with her cane. We will have [facility] look at her AFO to see if it needs adjustments. Pt will continue to benefit from physical therapy for at least 4 more weeks to maximize functional ability. INITIAL ASSESSMENT:  Ms. Anuradha Alfonso presents with dominant right hemiplegia s/p CVA on 8/3/17. Pt presents with ADL, balance and gait deficits. Pt will benefit from physical therapy to maximize to full potential and safety with all functional mobility. PROBLEM LIST (Impacting functional limitations):  1. Decreased Strength  2. Decreased ADL/Functional Activities  3. Decreased Ambulation Ability/Technique  4. Decreased Balance  5. Increased Pain  6. Decreased Activity Tolerance  7. Decreased Flexibility/Joint Mobility  8. Decreased [place] with Home Exercise Program INTERVENTIONS PLANNED:  1. Balance Exercise  2. Bed Mobility  3. Family Education  4. Gait Training  5. Home Exercise Program (HEP)  6. Neuromuscular Re-education/Strengthening  7. Range of Motion (ROM)  8. Therapeutic Activites  9. Therapeutic Exercise/Strengthening  10. Transfer Training  11. modalities   TREATMENT PLAN:  Effective Dates: 9/22/17 TO 12/15/17. Frequency/Duration: 3 times a week for 8 weeks  GOALS: (Goals have been discussed and agreed upon with patient.)  Short-Term Functional Goals: Time Frame: 4 weeks  1. Pt will be independent with range of motion, strength and balance home exercise program.  STATUS:  MET  2. Pt will decrease TUG score by 5 seconds indicating more normalized and safer gait pattern.   STATUS:  MET  Discharge Goals: Time Frame: 8 weeks  Pt will show increased range of motion and improved posture to allow for improved safety and ability with all functional mobility. .  STATUS:  PROGRESSING, CONTINUE 4 WEEKS. Pt will decrease TUG score by 10 seconds indicating more normalized gait pattern and decrease risk for falls. STATUS:  MET  NEW GOAL:  Pt will decrease TUG score by 5 more seconds indicating more normalized gait pattern and decrease risk for falls. NEW GOAL  Pt will increase Perez Balance Scale to 49/56 indicating increased balance and decreased risk for falls. STATUS:  NOT MET, CONT 4 WEEKS. 5.  NEW GOAL (2 weeks from 11/10/17): Pt will be able to tolerate 10 reps of each eye exercise with minimal to no dizziness indicating increased binocular strength. 6.  NEW GOAL (4 weeks from 11;10/17): Pt will report no more than 20% disability on the Dizziness Handicap Inventory Scale indicating decreased dizziness and increased functional activity and quality of life. Rehabilitation Potential For Stated Goals: Good  Regarding [place] therapy, I certify that the treatment plan above will be carried out by a therapist or under their direction. Thank you for this referral,  Roderick Cummins, PT                  HISTORY:   GOAL:  Love to be back to doing things independenlty. Would like to get to where I don't have to use the cane. Get back to my own home. 10/27/2017  I definitely cant go back to work right now. Its slow progress I definitely cna;t drive at the [employer]. I have gotten stornger as far as my blance and in my leg. More ocmrortalble doing suff around the kitchen . I can take a dish and put it in the sink and rinse it off. I keep it near me in sonia I get tired. I will move aorund owthout it. Present Symptoms:    38year old  right handed female s/p left CVA 8/3/17 2* to HTN. Short term disability for now. Work in the [id number] at [employer]. Very physical job. More sued to my left hand.   Learning how to function with one hand is difficult. ADLs with just my left hand is difficult. Dynamic sitting balance. I just have to be careluf that my foot is flat especially if I have my shoes off. Tire easily. Buildig up strength. Large base quad cane but not all the time at home. Can't carry a heavy load. PAIN:  Right fingers and shoulder with slight movement. 6/10  History of Present Injury/Illness (Reason for Referral):  See above  Past Medical History/Comorbidities:   Ms. Irlanda Varner  has a past medical history of Generalized anxiety disorder; GERD (gastroesophageal reflux disease); Hypertension; Insomnia, unspecified; Iron deficiency anemia (8/23/2013); Left sided lacunar stroke ([facility] [address] (08/2017); Mitral valve prolapse (1993); Obesity (BMI 30-39.9); RADHA on CPAP (10/2017); and Prediabetes (7/28/2015). Ms. Irlanda Varner  has a past surgical history that includes tubal ligation (1995) and endoscopy (10/1/04). Social History/Living Environment:     25and 25year old son and daughter. . Prior Level of Function/Work/Activity:  Worked in [employer] department. 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 One story - Own home:  About 5 steps to get in with bilateral railing. Staying at [facility] house which is easier to maneuver - low tub - can walk right in. Small wheelchair ramp and then level. Mom helps with driving and household duties - cooking. Dominant Side:         RIGHT    Current Medications:    Current Outpatient Prescriptions:     diazePAM (VALIUM) 5 mg tablet, Take 0.5 Tabs by mouth every six (6) hours as needed. Max Daily Amount: 10 mg. Indications: Muscle Spasm, Disp: 60 Tab, Rfl: 2    tiZANidine (ZANAFLEX) 2 mg tablet, Take 1 Tab by mouth three (3) times daily. Indications: Muscle Spasticity of Cerebral Origin, Disp: 90 Tab, Rfl: 4    raNITIdine (ZANTAC) 150 mg tablet, Take 1 Tab by mouth two (2) times a day., Disp: 60 Tab, Rfl: 5    hydroCHLOROthiazide (MICROZIDE) 12.5 mg capsule, Take 2 Caps by mouth daily. , Disp: 30 Cap, Rfl: 6    potassium chloride SR (K-TAB) 20 mEq tablet, Take 1 Tab by mouth two (2) times a day., Disp: 60 Tab, Rfl: 6    aspirin (ASPIRIN) 325 mg tablet, Take 1 Tab by mouth daily. , Disp: 30 Tab, Rfl: 12    acetaminophen (TYLENOL) 325 mg tablet, Take 2 Tabs by mouth every six (6) hours as needed for Pain or Fever (headache). , Disp: 30 Tab, Rfl: 0    escitalopram oxalate (LEXAPRO) 20 mg tablet, Take 1 Tab by mouth daily. , Disp: 30 Tab, Rfl: 5   Date Last Reviewed:  11/20/2017     Number of Personal Factors/Comorbidities that affect the Plan of Care: 1-2: MODERATE COMPLEXITY   EXAMINATION:   ROM:          Painful right shoulder and finger and hands. Shoulder not fully assessed. Seeing OT. Passive Fingers to neutral extension at least.  No overpressure given. Elbow slight decrease as end ranges. Supination ~75% with mild pain. Right lower extremity WFL  Minimal pain in my right shoulder. OT has taped it and that helps. Left WFL  Strength:          Right UE:  Almost no active movement noted. Possible scap stabilization and tone is not flaccid. Right hip flexion:  3-/5  Knee extension at least 4/5  Nothing in DF. Functional Mobility:         Gait/Ambulation:  Ambulates with large base quad came with slow small steps with custom hinged AFO on. Transfers:  Independent. occ no WBing on the right with sit to stand        Bed Mobility:  NT  Mental Status:          Alert and oriented x 4;  Pleasant and appropriate  Vision:          No deficits reported. Body Structures Involved:  1. Nerves  2. Bones  3. Joints  4. Muscles  5. Ligaments Body Functions Affected:  1. Sensory/Pain  2. Neuromusculoskeletal  3. Movement Related Activities and Participation Affected:  1. Learning and Applying Knowledge  2. General Tasks and Demands  3. Mobility  4. Self Care  5. Domestic Life  6. Interpersonal Interactions and Relationships  7.  Community, Social and [place]   Number of elements that affect the Plan of Care: 4+: HIGH COMPLEXITY   CLINICAL PRESENTATION:   Presentation: Evolving clinical presentation with changing clinical characteristics: MODERATE COMPLEXITY   CLINICAL DECISION MAKING:   Outcome Measure: Tool Used: Perez Balance Scale  Score:  Initial: 46/56 Most Recent: 48/56 (Date: -- )   Interpretation of Score: Each section is scored on a 0-4 scale, 0 representing the patients inability to perform the task and 4 representing independence. The scores of each section are added together for a total score of 56. The higher the patients score, the more independent the patient is. Any score below 45 indicates increased risk for falls. Score 56 55-45 44-34 33-23 22-12 11-1 0   Modifier CH CI CJ CK CL CM CN       Tool Used: Timed Up and Go (TUG)  Score:  Initial: with cane:  45.24 seconds; without cane 52.01 seconds Most Recent: 31.05 seconds with cane:  30.05 without cane  With 8 mm heel lift in shoe with cane:  27.85 (Date: 10/27/17)   Interpretation of Score: The test measures, in seconds, the time taken by an individual to stand up from a standard arm chair (seat height 46 cm [18 in], arm height 65 cm [25.6 in]), walk a distance of 3 meters (118 in, approx 10 ft), turn, walk back to the chair and sit down. If the individual takes longer than 14 seconds to complete TUG, this indicates risk for falls. Score 7 7.5-10.5 11-14 14.5-17.5 18-21 21.5-24.5 25+   Modifier CH CI CJ CK CL CM CN     Outcome Measure: Tool Used: Dizziness Handicap Inventory  Score:  Initial: 66% Most Recent:    Interpretation of Score: The test rates the patients perception of handicap that dizziness plays in their ADLs. 100 - 70=severe perception of handicap, 71- 40=moderate perception of handicap;  44 - 0=low perception of handicap    Medical Necessity:   · Skilled intervention continues to be required due to residual right sided hemiparesis.   Reason for Services/Other Comments:  · Patient continues to require skilled intervention due to residual right hemiparesis. .   Use of outcome tool(s) and clinical judgement create a POC that gives a: Questionable prediction of patient's progress: MODERATE COMPLEXITY      S:  Saw primary on 17 after seeing Dr. Trina Barnes. Dr. Trina Barnes stopped the Ditropan. Stomach cramps have stopped since then. Going (to urinate) more frequently to try to stop having accidents. I will be at rest and then I will move either roll over in bed or get up to stand and it will just flow. Almost same with BM but have added fiber and that has helped. Neither MD changed BP meds and want neurologist to see for autonomic responses to BP since CVA. Feel like it (BP) is ok. Don't feel like my legs are going to fall out from under me. Borrowing my mil cuff to monitor. Lot going on at home. My  is helping his step father from hip replacement. Will try to see [facility] for brace next week. TREATMENT:   (In addition to Assessment/Re-Assessment sessions the following treatments were rendered)     THERAPEUTIC EXERCISE: (45 minutes):  Exercises per grid below to improve strength and coordination. Required minimal visual and verbal cues to to perform correctly. Progressed repetitions as indicated. Date:  17 Date:  11/10/17 Date:  11/15/17 Date:  17   Activity/Exercise Parameters       BP   Sittin/59  Standin/57 Sittin/107; 127/93 with larger cuff.  139/98 with original cuff. Dizziness:  3/10 in general   Tracking - horizontal  3 x 5 X 10 reps 3 x 10  No increase on first rep  Nystagmus and dizziness on 2nd rep last pass   3rd rep better control on last pass   Vertical    2x5 X 10 reps  3 x10. D1  2x5 X 10 reps 3 x 10 no problems   D2  2x5 X 10 reps x10 no problem   Saccades - horizontal  2x5 2 x 10 reps 2 x 10 no problems other than see like a shadow of the target from the right eye.   Feels like it is in the upper right quadrant of the right eye.   vertical  2x5 2 x 10 reps 2 x 10 same as above   D1  2x5 X 10 reps with difficulty 3 x 10 with no problem   D2  2x5 X 10 reps with difficulty  3 x 10. Blinking to come down each time. Better with subsequent reps   VOR1    x10 dizzy   VOR 2    x10 dizzy with some nystagmus   Right LE tone reduction and sensorimotor retraining for improved gait and standing balance   In supine tone reduction x 8 minutes    Seated in chair (pt is short in stature). Right shoe and brace off Neuro-JAKE tactile stimulation to R foot to stimulate dorsiflexion x 10 reps   rockerboard under R foot working on small range plantarflexion 3 x 5 reps with facilitation       Kicking soccer ball in bars for tone reduction and improved swing pattern  2 x 10 reps each leg       Facilitated long arc quad       LE tone reduction prior to gait        Gait retraining In the bars working on hip/knee release and stepping pattern x 10 minutes   Then with quad cane working on kicking out and putting weight down with forward momentum       Mini squats on door with therapist facilitating knee extension          Standing in parallel bars, 1\" block under R foot Therapist assisting with elevating up and then working on slow control on the way day for quad and plantarflexor eccentric control 2 x 10 reps           Therapeutic Exercise: ( 0 minutes):  Exercises per grid below to improve mobility, strength and coordination. Required moderate visual, verbal and manual cues to promote proper body alignment, promote proper body posture and promote proper body breathing techniques. Progressed range and repetitions as indicated.    Date:  10/11/17 Date:  10/20/17 Date:  10/26/17   Activity/Exercise Parameters Parameters Parameters   Piriformis stretch   3 x 30 second hold     Hip capsule stetch   3 x 30 second hold right     Hip flexor stretch   3 x 30 second      Bent knee hip abduction stretch with active adduction   10 reps with 10 second hold     SKTC stretch   3 x 30 second hold right  3 x 30 second hold right   Heel cord and plantar fascia stretch passive X 5 minutes  X 5 minutes   Bridging   2 x 10 reps B     LAQ   4 x 5 reps right  4 x 5 reps right   Right ankle dorsiflexion    During russian e-stim x 10 minutes    Manual facilitation of right dorsiflexors   Working with patient on active dorsiflexion with manual and verbal cues with patient supine       EDUCATION:  Instructed in above exercises and home program.  HEP:  Written HEP given to patient. Instructed to perform 3 times per day and to increase reps as tolerated. .     Treatment/Session Assessment: BP a little high today but does not feel bad. Still with dizziness with moving targets in periphery - driving, grocery store. Improved with general eye exercises. Dizzy with VOR and higher level eye exericses. Patients response to todays treatment session was tolerated well with no medical complications. .  · Post session pain:  No pain  · Compliance with Program/Exercises: Will assess as treatment progresses. · Recommendations/Intent for next treatment session: \"Next visit will focus on advancements to more challenging activities\".   Total Treatment Duration:  PT Patient Time In/Time Out  Time In: 1400  Time Out: Zeina 15, PT

## 2017-11-20 NOTE — PROGRESS NOTES
Zoraida Avella  : 1975  Primary: [employer] Other  Secondary:  [address]  at [facility] [address], [address], [address]  [phone]   [phone]      17  PT cancelled. Therapist out.   Gilbert Headings, PT

## 2017-11-20 NOTE — PROGRESS NOTES
Zoraida pitt  : 1975 Therapy Center at [facility]  [address], [address] [facility], [address]  [phone]   [phone]         OUTPATIENT OCCUPATIONAL THERAPY: Daily Note 2017    ICD-10: Treatment Diagnosis:   Hemiplegia and hemiparesis following cerebral infarction affecting right dominant side (I69.351)  Precautions/Allergies:   Erythromycin   Fall Risk Score: 3 (? 5 = High Risk)  MD Orders: OT evaluate and treat  MEDICAL/REFERRING DIAGNOSIS:   Stroke (cerebrum) ([facility] [address] [I63.9]   DATE OF ONSET: 2017   REFERRING PHYSICIAN: Kera Canas*  RETURN PHYSICIAN APPOINTMENT: 2017     INITIAL ASSESSMENT:  Ms. Demi pitt has been seen by OT for a total of 7 visits. Pt reports she has been using saebo flex brace for R UE, but reports pain after prolonged wear. Pt has now started alternating between resting hand splint and saebo flex brace with decreased pain. Pt reports severe pain with attempts to passively extend digits. Pt is demonstrating some increased tone in the R UE. Pt reports pain in R shoulder but no presence of subluxation. Pt has been working on weightbearing to help improve strength, function, and decrease tone in the R dominant UE. Pt demonstrates good progress with scapular strength. Pt also has some activation at the elbow flexors/shoulder abductors with movement in flexor synergy. Pt making slow progress and is 10-15 minutes late to most all appointments which limits progress as well. Pt does report decreased pain and improved positioning of R UE after treatment. Pt to continue per plan of care. PLAN OF CARE:   PROBLEM LIST:  1. Decreased Strength  2. Decreased ADL/Functional Activities  3. Decreased Transfer Abilities  4. Decreased Ambulation Ability/Technique  5. Decreased Balance  6. Increased Pain  7. Decreased Activity Tolerance  8. Decreased Flexibility/Joint Mobility  9.  Decreased [place] with Home Exercise Program INTERVENTIONS PLANNED:  1. Activities of daily living training  2. Adaptive equipment training  3. Balance training  4. Clothing management  5. Donning&doffing training  6. Manual therapy training  7. Modalities  8. Neuromuscular re-eduation  9. Splinting  10. Therapeutic activity  11. Therapeutic exercise   TREATMENT PLAN:  Effective Dates: 9/11/17 TO 12/11/17. Frequency/Duration: 2 times a week for 12 weeks  GOALS: (Goals have been discussed and agreed upon with patient.)  Short-Term Functional Goals: Time Frame:4- 6 weeks  1. Pipo Stauffer will report decreased pain in the R shoulder from 5/10 to 3/10 with passive movement to decrease stiffness for daily activity. CONTINUE  2. Pipo Stauffer will be 100% compliant with daily SROM exercises for improving R UE ROM to decrease risk for contractures. CONTINUE  3. Pipo Stauffer will demonstrate ability to complete weightbearing activities in R UE with moderate facilitation to improve functional use for transfers. MET  4. Pipo Stauffer will complete basic ADL with supervision to improve independence with functional tasks. CONTINUE  5. Pipo Stauffer will tolerate 15 minutes of dynamic standing balance activity with CGA to decrease risk for falls with ADL and to improve activity tolerance. NOT ADDRESSED  6. Discharge Goals: Time Frame: 8-12 weeks   1. Pipo Stauffer will have functional PROM of R shoulder to [facility] to decrease pain and stiffness in R dominant UE. CONTINUE  2. Pipo Stauffer will demonstrate improved strength in the R dominant UE as evidenced by at least 2+ to 3-/5 strength for using R UE as functional assist. CONTINUE  3. Pipo Stauffer will demonstrate ability to functionally grasp and release light objects with supervision and with use of R dominant hand. CONTINUE  4. Pipo Stauffer will be modified independent with all basic ADL to improve overall quality of life. CONTINUE  5.  Pipo Stauffer will use R hand as functional assist with daily activities/transfers with supervision to improve independence with ADL. CONTINUE   Rehabilitation Potential For Stated Goals: Good  Regarding [place] therapy, I certify that the treatment plan above will be carried out by a therapist or under their direction. Thank you for this referral,  Chayo Camejo OT                 The information in this section was collected on 9/11/17 (except where otherwise noted). OCCUPATIONAL PROFILE & HISTORY:   History of Present Injury/Illness (Reason for Referral):  Pt reports she was at work when she started feeling like she was going to pass out. Pt reports she didn't really stop working and the feeling wouldn't go away. It started at 2pm and left work at 3:30 pm. Pt felt like she was going to fall and pushed a buggy to her car and drove to her daughter's house. Pt then was brought over to the ER at [facility]. Progressively got worse with R sided weakness and woke up the next morning with full parlaysis of the R side. Reports her eyesight was decreasing and needing reading glasses. R eye is a little worse with the blurry vision. Pt wears to reading glasses. Pt using a wheelchair as less as possible. Using wheelchair when out and about. Pt using a quad cane around the home and has AFO to R LE. Tub transfer bench with CGA. Eats at the kitchen table. Pt stayed from Aug 8th- September 6th in inpatient rehab. Pt reports no falls at home. Pt getting dressed using techniques learned for inpatient rehab. Difficulty walking and carrying objects. Pt has some issues with Zanaflex and incontinence (she is unsure if that is related). Pt presents today for outpatient occupational therapy services. Past Medical History/Comorbidities:   Ms. Sanjana Grey  has a past medical history of Generalized anxiety disorder; GERD (gastroesophageal reflux disease); Hypertension; Insomnia, unspecified; Iron deficiency anemia (8/23/2013); Left sided lacunar stroke ([facility] [address] (08/2017);  Mitral valve prolapse (1993); Obesity (BMI 30-39.9); RADHA on CPAP (10/2017); and Prediabetes (7/28/2015). Ms. Andrea Sanchez  has a past surgical history that includes tubal ligation (1995) and endoscopy (10/1/04). Social History/Living Environment:   Lives with mom with her  because toilet/tub is lower with more open spaces. Pt also has a son. Pt has 24 hr supervision. Prior Level of Function/Work/Activity: Indpendent and working full time in [employer] at [employer] .   Dominant Side:         RIGHTPersonal Factors: Other factors that influence how disability is experienced by the patient:  Hx of anxiety disorder, HTN, Pre-diabetic    Current Medications:    Current Outpatient Prescriptions:     diazePAM (VALIUM) 5 mg tablet, Take 0.5 Tabs by mouth every six (6) hours as needed. Max Daily Amount: 10 mg. Indications: Muscle Spasm, Disp: 60 Tab, Rfl: 2    tiZANidine (ZANAFLEX) 2 mg tablet, Take 1 Tab by mouth three (3) times daily. Indications: Muscle Spasticity of Cerebral Origin, Disp: 90 Tab, Rfl: 4    raNITIdine (ZANTAC) 150 mg tablet, Take 1 Tab by mouth two (2) times a day., Disp: 60 Tab, Rfl: 5    hydroCHLOROthiazide (MICROZIDE) 12.5 mg capsule, Take 2 Caps by mouth daily. , Disp: 30 Cap, Rfl: 6    potassium chloride SR (K-TAB) 20 mEq tablet, Take 1 Tab by mouth two (2) times a day., Disp: 60 Tab, Rfl: 6    aspirin (ASPIRIN) 325 mg tablet, Take 1 Tab by mouth daily. , Disp: 30 Tab, Rfl: 12    acetaminophen (TYLENOL) 325 mg tablet, Take 2 Tabs by mouth every six (6) hours as needed for Pain or Fever (headache). , Disp: 30 Tab, Rfl: 0    escitalopram oxalate (LEXAPRO) 20 mg tablet, Take 1 Tab by mouth daily. , Disp: 30 Tab, Rfl: 5            Date Last Reviewed:  9/11/17   Complexity Level : Expanded review of therapy/medical records (1-2):  MODERATE COMPLEXITY   ASSESSMENT OF OCCUPATIONAL PERFORMANCE:     Palpation:  Patient with no palpated subluxation of the R shoulder at this time.  Increased pain and IP flexion  WFL  0     Hand Strength:         unable  0     THREE JAW KARY PINCH unable  0     LATERAL PINCH  unable  0       Functional Mobility:         Gait/Ambulation:  CGA to supervision with quad cane; Uses wheelchair occasionally         Bed Mobility:  CGA  Balance:          Good static standing balance but fair to poor dynamic standing balance  Coordination:          Non-functional in R dominant UE  Mental Status: WNL  Vision:          Reading glasses. Reports some blurry vision in R eye. Tracking and visual fields intact. Some decreased speed with saccadic movement to the R. Activities of Daily Living:           Basic ADLs (From Assessment) Complex ADLs (From Assessment)         Grooming/Bathing/Dressing Activities of Daily Living                                   Sensation:         Grossly intact to light touch in B UE. R hand with some decreased light touch sensation. Reports some hypersensitivity to cold. Physical Skills Involved:  1. Range of Motion  2. Balance  3. Strength  4. Activity Tolerance  5. Sensation  6. Fine Motor Control  7. Gross Motor Control  8. Pain (acute) Cognitive Skills Affected (resulting in the inability to perform in a timely and safe manner):  1. WNL Psychosocial Skills Affected:  1. Anxiety Disorder (per chart review)   Number of elements that affect the Plan of Care: 5+:  HIGH COMPLEXITY   CLINICAL DECISION MAKING:   Outcome Measure: Tool Used: Fugl-Goodman Upper Extremity Motor Assessment  Score:  Initial: 8/66 Most Recent: X (Date: -- )   Interpretation of Score: Outcome Measure Conversion Site    ?  Carrying, Moving, and Handling Objects:     - CURRENT STATUS: CM - 80%-99% impaired, limited or restricted    - GOAL STATUS: CK - 40%-59% impaired, limited or restricted    - D/C STATUS:  ---------------To be determined---------------     Fugl-Goodman Upper Extremity Motor Assessment (without reflexes) Date:  9/11 Date:   Date:     Item Description Score Score Score   Wrist circumduction 0     Hook grasp 0     Shoulder flexion to 180°, elbow extended 0     Spherical grasp 0     Lateral prehension 0     Wrist flexion/extension, elbow extended 0     Pronation-supination, elbow extended 0     Wrist stable, elbow extended 0     Movement with normal speed 0     Forearm supination 0     Shoulder abduction to 90°, elbow extended 0     Movement without dysmetria 0     Shoulder external rotation 0     Wrist stable, elbow at 90° 0     Wrist flexion/extension, elbow at 90° 0     Palmar prehension 0     Scapular retraction 1     Pronation-supination, elbow at 90° 0     Shoulder flexion to 90°, elbow extended 0     Hand to lumbar spine 0     Shoulder abduction 0     Elbow extension 0     Forearm pronation 0     Movement without tremor 0     Cylindrical grasp 0     Finger mass extension (relaxation of flexion) 0     Scapular elevation 1     Finger mass flexion 0     Shoulder adduction with internal rotation 0     Elbow flexion 0     Total Score (Max = 60) 2           Medical Necessity:   · Patient demonstrates good rehab potential due to higher previous functional level. Reason for Services/Other Comments:  · Patient continues to require skilled intervention due to decreased functional independence s/p CVA impacting overall quality of life. Clinical Decision-Making Assessment:     Assessment process, impact of co-morbidities, assessment modification\need for assistance, and selection of interventions: Analytical Complexity:HIGH COMPLEXITY   TREATMENT:   (In addition to Assessment/Re-Assessment sessions the following treatments were rendered)    Pre-treatment Symptoms/Complaints:    Pain: Initial: Pain Intensity 1: 0  Post Session: 0/10      Manual Therapy (0): Pt received leuko rigid taping along deltoid and supraspinatus to address support for soft tissue pain in R shoulder. Pt then taped with guidance of humeral head posteriorly. Pt reports no complaints. Therapeutic Exercise: (8 minutes):  Exercises per grid below to improve mobility. Required maximal manual and tactile cues to promote proper body alignment, promote proper body posture and promote proper body mechanics. Progressed repetitions and complexity of movement as indicated.    Date:  10/30/17 Date:  11/1/17 Date:  11/6/17 Date:  11/15/17 Date:  11/17/17 Date:  11/20/17   Activity/Exercise Parameters Parameters Parameters      External Rotation  10-15 reps with glenohumeral mobilizations; 15-20 reps with dowel with minimal tactile cues  1)10-15 reps with glenohumeral mobilizations (supine)  2) 10 reps SROM with dowel and moderate tactile cues  3) 10 reps from side-lying position  Pt completed x 15-10 reps from supine position with glenohumeral mobilizations  15-20 reps with dowel and minimal facilitation from supine position (assistance to position elbow at side) 15-20 reps with dowel and minimal facilitation from supine position (assistance to position elbow at side) 15-20 reps with dowel and minimal facilitation from supine position (assistance to position elbow at side)   Shoulder Flexion  10 reps tolerated to 130 ; SROM x 5 reps with addditional time 10 reps with dowel to ~90 degrees with minimal assistance in supine        Scapular Press 10 reps supine with assistance of L UE        Shoulder abduction  10 reps with glenohumeral mobilization with minimal c/o pain to ~130 Attempted 2-3 reps but too painful today  8-10 reps to ~80 degrees with inferior glide and less reports of pain       Horizontal Abduction/add 5-8 reps with pain with horizontal abd        Scapular Mobilizations  AAROM in sidelying for scapular elevation/depression with maximal tactile cues  Scapular depression from side-lying x 10-15 reps   Scapular mobilizations from sidelying for 15 reps AAROM  Scapular mobilizations from sidelying for 15 reps AAROM    Backward shoulder rolls   5-8 reps with moderate assistance with cues to press down into depression           Neuromuscular Re-education: (  30  minutes):  Exercise/activities per grid below to improve kinesthetic sense and proprioception. Required minimal visual, verbal, manual and tactile cues to promote motor control of right, upper extremity(s).     Date:  11/6/17 Date:  11/15/17 Date:  11/17/17 Date:  11/20/17   Activity/Exercise       Supine to Sit  Supervision with some additional time      Trunk Rotation   Supine with pt tolerating abduction to about 60 degrees in R UE and rotating the trunk 2 reps each direction and holding 20 seconds      Stimulation combined with attempts to activate movement        Towel Glides  Pt completed in sitting  1) Shoulder flexion with some incline with assistance up and cues to slowly lower down x 10-15 reps with moderate assistance  2) Shoulder scaption with some incline with  assistance up and cues to slowly lower with moderate assistance  Pt completed in sitting with no incline with moderate tactile cues and pt pressing forward into shoulder flexion for 10-15 reps     Arm Slides  Pt completed in side-lying on L side;  1) 10 reps working on scapular movement and maximal facilitation reaching forward  2) Arm slides with palms up position x 10 reps with maximal facilitation  3) Arm slides along thumb position with maximal facilitation x 10 reps  Pt completed in side-lying on L side  1) 10-15 reps working from palms up position with moderate facilitation   2) thumbs down position x 10-15 reps with moderate facilitation   3) palms down position x 10-15 reps with moderate facilitation   4) Pressing forwards/backwars with hand on ball x 10-15 reps with moderate facilitation  Pt completed in side-lying on L side  1) 10-15 reps working from palms up position with moderate facilitation   2) thumbs down position x 10-15 reps with moderate facilitation  Pt completed in side-lying on L side  1) 10-15 reps working from palms up position with moderate facilitation 2) thumbs down position x 10-15 reps with moderate facilitation  3) 20 reps with arm on a ball with pt demonstrating good activation of scapular movement    Forward reach Sitting for 3-5 reps with maximal facilitation (decreased ability to press through the R hand)   Pt in supine and pressing upwards with moderate facilitation at the R UE for 2 sets x 5 reps    Weightbearing  1) Sitting and weightbearing into elbow with minimal to moderate verbal cues for trunk positioning  2) Weightbearing with prop under R hand/wrist x 10 reps and maximal assistance under the wrist  Pt tolerated with wrist in paddle and hand flat on mat x 10 reps with moderate facilitation of the elbow into extension  Pt tolerated with wrist in paddle and hand flat on mat x 10 reps with moderate facilitation of the elbow into extension     Tone Reduction into hand  [place] stretching/finger extension with placement of R hand in paddle  Jabari Ruts stretching/finger extension with placement of R hand in paddle  Pt placed in wrist flexion working on krueger stretching and extension of digits eventually bringing R hand into wrist extension with finger extension    Prone on Elbows        Pectoral glides       Elbow Flexion    Pt completed with holding ball in B hands (strap in place) and bending B UEs into elbow flexion/extension x 10 reps with minimal tactile cues  Pt completed in supine working on touching to head and then extending at the elbow with no activation and total assistance x 5 reps    Guided Movement       Shoulder abduction   Placing R hand in weightbearing position from side-lying and rotating arm from adduction to abduction with maximal facilitation x 10 reps   Glides on board with paddle and hand towel x 10 reps to improve ROM and to provide motor feedback     Mobile Arm Support    1) 10-15 reps guiding into external rotation with minimal facilitation  2) 10-15 reps guiding into forward reach with some activation at shoulder but none at the elbow with moderate facilitation to fully extend    PNF Total facilitation to within tolerable ROM for 10 reps in D1/D2 flexion/extension                 Treatment/Session Assessment:    · Response to Treatment: Pt tolerated session overall with minimal pain. Pt's R hand continues to tolerate more extension than previous weeks. Pt with some activation at the shoulder with mobile arm support. Pt to continue per plan of care. · Compliance with Program/Exercises: Will assess as treatment progresses. · Recommendations/Intent for next treatment session: \"Next visit will focus on advancements to more challenging activities and reduction in assistance provided\".   Total Treatment Duration:OT Patient Time In/Time Out  Time In: 1310  Time Out: CLAIRE Nunez

## 2017-11-22 ENCOUNTER — HOSPITAL ENCOUNTER (OUTPATIENT)
Dept: PHYSICAL THERAPY | Age: 42
Discharge: HOME | End: 2017-11-22
Payer: COMMERCIAL

## 2017-11-22 NOTE — PROGRESS NOTES
11/22/2017  Patient's daughter called to cancel appointment today due to pt's BP being low today. Will follow-up with patient at her next scheduled appointment.    Thank you,  Hector Lemus, OT

## 2017-11-22 NOTE — PROGRESS NOTES
Zoraida [place]  : 1975  Primary: Karin Davenport Other  Secondary:  [address]  at [facility]  [address] [facility], [address]  [phone]   [phone]        2017       Patient's daughter called to cancel her therapy appointments today due to patient's blood pressure running low today. Will follow up with the patient at the next visit.   Cheryle Forge, PTA

## 2017-11-28 ENCOUNTER — HOSPITAL ENCOUNTER (OUTPATIENT)
Dept: PHYSICAL THERAPY | Age: 42
Discharge: HOME | End: 2017-11-28
Payer: COMMERCIAL

## 2017-11-28 NOTE — PROGRESS NOTES
Zoraida [place]  : 1975 Therapy Center at [facility]  [address] [facility], [address] [place] [address], [address]  [phone]   [phone]         OT Note: Patient cancelled today's appointment due to being sick. Plan to follow up upon next scheduled visit.

## 2017-11-28 NOTE — PROGRESS NOTES
Zoraida [place]  : 1975  Primary: Thedore Light Rpn  Secondary:  Therapy Center at [facility] [address], [address] [facility], [place] [address] [address] [facility]  [phone]   [phone]      2017  Pt called to cancel. Not feeling well.   Vincent Payton, PT

## 2017-11-30 ENCOUNTER — HOSPITAL ENCOUNTER (OUTPATIENT)
Dept: PHYSICAL THERAPY | Age: 42
Discharge: HOME | End: 2017-11-30
Payer: COMMERCIAL

## 2017-11-30 PROCEDURE — 97530 THERAPEUTIC ACTIVITIES: CPT

## 2017-11-30 PROCEDURE — 97140 MANUAL THERAPY 1/> REGIONS: CPT

## 2017-11-30 PROCEDURE — 97112 NEUROMUSCULAR REEDUCATION: CPT

## 2017-11-30 NOTE — PROGRESS NOTES
Zoraida pitt  : 1975 [facility] at [facility]  [address] [facility], [address] [place] [address], [address]  [phone]   [phone]         OUTPATIENT OCCUPATIONAL THERAPY: Daily Note 2017    ICD-10: Treatment Diagnosis:   Hemiplegia and hemiparesis following cerebral infarction affecting right dominant side (I69.351)  Precautions/Allergies:   Erythromycin   Fall Risk Score: 3 (? 5 = High Risk)  MD Orders: OT evaluate and treat  MEDICAL/REFERRING DIAGNOSIS:   Stroke (cerebrum) ([facility] [address] [I63.9]   DATE OF ONSET: 2017   REFERRING PHYSICIAN: Kera Duran*  RETURN PHYSICIAN APPOINTMENT: 2017     INITIAL ASSESSMENT:  Ms. Demi pitt has been seen by OT for a total of 7 visits. Pt reports she has been using saebo flex brace for R UE, but reports pain after prolonged wear. Pt has now started alternating between resting hand splint and saebo flex brace with decreased pain. Pt reports severe pain with attempts to passively extend digits. Pt is demonstrating some increased tone in the R UE. Pt reports pain in R shoulder but no presence of subluxation. Pt has been working on weightbearing to help improve strength, function, and decrease tone in the R dominant UE. Pt demonstrates good progress with scapular strength. Pt also has some activation at the elbow flexors/shoulder abductors with movement in flexor synergy. Pt making slow progress and is 10-15 minutes late to most all appointments which limits progress as well. Pt does report decreased pain and improved positioning of R UE after treatment. Pt to continue per plan of care. PLAN OF CARE:   PROBLEM LIST:  1. Decreased Strength  2. Decreased ADL/Functional Activities  3. Decreased Transfer Abilities  4. Decreased Ambulation Ability/Technique  5. Decreased Balance  6. Increased Pain  7. Decreased Activity Tolerance  8. Decreased Flexibility/Joint Mobility  9.  Decreased Gosper with Home Exercise Program INTERVENTIONS PLANNED:  1. Activities of daily living training  2. Adaptive equipment training  3. Balance training  4. Clothing management  5. Donning&doffing training  6. Manual therapy training  7. Modalities  8. Neuromuscular re-eduation  9. Splinting  10. Therapeutic activity  11. Therapeutic exercise   TREATMENT PLAN:  Effective Dates: 9/11/17 TO 12/11/17. Frequency/Duration: 2 times a week for 12 weeks  GOALS: (Goals have been discussed and agreed upon with patient.)  Short-Term Functional Goals: Time Frame:4- 6 weeks  1. Pipo Stauffer will report decreased pain in the R shoulder from 5/10 to 3/10 with passive movement to decrease stiffness for daily activity. CONTINUE  2. Pipo Stauffer will be 100% compliant with daily SROM exercises for improving R UE ROM to decrease risk for contractures. CONTINUE  3. Pipo Stauffer will demonstrate ability to complete weightbearing activities in R UE with moderate facilitation to improve functional use for transfers. MET  4. Pipo Stauffer will complete basic ADL with supervision to improve independence with functional tasks. CONTINUE  5. Pipo Stauffer will tolerate 15 minutes of dynamic standing balance activity with CGA to decrease risk for falls with ADL and to improve activity tolerance. NOT ADDRESSED  6. Discharge Goals: Time Frame: 8-12 weeks   1. Pipo Stauffer will have functional PROM of R shoulder to [facility] to decrease pain and stiffness in R dominant UE. CONTINUE  2. Pipo Stauffer will demonstrate improved strength in the R dominant UE as evidenced by at least 2+ to 3-/5 strength for using R UE as functional assist. CONTINUE  3. Pipo Stauffer will demonstrate ability to functionally grasp and release light objects with supervision and with use of R dominant hand. CONTINUE  4. Pipo Stauffer will be modified independent with all basic ADL to improve overall quality of life. CONTINUE  5.  Pipo Stauffer will use R hand as functional assist with daily activities/transfers with supervision to improve independence with ADL. CONTINUE   Rehabilitation Potential For Stated Goals: Good  Regarding [place] therapy, I certify that the treatment plan above will be carried out by a therapist or under their direction. Thank you for this referral,  JUDY Scruggs, OTR/L                 The information in this section was collected on 9/11/17 (except where otherwise noted). OCCUPATIONAL PROFILE & HISTORY:   History of Present Injury/Illness (Reason for Referral):  Pt reports she was at work when she started feeling like she was going to pass out. Pt reports she didn't really stop working and the feeling wouldn't go away. It started at 2pm and left work at 3:30 pm. Pt felt like she was going to fall and pushed a buggy to her car and drove to her daughter's house. Pt then was brought over to the ER at [address] [place] [address]. Progressively got worse with R sided weakness and woke up the next morning with full parlaysis of the R side. Reports her eyesight was decreasing and needing reading glasses. R eye is a little worse with the blurry vision. Pt wears to reading glasses. Pt using a wheelchair as less as possible. Using wheelchair when out and about. Pt using a quad cane around the home and has AFO to R LE. Tub transfer bench with CGA. Eats at the kitchen table. Pt stayed from Aug 8th- September 6th in inpatient rehab. Pt reports no falls at home. Pt getting dressed using techniques learned for inpatient rehab. Difficulty walking and carrying objects. Pt has some issues with Zanaflex and incontinence (she is unsure if that is related). Pt presents today for outpatient occupational therapy services. Past Medical History/Comorbidities:   Ms. Edgar Goodpasture  has a past medical history of Generalized anxiety disorder; GERD (gastroesophageal reflux disease); Hypertension; Insomnia, unspecified; Iron deficiency anemia (8/23/2013);  [facility] (08/2017); Mitral valve prolapse (1993); Obesity (BMI 30-39.9); RADHA on CPAP (10/2017); and Prediabetes (7/28/2015). Ms. Paramjit Flores  has a past surgical history that includes tubal ligation (1995) and endoscopy (10/1/04). Social History/Living Environment:   Lives with mom with her  because toilet/tub is lower with more open spaces. Pt also has a son. Pt has 24 hr supervision. Prior Level of Function/Work/Activity: Indpendent and working full time in [place] at [employer] .   Dominant Side:         RIGHTPersonal Factors: Other factors that influence how disability is experienced by the patient:  Hx of anxiety disorder, HTN, Pre-diabetic    Current Medications:    Current Outpatient Prescriptions:     diazePAM (VALIUM) 5 mg tablet, Take 0.5 Tabs by mouth every six (6) hours as needed. Max Daily Amount: 10 mg. Indications: Muscle Spasm, Disp: 60 Tab, Rfl: 2    tiZANidine (ZANAFLEX) 2 mg tablet, Take 1 Tab by mouth three (3) times daily. Indications: Muscle Spasticity of Cerebral Origin, Disp: 90 Tab, Rfl: 4    raNITIdine (ZANTAC) 150 mg tablet, Take 1 Tab by mouth two (2) times a day., Disp: 60 Tab, Rfl: 5    hydroCHLOROthiazide (MICROZIDE) 12.5 mg capsule, Take 2 Caps by mouth daily. , Disp: 30 Cap, Rfl: 6    potassium chloride SR (K-TAB) 20 mEq tablet, Take 1 Tab by mouth two (2) times a day., Disp: 60 Tab, Rfl: 6    aspirin (ASPIRIN) 325 mg tablet, Take 1 Tab by mouth daily. , Disp: 30 Tab, Rfl: 12    acetaminophen (TYLENOL) 325 mg tablet, Take 2 Tabs by mouth every six (6) hours as needed for Pain or Fever (headache). , Disp: 30 Tab, Rfl: 0    escitalopram oxalate (LEXAPRO) 20 mg tablet, Take 1 Tab by mouth daily. , Disp: 30 Tab, Rfl: 5            Date Last Reviewed:  9/11/17   Complexity Level : Expanded review of therapy/medical records (1-2):  MODERATE COMPLEXITY   ASSESSMENT OF OCCUPATIONAL PERFORMANCE:     Palpation:  Patient with no palpated subluxation of the R shoulder at this time.  Increased pain and tightness with R shoulder flexion/abduction with passive ROM above 90 degrees. Pain along anterior portion of humeral head with anterior tilt. Pain radiates along the middle deltoid. Modified Maggi Scale     Grade Description : Noted in ELBOW FLEXORS (1)  WRIST FLEXORS(2) , FINGER FLEXORS (1+)  []0 - No increase in muscle tone  []1 - Slight increase in muscle tone, manifested by a catch and release, or by minimal resistance at the end of the range of motion when the affected part(s) is moved in flexion or extension  [x]1+ - Slight increase in muscle tone, manifested by a catch, followed by minimal resistance throughout the remainder (less than half) of the range of movement (ROM)  []2 - More marked increase in muscle tone through most of ROM, but affected part(s) easily moved  []3 - Considerable increase in muscle tone, passive movement difficult  []4 - Affected part(s) rigid in flexion and extension      ROM/Strength:  L UE WNL; No functional AROM of R UE.            Date:  9/11/17  Date:  9/11/17 Date:  10/25/17 Date:  10/25/17    PROM  STRENGTH PROM Strength   Movement RIGHT  RIGHT Right Right    SCAPULA:        Elevation  Limited  2 limited 2   Protraction  Limited  2+ limited 2+   Retraction  Limited   2 WFL 3   SHOULDER:        Flexion  100  0 115 PROM    Abduction  90  1 20-25 AROM  2   External rotation  70  0 30 PROM 0   Internal rotation  WNL  0 WFL PROM 0   Horizontal abduction 100  0  0   Horizontal adduction  WFL  0  0   Extension         ELBOW:        Flexion  WNL  0 75 AROM 2   Extension  WNL  0     FOREARM:        Supination  WFL  0  0   Pronation  WFL  0  0   WRIST:        Wrist flexion  65  0 55 PROM 0   Wrist extension  65  0 55 PROM 0   Wrist radial deviation         Wrist ulnar deviation         HAND:        Finger flexion WFL  0 WFL 0   Finger extension WFL  0 WFL (pain) 0   THUMB:         Abduction  WFL  0 WFL 0    Extension WFL  0 WFL 0    Adduction     WFL 0    MCP flexion  [facility] 0      IP flexion  WFL  0     Hand Strength:         unable  0     THREE JAW KARY PINCH unable  0     LATERAL PINCH  unable  0       Functional Mobility:         Gait/Ambulation:  CGA to supervision with quad cane; Uses wheelchair occasionally         Bed Mobility:  CGA  Balance:          Good static standing balance but fair to poor dynamic standing balance  Coordination:          Non-functional in R dominant UE  Mental Status: WNL  Vision:          Reading glasses. Reports some blurry vision in R eye. Tracking and visual fields intact. Some decreased speed with saccadic movement to the R. Activities of Daily Living:           Basic ADLs (From Assessment) Complex ADLs (From Assessment)         Grooming/Bathing/Dressing Activities of Daily Living                                   Sensation:         Grossly intact to light touch in B UE. R hand with some decreased light touch sensation. Reports some hypersensitivity to cold. Physical Skills Involved:  1. Range of Motion  2. Balance  3. Strength  4. Activity Tolerance  5. Sensation  6. Fine Motor Control  7. Gross Motor Control  8. Pain (acute) Cognitive Skills Affected (resulting in the inability to perform in a timely and safe manner):  1. WNL Psychosocial Skills Affected:  1. Anxiety Disorder (per chart review)   Number of elements that affect the Plan of Care: 5+:  HIGH COMPLEXITY   CLINICAL DECISION MAKING:   Outcome Measure: Tool Used: Fugl-Goodman Upper Extremity Motor Assessment  Score:  Initial: 8/66 Most Recent: X (Date: -- )   Interpretation of Score: Outcome Measure Conversion Site    ?  Carrying, Moving, and Handling Objects:     - CURRENT STATUS: CM - 80%-99% impaired, limited or restricted    - GOAL STATUS: CK - 40%-59% impaired, limited or restricted    - D/C STATUS:  ---------------To be determined---------------     Fugl-Goodman Upper Extremity Motor Assessment (without reflexes) Date:  9/11 Date:   Date:     Item Description Score Score Score   Wrist circumduction 0     Hook grasp 0     Shoulder flexion to 180°, elbow extended 0     Spherical grasp 0     Lateral prehension 0     Wrist flexion/extension, elbow extended 0     Pronation-supination, elbow extended 0     Wrist stable, elbow extended 0     Movement with normal speed 0     Forearm supination 0     Shoulder abduction to 90°, elbow extended 0     Movement without dysmetria 0     Shoulder external rotation 0     Wrist stable, elbow at 90° 0     Wrist flexion/extension, elbow at 90° 0     Palmar prehension 0     Scapular retraction 1     Pronation-supination, elbow at 90° 0     Shoulder flexion to 90°, elbow extended 0     Hand to lumbar spine 0     Shoulder abduction 0     Elbow extension 0     Forearm pronation 0     Movement without tremor 0     Cylindrical grasp 0     Finger mass extension (relaxation of flexion) 0     Scapular elevation 1     Finger mass flexion 0     Shoulder adduction with internal rotation 0     Elbow flexion 0     Total Score (Max = 60) 2           Medical Necessity:   · Patient demonstrates good rehab potential due to higher previous functional level. Reason for Services/Other Comments:  · Patient continues to require skilled intervention due to decreased functional independence s/p CVA impacting overall quality of life. Clinical Decision-Making Assessment:     Assessment process, impact of co-morbidities, assessment modification\need for assistance, and selection of interventions: Analytical Complexity:HIGH COMPLEXITY   TREATMENT:   (In addition to Assessment/Re-Assessment sessions the following treatments were rendered)    Pre-treatment Symptoms/Complaints:    Pain: Initial: Pain Intensity 1: 2  Pain Location 1: Hand, Shoulder  Pain Orientation 1: Right  Post Session: 2/10      Manual Therapy (0): Pt received leuko rigid taping along deltoid and supraspinatus to address support for soft tissue pain in R shoulder.  Pt then taped with guidance of humeral head posteriorly. Pt reports no complaints. Therapeutic Exercise: ( minutes):  Exercises per grid below to improve mobility. Required maximal manual and tactile cues to promote proper body alignment, promote proper body posture and promote proper body mechanics. Progressed repetitions and complexity of movement as indicated.    Date:  10/30/17 Date:  11/1/17 Date:  11/6/17 Date:  11/15/17 Date:  11/17/17 Date:  11/20/17   Activity/Exercise Parameters Parameters Parameters      External Rotation  10-15 reps with glenohumeral mobilizations; 15-20 reps with dowel with minimal tactile cues  1)10-15 reps with glenohumeral mobilizations (supine)  2) 10 reps SROM with dowel and moderate tactile cues  3) 10 reps from side-lying position  Pt completed x 15-10 reps from supine position with glenohumeral mobilizations  15-20 reps with dowel and minimal facilitation from supine position (assistance to position elbow at side) 15-20 reps with dowel and minimal facilitation from supine position (assistance to position elbow at side) 15-20 reps with dowel and minimal facilitation from supine position (assistance to position elbow at side)   Shoulder Flexion  10 reps tolerated to 130 ; SROM x 5 reps with addditional time 10 reps with dowel to ~90 degrees with minimal assistance in supine        Scapular Press 10 reps supine with assistance of L UE        Shoulder abduction  10 reps with glenohumeral mobilization with minimal c/o pain to ~130 Attempted 2-3 reps but too painful today  8-10 reps to ~80 degrees with inferior glide and less reports of pain       Horizontal Abduction/add 5-8 reps with pain with horizontal abd        Scapular Mobilizations  AAROM in sidelying for scapular elevation/depression with maximal tactile cues  Scapular depression from side-lying x 10-15 reps   Scapular mobilizations from sidelying for 15 reps AAROM  Scapular mobilizations from sidelying for 15 reps AAROM    Backward shoulder rolls   5-8 reps with moderate assistance with cues to press down into depression           Neuromuscular Re-education: (  40  minutes):  Exercise/activities per grid below to improve kinesthetic sense and proprioception. Required minimal visual, verbal, manual and tactile cues to promote motor control of right, upper extremity(s).       Date:  11/6/17 Date:  11/15/17 Date:  11/17/17 Date:  11/20/17   Activity/Exercise       Supine to Sit  Supervision with some additional time      Trunk Rotation   Supine with pt tolerating abduction to about 60 degrees in R UE and rotating the trunk 2 reps each direction and holding 20 seconds      Stimulation combined with attempts to activate movement        Towel Glides  Pt completed in sitting  1) Shoulder flexion with some incline with assistance up and cues to slowly lower down x 10-15 reps with moderate assistance  2) Shoulder scaption with some incline with  assistance up and cues to slowly lower with moderate assistance  Pt completed in sitting with no incline with moderate tactile cues and pt pressing forward into shoulder flexion for 10-15 reps     Arm Slides  Pt completed in side-lying on L side;  1) 10 reps working on scapular movement and maximal facilitation reaching forward  2) Arm slides with palms up position x 10 reps with maximal facilitation  3) Arm slides along thumb position with maximal facilitation x 10 reps  Pt completed in side-lying on L side  1) 10-15 reps working from palms up position with moderate facilitation   2) thumbs down position x 10-15 reps with moderate facilitation   3) palms down position x 10-15 reps with moderate facilitation   4) Pressing forwards/backwars with hand on ball x 10-15 reps with moderate facilitation  Pt completed in side-lying on L side  1) 10-15 reps working from palms up position with moderate facilitation   2) thumbs down position x 10-15 reps with moderate facilitation  Pt completed in side-lying on L side  1) 10-15 reps working from palms up position with moderate facilitation   2) thumbs down position x 10-15 reps with moderate facilitation  3) 20 reps with arm on a ball with pt demonstrating good activation of scapular movement    Forward reach Sitting for 3-5 reps with maximal facilitation (decreased ability to press through the R hand)   Pt in supine and pressing upwards with moderate facilitation at the R UE for 2 sets x 5 reps    Weightbearing  1) Sitting and weightbearing into elbow with minimal to moderate verbal cues for trunk positioning  2) Weightbearing with prop under R hand/wrist x 10 reps and maximal assistance under the wrist  Pt tolerated with wrist in paddle and hand flat on mat x 10 reps with moderate facilitation of the elbow into extension  Pt tolerated with wrist in paddle and hand flat on mat x 10 reps with moderate facilitation of the elbow into extension     Tone Reduction into hand  [place] stretching/finger extension with placement of R hand in paddle  [employer] stretching/finger extension with placement of R hand in paddle  Pt placed in wrist flexion working on krueegr stretching and extension of digits eventually bringing R hand into wrist extension with finger extension    Prone on Elbows        Pectoral glides       Elbow Flexion    Pt completed with holding ball in B hands (strap in place) and bending B UEs into elbow flexion/extension x 10 reps with minimal tactile cues  Pt completed in supine working on touching to head and then extending at the elbow with no activation and total assistance x 5 reps    Guided Movement       Shoulder abduction   Placing R hand in weightbearing position from side-lying and rotating arm from adduction to abduction with maximal facilitation x 10 reps   Glides on board with paddle and hand towel x 10 reps to improve ROM and to provide motor feedback     Mobile Arm Support    1) 10-15 reps guiding into external rotation with minimal facilitation  2) 10-15 reps guiding into forward reach with some activation at shoulder but none at the elbow with moderate facilitation to fully extend    PNF Total facilitation to within tolerable ROM for 10 reps in D1/D2 flexion/extension         Re-Education Training  Re-Education Training: YesRUE Re-Education  Muscle Facilitation: 1. Triceps/elbow extension initiation 2 sets with good triceps fire noted. 2. Scapular retraction 2 reps. 3. Scapular protraction 2 reps. 4. Scapular elevation 2 reps. Muscle Inhibition: 1. Metacarpal stretch. 2. scaphoid on radius stretch. 3. ulnar deviation stretch. 4. carpals on carpals 5. gentle distraction at wrist with wrist extension in support position 3 sets held 1-2 minutes each. 6. Pec stretch from behind 30-60 seconds. 7. Pec major tissue stretch held 15-20 seconds. 8. Anterior deltoid stretch 2 sets 15-30 seconds. 11. Hand placed in paddle. 12. Hand placed in docking splint. Other Activities: Hand in support position sitting edge of mat. Treatment/Session Assessment:    · Response to Treatment: Pt tolerated session overall with minimal pain. Acivated triceps today with flexor tone noted. Pt to continue per plan of care. · Compliance with Program/Exercises: Will assess as treatment progresses. · Recommendations/Intent for next treatment session: \"Next visit will focus on advancements to more challenging activities and reduction in assistance provided\".   Total Treatment Duration:OT Patient Time In/Time Out  Time In: 9996  Time Out: JUDY Sims, OTR/L

## 2017-11-30 NOTE — PROGRESS NOTES
Zoraida Selby  : 1975  Primary: Karin Garza Rpn  Secondary:  [facility]  [address], [place], [facility]  [phone]   [phone]        OUTPATIENT PHYSICAL THERAPY:Daily Note 2017   ICD-10: Treatment Diagnosis: Difficulty in walking, not elsewhere classified (R26.2); Hemiplegia and hemiparesis following cerebral infarction affecting right dominant side (I69.351); Dizziness and giddiness (R42)      Precautions/Allergies:   Erythromycin   Fall Risk Score: 0 (? 5 = High Risk)  MD Orders: PT eval MEDICAL/REFERRING DIAGNOSIS:  Stroke (cerebrum) ([facility] [address] [I63.9]   DATE OF ONSET: 8/3/17  REFERRING PHYSICIAN: MIK Parisi [address]: unknown  DATE OF PROGRESS NOTE:  03  RECERTIFICATION DATE:    REEVALUATION:  Pt has attended 15 physical therapy sessions from 9/15/17 to date including initial assessment. Pt has complained several times of motion sickness, nausea and occasional dizziness that seems to be chronic. Binocular vision exam is abnormal.  Pt cannot tolerate more that 5 reps of tracking and saccadic exercises without having some dizziness. Pt will benefit from vison exercises and possible vestibular exercise as well to try to decrease dizziness. Dizziness Handicap Inventory Score is 66% disability. PROGRESS NOTE:  Pt has attended 10 physical therapy sessions from 9/15/17 to date including initial assessment. Sessions consist of range of motion, therapeutic activity, therapeutic exercise, balance and gait refinement. Pt has shown improvement in balance and gait ability. Pt has increased Perez Balance Score by 2 points indicating improved static standing balance and decreased risk for fall. Pt has decreased TUG time by over 14 seconds with her cane and almost 22 seconds without her cane indicating more normalized walking pattern.  Her gait pattern has improved but she does not take normal step length on the left because she is trying to keep her right knee from hyperextending in stance on the right with her AFO on. I feel the AFO may be a little short so I placed a heel lift in her right shoe with further improvement in her TUG time by 3 more seconds for an overall increase of 17 second improvement with her cane. We will have [facility] look at her AFO to see if it needs adjustments. Pt will continue to benefit from physical therapy for at least 4 more weeks to maximize functional ability. INITIAL ASSESSMENT:  Ms. Mica Liu presents with dominant right hemiplegia s/p CVA on 8/3/17. Pt presents with ADL, balance and gait deficits. Pt will benefit from physical therapy to maximize to full potential and safety with all functional mobility. PROBLEM LIST (Impacting functional limitations):  1. Decreased Strength  2. Decreased ADL/Functional Activities  3. Decreased Ambulation Ability/Technique  4. Decreased Balance  5. Increased Pain  6. Decreased Activity Tolerance  7. Decreased Flexibility/Joint Mobility  8. Decreased [place] with Home Exercise Program INTERVENTIONS PLANNED:  1. Balance Exercise  2. Bed Mobility  3. Family Education  4. Gait Training  5. Home Exercise Program (HEP)  6. Neuromuscular Re-education/Strengthening  7. Range of Motion (ROM)  8. Therapeutic Activites  9. Therapeutic Exercise/Strengthening  10. Transfer Training  11. modalities   TREATMENT PLAN:  Effective Dates: 9/22/17 TO 12/15/17. Frequency/Duration: 3 times a week for 8 weeks  GOALS: (Goals have been discussed and agreed upon with patient.)  Short-Term Functional Goals: Time Frame: 4 weeks  1. Pt will be independent with range of motion, strength and balance home exercise program.  STATUS:  MET  2. Pt will decrease TUG score by 5 seconds indicating more normalized and safer gait pattern.   STATUS:  MET  Discharge Goals: Time Frame: 8 weeks  Pt will show increased range of motion and improved posture to allow for improved safety and ability with all functional mobility. .  STATUS:  PROGRESSING, CONTINUE 4 WEEKS. Pt will decrease TUG score by 10 seconds indicating more normalized gait pattern and decrease risk for falls. STATUS:  MET  NEW GOAL:  Pt will decrease TUG score by 5 more seconds indicating more normalized gait pattern and decrease risk for falls. NEW GOAL  Pt will increase Perez Balance Scale to 49/56 indicating increased balance and decreased risk for falls. STATUS:  NOT MET, CONT 4 WEEKS. 5.  NEW GOAL (2 weeks from 11/10/17): Pt will be able to tolerate 10 reps of each eye exercise with minimal to no dizziness indicating increased binocular strength. 6.  NEW GOAL (4 weeks from 11;10/17): Pt will report no more than 20% disability on the Dizziness Handicap Inventory Scale indicating decreased dizziness and increased functional activity and quality of life. Rehabilitation Potential For Stated Goals: Good  Regarding [facility] therapy, I certify that the treatment plan above will be carried out by a therapist or under their direction. Thank you for this referral,  Tobias Combs, PT                  HISTORY:   GOAL:  Love to be back to doing things independenlty. Would like to get to where I don't have to use the cane. Get back to my own home. 10/27/2017  I definitely cant go back to work right now. Its slow progress I definitely cna;t drive at the [employer]. I have gotten stornger as far as my blance and in my leg. More ocmrortalble doing suff around the kitchen . I can take a dish and put it in the sink and rinse it off. I keep it near me in sonia I get tired. I will move aorund owthout it. Present Symptoms:    38year old  right handed female s/p left CVA 8/3/17 2* to HTN. Short term disability for now. Work in the [address] at [employer]. Very physical job. More sued to my left hand. Learning how to function with one hand is difficult.   ADLs with just my left hand is difficult. Dynamic sitting balance. I just have to be careluf that my foot is flat especially if I have my shoes off. Tire easily. Buildig up strength. Large base quad cane but not all the time at home. Can't carry a heavy load. PAIN:  Right fingers and shoulder with slight movement. 6/10  History of Present Injury/Illness (Reason for Referral):  See above  Past Medical History/Comorbidities:   Ms. Gifty Thompson  has a past medical history of Generalized anxiety disorder; GERD (gastroesophageal reflux disease); Hypertension; Insomnia, unspecified; Iron deficiency anemia (8/23/2013); Left sided lacunar stroke ([facility] [address] (08/2017); Mitral valve prolapse (1993); Obesity (BMI 30-39.9); RADHA on CPAP (10/2017); and Prediabetes (7/28/2015). Ms. Gifty Thompson  has a past surgical history that includes tubal ligation (1995) and endoscopy (10/1/04). Social History/Living Environment:     25and 25year old son and daughter. . Prior Level of Function/Work/Activity:  Worked in [employer] department. 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 One story - Own home:  About 5 steps to get in with bilateral railing. Staying at [facility] house which is easier to maneuver - low tub - can walk right in. Small wheelchair ramp and then level. Mom helps with driving and household duties - cooking. Dominant Side:         RIGHT    Current Medications:    Current Outpatient Prescriptions:     diazePAM (VALIUM) 5 mg tablet, Take 0.5 Tabs by mouth every six (6) hours as needed. Max Daily Amount: 10 mg. Indications: Muscle Spasm, Disp: 60 Tab, Rfl: 2    tiZANidine (ZANAFLEX) 2 mg tablet, Take 1 Tab by mouth three (3) times daily. Indications: Muscle Spasticity of Cerebral Origin, Disp: 90 Tab, Rfl: 4    raNITIdine (ZANTAC) 150 mg tablet, Take 1 Tab by mouth two (2) times a day., Disp: 60 Tab, Rfl: 5    hydroCHLOROthiazide (MICROZIDE) 12.5 mg capsule, Take 2 Caps by mouth daily. , Disp: 30 Cap, Rfl: 6    potassium chloride SR (K-TAB) 20 mEq tablet, Take 1 Tab by mouth two (2) times a day., Disp: 60 Tab, Rfl: 6    aspirin (ASPIRIN) 325 mg tablet, Take 1 Tab by mouth daily. , Disp: 30 Tab, Rfl: 12    acetaminophen (TYLENOL) 325 mg tablet, Take 2 Tabs by mouth every six (6) hours as needed for Pain or Fever (headache). , Disp: 30 Tab, Rfl: 0    escitalopram oxalate (LEXAPRO) 20 mg tablet, Take 1 Tab by mouth daily. , Disp: 30 Tab, Rfl: 5   Date Last Reviewed:  11/30/2017     Number of Personal Factors/Comorbidities that affect the Plan of Care: 1-2: MODERATE COMPLEXITY   EXAMINATION:   ROM:          Painful right shoulder and finger and hands. Shoulder not fully assessed. Seeing OT. Passive Fingers to neutral extension at least.  No overpressure given. Elbow slight decrease as end ranges. Supination ~75% with mild pain. Right lower extremity WFL  Minimal pain in my right shoulder. OT has taped it and that helps. Left WFL  Strength:          Right UE:  Almost no active movement noted. Possible scap stabilization and tone is not flaccid. Right hip flexion:  3-/5  Knee extension at least 4/5  Nothing in DF. Functional Mobility:         Gait/Ambulation:  Ambulates with large base quad came with slow small steps with custom hinged AFO on. Transfers:  Independent. occ no WBing on the right with sit to stand        Bed Mobility:  NT  Mental Status:          Alert and oriented x 4;  Pleasant and appropriate  Vision:          No deficits reported. Body Structures Involved:  1. Nerves  2. Bones  3. Joints  4. Muscles  5. Ligaments Body Functions Affected:  1. Sensory/Pain  2. Neuromusculoskeletal  3. Movement Related Activities and Participation Affected:  1. Learning and Applying Knowledge  2. General Tasks and Demands  3. Mobility  4. Self Care  5. Domestic Life  6. Interpersonal Interactions and Relationships  7.  Community, Social and [place]   Number of elements that affect the Plan of Care: 4+: HIGH COMPLEXITY   CLINICAL PRESENTATION:   Presentation: Evolving clinical presentation with changing clinical characteristics: MODERATE COMPLEXITY   CLINICAL DECISION MAKING:   Outcome Measure: Tool Used: Perez Balance Scale  Score:  Initial: 46/56 Most Recent: 48/56 (Date: -- )   Interpretation of Score: Each section is scored on a 0-4 scale, 0 representing the patients inability to perform the task and 4 representing independence. The scores of each section are added together for a total score of 56. The higher the patients score, the more independent the patient is. Any score below 45 indicates increased risk for falls. Score 56 55-45 44-34 33-23 22-12 11-1 0   Modifier CH CI CJ CK CL CM CN       Tool Used: Timed Up and Go (TUG)  Score:  Initial: with cane:  45.24 seconds; without cane 52.01 seconds Most Recent: 31.05 seconds with cane:  30.05 without cane  With 8 mm heel lift in shoe with cane:  27.85 (Date: 10/27/17)   Interpretation of Score: The test measures, in seconds, the time taken by an individual to stand up from a standard arm chair (seat height 46 cm [18 in], arm height 65 cm [25.6 in]), walk a distance of 3 meters (118 in, approx 10 ft), turn, walk back to the chair and sit down. If the individual takes longer than 14 seconds to complete TUG, this indicates risk for falls. Score 7 7.5-10.5 11-14 14.5-17.5 18-21 21.5-24.5 25+   Modifier CH CI CJ CK CL CM CN     Outcome Measure: Tool Used: Dizziness Handicap Inventory  Score:  Initial: 66% Most Recent:    Interpretation of Score: The test rates the patients perception of handicap that dizziness plays in their ADLs. 100 - 70=severe perception of handicap, 71- 40=moderate perception of handicap;  44 - 0=low perception of handicap    Medical Necessity:   · Skilled intervention continues to be required due to residual right sided hemiparesis.   Reason for Services/Other Comments:  · Patient continues to require skilled intervention due to residual right hemiparesis. .   Use of outcome tool(s) and clinical judgement create a POC that gives a: Questionable prediction of patient's progress: MODERATE COMPLEXITY      S:  It's mostly related to what has been going on. I will get in the shower and get dizzy or my BP is so low I can barely walk. Used to not really get sick but I am now. Either throw up or dry heaves. Today was a pretty good day until my mom was drying my hair. I was standing and looking in the mirror and then watching the movement in the mirror and her moving the hairdryer. I notice that in the car it doesn't bother me as much in the dark. Starting coming on strong 6/10. Just a little in my stomach right now  - 2/10. My head feels like something is pushing down on my head like a book or brick is pressing down on my head. See Nilam Altamirano tomorrow. Neurology is in March. Lot of muscle spasms in my leg or hand. TREATMENT:   (In addition to Assessment/Re-Assessment sessions the following treatments were rendered)     THERAPEUTIC EXERCISE: (40 minutes):  Exercises per grid below to improve strength and coordination. Required minimal visual and verbal cues to to perform correctly. Progressed repetitions as indicated. MANUAL THERAPY: (10  minutes): Soft tissue mobilization was utilized and necessary because of the patient's painful spasm and head and neck pressure. .    Occipital release with manaul neck traction and passive range of motion into flexion, rotation, trapezius stretch. Still with feeling of pressure after manual therapy. Feels like something or someone pressing down on my head - not constant - comes and goes. Date:  11/15/17 Date:  17 Date:  17   Activity/Exercise      BP Sittin/59  Standin/57 Sittin/107; 127/93 with larger cuff.  139/98 with original cuff.   Dizziness:  3/10 in general Sittin/80   Tracking - horizontal X 10 reps 3 x 10  No increase on first rep  Nystagmus and dizziness on 2nd rep last pass   3rd rep better control on last pass Dizzy 3/10; nausea just a little  x10 no increase in symptoms  x10 with pink background - no increase  x10 with checks 4/10  x10 with zebra no real change   Vertical   X 10 reps  3 x10. With checks bothers me a little 4/10  3 x 10 last rep seemed to be less bothersome   D1 X 10 reps 3 x 10 no problems x10 with checks. No problem   D2 X 10 reps x10 no problem x10 with checks emild increase   Saccades - horizontal 2 x 10 reps 2 x 10 no problems other than see like a shadow of the target from the right eye. Feels like it is in the upper right quadrant of the right eye.    vertical 2 x 10 reps 2 x 10 same as above    D1 X 10 reps with difficulty 3 x 10 with no problem    D2 X 10 reps with difficulty  3 x 10. Blinking to come down each time. Better with subsequent reps    VOR1  x10 dizzy Horizontal with zebra x 10 not too bad  With checks x 10 \"fine\"     VOR 2  x10 dizzy with some nystagmus    Right LE tone reduction and sensorimotor retraining for improved gait and standing balance In supine tone reduction x 8 minutes     Seated in chair (pt is short in stature). Right shoe and brace off      Kicking soccer ball in bars for tone reduction and improved swing pattern       Facilitated long arc quad      LE tone reduction prior to gait       Gait retraining      Mini squats on door with therapist facilitating knee extension         Standing in parallel bars, 1\" block under R foot          Therapeutic Exercise: ( 0 minutes):  Exercises per grid below to improve mobility, strength and coordination. Required moderate visual, verbal and manual cues to promote proper body alignment, promote proper body posture and promote proper body breathing techniques. Progressed range and repetitions as indicated.    Date:  10/11/17 Date:  10/20/17 Date:  10/26/17   Activity/Exercise Parameters Parameters Parameters   Piriformis stretch   3 x 30 second hold     Hip capsule stetch   3 x 30 second hold right     Hip flexor stretch   3 x 30 second      Bent knee hip abduction stretch with active adduction   10 reps with 10 second hold     SKTC stretch   3 x 30 second hold right  3 x 30 second hold right   Heel cord and plantar fascia stretch passive X 5 minutes  X 5 minutes   Bridging   2 x 10 reps B     LAQ   4 x 5 reps right  4 x 5 reps right   Right ankle dorsiflexion    During russian e-stim x 10 minutes    Manual facilitation of right dorsiflexors   Working with patient on active dorsiflexion with manual and verbal cues with patient supine       EDUCATION:  Instructed in above exercises and home program.  HEP:  Written HEP given to patient. Instructed to perform 3 times per day and to increase reps as tolerated. .     Treatment/Session Assessment: BP good. Still with dizziness with moving targets in periphery - driving, grocery store. Dizziness is better with general eye exercises. Dizzy with VOR and higher level eye exercises and figure/ground. Needing rests between exercises. Patients response to todays treatment session was tolerated well with no medical complications. · Post session pain:  No pain  · Compliance with Program/Exercises: Will assess as treatment progresses. · Recommendations/Intent for next treatment session: \"Next visit will focus on advancements to more challenging activities\".   Total Treatment Duration:       Amy Kern, PT

## 2017-12-13 ENCOUNTER — HOSPITAL ENCOUNTER (EMERGENCY)
Age: 42
Discharge: HOME | End: 2017-12-13
Attending: EMERGENCY MEDICINE
Payer: COMMERCIAL

## 2017-12-13 VITALS
HEART RATE: 64 BPM | SYSTOLIC BLOOD PRESSURE: 96 MMHG | WEIGHT: 175 LBS | BODY MASS INDEX: 35.28 KG/M2 | TEMPERATURE: 97.4 F | HEIGHT: 59 IN | RESPIRATION RATE: 20 BRPM | DIASTOLIC BLOOD PRESSURE: 53 MMHG | OXYGEN SATURATION: 98 %

## 2017-12-13 DIAGNOSIS — I95.9 HYPOTENSION, UNSPECIFIED HYPOTENSION TYPE: Primary | ICD-10-CM

## 2017-12-13 LAB
ALBUMIN SERPL-MCNC: 3 G/DL (ref 3.5–5)
ALBUMIN/GLOB SERPL: 0.8 {RATIO} (ref 1.2–3.5)
ALP SERPL-CCNC: 137 U/L (ref 50–136)
ALT SERPL-CCNC: 332 U/L (ref 12–65)
ANION GAP SERPL CALC-SCNC: 8 MMOL/L (ref 7–16)
AST SERPL-CCNC: 436 U/L (ref 15–37)
ATRIAL RATE: 61 BPM
BACTERIA URNS QL MICRO: ABNORMAL /HPF
BASOPHILS # BLD: 0 K/UL (ref 0–0.2)
BASOPHILS NFR BLD: 0 % (ref 0–2)
BILIRUB SERPL-MCNC: 1.6 MG/DL (ref 0.2–1.1)
BUN SERPL-MCNC: 11 MG/DL (ref 6–23)
CALCIUM SERPL-MCNC: 8.7 MG/DL (ref 8.3–10.4)
CALCULATED P AXIS, ECG09: 43 DEGREES
CALCULATED R AXIS, ECG10: 56 DEGREES
CALCULATED T AXIS, ECG11: 60 DEGREES
CASTS URNS QL MICRO: ABNORMAL /LPF
CHLORIDE SERPL-SCNC: 105 MMOL/L (ref 98–107)
CO2 SERPL-SCNC: 26 MMOL/L (ref 21–32)
CREAT SERPL-MCNC: 1.03 MG/DL (ref 0.6–1)
CRYSTALS URNS QL MICRO: 0 /LPF
DIAGNOSIS, 93000: NORMAL
DIFFERENTIAL METHOD BLD: ABNORMAL
EOSINOPHIL # BLD: 0.1 K/UL (ref 0–0.8)
EOSINOPHIL NFR BLD: 2 % (ref 0.5–7.8)
EPI CELLS #/AREA URNS HPF: ABNORMAL /HPF
ERYTHROCYTE [DISTWIDTH] IN BLOOD BY AUTOMATED COUNT: 17.7 % (ref 11.9–14.6)
GLOBULIN SER CALC-MCNC: 4 G/DL (ref 2.3–3.5)
GLUCOSE SERPL-MCNC: 129 MG/DL (ref 65–100)
HCT VFR BLD AUTO: 38.6 % (ref 35.8–46.3)
HGB BLD-MCNC: 12.6 G/DL (ref 11.7–15.4)
IMM GRANULOCYTES # BLD: 0 K/UL (ref 0–0.5)
IMM GRANULOCYTES NFR BLD AUTO: 0 % (ref 0–5)
LACTATE BLD-SCNC: 1.8 MMOL/L (ref 0.5–1.9)
LYMPHOCYTES # BLD: 0.7 K/UL (ref 0.5–4.6)
LYMPHOCYTES NFR BLD: 11 % (ref 13–44)
MCH RBC QN AUTO: 26.4 PG (ref 26.1–32.9)
MCHC RBC AUTO-ENTMCNC: 32.6 G/DL (ref 31.4–35)
MCV RBC AUTO: 80.8 FL (ref 79.6–97.8)
MONOCYTES # BLD: 0.3 K/UL (ref 0.1–1.3)
MONOCYTES NFR BLD: 5 % (ref 4–12)
MUCOUS THREADS URNS QL MICRO: 0 /LPF
NEUTS SEG # BLD: 5.4 K/UL (ref 1.7–8.2)
NEUTS SEG NFR BLD: 82 % (ref 43–78)
P-R INTERVAL, ECG05: 130 MS
PLATELET # BLD AUTO: 325 K/UL (ref 150–450)
PMV BLD AUTO: 10 FL (ref 10.8–14.1)
POTASSIUM SERPL-SCNC: 4.2 MMOL/L (ref 3.5–5.1)
PROCALCITONIN SERPL-MCNC: 0.1 NG/ML
PROT SERPL-MCNC: 7 G/DL (ref 6.3–8.2)
Q-T INTERVAL, ECG07: 436 MS
QRS DURATION, ECG06: 80 MS
QTC CALCULATION (BEZET), ECG08: 438 MS
RBC # BLD AUTO: 4.78 M/UL (ref 4.05–5.25)
RBC #/AREA URNS HPF: ABNORMAL /HPF
SODIUM SERPL-SCNC: 139 MMOL/L (ref 136–145)
VENTRICULAR RATE, ECG03: 61 BPM
WBC # BLD AUTO: 6.6 K/UL (ref 4.3–11.1)
WBC URNS QL MICRO: ABNORMAL /HPF

## 2017-12-13 PROCEDURE — 96361 HYDRATE IV INFUSION ADD-ON: CPT | Performed by: EMERGENCY MEDICINE

## 2017-12-13 PROCEDURE — 96360 HYDRATION IV INFUSION INIT: CPT | Performed by: EMERGENCY MEDICINE

## 2017-12-13 PROCEDURE — 99285 EMERGENCY DEPT VISIT HI MDM: CPT | Performed by: EMERGENCY MEDICINE

## 2017-12-13 PROCEDURE — 81003 URINALYSIS AUTO W/O SCOPE: CPT | Performed by: EMERGENCY MEDICINE

## 2017-12-13 PROCEDURE — 81015 MICROSCOPIC EXAM OF URINE: CPT | Performed by: EMERGENCY MEDICINE

## 2017-12-13 PROCEDURE — 74011250636 HC RX REV CODE- 250/636: Performed by: EMERGENCY MEDICINE

## 2017-12-13 PROCEDURE — 85025 COMPLETE CBC W/AUTO DIFF WBC: CPT | Performed by: EMERGENCY MEDICINE

## 2017-12-13 PROCEDURE — 83605 ASSAY OF LACTIC ACID: CPT

## 2017-12-13 PROCEDURE — 84145 PROCALCITONIN (PCT): CPT | Performed by: EMERGENCY MEDICINE

## 2017-12-13 PROCEDURE — 80053 COMPREHEN METABOLIC PANEL: CPT | Performed by: EMERGENCY MEDICINE

## 2017-12-13 PROCEDURE — 93005 ELECTROCARDIOGRAM TRACING: CPT | Performed by: EMERGENCY MEDICINE

## 2017-12-13 RX ADMIN — SODIUM CHLORIDE 1000 ML: 900 INJECTION, SOLUTION INTRAVENOUS at 13:47

## 2017-12-13 RX ADMIN — SODIUM CHLORIDE 1000 ML: 900 INJECTION, SOLUTION INTRAVENOUS at 12:45

## 2017-12-13 NOTE — DISCHARGE INSTRUCTIONS
Low Blood Pressure: Care Instructions  Your Care Instructions    Blood pressure is a measurement of the force of the blood against the walls of the blood vessels during and after each beat of the heart. Low blood pressure is also called hypotension. It means that your blood pressure is much lower than normal. Some people, especially young, slim women, may have slightly low blood pressure without symptoms. But in many people, low blood pressure can cause symptoms such as feeling dizzy or lightheaded. When your blood pressure is too low, your heart, brain, and other organs do not get enough blood. Low blood pressure can be caused by many things, including heart problems and some medicines. Diabetes that is not under control can cause your blood pressure to drop. And so can a severe allergic reaction or infection. Another cause is dehydration, which is when your body loses too much fluid. Treatment for low blood pressure depends on the cause. Follow-up care is a key part of your treatment and safety. Be sure to make and go to all appointments, and call your doctor if you are having problems. It's also a good idea to know your test results and keep a list of the medicines you take. How can you care for yourself at home? · Drink plenty of fluids, enough so that your urine is light yellow or clear like water. If you have kidney, heart, or liver disease and have to limit fluids, talk with your doctor before you increase the amount of fluids you drink. · Be safe with medicines. Call your doctor if you think you are having a problem with your medicine. You will get more details on the specific medicines your doctor prescribes. · Stand up or get out of bed very slowly to allow your body to adjust.  · Get plenty of rest.  · Do not smoke. Smoking increases your risk of heart attack. If you need help quitting, talk to your doctor about stop-smoking programs and medicines.  These can increase your chances of quitting for good. · Limit alcohol to 2 drinks a day for men and 1 drink a day for women. Alcohol may interfere with your medicine. In addition, alcohol can make your low blood pressure worse by causing your body to lose water. When should you call for help? Call 911 anytime you think you may need emergency care. For example, call if:  ? · You passed out (lost consciousness). ?Call your doctor now or seek immediate medical care if:  ? · You are dizzy or lightheaded, or you feel like you may faint. ? Watch closely for changes in your health, and be sure to contact your doctor if you have any problems. Where can you learn more? Go to http://alberto-estee.info/. Enter C304 in the search box to learn more about \"Low Blood Pressure: Care Instructions. \"  Current as of: September 21, 2016  Content Version: 11.4  © 2641-5783 [employer]. Care instructions adapted under license by [employer] (which disclaims liability or warranty for this information). If you have questions about a medical condition or this instruction, always ask your healthcare professional. [address] any warranty or liability for your use of this information.

## 2017-12-13 NOTE — ED TRIAGE NOTES
Patient went to pcp across the street sent her for low blood pressure. Patient states had stroke in august. States did not take blood pressure medication this am but did last evening.

## 2017-12-13 NOTE — ED NOTES
I have reviewed discharge instructions with the patient and spouse. The patient and spouse verbalized understanding. Patient left ED via Discharge Method: wheelchair to Home with   Opportunity for questions and clarification provided. Patient given 0 scripts. To continue your aftercare when you leave the hospital, you may receive an automated call from our care team to check in on how you are doing. This is a free service and part of our promise to provide the best care and service to meet your aftercare needs.  If you have questions, or wish to unsubscribe from this service please call [phone]. Thank you for Choosing our [facility] Emergency Department.

## 2017-12-13 NOTE — ED PROVIDER NOTES
HPI Comments: Patient presents to the ER for hypotension. Apparently she had been seen today by her primary care physician for normal follow up when she was found to be hypotensive. She was sent to the ER for further evaluation. Her only symptom is some mild fatigue. She denies any syncope, chest pain, chest pressure or vomiting. Patient has a history of previous CVA with residual right-sided symptoms. She ambulates with use of her cane and walker. Denies any recent trauma. Denies any new medicines or changes in medications    Patient is a 38 y.o. female presenting with hypotension. The history is provided by the patient. Hypotension    This is a new problem. The current episode started 1 to 2 hours ago. The problem has not changed since onset. Associated symptoms include weakness. Pertinent negatives include no confusion, no unresponsiveness and no numbness. Past Medical History:   Diagnosis Date    Generalized anxiety disorder     GERD (gastroesophageal reflux disease)     Hypertension     Insomnia, unspecified     Iron deficiency anemia 8/23/2013    Left sided lacunar stroke ([facility] [address] 08/2017    Mitral valve prolapse 1993    Obesity (BMI 30-39. 9)     RADHA on CPAP 10/2017    Prediabetes 7/28/2015 12/2/14 HgbA1C 5.9       Past Surgical History:   Procedure Laterality Date    HX ENDOSCOPY  10/1/04    Normal per Dr. Theresa Reynolds         Family History:   Problem Relation Age of Onset    Cancer Mother      cervical    Hypertension Mother     Cancer Sister      cervical    Hypertension Brother 32    Diabetes Maternal Grandmother     Hypertension Other      strong FH       Social History     Social History    Marital status:      Spouse name: N/A    Number of children: 2    Years of education: N/A     Occupational History    Not on file.      Social History Main Topics    Smoking status: Never Smoker    Smokeless tobacco: Never Used    Alcohol use No    Drug use: No    Sexual activity: Yes     Partners: Male     Other Topics Concern    Not on file     Social History Narrative    . Her  is disabled. Mother of 2 children. Her son is 23 and may be Bipolar. 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 ALLERGIES: Erythromycin    Review of Systems   Constitutional: Negative for fatigue and unexpected weight change. HENT: Negative for congestion and dental problem. Eyes: Negative for photophobia, redness and visual disturbance. Respiratory: Negative for chest tightness, shortness of breath and stridor. Cardiovascular: Negative for palpitations and leg swelling. Gastrointestinal: Negative for abdominal pain, anal bleeding, nausea and vomiting. Endocrine: Negative for polydipsia, polyphagia and polyuria. Genitourinary: Negative for flank pain, frequency and urgency. Musculoskeletal: Negative for back pain and gait problem. Skin: Negative for pallor. Allergic/Immunologic: Negative for food allergies and immunocompromised state. Neurological: Positive for weakness. Negative for light-headedness and numbness. Hematological: Negative for adenopathy. Does not bruise/bleed easily. Psychiatric/Behavioral: Negative for confusion. All other systems reviewed and are negative. Vitals:    12/13/17 1222   BP: (!) 67/46   Pulse: 85   Resp: 18   Temp: 97.4 °F (36.3 °C)   SpO2: 97%   Weight: 79.4 kg (175 lb)   Height: 4' 11\" (1.499 m)            Physical Exam   Constitutional: She is oriented to person, place, and time. She appears well-developed and well-nourished. HENT:   Head: Normocephalic and atraumatic. Mouth/Throat: Oropharynx is clear and moist. No oropharyngeal exudate. Eyes: Conjunctivae and EOM are normal. Pupils are equal, round, and reactive to light. No scleral icterus. Neck: Normal range of motion. No tracheal deviation present. No thyromegaly present.    Cardiovascular: Normal rate, regular rhythm and intact distal pulses. Pulmonary/Chest: Effort normal and breath sounds normal. No respiratory distress. She has no wheezes. Abdominal: Soft. Bowel sounds are normal. She exhibits no distension. There is no tenderness. Musculoskeletal: Normal range of motion. She exhibits no edema, tenderness or deformity. Neurological: She is alert and oriented to person, place, and time. She has normal reflexes. Right-sided weakness noted, stable previous CVA   Skin: Skin is warm and dry. No erythema. Nursing note and vitals reviewed. MDM  Number of Diagnoses or Management Options  Hypotension, unspecified hypotension type:   Diagnosis management comments: Patient is awake and alert and talking although her blood pressures in the 70s  Could be medication related  We'll check  labs to rule out any signs of sepsis    2:37 PM  Normal labs. Blood pressures have improved with IV fluids. Had discussed the patient was also testing. We'll hold all blood pressure medications currently. She will need follow-up with her primary care physician       Amount and/or Complexity of Data Reviewed  Clinical lab tests: ordered and reviewed  Obtain history from someone other than the patient: yes (Office Visit     11/16/2017  [facility] MEDICINE    Hector Diaz MD   Family Practice    Labile hypertension +6 more   Dx    Fatigue , Hypotension ; Referred by Hector Diaz MD   Reason for visit   Progress Notes        HISTORY OF PRESENT ILLNESS  Terrea Koyanagi is a 38 y.o. female. HPI here for f/u hypotension and severe fatigue while at PT appt yesterday. BP was 86/59 and she was lightheaded and weak. Sx's resolved after a few hours. She has been having episodes of severe fatigue, nausea, and weakness intermittently for several weeks. No syncope, chest discomfort, or sob. No F/C or night sweats. She saw GI for first time today for eval of severely elevated LFT's. Hepatitis panel and US are normal. Further labs checked at appt today.  She does not use alcohol. Stopped lipitor as directed when elevation first noted. Since her CVA she has occasional urine incontinence, blurred vision, and dizziness. She has complete loss of function of right arm and hand, weakness of right leg. She is having PT 3 times weekly and seeing Dr. Monae Dutta, [facility]. She is on STD until 1/1/2018 presently, and will see Dr Monae Dutta again 12/14/2017 to see if will be able to return to her or some other job. She worked in [employer] at [employer] and her duties included use of both hands w/ fine and gross manipulation, lifting, and prolonged standing and walking. She currently uses a cane for ambulation and is only able to stand and walk for short periods of time. She has not and does not have a f/u appt w/ neurology.     Review of Systems   Constitutional: Positive for malaise/fatigue. Negative for chills and fever. No night sweats   Eyes: Positive for blurred vision. Negative for double vision. Respiratory: Negative for shortness of breath. Cardiovascular: Negative for chest pain. Gastrointestinal: Positive for nausea. Negative for abdominal pain and vomiting. Genitourinary:        No dark urine   Skin: Negative for itching and rash. Neurological: Positive for dizziness, sensory change and focal weakness.         Physical Exam   Constitutional: She is oriented to person, place, and time. She appears well-developed and well-nourished. No distress. obese   HENT:   Mouth/Throat: Oropharynx is clear and moist.   Eyes: Conjunctivae are normal.   Cardiovascular: Normal rate and regular rhythm. No murmur heard. Pulmonary/Chest: Effort normal and breath sounds normal.   Abdominal: Soft. Bowel sounds are normal. She exhibits no distension and no mass. There is no hepatosplenomegaly. There is no tenderness. Musculoskeletal:   Right UE paresis  Right LE rigid  Antalgic gait w/ cane   Neurological: She is alert and oriented to person, place, and time.    Skin: Skin is warm and dry. No rash noted. Psychiatric: She has a normal mood and affect. Her behavior is normal. Her speech is slurred.        Visit Vitals   /88   Pulse 86   Temp 95.5 °F (35.3 °C) (Oral)   Ht 4' 11\" (1.499 m)   Wt 173 lb 6.4 oz (78.7 kg)   SpO2 97%   BMI 35.02 kg/m2           ASSESSMENT and PLAN        ICD-10-CM ICD-9-CM    1. Labile hypertension I10 401.9 BP normal today. It is obviously fluctuating like due to autonomic dysfunction s/p CVA. Continue current regimen and have her start checking and recording bp at home. 2. Malaise and fatigue R53.81 780.79      R53.83      3. Weakness generalized R53.1 780.79    4. Right hemiparesis ([address] G81.91 342.90 REFERRAL TO NEUROLOGY. If her sx's improve significantly, will cancel, but would like for them to see her in regard to the autonomic dysfunction. 5. Left sided lacunar stroke ([facility]) I63.9 434.91 REFERRAL TO NEUROLOGY  6. Urinary incontinence without sensory awareness N39.42 788.34 ditropan stopped due to se's.    7. Elevated LFTs R79.89 790.6 Await GI eval and recs.        F/u 1 week      )    Risk of Complications, Morbidity, and/or Mortality  Presenting problems: high  Diagnostic procedures: moderate  Management options: moderate    Patient Progress  Patient progress: stable    ED Course       Procedures

## 2018-01-08 ENCOUNTER — HOSPITAL ENCOUNTER (OUTPATIENT)
Dept: ULTRASOUND IMAGING | Age: 43
Discharge: HOME | End: 2018-01-08
Attending: INTERNAL MEDICINE
Payer: COMMERCIAL

## 2018-01-08 VITALS
HEART RATE: 76 BPM | TEMPERATURE: 97.7 F | OXYGEN SATURATION: 100 % | DIASTOLIC BLOOD PRESSURE: 70 MMHG | RESPIRATION RATE: 16 BRPM | SYSTOLIC BLOOD PRESSURE: 136 MMHG

## 2018-01-08 DIAGNOSIS — R74.01 NONSPECIFIC ELEVATION OF LEVELS OF TRANSAMINASE OR LACTIC ACID DEHYDROGENASE (LDH): ICD-10-CM

## 2018-01-08 DIAGNOSIS — R74.02 NONSPECIFIC ELEVATION OF LEVELS OF TRANSAMINASE OR LACTIC ACID DEHYDROGENASE (LDH): ICD-10-CM

## 2018-01-08 PROCEDURE — 77030003503 US GUIDE BX LIV PERC

## 2018-01-08 PROCEDURE — 74011000250 HC RX REV CODE- 250: Performed by: RADIOLOGY

## 2018-01-08 PROCEDURE — 74011250636 HC RX REV CODE- 250/636

## 2018-01-08 PROCEDURE — 99152 MOD SED SAME PHYS/QHP 5/>YRS: CPT

## 2018-01-08 PROCEDURE — 74011250636 HC RX REV CODE- 250/636: Performed by: RADIOLOGY

## 2018-01-08 PROCEDURE — 74011250637 HC RX REV CODE- 250/637: Performed by: RADIOLOGY

## 2018-01-08 PROCEDURE — 88312 SPECIAL STAINS GROUP 1: CPT | Performed by: INTERNAL MEDICINE

## 2018-01-08 PROCEDURE — 88313 SPECIAL STAINS GROUP 2: CPT | Performed by: INTERNAL MEDICINE

## 2018-01-08 PROCEDURE — 88307 TISSUE EXAM BY PATHOLOGIST: CPT | Performed by: INTERNAL MEDICINE

## 2018-01-08 RX ORDER — FENTANYL CITRATE 50 UG/ML
25-100 INJECTION, SOLUTION INTRAMUSCULAR; INTRAVENOUS
Status: DISCONTINUED | OUTPATIENT
Start: 2018-01-08 | End: 2018-01-12 | Stop reason: HOSPADM

## 2018-01-08 RX ORDER — IBUPROFEN 600 MG/1
600 TABLET ORAL
Status: DISCONTINUED | OUTPATIENT
Start: 2018-01-08 | End: 2018-01-12 | Stop reason: HOSPADM

## 2018-01-08 RX ORDER — DIPHENHYDRAMINE HYDROCHLORIDE 50 MG/ML
25-50 INJECTION, SOLUTION INTRAMUSCULAR; INTRAVENOUS
Status: DISCONTINUED | OUTPATIENT
Start: 2018-01-08 | End: 2018-01-12 | Stop reason: HOSPADM

## 2018-01-08 RX ORDER — HYDROCHLOROTHIAZIDE 12.5 MG/1
12.5 TABLET ORAL DAILY
COMMUNITY
End: 2018-04-10 | Stop reason: SDUPTHER

## 2018-01-08 RX ORDER — MIDAZOLAM HYDROCHLORIDE 1 MG/ML
.5-2 INJECTION, SOLUTION INTRAMUSCULAR; INTRAVENOUS
Status: DISCONTINUED | OUTPATIENT
Start: 2018-01-08 | End: 2018-01-12 | Stop reason: HOSPADM

## 2018-01-08 RX ORDER — MORPHINE SULFATE 2 MG/ML
1 INJECTION, SOLUTION INTRAMUSCULAR; INTRAVENOUS
Status: DISCONTINUED | OUTPATIENT
Start: 2018-01-08 | End: 2018-01-12 | Stop reason: HOSPADM

## 2018-01-08 RX ORDER — LIDOCAINE HYDROCHLORIDE 20 MG/ML
20-300 INJECTION, SOLUTION INFILTRATION; PERINEURAL
Status: DISCONTINUED | OUTPATIENT
Start: 2018-01-08 | End: 2018-01-12 | Stop reason: HOSPADM

## 2018-01-08 RX ORDER — OXYCODONE HYDROCHLORIDE 5 MG/1
10 TABLET ORAL
Status: DISCONTINUED | OUTPATIENT
Start: 2018-01-08 | End: 2018-01-12 | Stop reason: HOSPADM

## 2018-01-08 RX ORDER — VALSARTAN 160 MG/1
160 TABLET ORAL DAILY
COMMUNITY
End: 2018-04-10 | Stop reason: SDUPTHER

## 2018-01-08 RX ORDER — ONDANSETRON 2 MG/ML
4 INJECTION INTRAMUSCULAR; INTRAVENOUS ONCE
Status: ACTIVE | OUTPATIENT
Start: 2018-01-08 | End: 2018-01-09

## 2018-01-08 RX ORDER — SODIUM CHLORIDE 9 MG/ML
150 INJECTION, SOLUTION INTRAVENOUS CONTINUOUS
Status: ACTIVE | OUTPATIENT
Start: 2018-01-08 | End: 2018-01-09

## 2018-01-08 RX ORDER — SODIUM CHLORIDE 9 MG/ML
25 INJECTION, SOLUTION INTRAVENOUS ONCE
Status: COMPLETED | OUTPATIENT
Start: 2018-01-08 | End: 2018-01-08

## 2018-01-08 RX ADMIN — MIDAZOLAM HYDROCHLORIDE 1 MG: 1 INJECTION, SOLUTION INTRAMUSCULAR; INTRAVENOUS at 10:49

## 2018-01-08 RX ADMIN — FENTANYL CITRATE 50 MCG: 50 INJECTION, SOLUTION INTRAMUSCULAR; INTRAVENOUS at 11:01

## 2018-01-08 RX ADMIN — FENTANYL CITRATE 50 MCG: 50 INJECTION, SOLUTION INTRAMUSCULAR; INTRAVENOUS at 10:49

## 2018-01-08 RX ADMIN — OXYCODONE HYDROCHLORIDE 10 MG: 5 TABLET ORAL at 13:16

## 2018-01-08 RX ADMIN — MIDAZOLAM HYDROCHLORIDE 1 MG: 1 INJECTION, SOLUTION INTRAMUSCULAR; INTRAVENOUS at 10:54

## 2018-01-08 RX ADMIN — FENTANYL CITRATE 50 MCG: 50 INJECTION, SOLUTION INTRAMUSCULAR; INTRAVENOUS at 10:54

## 2018-01-08 RX ADMIN — SODIUM CHLORIDE 25 ML/HR: 900 INJECTION, SOLUTION INTRAVENOUS at 10:49

## 2018-01-08 RX ADMIN — MIDAZOLAM HYDROCHLORIDE 1 MG: 1 INJECTION, SOLUTION INTRAMUSCULAR; INTRAVENOUS at 11:02

## 2018-01-08 RX ADMIN — LIDOCAINE HYDROCHLORIDE 60 MG: 20 INJECTION, SOLUTION INFILTRATION; PERINEURAL at 10:59

## 2018-01-08 NOTE — PROCEDURES
Department of Interventional Radiology  [phone]        Interventional Radiology Brief Procedure Note    Patient: Carmela Machado MRN: [id number]  SSN: [id number]    YOB: 1975  Age: 38 y.o. Sex: female      Date of Procedure: 1/8/2018    Pre-Procedure Diagnosis: Elevated LFT's. Post-Procedure Diagnosis: SAME    Procedure(s): Image Guided Biopsy    Brief Description of Procedure: Left lobe liver    Performed By: Alyssia Penny MD     Assistants: None    Anesthesia:Moderate Sedation    Estimated Blood Loss: Less than 10ml    Specimens: Pathology    Implants: None    Findings: Unremarkable. Complications: None    Recommendations: 3 hour bedrest.       Follow Up: [facility].       Signed By: Alyssia Penny MD     January 8, 2018

## 2018-01-08 NOTE — IP AVS SNAPSHOT
Adele Hastings 
 
 
 [address] [facility] [address] [facility] 
[phone] Patient: Cayla Lange MRN: [id number] About your hospitalization You were admitted on:  January 8, 2018 You last received care in the:  [facility] You were discharged on:  January 8, 2018 Why you were hospitalized Your primary diagnosis was:  Not on File Follow-up Information None Discharge Orders None A check cami indicates which time of day the medication should be taken. My Medications ASK your doctor about these medications Instructions Each Dose to Equal  
 Morning Noon Evening Bedtime  
 acetaminophen 325 mg tablet Commonly known as:  TYLENOL Your last dose was: Your next dose is: Take 2 Tabs by mouth every six (6) hours as needed for Pain or Fever (headache). 650 mg  
    
   
   
   
  
 aspirin 325 mg tablet Commonly known as:  ASPIRIN Your last dose was: Your next dose is: Take 1 Tab by mouth daily. 325 mg  
    
   
   
   
  
 diazePAM 5 mg tablet Commonly known as:  VALIUM Your last dose was: Your next dose is: Take 0.5 Tabs by mouth every six (6) hours as needed. Max Daily Amount: 10 mg. Indications: Muscle Spasm 2.5 mg  
    
   
   
   
  
 escitalopram oxalate 20 mg tablet Commonly known as:  Meena Pattee Your last dose was: Your next dose is: Take 1 Tab by mouth daily. 20 mg  
    
   
   
   
  
 hydroCHLOROthiazide 12.5 mg tablet Commonly known as:  HYDRODIURIL Your last dose was: Your next dose is: Take 12.5 mg by mouth daily. 12.5 mg  
    
   
   
   
  
 potassium chloride SR 20 mEq tablet Commonly known as:  K-TAB Your last dose was: Your next dose is: Take 1 Tab by mouth two (2) times a day. 20 mEq raNITIdine 150 mg tablet Commonly known as:  ZANTAC Your last dose was: Your next dose is: Take 1 Tab by mouth two (2) times a day. 150 mg  
    
   
   
   
  
 tiZANidine 2 mg tablet Commonly known as:  Tresia Traci Your last dose was: Your next dose is: Take 1 Tab by mouth three (3) times daily. Indications: Muscle Spasticity of Cerebral Origin 2 mg  
    
   
   
   
  
 valsartan 160 mg tablet Commonly known as:  DIOVAN Your last dose was: Your next dose is: Take 160 mg by mouth daily. 160 mg Discharge Instructions [address] Department of Interventional Radiology [facility] 
[phone] Office 
[phone] Fax BIOPSY DISCHARGE INSTRUCTIONS General Instructions: A biopsy is the removal of a small piece of tissue for microscopic examination or testing. Healthy tissue can be obtained for the purpose of tissue-type matching for transplants. Unhealthy tissues are more commonly biopsied to diagnose disease. Lung Biopsy: A needle lung biopsy is performed when there is a mass discovered in the lung area. The most serious risk is infection, bleeding, and pneumothorax (a collapsed lung). Signs of pneumothorax include shortness of breath, rapid heart rate, and blueness of the skin. If any of these occur, call 911. Liver Biopsy: This test helps detect cancer, infections, and the cause of an enlargement of the liver or elevated liver enzymes. It also helps to diagnose a number of liver diseases. The pain associated with the procedure may be felt in the shoulder. The risks include internal bleeding, pneumothorax, and injury to the surrounding organs. Renal Biopsy: This procedure is sometimes done to evaluate a transplanted kidney.  It is also used to evaluate unexplained decrease in kidney function, blood, or protein in the urine. You may see bright red blood in the urine the first 24 hours after the test. If the bleeding lasts longer, you need to call your doctor. There is a risk of infection and bleeding into the muscle, which may cause soreness. Spinal Biopsy: This test is sometimes done in conjunction with other procedures. Your back will be sore, as we are taking a small sample of bone, which is slightly more difficult to sample than tissue. General Biopsy: 
   A mass can grow in any area of the body, and we are taking a specimen as ordered by your doctor. The risks are the same. They include bleeding, pain, and infection. Home Care Instructions: You may resume your regular diet and medication regimen. Do not drink alcohol, drive, or make any important legal decisions in the next 24 hours. Do not lift anything heavier than a gallon of milk until the soreness goes away. You may use over the counter acetaminophen or ibuprofen for the soreness. You may apply an ice pack to the affected area for 20-30 minutes at time for the first 24 hours. After that, you may apply a heat pack. Call If: You should call your Physician and/or the Radiology Nurse if you have any questions or concerns about the biopsy site. Call if you should have increased pain, fever, redness, drainage, or bleeding more than a small spot on the bandage. Follow-Up Instructions: Please see your ordering doctor as he/she has requested. To Reach Us: If you have any questions about your procedure, please call the Interventional Radiology department at [phone]. After business hours (5pm) and weekends, call the answering service at [phone] and ask for the Radiologist on call to be paged. Interventional Radiology General Nurse Discharge After general anesthesia or intravenous sedation, for 24 hours or while taking prescription Narcotics: · Limit your activities · Do not drive and operate hazardous machinery · Do not make important personal or business decisions · Do  not drink alcoholic beverages · If you have not urinated within 8 hours after discharge, please contact your surgeon on call. * Please give a list of your current medications to your Primary Care Provider. * Please update this list whenever your medications are discontinued, doses are 
   changed, or new medications (including over-the-counter products) are added. * Please carry medication information at all times in case of emergency situations. These are general instructions for a healthy lifestyle: No smoking/ No tobacco products/ Avoid exposure to second hand smoke Surgeon General's Warning:  Quitting smoking now greatly reduces serious risk to your health. Obesity, smoking, and sedentary lifestyle greatly increases your risk for illness A healthy diet, regular physical exercise & weight monitoring are important for maintaining a healthy lifestyle You may be retaining fluid if you have a history of heart failure or if you experience any of the following symptoms:  Weight gain of 3 pounds or more overnight or 5 pounds in a week, increased swelling in our hands or feet or shortness of breath while lying flat in bed. Please call your doctor as soon as you notice any of these symptoms; do not wait until your next office visit. Recognize signs and symptoms of STROKE: 
F-face looks uneven A-arms unable to move or move unevenly S-speech slurred or non-existent T-time-call 911 as soon as signs and symptoms begin-DO NOT go Back to bed or wait to see if you get better-TIME IS BRAIN. Patient Signature: 
Date: 1/8/2018 Discharging Nurse: Cara Solitario RN Introducing [facility]! Dear [employer]: 
Thank you for requesting a [employer] account.   Our records indicate that you already have an active [employer] account. You can access your account anytime at [email or web address] Did you know that you can access your hospital and ER discharge instructions at any time in [employer]? You can also review all of your test results from your hospital stay or ER visit. Additional Information If you have questions, please visit the Frequently Asked Questions section of the [employer] website at [email or web address] Remember, [employer] is NOT to be used for urgent needs. For medical emergencies, dial 911. Now available from your iPhone and Android! Unresulted Labs-Please follow up with your PCP about these lab tests Order Current Status US GUIDE BX DORA PERC In process Providers Seen During Your Hospitalization Provider Specialty Primary office phone Mariza Brunson MD Gastroenterology [phone] Your Primary Care Physician (PCP) Primary Care Physician Office Phone Office Fax Jennifer Leyda [phone] You are allergic to the following Allergen Reactions Erythromycin Nausea and Vomiting Recent Documentation Breastfeeding? OB Status Smoking Status No Having regular periods Never Smoker Emergency Contacts Name Discharge Info Relation Home Work Mobile Yocasta Miller  Mother [14] [phone] Constantino Mathur  Spouse [3] [phone] Patient Belongings The following personal items are in your possession at time of discharge: 
     Visual Aid: None Please provide this summary of care documentation to your next provider. Signatures-by signing, you are acknowledging that this After Visit Summary has been reviewed with you and you have received a copy. Patient Signature:  ____________________________________________________________ Date:  ____________________________________________________________  
  
Sis Eugene Provider Signature:  ____________________________________________________________ Date:  ____________________________________________________________

## 2018-01-08 NOTE — PROGRESS NOTES
Patient to [address] [facility] room for procedure. Patient awake and alert, verbalized name, , and procedure to be performed.

## 2018-01-08 NOTE — PROGRESS NOTES
IR Nurse Pre-Procedure Checklist Part 2          Consent signed: Yes    H&P complete:  Yes    Antibiotics: Not applicable    Airway/Mallampati Done: Yes    Shaved: Not applicable    Pregnancy Form:Yes    Patient Position: Yes    MD Side: Yes     Biopsy Worksheet: Yes    Specimen Medium: Yes

## 2018-01-08 NOTE — DISCHARGE INSTRUCTIONS
[address]  Department of Interventional Radiology  [facility]  [phone] Office  [phone] Fax    BIOPSY DISCHARGE INSTRUCTIONS    General Instructions:     A biopsy is the removal of a small piece of tissue for microscopic examination or testing. Healthy tissue can be obtained for the purpose of tissue-type matching for transplants. Unhealthy tissues are more commonly biopsied to diagnose disease. Lung Biopsy:     A needle lung biopsy is performed when there is a mass discovered in the lung area. The most serious risk is infection, bleeding, and pneumothorax (a collapsed lung). Signs of pneumothorax include shortness of breath, rapid heart rate, and blueness of the skin. If any of these occur, call 911. Liver Biopsy: This test helps detect cancer, infections, and the cause of an enlargement of the liver or elevated liver enzymes. It also helps to diagnose a number of liver diseases. The pain associated with the procedure may be felt in the shoulder. The risks include internal bleeding, pneumothorax, and injury to the surrounding organs. Renal Biopsy: This procedure is sometimes done to evaluate a transplanted kidney. It is also used to evaluate unexplained decrease in kidney function, blood, or protein in the urine. You may see bright red blood in the urine the first 24 hours after the test. If the bleeding lasts longer, you need to call your doctor. There is a risk of infection and bleeding into the muscle, which may cause soreness. Spinal Biopsy: This test is sometimes done in conjunction with other procedures. Your back will be sore, as we are taking a small sample of bone, which is slightly more difficult to sample than tissue. General Biopsy:     A mass can grow in any area of the body, and we are taking a specimen as ordered by your doctor. The risks are the same.  They include bleeding, pain, and infection. Home Care Instructions: You may resume your regular diet and medication regimen. Do not drink alcohol, drive, or make any important legal decisions in the next 24 hours. Do not lift anything heavier than a gallon of milk until the soreness goes away. You may use over the counter acetaminophen or ibuprofen for the soreness. You may apply an ice pack to the affected area for 20-30 minutes at time for the first 24 hours. After that, you may apply a heat pack. Call If: You should call your Physician and/or the Radiology Nurse if you have any questions or concerns about the biopsy site. Call if you should have increased pain, fever, redness, drainage, or bleeding more than a small spot on the bandage. Follow-Up Instructions: Please see your ordering doctor as he/she has requested. To Reach Us: If you have any questions about your procedure, please call the Interventional Radiology department at [phone]. After business hours (5pm) and weekends, call the answering service at [phone] and ask for the Radiologist on call to be paged. Interventional Radiology General Nurse Discharge    After general anesthesia or intravenous sedation, for 24 hours or while taking prescription Narcotics:  · Limit your activities  · Do not drive and operate hazardous machinery  · Do not make important personal or business decisions  · Do  not drink alcoholic beverages  · If you have not urinated within 8 hours after discharge, please contact your surgeon on call. * Please give a list of your current medications to your Primary Care Provider. * Please update this list whenever your medications are discontinued, doses are     changed, or new medications (including over-the-counter products) are added. * Please carry medication information at all times in case of emergency situations.     These are general instructions for a healthy lifestyle:    No smoking/ No tobacco products/ Avoid exposure to second hand smoke  Surgeon General's Warning:  Quitting smoking now greatly reduces serious risk to your health. Obesity, smoking, and sedentary lifestyle greatly increases your risk for illness  A healthy diet, regular physical exercise & weight monitoring are important for maintaining a healthy lifestyle    You may be retaining fluid if you have a history of heart failure or if you experience any of the following symptoms:  Weight gain of 3 pounds or more overnight or 5 pounds in a week, increased swelling in our hands or feet or shortness of breath while lying flat in bed. Please call your doctor as soon as you notice any of these symptoms; do not wait until your next office visit. Recognize signs and symptoms of STROKE:  F-face looks uneven    A-arms unable to move or move unevenly    S-speech slurred or non-existent    T-time-call 911 as soon as signs and symptoms begin-DO NOT go       Back to bed or wait to see if you get better-TIME IS BRAIN.         Patient Signature:  Date: 1/8/2018  Discharging Nurse: Maikol Lora RN

## 2018-01-08 NOTE — H&P
Department of Interventional Radiology  [phone]    History and Physical    Patient:  Macy Rashid MRN:  [id number]  SSN:  [id number]    YOB: 1975  Age:  38 y.o. Sex:  female      Primary Care Provider:  Nicolás Steward MD  Referring Physician:  Jamal Arreola*    Subjective:     Chief Complaint: biopsy    History of the Present Illness: The patient is a 38 y.o. female who presents for random liver biopsy. Elevated LFTs. S/p CVA, right hemiparesis, ambulates with a cane. NPO. Past Medical History:   Diagnosis Date    Generalized anxiety disorder     GERD (gastroesophageal reflux disease)     Hypertension     Insomnia, unspecified     Iron deficiency anemia 8/23/2013    Left sided lacunar stroke ([address] 08/2017    Mitral valve prolapse 1993    Obesity (BMI 30-39. 9)     RADHA on CPAP 10/2017    Prediabetes 7/28/2015 12/2/14 HgbA1C 5.9     Past Surgical History:   Procedure Laterality Date    HX ENDOSCOPY  10/1/04    Normal per Dr. Jameel Olson        Review of Systems:    Pertinent items are noted in the History of Present Illness. Current Outpatient Prescriptions   Medication Sig    valsartan (DIOVAN) 160 mg tablet Take 160 mg by mouth daily.  hydroCHLOROthiazide (HYDRODIURIL) 12.5 mg tablet Take 12.5 mg by mouth daily.  diazePAM (VALIUM) 5 mg tablet Take 0.5 Tabs by mouth every six (6) hours as needed. Max Daily Amount: 10 mg. Indications: Muscle Spasm    raNITIdine (ZANTAC) 150 mg tablet Take 1 Tab by mouth two (2) times a day.  potassium chloride SR (K-TAB) 20 mEq tablet Take 1 Tab by mouth two (2) times a day.  acetaminophen (TYLENOL) 325 mg tablet Take 2 Tabs by mouth every six (6) hours as needed for Pain or Fever (headache).  escitalopram oxalate (LEXAPRO) 20 mg tablet Take 1 Tab by mouth daily.  tiZANidine (ZANAFLEX) 2 mg tablet Take 1 Tab by mouth three (3) times daily.  Indications: Muscle Spasticity of Cerebral Origin    aspirin (ASPIRIN) 325 mg tablet Take 1 Tab by mouth daily. Current Facility-Administered Medications   Medication Dose Route Frequency    lidocaine (XYLOCAINE) 20 mg/mL (2 %) injection  mg   mg SubCUTAneous Multiple    0.9% sodium chloride infusion  25 mL/hr IntraVENous ONCE    diphenhydrAMINE (BENADRYL) injection 25-50 mg  25-50 mg IntraVENous Multiple    fentaNYL citrate (PF) injection  mcg   mcg IntraVENous Multiple    midazolam (VERSED) injection 0.5-2 mg  0.5-2 mg IntraVENous Multiple        Allergies   Allergen Reactions    Erythromycin Nausea and Vomiting       Family History   Problem Relation Age of Onset    Cancer Mother      cervical    Hypertension Mother     Cancer Sister      cervical    Hypertension Brother 32    Diabetes Maternal Grandmother     Hypertension Other      strong FH     Social History   Substance Use Topics    Smoking status: Never Smoker    Smokeless tobacco: Never Used    Alcohol use No        Objective:       Physical Examination:    Vitals:    01/08/18 0950   BP: 141/71   Pulse: 86   Resp: 18   Temp: 97.7 °F (36.5 °C)   SpO2: 97%     Blood pressure 141/71, pulse 86, temperature 97.7 °F (36.5 °C), resp. rate 18, last menstrual period 12/13/2017, SpO2 97 %, not currently breastfeeding.   HEART: regular rate and rhythm  LUNG: clear to auscultation bilaterally  ABDOMEN: normal findings: soft, non-tender  EXTREMITIES: warm    Laboratory:     Lab Results   Component Value Date/Time    Sodium 139 12/13/2017 12:28 PM    Sodium 142 09/26/2017 05:09 PM    Potassium 4.2 12/13/2017 12:28 PM    Potassium 4.0 09/26/2017 05:09 PM    Chloride 105 12/13/2017 12:28 PM    Chloride 98 09/26/2017 05:09 PM    CO2 26 12/13/2017 12:28 PM    CO2 27 09/26/2017 05:09 PM    Anion gap 8 12/13/2017 12:28 PM    Anion gap 8 08/31/2017 07:20 AM    Glucose 129 12/13/2017 12:28 PM    Glucose 92 09/26/2017 05:09 PM    BUN 11 12/13/2017 12:28 PM    BUN 9 09/26/2017 05:09 PM    Creatinine 1.03 12/13/2017 12:28 PM    Creatinine 0.61 09/26/2017 05:09 PM    GFR est AA >60 12/13/2017 12:28 PM    GFR est  09/26/2017 05:09 PM    GFR est non-AA >60 12/13/2017 12:28 PM    GFR est non- 09/26/2017 05:09 PM    Calcium 8.7 12/13/2017 12:28 PM    Calcium 9.7 09/26/2017 05:09 PM    Magnesium 2.0 08/16/2017 06:35 AM    Magnesium 2.2 08/09/2017 07:17 AM    Albumin 3.0 12/13/2017 12:28 PM    Albumin 4.0 10/24/2017 03:00 PM    Protein, total 7.0 12/13/2017 12:28 PM    Protein, total 7.0 10/24/2017 03:00 PM    Globulin 4.0 12/13/2017 12:28 PM    Globulin 4.3 08/03/2017 05:30 PM    A-G Ratio 0.8 12/13/2017 12:28 PM    A-G Ratio 1.3 09/26/2017 05:09 PM    AST (SGOT) 436 12/13/2017 12:28 PM    AST (SGOT) 364 10/24/2017 03:00 PM    ALT (SGPT) 332 12/13/2017 12:28 PM    ALT (SGPT) 325 10/24/2017 03:00 PM     Lab Results   Component Value Date/Time    WBC 6.6 12/13/2017 12:28 PM    WBC 5.7 08/22/2017 07:09 AM    HGB 12.6 12/13/2017 12:28 PM    HGB 12.0 09/04/2017 08:29 AM    HCT 38.6 12/13/2017 12:28 PM    HCT 37.3 08/31/2017 07:20 AM    PLATELET 825 18/66/8283 12:28 PM    PLATELET 849 96/19/0753 08:29 AM     Lab Results   Component Value Date/Time    aPTT 26.4 08/07/2017 02:22 PM    Prothrombin time 10.4 08/07/2017 02:22 PM    INR 1.0 08/07/2017 02:22 PM       Assessment:     Elevated LFTs    Plan:     Planned Procedure:  Liver biopsy    Risks, benefits, and alternatives reviewed with patient and she agrees to proceed with the procedure.       Signed By: Radha Robins PA-C     January 8, 2018

## 2018-01-08 NOTE — PROGRESS NOTES
TRANSFER - OUT REPORT:    Verbal report given to Carol Denis RN(name) on [facility]  being transferred to recovery(unit) for routine progression of care       Report consisted of patients Situation, Background, Assessment and   Recommendations(SBAR). Information from the following report(s) SBAR, Procedure Summary and MAR was reviewed with the receiving nurse. Lines:   Peripheral IV 01/08/18 Left Forearm (Active)   Site Assessment Clean, dry, & intact 1/8/2018  9:53 AM   Phlebitis Assessment 0 1/8/2018  9:53 AM   Infiltration Assessment 0 1/8/2018  9:53 AM   Dressing Status Clean, dry, & intact 1/8/2018  9:53 AM   Hub Color/Line Status Blue 1/8/2018  9:53 AM        Opportunity for questions and clarification was provided.       Patient transported with:   Registered Nurse

## 2018-01-08 NOTE — PROGRESS NOTES
Recovery period complete, discharge instructions reviewed with patient and spouse, patient assisted to front exit via wheelchair.

## 2018-01-08 NOTE — IP AVS SNAPSHOT
Lavaun Jeans 
 
 
 [address] 
[phone] Patient: Cm Teresa MRN: [id number] A check cami indicates which time of day the medication should be taken. My Medications ASK your doctor about these medications Instructions Each Dose to Equal  
 Morning Noon Evening Bedtime  
 acetaminophen 325 mg tablet Commonly known as:  TYLENOL Your last dose was: Your next dose is: Take 2 Tabs by mouth every six (6) hours as needed for Pain or Fever (headache). 650 mg  
    
   
   
   
  
 aspirin 325 mg tablet Commonly known as:  ASPIRIN Your last dose was: Your next dose is: Take 1 Tab by mouth daily. 325 mg  
    
   
   
   
  
 diazePAM 5 mg tablet Commonly known as:  VALIUM Your last dose was: Your next dose is: Take 0.5 Tabs by mouth every six (6) hours as needed. Max Daily Amount: 10 mg. Indications: Muscle Spasm 2.5 mg  
    
   
   
   
  
 escitalopram oxalate 20 mg tablet Commonly known as:  Clevester Clap Your last dose was: Your next dose is: Take 1 Tab by mouth daily. 20 mg  
    
   
   
   
  
 hydroCHLOROthiazide 12.5 mg tablet Commonly known as:  HYDRODIURIL Your last dose was: Your next dose is: Take 12.5 mg by mouth daily. 12.5 mg  
    
   
   
   
  
 potassium chloride SR 20 mEq tablet Commonly known as:  K-TAB Your last dose was: Your next dose is: Take 1 Tab by mouth two (2) times a day. 20 mEq  
    
   
   
   
  
 raNITIdine 150 mg tablet Commonly known as:  ZANTAC Your last dose was: Your next dose is: Take 1 Tab by mouth two (2) times a day. 150 mg  
    
   
   
   
  
 tiZANidine 2 mg tablet Commonly known as:  Alveda Riis Your last dose was: Your next dose is: Take 1 Tab by mouth three (3) times daily. Indications: Muscle Spasticity of Cerebral Origin 2 mg  
    
   
   
   
  
 valsartan 160 mg tablet Commonly known as:  DIOVAN Your last dose was: Your next dose is: Take 160 mg by mouth daily.   
 160 mg

## 2018-02-06 NOTE — PROGRESS NOTES
Zoraida pitt  : 1975 [facility]  [address] [facility], [address] [place] [address], [address]  [phone]   [phone]         OUTPATIENT OCCUPATIONAL THERAPY: Discontinuation Summary 2018    ICD-10: Treatment Diagnosis:   Hemiplegia and hemiparesis following cerebral infarction affecting right dominant side (I69.351)  Precautions/Allergies:   Erythromycin   Fall Risk Score: 3 (? 5 = High Risk)  MD Orders: OT evaluate and treat  MEDICAL/REFERRING DIAGNOSIS:   Stroke (cerebrum) ([facility] [address] [I63.9]   DATE OF ONSET: 2017   REFERRING PHYSICIAN: Kera Duran*  RETURN PHYSICIAN APPOINTMENT: 2017     INITIAL ASSESSMENT:  Ms. Demi pitt was seen for 18 visits of outpatient therapy s/p CVA. Pt was making slow progress with R hemiplegia. Pt late to many appointments limiting time during her session. Pt's progress was also limited at times due to fatigue and low blood pressure. Pt cancelled several appointments due to not feeling well. Pt failed to return to therapy for unknown reasons. Pt will be discontinued from OT services at this time. Please re-consult in the future if appropriate. PLAN OF CARE:   PROBLEM LIST:  1. Decreased Strength  2. Decreased ADL/Functional Activities  3. Decreased Transfer Abilities  4. Decreased Ambulation Ability/Technique  5. Decreased Balance  6. Increased Pain  7. Decreased Activity Tolerance  8. Decreased Flexibility/Joint Mobility  9. Decreased [place] with Home Exercise Program INTERVENTIONS PLANNED:  1. Activities of daily living training  2. Adaptive equipment training  3. Balance training  4. Clothing management  5. Donning&doffing training  6. Manual therapy training  7. Modalities  8. Neuromuscular re-eduation  9. Splinting  10. Therapeutic activity  11. Therapeutic exercise   TREATMENT PLAN:  Effective Dates: 17 TO 17.   Frequency/Duration: 2 times a week for 12 weeks  GOALS: (Goals have been discussed and agreed upon with patient.)  Short-Term Functional Goals: Time Frame:4- 6 weeks  1. Pipo Stauffer will report decreased pain in the R shoulder from 5/10 to 3/10 with passive movement to decrease stiffness for daily activity. NOT MET  2. Pipo Stauffer will be 100% compliant with daily SROM exercises for improving R UE ROM to decrease risk for contractures. NOT MET  3. Pipo Stauffer will demonstrate ability to complete weightbearing activities in R UE with moderate facilitation to improve functional use for transfers. MET  4. Pipo Stauffer will complete basic ADL with supervision to improve independence with functional tasks. NOT MET  5. Pipo Stauffer will tolerate 15 minutes of dynamic standing balance activity with CGA to decrease risk for falls with ADL and to improve activity tolerance. NOT ADDRESSED  6. Discharge Goals: Time Frame: 8-12 weeks   1. Pipo Stauffer will have functional PROM of R shoulder to [facility] to decrease pain and stiffness in R dominant UE. NOT MET  2. Pipo Stauffer will demonstrate improved strength in the R dominant UE as evidenced by at least 2+ to 3-/5 strength for using R UE as functional assist. NOT MET  3. Pipo Stauffer will demonstrate ability to functionally grasp and release light objects with supervision and with use of R dominant hand. NOT MET  4. Pipo Stauffer will be modified independent with all basic ADL to improve overall quality of life. NOT MET  5. Pipo Stauffer will use R hand as functional assist with daily activities/transfers with supervision to improve independence with ADL. NOT MET  Rehabilitation Potential For Stated Goals: Good  Regarding [place] therapy, I certify that the treatment plan above will be carried out by a therapist or under their direction. Thank you for this referral,  Roc English OT                 The information in this section was collected on 9/11/17 (except where otherwise noted).   OCCUPATIONAL PROFILE & HISTORY: History of Present Injury/Illness (Reason for Referral):  Pt reports she was at work when she started feeling like she was going to pass out. Pt reports she didn't really stop working and the feeling wouldn't go away. It started at 2pm and left work at 3:30 pm. Pt felt like she was going to fall and pushed a buggy to her car and drove to her daughter's house. Pt then was brought over to the ER at [facility]. Progressively got worse with R sided weakness and woke up the next morning with full parlaysis of the R side. Reports her eyesight was decreasing and needing reading glasses. R eye is a little worse with the blurry vision. Pt wears to reading glasses. Pt using a wheelchair as less as possible. Using wheelchair when out and about. Pt using a quad cane around the home and has AFO to R LE. Tub transfer bench with CGA. Eats at the kitchen table. Pt stayed from Aug 8th- September 6th in inpatient rehab. Pt reports no falls at home. Pt getting dressed using techniques learned for inpatient rehab. Difficulty walking and carrying objects. Pt has some issues with Zanaflex and incontinence (she is unsure if that is related). Pt presents today for outpatient occupational therapy services. Past Medical History/Comorbidities:   Ms. Tiki Lugo  has a past medical history of Generalized anxiety disorder; GERD (gastroesophageal reflux disease); Hypertension; Insomnia, unspecified; Iron deficiency anemia (8/23/2013); Left sided lacunar stroke ([facility] [address] (08/2017); Mitral valve prolapse (1993); Obesity (BMI 30-39.9); RADHA on CPAP (10/2017); and Prediabetes (7/28/2015). Ms. Tiki Lugo  has a past surgical history that includes hx tubal ligation (1995) and hx endoscopy (10/1/04). Social History/Living Environment:   Lives with mom with her  because toilet/tub is lower with more open spaces. Pt also has a son. Pt has 24 hr supervision. Prior Level of Function/Work/Activity:   Indpendent and working full time in [employer] at [employer] .   Dominant Side:         RIGHTPersonal Factors: Other factors that influence how disability is experienced by the patient:  Hx of anxiety disorder, HTN, Pre-diabetic    Current Medications:    Current Outpatient Prescriptions:     valsartan (DIOVAN) 160 mg tablet, Take 160 mg by mouth daily. , Disp: , Rfl:     hydroCHLOROthiazide (HYDRODIURIL) 12.5 mg tablet, Take 12.5 mg by mouth daily. , Disp: , Rfl:     diazePAM (VALIUM) 5 mg tablet, Take 0.5 Tabs by mouth every six (6) hours as needed. Max Daily Amount: 10 mg. Indications: Muscle Spasm, Disp: 60 Tab, Rfl: 2    tiZANidine (ZANAFLEX) 2 mg tablet, Take 1 Tab by mouth three (3) times daily. Indications: Muscle Spasticity of Cerebral Origin, Disp: 90 Tab, Rfl: 4    raNITIdine (ZANTAC) 150 mg tablet, Take 1 Tab by mouth two (2) times a day., Disp: 60 Tab, Rfl: 5    potassium chloride SR (K-TAB) 20 mEq tablet, Take 1 Tab by mouth two (2) times a day., Disp: 60 Tab, Rfl: 6    aspirin (ASPIRIN) 325 mg tablet, Take 1 Tab by mouth daily. , Disp: 30 Tab, Rfl: 12    acetaminophen (TYLENOL) 325 mg tablet, Take 2 Tabs by mouth every six (6) hours as needed for Pain or Fever (headache). , Disp: 30 Tab, Rfl: 0    escitalopram oxalate (LEXAPRO) 20 mg tablet, Take 1 Tab by mouth daily. , Disp: 30 Tab, Rfl: 5            Date Last Reviewed:  9/11/17   Complexity Level : Expanded review of therapy/medical records (1-2):  MODERATE COMPLEXITY   ASSESSMENT OF OCCUPATIONAL PERFORMANCE:     Palpation:  Patient with no palpated subluxation of the R shoulder at this time. Increased pain and tightness with R shoulder flexion/abduction with passive ROM above 90 degrees. Pain along anterior portion of humeral head with anterior tilt. Pain radiates along the middle deltoid.        Modified Maggi Scale     Grade Description : Noted in ELBOW FLEXORS (1)  WRIST FLEXORS(2) , FINGER FLEXORS (1+)  []0 - No increase in muscle tone  []1 - Slight increase in muscle tone, manifested by a catch and release, or by minimal resistance at the end of the range of motion when the affected part(s) is moved in flexion or extension  [x]1+ - Slight increase in muscle tone, manifested by a catch, followed by minimal resistance throughout the remainder (less than half) of the range of movement (ROM)  []2 - More marked increase in muscle tone through most of ROM, but affected part(s) easily moved  []3 - Considerable increase in muscle tone, passive movement difficult  []4 - Affected part(s) rigid in flexion and extension      ROM/Strength:  L UE WNL; No functional AROM of R UE.            Date:  9/11/17  Date:  9/11/17 Date:  10/25/17 Date:  10/25/17    PROM  STRENGTH PROM Strength   Movement RIGHT  RIGHT Right Right    SCAPULA:        Elevation  Limited  2 limited 2   Protraction  Limited  2+ limited 2+   Retraction  Limited   2 WFL 3   SHOULDER:        Flexion  100  0 115 PROM    Abduction  90  1 20-25 AROM  2   External rotation  70  0 30 PROM 0   Internal rotation  WNL  0 WFL PROM 0   Horizontal abduction 100  0  0   Horizontal adduction  WFL  0  0   Extension         ELBOW:        Flexion  WNL  0 75 AROM 2   Extension  WNL  0     FOREARM:        Supination  WFL  0  0   Pronation  WFL  0  0   WRIST:        Wrist flexion  65  0 55 PROM 0   Wrist extension  65  0 55 PROM 0   Wrist radial deviation         Wrist ulnar deviation         HAND:        Finger flexion WFL  0 WFL 0   Finger extension WFL  0 WFL (pain) 0   THUMB:         Abduction  WFL  0 WFL 0    Extension WFL  0 WFL 0    Adduction     WFL 0    MCP flexion  WFL  0      IP flexion  WFL  0     Hand Strength:         unable  0     THREE JAW KARY PINCH unable  0     LATERAL PINCH  unable  0       Functional Mobility:         Gait/Ambulation:  CGA to supervision with quad cane; Uses wheelchair occasionally         Bed Mobility:  CGA  Balance:          Good static standing balance but fair to poor dynamic standing balance  Coordination:          Non-functional in R dominant UE  Mental Status: WNL  Vision:          Reading glasses. Reports some blurry vision in R eye. Tracking and visual fields intact. Some decreased speed with saccadic movement to the R. Activities of Daily Living:           Basic ADLs (From Assessment) Complex ADLs (From Assessment)         Grooming/Bathing/Dressing Activities of Daily Living                                   Sensation:         Grossly intact to light touch in B UE. R hand with some decreased light touch sensation. Reports some hypersensitivity to cold. Physical Skills Involved:  1. Range of Motion  2. Balance  3. Strength  4. Activity Tolerance  5. Sensation  6. Fine Motor Control  7. Gross Motor Control  8. Pain (acute) Cognitive Skills Affected (resulting in the inability to perform in a timely and safe manner):  1. WNL Psychosocial Skills Affected:  1. Anxiety Disorder (per chart review)   Number of elements that affect the Plan of Care: 5+:  HIGH COMPLEXITY   CLINICAL DECISION MAKING:   Outcome Measure: Tool Used: Fugl-Goodman Upper Extremity Motor Assessment  Score:  Initial: 8/66 Most Recent: X (Date: -- )   Interpretation of Score: Outcome Measure Conversion Site    ?  Carrying, Moving, and Handling Objects:     - CURRENT STATUS: CM - 80%-99% impaired, limited or restricted    - GOAL STATUS: CK - 40%-59% impaired, limited or restricted    - D/C STATUS:  ---------------To be determined---------------     Fugl-Goodman Upper Extremity Motor Assessment (without reflexes) Date:  9/11 Date:   Date:     Item Description Score Score Score   Wrist circumduction 0     Hook grasp 0     Shoulder flexion to 180°, elbow extended 0     Spherical grasp 0     Lateral prehension 0     Wrist flexion/extension, elbow extended 0     Pronation-supination, elbow extended 0     Wrist stable, elbow extended 0     Movement with normal speed 0     Forearm supination 0     Shoulder abduction to 90°, elbow extended 0     Movement without dysmetria 0     Shoulder external rotation 0     Wrist stable, elbow at 90° 0     Wrist flexion/extension, elbow at 90° 0     Palmar prehension 0     Scapular retraction 1     Pronation-supination, elbow at 90° 0     Shoulder flexion to 90°, elbow extended 0     Hand to lumbar spine 0     Shoulder abduction 0     Elbow extension 0     Forearm pronation 0     Movement without tremor 0     Cylindrical grasp 0     Finger mass extension (relaxation of flexion) 0     Scapular elevation 1     Finger mass flexion 0     Shoulder adduction with internal rotation 0     Elbow flexion 0     Total Score (Max = 60) 2           Medical Necessity:   · Patient demonstrates good rehab potential due to higher previous functional level. Reason for Services/Other Comments:  · Patient continues to require skilled intervention due to decreased functional independence s/p CVA impacting overall quality of life. Clinical Decision-Making Assessment:     Assessment process, impact of co-morbidities, assessment modification\need for assistance, and selection of interventions: Analytical Complexity:HIGH COMPLEXITY   TREATMENT:   (In addition to Assessment/Re-Assessment sessions the following treatments were rendered)    Pre-treatment Symptoms/Complaints:    Pain: Initial: Pain Intensity 1: 0  Post Session: 2/10      No treatment this date of service. Treatment/Session Assessment:    · Response to Treatment: N/A  · Compliance with Program/Exercises: Will assess as treatment progresses.   · Recommendations/Intent for next treatment session: Discontinue  Total Treatment Duration:  Isiah Jaimes, OT

## 2018-03-12 NOTE — PROGRESS NOTES
Zoraida Fairview  : 1975  Primary: Nadine Yoselin Rpn  Secondary:  [facility]  [address] [facility], [address] [place] [address], [place], [facility]  [phone]   [phone]        OUTPATIENT PHYSICAL THERAPY:Discontinuation Summary    ICD-10: Treatment Diagnosis: Difficulty in walking, not elsewhere classified (R26.2); Hemiplegia and hemiparesis following cerebral infarction affecting right dominant side (I69.351); Dizziness and giddiness (R42)      Precautions/Allergies:   Erythromycin   Fall Risk Score: 0 (? 5 = High Risk)  MD Orders: PT eval MEDICAL/REFERRING DIAGNOSIS:  [facility] [I63.9]   DATE OF ONSET: 8/3/17  REFERRING PHYSICIAN: Kera Christie*  RETURN PHYSICIAN APPOINTMENT: unknown     Marina Gottlieb has been seen in physical therapy from 9/15/17 to 18 for 18 visits. Treatment has been discontinued at this time due to patient failing to return for additional treatment. The below goals were met prior to discontinuation. Thank you for this referral.       REEVALUATION:  Pt has attended 15 physical therapy sessions from 9/15/17 to date including initial assessment. Pt has complained several times of motion sickness, nausea and occasional dizziness that seems to be chronic. Binocular vision exam is abnormal.  Pt cannot tolerate more that 5 reps of tracking and saccadic exercises without having some dizziness. Pt will benefit from vison exercises and possible vestibular exercise as well to try to decrease dizziness. Dizziness Handicap Inventory Score is 66% disability. PROGRESS NOTE:  Pt has attended 10 physical therapy sessions from 9/15/17 to date including initial assessment. Sessions consist of range of motion, therapeutic activity, therapeutic exercise, balance and gait refinement. Pt has shown improvement in balance and gait ability.   Pt has increased Perez Balance Score by 2 points indicating improved static standing balance and decreased risk for fall. Pt has decreased TUG time by over 14 seconds with her cane and almost 22 seconds without her cane indicating more normalized walking pattern. Her gait pattern has improved but she does not take normal step length on the left because she is trying to keep her right knee from hyperextending in stance on the right with her AFO on. I feel the AFO may be a little short so I placed a heel lift in her right shoe with further improvement in her TUG time by 3 more seconds for an overall increase of 17 second improvement with her cane. We will have [facility] look at her AFO to see if it needs adjustments. Pt will continue to benefit from physical therapy for at least 4 more weeks to maximize functional ability. INITIAL ASSESSMENT:  Ms. Fournier Shows presents with dominant right hemiplegia s/p CVA on 8/3/17. Pt presents with ADL, balance and gait deficits. Pt will benefit from physical therapy to maximize to full potential and safety with all functional mobility. PROBLEM LIST (Impacting functional limitations):  1. Decreased Strength  2. Decreased ADL/Functional Activities  3. Decreased Ambulation Ability/Technique  4. Decreased Balance  5. Increased Pain  6. Decreased Activity Tolerance  7. Decreased Flexibility/Joint Mobility  8. Decreased [place] with Home Exercise Program INTERVENTIONS PLANNED:  1. Balance Exercise  2. Bed Mobility  3. Family Education  4. Gait Training  5. Home Exercise Program (HEP)  6. Neuromuscular Re-education/Strengthening  7. Range of Motion (ROM)  8. Therapeutic Activites  9. Therapeutic Exercise/Strengthening  10. Transfer Training  11. modalities   TREATMENT PLAN:  Effective Dates: 9/22/17 TO 12/15/17. Frequency/Duration: 3 times a week for 8 weeks  GOALS: (Goals have been discussed and agreed upon with patient.)  Short-Term Functional Goals: Time Frame: 4 weeks  1.  Pt will be independent with range of motion, strength and balance home exercise program.  STATUS:  MET  2. Pt will decrease TUG score by 5 seconds indicating more normalized and safer gait pattern. STATUS:  MET  Discharge Goals: Time Frame: 8 weeks  Pt will show increased range of motion and improved posture to allow for improved safety and ability with all functional mobility. .  STATUS:  PROGRESSING, CONTINUE 4 WEEKS. Pt will decrease TUG score by 10 seconds indicating more normalized gait pattern and decrease risk for falls. STATUS:  MET  NEW GOAL:  Pt will decrease TUG score by 5 more seconds indicating more normalized gait pattern and decrease risk for falls. NEW GOAL  Pt will increase Perez Balance Scale to 49/56 indicating increased balance and decreased risk for falls. STATUS:  NOT MET, CONT 4 WEEKS. 5.  NEW GOAL (2 weeks from 11/10/17): Pt will be able to tolerate 10 reps of each eye exercise with minimal to no dizziness indicating increased binocular strength. 6.  NEW GOAL (4 weeks from 11;10/17): Pt will report no more than 20% disability on the Dizziness Handicap Inventory Scale indicating decreased dizziness and increased functional activity and quality of life. Rehabilitation Potential For Stated Goals: Good  Regarding [place] therapy, I certify that the treatment plan above will be carried out by a therapist or under their direction.   Thank you for this referral,  Constantino Funez, PT

## 2018-03-22 PROBLEM — Z86.73 HISTORY OF CEREBRAL INFARCTION: Status: ACTIVE | Noted: 2018-03-22

## 2018-03-22 PROBLEM — G81.10 SPASTIC HEMIPLEGIA AFFECTING DOMINANT SIDE (HCC): Status: ACTIVE | Noted: 2018-03-22

## 2018-04-06 ENCOUNTER — HOSPITAL ENCOUNTER (OUTPATIENT)
Dept: MRI IMAGING | Age: 43
Discharge: HOME | End: 2018-04-06
Attending: PSYCHIATRY & NEUROLOGY
Payer: SELF-PAY

## 2018-04-06 DIAGNOSIS — Z86.73 HISTORY OF CEREBRAL INFARCTION: ICD-10-CM

## 2018-04-06 DIAGNOSIS — G81.10 SPASTIC HEMIPLEGIA AFFECTING DOMINANT SIDE (HCC): ICD-10-CM

## 2018-04-06 PROCEDURE — 70544 MR ANGIOGRAPHY HEAD W/O DYE: CPT

## 2018-04-10 NOTE — PROGRESS NOTES
Zoraida,     Your brain MRA is not able to provide accurate information due to movement artifact. Radiologist recommended a CT angiogram, contrast will be injected into your peripheral vein study. You need to drink bottles of water prior to the study. If you agree with the testing, I will go ahead and order the test for you.      Dr Michael Clayton

## 2020-06-11 NOTE — PROGRESS NOTES
Hospitalist Progress Note    2017  Admit Date: 8/3/2017  5:38 PM   NAME: Rosa Sweeney   :  1975   MRN:  [id number]   Attending: Ignacio Cobos MD  PCP:  Krista Birmingham MD    SUBJECTIVE:     Rosa Sweeney is a 2KDX with obesity, HTN who was admitted on 8/3 with R sided weakness and dysarthria. No improvement today. She and her family are tearful and very worried given her lack of improvement. Denies HA, SOB, CP, visual disturbance. Review of Systems negative with exception of pertinent positives noted above      PHYSICAL EXAM       Visit Vitals    /79 (BP 1 Location: Left arm, BP Patient Position: At rest)    Pulse 86    Temp 97.6 °F (36.4 °C)    Resp 16    Ht 4' 11\" (1.499 m)    Wt 77.1 kg (170 lb)    LMP 2017    SpO2 92%    BMI 34.34 kg/m2      Temp (24hrs), Av.2 °F (36.8 °C), Min:97.6 °F (36.4 °C), Max:98.6 °F (37 °C)    Oxygen Therapy  O2 Sat (%): 92 % (17 0737)  Pulse via Oximetry: 88 beats per minute (17 002)  O2 Device: CPAP nasal (17)  FIO2 (%): 21 % (17 0029)    Intake/Output Summary (Last 24 hours) at 17 0905  Last data filed at 17 2116   Gross per 24 hour   Intake                0 ml   Output               60 ml   Net              -60 ml      General: No acute distress. Head:  Atraumatic Normocephalic. Lungs:  CTA Bilaterally. Nonlabored  CVS:  RRR  Abdomen: Soft, NTTP, +BS  Neurologic:  AOx3.  Dysarthria, R sided hemiparesis, L side 5/5    Recent Results (from the past 24 hour(s))   EKG, 12 LEAD, INITIAL    Collection Time: 17  5:23 PM   Result Value Ref Range    Ventricular Rate 86 BPM    Atrial Rate 86 BPM    P-R Interval 122 ms    QRS Duration 84 ms    Q-T Interval 378 ms    QTC Calculation (Bezet) 452 ms    Calculated P Axis 55 degrees    Calculated R Axis 74 degrees    Calculated T Axis 52 degrees    Diagnosis       Normal sinus rhythm  Normal ECG  No previous ECGs available  Confirmed by ST SHARIFA LUND MD Medicare Wellness Visit  Plan for Preventive Care    A good way for you to stay healthy is to use preventive care.  Medicare covers many services that can help you stay healthy.* The goal of these services is to find any health problems as quickly as possible. Finding problems early can help make them easier to treat.  Your personal plan below lists the services you may need and when they are due.     Health Maintenance Summary     DTaP/Tdap/Td Vaccine (1 - Tdap)  Overdue since 6/5/1951    Shingles Vaccine (2 of 3)  Overdue since 5/2/2011    DM/CKD Microalbumin (Yearly)  Overdue since 12/30/2015    Medicare Wellness 65+ (Yearly)  Overdue since 1/28/2020    Depression Screening (Yearly)  Next due on 2/24/2021    DM/CKD GFR (Yearly)  Next due on 6/4/2021    Pneumococcal Vaccine 65+   Completed    Influenza Vaccine   Completed    Hepatitis B Vaccine   Aged Out    Meningococcal Vaccine   Aged Out           Preventive Care for Women and Men    Heart Screenings (Cardiovascular):  · Blood tests are used to check your cholesterol, lipid and triglyceride levels. High levels can increase your risk for heart disease and stroke. High levels can be treated with medications, diet and exercise. Lowering your levels can help keep your heart and blood vessels healthy.  Your provider will order these tests if they are needed.    · An ultrasound is done to see if you have an abdominal aortic aneurysm (AAA).  This is an enlargement of one of the main blood vessels that delivers blood to the body.   In the [place], 9,000 deaths are caused by AAA.  You may not even know you have this problem and as many as 1 in 3 people will have a serious problem if it is not treated.  Early diagnosis allows for more effective treatment and cure.  If you have a family history of AAA or are a male age 65-75 who has smoked, you are at higher risk of an AAA.  Your provider can order this test, if needed.    Colorectal Screening:  · There are many  (), KUN LI ([id number]) on 8/3/2017 9:06:36 PM     TROPONIN I    Collection Time: 08/03/17  5:30 PM   Result Value Ref Range    Troponin-I, Qt. <0.02 (L) 0.02 - 0.05 NG/ML   CBC WITH AUTOMATED DIFF    Collection Time: 08/03/17  5:30 PM   Result Value Ref Range    WBC 6.6 4.3 - 11.1 K/uL    RBC 4.98 4.05 - 5.25 M/uL    HGB 10.4 (L) 11.7 - 15.4 g/dL    HCT 34.1 (L) 35.8 - 46.3 %    MCV 68.5 (L) 79.6 - 97.8 FL    MCH 20.9 (L) 26.1 - 32.9 PG    MCHC 30.5 (L) 31.4 - 35.0 g/dL    RDW 16.4 (H) 11.9 - 14.6 %    PLATELET 278 (H) 747 - 450 K/uL    MPV 9.6 (L) 10.8 - 14.1 FL    DF AUTOMATED      NEUTROPHILS 52 43 - 78 %    LYMPHOCYTES 34 13 - 44 %    MONOCYTES 11 4.0 - 12.0 %    EOSINOPHILS 2 0.5 - 7.8 %    BASOPHILS 1 0.0 - 2.0 %    IMMATURE GRANULOCYTES 0.2 0.0 - 5.0 %    ABS. NEUTROPHILS 3.5 1.7 - 8.2 K/UL    ABS. LYMPHOCYTES 2.2 0.5 - 4.6 K/UL    ABS. MONOCYTES 0.7 0.1 - 1.3 K/UL    ABS. EOSINOPHILS 0.2 0.0 - 0.8 K/UL    ABS. BASOPHILS 0.0 0.0 - 0.2 K/UL    ABS. IMM. GRANS. 0.0 0.0 - 0.5 K/UL   METABOLIC PANEL, COMPREHENSIVE    Collection Time: 08/03/17  5:30 PM   Result Value Ref Range    Sodium 141 136 - 145 mmol/L    Potassium 3.9 3.5 - 5.1 mmol/L    Chloride 105 98 - 107 mmol/L    CO2 26 21 - 32 mmol/L    Anion gap 10 7 - 16 mmol/L    Glucose 108 (H) 65 - 100 mg/dL    BUN 12 6 - 23 MG/DL    Creatinine 0.72 0.6 - 1.0 MG/DL    GFR est AA >60 >60 ml/min/1.73m2    GFR est non-AA >60 >60 ml/min/1.73m2    Calcium 9.0 8.3 - 10.4 MG/DL    Bilirubin, total 0.2 0.2 - 1.1 MG/DL    ALT (SGPT) 29 12 - 65 U/L    AST (SGOT) 20 15 - 37 U/L    Alk.  phosphatase 115 50 - 136 U/L    Protein, total 7.7 6.3 - 8.2 g/dL    Albumin 3.4 (L) 3.5 - 5.0 g/dL    Globulin 4.3 (H) 2.3 - 3.5 g/dL    A-G Ratio 0.8 (L) 1.2 - 3.5     LIPID PANEL    Collection Time: 08/04/17  4:46 AM   Result Value Ref Range    LIPID PROFILE          Cholesterol, total 171 <200 MG/DL    Triglyceride 174 (H) 35 - 150 MG/DL    HDL Cholesterol 46 40 - 60 MG/DL    LDL, tests that are used to check for cancer of your colon and rectum. You and your provider should discuss what test is best for you and when to have it done.  Options include:  · Screening Colonoscopy: exam of the entire colon, seen through a flexible lighted tube.  · Flexible Sigmoidoscopy: exam of the last third (sigmoid portion) of the colon and rectum, seen through a flexible lighted tube.  · Cologuard DNA stool test: a sample of your stool is used to screen for cancer and unseen blood in your stool.  · Fecal Occult Blood Test: a sample of your stool is studied to find any unseen blood    Flu Shot:  · An immunization that helps to prevent influenza (the flu). You should get this every year. The best time to get the shot is in the fall.    Pneumococcal Shot:  • Vaccines are available that can help prevent pneumococcal disease, which is any type of infection caused by Streptococcus pneumoniae bacteria.   Their use can prevent some cases of pneumonia, meningitis, and sepsis. There are two types of pneumococcal vaccines:   o Conjugate vaccines (PCV-13 or Prevnar 13®) - helps protect against the 13 types of pneumococcal bacteria that are the most common causes of serious infections in children and adults.    o Polysaccharide vaccine (PPSV23 or Etuazewkq33®) - helps protect against 23 types of pneumococcal bacteria for patients who are recommended to get it.  These vaccines should be given at least 12 months apart.  A booster is usually not needed.     Hepatitis B Shot:  · An immunization that helps to protect people from getting Hepatitis B. Hepatitis B is a virus that spreads through contact with infected blood or body fluids. Many people with the virus do not have symptoms.  The virus can lead to serious problems, such as liver disease. Some people are at higher risk than others. Your doctor will tell you if you need this shot.     Diabetes Screening:  · A test to measure sugar (glucose) in your blood is called a  calculated 90.2 <100 MG/DL    VLDL, calculated 34.8 (H) 6.0 - 23.0 MG/DL    CHOL/HDL Ratio 3.7     HEMOGLOBIN A1C WITH EAG    Collection Time: 08/04/17  4:46 AM   Result Value Ref Range    Hemoglobin A1c 5.5 4.8 - 6.0 %    Est. average glucose 111 mg/dL         Imaging /Procedures /Studies   CT head 8/3: IMPRESSION:    1. Motion limited study without acute intracranial process evident by  noncontrast CT study of the head. ASSESSMENT      Hospital Problems as of 8/4/2017  Date Reviewed: 1/26/2017          Codes Class Noted - Resolved POA    * (Principal)Weakness of right side of body ICD-10-CM: R53.1  ICD-9-CM: 728.87  8/3/2017 - Present Yes        Essential hypertension ICD-10-CM: I10  ICD-9-CM: 401.9  10/5/2016 - Present Yes        Obesity (BMI 30-39. 9) ICD-10-CM: E66.9  ICD-9-CM: 278.00  Unknown - Present Yes                  Plan:  - concern for CVA  - MRI, carotids, echo pending  - continue ASA, start statin   - permissive HTN    DVT Prophylaxis: Lovenox  Dispo: PT/OT/SLP evals pending    Rayburn Jeans, MD fasting blood sugar. Fasting means you cannot have food or drink for at least 8 hours before the test. This test can detect diabetes long before you may notice symptoms.    Glaucoma Screening:  · Glaucoma screening is performed by your eye doctor. The test measures the fluid pressure inside your eyes to determine if you have glaucoma.     Hepatitis C Screening:  · A blood test to see if you have the hepatitis C virus.  Hepatitis C attacks the liver and is a major cause of chronic liver disease.  Medicare will cover a single screening for all adults born between 1945 & 1965, or high risk patients (people who have injected illegal drugs or people who have had blood transfusions).  High risk patients who continue to inject illegal drugs can be screened for Hepatitis C every year.    Smoking and Tobacco-Use Cessation Counseling:  · Tobacco is the single greatest cause of disease and early death in our country today. Medication and counseling together can increase a person’s chance of quitting for good.   · Medicare covers two quitting attempts per year, with four counseling sessions per attempt (eight sessions in a 12 month period)    Preventive Screening tests for Men    Prostate Screening:  · Should you have a prostate cancer test (PSA)?  It is up to you to decide if you want a prostate cancer test. Talk to your clinician to find out if the test is right for you.  Things for you to consider and talk about should include:  · Benefits and harms of the test  · Your family history  · How your race/ethnicity may influence the test  · If the test may impact other medical conditions you have  · Your values on screenings and treatments    *Medicare pays for many preventive services to keep you healthy. For some of these services, you might have to pay a deductible, coinsurance, and / or copayment.  The amounts vary depending on the type of services you need and the kind of Medicare health plan you have.          If you have  any questions after today's visit, please feel free to send us a message through [employer] ([email or web address] or call the clinic at [phone].    We are committed to providing our patients with their test results in a timely manner. If you have access to [employer], you will receive your results within 5 to 7 days. If your results are normal, a member of our office may call you or you may receive a letter in the mail within 10 to 15 days of your testing. If your results have something additional to discuss, a member of our office will contact you by phone. If at any time you have questions related your results, please feel free to call our office at [phone]

## 2020-07-20 PROBLEM — G81.91 RIGHT HEMIPARESIS (HCC): Status: RESOLVED | Noted: 2017-09-19 | Resolved: 2020-07-20

## 2020-07-20 PROBLEM — Z86.73 HISTORY OF CEREBRAL INFARCTION: Status: RESOLVED | Noted: 2018-03-22 | Resolved: 2020-07-20

## 2020-10-20 PROBLEM — E66.01 SEVERE OBESITY (BMI 35.0-39.9) WITH COMORBIDITY (HCC): Status: ACTIVE | Noted: 2020-10-20

## 2020-10-20 PROBLEM — Z00.00 ANNUAL PHYSICAL EXAM: Status: ACTIVE | Noted: 2020-10-20

## 2021-01-11 ENCOUNTER — HOSPITAL ENCOUNTER (OUTPATIENT)
Dept: LAB | Age: 46
Discharge: HOME | End: 2021-01-11
Payer: MEDICARE

## 2021-01-11 DIAGNOSIS — I10 ESSENTIAL HYPERTENSION: ICD-10-CM

## 2021-01-11 DIAGNOSIS — E78.5 HYPERLIPIDEMIA, UNSPECIFIED HYPERLIPIDEMIA TYPE: ICD-10-CM

## 2021-01-11 LAB
ALBUMIN SERPL-MCNC: 3.9 G/DL (ref 3.5–5)
ALBUMIN/GLOB SERPL: 0.8 {RATIO} (ref 1.2–3.5)
ALP SERPL-CCNC: 124 U/L (ref 50–136)
ALT SERPL-CCNC: 26 U/L (ref 12–65)
ANION GAP SERPL CALC-SCNC: 5 MMOL/L (ref 7–16)
AST SERPL-CCNC: 19 U/L (ref 15–37)
BILIRUB SERPL-MCNC: 0.5 MG/DL (ref 0.2–1.1)
BUN SERPL-MCNC: 8 MG/DL (ref 6–23)
CALCIUM SERPL-MCNC: 9.2 MG/DL (ref 8.3–10.4)
CHLORIDE SERPL-SCNC: 102 MMOL/L (ref 98–107)
CO2 SERPL-SCNC: 29 MMOL/L (ref 21–32)
CREAT SERPL-MCNC: 0.65 MG/DL (ref 0.6–1)
GLOBULIN SER CALC-MCNC: 4.7 G/DL (ref 2.3–3.5)
GLUCOSE SERPL-MCNC: 97 MG/DL (ref 65–100)
POTASSIUM SERPL-SCNC: 3.2 MMOL/L (ref 3.5–5.1)
PROT SERPL-MCNC: 8.6 G/DL (ref 6.3–8.2)
SODIUM SERPL-SCNC: 136 MMOL/L (ref 136–145)

## 2021-01-11 PROCEDURE — 80053 COMPREHEN METABOLIC PANEL: CPT

## 2021-01-11 PROCEDURE — 36415 COLL VENOUS BLD VENIPUNCTURE: CPT

## 2021-02-08 ENCOUNTER — HOSPITAL ENCOUNTER (OUTPATIENT)
Dept: LAB | Age: 46
Discharge: HOME | End: 2021-02-08
Payer: MEDICARE

## 2021-02-08 DIAGNOSIS — I10 ESSENTIAL HYPERTENSION: ICD-10-CM

## 2021-02-08 LAB
ANION GAP SERPL CALC-SCNC: 6 MMOL/L (ref 7–16)
BUN SERPL-MCNC: 8 MG/DL (ref 6–23)
CALCIUM SERPL-MCNC: 9.4 MG/DL (ref 8.3–10.4)
CHLORIDE SERPL-SCNC: 104 MMOL/L (ref 98–107)
CO2 SERPL-SCNC: 27 MMOL/L (ref 21–32)
CREAT SERPL-MCNC: 0.6 MG/DL (ref 0.6–1)
GLUCOSE SERPL-MCNC: 88 MG/DL (ref 65–100)
POTASSIUM SERPL-SCNC: 3.2 MMOL/L (ref 3.5–5.1)
SODIUM SERPL-SCNC: 137 MMOL/L (ref 136–145)

## 2021-02-08 PROCEDURE — 80048 BASIC METABOLIC PNL TOTAL CA: CPT

## 2021-02-08 PROCEDURE — 36415 COLL VENOUS BLD VENIPUNCTURE: CPT

## 2021-02-15 NOTE — PROGRESS NOTES
Persistent hypokalemia. Can you verify she is not taking her hydrochlorothiazide. If she is not, please check a magnesium level on her as well.   In the meanwhile, have her take 20 mEq of potassium a day and repeat a BMP/magnesium in 2 weeks

## 2021-04-14 ENCOUNTER — HOSPITAL ENCOUNTER (OUTPATIENT)
Dept: LAB | Age: 46
Discharge: HOME | End: 2021-04-14
Payer: MEDICARE

## 2021-04-14 DIAGNOSIS — R00.2 PALPITATIONS: ICD-10-CM

## 2021-04-14 DIAGNOSIS — E78.5 HYPERLIPIDEMIA, UNSPECIFIED HYPERLIPIDEMIA TYPE: ICD-10-CM

## 2021-04-14 DIAGNOSIS — I10 ESSENTIAL HYPERTENSION: ICD-10-CM

## 2021-04-14 LAB
ANION GAP SERPL CALC-SCNC: 5 MMOL/L (ref 7–16)
BUN SERPL-MCNC: 8 MG/DL (ref 6–23)
CALCIUM SERPL-MCNC: 8.8 MG/DL (ref 8.3–10.4)
CHLORIDE SERPL-SCNC: 109 MMOL/L (ref 98–107)
CO2 SERPL-SCNC: 26 MMOL/L (ref 21–32)
CREAT SERPL-MCNC: 0.73 MG/DL (ref 0.6–1)
GLUCOSE SERPL-MCNC: 119 MG/DL (ref 65–100)
MAGNESIUM SERPL-MCNC: 2 MG/DL (ref 1.8–2.4)
POTASSIUM SERPL-SCNC: 3.5 MMOL/L (ref 3.5–5.1)
SODIUM SERPL-SCNC: 140 MMOL/L (ref 136–145)

## 2021-04-14 PROCEDURE — 36415 COLL VENOUS BLD VENIPUNCTURE: CPT

## 2021-04-14 PROCEDURE — 83735 ASSAY OF MAGNESIUM: CPT

## 2021-04-14 PROCEDURE — 80048 BASIC METABOLIC PNL TOTAL CA: CPT

## 2021-05-11 ENCOUNTER — HOSPITAL ENCOUNTER (EMERGENCY)
Age: 46
Discharge: HOME | End: 2021-05-11
Attending: EMERGENCY MEDICINE
Payer: MEDICARE

## 2021-05-11 ENCOUNTER — APPOINTMENT (OUTPATIENT)
Dept: GENERAL RADIOLOGY | Age: 46
End: 2021-05-11
Attending: EMERGENCY MEDICINE
Payer: MEDICARE

## 2021-05-11 ENCOUNTER — APPOINTMENT (OUTPATIENT)
Dept: ULTRASOUND IMAGING | Age: 46
End: 2021-05-11
Attending: EMERGENCY MEDICINE
Payer: MEDICARE

## 2021-05-11 VITALS
BODY MASS INDEX: 37.54 KG/M2 | DIASTOLIC BLOOD PRESSURE: 72 MMHG | SYSTOLIC BLOOD PRESSURE: 134 MMHG | HEART RATE: 67 BPM | WEIGHT: 174 LBS | RESPIRATION RATE: 18 BRPM | TEMPERATURE: 98.9 F | HEIGHT: 57 IN | OXYGEN SATURATION: 98 %

## 2021-05-11 DIAGNOSIS — S93.401A SPRAIN OF RIGHT ANKLE, UNSPECIFIED LIGAMENT, INITIAL ENCOUNTER: Primary | ICD-10-CM

## 2021-05-11 DIAGNOSIS — R07.89 ATYPICAL CHEST PAIN: ICD-10-CM

## 2021-05-11 LAB
ALBUMIN SERPL-MCNC: 3.8 G/DL (ref 3.5–5)
ALBUMIN/GLOB SERPL: 1 {RATIO} (ref 1.2–3.5)
ALP SERPL-CCNC: 111 U/L (ref 50–136)
ALT SERPL-CCNC: 25 U/L (ref 12–65)
ANION GAP SERPL CALC-SCNC: 8 MMOL/L (ref 7–16)
AST SERPL-CCNC: 17 U/L (ref 15–37)
ATRIAL RATE: 80 BPM
BASOPHILS # BLD: 0 K/UL (ref 0–0.2)
BASOPHILS NFR BLD: 0 % (ref 0–2)
BILIRUB SERPL-MCNC: 0.6 MG/DL (ref 0.2–1.1)
BUN SERPL-MCNC: 6 MG/DL (ref 6–23)
CALCIUM SERPL-MCNC: 8.9 MG/DL (ref 8.3–10.4)
CALCULATED P AXIS, ECG09: 61 DEGREES
CALCULATED R AXIS, ECG10: 62 DEGREES
CALCULATED T AXIS, ECG11: 53 DEGREES
CHLORIDE SERPL-SCNC: 107 MMOL/L (ref 98–107)
CO2 SERPL-SCNC: 25 MMOL/L (ref 21–32)
CREAT SERPL-MCNC: 0.53 MG/DL (ref 0.6–1)
D DIMER PPP FEU-MCNC: 0.46 UG/ML(FEU)
DIAGNOSIS, 93000: NORMAL
DIFFERENTIAL METHOD BLD: ABNORMAL
EOSINOPHIL # BLD: 0 K/UL (ref 0–0.8)
EOSINOPHIL NFR BLD: 0 % (ref 0.5–7.8)
ERYTHROCYTE [DISTWIDTH] IN BLOOD BY AUTOMATED COUNT: 12.9 % (ref 11.9–14.6)
GLOBULIN SER CALC-MCNC: 3.9 G/DL (ref 2.3–3.5)
GLUCOSE SERPL-MCNC: 101 MG/DL (ref 65–100)
HCT VFR BLD AUTO: 41.4 % (ref 35.8–46.3)
HGB BLD-MCNC: 13.8 G/DL (ref 11.7–15.4)
IMM GRANULOCYTES # BLD AUTO: 0 K/UL (ref 0–0.5)
IMM GRANULOCYTES NFR BLD AUTO: 0 % (ref 0–5)
LYMPHOCYTES # BLD: 1.1 K/UL (ref 0.5–4.6)
LYMPHOCYTES NFR BLD: 14 % (ref 13–44)
MCH RBC QN AUTO: 29.1 PG (ref 26.1–32.9)
MCHC RBC AUTO-ENTMCNC: 33.3 G/DL (ref 31.4–35)
MCV RBC AUTO: 87.2 FL (ref 79.6–97.8)
MONOCYTES # BLD: 0.4 K/UL (ref 0.1–1.3)
MONOCYTES NFR BLD: 5 % (ref 4–12)
NEUTS SEG # BLD: 6.4 K/UL (ref 1.7–8.2)
NEUTS SEG NFR BLD: 81 % (ref 43–78)
NRBC # BLD: 0 K/UL (ref 0–0.2)
P-R INTERVAL, ECG05: 128 MS
PLATELET # BLD AUTO: 311 K/UL (ref 150–450)
PMV BLD AUTO: 10.2 FL (ref 9.4–12.3)
POTASSIUM SERPL-SCNC: 3.3 MMOL/L (ref 3.5–5.1)
PROT SERPL-MCNC: 7.7 G/DL (ref 6.3–8.2)
Q-T INTERVAL, ECG07: 388 MS
QRS DURATION, ECG06: 84 MS
QTC CALCULATION (BEZET), ECG08: 447 MS
RBC # BLD AUTO: 4.75 M/UL (ref 4.05–5.2)
SODIUM SERPL-SCNC: 140 MMOL/L (ref 136–145)
TROPONIN-HIGH SENSITIVITY: 12.9 PG/ML (ref 0–14)
TROPONIN-HIGH SENSITIVITY: 5.6 PG/ML (ref 0–14)
VENTRICULAR RATE, ECG03: 80 BPM
WBC # BLD AUTO: 7.9 K/UL (ref 4.3–11.1)

## 2021-05-11 PROCEDURE — 84484 ASSAY OF TROPONIN QUANT: CPT

## 2021-05-11 PROCEDURE — 74011250637 HC RX REV CODE- 250/637: Performed by: EMERGENCY MEDICINE

## 2021-05-11 PROCEDURE — 74011250636 HC RX REV CODE- 250/636: Performed by: EMERGENCY MEDICINE

## 2021-05-11 PROCEDURE — 85379 FIBRIN DEGRADATION QUANT: CPT

## 2021-05-11 PROCEDURE — 93971 EXTREMITY STUDY: CPT

## 2021-05-11 PROCEDURE — 80053 COMPREHEN METABOLIC PANEL: CPT

## 2021-05-11 PROCEDURE — 85025 COMPLETE CBC W/AUTO DIFF WBC: CPT

## 2021-05-11 PROCEDURE — 99285 EMERGENCY DEPT VISIT HI MDM: CPT

## 2021-05-11 PROCEDURE — 73610 X-RAY EXAM OF ANKLE: CPT

## 2021-05-11 PROCEDURE — 96374 THER/PROPH/DIAG INJ IV PUSH: CPT

## 2021-05-11 PROCEDURE — 93005 ELECTROCARDIOGRAM TRACING: CPT | Performed by: EMERGENCY MEDICINE

## 2021-05-11 PROCEDURE — 75810000053 HC SPLINT APPLICATION

## 2021-05-11 RX ORDER — ACETAMINOPHEN 500 MG
1000 TABLET ORAL
Status: COMPLETED | OUTPATIENT
Start: 2021-05-11 | End: 2021-05-11

## 2021-05-11 RX ORDER — KETOROLAC TROMETHAMINE 15 MG/ML
15 INJECTION, SOLUTION INTRAMUSCULAR; INTRAVENOUS
Status: COMPLETED | OUTPATIENT
Start: 2021-05-11 | End: 2021-05-11

## 2021-05-11 RX ADMIN — KETOROLAC TROMETHAMINE 15 MG: 15 INJECTION, SOLUTION INTRAMUSCULAR; INTRAVENOUS at 15:47

## 2021-05-11 RX ADMIN — ACETAMINOPHEN 1000 MG: 500 TABLET ORAL at 18:29

## 2021-05-11 NOTE — ED NOTES
[facility] delayed. Pt c/o increase in pain. Pt given 1000 mg Tylenol and ice pack for ankle.

## 2021-05-11 NOTE — ED PROVIDER NOTES
Patient is a pleasant 57-year-old female presenting to the emergency department today after a fall coming down the stairs. Patient states that she had a stroke in 2017 and lost full function of her right side. 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 The patient said that her  was able to get her up off the ground into a chair but they immediately noticed all the swelling around the ankle and the pain she was having in the ankle decided to bring her to the ER by EMS. The patient was on her way to go to the cardiologist because over the last few days she has been having intermittent sharp chest pain on the left side. She describes the pain as a stabbing pain only lasting a few seconds. She also notes she has been having some pain in the left leg which she says is a cramping type pain. She takes full dose aspirin daily but no other blood thinners. The patient has no known history of DVT or PE. Past Medical History:   Diagnosis Date    Generalized anxiety disorder     GERD (gastroesophageal reflux disease)     Hypertension     Insomnia, unspecified     Iron deficiency anemia 8/23/2013    Left sided lacunar stroke ([address] 08/2017    Mitral valve prolapse 1993    Obesity (BMI 30-39. 9)     RADHA on CPAP 10/2017    Prediabetes 7/28/2015 12/2/14 HgbA1C 5.9    Spastic hemiplegia affecting dominant side ([address] 3/22/2018    Stroke [facility])        Past Surgical History:   Procedure Laterality Date    HX ENDOSCOPY  10/1/04    Normal per Dr. Summer Eaton         Family History:   Problem Relation Age of Onset    Cancer Mother         cervical    Hypertension Mother     Cancer Sister         cervical    Hypertension Brother 32    Diabetes Maternal Grandmother     Hypertension Other         strong FH    Coronary Artery Disease Neg Hx        Social History     Socioeconomic History    Marital status:      Spouse name: Not on file    Number of children: 2    Years of education: Not on file    Highest education level: Not on file   Occupational History    Not on file   Social Needs    Financial resource strain: Not on file    Food insecurity     Worry: Not on file     Inability: Not on file   [employer] needs     Medical: Not on file     Non-medical: Not on file   Tobacco Use    Smoking status: Never Smoker    Smokeless tobacco: Never Used   Substance and Sexual Activity    Alcohol use: No    Drug use: No    Sexual activity: Yes     Partners: Male   Lifestyle    Physical activity     Days per week: Not on file     Minutes per session: Not on file    Stress: Not on file   Relationships    Social connections     Talks on phone: Not on file     Gets together: Not on file     Attends Denominational service: Not on file     Active member of club or organization: Not on file     Attends meetings of clubs or organizations: Not on file     Relationship status: Not on file    Intimate partner violence     Fear of current or ex partner: Not on file     Emotionally abused: Not on file     Physically abused: Not on file     Forced sexual activity: Not on file   Other Topics Concern    Not on file   Social History Narrative    . Her  is disabled. Mother of 2 children. Her son is 23 and may be Bipolar. 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 ALLERGIES: Erythromycin    Review of Systems   Cardiovascular: Positive for chest pain. Musculoskeletal: Positive for gait problem and joint swelling. All other systems reviewed and are negative.       Vitals:    05/11/21 1316 05/11/21 1325   BP: (!) 180/78    Pulse: 90    Resp: 18    Temp: 98.9 °F (37.2 °C)    SpO2: 97% 96%   Weight: 78.9 kg (174 lb) Height: 4' 9\" (1.448 m)             Physical Exam     GENERAL:The patient has Body mass index is 37.65 kg/m². Well-hydrated. No acute distress. VITAL SIGNS: Heart rate, blood pressure, respiratory rate reviewed as recorded in  nurse's notes  EYES: Pupils reactive. Extraocular motion intact. No conjunctival redness or drainage. NECK: Supple, no meningeal signs. Trachea midline. No masses or thyromegaly. LUNGS: Breath sounds clear and equal bilaterally no accessory muscle use  CHEST: No deformity  CARDIOVASCULAR: Regular rate and rhythm  EXTREMITIES: Obvious swelling around the right ankle with tenderness to light palpation. Patient is not able to dorsiflex or plantarflex her right ankle secondary to her stroke 4 years ago. She has no decreased sensation over her baseline on the right foot medial lateral dorsally. The patient does not have any unilateral leg swelling on the left or redness noted to the left calf. NEUROLOGIC: Sensation is grossly intact. Cranial nerve exam reveals face is  symmetrical, tongue is midline speech is clear. SKIN: No rash or petechiae. Good skin turgor palpated. PSYCHIATRIC: Alert and oriented. Appropriate behavior and judgment.       MDM  Number of Diagnoses or Management Options  Diagnosis management comments: Sprain, strain, tendon injury, contusion,    Abrasion, laceration, neurovascular injury, foreign body    Fracture, open fracture, dislocation, joint separation, articular surface injury,      CHF, COPD, pneumonia, PE,    MI, coronary artery disease, unstable angina, coronary artery disease,    Atrial fibrillation, cardiac arrhythmia, PVC, medication induced palpitations, heart block,  electrolyte induced palpitations,    GERD, musculoskeletal pain, costochondritis, rib fracture, pleurisy,         Amount and/or Complexity of Data Reviewed  Clinical lab tests: reviewed and ordered  Tests in the radiology section of CPT®: reviewed and ordered  Tests in the medicine section of CPT®: ordered and reviewed  Review and summarize past medical records: yes  Independent visualization of images, tracings, or specimens: yes      ED Course as of May 11 1501   Tue May 11, 2021   1405 IMPRESSION  Lateral soft tissue swelling. No acute fractures.     Possible chronic healed fracture the medial malleolus.     Plantar calcaneal spur. XR ANKLE RT MIN 3 V [KH]      ED Course User Index  [KH] Crissy Alexander DO       EKG    Date/Time: 5/11/2021 1:33 PM  Performed by: Crissy Alexander DO  Authorized by: Crissy Alexander DO     ECG reviewed by ED Physician in the absence of a cardiologist: yes    Comments:      EKG shows a normal sinus rhythm at a rate of 80 bpm with no ST elevations appreciated. Splint, Ankle    Date/Time: 5/11/2021 2:26 PM  Performed by: Crissy Alexander DO  Authorized by: Crissy Alexander DO     Consent:     Consent obtained:  Verbal    Consent given by:  Patient    Risks discussed:  Discoloration, numbness, pain and swelling    Alternatives discussed:  No treatment, observation and delayed treatment  Pre-procedure details:     Sensation:  Normal  Procedure details:     Laterality:  Right    Location:  Ankle    Ankle:  R ankle    Splint type: Ankle stirrup    Supplies:  Ortho-Glass, elastic bandage and cotton padding  Post-procedure details:     Pain:  Improved    Sensation:  Normal    Patient tolerance of procedure:   Tolerated well, no immediate complications

## 2021-05-11 NOTE — ED TRIAGE NOTES
Ems called to home for right ankle pain. Per ems there is obvious deformity to right ankle. Stepped off a step wrong and twisted ankle.

## 2021-05-11 NOTE — ED NOTES
I have reviewed discharge instructions with the patient and parent. The patient and parent verbalized understanding. Patient left ED via Discharge Method: stretcher to Home with [facility]. Opportunity for questions and clarification provided. Patient given 0 scripts. To continue your aftercare when you leave the hospital, you may receive an automated call from our care team to check in on how you are doing.  This is a free service and part of our promise to provide the best care and service to meet your aftercare needs. \" If you have questions, or wish to unsubscribe from this service please call [phone].  Thank you for Choosing our [address] [place] [address] Emergency Department.

## 2021-05-11 NOTE — DISCHARGE INSTRUCTIONS
Reschedule your appointment with cardiology. Rest, ice, compression and elevation of the right ankle. In 48 hours start doing ABC range of motion activity with your right foot as best possible in light of your neurologic deficits. If you do not start to have improvement in 7 to 10 days you may need to follow-up with orthopedic surgery for repeat imaging.

## 2021-05-11 NOTE — ED TRIAGE NOTES
Pt arrives via [facility] from home. Pt missed a step and rolled right ankle. Pt fell as a result. Occurred 1 hour PTA. Pt has obvious deformity to right ankle with swelling and bruising around top of ankle. Pt is unable to wiggle her right toes or lift her right lower leg/foot due to deficits from previous CVA/ Pt denies taking any blood thinners. Pt takes 324 ASA daily. Last had ASA last night. Pt reports previous Hx of CVA. Pt reports she uses a cane to walk due to right sided deficits from CVA. Pt states she has to go up and down the stairs backwards. Pt states she thought she was at the bottom of the steps but was actually on the 2nd or 3rd step. Pt reports she turned to step back and rolled her right ankle. Pt reports she has been having CP and was scheduled to see cardiologist today. Pt reports intermittent sharp CP and left leg swelling x 1 week.

## 2021-05-14 ENCOUNTER — APPOINTMENT (OUTPATIENT)
Dept: PHYSICAL THERAPY | Age: 46
End: 2021-05-14
Attending: PHYSICIAN ASSISTANT

## 2021-06-21 ENCOUNTER — HOSPITAL ENCOUNTER (OUTPATIENT)
Dept: CT IMAGING | Age: 46
Discharge: HOME | End: 2021-06-21
Attending: ORTHOPAEDIC SURGERY
Payer: MEDICARE

## 2021-06-21 DIAGNOSIS — S82.891A CLOSED FRACTURE OF MALLEOLUS OF RIGHT ANKLE, INITIAL ENCOUNTER: ICD-10-CM

## 2021-06-21 DIAGNOSIS — M79.671 RIGHT FOOT PAIN: ICD-10-CM

## 2021-06-21 PROCEDURE — 73700 CT LOWER EXTREMITY W/O DYE: CPT

## 2021-08-17 ENCOUNTER — HOSPITAL ENCOUNTER (OUTPATIENT)
Dept: PHYSICAL THERAPY | Age: 46
Discharge: HOME | End: 2021-08-17
Payer: MEDICARE

## 2021-08-17 DIAGNOSIS — M79.671 RIGHT FOOT PAIN: ICD-10-CM

## 2021-08-17 DIAGNOSIS — S82.891A CLOSED FRACTURE OF MALLEOLUS OF RIGHT ANKLE, INITIAL ENCOUNTER: ICD-10-CM

## 2021-08-17 PROCEDURE — 97162 PT EVAL MOD COMPLEX 30 MIN: CPT

## 2021-08-17 NOTE — THERAPY EVALUATION
Zoraida Boykin  : 1975  [place]  Secondary:  [address]  at [address] [facility] 68, 101 [facility], [place], [address] [place] [address]  [phone]   [phone]        OUTPATIENT PHYSICAL THERAPY:Initial Assessment 2021   ICD-10: Treatment Diagnosis: Pain in right ankle and joints of right foot (M25.571)    Stiffness of right ankle, not elsewhere classified (M25.671)  Difficulty in walking, not elsewhere classified (R26.2)  Unsteadiness on feet (R26.81)  Muscle weakness (generalized) (M62.81)  Precautions/Allergies:   Erythromycin   TREATMENT PLAN:  Effective Dates/Frequency/Duration: Twice per week from 2021 until 10/31/2021 (10 weeks). MEDICAL/REFERRING DIAGNOSIS:  Right foot pain [M79.671]  Closed fracture of malleolus of right ankle, initial encounter [S82.891A]   DATE OF ONSET: 2021 (date of initial injury)  REFERRING PHYSICIAN: Vernell Adams MD MD Orders: Evaluate and treat  Return MD Appointment: unspecified   INITIAL ASSESSMENT:  Harjeet Esteban is a 48 y.o. female who presents to physical therapy for R ankle/foot pain following fall on R foot on 2021 that resulted in a medial malleolar fracture. This session, pt demonstrated decreased B LE strength/endurance (significant weaker at R secondary to past CVA), decreased right ankle mobility with associated pain, multiple postural/gait deficits, decreased standing tolerance, decreased static/dynamic standing balance as evident by a time of 25.4 seconds with quad cane on L on the Timed Up and Go test (with times of >13.5 seconds indicating an increased risk for falls) and decreased functional mobility as evident by a score of 28/80 on the Lower Extremity Functional Scale (with lower scores indicating increased disability) and 34/84 on the Foot and Ankle Ability Measure (with lower scores indicating increased disability).  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 Pt may benefit from skilled PT to address the above listed deficits to improve ability to perform pain-free and safe weightbearing activities and to improve overall quality of life prior to discharge. PROBLEM LIST (Impacting functional limitations):  1. Decreased Strength  2. Decreased ADL/Functional Activities  3. Decreased Transfer Abilities  4. Decreased Ambulation Ability/Technique  5. Decreased Balance  6. Increased Pain  7. Decreased Activity Tolerance  8. Decreased Pacing Skills  9. Increased Fatigue  10. Decreased Flexibility/Joint Mobility  11. Edema/Girth INTERVENTIONS PLANNED: (Treatment may consist of any combination of the following)  1. Balance Exercise  2. Bed Mobility  3. Cold  4. Electrical Stimulation  5. Family Education  6. Gait Training  7. Heat  8. Home Exercise Program (HEP)  9. Manual Therapy  10. Neuromuscular Re-education/Strengthening  11. Range of Motion (ROM)  12. Therapeutic Activites  13. Therapeutic Exercise/Strengthening  14. Transcutaneous Electrical Nerve Stimulation (TENS)  15. Transfer Training  16. Ultrasound (US)  17. Vasopneumatic Compression     GOALS: (Goals have been discussed and agreed upon with patient.)  Short-Term Functional Goals: Time Frame: 5 weeks  1. Pt will be compliant with HEP in order to increase LE strength/endurance/mobility to improve functional mobility and overall quality of life. 2. Pt will improve score on the Lower Extremity Functional Scale to 32/80 in order to demonstrate improved functional mobility and quality of life. 3. Pt will improve score on the Foot and Ankle Ability Measure to 38/84 in order to demonstrate improved functional mobility and quality of life.     4. Pt will be able to ambulate 250 feet over level surfaces with supervision with least restrictive assistive device with minimal to increase in pain in order to improve household mobility. 5. Pt will be able to ascend/descend 2 steps with contact guard assistance with rail with safe gait pattern in order to improve community mobility. 6. Pt will increase right ankle dorsiflexion PROM to 6 degrees with minimal to no increase in pain in order to improve functional mobility and reduce gait deficits. Discharge Goals: Time Frame: 10 weeks  12. Pt will be independent with HEP in order to increase LE strength/endurance/mobility to improve functional mobility and overall quality of life. 13. Pt will improve score on the Lower Extremity Functional Scale to 36/80 in order to demonstrate improved functional mobility and quality of life. 14. Pt will improve score on the Foot and Ankle Ability Measure to 42/84 in order to demonstrate improved functional mobility and quality of life. 15. Pt will be able to ambulate 500 feet over level and unlevel surfaces with modified independence with least restrictive assistive device with minimal to no increase in pain in order to improve community mobility  16. Pt will be able to ascend/descend 5 steps with modified independence with rail with safe gait pattern in order to improve community mobility. 17. Pt will increase right ankle dorsiflexion PROM to 8 degrees with minimal to no increase in pain in order to improve functional mobility and reduce gait deficits. OUTCOME MEASURE:   Tool Used: Lower Extremity Functional Scale (LEFS)  Score:  Initial: 28/80 Most Recent: X/80 (Date: -- )   Interpretation of Score: 20 questions each scored on a 5 point scale with 0 representing \"extreme difficulty or unable to perform\" and 4 representing \"no difficulty\". The lower the score, the greater the functional disability. 80/80 represents no disability. Minimal detectable change is 9 points.     Tool Used: PT/OT FOOT AND ANKLE ABILITY MEASURE  Score:  Initial: 34/84 Most Recent: X (Date: -- )   Interpretation of Score: For the \"Activities of Daily Living\", there are 21 questions each scored on a 5 point scale with 0 representing \"Unable to do\" and 4 representing \"No difficulty\". The lower the score, the greater the functional disability. 84/84 represents no disability. Minimal detectable change is 5.7 points. With the addition of the 8 questions in the \"Sports Subscale,\" there are 29 questions, each scored on a 5 point scale with 0 representing \"Unable to do\" and 4 representing \"No difficulty\". The lower the score, the greater the functional disability. 116/116 represents no disability. Minimal detectable change is 12.3 points. Tool Used: Timed Up and Go (TUG)  Score:  Initial: 25.4 seconds with wide based quad cane Most Recent: X seconds (Date: -- )   Interpretation of Score: The test measures, in seconds, the time taken by an individual to stand up from a standard arm chair (seat height 46 cm [18 in], arm height 65 cm [25.6 in]), walk a distance of 3 meters (118 in, approx 10 ft), turn, walk back to the chair and sit down. If the individual takes longer than 14 seconds to complete TUG, this indicates risk for falls. FALL RISK:    Ambulatory/Rehab Services H2 Model Falls Risk Assessment   Risk Factors:       (5)  History of Recent Falls [w/in 3 months] Ability to Rise from Chair:       (1)  Pushes up, successful in one attempt   Falls Prevention Plan:       Physical Limitations to Exercise (specify):  standing exercises with supervision       Mobility Assistance Device (specify):  quad cane   Total: (5 or greater = High Risk): 6   ©2010 [facility] of [employer]. All Rights Reserved. [facility] Patent #[id number].  Federal Law prohibits the replication, distribution or use without written permission from [facility] of [address] [place] [address] FINDINGS:     Initial assessment only today: see objective section below for details    MEDICAL NECESSITY:   · Patient is expected to demonstrate progress in strength, range of motion, balance and functional technique to improve ability to perform pain-free and safe weightbearing activities. REASON FOR SERVICES/OTHER COMMENTS:  · Patient continues to require modification of therapeutic interventions to increase complexity of exercises. Total Duration:  PT Patient Time In/Time Out  Time In: 1430  Time Out: 1515    Rehabilitation Potential For Stated Goals: Good  Regarding Zoraida Boykin's therapy, I certify that the treatment plan above will be carried out by a therapist or under their direction. Thank you for this referral,  Freeman Martinez DPT     Referring Physician Signature: Cody Jain MD                 PAIN/SUBJECTIVE:   Initial: 3/10 Post Session:  No pain specified   HISTORY:   History of Injury/Illness (Reason for Referral): *History per pt or pt's family except where otherwise noted       Location(s) of Injury: R medial malleolus       Date of Injury: 5/11/2021       Mechanism of Injury: Pt was descending stairs backward at an acquaintance's house (states she normally does this because it feels unstable if she tries to go forward down the stairs) and missed the last step and landed on her inverted R ankle       Pain at Worst: 7/10       Aggravating Activities: getting out of car (having to put pressure on her R foot to maneuver), donning R shoe (when pulling back of shoe up); increased pain with weightbearing on R LE       Pain at Best: 3/10       Easing Activities: rest, soaking in hot water and epsom salts, ice, tylenol/ibuprofen       Other Pertinent Information: Pt states she was in wheelchair for 6 weeks following fracture (didn't go to orthopedic MD for 5 weeks because she thought she only had a sprain, since x-ray at ER right after injury was negative for fracture).  Pt states she has lingering spasticity from stroke that causes her R foot to occasionally pull forcefully inward and aggravates her pain. Pt has AFO for her R foot but it needs to be refitted by orthotist (it wasn't fitting well on her foot even before her injury). Pt had stroke affecting her R side with associated spasticity in 2017. Pt states she is still dealing with residual swelling following the fracture. Patient-Stated Goal: \"make sure I'm walking stable\"  Past Medical History/Comorbidities:   Ms. Ciara Palafox  has a past medical history of Generalized anxiety disorder, GERD (gastroesophageal reflux disease), Hypertension, Insomnia, unspecified, Iron deficiency anemia (8/23/2013), Left sided lacunar stroke ([facility] (08/2017), Mitral valve prolapse (1993), Obesity (BMI 30-39.9), RADHA on CPAP (10/2017), Prediabetes (7/28/2015), Spastic hemiplegia affecting dominant side ([facility] (3/22/2018), and Stroke ([facility] Ms. Ciara Palafox  has a past surgical history that includes hx tubal ligation (1995) and hx endoscopy (10/1/04). Social History/Living Environment:   Lives with  in mobile home with 5 steps to get in with rail on each side going up  Prior Level of Function/Work/Activity:  Pt has to have help getting out of the shower (since the bottom of the tub is wet/slippery, and she has to step out with her L LE first and doesn't want her R LE to slip out from underneath her); pt normally does not have to walk with any AD at home (even since stroke) but does hold onto items around house as needed; always uses quad cane at L when going out into the community  Dominant Side:         RIGHT  Other Clinical Tests:          X-ray and CT scan  \"comminuted medial malleolar fracture, possible syndesmosis spread and medial ankle deltoid avulsion\"  Personal Factors:          Sex:  female        Age:  48 y.o. Current Medications:       Current Outpatient Medications:     tiZANidine (ZANAFLEX) 4 mg tablet, Take 1 Tab by mouth three (3) times daily.  Indications: muscle spasm, Disp: 270 Tab, Rfl: 1    losartan (COZAAR) 100 mg tablet, Take 1 Tab by mouth daily., Disp: 90 Tab, Rfl: 1    LORazepam (ATIVAN) 1 mg tablet, Take 1 Tab by mouth every eight (8) hours as needed for Anxiety. Max Daily Amount: 3 mg., Disp: 90 Tab, Rfl: 5    ferrous sulfate (IRON) 325 mg (65 mg iron) EC tablet, Take 1 Tab by mouth two (2) times a day., Disp: 180 Tab, Rfl: 1    escitalopram oxalate (Lexapro) 20 mg tablet, Take 1 Tab by mouth daily. , Disp: 90 Tab, Rfl: 1    potassium chloride (K-DUR, KLOR-CON) 20 mEq tablet, Take 1 Tab by mouth daily. , Disp: 30 Tab, Rfl: 6    pravastatin (PRAVACHOL) 20 mg tablet, Take 1 Tab by mouth nightly., Disp: 30 Tab, Rfl: 11    aspirin delayed-release 81 mg tablet, Take 1 Tab by mouth daily. , Disp: 90 Tab, Rfl: 1   Date Last Reviewed:  8/17/2021    Number of Personal Factors/Comorbidities that affect the Plan of Care: 1-2: MODERATE COMPLEXITY     EXAMINATION:   Initial assessment on 8/17/2021   Observation/Orthostatic Postural Assessment:    The following postural deficits were noted in sitting: loss of cervical lordosis, increased thoracic kyphosis, forward head, rounded shoulders and posterior pelvic tilt   The following postural deficits were noted in standing: increased weightbearing at L side  Palpation:          Slight edema noted at R foot/ankle  ROM:    Initial measurement: (on 8/17/2021) Initial measurement: (on 8/17/2021) Reassessment measurement:  Reassessment measurement:      L ankle AROM:     Ankle dorsiflexion: 15 degrees     Ankle plantarflexion: 50 degrees      Ankle inversion: 40 degrees     Ankle eversion: 36 degrees   Mainly WFL throughout R hip and knee, but limited with following motions:   R ankle AROM - pt unable to initiate sufficient ankle movement secondary to CVA  R ankle PROM:     Ankle dorsiflexion: 2 degrees     Ankle plantarflexion: 60 degrees     Ankle inversion: 45 degrees     Ankle eversion: 10 degrees         Strength:  R side partially weaker due to CVA (at hip/knee)   Initial measurement: (on 8/17/2021) Initial measurement: (on 8/17/2021)  Reassessment Reassessment    L LE: R LE:     Hip flexion 4+/5  4+/5      Hip ER 4+/5  4-/5      Hip IR 4/5  4-/5      Hip abduction 4/5  4/5      Hip adduction 4+/5  2+/5      Hip extension 4-/5  3-/5      Knee flexion 4+/5  3+/5      Knee extension 5/5  4+/5      Ankle DF  4+/5  1+/5      Ankle PF - 1+/5     Ankle Inv - 1+/5     Ankle Ev - 1+/5       Special Tests:          -  Functional Mobility:   Gait/Ambulation: Pt ambulates with wide-based quad cane at L with following gait deficits: increased forward trunk lean, slower gait speed, altered arm swing, increased lateral trunk sway, decreased trunk rotation, wide KESHAV, increased weightshift to L, R trendelenburg, R hip circumduction, decreased R stance time, decreased B step length, decreased R knee flexion during R preswing, decreased R foot clearance and decreased R heel strike; inversion noted at R foot during R swing phase        Transfers:  Pushes up to stand, reaches back to sit, Breezy        Bed Mobility:  WFL  Balance:          Decreased with dynamic standing as evident by increased TUG assessment time above  Sensation:         Decreased throughout R LE secondary to CVA   Body Structures Involved:  18. Nerves  19. Bones  20. Joints  21. Muscles  22. Ligaments Body Functions Affected:  18. Sensory/Pain  19. Neuromusculoskeletal  20. Movement Related Activities and Participation Affected:  7. General Tasks and Demands  8. Mobility  9. Self Care  10. Domestic Life  11. Interpersonal Interactions and Relationships  12.  Community, Social and [place]   Number of elements (examined above) that affect the Plan of Care: 4+: HIGH COMPLEXITY   CLINICAL PRESENTATION:   Presentation: Evolving clinical presentation with changing clinical characteristics: MODERATE COMPLEXITY   CLINICAL DECISION MAKING:   Use of outcome tool(s) and clinical judgement create a POC that gives a: Questionable prediction of patient's progress: MODERATE COMPLEXITY

## 2021-08-18 NOTE — PROGRESS NOTES
Victor Manuel Ying  : 1975  [place]  Secondary:  [address]  at [facility]  [address], [address], [place], [facility]  [phone]   [phone]       OUTPATIENT PHYSICAL THERAPY: Daily Treatment Note 2021  Visit Count:  1  ICD-10: Treatment Diagnosis: Pain in right ankle and joints of right foot (M25.571)    Stiffness of right ankle, not elsewhere classified (M25.671)  Difficulty in walking, not elsewhere classified (R26.2)  Unsteadiness on feet (R26.81)  Muscle weakness (generalized) (M62.81)  Precautions/Allergies:   Erythromycin   TREATMENT PLAN:  Effective Dates/Frequency/Duration: Twice per week from 2021 until 10/31/2021 (10 weeks). Pre-treatment Symptoms/Complaints:  See history above. Pain: Initial:   3/10 Post Session:  3/10   Medications Last Reviewed:  2021  Updated Objective Findings: See evaluation note from today   TREATMENT:   Assessment only today, no treatment provided. Treatment/Session Summary:    · Response to Treatment:  See assessment above. No adverse reactions/unusual changes observed/reported in clinical status this session. · Communication/Consultation:  None today  · Equipment provided today:  None today  · Recommendations/Intent for next treatment session: Next visit will focus on manual therapy/modalities to reduce pain/swelling and improve mobility at R foot and ankle; work on hip strengthening; gait mechanics.     Total Treatment Billable Duration:  0 minutes  PT Patient Time In/Time Out  Time In: 1430  Time Out: [address] DPT    Future Appointments   Date Time Provider Jessie Carlisle   2021  1:45 PM Mina Molina PTA [facility]   2021  2:30 PM [facility]   2021  1:45 PM KELLEY Lemus [facility]   2021  1:45 PM KELLEY Mckeon [facility]   2021  2:30 PM Sherley Romeo DPT [facility] [place] 9/7/2021  2:30 PM Mariana Castano DPT [facility]   9/9/2021  1:00 PM Brigitte Becker PTA [facility]   10/14/2021  8:15 AM PRE LAB RESOURCE [facility] PRE PRE   10/20/2021  2:00 PM Mary Arreola MD [facility] PRE PRE   10/26/2021 11:00 AM Jay RICHARDS, [address] [facility] PMR PMR   7/29/2022  2:20 PM Anne Frame, NP [facility] PSCD PP        Visit Approval Visit # Therapist initials Date A NS / Cx < 24 hr >24 hr Cx Comments   99 visits total 1  8/18/2021 [x]  [] [] Initial evaluation       [] [] []        [] [] []        [] [] []        [] [] []        [] [] []        [] [] []        [] [] []        [] [] []        [] [] []        [] [] []        [] [] []        [] [] []        [] [] []        [] [] []        [] [] []        [] [] []        [] [] []

## 2021-08-19 ENCOUNTER — HOSPITAL ENCOUNTER (OUTPATIENT)
Dept: PHYSICAL THERAPY | Age: 46
Discharge: HOME | End: 2021-08-19
Payer: MEDICARE

## 2021-08-19 NOTE — PROGRESS NOTES
Steven Sidhu  : 1975  [place]  Secondary:  [address]  at [facility] [address], 101 [facility], [address]  [phone]   [phone]      OUTPATIENT DAILY NOTE    NAME/AGE/GENDER: Steven Sidhu is a 48 y.o. female. DATE: 2021    Ms. Peters Minors for today's appointment due to a stomach bug.      Greta Jeffrey, PTA

## 2021-08-24 ENCOUNTER — HOSPITAL ENCOUNTER (OUTPATIENT)
Dept: PHYSICAL THERAPY | Age: 46
Discharge: HOME | End: 2021-08-24
Payer: MEDICARE

## 2021-08-24 PROCEDURE — 97140 MANUAL THERAPY 1/> REGIONS: CPT

## 2021-08-24 PROCEDURE — 97016 VASOPNEUMATIC DEVICE THERAPY: CPT

## 2021-08-24 PROCEDURE — 97110 THERAPEUTIC EXERCISES: CPT

## 2021-08-24 NOTE — PROGRESS NOTES
Nita Loaiza  : 1975  [place]  Secondary:  [address]  at [facility]  [address] \[facility]", [address]  [phone]   [phone]       OUTPATIENT PHYSICAL THERAPY: Daily Treatment Note 2021  Visit Count:  2  ICD-10: Treatment Diagnosis: Pain in right ankle and joints of right foot (M25.571)    Stiffness of right ankle, not elsewhere classified (M25.671)  Difficulty in walking, not elsewhere classified (R26.2)  Unsteadiness on feet (R26.81)  Muscle weakness (generalized) (M62.81)  Precautions/Allergies:   Erythromycin   TREATMENT PLAN:  Effective Dates/Frequency/Duration: Twice per week from 2021 until 10/31/2021 (10 weeks). Pre-treatment Symptoms/Complaints:  States she got food poisoning and had to cancel last session as a result; states the longer she stands, the more pain she has in her ankle. Pain: Initial:   3/10 Post Session:  1/10   Medications Last Reviewed:  2021  Updated Objective Findings: None Today   TREATMENT:   Therapeutic Exercise: ( 15 minutes):  Exercises per grid below to improve mobility, strength, balance and dynamic movement of right ankle/foot to improve functional mobility. Required minimal visual, verbal and manual cues to promote proper body alignment, promote proper body posture, promote proper body mechanics and promote proper body breathing techniques. Progressed resistance, range, repetitions and complexity of movement as indicated.     Date:  2021 Date:   Date:   Activity/Exercise Parameters Parameters    Physiostep L1 resistance for 10 minutes with B LEs only; yellow band to maintain R foot on pedal (therapist occasionally having to assist)     Ankle plantarflexor stretch on incline board 30 second hold x 3 reps with knees extended then flexed; L UE support                                   *given in HEP  [employer]   Pt given following band colors: [] Yellow [] Red          [] Green          [] Blue          [] Grey     Manual therapy (25 minutes): With pt in supported supine position (B LEs elevated on wedge):   - STM and retrograde edema massage to musculature and connective tissue surrounding R dorsum and lateral foot to reduce edema, pain, and muscular tightness   - gentle mobilizations at R foot into dorsiflexion and eversion/inversion to improve R foot mobility and reduce pain    Modalities (15 minutes): With pt in supported supine position with B LEs elevated on wedge, vasopneumatic compression with cold therapy via [employer] system applied to R foot/ankle with low pressure and at 34 degrees to reduce pain, swelling, and inflammation. Treatment/Session Summary:    · Response to Treatment:  Pt demonstrating improved R foot/ankle mobility after mobilizations and retrograde edema massage performed this session, with less reported pain at end-range. Pt reported significant reduction in pain with ambulation after vasopneumatic compression at end of session. No adverse reactions/unusual changes observed/reported in clinical status this session. · Communication/Consultation:  None today  · Equipment provided today:  None today  · Recommendations/Intent for next treatment session: Next visit will focus on manual therapy/modalities to reduce pain/swelling and improve mobility at R foot and ankle; work on hip strengthening; gait mechanics.     Total Treatment Billable Duration:  55 minutes  PT Patient Time In/Time Out  Time In: 1430  Time Out: [address], DPT    Future Appointments   Date Time Provider Jessie Carlisle   8/27/2021  1:45 PM Destinee Bro DPT [facility]   8/31/2021  1:45 PM KELLEY Arriaga   9/2/2021  2:30 PM KELLEY Mercedes   9/7/2021  2:30 PM KELLEY Arriaga   9/9/2021  1:00 PM Mariela Mchugh PTA [facility]   10/14/2021  8:15 AM PRE LAB RESOURCE [facility] PRE PRE 10/20/2021  2:00 PM Danica Petty MD [facility] PRE PRE   10/26/2021 11:00 AM ARTEM Hernández SSA PMR PMR   7/29/2022  2:20 PM JOSE CARLOS Manriquez PSCD PP        Visit Approval Visit # Therapist initials Date A NS / Cx < 24 hr >24 hr Cx Comments   99 visits total 1  8/17/2021 [x]  [] [] Initial evaluation     PDE 8-19/2021 [] [x] [] Stomach bug    2  8/24/2021 [x] [] []        [] [] []        [] [] []        [] [] []        [] [] []        [] [] []        [] [] []        [] [] []        [] [] []        [] [] []        [] [] []        [] [] []        [] [] []        [] [] []        [] [] []        [] [] []

## 2021-08-27 ENCOUNTER — HOSPITAL ENCOUNTER (OUTPATIENT)
Dept: PHYSICAL THERAPY | Age: 46
Discharge: HOME | End: 2021-08-27
Payer: MEDICARE

## 2021-08-27 PROCEDURE — 97110 THERAPEUTIC EXERCISES: CPT

## 2021-08-27 PROCEDURE — 97140 MANUAL THERAPY 1/> REGIONS: CPT

## 2021-08-27 PROCEDURE — 97016 VASOPNEUMATIC DEVICE THERAPY: CPT

## 2021-08-27 NOTE — PROGRESS NOTES
Denny Navarrete  : 1975  [place]  Secondary:  [address]  at [facility]  [address] [facility] [place], [address]  [phone]   [phone]       OUTPATIENT PHYSICAL THERAPY: Daily Treatment Note 2021  Visit Count:  3  ICD-10: Treatment Diagnosis: Pain in right ankle and joints of right foot (M25.571)    Stiffness of right ankle, not elsewhere classified (M25.671)  Difficulty in walking, not elsewhere classified (R26.2)  Unsteadiness on feet (R26.81)  Muscle weakness (generalized) (M62.81)  Precautions/Allergies:   Erythromycin   TREATMENT PLAN:  Effective Dates/Frequency/Duration: Twice per week from 2021 until 10/31/2021 (10 weeks). Pre-treatment Symptoms/Complaints:  States she had a lot less pain after last session, especially after vasopneumatic compression. States she was able to have less pain than usual at the end of the day after last session  Pain: Initial:   3/10 Post Session:  0/10   Medications Last Reviewed:  2021  Updated Objective Findings: None Today   TREATMENT:   Therapeutic Exercise: ( 31 minutes):  Exercises per grid below to improve mobility, strength, balance and dynamic movement of right ankle/foot to improve functional mobility. Required minimal visual, verbal and manual cues to promote proper body alignment, promote proper body posture, promote proper body mechanics and promote proper body breathing techniques. Progressed resistance, range, repetitions and complexity of movement as indicated.     Date:  2021 Date:  2021 Date:   Activity/Exercise Parameters Parameters    Physiostep L1 resistance for 10 minutes with B LEs only; yellow band to maintain R foot on pedal (therapist occasionally having to assist) L1 resistance for 10 minutes with B LEs only; yellow band to maintain R foot on pedal    Ankle plantarflexor stretch on incline board 30 second hold x 3 reps with knees extended then flexed; L UE support 30 second hold x 3 reps with knees extended then flexed; L UE support    Anterior/posterior tapping on rocker board  20 reps/1 set tapping both directions slowly with B feet (to allow for increased motion at R ankle)    Lateral tapping on rocker board  20 reps/1 set tapping both directions slowly with B feet (to allow for increased motion at R ankle) and cues to avoid movement at R knee to reduce substitution                      *given in HEP  [employer] Portal   Pt given following band colors: [] Yellow          [] Red          [] Green          [] Blue          [] Grey     Manual therapy (8 minutes): With pt in supported supine position (B LEs elevated on wedge):   - STM and retrograde edema massage to musculature and connective tissue surrounding R dorsum and lateral foot to reduce edema, pain, and muscular tightness   - gentle mobilizations at R foot into dorsiflexion and eversion/inversion to improve R foot mobility and reduce pain    Modalities (15 minutes): With pt in supported supine position with B LEs elevated on wedge, vasopneumatic compression with cold therapy via [employer] system applied to R foot/ankle with medium pressure and at 38 degrees to reduce pain, swelling, and inflammation. Treatment/Session Summary:    · Response to Treatment:  Pt demonstrating improved R foot/ankle mobility with less pain initially during session, and was able to maintain her foot on the pedal at physiostep more consistently as a result. She is demonstrating slightly improved R foot clearance during ambulation as well. No adverse reactions/unusual changes observed/reported in clinical status this session.   · Communication/Consultation:  None today  · Equipment provided today:  None today  · Recommendations/Intent for next treatment session: Next visit will focus on manual therapy/modalities to reduce pain/swelling and improve mobility at R foot and ankle; work on hip strengthening; gait mechanics.     Total Treatment Billable Duration:  54 minutes  PT Patient Time In/Time Out  Time In: 8171  Time Out: 300 [address], DPT    Future Appointments   Date Time Provider Jessie Maylin   8/31/2021  1:45 PM Charu Gusman DPT [facility]   9/2/2021  2:30 PM Charu Gusman, DPT [facility]   9/7/2021  2:30 PM SFD PHYSICIAL THERAPIST [facility]   9/9/2021  1:00 PM Moreno Escobar PTA [facility]   10/14/2021  8:15 AM PRE LAB RESOURCE [facility] PRE PRE   10/20/2021  2:00 PM Daniel Adames MD [facility] PRE PRE   10/26/2021 11:00 AM ARTEM García [facility] PMR PMR   7/29/2022  2:20 PM Mala Dos Santos NP [facility] PSCD PP        Visit Approval Visit # Therapist initials Date A NS / Cx < 24 hr >24 hr Cx Comments   99 visits total 1  8/17/2021 [x]  [] [] Initial evaluation     PDE 8-19/2021 [] [x] [] Stomach bug    2  8/24/2021 [x] [] []     3  8/27/2021 [x] [] []        [] [] []        [] [] []        [] [] []        [] [] []        [] [] []        [] [] []        [] [] []        [] [] []        [] [] []        [] [] []        [] [] []        [] [] []        [] [] []        [] [] []

## 2021-08-31 ENCOUNTER — HOSPITAL ENCOUNTER (OUTPATIENT)
Dept: PHYSICAL THERAPY | Age: 46
Discharge: HOME | End: 2021-08-31
Payer: MEDICARE

## 2021-08-31 PROCEDURE — 97140 MANUAL THERAPY 1/> REGIONS: CPT

## 2021-08-31 PROCEDURE — 97016 VASOPNEUMATIC DEVICE THERAPY: CPT

## 2021-08-31 PROCEDURE — 97110 THERAPEUTIC EXERCISES: CPT

## 2021-09-02 ENCOUNTER — HOSPITAL ENCOUNTER (OUTPATIENT)
Dept: PHYSICAL THERAPY | Age: 46
Discharge: HOME | End: 2021-09-02
Payer: MEDICARE

## 2021-09-02 PROCEDURE — 97016 VASOPNEUMATIC DEVICE THERAPY: CPT

## 2021-09-02 PROCEDURE — 97140 MANUAL THERAPY 1/> REGIONS: CPT

## 2021-09-02 PROCEDURE — 97110 THERAPEUTIC EXERCISES: CPT

## 2021-09-02 NOTE — PROGRESS NOTES
Ace Kenia  : 1975  [place]  Secondary:  [address]  at [facility]  [address] [facility] [place], [place] [address] [facility]  [phone]   [phone]       OUTPATIENT PHYSICAL THERAPY: Daily Treatment Note 2021  Visit Count:  5  ICD-10: Treatment Diagnosis: Pain in right ankle and joints of right foot (M25.571)    Stiffness of right ankle, not elsewhere classified (M25.671)  Difficulty in walking, not elsewhere classified (R26.2)  Unsteadiness on feet (R26.81)  Muscle weakness (generalized) (M62.81)  Precautions/Allergies:   Erythromycin   TREATMENT PLAN:  Effective Dates/Frequency/Duration: Twice per week from 2021 until 10/31/2021 (10 weeks). Pre-treatment Symptoms/Complaints:  Pt states she felt better after last session (states the vasopneumatic compression really seems to help a lot)  Pain: Initial:   310 Post Session:  0/10   Medications Last Reviewed:  2021  Updated Objective Findings: None Today   TREATMENT:   Therapeutic Exercise: ( 32 minutes):  Exercises per grid below to improve mobility, strength, balance and dynamic movement of right ankle/foot to improve functional mobility. Required minimal visual, verbal and manual cues to promote proper body alignment, promote proper body posture, promote proper body mechanics and promote proper body breathing techniques. Progressed resistance, range, repetitions and complexity of movement as indicated.     Date:  2021 Date:  2021 Date:  2021   Activity/Exercise Parameters     Physiostep L1 resistance for 10 minutes with B LEs only; yellow band to maintain R foot on pedal L1 resistance for 10 minutes with B LEs only; yellow band to maintain R foot on pedal L1 resistance for 10 minutes with B LEs only; yellow band to maintain R foot on pedal   Ankle plantarflexor stretch on incline board 30 second hold x 3 reps with knees extended then flexed; L UE support 30 second hold x 3 reps with knees extended then flexed; L UE support 30 second hold x 3 reps with knees extended then flexed; L UE support   Anterior/posterior tapping on rocker board 20 reps/1 set tapping both directions slowly with B feet (to allow for increased motion at R ankle) 20 reps/1 set tapping both directions slowly with B feet (to allow for increased motion at R ankle)    Lateral tapping on rocker board 20 reps/1 set tapping both directions slowly with B feet (to allow for increased motion at R ankle) and cues to avoid movement at R knee to reduce substitution 20 reps/1 set tapping both directions slowly with B feet (to allow for increased motion at R ankle) and cues to maintain entire R foot on board    Static standing on foam   Feet apart x 30 second trials with minimal to no instability  Feet together x 30 second trials with minimal to no instability; no UE support throughout; cues for increased weightbearing at R LE Feet together x 30 second trial with minimal to no instability; no UE support throughout; cues for increased weightbearing at R LE and increased pressure through medial R foot  Feet in semi-tandem stance x 45-60 second trials each foot forward x 2 bouts with cues for increased symmetry of weightbearing throughout               *given in HEP  MedBridge Portal   Pt given following band colors: [] Yellow          [] Red          [] Green          [] Blue          [] Grey     Manual therapy (8 minutes): With pt in supported supine position (B LEs elevated on wedge):   - gentle mobilizations at R foot into dorsiflexion and eversion/inversion to improve R foot mobility and reduce pain    Modalities (15 minutes): With pt in supported supine position with B LEs elevated on wedge, vasopneumatic compression with cold therapy via [employer] system applied to R foot/ankle with high pressure and at 38 degrees to reduce pain, swelling, and inflammation.      Treatment/Session Summary:    · Response to Treatment:  Pt able to perform more challenging standing balance activities this session with good stability. She is reporting less pain overall today. Will continue to work on increasing R foot mobility and stability next few sessions to further reduce her pain. No adverse reactions/unusual changes observed/reported in clinical status this session. · Communication/Consultation:  None today  · Equipment provided today:  None today  · Recommendations/Intent for next treatment session: Next visit will focus on manual therapy/modalities to reduce pain/swelling and improve mobility at R foot and ankle; work on hip strengthening; gait mechanics.     Total Treatment Billable Duration:  55 minutes  PT Patient Time In/Time Out  Time In: 1430  Time Out: 2623 Ankita Chong, DPT    Future Appointments   Date Time Provider Jessie Carlisle   9/7/2021  2:30 PM [facility] PHYSICIAL THERAPIST [facility] D   9/9/2021  1:00 PM Ivanna Fontaine, [facility] SFD   9/14/2021  2:30 PM Kirt Zhu DPT [facility] D   9/16[facility]2021  [facility]   9/21/2021  2:30 PM Yahaira Candelarios KAYA, DPT [facility] D   9/23/2021  2:30 PM Yahaira Beets B, DPT [facility] D   9/28/2021  2:30 PM Yahaira Beets B, DPT [facility]   9/30/2021  2:30 PM Yahaira Beets B, DPT [facility]   10/12/2021 11:00 AM ARTEM Kumar [facility] PMR PMR   10/14/2021  8:15 AM PRE LAB RESOURCE [facility] PRE PRE   10/20/2021  2:00 PM Vivien Kwong MD [facility] PRE PRE   7/29/2022  2:20 PM Kyler Garcia NP [facility] PSCD PP        Visit Approval Visit # Therapist initials Date A NS / Cx < 24 hr >24 hr Cx Comments   99 visits total 1  8/17/2021 [x]  [] [] Initial evaluation     PDE 8-19/2021 [] [x] [] Stomach bug    2  8/24/2021 [x] [] []     3  8/27/2021 [x] [] []     4  8/31/2021 [x] [] []     5  9/2/2021 [x] [] []        [] [] []        [] [] []        [] [] []        [] [] []        [] [] []        [] [] []        [] [] [] [] [] []        [] [] []        [] [] []        [] [] []        [] [] []

## 2021-09-07 ENCOUNTER — HOSPITAL ENCOUNTER (OUTPATIENT)
Dept: PHYSICAL THERAPY | Age: 46
Discharge: HOME | End: 2021-09-07
Payer: MEDICARE

## 2021-09-07 NOTE — PROGRESS NOTES
Beth Farah  : 1975  [place]  Secondary:  [address]  at [facility]  [address], [address], [place], [facility]  [phone]   [phone]       CANCELLATION NOTE    Ms. Sb Jorgensen canceled her appointment today due to not being able to get her car to start. # Recent No Shows/Same-Day Cancellations: 2    2021  Conner Talbot, PT          OUTPATIENT PHYSICAL THERAPY: Daily Treatment Note 2021  Visit Count:  6  ICD-10: Treatment Diagnosis: Pain in right ankle and joints of right foot (M25.571)    Stiffness of right ankle, not elsewhere classified (M25.671)  Difficulty in walking, not elsewhere classified (R26.2)  Unsteadiness on feet (R26.81)  Muscle weakness (generalized) (M62.81)  Precautions/Allergies:   Erythromycin   TREATMENT PLAN:  Effective Dates/Frequency/Duration: Twice per week from 2021 until 10/31/2021 (10 weeks). Pre-treatment Symptoms/Complaints:  Pt states she felt better after last session (states the vasopneumatic compression really seems to help a lot)  Pain: Initial:   310 Post Session:  0/10   Medications Last Reviewed:  2021  Updated Objective Findings: None Today   TREATMENT:   Therapeutic Exercise: ( 32 minutes):  Exercises per grid below to improve mobility, strength, balance and dynamic movement of right ankle/foot to improve functional mobility. Required minimal visual, verbal and manual cues to promote proper body alignment, promote proper body posture, promote proper body mechanics and promote proper body breathing techniques. Progressed resistance, range, repetitions and complexity of movement as indicated.     Date:  2021 Date:  2021 Date:  2021   Activity/Exercise Parameters     Physiostep L1 resistance for 10 minutes with B LEs only; yellow band to maintain R foot on pedal L1 resistance for 10 minutes with B LEs only; yellow band to maintain R foot on pedal L1 resistance for 10 minutes with B LEs only; yellow band to maintain R foot on pedal   Ankle plantarflexor stretch on incline board 30 second hold x 3 reps with knees extended then flexed; L UE support 30 second hold x 3 reps with knees extended then flexed; L UE support 30 second hold x 3 reps with knees extended then flexed; L UE support   Anterior/posterior tapping on rocker board 20 reps/1 set tapping both directions slowly with B feet (to allow for increased motion at R ankle) 20 reps/1 set tapping both directions slowly with B feet (to allow for increased motion at R ankle)    Lateral tapping on rocker board 20 reps/1 set tapping both directions slowly with B feet (to allow for increased motion at R ankle) and cues to avoid movement at R knee to reduce substitution 20 reps/1 set tapping both directions slowly with B feet (to allow for increased motion at R ankle) and cues to maintain entire R foot on board    Static standing on foam   Feet apart x 30 second trials with minimal to no instability  Feet together x 30 second trials with minimal to no instability; no UE support throughout; cues for increased weightbearing at R LE Feet together x 30 second trial with minimal to no instability; no UE support throughout; cues for increased weightbearing at R LE and increased pressure through medial R foot  Feet in semi-tandem stance x 45-60 second trials each foot forward x 2 bouts with cues for increased symmetry of weightbearing throughout               *given in HEP  MedBridge Portal   Pt given following band colors: [] Yellow          [] Red          [] Green          [] Blue          [] Grey     Manual therapy (8 minutes): With pt in supported supine position (B LEs elevated on wedge):   - gentle mobilizations at R foot into dorsiflexion and eversion/inversion to improve R foot mobility and reduce pain    Modalities (15 minutes):  With pt in supported supine position with B LEs elevated on wedge, vasopneumatic compression with cold therapy via [employer] system applied to R foot/ankle with high pressure and at 38 degrees to reduce pain, swelling, and inflammation. Treatment/Session Summary:    · Response to Treatment:  Pt able to perform more challenging standing balance activities this session with good stability. She is reporting less pain overall today. Will continue to work on increasing R foot mobility and stability next few sessions to further reduce her pain. No adverse reactions/unusual changes observed/reported in clinical status this session. · Communication/Consultation:  None today  · Equipment provided today:  None today  · Recommendations/Intent for next treatment session: Next visit will focus on manual therapy/modalities to reduce pain/swelling and improve mobility at R foot and ankle; work on hip strengthening; gait mechanics.     Total Treatment Billable Duration:  55 minutes     Siddharth Maya PT    Future Appointments   Date Time Provider Jessie Carlisle   9/9/2021  1:00 PM Carroll Rosales [facility]   9/14/2021  2:30 PM Claudio KIRKPATRICK, DPT [facility] D   9/16/2021  1:45 PM Carroll Rosales [facility] SFD   9/21/2021  2:30 PM Mia Lamp, DPT [facility]   9/23/2021  2:30 PM Mia Lamp, DPT [facility]   9/28/2021  2:30 PM Mia Lamp, DPT [facility]   9/30/2021  2:30 PM Mia Lamp, DPT [facility]   10/12/2021 11:00 AM ARTEM Matson [facility] PMR PMR   10/14/2021  8:15 AM PRE LAB RESOURCE [facility] PRE PRE   10/20/2021  2:00 PM Curtis Garcia MD [facility] PRE PRE   7/29/2022  2:20 PM Sonny Rodriguez NP [facility] PSCD PP        Visit Approval Visit # Therapist initials Date A NS / Cx < 24 hr >24 hr Cx Comments   99 visits total 1  8/17/2021 [x]  [] [] Initial evaluation     PDE 8-19/2021 [] [x] [] Stomach bug    2  8/24/2021 [x] [] []     3  8/27/2021 [x] [] []     4  8/31/2021 [x] [] []     5  9/2/2021 [x] [] []      ANALI 9/8/2021 [] [x] [] Car won't start       [] [] []        [] [] []        [] [] []        [] [] []        [] [] []        [] [] []        [] [] []        [] [] []        [] [] []        [] [] []        [] [] []

## 2021-09-09 ENCOUNTER — HOSPITAL ENCOUNTER (OUTPATIENT)
Dept: PHYSICAL THERAPY | Age: 46
Discharge: HOME | End: 2021-09-09
Payer: MEDICARE

## 2021-09-09 PROCEDURE — 97110 THERAPEUTIC EXERCISES: CPT

## 2021-09-09 PROCEDURE — 97016 VASOPNEUMATIC DEVICE THERAPY: CPT

## 2021-09-09 NOTE — PROGRESS NOTES
Josh Ventura  : 1975  [place]  Secondary:  [address]  at [facility]  [address], [address] [facility], [address]  [phone]   [phone]        OUTPATIENT PHYSICAL THERAPY: Daily Treatment Note 2021  Visit Count:  6  ICD-10: Treatment Diagnosis: Pain in right ankle and joints of right foot (M25.571)    Stiffness of right ankle, not elsewhere classified (M25.671)  Difficulty in walking, not elsewhere classified (R26.2)  Unsteadiness on feet (R26.81)  Muscle weakness (generalized) (M62.81)  Precautions/Allergies:   Erythromycin   TREATMENT PLAN:  Effective Dates/Frequency/Duration: Twice per week from 2021 until 10/31/2021 (10 weeks). Pre-treatment Symptoms/Complaints:  Pt states she has been up on her feet more today, which she states makes her pain a tad higher or more spasms. .    Pain: Initial:   2-3/10 Post Session:  1/10   Medications Last Reviewed:  2021  Updated Objective Findings: None Today   TREATMENT:   Therapeutic Exercise: ( 39 minutes):  Exercises per grid below to improve mobility, strength, balance and dynamic movement of right ankle/foot to improve functional mobility. Required minimal visual, verbal and manual cues to promote proper body alignment, promote proper body posture, promote proper body mechanics and promote proper body breathing techniques. Progressed resistance, range, repetitions and complexity of movement as indicated.     Date:  2021 Date:  2021 Date:  2021 Date:  2021   Activity/Exercise Parameters      Physiostep L1 resistance for 10 minutes with B LEs only; yellow band to maintain R foot on pedal L1 resistance for 10 minutes with B LEs only; yellow band to maintain R foot on pedal L1 resistance for 10 minutes with B LEs only; yellow band to maintain R foot on pedal L1 10 minutes with B LE's only; blue band to maintain foot on pedal.    Ankle plantarflexor stretch on incline board 30 second hold x 3 reps with knees extended then flexed; L UE support 30 second hold x 3 reps with knees extended then flexed; L UE support 30 second hold x 3 reps with knees extended then flexed; L UE support 3 x 30 sec hold with knees flexed and extended - L UE support   Anterior/posterior tapping on rocker board 20 reps/1 set tapping both directions slowly with B feet (to allow for increased motion at R ankle) 20 reps/1 set tapping both directions slowly with B feet (to allow for increased motion at R ankle)     Lateral tapping on rocker board 20 reps/1 set tapping both directions slowly with B feet (to allow for increased motion at R ankle) and cues to avoid movement at R knee to reduce substitution 20 reps/1 set tapping both directions slowly with B feet (to allow for increased motion at R ankle) and cues to maintain entire R foot on board  A/P & M/L   Tapping x 15   Static hold x 20 sec. Static standing on foam   Feet apart x 30 second trials with minimal to no instability  Feet together x 30 second trials with minimal to no instability; no UE support throughout; cues for increased weightbearing at R LE Feet together x 30 second trial with minimal to no instability; no UE support throughout; cues for increased weightbearing at R LE and increased pressure through medial R foot  Feet in semi-tandem stance x 45-60 second trials each foot forward x 2 bouts with cues for increased symmetry of weightbearing throughout Feet together   3 x 30 sec   Feet apart   3 x 30 sec   Tandem stance  2 x 30 sec each foot forward                 *[facility]   Pt given following band colors: [] Yellow          [] Red          [] Green          [] Blue          [] Grey     Manual therapy (0 minutes):   With pt in supported supine position (B LEs elevated on wedge):   - gentle mobilizations at R foot into dorsiflexion and eversion/inversion to improve R foot mobility and reduce pain    Modalities (15 minutes): With pt in supported supine position with B LEs elevated on wedge, vasopneumatic compression with cold therapy via [employer] system applied to R foot/ankle with high pressure and at 38 degrees to reduce pain, swelling, and inflammation. Treatment/Session Summary:    · Response to Treatment:  Patient did well with more challenging standing balance activities today. Will continue to work on increasing R foot mobility and stability next few sessions to further reduce her pain. No adverse reactions/unusual changes observed/reported in clinical status this session. · Communication/Consultation:  None today  · Equipment provided today:  None today  · Recommendations/Intent for next treatment session: Next visit will focus on manual therapy/modalities to reduce pain/swelling and improve mobility at R foot and ankle; work on hip strengthening; gait mechanics.     Total Treatment Billable Duration:  54 minutes  PT Patient Time In/Time Out  Time In: 6581  Time Out: [address] , PTA    Future Appointments   Date Time Provider Jessie Carlisle   9/14/2021  2:30 PM Ranjan Mitchell, DPT [facility]   9/16/2021  1:45 PM Beni Nephata, PTA [facility]   9/21/2021  2:30 PM Ranjan Mitchell, DPT [facility]   9/23/2021  2:30 PM Ranjan Mitchell, DPT [facility]   9/28/2021  2:30 PM Ranjan Mitchell, DPT [facility]   9/30/2021  2:30 PM Ranjan Mitchell, DPT [facility]   10/12/2021 11:00 AM Canary Soulier, PA [facility] PMR PMR   10/14/2021  8:15 AM PRE LAB RESOURCE [facility] PRE PRE   10/20/2021  2:00 PM Merary Altman MD [facility] PRE PRE   7/29/2022  2:20 PM Elsie Hurtado NP [facility] PSCD PP        Visit Approval Visit # Therapist initials Date A NS / Cx < 24 hr >24 hr Cx Comments   99 visits total 1  8/17/2021 [x]  [] [] Initial evaluation     PDE 8-19/2021 [] [x] [] Stomach bug    2  8/24/2021 [x] [] []     3  8/27/2021 [x] [] []     4  8/31/2021 [x] [] []     5  9/2/2021 [x] [] []      ANALI 9/8/2021 [] [x] [] Car won't start    6 PDE 9-9-2021 [x] [] []        [] [] []        [] [] []        [] [] []        [] [] []        [] [] []        [] [] []        [] [] []        [] [] []        [] [] []        [] [] []

## 2021-09-14 ENCOUNTER — HOSPITAL ENCOUNTER (OUTPATIENT)
Dept: PHYSICAL THERAPY | Age: 46
Discharge: HOME | End: 2021-09-14
Payer: MEDICARE

## 2021-09-14 PROCEDURE — 97110 THERAPEUTIC EXERCISES: CPT

## 2021-09-14 PROCEDURE — 97016 VASOPNEUMATIC DEVICE THERAPY: CPT

## 2021-09-14 NOTE — PROGRESS NOTES
Zoraida WARREN Siddharthkevin  : 1975  [place]  Secondary:  [address]  at [facility]  [address], [address] [facility] [place], [address]  [phone]   [phone]        OUTPATIENT PHYSICAL THERAPY: Daily Treatment Note 2021  Visit Count:  7  ICD-10: Treatment Diagnosis: Pain in right ankle and joints of right foot (M25.571)    Stiffness of right ankle, not elsewhere classified (M25.671)  Difficulty in walking, not elsewhere classified (R26.2)  Unsteadiness on feet (R26.81)  Muscle weakness (generalized) (M62.81)  Precautions/Allergies:   Erythromycin   TREATMENT PLAN:  Effective Dates/Frequency/Duration: Twice per week from 2021 until 10/31/2021 (10 weeks). Pre-treatment Symptoms/Complaints:  Pt states she has been having more spasms in her R foot for the past week, so it has been drawing up and in more when she is trying to walk  Pain: Initial:   3/10 Post Session:  010   Medications Last Reviewed:  2021  Updated Objective Findings: None Today   TREATMENT:   Therapeutic Exercise: ( 39 minutes):  Exercises per grid below to improve mobility, strength, balance and dynamic movement of right ankle/foot to improve functional mobility. Required minimal visual, verbal and manual cues to promote proper body alignment, promote proper body posture, promote proper body mechanics and promote proper body breathing techniques. Progressed resistance, range, repetitions and complexity of movement as indicated.     Date:  2021 Date:  2021 Date:  2021 Date:  2021   Activity/Exercise       Physiostep L1 resistance for 10 minutes with B LEs only; yellow band to maintain R foot on pedal L1 resistance for 10 minutes with B LEs only; yellow band to maintain R foot on pedal L1 10 minutes with B LE's only; blue band to maintain foot on pedal.  L1 resistance for 10 minutes with B LEs only; blue band to maintain R foot on pedal   Ankle plantarflexor stretch on incline board 30 second hold x 3 reps with knees extended then flexed; L UE support 30 second hold x 3 reps with knees extended then flexed; L UE support 3 x 30 sec hold with knees flexed and extended - L UE support 30 second hold x 3 reps with knees extended then flexed; L UE support   Anterior/posterior tapping on rocker board 20 reps/1 set tapping both directions slowly with B feet (to allow for increased motion at R ankle)   In standing; 20 reps/1 set tapping both directions slowly with B feet (to allow for increased motion at R ankle); minimal UE support   Lateral tapping on rocker board 20 reps/1 set tapping both directions slowly with B feet (to allow for increased motion at R ankle) and cues to maintain entire R foot on board  A/P & M/L   Tapping x 15   Static hold x 20 sec.   In standing; 20 reps/1 set tapping both directions slowly with B feet (to allow for increased motion at R ankle) and cues to maintain entire R foot on board; minimal UE support   Static standing on foam  Feet apart x 30 second trials with minimal to no instability  Feet together x 30 second trials with minimal to no instability; no UE support throughout; cues for increased weightbearing at R LE Feet together x 30 second trial with minimal to no instability; no UE support throughout; cues for increased weightbearing at R LE and increased pressure through medial R foot  Feet in semi-tandem stance x 45-60 second trials each foot forward x 2 bouts with cues for increased symmetry of weightbearing throughout Feet together   3 x 30 sec   Feet apart   3 x 30 sec   Tandem stance  2 x 30 sec each foot forward Feet together x 60 second trial with minimal to no instability; no UE support   Standing LE marching on foam    20 reps/1 set each LE with cues for increased hip flexion AROM to increase challenge; cues to ensure full contact of R foot with foam before attempting to lift L LE up; light UE support occasionally, with occasional no UE support; S throughout   Step ups    Onto 3 inch foam; 20 reps/1 set leading with R LE with cues for ensuring full contact of R foot on foam prior to stepping up with L foot to ensure safety; light UE support at L throughout          *given in HEP  [employer]   Pt given following band colors: [] Yellow          [] Red          [] Green          [] Blue          [] Grey     Manual therapy (0 minutes): With pt in supported supine position (B LEs elevated on wedge):   - gentle mobilizations at R foot into dorsiflexion and eversion/inversion to improve R foot mobility and reduce pain    Modalities (15 minutes): With pt in supported supine position with B LEs elevated on wedge, vasopneumatic compression with cold therapy via ONOFFMIX (?????) system applied to R foot/ankle with high pressure and at 38 degrees to reduce pain, swelling, and inflammation. Treatment/Session Summary:    · Response to Treatment:  Patient demonstrating improved stability with more challenging standing balance exercises this session, but continues to require cues to ensure full contact of R foot on ground when performing activities involving R SLS to avoid injury. Will continue to work on increasing R foot mobility and stability next few sessions to further reduce her pain. May also try walking with narrow based quad cane next session to see if this improves the fluidity of her gait. No adverse reactions/unusual changes observed/reported in clinical status this session. · Communication/Consultation:  None today  · Equipment provided today:  None today  · Recommendations/Intent for next treatment session: Next visit will focus on manual therapy/modalities to reduce pain/swelling and improve mobility at R foot and ankle; work on hip strengthening; gait mechanics.     Total Treatment Billable Duration:  54 minutes  PT Patient Time In/Time Out  Time In: 4328  Time Out: [address], DPT    Future Appointments   Date Time Provider Jessie Carlisle   9/16/2021  1:45 PM Mariela Mchugh, [facility] SFD   9/21/2021  2:30 PM Destinee Pole, DPT [facility] SFD   9/23/2021  2:30 PM Destinee Pole, DPT [facility] SFD   9/28/2021  2:30 PM Destinee Pole, DPT [facility] SFD   9/30/2021  2:30 PM Destinee Pole, DPT [facility]   10/12/2021 11:00 AM ARTEM Barry SSA PMR PMR   10/14/2021  8:15 AM PRE LAB RESOURCE SSA PRE PRE   10/20/2021  2:00 PM Jay Betancourt MD [facility] PRE PRE   7/29/2022  2:20 PM Juan F Egan NP [facility] PSCD PP        Visit Approval Visit # Therapist initials Date A NS / Cx < 24 hr >24 hr Cx Comments   99 visits total 1  8/17/2021 [x]  [] [] Initial evaluation     PDE 8-19/2021 [] [x] [] Stomach bug    2  8/24/2021 [x] [] []     3  8/27/2021 [x] [] []     4  8/31/2021 [x] [] []     5  9/2/2021 [x] [] []      ANALI 9/8/2021 [] [x] [] Car won't start    6 PDE 9-9-2021 [x] [] []     7  9/14/2021 [x] [] []        [] [] []        [] [] []        [] [] []        [] [] []        [] [] []        [] [] []        [] [] []        [] [] []        [] [] []

## 2021-09-16 ENCOUNTER — HOSPITAL ENCOUNTER (OUTPATIENT)
Dept: PHYSICAL THERAPY | Age: 46
Discharge: HOME | End: 2021-09-16
Payer: MEDICARE

## 2021-09-16 PROCEDURE — 97110 THERAPEUTIC EXERCISES: CPT

## 2021-09-16 PROCEDURE — 97016 VASOPNEUMATIC DEVICE THERAPY: CPT

## 2021-09-16 NOTE — PROGRESS NOTES
Zoraida Boykin  : 1975  [place]  Secondary:  [address]  at [facility]  [address], [address] [facility], [place], [address] [place] [address]  [phone]   [phone]        OUTPATIENT PHYSICAL THERAPY:Progress Report 2021   ICD-10: Treatment Diagnosis: Pain in right ankle and joints of right foot (M25.571)    Stiffness of right ankle, not elsewhere classified (M25.671)  Difficulty in walking, not elsewhere classified (R26.2)  Unsteadiness on feet (R26.81)  Muscle weakness (generalized) (M62.81)  Precautions/Allergies:   Erythromycin   TREATMENT PLAN:  Effective Dates/Frequency/Duration: Twice per week from 2021 until 10/31/2021 (10 weeks). MEDICAL/REFERRING DIAGNOSIS:  Pain in right foot [M79.671]  Other fracture of right lower leg, initial encounter for closed fracture [S82.891A]   DATE OF ONSET: 2021 (date of initial injury)  REFERRING PHYSICIAN: Rush Rhodes MD MD Orders: Evaluate and treat  Return MD Appointment: unspecified   ASSESSMENT:  Carrie Odonnell has now attended 8 physical therapy sessions for R ankle/foot pain following fall on R foot on 2021 that resulted in a medial malleolar fracture. This session, pt demonstrated slightly improved R LE strength/endurance, improved R ankle mobility, slightly less postural/gait deficits, improved static/dynamic standing balance as evident by a time of 22.15 seconds with quad cane on L on the Timed Up and Go test (with times of >13.5 seconds indicating an increased risk for falls), and improved functional mobility as evident by a score of 38/84 on the Foot and Ankle Ability Measure (initial score of 34/84, with lower scores indicating increased disability).   Despite these improvements, she continues to demonstrate decreased B LE strength/endurance (significant weaker at R secondary to past CVA), decreased right ankle mobility with associated pain, multiple postural/gait deficits, decreased standing tolerance, decreased static/dynamic standing balance and decreased functional mobility. 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 Pt may continue to benefit from skilled PT to address the above listed deficits to improve ability to perform pain-free and safe weightbearing activities and to improve overall quality of life prior to discharge. PROBLEM LIST (Impacting functional limitations):  1. Decreased Strength  2. Decreased ADL/Functional Activities  3. Decreased Transfer Abilities  4. Decreased Ambulation Ability/Technique  5. Decreased Balance  6. Increased Pain  7. Decreased Activity Tolerance  8. Decreased Pacing Skills  9. Increased Fatigue  10. Decreased Flexibility/Joint Mobility  11. Edema/Girth INTERVENTIONS PLANNED: (Treatment may consist of any combination of the following)  1. Balance Exercise  2. Bed Mobility  3. Cold  4. Electrical Stimulation  5. Family Education  6. Gait Training  7. Heat  8. Home Exercise Program (HEP)  9. Manual Therapy  10. Neuromuscular Re-education/Strengthening  11. Range of Motion (ROM)  12. Therapeutic Activites  13. Therapeutic Exercise/Strengthening  14. Transcutaneous Electrical Nerve Stimulation (TENS)  15. Transfer Training  16. Ultrasound (US)  17. Vasopneumatic Compression     GOALS: (Goals have been discussed and agreed upon with patient.)  Short-Term Functional Goals: Time Frame: 5 weeks  1. Pt will be compliant with HEP in order to increase LE strength/endurance/mobility to improve functional mobility and overall quality of life. (MET, 9/16/2021)   2. Pt will improve score on the Lower Extremity Functional Scale to 32/80 in order to demonstrate improved functional mobility and quality of life. (CONTINUE, 9/16/2021)   3.  Pt will improve score on the Foot and Ankle Ability Measure to 38/84 in order to demonstrate improved functional mobility and quality of life.  (MET, 9/16/2021)   4. Pt will be able to ambulate 250 feet over level surfaces with supervision with least restrictive assistive device with minimal to increase in pain in order to improve household mobility. (MET, 9/16/2021)   5. Pt will be able to ascend/descend 2 steps with contact guard assistance with rail with safe gait pattern in order to improve community mobility.  (MET, 9/16/2021)   6. Pt will increase right ankle dorsiflexion PROM to 6 degrees with minimal to no increase in pain in order to improve functional mobility and reduce gait deficits. (MET, 9/16/2021)   Discharge Goals: Time Frame: 10 weeks  12. Pt will be independent with HEP in order to increase LE strength/endurance/mobility to improve functional mobility and overall quality of life. 13. Pt will improve score on the Lower Extremity Functional Scale to 36/80 in order to demonstrate improved functional mobility and quality of life. 14. Pt will improve score on the Foot and Ankle Ability Measure to 42/84 in order to demonstrate improved functional mobility and quality of life. 15. Pt will be able to ambulate 500 feet over level and unlevel surfaces with modified independence with least restrictive assistive device with minimal to no increase in pain in order to improve community mobility  16. Pt will be able to ascend/descend 5 steps with modified independence with rail with safe gait pattern in order to improve community mobility. 17. Pt will increase right ankle dorsiflexion PROM to 8 degrees with minimal to no increase in pain in order to improve functional mobility and reduce gait deficits.     OUTCOME MEASURE:   Tool Used: Lower Extremity Functional Scale (LEFS)  Score:  Initial: 28/80 Most Recent: 26/80 (Date: 9/16/2021 )   Interpretation of Score: 20 questions each scored on a 5 point scale with 0 representing \"extreme difficulty or unable to perform\" and 4 representing \"no difficulty\". The lower the score, the greater the functional disability. 80/80 represents no disability. Minimal detectable change is 9 points. Tool Used: PT/OT FOOT AND ANKLE ABILITY MEASURE  Score:  Initial: 34/84 Most Recent: 38/84 (Date: 9/16/2021 )   Interpretation of Score: For the \"Activities of Daily Living\", there are 21 questions each scored on a 5 point scale with 0 representing \"Unable to do\" and 4 representing \"No difficulty\". The lower the score, the greater the functional disability. 84/84 represents no disability. Minimal detectable change is 5.7 points. With the addition of the 8 questions in the \"Sports Subscale,\" there are 29 questions, each scored on a 5 point scale with 0 representing \"Unable to do\" and 4 representing \"No difficulty\". The lower the score, the greater the functional disability. 116/116 represents no disability. Minimal detectable change is 12.3 points. Tool Used: Timed Up and Go (TUG)  Score:  Initial: 25.4 seconds with wide based quad cane Most Recent: 22.15 seconds with wide based quad cane (Date: 9/16/2021 )   Interpretation of Score: The test measures, in seconds, the time taken by an individual to stand up from a standard arm chair (seat height 46 cm [18 in], arm height 65 cm [25.6 in]), walk a distance of 3 meters (118 in, approx 10 ft), turn, walk back to the chair and sit down. If the individual takes longer than 14 seconds to complete TUG, this indicates risk for falls.     FALL RISK:    Ambulatory/Rehab Services H2 Model Falls Risk Assessment   Risk Factors:       (5)  History of Recent Falls [w/in 3 months] Ability to Rise from Chair:       (1)  Pushes up, successful in one attempt   Falls Prevention Plan:       Physical Limitations to Exercise (specify):  standing exercises with supervision       Mobility Assistance Device (specify):  quad cane   Total: (5 or greater = High Risk): 6 ©2010 [facility] [address] [place] [address] States Patent #[id number].  Federal Law prohibits the replication, distribution or use without written permission from [facility] of [address] [place] [address] FINDINGS:     Reassessment on 9/16/2021   Observation/Orthostatic Postural Assessment:    The following postural deficits were noted in sitting: loss of cervical lordosis, increased thoracic kyphosis, forward head, rounded shoulders and posterior pelvic tilt  - increased upright posture on 9/16/2021  The following postural deficits were noted in standing: increased weightbearing at L side  Palpation:          Slight edema noted at R foot/ankle - reduced on 9/16/2021  ROM:    Initial measurement: (on 8/17/2021) Initial measurement: (on 8/17/2021) Reassessment measurement: (on 9/16/2021) Reassessment measurement:      L ankle AROM:     Ankle dorsiflexion: 15 degrees     Ankle plantarflexion: 50 degrees      Ankle inversion: 40 degrees     Ankle eversion: 36 degrees   Mainly WFL throughout R hip and knee, but limited with following motions:   R ankle AROM - pt unable to initiate sufficient ankle movement secondary to CVA  R ankle PROM:     Ankle dorsiflexion: 2 degrees     Ankle plantarflexion: 60 degrees     Ankle inversion: 45 degrees     Ankle eversion: 10 degrees   R ankle PROM:     Ankle dorsiflexion: 6 degrees     Ankle plantarflexion: 71 degrees     Ankle inversion: 46 degrees     Ankle eversion: 28 degrees      Strength:  R side partially weaker due to CVA (at hip/knee)   Initial measurement: (on 8/17/2021) Initial measurement: (on 8/17/2021)  Reassessment (on 9/16/2021) Reassessment    L LE: R LE: R LE:    Hip flexion 4+/5  4+/5  4+/5    Hip ER 4+/5  4-/5  4/5    Hip IR 4/5  4-/5  4+/5    Hip abduction 4/5  4/5  4/5    Hip adduction 4+/5  2+/5  3-/5    Hip extension 4-/5  3-/5  3+/5    Knee flexion 4+/5  3+/5  4-/5    Knee extension 5/5  4+/5      Ankle DF  4+/5  1+/5  1+/5    Ankle PF - 1+/5 2-/5    Ankle Inv - 1+/5 1+/5    Ankle Ev - 1+/5 1+/5      Functional Mobility:   Gait/Ambulation: Pt ambulates with wide-based quad cane at L with following gait deficits: increased forward trunk lean, slower gait speed, altered arm swing, increased lateral trunk sway, decreased trunk rotation, wide KESHAV, increased weightshift to L, R trendelenburg, R hip circumduction, decreased R stance time, decreased B step length, decreased R knee flexion during R preswing, decreased R foot clearance and decreased R heel strike; inversion noted at R foot during R swing phase - pt with improved upright posture and faster gait speed with less lateral trunk sway on 9/16/2021, as well as demonstrating improved B step length        Transfers:  Pushes up to stand, reaches back to sit, Breezy        Bed Mobility:  WFL  Balance:          Decreased with dynamic standing as evident by increased TUG assessment time above  Sensation:         Decreased throughout R LE secondary to CVA  MEDICAL NECESSITY:   · Patient is expected to demonstrate progress in strength, range of motion, balance and functional technique to improve ability to perform pain-free and safe weightbearing activities. REASON FOR SERVICES/OTHER COMMENTS:  · Patient continues to require modification of therapeutic interventions to increase complexity of exercises. Rehabilitation Potential For Stated Goals: Good  Regarding Zoraida KELVIN [facility] therapy, I certify that the treatment plan above will be carried out by a therapist or under their direction. Thank you for this referral,  RONDA TianT     Referring Physician Signature: Yan Goncalves MD No Signature is Required for this note.

## 2021-09-16 NOTE — PROGRESS NOTES
Lexie Gonzales  : 1975  [place]  Secondary:  [address]  at [facility]  [address], [place], [facility]  [phone]   [phone]        OUTPATIENT PHYSICAL THERAPY: Daily Treatment Note 2021  Visit Count:  8  ICD-10: Treatment Diagnosis: Pain in right ankle and joints of right foot (M25.571)    Stiffness of right ankle, not elsewhere classified (M25.671)  Difficulty in walking, not elsewhere classified (R26.2)  Unsteadiness on feet (R26.81)  Muscle weakness (generalized) (M62.81)  Precautions/Allergies:   Erythromycin   TREATMENT PLAN:  Effective Dates/Frequency/Duration: Twice per week from 2021 until 10/31/2021 (10 weeks). Pre-treatment Symptoms/Complaints:  Pt states she has noticed her R ankle has been feeling more stable in general; states she did have a little more pain in her R ankle after her last session, as well as some pain in her knee (thinks this is due to more weightbearing activities); pt states she is planning to go to [employer] next month and get AFO adjusted as well as new shoes (waiting on doing this due to financial reasons)  Pain: Initial:   3/10 Post Session:  1/10   Medications Last Reviewed:  2021  Updated Objective Findings: See evaluation note from today   TREATMENT:   Therapeutic Exercise: ( 40 minutes):  Exercises per grid below (and assessment in chart on 2021) to improve mobility, strength, balance and dynamic movement of right ankle/foot to improve functional mobility. Required minimal visual, verbal and manual cues to promote proper body alignment, promote proper body posture, promote proper body mechanics and promote proper body breathing techniques. Progressed resistance, range, repetitions and complexity of movement as indicated.     Date:  2021 Date:  2021 Date:  2021 Date:  2021   Activity/Exercise       Physiostep L1 resistance for 10 minutes with B LEs only; yellow band to maintain R foot on pedal L1 10 minutes with B LE's only; blue band to maintain foot on pedal.  L1 resistance for 10 minutes with B LEs only; blue band to maintain R foot on pedal L2 resistance for 10 minutes with B LEs only; blue band to maintain R foot on pedal   Ankle plantarflexor stretch on incline board 30 second hold x 3 reps with knees extended then flexed; L UE support 3 x 30 sec hold with knees flexed and extended - L UE support 30 second hold x 3 reps with knees extended then flexed; L UE support    Anterior/posterior tapping on rocker board   In standing; 20 reps/1 set tapping both directions slowly with B feet (to allow for increased motion at R ankle); minimal UE support    Lateral tapping on rocker board  A/P & M/L   Tapping x 15   Static hold x 20 sec.   In standing; 20 reps/1 set tapping both directions slowly with B feet (to allow for increased motion at R ankle) and cues to maintain entire R foot on board; minimal UE support    Static standing on foam  Feet together x 30 second trial with minimal to no instability; no UE support throughout; cues for increased weightbearing at R LE and increased pressure through medial R foot  Feet in semi-tandem stance x 45-60 second trials each foot forward x 2 bouts with cues for increased symmetry of weightbearing throughout Feet together   3 x 30 sec   Feet apart   3 x 30 sec   Tandem stance  2 x 30 sec each foot forward Feet together x 60 second trial with minimal to no instability; no UE support    Standing LE marching on foam   20 reps/1 set each LE with cues for increased hip flexion AROM to increase challenge; cues to ensure full contact of R foot with foam before attempting to lift L LE up; light UE support occasionally, with occasional no UE support; S throughout    Step ups   Onto 3 inch foam; 20 reps/1 set leading with R LE with cues for ensuring full contact of R foot on foam prior to stepping up with L foot to ensure safety; light UE support at L throughout Ascending/descending 2 steps x 1 set with S only; pt using rail at L and step to gait, stepping up leading with either leg and stepping down leading with L LE only (pt stating her R ankle gives too much when she tries to lead with that LE)   Ambulation over level ground    Walking with wide based quad cane on L with no AFO x 3 bouts of 20 feet per TUG assessment in chart  Walking with wide based quad cane on L with no AFO x 3 bouts of  feet throughout clinic over level ground with S for safety   Varying resistance at R LE in multiple different planes of motion    Per strength assessment in chart   R ankle PROM    Per ROM assessment in chart                        *given in HEP  [employer] Portal   Pt given following band colors: [] Yellow          [] Red          [] Green          [] Blue          [] Grey     Manual therapy (0 minutes): With pt in supported supine position (B LEs elevated on wedge):   - gentle mobilizations at R foot into dorsiflexion and eversion/inversion to improve R foot mobility and reduce pain    Modalities (15 minutes): With pt in supported supine position with B LEs elevated on wedge, vasopneumatic compression with cold therapy via [employer] system applied to R foot/ankle with high pressure and at 38 degrees to reduce pain, swelling, and inflammation. Treatment/Session Summary:    · Response to Treatment:  Progress note today - see assessment in chart. No adverse reactions/unusual changes observed/reported in clinical status this session. · Communication/Consultation:  None today  · Equipment provided today:  None today  · Recommendations/Intent for next treatment session: Next visit will focus on manual therapy/modalities to reduce pain/swelling and improve mobility at R foot and ankle; work on hip strengthening; gait mechanics.     Total Treatment Billable Duration:  55 minutes  PT Patient Time In/Time Out  Time In: 8808  Time Out: William Judd 73, DPT    Future Appointments   Date Time Provider Jessie Maylin   9/21/2021  2:30 PM Stef Mcclain, KELLEY [facility]   9/23/2021  2:30 PM Stef Mcclain, RONDAT [facility] SFD   9/28/2021  2:30 PM Stef Mcclain, DPT SFDORPT D   9/30/2021  2:30 PM Stef Mcclain, DPT SFDORPT [facility]   10/12/2021 11:00 AM ARTEM Lee SSA PMR PMR   10/14/2021  8:15 AM PRE LAB RESOURCE SSA PRE PRE   10/20/2021  2:00 PM Lb Ortega MD [facility] PRE PRE   7/29/2022  2:20 PM Jose Tapia NP [facility] PSCD PP        Visit Approval Visit # Therapist initials Date A NS / Cx < 24 hr >24 hr Cx Comments   99 visits total 1  8/17/2021 [x]  [] [] Initial evaluation     PDE 8-19/2021 [] [x] [] Stomach bug    2  8/24/2021 [x] [] []     3  8/27/2021 [x] [] []     4  8/31/2021 [x] [] []     5  9/2/2021 [x] [] []      ANALI 9/8/2021 [] [x] [] Car won't start    6 PDE 9-9-2021 [x] [] []     7  9/14/2021 [x] [] []     8  9/16/2021 [x] [] [] Progress note       [] [] []        [] [] []        [] [] []        [] [] []        [] [] []        [] [] []        [] [] []        [] [] []

## 2021-09-21 ENCOUNTER — HOSPITAL ENCOUNTER (OUTPATIENT)
Dept: PHYSICAL THERAPY | Age: 46
Discharge: HOME | End: 2021-09-21
Payer: MEDICARE

## 2021-09-21 PROCEDURE — 97016 VASOPNEUMATIC DEVICE THERAPY: CPT

## 2021-09-21 PROCEDURE — 97110 THERAPEUTIC EXERCISES: CPT

## 2021-09-21 NOTE — PROGRESS NOTES
Ace Richkevin  : 1975  [place]  Secondary:  [address]  at [facility]  [address] [facility]", [address]  [phone]   [phone]        OUTPATIENT PHYSICAL THERAPY: Daily Treatment Note 2021  Visit Count:  9  ICD-10: Treatment Diagnosis: Pain in right ankle and joints of right foot (M25.571)    Stiffness of right ankle, not elsewhere classified (M25.671)  Difficulty in walking, not elsewhere classified (R26.2)  Unsteadiness on feet (R26.81)  Muscle weakness (generalized) (M62.81)  Precautions/Allergies:   Erythromycin   TREATMENT PLAN:  Effective Dates/Frequency/Duration: Twice per week from 2021 until 10/31/2021 (10 weeks). Pre-treatment Symptoms/Complaints:  Pt states she is having minimal to no pain today; she is seeing MD again in late October  Pain: Initial:   1/10 Post Session:  0/10   Medications Last Reviewed:  2021  Updated Objective Findings: None Today   TREATMENT:   Therapeutic Exercise: ( 40 minutes):  Exercises per grid below to improve mobility, strength, balance and dynamic movement of right ankle/foot to improve functional mobility. Required minimal visual, verbal and manual cues to promote proper body alignment, promote proper body posture, promote proper body mechanics and promote proper body breathing techniques. Progressed resistance, range, repetitions and complexity of movement as indicated.     Date:  2021 Date:  2021 Date:  2021 Date:  2021 Date:  2021   Activity/Exercise        Physiostep L1 resistance for 10 minutes with B LEs only; yellow band to maintain R foot on pedal L1 10 minutes with B LE's only; blue band to maintain foot on pedal.  L1 resistance for 10 minutes with B LEs only; blue band to maintain R foot on pedal L2 resistance for 10 minutes with B LEs only; blue band to maintain R foot on pedal L2 resistance for 10 minutes with B LEs only; blue band to maintain R foot on pedal   Ankle plantarflexor stretch on incline board 30 second hold x 3 reps with knees extended then flexed; L UE support 3 x 30 sec hold with knees flexed and extended - L UE support 30 second hold x 3 reps with knees extended then flexed; L UE support  30 second hold x 3 reps with knees extended then flexed; L UE support   Anterior/posterior tapping on rocker board   In standing; 20 reps/1 set tapping both directions slowly with B feet (to allow for increased motion at R ankle); minimal UE support  In standing; 20 reps/1 set tapping both directions slowly with B feet (to allow for increased motion at R ankle); minimal to no UE support   Lateral tapping on rocker board  A/P & M/L   Tapping x 15   Static hold x 20 sec.   In standing; 20 reps/1 set tapping both directions slowly with B feet (to allow for increased motion at R ankle) and cues to maintain entire R foot on board; minimal UE support  In standing; 20 reps/1 set tapping both directions slowly with B feet (to allow for increased motion at R ankle) and cues to maintain entire R foot on board; minimal UE support   Static standing on foam  Feet together x 30 second trial with minimal to no instability; no UE support throughout; cues for increased weightbearing at R LE and increased pressure through medial R foot  Feet in semi-tandem stance x 45-60 second trials each foot forward x 2 bouts with cues for increased symmetry of weightbearing throughout Feet together   3 x 30 sec   Feet apart   3 x 30 sec   Tandem stance  2 x 30 sec each foot forward Feet together x 60 second trial with minimal to no instability; no UE support     Standing LE marching on foam   20 reps/1 set each LE with cues for increased hip flexion AROM to increase challenge; cues to ensure full contact of R foot with foam before attempting to lift L LE up; light UE support occasionally, with occasional no UE support; S throughout     Step ups   Onto 3 inch foam; 20 reps/1 set leading with R LE with cues for ensuring full contact of R foot on foam prior to stepping up with L foot to ensure safety; light UE support at L throughout Ascending/descending 2 steps x 1 set with S only; pt using rail at L and step to gait, stepping up leading with either leg and stepping down leading with L LE only (pt stating her R ankle gives too much when she tries to lead with that LE) Onto 3 inch foam; 20 reps/1 set leading with R LE with cues for ensuring full contact of R foot on foam prior to stepping up with L foot to ensure safety; minimal to no UE support throughout; cues for increased control when stepping on and off foam with L LE   Ambulation over level ground    Walking with wide based quad cane on L with no AFO x 3 bouts of 20 feet per TUG assessment in chart  Walking with wide based quad cane on L with no AFO x 3 bouts of  feet throughout clinic over level ground with S for safety Throughout session with wide based quad cane on L with no AFO   Varying resistance at R LE in multiple different planes of motion    Per strength assessment in chart    R ankle PROM    Per ROM assessment in chart    Tapping cones placed anteriorly     On firm ground; tapping single cone alternating feet x 10 reps/1 set progressing to series of 1-3 cones (therapist calling out foot and colors) with each foot x 20 reps/1 set; minimal to no UE support throughout with cues for weightshifting                   *given in HEP  [employer] Portal   Pt given following band colors: [] Yellow          [] Red          [] Green          [] Blue          [] Grey     Modalities (15 minutes): With pt in supported supine position with B LEs elevated on wedge, vasopneumatic compression with cold therapy via [employer] system applied to R foot/ankle with high pressure and at 38 degrees to reduce pain, swelling, and inflammation.      Treatment/Session Summary:    · Response to Treatment:  Pt able to progress to more challenging R dynamic SLS exercises this session with only minimal increase in instability and occasional cues for increased weightbearing through entire R foot for increased stability. No adverse reactions/unusual changes observed/reported in clinical status this session. · Communication/Consultation:  None today  · Equipment provided today:  None today  · Recommendations/Intent for next treatment session: Next visit will focus on manual therapy/modalities to reduce pain/swelling and improve mobility at R foot and ankle; work on hip strengthening; gait mechanics.     Total Treatment Billable Duration:  55 minutes  PT Patient Time In/Time Out  Time In: 1058  Time Out: [address] DPT    Future Appointments   Date Time Provider Jessie Carlisle   9/23/2021  2:30 PM Mamie Last DPT [facility]   9/28/2021  2:30 PM Mamie Last DPT [facility] SFD   9/30/2021  2:30 PM Talita KIRKPATRICK DPT SFDORPYAMILET [facility]   10/12/2021 11:00 AM ARTEM Hammond SSA PMR PMR   10/14/2021  8:15 AM PRE LAB RESOURCE SSA PRE PRE   10/20/2021  2:00 PM Marylene Barrette, MD SSA PRE PRE   7/29/2022  2:20 PM Won Gomez NP [facility] PSCD PP        Visit Approval Visit # Therapist initials Date A NS / Cx < 24 hr >24 hr Cx Comments   99 visits total 1  8/17/2021 [x]  [] [] Initial evaluation     PDE 8-19/2021 [] [x] [] Stomach bug    2  8/24/2021 [x] [] []     3  8/27/2021 [x] [] []     4  8/31/2021 [x] [] []     5  9/2/2021 [x] [] []      ANALI 9/8/2021 [] [x] [] Car won't start    6 PDE 9-9-2021 [x] [] []     7  9/14/2021 [x] [] []     8  9/16/2021 [x] [] [] Progress note    9  9/21/2021 [x] [] []        [] [] []        [] [] []        [] [] []        [] [] []        [] [] []        [] [] []        [] [] []

## 2021-09-23 ENCOUNTER — HOSPITAL ENCOUNTER (OUTPATIENT)
Dept: PHYSICAL THERAPY | Age: 46
Discharge: HOME | End: 2021-09-23
Payer: MEDICARE

## 2021-09-23 PROCEDURE — 97110 THERAPEUTIC EXERCISES: CPT

## 2021-09-23 NOTE — PROGRESS NOTES
Steven Sidhu  : 1975  [place]  Secondary:  [address]  at [facility]  [address] [facility], [address]  [phone]   [phone]        OUTPATIENT PHYSICAL THERAPY: Daily Treatment Note 2021  Visit Count:  10  ICD-10: Treatment Diagnosis: Pain in right ankle and joints of right foot (M25.571)    Stiffness of right ankle, not elsewhere classified (M25.671)  Difficulty in walking, not elsewhere classified (R26.2)  Unsteadiness on feet (R26.81)  Muscle weakness (generalized) (M62.81)  Precautions/Allergies:   Erythromycin   TREATMENT PLAN:  Effective Dates/Frequency/Duration: Twice per week from 2021 until 10/31/2021 (10 weeks). Pre-treatment Symptoms/Complaints:  Pt states she has slightly more pain today  Pain: Initial:   2/10 Post Session:  2/10   Medications Last Reviewed:  2021  Updated Objective Findings: None Today   TREATMENT:   Therapeutic Exercise: ( 39 minutes):  Exercises per grid below to improve mobility, strength, balance and dynamic movement of right ankle/foot to improve functional mobility. Required minimal visual, verbal and manual cues to promote proper body alignment, promote proper body posture, promote proper body mechanics and promote proper body breathing techniques. Progressed resistance, range, repetitions and complexity of movement as indicated.     Date:  2021 Date:  2021 Date:  2021 Date:  2021 Date:  2021   Activity/Exercise        Physiostep L1 10 minutes with B LE's only; blue band to maintain foot on pedal.  L1 resistance for 10 minutes with B LEs only; blue band to maintain R foot on pedal L2 resistance for 10 minutes with B LEs only; blue band to maintain R foot on pedal L2 resistance for 10 minutes with B LEs only; blue band to maintain R foot on pedal L2 resistance for 10 minutes with B LEs only; blue band to maintain R foot on pedal   Ankle plantarflexor stretch on incline board 3 x 30 sec hold with knees flexed and extended - L UE support 30 second hold x 3 reps with knees extended then flexed; L UE support  30 second hold x 3 reps with knees extended then flexed; L UE support 30 second hold x 3 reps with knees extended then flexed; L UE support   Anterior/posterior tapping on rocker board  In standing; 20 reps/1 set tapping both directions slowly with B feet (to allow for increased motion at R ankle); minimal UE support  In standing; 20 reps/1 set tapping both directions slowly with B feet (to allow for increased motion at R ankle); minimal to no UE support    Lateral tapping on rocker board A/P & M/L   Tapping x 15   Static hold x 20 sec.   In standing; 20 reps/1 set tapping both directions slowly with B feet (to allow for increased motion at R ankle) and cues to maintain entire R foot on board; minimal UE support  In standing; 20 reps/1 set tapping both directions slowly with B feet (to allow for increased motion at R ankle) and cues to maintain entire R foot on board; minimal UE support    Static standing on foam  Feet together   3 x 30 sec   Feet apart   3 x 30 sec   Tandem stance  2 x 30 sec each foot forward Feet together x 60 second trial with minimal to no instability; no UE support   Feet together x 60 second trial with minimal to no instability; no UE support  Feet in semi-tandem with each LE forward x 30-45 second trials with minimal to moderate instability; no UE support; S throughout   Standing LE marching on foam  20 reps/1 set each LE with cues for increased hip flexion AROM to increase challenge; cues to ensure full contact of R foot with foam before attempting to lift L LE up; light UE support occasionally, with occasional no UE support; S throughout      Step ups  Onto 3 inch foam; 20 reps/1 set leading with R LE with cues for ensuring full contact of R foot on foam prior to stepping up with L foot to ensure safety; light UE support at L throughout Ascending/descending 2 steps x 1 set with S only; pt using rail at L and step to gait, stepping up leading with either leg and stepping down leading with L LE only (pt stating her R ankle gives too much when she tries to lead with that LE) Onto 3 inch foam; 20 reps/1 set leading with R LE with cues for ensuring full contact of R foot on foam prior to stepping up with L foot to ensure safety; minimal to no UE support throughout; cues for increased control when stepping on and off foam with L LE    Ambulation over level ground   Walking with wide based quad cane on L with no AFO x 3 bouts of 20 feet per TUG assessment in chart  Walking with wide based quad cane on L with no AFO x 3 bouts of  feet throughout clinic over level ground with S for safety Throughout session with wide based quad cane on L with no AFO Throughout session with wide based quad cane on L with no AFO   Varying resistance at R LE in multiple different planes of motion   Per strength assessment in chart     R ankle PROM   Per ROM assessment in chart     Tapping cones placed anteriorly    On firm ground; tapping single cone alternating feet x 10 reps/1 set progressing to series of 1-3 cones (therapist calling out foot and colors) with each foot x 20 reps/1 set; minimal to no UE support throughout with cues for weightshifting    L foot on step (for R sided weightbearing)     On 4 inch step; 30 second hold with entire L foot on step with minimal to no instability with minimal to no UE support; progressing to placing only L forefoot on step with minimal to moderate instability with no UE support  Progressing to performing small lifts at L forefoot x 10 reps/2 sets with minimal to moderate instability with no UE support   Forward lunges onto step with push-off for plantarflexor activation     Off of 4 inch step; 20 reps/1 set placing R foot on step with UE support, leaning forward then pushing off of R foot to step back with no UE support with S for safety; cues for increased pressure through R foot before stepping back for increased activation   *given in HEP  [email or web address] Portal   Pt given following band colors: [] Yellow          [] Red          [] Green          [] Blue          [] [employer]     Modalities (): Game ready not available at end of session - pt stating she will ice at home    Treatment/Session Summary:    · Response to Treatment:  Pt continuing to demonstrate slightly improved R ankle/foot stability in R SLS even with more challenging dynamic exercises this session, although she continues to have difficulty with exercises that require more R ankle/foot activation (I.e. lunges onto/off of step). No adverse reactions/unusual changes observed/reported in clinical status this session. · Communication/Consultation:  None today  · Equipment provided today:  None today  · Recommendations/Intent for next treatment session: Next visit will focus on manual therapy/modalities to reduce pain/swelling and improve mobility at R foot and ankle; work on hip strengthening; gait mechanics.     Total Treatment Billable Duration:  39 minutes  PT Patient Time In/Time Out  Time In: 6419  Time Out: 7354  Arabella Douglas DPT    Future Appointments   Date Time Provider Jessie Carlisle   9/28/2021  2:30 PM Ena Rees DPT [facility]   9/30/2021  2:30 PM Ena Rees DPT [facility]   10/12/2021 11:00 AM ARTEM Piper [facility] PMR PMR   10/14/2021  8:15 AM PRE LAB RESOURCE [facility] PRE PRE   10/20/2021  2:00 PM Cristina Hastings MD [facility] PRE PRE   7/29/2022  2:20 [facility]        Visit Approval Visit # Therapist initials Date A NS / Cx < 24 hr >24 hr Cx Comments   99 visits total 1  8/17/2021 [x]  [] [] Initial evaluation     PDE 8-19/2021 [] [x] [] Stomach bug    2  8/24/2021 [x] [] []     3  8/27/2021 [x] [] []     4  8/31/2021 [x] [] []     5  9/2/2021 [x] [] []      ANALI 9/8/2021 [] [x] [] Car won't start    6 PDE 9-9-2021 [x] [] []     7 [facility] 9/14/2021 [x] [] []     8 [facility] 9/16/2021 [x] [] [] Progress note    9 [facility] 9/21/2021 [x] [] []     10 [facility] 9/23/2021 [x] [] []        [] [] []        [] [] []        [] [] []        [] [] []        [] [] []        [] [] []

## 2021-09-28 ENCOUNTER — HOSPITAL ENCOUNTER (OUTPATIENT)
Dept: PHYSICAL THERAPY | Age: 46
Discharge: HOME | End: 2021-09-28
Payer: MEDICARE

## 2021-09-30 ENCOUNTER — HOSPITAL ENCOUNTER (OUTPATIENT)
Dept: PHYSICAL THERAPY | Age: 46
Discharge: HOME | End: 2021-09-30
Payer: MEDICARE

## 2021-09-30 PROCEDURE — 97110 THERAPEUTIC EXERCISES: CPT

## 2021-09-30 NOTE — PROGRESS NOTES
Siddhartha Herrera  : 1975  [place]  Secondary:  [address]  at [facility]  [address], [address], [address]  [phone]   [phone]        OUTPATIENT PHYSICAL THERAPY: Daily Treatment Note 2021  Visit Count:  11  ICD-10: Treatment Diagnosis: Pain in right ankle and joints of right foot (M25.571)    Stiffness of right ankle, not elsewhere classified (M25.671)  Difficulty in walking, not elsewhere classified (R26.2)  Unsteadiness on feet (R26.81)  Muscle weakness (generalized) (M62.81)  Precautions/Allergies:   Erythromycin   TREATMENT PLAN:  Effective Dates/Frequency/Duration: Twice per week from 2021 until 10/31/2021 (10 weeks). Pre-treatment Symptoms/Complaints:  Pt states she was up and doing more things before coming today, so her foot/ankle hurts a little more  Pain: Initial:   3/10 Post Session:  3/10   Medications Last Reviewed:  2021  Updated Objective Findings: None Today   TREATMENT:   Therapeutic Exercise: ( 39 minutes):  Exercises per grid below to improve mobility, strength, balance and dynamic movement of right ankle/foot to improve functional mobility. Required minimal visual, verbal and manual cues to promote proper body alignment, promote proper body posture, promote proper body mechanics and promote proper body breathing techniques. Progressed resistance, range, repetitions and complexity of movement as indicated.     Date:  2021 Date:  2021 Date:  2021 Date:  2021 Date:  2021   Activity/Exercise        Physiostep L1 resistance for 10 minutes with B LEs only; blue band to maintain R foot on pedal L2 resistance for 10 minutes with B LEs only; blue band to maintain R foot on pedal L2 resistance for 10 minutes with B LEs only; blue band to maintain R foot on pedal L2 resistance for 10 minutes with B LEs only; blue band to maintain R foot on pedal L2 resistance for 10 minutes with B LEs only; blue band to maintain R foot on pedal   Ankle plantarflexor stretch on incline board 30 second hold x 3 reps with knees extended then flexed; L UE support  30 second hold x 3 reps with knees extended then flexed; L UE support 30 second hold x 3 reps with knees extended then flexed; L UE support 30 second hold x 3 reps with knees extended then flexed; L UE support   Anterior/posterior tapping on rocker board In standing; 20 reps/1 set tapping both directions slowly with B feet (to allow for increased motion at R ankle); minimal UE support  In standing; 20 reps/1 set tapping both directions slowly with B feet (to allow for increased motion at R ankle); minimal to no UE support  In standing; feet in semi-tandem each LE forward on rocker board; 20 reps/1 set with cues to tap each direction slowly; minimal to no UE support; cues to obtain/maintain R knee extension when coming forward with R LE anteriorly   Lateral tapping on rocker board In standing; 20 reps/1 set tapping both directions slowly with B feet (to allow for increased motion at R ankle) and cues to maintain entire R foot on board; minimal UE support  In standing; 20 reps/1 set tapping both directions slowly with B feet (to allow for increased motion at R ankle) and cues to maintain entire R foot on board; minimal UE support  In standing; 20 reps/1 set tapping both directions slowly with B feet (to allow for increased motion at R ankle) and cues to maintain entire R foot on board; minimal to no UE support   Static standing on foam  Feet together x 60 second trial with minimal to no instability; no UE support   Feet together x 60 second trial with minimal to no instability; no UE support  Feet in semi-tandem with each LE forward x 30-45 second trials with minimal to moderate instability; no UE support; S throughout    Standing LE marching on foam 20 reps/1 set each LE with cues for increased hip flexion AROM to increase challenge; cues to ensure full contact of R foot with foam before attempting to lift L LE up; light UE support occasionally, with occasional no UE support; S throughout       Step ups Onto 3 inch foam; 20 reps/1 set leading with R LE with cues for ensuring full contact of R foot on foam prior to stepping up with L foot to ensure safety; light UE support at L throughout Ascending/descending 2 steps x 1 set with S only; pt using rail at L and step to gait, stepping up leading with either leg and stepping down leading with L LE only (pt stating her R ankle gives too much when she tries to lead with that LE) Onto 3 inch foam; 20 reps/1 set leading with R LE with cues for ensuring full contact of R foot on foam prior to stepping up with L foot to ensure safety; minimal to no UE support throughout; cues for increased control when stepping on and off foam with L LE     Ambulation over level ground  Walking with wide based quad cane on L with no AFO x 3 bouts of 20 feet per TUG assessment in chart  Walking with wide based quad cane on L with no AFO x 3 bouts of  feet throughout clinic over level ground with S for safety Throughout session with wide based quad cane on L with no AFO Throughout session with wide based quad cane on L with no AFO    Varying resistance at R LE in multiple different planes of motion  Per strength assessment in chart      R ankle PROM  Per ROM assessment in chart      Tapping cones placed anteriorly   On firm ground; tapping single cone alternating feet x 10 reps/1 set progressing to series of 1-3 cones (therapist calling out foot and colors) with each foot x 20 reps/1 set; minimal to no UE support throughout with cues for weightshifting     L foot on step (for R sided weightbearing)    On 4 inch step; 30 second hold with entire L foot on step with minimal to no instability with minimal to no UE support; progressing to placing only L forefoot on step with minimal to moderate instability with no UE support  Progressing to performing small lifts at L forefoot x 10 reps/2 sets with minimal to moderate instability with no UE support On 4 inch step; maintaining L forefoot on step for 10-20 second trials with minimal to no UE support and minimal instability throughout  Progressing to performing small lifts at L forefoot x 15 reps/2 sets with minimal instability with no UE support (occasional UE corrections); S throughout for safety   Forward lunges onto step with push-off for plantarflexor activation    Off of 4 inch step; 20 reps/1 set placing R foot on step with UE support, leaning forward then pushing off of R foot to step back with no UE support with S for safety; cues for increased pressure through R foot before stepping back for increased activation Off of 4 inch step; 20 reps/1 set placing R foot on step with UE support, leaning forward then pushing off of R foot to step back with no UE support with S for safety; cues for increased pressure through R foot before stepping back for increased activation   *given in HEP  [employer]   Pt given following band colors: [] Yellow          [] Red          [] Green          [] Blue          [] Grey     Modalities (): Game ready not available at end of session - pt stating she will ice at home    Treatment/Session Summary:    · Response to Treatment:  Pt demonstrating slightly improved ability to perform more challenging standing exercises this session with improved stance stability at R LE. She does continue to have difficulty with trying to push her R LE off of a step secondary to spasticity in her R foot making it difficult to complete the motion. No adverse reactions/unusual changes observed/reported in clinical status this session.   · Communication/Consultation:  None today  · Equipment provided today:  None today  · Recommendations/Intent for next treatment session: Next visit will focus on manual therapy/modalities to reduce pain/swelling and improve mobility at R foot and ankle; work on hip strengthening; gait mechanics.     Total Treatment Billable Duration:  39 minutes  PT Patient Time In/Time Out  Time In: 3361  Time Out: [address], DPT    Future Appointments   Date Time Provider Jessie Carlisle   10/4/2021  1:00 PM Simba Ribeiro PTA [facility]   10/7/2021  2:30 PM KELLEY Winters [facility]   10/11/2021  1:45 PM Breann KIRKPATRICK, RONDAT ERMAT [facility]   10/12/2021 11:00 AM ARTEM Proctor SSA PMR PMR   10/13/2021  1:45 PM Breann KIRKPATRICK DPT [facility]   10/14/2021  8:15 AM PRE LAB RESOURCE [facility] PRE PRE   10/20/2021  2:00 PM Francisco Zacarias MD [facility] PRE PRE   7/29/2022  2:20 PM Bora Smith NP [facility] PSCD PP        Visit Approval Visit # Therapist initials Date A NS / Cx < 24 hr >24 hr Cx Comments   99 visits total 1 [facility] 8/17/2021 [x]  [] [] Initial evaluation     PDE 8-19/2021 [] [x] [] Stomach bug    2 [facility] 8/24/2021 [x] [] []     3 [facility] 8/27/2021 [x] [] []     4 [facility] 8/31/2021 [x] [] []     5 [facility] 9/2/2021 [x] [] []      ANLAI 9/8/2021 [] [x] [] Car won't start    6 PDE 9-9-2021 [x] [] []     7 [facility] 9/14/2021 [x] [] []     8 [facility] 9/16/2021 [x] [] [] Progress note    9 [facility] 9/21/2021 [x] [] []     10 [facility] 9/23/2021 [x] [] []      [facility] 9/28/2021 [] [x] [] Lack of transportation    11 [facility] 9/30/2021 [x] [] []        [] [] []        [] [] []        [] [] []        [] [] []

## 2021-10-04 ENCOUNTER — HOSPITAL ENCOUNTER (OUTPATIENT)
Dept: PHYSICAL THERAPY | Age: 46
Discharge: HOME | End: 2021-10-04
Payer: MEDICARE

## 2021-10-04 PROCEDURE — 97110 THERAPEUTIC EXERCISES: CPT

## 2021-10-04 NOTE — PROGRESS NOTES
Siddhartha Herrera  : 1975  [place]  Secondary:  [address]  at [facility]  [address], [address], [address]  [phone]   [phone]        OUTPATIENT PHYSICAL THERAPY: Daily Treatment Note 10/4/2021  Visit Count:  12  ICD-10: Treatment Diagnosis: Pain in right ankle and joints of right foot (M25.571)    Stiffness of right ankle, not elsewhere classified (M25.671)  Difficulty in walking, not elsewhere classified (R26.2)  Unsteadiness on feet (R26.81)  Muscle weakness (generalized) (M62.81)  Precautions/Allergies:   Erythromycin   TREATMENT PLAN:  Effective Dates/Frequency/Duration: Twice per week from 2021 until 10/31/2021 (10 weeks). Pre-treatment Symptoms/Complaints:  Pt states that the weather is making it chilly. Pain: Initial:   3/10 Post Session:  2/10   Medications Last Reviewed:  10/4/2021  Updated Objective Findings: None Today   TREATMENT:   Therapeutic Exercise: ( 45 minutes):  Exercises per grid below to improve mobility, strength, balance and dynamic movement of right ankle/foot to improve functional mobility. Required minimal visual, verbal and manual cues to promote proper body alignment, promote proper body posture, promote proper body mechanics and promote proper body breathing techniques. Progressed resistance, range, repetitions and complexity of movement as indicated.     Date:  2021 Date:  2021 Date:  2021 Date:  2021 Date:  10-4-2021   Activity/Exercise        Physiostep L2 resistance for 10 minutes with B LEs only; blue band to maintain R foot on pedal L2 resistance for 10 minutes with B LEs only; blue band to maintain R foot on pedal L2 resistance for 10 minutes with B LEs only; blue band to maintain R foot on pedal L2 resistance for 10 minutes with B LEs only; blue band to maintain R foot on pedal Level 2   10 minutes with B LE's;   Blue band to maintain R foot on pedal    Ankle plantarflexor stretch on incline board  30 second hold x 3 reps with knees extended then flexed; L UE support 30 second hold x 3 reps with knees extended then flexed; L UE support 30 second hold x 3 reps with knees extended then flexed; L UE support 3 x 30 second with knees flexed or extended -  L UE support    Anterior/posterior tapping on rocker board  In standing; 20 reps/1 set tapping both directions slowly with B feet (to allow for increased motion at R ankle); minimal to no UE support  In standing; feet in semi-tandem each LE forward on rocker board; 20 reps/1 set with cues to tap each direction slowly; minimal to no UE support; cues to obtain/maintain R knee extension when coming forward with R LE anteriorly X 20 - minimal to no UE support      Lateral tapping on rocker board  In standing; 20 reps/1 set tapping both directions slowly with B feet (to allow for increased motion at R ankle) and cues to maintain entire R foot on board; minimal UE support  In standing; 20 reps/1 set tapping both directions slowly with B feet (to allow for increased motion at R ankle) and cues to maintain entire R foot on board; minimal to no UE support X 20 - minimal to no UE support    Static standing on foam    Feet together x 60 second trial with minimal to no instability; no UE support  Feet in semi-tandem with each LE forward x 30-45 second trials with minimal to moderate instability; no UE support; S throughout     Standing LE marching on foam        Step ups Ascending/descending 2 steps x 1 set with S only; pt using rail at L and step to gait, stepping up leading with either leg and stepping down leading with L LE only (pt stating her R ankle gives too much when she tries to lead with that LE) Onto 3 inch foam; 20 reps/1 set leading with R LE with cues for ensuring full contact of R foot on foam prior to stepping up with L foot to ensure safety; minimal to no UE support throughout; cues for increased control when stepping on and off foam with L LE      Ambulation over level ground Walking with wide based quad cane on L with no AFO x 3 bouts of 20 feet per TUG assessment in chart  Walking with wide based quad cane on L with no AFO x 3 bouts of  feet throughout clinic over level ground with S for safety Throughout session with wide based quad cane on L with no AFO Throughout session with wide based quad cane on L with no AFO     Varying resistance at R LE in multiple different planes of motion Per strength assessment in chart       R ankle PROM Per ROM assessment in chart       Tapping cones placed anteriorly  On firm ground; tapping single cone alternating feet x 10 reps/1 set progressing to series of 1-3 cones (therapist calling out foot and colors) with each foot x 20 reps/1 set; minimal to no UE support throughout with cues for weightshifting      L foot on step (for R sided weightbearing)   On 4 inch step; 30 second hold with entire L foot on step with minimal to no instability with minimal to no UE support; progressing to placing only L forefoot on step with minimal to moderate instability with no UE support  Progressing to performing small lifts at L forefoot x 10 reps/2 sets with minimal to moderate instability with no UE support On 4 inch step; maintaining L forefoot on step for 10-20 second trials with minimal to no UE support and minimal instability throughout  Progressing to performing small lifts at L forefoot x 15 reps/2 sets with minimal instability with no UE support (occasional UE corrections); S throughout for safety 4 inch step   L forefoot on step and trying to lift foot up with weight bearing on R LR.      Forward lunges onto step with push-off for plantarflexor activation   Off of 4 inch step; 20 reps/1 set placing R foot on step with UE support, leaning forward then pushing off of R foot to step back with no UE support with S for safety; cues for increased pressure through R foot before stepping back for increased activation Off of 4 inch step; 20 reps/1 set placing R foot on step with UE support, leaning forward then pushing off of R foot to step back with no UE support with S for safety; cues for increased pressure through R foot before stepping back for increased activation 4 inch step   R foot on step then slight lunge to push off x 20       Green oval foam   X 10    *given in HEP  [employer] Portal   Pt given following band colors: [] Yellow          [] Red          [] Green          [] Blue          [] Grey     Modalities (): Game ready not available at end of session - pt stating she will ice at home    Treatment/Session Summary:    · Response to Treatment: Patient reports the push off on green oval she did not feel a \"catch\" in her knee as she pushes off. She does continue to have difficulty with trying to push her R LE off of a step secondary to spasticity in her R foot making it difficult to complete the motion. No adverse reactions/unusual changes observed/reported in clinical status this session. · Communication/Consultation:  None today  · Equipment provided today:  None today  · Recommendations/Intent for next treatment session: Next visit will focus on manual therapy/modalities to reduce pain/swelling and improve mobility at R foot and ankle; work on hip strengthening; gait mechanics.     Total Treatment Billable Duration:  45 minutes  PT Patient Time In/Time Out  Time In: 1414  Time Out: [place], [place]    Future Appointments   Date Time Provider Jessie Carlisle   10/7/2021  2:30 PM Shahram Castellanos DPT [facility]   10/11/2021  1:45 PM Haile KIRKPATRICK DPT [facility] D   10/12/2021 11:00 AM ARTEM Jason SSA PMR PMR   10/13/2021  1:45 PM Shahram Castellanos DPT [facility]FD   10/14/2021  8:15 AM PRE LAB RESOURCE [facility] PRE PRE   10/20/2021  2:00 PM MD CHERISE Yu PRE PRE   7/29/2022  2:20 PM JOSE CARLOS Ramirez PSCD PP        Visit Approval Visit # Therapist initials Date A NS / Cx < 24 hr >24 hr Cx Comments   99 visits total 1 [facility] 8/17/2021 [x]  [] [] Initial evaluation     PDE 8-19/2021 [] [x] [] Stomach bug    2 [facility] 8/24/2021 [x] [] []     3 [facility] 8/27/2021 [x] [] []     4 [facility] 8/31/2021 [x] [] []     5 [facility] 9/2/2021 [x] [] []      ANALI 9/8/2021 [] [x] [] Car won't start    6 PDE 9-9-2021 [x] [] []     7 [facility] 9/14/2021 [x] [] []     8 [facility] 9/16/2021 [x] [] [] Progress note    9 [facility] 9/21/2021 [x] [] []     10 [facility] 9/23/2021 [x] [] []      [facility] 9/28/2021 [] [x] [] Lack of transportation    11 [facility] 9/30/2021 [x] [] []     12 PDE 10-1-2021 [x] [] []        [] [] []        [] [] []        [] [] []

## 2021-10-07 ENCOUNTER — HOSPITAL ENCOUNTER (OUTPATIENT)
Dept: PHYSICAL THERAPY | Age: 46
Discharge: HOME | End: 2021-10-07
Payer: MEDICARE

## 2021-10-07 PROCEDURE — 97110 THERAPEUTIC EXERCISES: CPT

## 2021-10-07 NOTE — PROGRESS NOTES
Zoraida WARREN Lucretia  : 1975  Primary:   Secondary:  [address] [place]  at [facility]  [address]", [address]  [phone]   [phone]        OUTPATIENT PHYSICAL THERAPY: Daily Treatment Note 10/7/2021  Visit Count:  13  ICD-10: Treatment Diagnosis: Pain in right ankle and joints of right foot (M25.571)    Stiffness of right ankle, not elsewhere classified (M25.671)  Difficulty in walking, not elsewhere classified (R26.2)  Unsteadiness on feet (R26.81)  Muscle weakness (generalized) (M62.81)  Precautions/Allergies:   Erythromycin   TREATMENT PLAN:  Effective Dates/Frequency/Duration: Twice per week from 2021 until 10/31/2021 (10 weeks). Pre-treatment Symptoms/Complaints:  Pt states that her R foot has been more swollen the past couple of days since she has been on it more (also the weather is rainy)  Pain: Initial:   2/10 Post Session: no change in pain noted   Medications Last Reviewed:  10/7/2021  Updated Objective Findings: None Today   TREATMENT:   Therapeutic Exercise: ( 39 minutes):  Exercises per grid below to improve mobility, strength, balance and dynamic movement of right ankle/foot to improve functional mobility. Required minimal visual, verbal and manual cues to promote proper body alignment, promote proper body posture, promote proper body mechanics and promote proper body breathing techniques. Progressed resistance, range, repetitions and complexity of movement as indicated.     Date:  2021 Date:  2021 Date:  2021 Date:  10-4-2021 Date:  10/7/2021   Activity/Exercise        Physiostep L2 resistance for 10 minutes with B LEs only; blue band to maintain R foot on pedal L2 resistance for 10 minutes with B LEs only; blue band to maintain R foot on pedal L2 resistance for 10 minutes with B LEs only; blue band to maintain R foot on pedal Level 2   10 minutes with B LE's;   Blue band to maintain R foot on pedal  Level 1-2   10 minutes with B LE's;   Blue band to maintain R foot on pedal    Ankle plantarflexor stretch on incline board 30 second hold x 3 reps with knees extended then flexed; L UE support 30 second hold x 3 reps with knees extended then flexed; L UE support 30 second hold x 3 reps with knees extended then flexed; L UE support 3 x 30 second with knees flexed or extended -  L UE support  30 second hold x 3 reps with knees extended then flexed; L UE support   Anterior/posterior tapping on rocker board In standing; 20 reps/1 set tapping both directions slowly with B feet (to allow for increased motion at R ankle); minimal to no UE support  In standing; feet in semi-tandem each LE forward on rocker board; 20 reps/1 set with cues to tap each direction slowly; minimal to no UE support; cues to obtain/maintain R knee extension when coming forward with R LE anteriorly X 20 - minimal to no UE support       Lateral tapping on rocker board In standing; 20 reps/1 set tapping both directions slowly with B feet (to allow for increased motion at R ankle) and cues to maintain entire R foot on board; minimal UE support  In standing; 20 reps/1 set tapping both directions slowly with B feet (to allow for increased motion at R ankle) and cues to maintain entire R foot on board; minimal to no UE support X 20 - minimal to no UE support     Static standing on foam   Feet together x 60 second trial with minimal to no instability; no UE support  Feet in semi-tandem with each LE forward x 30-45 second trials with minimal to moderate instability; no UE support; S throughout      Standing LE marching on foam        Step ups Onto 3 inch foam; 20 reps/1 set leading with R LE with cues for ensuring full contact of R foot on foam prior to stepping up with L foot to ensure safety; minimal to no UE support throughout; cues for increased control when stepping on and off foam with L LE    Onto 3 inch foam; 20 reps/1 set leading with R LE with cues for ensuring full contact of R foot on foam prior to stepping up with L foot to ensure safety; no UE support and cues for increased control with stepping onto foam with L LE   Ambulation over level ground Throughout session with wide based quad cane on L with no AFO Throughout session with wide based quad cane on L with no AFO   Throughout session with wide based quad cane on L with no AFO  Also performed 1 bout of 250 with narrow based quad cane on L with no AFO with minimal increase in instability; occasional cues to have more contact of quad cane with ground around corners to increase safety  1 bout of 80 feet with single point cane on L with no AFO with minimal to moderate increase in instability   Varying resistance at R LE in multiple different planes of motion        R ankle PROM        Tapping cones placed anteriorly On firm ground; tapping single cone alternating feet x 10 reps/1 set progressing to series of 1-3 cones (therapist calling out foot and colors) with each foot x 20 reps/1 set; minimal to no UE support throughout with cues for weightshifting       L foot on step (for R sided weightbearing)  On 4 inch step; 30 second hold with entire L foot on step with minimal to no instability with minimal to no UE support; progressing to placing only L forefoot on step with minimal to moderate instability with no UE support  Progressing to performing small lifts at L forefoot x 10 reps/2 sets with minimal to moderate instability with no UE support On 4 inch step; maintaining L forefoot on step for 10-20 second trials with minimal to no UE support and minimal instability throughout  Progressing to performing small lifts at L forefoot x 15 reps/2 sets with minimal instability with no UE support (occasional UE corrections); S throughout for safety 4 inch step   L forefoot on step and trying to lift foot up with weight bearing on R LR.    Attempting to perform standing on foam with R LE and having L foot on 6 inch step, but pt everting R foot excessively due to instability so had to discontinue  Performed with entire L foot on 4 inch step with 3 inch foam placed on top to encourage increased weightbearing at R LE - minimal instability noted with 30-60 second trials - progressed to having only L forefoot on step - minimal to moderate instability with 10-20 second trials and occasional UE corrections   Forward lunges onto step with push-off for plantarflexor activation  Off of 4 inch step; 20 reps/1 set placing R foot on step with UE support, leaning forward then pushing off of R foot to step back with no UE support with S for safety; cues for increased pressure through R foot before stepping back for increased activation Off of 4 inch step; 20 reps/1 set placing R foot on step with UE support, leaning forward then pushing off of R foot to step back with no UE support with S for safety; cues for increased pressure through R foot before stepping back for increased activation 4 inch step   R foot on step then slight lunge to push off x 20       Green oval foam   X 10     *given in [facility]  Information Ass[employer] Portal   Pt given following band colors: [] Yellow          [] Red          [] Green          [] Blue          [] [employer]     Modalities (): Game ready not available at end of session - pt stating she will ice at home    Treatment/Session Summary:    · Response to Treatment: Patient demonstrating increased stability in R foot/ankle with standing exercises this session, but continues to have excessive eversion at R foot with standing on foam, so practiced mainly on firm ground to avoid further injury. She had good balance using narrow based quad cane with less gait deficits noted overall, so may try using this over unlevel ground within next few sessions to see if pt can be safe using it on a regular basis. No adverse reactions/unusual changes observed/reported in clinical status this session.   · Communication/Consultation:  None today  · Equipment provided today:  None today  · Recommendations/Intent for next treatment session: Next visit will focus on manual therapy/modalities to reduce pain/swelling and improve mobility at R foot and ankle; work on hip strengthening; gait mechanics.     Total Treatment Billable Duration:  39 minutes  PT Patient Time In/Time Out  Time In: 1427  Time Out: [address] [facility]    Future Appointments   Date Time Provider Jessie Carlisle   10/11/2021  1:45 PM Justen Mooney, KELLEY [facility]   10/12/2021 11:00 AM ARTEM Haney [facility] PMR PMR   10/13/2021  1:45 PM Jesus KIRKPATRICK, DPT [facility]   10/14/2021  8:15 AM PRE LAB RESOURCE [facility] PRE PRE   10/18/2021  2:30 PM Katdeannaine Patee, [facility]   10/20/2021  2:00 PM Zenaida Saldivar MD [facility] PRE PRE   10/21/2021  1:45 PM Justen Mooney, DPT [facility]   10/25/2021  1:45 PM Kathline Patee, [facility]   10/27/2021  2:30 PM [facility]   11/1/2021  2:30 PM Kathline Patee, [facility]   11/3/2021  2:30 PM Justen Mooney, DPT [facility]   7/29/2022  2:20 PM Jason Bedoya NP [facility] PSCD PP        Visit Approval Visit # Therapist initials Date A NS / Cx < 24 hr >24 hr Cx Comments   99 visits total 1  8/17/2021 [x]  [] [] Initial evaluation     PDE 8-19/2021 [] [x] [] Stomach bug    2  8/24/2021 [x] [] []     3  8/27/2021 [x] [] []     4  8/31/2021 [x] [] []     5  9/2/2021 [x] [] []      ANALI 9/8/2021 [] [x] [] Car won't start    6 PDE 9-9-2021 [x] [] []     7  9/14/2021 [x] [] []     8 DBA 9/16/2021 [x] [] [] Progress note    9  9/21/2021 [x] [] []     10 DBA 9/23/2021 [x] [] []      DBA 9/28/2021 [] [x] [] Lack of transportation    11 [facility] 9/30/2021 [x] [] []     12 [facility] 10-1-2021 [x] [] []     13  10/7/2021 [x] [] []        [] [] []        [] [] []

## 2021-10-11 ENCOUNTER — HOSPITAL ENCOUNTER (OUTPATIENT)
Dept: PHYSICAL THERAPY | Age: 46
Discharge: HOME | End: 2021-10-11
Payer: MEDICARE

## 2021-10-11 NOTE — PROGRESS NOTES
Ricardo Billy  : 1975  [place]  Secondary:  [address]  [facility] [address], [place], [address] [facility]  [phone]   [phone]        CANCELLATION NOTE    Ms. Wil Cao was a same-day cancellation for today's appointment due to car breaking down on the side of the road.     # Recent No Shows/Same-Day Cancellations: 2    10/11/2021  Grant Forrest DPT    Visit Approval Visit # Therapist initials Date A NS / Cx < 24 hr >24 hr Cx Comments   99 visits total 1  2021 [x]  [] [] Initial evaluation     PDE - [] [x] [] Stomach bug    2  2021 [x] [] []     3  2021 [x] [] []     4  2021 [x] [] []     5  2021 [x] [] []      ANALI 2021 [] [x] [] Car won't start    6 PDE 2021 [x] [] []     7  2021 [x] [] []     8  2021 [x] [] [] Progress note    9  2021 [x] [] []     10  2021 [x] [] []       2021 [] [x] [] Lack of transportation    11  2021 [x] [] []     12 PDE 10-1-2021 [x] [] []     13  10/7/2021 [x] [] []       10/11/2021 [] [x] [] Car broke down on way to therapy       [] [] []

## 2021-10-13 ENCOUNTER — HOSPITAL ENCOUNTER (OUTPATIENT)
Dept: PHYSICAL THERAPY | Age: 46
Discharge: HOME | End: 2021-10-13
Payer: MEDICARE

## 2021-10-13 PROCEDURE — 97110 THERAPEUTIC EXERCISES: CPT

## 2021-10-15 NOTE — PROGRESS NOTES
Zora[place]  : 1975  Payor: Sandy Lee / Plan: [address] / Product Type: PPO /  [address]  at [facility]  [address] [facility], [place] [address] [address] [facility]  [phone]   [phone]           OUTPATIENT PHYSICAL THERAPY:Daily Note 2017   ICD-10: Treatment Diagnosis: Difficulty in walking, not elsewhere classified (R26.2); Hemiplegia and hemiparesis following cerebral infarction affecting right dominant side (Q13.732)  Precautions/Allergies:   Erythromycin   Fall Risk Score: 0 (? 5 = High Risk)  MD Orders: PT eval MEDICAL/REFERRING DIAGNOSIS:  Stroke (cerebrum) ([facility] [address] [I63.9]   DATE OF ONSET: 8/3/17  REFERRING PHYSICIAN: Kera Dunne*  RETURN PHYSICIAN APPOINTMENT: unknown  DATE OF PROGRESS NOTE:    RECERTIFICATION DATE:      PROGRESS NOTE:  Pt has attended 10 physical therapy sessions from 9/15/17 to date including initial assessment. Sessions consist of range of motion, therapeutic activity, therapeutic exercise, balance and gait refinement. Pt has shown improvement in balance and gait ability. Pt has increased Perez Balance Score by 2 points indicating improved static standing balance and decreased risk for fall. Pt has decreased TUG time by over 14 seconds with her cane and almost 22 seconds without her cane indicating more normalized walking pattern. Her gait pattern has improved but she does not take normal step length on the left because she is trying to keep her right knee from hyperextending in stance on the right with her AFO on. I feel the AFO may be a little short so I placed a heel lift in her right shoe with further improvement in her TUG time by 3 more seconds for an overall increase of 17 second improvement with her cane. We will have [facility] look at her AFO to see if it needs adjustments. Pt will continue to benefit from physical therapy for at least 4 more weeks to maximize functional ability. INITIAL ASSESSMENT:  Ms. Cady Sargent presents with dominant right hemiplegia s/p CVA on 8/3/17. Pt presents with ADL, balance and gait deficits. Pt will benefit from physical therapy to maximize to full potential and safety with all functional mobility. PROBLEM LIST (Impacting functional limitations):  1. Decreased Strength  2. Decreased ADL/Functional Activities  3. Decreased Ambulation Ability/Technique  4. Decreased Balance  5. Increased Pain  6. Decreased Activity Tolerance  7. Decreased Flexibility/Joint Mobility  8. Decreased [place] with Home Exercise Program INTERVENTIONS PLANNED:  1. Balance Exercise  2. Bed Mobility  3. Family Education  4. Gait Training  5. Home Exercise Program (HEP)  6. Neuromuscular Re-education/Strengthening  7. Range of Motion (ROM)  8. Therapeutic Activites  9. Therapeutic Exercise/Strengthening  10. Transfer Training  11. modalities   TREATMENT PLAN:  Effective Dates: 9/22/17 TO 12/15/17. Frequency/Duration: 3 times a week for 8 weeks  GOALS: (Goals have been discussed and agreed upon with patient.)  Short-Term Functional Goals: Time Frame: 4 weeks  1. Pt will be independent with range of motion, strength and balance home exercise program.  STATUS:  MET  2. Pt will decrease TUG score by 5 seconds indicating more normalized and safer gait pattern. STATUS:  MET  Discharge Goals: Time Frame: 8 weeks  Pt will show increased range of motion and improved posture to allow for improved safety and ability with all functional mobility. .  STATUS:  PROGRESSING, CONTINUE 4 WEEKS. Pt will decrease TUG score by 10 seconds indicating more normalized gait pattern and decrease risk for falls. STATUS:  MET  NEW GOAL:  Pt will decrease TUG score by 5 more seconds indicating more normalized gait pattern and decrease risk for falls. NEW GOAL  Pt will increase Perez Balance Scale to 49/56 indicating increased balance and decreased risk for falls.   STATUS:  NOT MET, CONT 4 WEEKS.    Rehabilitation Potential For Stated Goals: Good  Regarding [place] therapy, I certify that the treatment plan above will be carried out by a therapist or under their direction. Thank you for this referral,  Tobias Combs, PT                  HISTORY:   GOAL:  Love to be back to doing things independenlty. Would like to get to where I don't have to use the cane. Get back to my own home. 11/3/2017  I definitely cant go back to work right now. Its slow progress I definitely cna;t drive at the [employer]. I have gotten stornger as far as my blance and in my leg. More ocmrortalble doing suff around the kitchen . I can take a dish and put it in the sink and rinse it off. I keep it near me in sonia I get tired. I will move aorund owthout it. Present Symptoms:    38year old  right handed female s/p left CVA 8/3/17 2* to HTN. Short term disability for now. Work in the [address] at [employer]. Very physical job. More sued to my left hand. Learning how to function with one hand is difficult. ADLs with just my left hand is difficult. Dynamic sitting balance. I just have to be careluf that my foot is flat especially if I have my shoes off. Tire easily. Buildig up strength. Large base quad cane but not all the time at home. Can't carry a heavy load. PAIN:  Right fingers and shoulder with slight movement. 6/10  History of Present Injury/Illness (Reason for Referral):  See above  Past Medical History/Comorbidities:   Ms. Paramjit Flores  has a past medical history of Generalized anxiety disorder; GERD (gastroesophageal reflux disease); Hypertension; Insomnia, unspecified; Iron deficiency anemia (8/23/2013); Left sided lacunar stroke ([facility] [address] (08/2017); Mitral valve prolapse (1993); Obesity (BMI 30-39.9); RADHA on CPAP (10/2017); and Prediabetes (7/28/2015). Ms. Paramjit Flores  has a past surgical history that includes tubal ligation (1995) and endoscopy (10/1/04).   Social History/Living Environment:     25and 25year old son and daughter. . Prior Level of Function/Work/Activity:  Worked in [employer] department. 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 One story - Own home:  About 5 steps to get in with bilateral railing. Staying at [facility] house which is easier to maneuver - low tub - can walk right in. Small wheelchair ramp and then level. Mom helps with driving and household duties - cooking. Dominant Side:         RIGHT    Current Medications:    Current Outpatient Prescriptions:     oxybutynin chloride XL (DITROPAN XL) 10 mg CR tablet, Take 1 Tab by mouth daily. Indications: URINARY URGE INCONTINENCE, Disp: 30 Tab, Rfl: 6    raNITIdine (ZANTAC) 150 mg tablet, Take 1 Tab by mouth two (2) times a day., Disp: 60 Tab, Rfl: 5    diazePAM (VALIUM) 5 mg tablet, Take 0.5 Tabs by mouth every six (6) hours as needed. Max Daily Amount: 10 mg. Indications: MUSCLE SPASM, Disp: 60 Tab, Rfl: 1    tiZANidine (ZANAFLEX) 4 mg tablet, Take 1 Tab by mouth three (3) times daily. , Disp: 90 Tab, Rfl: 6    hydroCHLOROthiazide (MICROZIDE) 12.5 mg capsule, Take 2 Caps by mouth daily. , Disp: 30 Cap, Rfl: 6    potassium chloride SR (K-TAB) 20 mEq tablet, Take 1 Tab by mouth two (2) times a day., Disp: 60 Tab, Rfl: 6    aspirin (ASPIRIN) 325 mg tablet, Take 1 Tab by mouth daily. , Disp: 30 Tab, Rfl: 12    acetaminophen (TYLENOL) 325 mg tablet, Take 2 Tabs by mouth every six (6) hours as needed for Pain or Fever (headache). , Disp: 30 Tab, Rfl: 0    escitalopram oxalate (LEXAPRO) 20 mg tablet, Take 1 Tab by mouth daily. , Disp: 30 Tab, Rfl: 5   Date Last Reviewed:  11/6/2017     Number of Personal Factors/Comorbidities that affect the Plan of Care: 1-2: MODERATE COMPLEXITY   EXAMINATION:   ROM:          Painful right shoulder and finger and hands. Shoulder not fully assessed. Seeing OT. Passive Fingers to neutral extension at least.  No overpressure given. Elbow slight decrease as end ranges.   Supination ~75% with mild pain.  Right lower extremity WFL  Minimal pain in my right shoulder. OT has taped it and that helps. Left WFL  Strength:          Right UE:  Almost no active movement noted. Possible scap stabilization and tone is not flaccid. Right hip flexion:  3-/5  Knee extension at least 4/5  Nothing in DF. Functional Mobility:         Gait/Ambulation:  Ambulates with large base quad came with slow small steps with custom hinged AFO on. Transfers:  Independent. occ no WBing on the right with sit to stand        Bed Mobility:  NT  Mental Status:          Alert and oriented x 4;  Pleasant and appropriate  Vision:          No deficits reported. Body Structures Involved:  1. Nerves  2. Bones  3. Joints  4. Muscles  5. Ligaments Body Functions Affected:  1. Sensory/Pain  2. Neuromusculoskeletal  3. Movement Related Activities and Participation Affected:  1. Learning and Applying Knowledge  2. General Tasks and Demands  3. Mobility  4. Self Care  5. Domestic Life  6. Interpersonal Interactions and Relationships  7. Community, Social and [place]   Number of elements that affect the Plan of Care: 4+: HIGH COMPLEXITY   CLINICAL PRESENTATION:   Presentation: Evolving clinical presentation with changing clinical characteristics: MODERATE COMPLEXITY   CLINICAL DECISION MAKING:   Outcome Measure: Tool Used: Perez Balance Scale  Score:  Initial: 46/56 Most Recent: 48/56 (Date: -- )   Interpretation of Score: Each section is scored on a 0-4 scale, 0 representing the patients inability to perform the task and 4 representing independence. The scores of each section are added together for a total score of 56. The higher the patients score, the more independent the patient is. Any score below 45 indicates increased risk for falls.     Score 56 55-45 44-34 33-23 22-12 11-1 0   Modifier CH CI CJ CK CL CM CN       Tool Used: Timed Up and Go (TUG)  Score:  Initial: with cane:  45.24 seconds; without cane 52.01 seconds Most Recent: 31.05 seconds with cane:  30.05 without cane  With 8 mm heel lift in shoe with cane:  27.85 (Date: 10/27/17)   Interpretation of Score: The test measures, in seconds, the time taken by an individual to stand up from a standard arm chair (seat height 46 cm [18 in], arm height 65 cm [25.6 in]), walk a distance of 3 meters (118 in, approx 10 ft), turn, walk back to the chair and sit down. If the individual takes longer than 14 seconds to complete TUG, this indicates risk for falls. Score 7 7.5-10.5 11-14 14.5-17.5 18-21 21.5-24.5 25+   Modifier CH CI CJ CK CL CM CN     Medical Necessity:   · Skilled intervention continues to be required due to residual right sided hemiparesis. Reason for Services/Other Comments:  · Patient continues to require skilled intervention due to residual right hemiparesis. .   Use of outcome tool(s) and clinical judgement create a POC that gives a: Questionable prediction of patient's progress: MODERATE COMPLEXITY      S:   The roll under my arm helped a lot. We used a shirt of mine to make it a little longer and that worked well. I feel like I have improved with not limping any more. That has improved. Calling Josep today to see about my brace. Went up a long flight of steps to my daughters 2nd floor. That was an accomplishment. TREATMENT:   (In addition to Assessment/Re-Assessment sessions the following treatments were rendered)    NEUROMUSCULAR RE-EDUCATION: (20 minutes):  Exercise/activities per grid below to improve balance, coordination, kinesthetic sense, proprioception and motor control. Required maximal manual, tactile and facilitation cues to perfrom activities. THERAPEUTIC ACTIVITY: ( 25 minutes): Therapeutic activities per grid below to improve mobility, strength and motor control. Required minimal verbal and tactile cues to perform correctly. Pt brings a personal electrical/TENS stimulation unit.  Appears to be more for TENS/massage pain relief although pamphlet indicates can be used for muscle stimulation. Attempted to place for anterior tibialis stimulation. Multiple tries and placements to try and get comfortable contraction. Able to achieve pulsing massage but very small intensity. No adjustability except intensity. With one more notch up on intensity, noxious pain at fibular head placement. Will try again at another visit. Instructed in placement of a rolled pillow case deep into arm pit to allow for some traction and lateral distraction of the humeral head when in left sidelying. Pt reported increased comfort. Date  11/3/17 Date:  11/6/17   Activity/Exercise     Right UE tone reduction and sensorimotor retraining for increased arm swing and posture during gait Left sidelying Liliams arm slides and hand and elbow tone reduction. Hand relaxed, shoulder with increased self range and ER. More comfortable during gait. -   Seated in chair (pt is short in stature). Right shoe and brace off-  Tone reduction and neuromuscular facilitation for sensorimotor control. Facilitated foot slides into long arc quads, foot and ankle eversion/inversion, heel banging, full foot circles on green Stream5by ball, hip, knee and foot flexion from side to side over 1/2 knob   Kicking soccer ball in bars for tone reduction and improved swing pattern      Facilitated long arc quad      As above   LE tone reduction prior to gait  X 5 minutes in supine to decrease tone and synergy    Gait retraining With shoes but no brace with large based quad cane x 120 feet working on gait pattern, heel strike, terminal knee extension. Without brace with shoes on and large base quad cane. Working on right hip and knee release to good toe off with hip and knee ER and swing. Working to swing and not hike. Used Neuro IF[employer] foot slide to work on [address].      Mini squats on door with therapist facilitating knee extension        Standing in parallel bars, 1\" block under R foot         Therapeutic Exercise: ( 0 minutes):  Exercises per grid below to improve mobility, strength and coordination. Required moderate visual, verbal and manual cues to promote proper body alignment, promote proper body posture and promote proper body breathing techniques. Progressed range and repetitions as indicated. Date:  10/11/17 Date:  10/20/17 Date:  10/26/17   Activity/Exercise Parameters Parameters Parameters   Piriformis stretch   3 x 30 second hold     Hip capsule stetch   3 x 30 second hold right     Hip flexor stretch   3 x 30 second      Bent knee hip abduction stretch with active adduction   10 reps with 10 second hold     SKTC stretch   3 x 30 second hold right  3 x 30 second hold right   Heel cord and plantar fascia stretch passive X 5 minutes  X 5 minutes   Bridging   2 x 10 reps B     LAQ   4 x 5 reps right  4 x 5 reps right   Right ankle dorsiflexion    During russian e-stim x 10 minutes    Manual facilitation of right dorsiflexors   Working with patient on active dorsiflexion with manual and verbal cues with patient supine       EDUCATION:  Instructed in above exercises and home program.  HEP:  No written HEP given to patient yet. Instructed to work on increase step length on the left. Treatment/Session Assessment:  Arms feels better with roll under for sleeping. Gait patter improving. Needs less AFO, but not sure what. Pt calling Josep today. Patients response to todays treatment session was tolerated well with no medical complications. .  · Post session pain:  No pain  · Compliance with Program/Exercises: Will assess as treatment progresses. · Recommendations/Intent for next treatment session: \"Next visit will focus on advancements to more challenging activities\".   Total Treatment Duration:  PT Patient Time In/Time Out  Time In: 1030  Time Out: [facility], PT negative/UCG

## 2021-10-18 ENCOUNTER — HOSPITAL ENCOUNTER (OUTPATIENT)
Dept: PHYSICAL THERAPY | Age: 46
Discharge: HOME | End: 2021-10-18
Payer: MEDICARE

## 2021-10-18 PROCEDURE — 97110 THERAPEUTIC EXERCISES: CPT

## 2021-10-18 NOTE — PROGRESS NOTES
Zoraida Boykin  : 1975  Primary:   Secondary:  [address]  at [address] [facility] [address], [address], [place], [facility]  [phone]   [phone]        OUTPATIENT PHYSICAL THERAPY: Daily Treatment Note 10/18/2021  Visit Count:  15  ICD-10: Treatment Diagnosis: Pain in right ankle and joints of right foot (M25.571)    Stiffness of right ankle, not elsewhere classified (M25.671)  Difficulty in walking, not elsewhere classified (R26.2)  Unsteadiness on feet (R26.81)  Muscle weakness (generalized) (M62.81)  Precautions/Allergies:   Erythromycin   TREATMENT PLAN:  Effective Dates/Frequency/Duration: Twice per week from 2021 until 10/31/2021 (10 weeks). Pre-treatment Symptoms/Complaints:  Pt states that she has been more fatigued today. She feels she has no \"get up and go\". Pain: Initial:   2/10 Post Session: no change in pain noted   Medications Last Reviewed:  10/18/2021  Updated Objective Findings: None Today   TREATMENT:   Therapeutic Exercise: ( 38 minutes):  Exercises per grid below to improve mobility, strength, balance and dynamic movement of right ankle/foot to improve functional mobility. Required minimal visual, verbal and manual cues to promote proper body alignment, promote proper body posture, promote proper body mechanics and promote proper body breathing techniques. Progressed resistance, range, repetitions and complexity of movement as indicated.     Date:  2021 Date:  2021 Date:  10-4-2021 Date:  10/7/2021 Date:  10- Date:  10-   Activity/Exercise         Physiostep L2 resistance for 10 minutes with B LEs only; blue band to maintain R foot on pedal L2 resistance for 10 minutes with B LEs only; blue band to maintain R foot on pedal Level 2   10 minutes with B LE's;   Blue band to maintain R foot on pedal  Level 1-2   10 minutes with B LE's;   Blue band to maintain R foot on pedal  Level 1-2  10 minutes with B LE's - blue band to maintain foot on pedal  Level 1  10 minutes with B LE's - blue band to maintain foot on pedal    Ankle plantarflexor stretch on incline board 30 second hold x 3 reps with knees extended then flexed; L UE support 30 second hold x 3 reps with knees extended then flexed; L UE support 3 x 30 second with knees flexed or extended -  L UE support  30 second hold x 3 reps with knees extended then flexed; L UE support 3 x 30 sec hold with knees extended and flexed 3 x 30 sec hold  With knees extended and flexed   Anterior/posterior tapping on rocker board  In standing; feet in semi-tandem each LE forward on rocker board; 20 reps/1 set with cues to tap each direction slowly; minimal to no UE support; cues to obtain/maintain R knee extension when coming forward with R LE anteriorly X 20 - minimal to no UE support     X 20 - no UE support X 20   No UE support   Lateral tapping on rocker board  In standing; 20 reps/1 set tapping both directions slowly with B feet (to allow for increased motion at R ankle) and cues to maintain entire R foot on board; minimal to no UE support X 20 - minimal to no UE support   X 20 - no UE support  X 20   No UE support   Static standing on foam  Feet together x 60 second trial with minimal to no instability; no UE support  Feet in semi-tandem with each LE forward x 30-45 second trials with minimal to moderate instability; no UE support; S throughout        Standing LE marching on foam         Step ups    Onto 3 inch foam; 20 reps/1 set leading with R LE with cues for ensuring full contact of R foot on foam prior to stepping up with L foot to ensure safety; no UE support and cues for increased control with stepping onto foam with L LE On blue foam   X 15  Leading with each foot     With minimal to no UE support  On blue foam x 15 leading with each foot     Minimal to no UE support   Ambulation over level ground Throughout session with wide based quad cane on L with no AFO   Throughout session with wide based quad cane on L with no AFO  Also performed 1 bout of 250 with narrow based quad cane on L with no AFO with minimal increase in instability; occasional cues to have more contact of quad cane with ground around corners to increase safety  1 bout of 80 feet with single point cane on L with no AFO with minimal to moderate increase in instability Throughout session with wide based quad cane on L with no AFO  Throughout session with wide based quad cane     Varying resistance at R LE in multiple different planes of motion         R ankle PROM         Tapping cones placed anteriorly         Assessed AFO      Donned and looked at it. Needs to be adjusted by orthotist     L foot on step (for R sided weightbearing) On 4 inch step; 30 second hold with entire L foot on step with minimal to no instability with minimal to no UE support; progressing to placing only L forefoot on step with minimal to moderate instability with no UE support  Progressing to performing small lifts at L forefoot x 10 reps/2 sets with minimal to moderate instability with no UE support On 4 inch step; maintaining L forefoot on step for 10-20 second trials with minimal to no UE support and minimal instability throughout  Progressing to performing small lifts at L forefoot x 15 reps/2 sets with minimal instability with no UE support (occasional UE corrections); S throughout for safety 4 inch step   L forefoot on step and trying to lift foot up with weight bearing on R LR.    Attempting to perform standing on foam with R LE and having L foot on 6 inch step, but pt everting R foot excessively due to instability so had to discontinue  Performed with entire L foot on 4 inch step with 3 inch foam placed on top to encourage increased weightbearing at R LE - minimal instability noted with 30-60 second trials - progressed to having only L forefoot on step - minimal to moderate instability with 10-20 second trials and occasional UE corrections              Forward lunges onto step with push-off for plantarflexor activation Off of 4 inch step; 20 reps/1 set placing R foot on step with UE support, leaning forward then pushing off of R foot to step back with no UE support with S for safety; cues for increased pressure through R foot before stepping back for increased activation Off of 4 inch step; 20 reps/1 set placing R foot on step with UE support, leaning forward then pushing off of R foot to step back with no UE support with S for safety; cues for increased pressure through R foot before stepping back for increased activation 4 inch step   R foot on step then slight lunge to push off x 20       Green oval foam   X 10       *given in HEP  [employer]   Pt given following band colors: [] Yellow          [] Red          [] Green          [] Blue          [] [employer]     Modalities (): Game ready not available at end of session - pt stating she will ice at home    Treatment/Session Summary:    · Response to Treatment: Patient was encouraged to consider or look clark cross body bag to put her items in as to not have a bag swinging on quad cane when trying to ambulate. She agreed and stated she had been looking for one. Asked her again about adjustment for AFO, she reported she was calling after therapy to talk with them. Patient did states she felt a little better at end of session, but still tired. No adverse reactions/unusual changes observed/reported in clinical status this session. · Communication/Consultation:  None today  · Equipment provided today:  None today  · Recommendations/Intent for next treatment session: Next visit will focus on manual therapy/modalities to reduce pain/swelling and improve mobility at R foot and ankle; work on hip strengthening; gait mechanics.     Total Treatment Billable Duration:  38 minutes  PT Patient Time In/Time Out  Time In: 1197 (Patient 7 minutes late )  Time Out: [address] [facility], PTA    Future Appointments   Date Time Provider Jessie Carlisle   10/20/2021  2:00 PM Robin Molina MD [facility] PRE PRE   10/21/2021  [facility]   10/25/2021  1:45 PM Latrelle Pipes, [facility] SFD   10/27/2021  2:30 [facility]   11/1/2021  2:30 [facility]   11/3/2021  2:30 PM Lizandro Griffiths DPT SFDORPT [facility]   4/12/2022 11:00 AM ARTEM Rowe SSA PMR PMR   7/29/2022  2:20 PM Janet Webster NP SSA PSCD PP        Visit Approval Visit # Therapist initials Date A NS / Cx < 24 hr >24 hr Cx Comments   99 visits total 1 [facility] 8/17/2021 [x]  [] [] Initial evaluation     PDE 8-19/2021 [] [x] [] Stomach bug    2 [facility] 8/24/2021 [x] [] []     3 [facility] 8/27/2021 [x] [] []     4 [facility] 8/31/2021 [x] [] []     5 [facility] 9/2/2021 [x] [] []      ANALI 9/8/2021 [] [x] [] Car won't start    6 PDE 9-9-2021 [x] [] []     7 [facility] 9/14/2021 [x] [] []     8 [facility] 9/16/2021 [x] [] [] Progress note    9 [facility] 9/21/2021 [x] [] [] 1    10 [facility] 9/23/2021 [x] [] [] 2     [facility] 9/28/2021 [] [x] [] Lack of transportation    11 [facility] 9/30/2021 [x] [] [] 3    12 PDE 10-1-2021 [x] [] [] 4    13 [facility] 10/7/2021 [x] [] [] 5    14 PDE 10- [x] [] [] 6    15 PDE 10- [x] [] [] 7

## 2021-10-20 PROBLEM — E87.6 HYPOKALEMIA: Status: ACTIVE | Noted: 2021-10-20

## 2021-10-20 PROBLEM — G56.00 CTS (CARPAL TUNNEL SYNDROME): Status: ACTIVE | Noted: 2021-10-20

## 2021-10-21 ENCOUNTER — HOSPITAL ENCOUNTER (OUTPATIENT)
Dept: PHYSICAL THERAPY | Age: 46
Discharge: HOME | End: 2021-10-21
Payer: MEDICARE

## 2021-10-21 PROCEDURE — 97110 THERAPEUTIC EXERCISES: CPT

## 2021-10-21 NOTE — PROGRESS NOTES
Brea Baker  : 1975  Primary:   Secondary:  [address] at [address] [facility] [address], [address], [place], [facility]  [phone]   [phone]        OUTPATIENT PHYSICAL THERAPY: Daily Treatment Note 10/21/2021  Visit Count:  16  ICD-10: Treatment Diagnosis: Pain in right ankle and joints of right foot (M25.571)    Stiffness of right ankle, not elsewhere classified (M25.671)  Difficulty in walking, not elsewhere classified (R26.2)  Unsteadiness on feet (R26.81)  Muscle weakness (generalized) (M62.81)  Precautions/Allergies:   Erythromycin   TREATMENT PLAN:  Effective Dates/Frequency/Duration: Twice per week from 10/17/2021 until 10/30/2021 (2 weeks), once per week from 10/31/2021 until 2021 (7 weeks)    Pre-treatment Symptoms/Complaints:  Pt states she is hoping to get her AFO adjusted by Josep at [facility] sometime this week  Pain: Initial:   2/10 Post Session: no change in pain noted   Medications Last Reviewed:  10/21/2021  Updated Objective Findings: See evaluation note from today   TREATMENT:   Therapeutic Exercise: ( 40 minutes):  Exercises per grid below (and assessment in chart on 10/21/2021) to improve mobility, strength, balance and dynamic movement of right ankle/foot to improve functional mobility. Required minimal visual, verbal and manual cues to promote proper body alignment, promote proper body posture, promote proper body mechanics and promote proper body breathing techniques. Progressed resistance, range, repetitions and complexity of movement as indicated.     Date:  2021 Date:  10-4-2021 Date:  10/7/2021 Date:  10- Date:  10- Date:  10/21/2021   Activity/Exercise         Physiostep L2 resistance for 10 minutes with B LEs only; blue band to maintain R foot on pedal Level 2   10 minutes with B LE's;   Blue band to maintain R foot on pedal  Level 1-2   10 minutes with B LE's;   Blue band to maintain R foot on pedal  Level 1-2  10 minutes with B LE's - blue band to maintain foot on pedal  Level 1  10 minutes with B LE's - blue band to maintain foot on pedal  Level 3  10 minutes with B LE's - blue band to maintain foot on pedal    Ankle plantarflexor stretch on incline board 30 second hold x 3 reps with knees extended then flexed; L UE support 3 x 30 second with knees flexed or extended -  L UE support  30 second hold x 3 reps with knees extended then flexed; L UE support 3 x 30 sec hold with knees extended and flexed 3 x 30 sec hold  With knees extended and flexed 3 x 30 sec hold  With knees extended and flexed   Anterior/posterior tapping on rocker board In standing; feet in semi-tandem each LE forward on rocker board; 20 reps/1 set with cues to tap each direction slowly; minimal to no UE support; cues to obtain/maintain R knee extension when coming forward with R LE anteriorly X 20 - minimal to no UE support     X 20 - no UE support X 20   No UE support    Lateral tapping on rocker board In standing; 20 reps/1 set tapping both directions slowly with B feet (to allow for increased motion at R ankle) and cues to maintain entire R foot on board; minimal to no UE support X 20 - minimal to no UE support   X 20 - no UE support  X 20   No UE support    Static standing on foam          Standing LE marching on foam         Step ups   Onto 3 inch foam; 20 reps/1 set leading with R LE with cues for ensuring full contact of R foot on foam prior to stepping up with L foot to ensure safety; no UE support and cues for increased control with stepping onto foam with L LE On blue foam   X 15  Leading with each foot     With minimal to no UE support  On blue foam x 15 leading with each foot     Minimal to no UE support Ascending/descending 5 steps with reciprocal gait going up, step to gait (leading with L LE) going down; single rail use at L; S   Ambulation over level ground   Throughout session with wide based quad cane on L with no AFO  Also performed 1 bout of 250 with narrow based quad cane on L with no AFO with minimal increase in instability; occasional cues to have more contact of quad cane with ground around corners to increase safety  1 bout of 80 feet with single point cane on L with no AFO with minimal to moderate increase in instability Throughout session with wide based quad cane on L with no AFO  Throughout session with wide based quad cane   Throughout session with wide based quad cane   Varying resistance at R LE in multiple different planes of motion      Per strength assessment in chart   R ankle PROM      Per ROM assessment in chart   Tapping cones placed anteriorly         Assessed AFO     Donned and looked at it. Needs to be adjusted by orthotist      L foot on step (for R sided weightbearing) On 4 inch step; maintaining L forefoot on step for 10-20 second trials with minimal to no UE support and minimal instability throughout  Progressing to performing small lifts at L forefoot x 15 reps/2 sets with minimal instability with no UE support (occasional UE corrections); S throughout for safety 4 inch step   L forefoot on step and trying to lift foot up with weight bearing on R LR.    Attempting to perform standing on foam with R LE and having L foot on 6 inch step, but pt everting R foot excessively due to instability so had to discontinue  Performed with entire L foot on 4 inch step with 3 inch foam placed on top to encourage increased weightbearing at R LE - minimal instability noted with 30-60 second trials - progressed to having only L forefoot on step - minimal to moderate instability with 10-20 second trials and occasional UE corrections               Forward lunges onto step with push-off for plantarflexor activation Off of 4 inch step; 20 reps/1 set placing R foot on step with UE support, leaning forward then pushing off of R foot to step back with no UE support with S for safety; cues for increased pressure through R foot before stepping back for increased activation 4 inch step   R foot on step then slight lunge to push off x 20       Green oval foam   X 10        *given in HEP  [employer]   Pt given following band colors: [] Yellow          [] Red          [] Nancy Fowler          [] Blue          [] Grey     Treatment/Session Summary:    · Response to Treatment: Recertification note today - see assessment in chart. No adverse reactions/unusual changes observed/reported in clinical status this session. · Communication/Consultation:  None today  · Equipment provided today:  None today  · Recommendations/Intent for next treatment session: Next visit will focus on manual therapy/modalities to reduce pain/swelling and improve mobility at R foot and ankle; work on hip strengthening; gait mechanics.     Total Treatment Billable Duration:  40 minutes  PT Patient Time In/Time Out  Time In: 9562  Time Out: [address], [place]ELLEY    Future Appointments   Date Time Provider Jessie Carlisle   10/25/2021  1:45 PM [facility], [facility]   10/27/2021  2:30 PM [facility], [facility]   11/1/2021  7:00 PM Tilford Shells, [facility]   11/3/2021  2:30 PM Janneth Shari, DPT [facility]   11/10/2021  2:30 PM Janneth Jamese, DPT SFDORPT D   [facility]17[facility]2021  [facility]   11/24/2021  2:30 PM Janneth Shari, DPT [facility]   4/12/2022 11:00 AM ARTEM Canas S[facility] PMR PMR   4/13/2022  9:30 AM PRE [facility] SSA PRE PRE   4/20/2022  2:40 PM Birdie Reynolds MD SSA PRE PRE   7/29/2022  2:20 PM Jaya Solis NP [facility] PSCD PP        Visit Approval Visit # Therapist initials Date A NS / Cx < 24 hr >24 hr Cx Comments   99 visits total 1  8/17/2021 [x]  [] [] Initial evaluation     PDE 8-19/2021 [] [x] [] Stomach bug    2  8/24/2021 [x] [] []     3  8/27/2021 [x] [] []     4  8/31/2021 [x] [] []     5  9/2/2021 [x] [] []      ANALI 9/8/2021 [] [x] [] Car won't start    6 PDE 9-9-2021 [x] [] []     7 [facility] 9/14/2021 [x] [] []     8 [facility] 9/16/2021 [x] [] [] Progress note    9 [facility] 9/21/2021 [x] [] [] 1    10 [facility] 9/23/2021 [x] [] [] 2     [facility] 9/28/2021 [] [x] [] Lack of transportation    11 [facility] 9/30/2021 [x] [] [] 3    12 PDE 10-1-2021 [x] [] [] 4    13 [facility] 10/7/2021 [x] [] [] 5    14 PDE 10- [x] [] [] 6    15 PDE 10- [x] [] [] 7      16 [facility] 76/41/8013 [x] [] [] Recertification note       [] [] []        [] [] []        [] [] []        [] [] []        [] [] []        [] [] []        [] [] []        [] [] []

## 2021-10-21 NOTE — PROGRESS NOTES
Tona Kussmaul  : 1975  [place]  Secondary:  [address] [place]  at [facility]  [address], [address] [facility], [address]  [phone]   [phone]        OUTPATIENT PHYSICAL THERAPY:Recertification    ICD-10: Treatment Diagnosis: Pain in right ankle and joints of right foot (M25.571)    Stiffness of right ankle, not elsewhere classified (M25.671)  Difficulty in walking, not elsewhere classified (R26.2)  Unsteadiness on feet (R26.81)  Muscle weakness (generalized) (M62.81)  Precautions/Allergies:   Erythromycin   TREATMENT PLAN:  Effective Dates/Frequency/Duration: Twice per week from 10/17/2021 until 10/30/2021 (2 weeks), once per week from 10/31/2021 until 2021 (7 weeks)    MEDICAL/REFERRING DIAGNOSIS:  Pain in right foot [M79.671]  Other fracture of right lower leg, initial encounter for closed fracture [S82.891A]   DATE OF ONSET: 2021 (date of initial injury)  REFERRING PHYSICIAN: Anisa Hawk MD MD Orders: Evaluate and treat  Return MD Appointment: unspecified   ASSESSMENT:  Tona Kussmaul has now attended 16 physical therapy sessions for R ankle/foot pain following fall on R foot on 2021 that resulted in a medial malleolar fracture. This session, pt demonstrated slightly improved R LE strength/endurance (especially at foot/ankle), improved R ankle mobility, slightly less postural/gait deficits, improved static/dynamic standing balance as evident by a time of 19.1 seconds with quad cane on L on the Timed Up and Go test (with times of >13.5 seconds indicating an increased risk for falls), and improved functional mobility as evident by her ability to walk longer distances with quad cane with less reported pain.   Despite these improvements, she continues to demonstrate decreased B LE strength/endurance (significant weaker at R secondary to past CVA), decreased right ankle mobility with associated pain, multiple postural/gait deficits, decreased standing tolerance, decreased static/dynamic standing balance and decreased functional mobility. 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 Pt may continue to benefit from skilled PT to address the above listed deficits to improve ability to perform pain-free and safe weightbearing activities and to improve overall quality of life prior to discharge. Will extend pt's original plan of care by 7 weeks but reduce frequency of sessions to once per week following next week to allow her to make additional gains in her R ankle stability and allow for time to practice ambulation with a newly fitted AFO. PROBLEM LIST (Impacting functional limitations):  1. Decreased Strength  2. Decreased ADL/Functional Activities  3. Decreased Transfer Abilities  4. Decreased Ambulation Ability/Technique  5. Decreased Balance  6. Increased Pain  7. Decreased Activity Tolerance  8. Decreased Pacing Skills  9. Increased Fatigue  10. Decreased Flexibility/Joint Mobility  11. Edema/Girth INTERVENTIONS PLANNED: (Treatment may consist of any combination of the following)  1. Balance Exercise  2. Bed Mobility  3. Cold  4. Electrical Stimulation  5. Family Education  6. Gait Training  7. Heat  8. Home Exercise Program (HEP)  9. Manual Therapy  10. Neuromuscular Re-education/Strengthening  11. Range of Motion (ROM)  12. Therapeutic Activites  13. Therapeutic Exercise/Strengthening  14. Transcutaneous Electrical Nerve Stimulation (TENS)  15. Transfer Training  16. Ultrasound (US)  17. Vasopneumatic Compression     GOALS: (Goals have been discussed and agreed upon with patient.)  Short-Term Functional Goals: Time Frame: 5 weeks  1.  Pt will be compliant with HEP in order to increase LE strength/endurance/mobility to improve functional mobility and overall quality of life. (MET, 9/16/2021)   2. Pt will improve score on the Lower Extremity Functional Scale to 32/80 in order to demonstrate improved functional mobility and quality of life. (CONTINUE, 10/21/2021)   3. Pt will improve score on the Foot and Ankle Ability Measure to 38/84 in order to demonstrate improved functional mobility and quality of life.  (MET, 9/16/2021)   4. Pt will be able to ambulate 250 feet over level surfaces with supervision with least restrictive assistive device with minimal to increase in pain in order to improve household mobility. (MET, 9/16/2021)   5. Pt will be able to ascend/descend 2 steps with contact guard assistance with rail with safe gait pattern in order to improve community mobility.  (MET, 9/16/2021)   6. Pt will increase right ankle dorsiflexion PROM to 6 degrees with minimal to no increase in pain in order to improve functional mobility and reduce gait deficits. (MET, 9/16/2021)   Discharge Goals: Time Frame: 10 weeks  12. Pt will be independent with HEP in order to increase LE strength/endurance/mobility to improve functional mobility and overall quality of life. (PROGRESSING, 10/21/2021)  13. Pt will improve score on the Lower Extremity Functional Scale to 36/80 in order to demonstrate improved functional mobility and quality of life. (CONTINUE, 10/21/2021)   14. Pt will improve score on the Foot and Ankle Ability Measure to 42/84 in order to demonstrate improved functional mobility and quality of life. (CONTINUE, 10/21/2021)   15. Pt will be able to ambulate 500 feet over level and unlevel surfaces with modified independence with least restrictive assistive device with minimal to no increase in pain in order to improve community mobility (PROGRESSING, 10/21/2021)   16.  Pt will be able to ascend/descend 5 steps with modified independence with rail with safe gait pattern in order to improve community mobility. (PROGRESSING, 10/21/2021)   17. Pt will increase right ankle dorsiflexion PROM to 8 degrees with minimal to no increase in pain in order to improve functional mobility and reduce gait deficits. (MET, 10/21/2021)     OUTCOME MEASURE:   Tool Used: Lower Extremity Functional Scale (LEFS)  Score:  Initial: 28/80 Most Recent: 24/80 (Date: 10/21/2021 )   Interpretation of Score: 20 questions each scored on a 5 point scale with 0 representing \"extreme difficulty or unable to perform\" and 4 representing \"no difficulty\". The lower the score, the greater the functional disability. 80/80 represents no disability. Minimal detectable change is 9 points. Tool Used: PT/OT FOOT AND ANKLE ABILITY MEASURE  Score:  Initial: 34/84 Most Recent: 30/84 (Date: 10/21/2021 )   Interpretation of Score: For the \"Activities of Daily Living\", there are 21 questions each scored on a 5 point scale with 0 representing \"Unable to do\" and 4 representing \"No difficulty\". The lower the score, the greater the functional disability. 84/84 represents no disability. Minimal detectable change is 5.7 points. With the addition of the 8 questions in the \"Sports Subscale,\" there are 29 questions, each scored on a 5 point scale with 0 representing \"Unable to do\" and 4 representing \"No difficulty\". The lower the score, the greater the functional disability. 116/116 represents no disability. Minimal detectable change is 12.3 points. Tool Used: Timed Up and Go (TUG)  Score:  Initial: 25.4 seconds with wide based quad cane Most Recent: 19.1 seconds with wide based quad cane (Date: 10/21/2021 )   Interpretation of Score: The test measures, in seconds, the time taken by an individual to stand up from a standard arm chair (seat height 46 cm [18 in], arm height 65 cm [25.6 in]), walk a distance of 3 meters (118 in, approx 10 ft), turn, walk back to the chair and sit down.   If the individual takes longer than 14 seconds to complete TUG, this indicates risk for falls. FALL RISK:    Ambulatory/Rehab Services H2 Model Falls Risk Assessment   Risk Factors:       (5)  History of Recent Falls [w/in 3 months] Ability to Rise from Chair:       (1)  Pushes up, successful in one attempt   Falls Prevention Plan:       Physical Limitations to Exercise (specify):  standing exercises with supervision       Mobility Assistance Device (specify):  quad cane   Total: (5 or greater = High Risk): 6   ©2010 [facility]. All Rights Reserved. [facility] Patent #[id number].  Federal Law prohibits the replication, distribution or use without written permission from [address] FINDINGS:     Reassessment on 10/21/2021   Observation/Orthostatic Postural Assessment:    The following postural deficits were noted in sitting: loss of cervical lordosis, increased thoracic kyphosis, forward head, rounded shoulders and posterior pelvic tilt  - increased upright posture on 10/21/2021  The following postural deficits were noted in standing: increased weightbearing at L side  Palpation:          Slight edema noted at R foot/ankle - reduced on 10/21/2021  ROM:    Initial measurement: (on 8/17/2021) Initial measurement: (on 8/17/2021) Reassessment measurement: (on 9/16/2021) Reassessment measurement: (on 10/21/2021)     L ankle AROM:     Ankle dorsiflexion: 15 degrees     Ankle plantarflexion: 50 degrees      Ankle inversion: 40 degrees     Ankle eversion: 36 degrees   Mainly WFL throughout R hip and knee, but limited with following motions:   R ankle AROM - pt unable to initiate sufficient ankle movement secondary to CVA  R ankle PROM:     Ankle dorsiflexion: 2 degrees     Ankle plantarflexion: 60 degrees     Ankle inversion: 45 degrees     Ankle eversion: 10 degrees   R ankle PROM:     Ankle dorsiflexion: 6 degrees     Ankle plantarflexion: 71 degrees     Ankle inversion: 46 degrees     Ankle eversion: 28 degrees R ankle PROM:     Ankle dorsiflexion: 13 degrees     Ankle plantarflexion: 74 degrees     Ankle inversion: 54 degrees     Ankle eversion: 30 degrees     Strength:  R side partially weaker due to CVA (at hip/knee)   Initial measurement: (on 8/17/2021) Initial measurement: (on 8/17/2021)  Reassessment (on 9/16/2021) Reassessment (on 10/21/2021)    L LE: R LE: R LE: R LE:   Hip flexion 4+/5  4+/5  4+/5 4+/5   Hip ER 4+/5  4-/5  4/5 4/5   Hip IR 4/5  4-/5  4+/5 4+/5   Hip abduction 4/5  4/5  4/5 NT   Hip adduction 4+/5  2+/5  3-/5 NT   Hip extension 4-/5  3-/5  3+/5 4-/5   Knee flexion 4+/5  3+/5  4-/5 4-/5   Knee extension 5/5  4+/5   5/5   Ankle DF  4+/5  1+/5  1+/5 2+/5   Ankle PF - 1+/5 2-/5 2-/5   Ankle Inv - 1+/5 1+/5 2-/5   Ankle Ev - 1+/5 1+/5 1+/5     Functional Mobility:   Gait/Ambulation: Pt ambulates with wide-based quad cane at L with following gait deficits: slight forward trunk lean, slower gait speed (faster overall on 10/21/2021), altered arm swing, increased lateral trunk sway (less on 10/21/2021), decreased trunk rotation, wide KESHAV, increased weightshift to L, R trendelenburg, R hip circumduction, decreased R stance time, decreased B step length (increased on 10/21/2021), decreased R knee flexion during R preswing, decreased R foot clearance and decreased R heel strike; inversion noted at R foot during R swing phase         Transfers:  Pushes up to stand, reaches back to sit, Breezy        Bed Mobility:  [facility]  Balance:          Decreased with dynamic standing as evident by increased TUG assessment time above (improved on 10/21/2021)  Sensation:         Decreased throughout R LE secondary to CVA  MEDICAL NECESSITY:   · Patient is expected to demonstrate progress in strength, range of motion, balance and functional technique to improve ability to perform pain-free and safe weightbearing activities.   REASON FOR SERVICES/OTHER COMMENTS:  · Patient continues to require modification of therapeutic interventions to increase complexity of exercises. Rehabilitation Potential For Stated Goals: Good  Regarding [facility] therapy, I certify that the treatment plan above will be carried out by a therapist or under their direction. Thank you for this referral,  Karl Snyder DPT     Referring Physician Signature:  Kaity Perales MD

## 2021-10-25 ENCOUNTER — HOSPITAL ENCOUNTER (OUTPATIENT)
Dept: PHYSICAL THERAPY | Age: 46
Discharge: HOME | End: 2021-10-25
Payer: MEDICARE

## 2021-10-25 PROCEDURE — 97110 THERAPEUTIC EXERCISES: CPT

## 2021-10-25 NOTE — PROGRESS NOTES
Colbyzia Both  : 1975  Primary:   Secondary:  [address]  at [facility]  [address], [address], [place], [facility] [address] [facility]  [phone]   [phone]        OUTPATIENT PHYSICAL THERAPY: Daily Treatment Note 10/25/2021  Visit Count:  17  ICD-10: Treatment Diagnosis: Pain in right ankle and joints of right foot (M25.571)    Stiffness of right ankle, not elsewhere classified (M25.671)  Difficulty in walking, not elsewhere classified (R26.2)  Unsteadiness on feet (R26.81)  Muscle weakness (generalized) (M62.81)  Precautions/Allergies:   Erythromycin   TREATMENT PLAN:  Effective Dates/Frequency/Duration: Twice per week from 10/17/2021 until 10/30/2021 (2 weeks), once per week from 10/31/2021 until 2021 (7 weeks)    Pre-treatment Symptoms/Complaints:  Pt states she is hoping to get her AFO adjusted by Josep at [facility] sometime this week  Pain: Initial:   2/10 Post Session: no change in pain noted   Medications Last Reviewed:  10/25/2021  Updated Objective Findings: None Today   TREATMENT:   Therapeutic Exercise: ( 45 minutes):  Exercises per grid below (and assessment in chart on 10/21/2021) to improve mobility, strength, balance and dynamic movement of right ankle/foot to improve functional mobility. Required minimal visual, verbal and manual cues to promote proper body alignment, promote proper body posture, promote proper body mechanics and promote proper body breathing techniques. Progressed resistance, range, repetitions and complexity of movement as indicated.     Date:  10-4-2021 Date:  10/7/2021 Date:  10- Date:  10- Date:  10/21/2021 Date:  10-   Activity/Exercise         Physiostep Level 2   10 minutes with B LE's;   Blue band to maintain R foot on pedal  Level 1-2   10 minutes with B LE's;   Blue band to maintain R foot on pedal  Level 1-2  10 minutes with B LE's - blue band to maintain foot on pedal  Level 1  10 minutes with B LE's - blue band to maintain foot on pedal  Level 3  10 minutes with B LE's - blue band to maintain foot on pedal  Level 3  10 minutes with B LE's - blue band to hold R foot on pedal    Ankle plantarflexor stretch on incline board 3 x 30 second with knees flexed or extended -  L UE support  30 second hold x 3 reps with knees extended then flexed; L UE support 3 x 30 sec hold with knees extended and flexed 3 x 30 sec hold  With knees extended and flexed 3 x 30 sec hold  With knees extended and flexed 3 x 30 sec hold with knees flexed and extended   Anterior/posterior tapping on rocker board X 20 - minimal to no UE support     X 20 - no UE support X 20   No UE support  X 20   No UE support    Lateral tapping on rocker board X 20 - minimal to no UE support   X 20 - no UE support  X 20   No UE support  X 20   No UE support    Static standing on foam          Standing LE marching on foam         Step ups  Onto 3 inch foam; 20 reps/1 set leading with R LE with cues for ensuring full contact of R foot on foam prior to stepping up with L foot to ensure safety; no UE support and cues for increased control with stepping onto foam with L LE On blue foam   X 15  Leading with each foot     With minimal to no UE support  On blue foam x 15 leading with each foot     Minimal to no UE support Ascending/descending 5 steps with reciprocal gait going up, step to gait (leading with L LE) going down; single rail use at L; S Ascending and descending 4 inch step   2 x 10 leading with each foot.     Ambulation over level ground  Throughout session with wide based quad cane on L with no AFO  Also performed 1 bout of 250 with narrow based quad cane on L with no AFO with minimal increase in instability; occasional cues to have more contact of quad cane with ground around corners to increase safety  1 bout of 80 feet with single point cane on L with no AFO with minimal to moderate increase in instability Throughout session with wide based quad cane on L with no AFO  Throughout session with wide based quad cane   Throughout session with wide based quad cane Throughout session and in hallways   Varying resistance at R LE in multiple different planes of motion     Per strength assessment in chart    R ankle PROM     Per ROM assessment in chart Stretching and mobility R ankle    Tapping cones placed anteriorly         Assessed AFO    Donned and looked at it. Needs to be adjusted by orthotist       L foot on step (for R sided weightbearing) 4 inch step   L forefoot on step and trying to lift foot up with weight bearing on R LR. Attempting to perform standing on foam with R LE and having L foot on 6 inch step, but pt everting R foot excessively due to instability so had to discontinue  Performed with entire L foot on 4 inch step with 3 inch foam placed on top to encourage increased weightbearing at R LE - minimal instability noted with 30-60 second trials - progressed to having only L forefoot on step - minimal to moderate instability with 10-20 second trials and occasional UE corrections                Forward lunges onto step with push-off for plantarflexor activation 4 inch step   R foot on step then slight lunge to push off x 20       Green oval foam   X 10         *given in HEP  [employer] Portal   Pt given following band colors: [] Yellow          [] Red          [] Judd Thomson          [] Blue          [] Grey     Treatment/Session Summary:    · Response to Treatment: Patient states she goes this week for them to work on her AFO. No adverse reactions/unusual changes observed/reported in clinical status this session. · Communication/Consultation:  None today  · Equipment provided today:  None today  · Recommendations/Intent for next treatment session: Next visit will focus on manual therapy/modalities to reduce pain/swelling and improve mobility at R foot and ankle; work on hip strengthening; gait mechanics.     Total Treatment Billable Duration:  45 minutes  PT Patient Time In/Time Out  Time In: 4145  Time Out: [address], [place]    Future Appointments   Date Time Provider Jessie Carlisle   10/27/2021  2:30 PM Santiago Quinn, [facility] SFD   11/1/2021  7:00 PM Santiago Quinn, [facility] SFD   11/3/2021  2:30 PM Andrea Cole DPT SF[facility] SFD   11/10/2021  2:30 PM KELLEY FordDOKARINAT SFD   11/17/2021  2:30 PM Santiago Quinn, [facility] SFD   11/24/2021  2:30 PM KELLEY FordDORPT D   4/12/2022 11:00 AM ARTEM Valenzuela S[facility] PMR PMR   4/13/2022  9:30 AM PRE LAB RESOURCE [facility]SA PRE PRE   4/20/2022  2:40 PM Miguel Salter MD SSA PRE PRE   7/29/2022  2:20 PM Josias Rodriguez NP [facility] PSCD PP        Visit Approval Visit # Therapist initials Date A NS / Cx < 24 hr >24 hr Cx Comments   99 visits total 1 [facility] 8/17/2021 [x]  [] [] Initial evaluation     PDE 8-19/2021 [] [x] [] Stomach bug    2 [facility] 8/24/2021 [x] [] []     3  8/27/2021 [x] [] []     4 [facility] 8/31/2021 [x] [] []     5 [facility] 9/2/2021 [x] [] []      ANALI 9/8/2021 [] [x] [] Car won't start    6 PDE 9-9-2021 [x] [] []     7  9/14/2021 [x] [] []     8 [facility] 9/16/2021 [x] [] [] Progress note    9 [facility] 9/21/2021 [x] [] [] 1    10 [facility] 9/23/2021 [x] [] [] 2      9/28/2021 [] [x] [] Lack of transportation    11 [facility] 9/30/2021 [x] [] [] 3    12 PDE 10-1-2021 [x] [] [] 4    13  10/7/2021 [x] [] [] 5    14 PDE 10- [x] [] [] 6    15 PDE 10- [x] [] [] 7      16  89/31/0196 [x] [] [] Recertification note    17 PDE 10- [x] [] [] 1       [] [] []        [] [] []        [] [] []        [] [] []        [] [] []        [] [] []        [] [] []

## 2021-10-27 ENCOUNTER — HOSPITAL ENCOUNTER (OUTPATIENT)
Dept: PHYSICAL THERAPY | Age: 46
Discharge: HOME | End: 2021-10-27
Payer: MEDICARE

## 2021-10-27 PROCEDURE — 97110 THERAPEUTIC EXERCISES: CPT

## 2021-10-27 NOTE — PROGRESS NOTES
Johny Shaw  : 1975  Primary:   Secondary:  Therapy Center at [address] [place] [address] 68, [address], [place], [address] [facility]  [phone]   [phone]        OUTPATIENT PHYSICAL THERAPY: Daily Treatment Note 10/27/2021  Visit Count:  18  ICD-10: Treatment Diagnosis: Pain in right ankle and joints of right foot (M25.571)    Stiffness of right ankle, not elsewhere classified (M25.671)  Difficulty in walking, not elsewhere classified (R26.2)  Unsteadiness on feet (R26.81)  Muscle weakness (generalized) (M62.81)  Precautions/Allergies:   Erythromycin   TREATMENT PLAN:  Effective Dates/Frequency/Duration: Twice per week from 10/17/2021 until 10/30/2021 (2 weeks), once per week from 10/31/2021 until 2021 (7 weeks)    Pre-treatment Symptoms/Complaints:  Pt states she had her AFO adjusted, but does not have a pair of shoes to wear with it. Pain: Initial:   2/10 Post Session: no change in pain noted   Medications Last Reviewed:  10/27/2021  Updated Objective Findings: None Today   TREATMENT:   Therapeutic Exercise: ( 42 minutes):  Exercises per grid below  to improve mobility, strength, balance and dynamic movement of right ankle/foot to improve functional mobility. Required minimal visual, verbal and manual cues to promote proper body alignment, promote proper body posture, promote proper body mechanics and promote proper body breathing techniques. Progressed resistance, range, repetitions and complexity of movement as indicated.     Date:  10-4-2021 Date:  10/7/2021 Date:  10- Date:  10- Date:  10/21/2021 Date:  10- Date:  10-   Activity/Exercise          Physiostep Level 2   10 minutes with B LE's;   Blue band to maintain R foot on pedal  Level 1-2   10 minutes with B LE's;   Blue band to maintain R foot on pedal  Level 1-2  10 minutes with B LE's - blue band to maintain foot on pedal  Level 1  10 minutes with B LE's - blue band to maintain foot on pedal  Level 3  10 minutes with B LE's - blue band to maintain foot on pedal  Level 3  10 minutes with B LE's - blue band to hold R foot on pedal  Level 3  10 minutes with B LE's and blue band to keep R foot on pedal    Ankle plantarflexor stretch on incline board 3 x 30 second with knees flexed or extended -  L UE support  30 second hold x 3 reps with knees extended then flexed; L UE support 3 x 30 sec hold with knees extended and flexed 3 x 30 sec hold  With knees extended and flexed 3 x 30 sec hold  With knees extended and flexed 3 x 30 sec hold with knees flexed and extended 3 x 30 sec hold with knees flexed and extended   Anterior/posterior tapping on rocker board X 20 - minimal to no UE support     X 20 - no UE support X 20   No UE support  X 20   No UE support  X 20   No UE support    Lateral tapping on rocker board X 20 - minimal to no UE support   X 20 - no UE support  X 20   No UE support  X 20   No UE support  X 20   N UE support    Static standing on foam           Standing LE marching on foam          Step ups  Onto 3 inch foam; 20 reps/1 set leading with R LE with cues for ensuring full contact of R foot on foam prior to stepping up with L foot to ensure safety; no UE support and cues for increased control with stepping onto foam with L LE On blue foam   X 15  Leading with each foot     With minimal to no UE support  On blue foam x 15 leading with each foot     Minimal to no UE support Ascending/descending 5 steps with reciprocal gait going up, step to gait (leading with L LE) going down; single rail use at L; S Ascending and descending 4 inch step   2 x 10 leading with each foot. Ascend/descend   4 inch step   2 x 10   Leading with each foot.    Slight UE support    Ambulation over level ground  Throughout session with wide based quad cane on L with no AFO  Also performed 1 bout of 250 with narrow based quad cane on L with no AFO with minimal increase in instability; occasional cues to have more contact of quad cane with ground around corners to increase safety  1 bout of 80 feet with single point cane on L with no AFO with minimal to moderate increase in instability Throughout session with wide based quad cane on L with no AFO  Throughout session with wide based quad cane   Throughout session with wide based quad cane Throughout session and in hallways    Varying resistance at R LE in multiple different planes of motion     Per strength assessment in chart     R ankle PROM     Per ROM assessment in chart Stretching and mobility R ankle     Tapping cones placed anteriorly          Assessed AFO    Donned and looked at it. Needs to be adjusted by orthotist        L foot on step (for R sided weightbearing) 4 inch step   L forefoot on step and trying to lift foot up with weight bearing on R LR. Attempting to perform standing on foam with R LE and having L foot on 6 inch step, but pt everting R foot excessively due to instability so had to discontinue  Performed with entire L foot on 4 inch step with 3 inch foam placed on top to encourage increased weightbearing at R LE - minimal instability noted with 30-60 second trials - progressed to having only L forefoot on step - minimal to moderate instability with 10-20 second trials and occasional UE corrections        clams       X 20 B   Bridging        2 x 10- with 2-3 sec hold  And good control   Forward lunges onto step with push-off for plantarflexor activation 4 inch step   R foot on step then slight lunge to push off x 20       Green oval foam   X 10          *given in HEP  [employer] Portal   Pt given following band colors: [] Yellow          [] Red          [] Sharion Lofty          [] Blue          [] Grey     Treatment/Session Summary:    · Response to Treatment: Patient was given bridging and clams to add at home to HEP. No other complaints today.             No adverse reactions/unusual changes observed/reported in clinical status this session. · Communication/Consultation:  None today  · Equipment provided today:  None today  · Recommendations/Intent for next treatment session: Next visit will focus on manual therapy/modalities to reduce pain/swelling and improve mobility at R foot and ankle; work on hip strengthening; gait mechanics.     Total Treatment Billable Duration:  42 minutes  PT Patient Time In/Time Out  Time In: 1430  Time Out: [address], [facility]    Future Appointments   Date Time Provider Jessie Maylin   11/1/2021  7:00 PM Dante Molina, [facility]   11/3/2021  2:30 PM Art KIRKPATRICK, DPT [facility]PT SFD   11/4/2021  7:30 AM ECHO 52 [facility]SA UCDG UCD   11/10/2021  2:30 PM Skippy Peak, DPT [facility]PT SFD   11/17/2021  2:30 PM Dante Molina, [facility] SFD   11/24/2021  2:30 PM Skippy Peak, DPT [facility]PT SFD   4/12/2022 11:00 AM ARTEM Carranza SSA PMR PMR   4/13/2022  9:30 AM PRE LAB RESOURCE SSA PRE PRE   4/20/2022  2:40 PM Justino Quiroz MD SSA PRE PRE   7/29/2022  2:20 PM Bianka Schmitt NP [facility]SA PSCD PP        Visit Approval Visit # Therapist initials Date A NS / Cx < 24 hr >24 hr Cx Comments   99 visits total 1  8/17/2021 [x]  [] [] Initial evaluation     PDE 8-19/2021 [] [x] [] Stomach bug    2  8/24/2021 [x] [] []     3  8/27/2021 [x] [] []     4  8/31/2021 [x] [] []     5  9/2/2021 [x] [] []      ANALI 9/8/2021 [] [x] [] Car won't start    6 PDE 9-9-2021 [x] [] []     7  9/14/2021 [x] [] []     8  9/16/2021 [x] [] [] Progress note    9  9/21/2021 [x] [] [] 1    10  9/23/2021 [x] [] [] 2     [facility] 9/28/2021 [] [x] [] Lack of transportation    11 [facility] 9/30/2021 [x] [] [] 3    12 PDE 10-1-2021 [x] [] [] 4    13 [facility] 10/7/2021 [x] [] [] 5    14 PDE 10- [x] [] [] 6    15 PDE 10- [x] [] [] 7      16 [facility] 10/29/6493 [x] [] [] Recertification note    17 PDE 10- [x] [] [] 1    18 PDE 10- [x] [] [] 2       [] [] []        [] [] []        [] [] [] [] [] []        [] [] []        [] [] []

## 2021-11-01 ENCOUNTER — APPOINTMENT (OUTPATIENT)
Dept: PHYSICAL THERAPY | Age: 46
End: 2021-11-01
Payer: MEDICARE

## 2021-11-03 ENCOUNTER — HOSPITAL ENCOUNTER (OUTPATIENT)
Dept: PHYSICAL THERAPY | Age: 46
Discharge: HOME | End: 2021-11-03
Payer: MEDICARE

## 2021-11-03 PROCEDURE — 97110 THERAPEUTIC EXERCISES: CPT

## 2021-11-03 NOTE — PROGRESS NOTES
Laci Both  : 1975  Primary:   Secondary:  [address]  at [address] [facility] [address], [address], [place], [facility] [address] [facility]  [phone]   [phone]        OUTPATIENT PHYSICAL THERAPY: Daily Treatment Note 11/3/2021  Visit Count:  19  ICD-10: Treatment Diagnosis: Pain in right ankle and joints of right foot (M25.571)    Stiffness of right ankle, not elsewhere classified (M25.671)  Difficulty in walking, not elsewhere classified (R26.2)  Unsteadiness on feet (R26.81)  Muscle weakness (generalized) (M62.81)  Precautions/Allergies:   Erythromycin   TREATMENT PLAN:  Effective Dates/Frequency/Duration: Twice per week from 10/17/2021 until 10/30/2021 (2 weeks), once per week from 10/31/2021 until 2021 (7 weeks)    Pre-treatment Symptoms/Complaints:  Pt states she continued to look for shoes for her newly fitted AFO, but still hasn't found one yet; states she still struggles the most with stairs  Pain: Initial:   no pain reported Post Session: no change in pain noted   Medications Last Reviewed:  11/3/2021  Updated Objective Findings: None Today   TREATMENT:   Therapeutic Exercise: ( 41 minutes):  Exercises per grid below  to improve mobility, strength, balance and dynamic movement of right ankle/foot to improve functional mobility. Required minimal visual, verbal and manual cues to promote proper body alignment, promote proper body posture, promote proper body mechanics and promote proper body breathing techniques. Progressed resistance, range, repetitions and complexity of movement as indicated.     Date:  10/7/2021 Date:  10- Date:  10- Date:  10/21/2021 Date:  10- Date:  10- Date:  11/3/2021   Activity/Exercise          Physiostep Level 1-2   10 minutes with B LE's;   Blue band to maintain R foot on pedal  Level 1-2  10 minutes with B LE's - blue band to maintain foot on pedal  Level 1  10 minutes with B LE's - blue band to maintain foot on pedal  Level 3  10 minutes with B LE's - blue band to maintain foot on pedal  Level 3  10 minutes with B LE's - blue band to hold R foot on pedal  Level 3  10 minutes with B LE's and blue band to keep R foot on pedal  Level 3  10 minutes with B LE's and blue band to keep R foot on pedal    Ankle plantarflexor stretch on incline board 30 second hold x 3 reps with knees extended then flexed; L UE support 3 x 30 sec hold with knees extended and flexed 3 x 30 sec hold  With knees extended and flexed 3 x 30 sec hold  With knees extended and flexed 3 x 30 sec hold with knees flexed and extended 3 x 30 sec hold with knees flexed and extended 3 x 30 sec hold with knees flexed and extended   Anterior/posterior tapping on rocker board  X 20 - no UE support X 20   No UE support  X 20   No UE support  X 20   No UE support  20 reps/1 set each direction with cues for increased control throughout   Lateral tapping on rocker board  X 20 - no UE support  X 20   No UE support  X 20   No UE support  X 20   N UE support  20 reps/1 set each direction with cues for minimal to no UE support   Static standing on foam        Feet together for 30-60 second trials with minimal instability noted; no UE use   Standing LE marching on foam          Step ups Onto 3 inch foam; 20 reps/1 set leading with R LE with cues for ensuring full contact of R foot on foam prior to stepping up with L foot to ensure safety; no UE support and cues for increased control with stepping onto foam with L LE On blue foam   X 15  Leading with each foot     With minimal to no UE support  On blue foam x 15 leading with each foot     Minimal to no UE support Ascending/descending 5 steps with reciprocal gait going up, step to gait (leading with L LE) going down; single rail use at L; S Ascending and descending 4 inch step   2 x 10 leading with each foot. Ascend/descend   4 inch step   2 x 10   Leading with each foot.    Slight UE support     Ambulation over level ground Throughout session with wide based quad cane on L with no AFO  Also performed 1 bout of 250 with narrow based quad cane on L with no AFO with minimal increase in instability; occasional cues to have more contact of quad cane with ground around corners to increase safety  1 bout of 80 feet with single point cane on L with no AFO with minimal to moderate increase in instability Throughout session with wide based quad cane on L with no AFO  Throughout session with wide based quad cane   Throughout session with wide based quad cane Throughout session and in hallways     Varying resistance at R LE in multiple different planes of motion    Per strength assessment in chart      R ankle PROM    Per ROM assessment in chart Stretching and mobility R ankle      Tapping cones placed anteriorly       With L forefoot to single cone placed anteriorly x 15 reps with minimal to moderate instability; cues for increased weightshift to R LE prior to tapping L foot  With each forefoot to one of 3 cones placed anteriorly (therapist calling out foot and color) x 5 reps each LE with minimal to moderate instability; minimal to no UE use throughout   Assessed AFO   Donned and looked at it.     Needs to be adjusted by orthotist         L foot on step (for R sided weightbearing) Attempting to perform standing on foam with R LE and having L foot on 6 inch step, but pt everting R foot excessively due to instability so had to discontinue  Performed with entire L foot on 4 inch step with 3 inch foam placed on top to encourage increased weightbearing at R LE - minimal instability noted with 30-60 second trials - progressed to having only L forefoot on step - minimal to moderate instability with 10-20 second trials and occasional UE corrections      With entire L foot on 6 inch step (with foam on top) progressing to only forefoot on step; 30-60 second holds with minimal to moderate instability; progressing to performing taps with L forefoot on and off of step (foam still on step) x 15 reps/1 set   clams      X 20 B    Bridging       2 x 10- with 2-3 sec hold  And good control    Forward lunges onto step with push-off for plantarflexor activation          *given in HEP  [employer] Portal   Pt given following band colors: [] Yellow          [] Red          [] Green          [] Blue          [] Grey     Treatment/Session Summary:    · Response to Treatment: Patient demonstrating increased difficulty with R SLS exercises this session, but demonstrated improved stability with increased practice. No adverse reactions/unusual changes observed/reported in clinical status this session. · Communication/Consultation:  None today  · Equipment provided today:  None today  · Recommendations/Intent for next treatment session: Next visit will focus on manual therapy/modalities to reduce pain/swelling and improve mobility at R foot and ankle; work on hip strengthening; gait mechanics.     Total Treatment Billable Duration:  41 minutes  PT Patient Time In/Time Out  Time In: 1430  Time Out: [address]    Future Appointments   Date Time Provider Jessie Carlisle   11/4/2021  7:30 AM [facility]   11/10/2021  2:30 PM Vivian Quintero DPT [facility]   11/17/2021  2:30 PM [facility]   11/24/2021  2:30 PM Vivian Quintero DPT [facility]   4/12/2022 11:00 AM ARTEM Marie [facility] PMR PMR   4/13/2022  9:30 AM PRE LAB RESOURCE [facility] PRE PRE   4/20/2022  2:40 PM Francisco Zacarias MD [facility] PRE PRE   7/29/2022  2:20 PM Bora Smith NP [facility] PSCD PP        Visit Approval Visit # Therapist initials Date A NS / Cx < 24 hr >24 hr Cx Comments   99 visits total 1  8/17/2021 [x]  [] [] Initial evaluation     PDE 8-19/2021 [] [x] [] Stomach bug    2  8/24/2021 [x] [] []     3  8/27/2021 [x] [] []     4  8/31/2021 [x] [] []     5  9/2/2021 [x] [] []      ANALI 9/8/2021 [] [x] [] Car won't start    6 PDE 9-9-2021 [x] [] []     7 [facility] 9/14/2021 [x] [] []     8 [facility] 9/16/2021 [x] [] [] Progress note    9 [facility] 9/21/2021 [x] [] [] 1    10 [facility] 9/23/2021 [x] [] [] 2     [facility] 9/28/2021 [] [x] [] Lack of transportation    11 [facility] 9/30/2021 [x] [] [] 3    12 PDE 10-1-2021 [x] [] [] 4    13 [facility] 10/7/2021 [x] [] [] 5    14 PDE 10- [x] [] [] 6    15 PDE 10- [x] [] [] 7      16 [facility] 76/27/8419 [x] [] [] Recertification note    17 PDE 10- [x] [] [] 1    18 PDE 10- [x] [] [] 2    19 [facility] 11/3/2021 [x] [] [] 3       [] [] []        [] [] []        [] [] []        [] [] []        [] [] []

## 2021-11-10 ENCOUNTER — HOSPITAL ENCOUNTER (OUTPATIENT)
Dept: PHYSICAL THERAPY | Age: 46
Discharge: HOME | End: 2021-11-10
Payer: MEDICARE

## 2021-11-10 PROCEDURE — 97110 THERAPEUTIC EXERCISES: CPT

## 2021-11-10 NOTE — PROGRESS NOTES
Justino Hollingsworth  : 1975  Primary:   Secondary:  [facility]  [address], [address] [facility], [address]  [phone]   [phone]        OUTPATIENT PHYSICAL THERAPY: Daily Treatment Note 11/10/2021  Visit Count:  20  ICD-10: Treatment Diagnosis: Pain in right ankle and joints of right foot (M25.571)    Stiffness of right ankle, not elsewhere classified (M25.671)  Difficulty in walking, not elsewhere classified (R26.2)  Unsteadiness on feet (R26.81)  Muscle weakness (generalized) (M62.81)  Precautions/Allergies:   Erythromycin   TREATMENT PLAN:  Effective Dates/Frequency/Duration: Twice per week from 10/17/2021 until 10/30/2021 (2 weeks), once per week from 10/31/2021 until 2021 (7 weeks)    Pre-treatment Symptoms/Complaints:  Pt states she still has not been able to find a shoe that will work for her R AFO, but she is continuing to look for shoes; pt states she still struggles with increased spasticity in her R foot that negatively affects her walking - states this appears to be due to her pain level in her foot/ankle  Pain: Initial:   2/10 Post Session: no change in pain noted   Medications Last Reviewed:  11/10/2021  Updated Objective Findings: None Today   TREATMENT:   Therapeutic Exercise: (  39 minutes):  Exercises per grid below  to improve mobility, strength, balance and dynamic movement of right ankle/foot to improve functional mobility. Required minimal visual, verbal and manual cues to promote proper body alignment, promote proper body posture, promote proper body mechanics and promote proper body breathing techniques. Progressed resistance, range, repetitions and complexity of movement as indicated.     Date:  10- Date:  10/21/2021 Date:  10- Date:  10- Date:  11/3/2021 Date:  11/10/2021   Activity/Exercise         Physiostep Level 1  10 minutes with B LE's - blue band to maintain foot on pedal  Level 3  10 minutes with B LE's - blue band to maintain foot on pedal  Level 3  10 minutes with B LE's - blue band to hold R foot on pedal  Level 3  10 minutes with B LE's and blue band to keep R foot on pedal  Level 3  10 minutes with B LE's and blue band to keep R foot on pedal  Level 1  10 minutes with B LE's and blue band to keep R foot on pedal    Ankle plantarflexor stretch on incline board 3 x 30 sec hold  With knees extended and flexed 3 x 30 sec hold  With knees extended and flexed 3 x 30 sec hold with knees flexed and extended 3 x 30 sec hold with knees flexed and extended 3 x 30 sec hold with knees flexed and extended 3 x 30 sec hold with knees flexed and extended   Anterior/posterior tapping on rocker board X 20   No UE support  X 20   No UE support  X 20   No UE support  20 reps/1 set each direction with cues for increased control throughout With feet in semi-tandem stance each LE forward; 20 reps/1 set with cues for increased control and no UE use; minimal instability with only S; pt requiring more UE support with R LE forward due to decreased stability and slight pain in R ankle   Lateral tapping on rocker board X 20   No UE support  X 20   No UE support  X 20   N UE support  20 reps/1 set each direction with cues for minimal to no UE support 20 reps/1 set each direction with cues for minimal to no UE support   Static standing on foam      Feet together for 30-60 second trials with minimal instability noted; no UE use    Standing LE marching on foam         Step ups On blue foam x 15 leading with each foot     Minimal to no UE support Ascending/descending 5 steps with reciprocal gait going up, step to gait (leading with L LE) going down; single rail use at L; S Ascending and descending 4 inch step   2 x 10 leading with each foot. Ascend/descend   4 inch step   2 x 10   Leading with each foot.    Slight UE support      Ambulation over level ground Throughout session with wide based quad cane   Throughout session with wide based quad cane Throughout session and in hallways   With narrow-based quad cane; bout of 600+ feet with cues for longer step length; walking over level ground in clinic as well as over unlevel ground outside, with cues for more careful steps when over unlevel ground   Varying resistance at R LE in multiple different planes of motion  Per strength assessment in chart       R ankle PROM  Per ROM assessment in chart Stretching and mobility R ankle       Tapping cones placed anteriorly     With L forefoot to single cone placed anteriorly x 15 reps with minimal to moderate instability; cues for increased weightshift to R LE prior to tapping L foot  With each forefoot to one of 3 cones placed anteriorly (therapist calling out foot and color) x 5 reps each LE with minimal to moderate instability; minimal to no UE use throughout    Assessed AFO          L foot on step (for R sided weightbearing)     With entire L foot on 6 inch step (with foam on top) progressing to only forefoot on step; 30-60 second holds with minimal to moderate instability; progressing to performing taps with L forefoot on and off of step (foam still on step) x 15 reps/1 set    clams    X 20 B     Bridging     2 x 10- with 2-3 sec hold  And good control     Forward lunges onto step with push-off for plantarflexor activation         *given in HEP  [facility] [facility]   Pt given following band colors: [] Yellow          [] Red          [] Green          [] Blue          [] Grey     Treatment/Session Summary:    · Response to Treatment: Discussed with pt several different shoe stores that she has not looked at yet, and advised her to bring the pair of shoes that she used to wear with her old AFO to aid in the search. Also discussed with pt borrowing one of her daughter's pairs of shoes (since her daughter wears an 8 and she wears a 6) and bringing it to next session to see if this will temporarily work so we can practice walking with the AFO.  Patient demonstrating improved gait technique when ambulating with narrow-based quad cane this session. No adverse reactions/unusual changes observed/reported in clinical status this session. · Communication/Consultation:  None today  · Equipment provided today:  None today  · Recommendations/Intent for next treatment session: Next visit will focus on manual therapy/modalities to reduce pain/swelling and improve mobility at R foot and ankle; work on hip strengthening; gait mechanics.     Total Treatment Billable Duration:  39 minutes  PT Patient Time In/Time Out  Time In: 3247  Time Out: [address], DPT    Future Appointments   Date Time Provider Jessie Maylin   11/17/2021  2:30 PM Mikal Keating [facility]   11/24/2021  2:30 PM KELLEY Spencer D   12/1/2021  1:45 PM Mikal Keating [facility] SFD   12/8/2021  2:30 PM KELLEY Spencer D   4/12/2022 11:00 AM ARTEM Sterling [facility] PMR PMR   4/13/2022  9:30 AM PRE LAB RESOURCE [facility] PRE PRE   4/20/2022  2:40 PM Madelyn Hernandez MD [facility] PRE PRE   7/29/2022  2:20 PM Eduardo Vera NP [facility] PSCD PP        Visit Approval Visit # Therapist initials Date A NS / Cx < 24 hr >24 hr Cx Comments   99 visits total 1  8/17/2021 [x]  [] [] Initial evaluation     PDE 8-19/2021 [] [x] [] Stomach bug    2  8/24/2021 [x] [] []     3  8/27/2021 [x] [] []     4  8/31/2021 [x] [] []     5  9/2/2021 [x] [] []      ANALI 9/8/2021 [] [x] [] Car won't start    6 PDE 9-9-2021 [x] [] []     7  9/14/2021 [x] [] []     8  9/16/2021 [x] [] [] Progress note    9  9/21/2021 [x] [] [] 1    10  9/23/2021 [x] [] [] 2      9/28/2021 [] [x] [] Lack of transportation    11  9/30/2021 [x] [] [] 3    12 PDE 10-1-2021 [x] [] [] 4    13  10/7/2021 [x] [] [] 5    14 PDE 10- [x] [] [] 6    15 PDE 10- [x] [] [] 7      16 [facility] 76/79/4847 [x] [] [] Recertification note    17 PDE 10- [x] [] [] 1 18 PDE 10- [x] [] [] 2    19  11/3/2021 [x] [] [] 3    20  11/10/2021 [x] [] []        [] [] []        [] [] []        [] [] []        [] [] []

## 2021-11-17 ENCOUNTER — HOSPITAL ENCOUNTER (OUTPATIENT)
Dept: PHYSICAL THERAPY | Age: 46
Discharge: HOME | End: 2021-11-17
Payer: MEDICARE

## 2021-11-17 PROCEDURE — 97110 THERAPEUTIC EXERCISES: CPT

## 2021-11-17 NOTE — PROGRESS NOTES
Diana Degree  : 1975  Primary:   Secondary:  [facility]  [address], [address] [facility], [address]  [phone]   [phone]        OUTPATIENT PHYSICAL THERAPY: Daily Treatment Note 2021  Visit Count:  21  ICD-10: Treatment Diagnosis: Pain in right ankle and joints of right foot (M25.571)    Stiffness of right ankle, not elsewhere classified (M25.671)  Difficulty in walking, not elsewhere classified (R26.2)  Unsteadiness on feet (R26.81)  Muscle weakness (generalized) (M62.81)  Precautions/Allergies:   Erythromycin   TREATMENT PLAN:  Effective Dates/Frequency/Duration: Twice per week from 10/17/2021 until 10/30/2021 (2 weeks), once per week from 10/31/2021 until 2021 (7 weeks)    Pre-treatment Symptoms/Complaints:  Pt states she still has not been able to find a shoe that will work for her R AFO, but she is continuing to look for shoes; pt states she still struggles with increased spasticity in her R foot that negatively affects her walking - states this appears to be due to her pain level in her foot/ankle  Pain: Initial:   2/10 Post Session: no change in pain noted   Medications Last Reviewed:  2021  Updated Objective Findings: None Today   TREATMENT:   Therapeutic Exercise: (  39 minutes):  Exercises per grid below  to improve mobility, strength, balance and dynamic movement of right ankle/foot to improve functional mobility. Required minimal visual, verbal and manual cues to promote proper body alignment, promote proper body posture, promote proper body mechanics and promote proper body breathing techniques. Progressed resistance, range, repetitions and complexity of movement as indicated.     Date:  10/21/2021 Date:  10- Date:  10- Date:  11/3/2021 Date:  11/10/2021 Date:  2021   Activity/Exercise         Physiostep Level 3  10 minutes with B LE's - blue band to maintain foot on pedal  Level 3  10 minutes with B LE's - blue band to hold R foot on pedal  Level 3  10 minutes with B LE's and blue band to keep R foot on pedal  Level 3  10 minutes with B LE's and blue band to keep R foot on pedal  Level 1  10 minutes with B LE's and blue band to keep R foot on pedal  Level 1  10 minutes with B LE's and blue band to keep R foot on pedal    Ankle plantarflexor stretch on incline board 3 x 30 sec hold  With knees extended and flexed 3 x 30 sec hold with knees flexed and extended 3 x 30 sec hold with knees flexed and extended 3 x 30 sec hold with knees flexed and extended 3 x 30 sec hold with knees flexed and extended 3 x 30 sec hold with knees flexed and extended   Anterior/posterior tapping on rocker board  X 20   No UE support  X 20   No UE support  20 reps/1 set each direction with cues for increased control throughout With feet in semi-tandem stance each LE forward; 20 reps/1 set with cues for increased control and no UE use; minimal instability with only S; pt requiring more UE support with R LE forward due to decreased stability and slight pain in R ankle Staggered stance x 20 with each foot forward - no UE support    Lateral tapping on rocker board  X 20   No UE support  X 20   N UE support  20 reps/1 set each direction with cues for minimal to no UE support 20 reps/1 set each direction with cues for minimal to no UE support Staggered stance   X 20 with each foot forward  With no UE support    Static standing on foam     Feet together for 30-60 second trials with minimal instability noted; no UE use     Standing LE marching on foam         Step ups Ascending/descending 5 steps with reciprocal gait going up, step to gait (leading with L LE) going down; single rail use at L; S Ascending and descending 4 inch step   2 x 10 leading with each foot. Ascend/descend   4 inch step   2 x 10   Leading with each foot. Slight UE support    Step up and down on blue foam    x 20   Leading with each foot.     Ambulation over level ground Throughout session with wide based quad cane Throughout session and in hallways   With narrow-based quad cane; bout of 600+ feet with cues for longer step length; walking over level ground in clinic as well as over unlevel ground outside, with cues for more careful steps when over unlevel ground With narrow-based quad cane; bout of 500+ ft with cues for longer step length - over different tile textures and obstacles in the hallways   Varying resistance at R LE in multiple different planes of motion Per strength assessment in chart        R ankle PROM Per ROM assessment in chart Stretching and mobility R ankle        Tapping cones placed anteriorly    With L forefoot to single cone placed anteriorly x 15 reps with minimal to moderate instability; cues for increased weightshift to R LE prior to tapping L foot  With each forefoot to one of 3 cones placed anteriorly (therapist calling out foot and color) x 5 reps each LE with minimal to moderate instability; minimal to no UE use throughout     Assessed AFO          L foot on step (for R sided weightbearing)    With entire L foot on 6 inch step (with foam on top) progressing to only forefoot on step; 30-60 second holds with minimal to moderate instability; progressing to performing taps with L forefoot on and off of step (foam still on step) x 15 reps/1 set     clams   X 20 B      Bridging    2 x 10- with 2-3 sec hold  And good control      Forward lunges onto step with push-off for plantarflexor activation         *given in HEP  [employer] Portal   Pt given following band colors: [] Yellow          [] Red          [] Green          [] Blue          [] Grey     Treatment/Session Summary:    · Response to Treatment:  She still can not find a pair of shoes that is wide enough to fit her braces. She is looking to get a smaller quad cane also. Ambulation improving  with narrow based quad cane.          No adverse reactions/unusual changes observed/reported in clinical status this session. · Communication/Consultation:  None today  · Equipment provided today:  None today  · Recommendations/Intent for next treatment session: Next visit will focus on manual therapy/modalities to reduce pain/swelling and improve mobility at R foot and ankle; work on hip strengthening; gait mechanics.     Total Treatment Billable Duration:  39 minutes  PT Patient Time In/Time Out  Time In: 1430  Time Out: [address], PTA    Future Appointments   Date Time Provider Jessie Carlisle   11/24/2021  2:30 PM Vivian Quintero DPT [facility]   12/1/2021  1:45 [facility]   12/8/2021  2:30 PM Breann KIRKPATRICK DPT [facility] D   4/12/2022 11:00 AM ARTEM Marie SSA PMR PMR   4/13/2022  9:30 AM PRE LAB RESOURCE [facility] PRE PRE   4/20/2022  2:40 PM Francisco Zacarias MD [facility] PRE PRE   7/29/2022  2:20 PM Bora Smith NP [facility] PSCD PP        Visit Approval Visit # Therapist initials Date A NS / Cx < 24 hr >24 hr Cx Comments   99 visits total 1  8/17/2021 [x]  [] [] Initial evaluation     PDE 8-19/2021 [] [x] [] Stomach bug    2  8/24/2021 [x] [] []     3  8/27/2021 [x] [] []     4  8/31/2021 [x] [] []     5  9/2/2021 [x] [] []      ANALI 9/8/2021 [] [x] [] Car won't start    6 PDE 9-9-2021 [x] [] []     7  9/14/2021 [x] [] []     8  9/16/2021 [x] [] [] Progress note    9  9/21/2021 [x] [] [] 1    10  9/23/2021 [x] [] [] 2      9/28/2021 [] [x] [] Lack of transportation    11  9/30/2021 [x] [] [] 3    12 PDE 10-1-2021 [x] [] [] 4    13  10/7/2021 [x] [] [] 5    14 PDE 10- [x] [] [] 6    15 PDE 10- [x] [] [] 7      16  55/55/9234 [x] [] [] Recertification note    17 PDE 10- [x] [] [] 1    18 PDE 10- [x] [] [] 2    19 [facility] 11/3/2021 [x] [] [] 3    20  11/10/2021 [x] [] [] 4    21 PDE 11- [x] [] [] 5       [] [] []        [] [] []        [] [] []

## 2021-11-24 ENCOUNTER — HOSPITAL ENCOUNTER (OUTPATIENT)
Dept: PHYSICAL THERAPY | Age: 46
Discharge: HOME | End: 2021-11-24
Payer: MEDICARE

## 2021-11-24 PROCEDURE — 97110 THERAPEUTIC EXERCISES: CPT

## 2021-11-24 NOTE — PROGRESS NOTES
Samy Donald  : 1975  Primary:   Secondary:  [facility]  [address], [address], [address]  [phone]   [phone]        OUTPATIENT PHYSICAL THERAPY: Daily Treatment Note 2021  Visit Count:  22  ICD-10: Treatment Diagnosis: Pain in right ankle and joints of right foot (M25.571)    Stiffness of right ankle, not elsewhere classified (M25.671)  Difficulty in walking, not elsewhere classified (R26.2)  Unsteadiness on feet (R26.81)  Muscle weakness (generalized) (M62.81)  Precautions/Allergies:   Erythromycin   TREATMENT PLAN:  Effective Dates/Frequency/Duration: Twice per week from 10/17/2021 until 10/30/2021 (2 weeks), once per week from 10/31/2021 until 2021 (7 weeks)    Pre-treatment Symptoms/Complaints:  Pt states she has still has not been able to find shoes that will fit her AFO, but states she has been looking (states she is only able to look one day each week due to her daughter's schedule); pt states she hasn't been bothered by her ankle bone as much this past week   Pain: Initial:   1/10 Post Session: no change in pain noted   Medications Last Reviewed:  2021  Updated Objective Findings: None Today   TREATMENT:   Therapeutic Exercise: (  40 minutes):  Exercises per grid below  to improve mobility, strength, balance and dynamic movement of right ankle/foot to improve functional mobility. Required minimal visual, verbal and manual cues to promote proper body alignment, promote proper body posture, promote proper body mechanics and promote proper body breathing techniques. Progressed resistance, range, repetitions and complexity of movement as indicated.     Date:  10- Date:  10- Date:  11/3/2021 Date:  11/10/2021 Date:  2021 Date:  2021   Activity/Exercise         Physiostep Level 3  10 minutes with B LE's - blue band to hold R foot on pedal  Level 3  10 minutes with B LE's and blue band to keep R foot on pedal  Level 3  10 minutes with B LE's and blue band to keep R foot on pedal  Level 1  10 minutes with B LE's and blue band to keep R foot on pedal  Level 1  10 minutes with B LE's and blue band to keep R foot on pedal  Level 1  10 minutes with B LE's and blue band to keep R foot on pedal    Ankle plantarflexor stretch on incline board 3 x 30 sec hold with knees flexed and extended 3 x 30 sec hold with knees flexed and extended 3 x 30 sec hold with knees flexed and extended 3 x 30 sec hold with knees flexed and extended 3 x 30 sec hold with knees flexed and extended 3 x 30 sec hold with knees flexed and extended   Anterior/posterior tapping on rocker board X 20   No UE support  X 20   No UE support  20 reps/1 set each direction with cues for increased control throughout With feet in semi-tandem stance each LE forward; 20 reps/1 set with cues for increased control and no UE use; minimal instability with only S; pt requiring more UE support with R LE forward due to decreased stability and slight pain in R ankle Staggered stance x 20 with each foot forward - no UE support     Lateral tapping on rocker board X 20   No UE support  X 20   N UE support  20 reps/1 set each direction with cues for minimal to no UE support 20 reps/1 set each direction with cues for minimal to no UE support Staggered stance   X 20 with each foot forward  With no UE support     Static standing on foam    Feet together for 30-60 second trials with minimal instability noted; no UE use   Feet in semi-tandem stance; 30-60 second trials with minimal to no instability noted; minimal to no UE use (occasional corrections)   Standing LE marching on foam         Step ups Ascending and descending 4 inch step   2 x 10 leading with each foot. Ascend/descend   4 inch step   2 x 10   Leading with each foot. Slight UE support    Step up and down on blue foam    x 20   Leading with each foot.      Ambulation over level ground Throughout session and in hallways   With narrow-based quad cane; bout of 600+ feet with cues for longer step length; walking over level ground in clinic as well as over unlevel ground outside, with cues for more careful steps when over unlevel ground With narrow-based quad cane; bout of 500+ ft with cues for longer step length - over different tile textures and obstacles in the hallways With narrow-based quad cane; bout of 800+ feet with occasional cues for longer R step length; walking over level ground in clinic as well as over unlevel ground outside, with cues for more careful steps when over unlevel ground  Also practiced stepping up/down over curb with narrow-based quad cane with S; cues for increasing proximity to step prior to stepping up/down   Varying resistance at R LE in multiple different planes of motion         R ankle PROM Stretching and mobility R ankle         Tapping cones placed anteriorly   With L forefoot to single cone placed anteriorly x 15 reps with minimal to moderate instability; cues for increased weightshift to R LE prior to tapping L foot  With each forefoot to one of 3 cones placed anteriorly (therapist calling out foot and color) x 5 reps each LE with minimal to moderate instability; minimal to no UE use throughout      Assessed AFO          L foot on step (for R sided weightbearing)   With entire L foot on 6 inch step (with foam on top) progressing to only forefoot on step; 30-60 second holds with minimal to moderate instability; progressing to performing taps with L forefoot on and off of step (foam still on step) x 15 reps/1 set      clams  X 20 B       Bridging   2 x 10- with 2-3 sec hold  And good control       Forward lunges onto step with push-off for plantarflexor activation         *given in HEP  [employer] Portal   Pt given following band colors: [] Yellow          [] Red          [] Nancy Fowler          [] Blue          [] Grey     Treatment/Session Summary:    · Response to Treatment:  Pt continuing to demonstrate improved ability to walk longer distances over unlevel surfaces with cane this session with improved stability. She is continuing to look for shoe that will accommodate her AFO. No adverse reactions/unusual changes observed/reported in clinical status this session. · Communication/Consultation:  None today  · Equipment provided today:  None today  · Recommendations/Intent for next treatment session: Next visit will focus on manual therapy/modalities to reduce pain/swelling and improve mobility at R foot and ankle; work on hip strengthening; gait mechanics.     Total Treatment Billable Duration:  40 minutes  PT Patient Time In/Time Out  Time In: 1459  Time Out: KELLEY Wright    Future Appointments   Date Time Provider Jessie Carlisle   12/1/2021  1:45 PM Melani Parry PTA [facility]   12/8/2021  2:30 PM Ingrid KIRKPATRICK DPT [facility]   4/12/2022 11:00 AM ARTEM Hunt SSA PMR PMR   4/13/2022  9:30 AM PRE LAB RESOURCE [facility] PRE PRE   4/20/2022  2:40 PM Maribel Fonseca MD [facility] PRE PRE   7/29/2022  2:20 PM Meli Penny NP [facility] PSCD PP        Visit Approval Visit # Therapist initials Date A NS / Cx < 24 hr >24 hr Cx Comments   99 visits total 1  8/17/2021 [x]  [] [] Initial evaluation     PDE 8-19/2021 [] [x] [] Stomach bug    2  8/24/2021 [x] [] []     3  8/27/2021 [x] [] []     4  8/31/2021 [x] [] []     5  9/2/2021 [x] [] []      ANALI 9/8/2021 [] [x] [] Car won't start    6 PDE 9-9-2021 [x] [] []     7  9/14/2021 [x] [] []     8  9/16/2021 [x] [] [] Progress note    9  9/21/2021 [x] [] [] 1    10  9/23/2021 [x] [] [] 2      9/28/2021 [] [x] [] Lack of transportation    11  9/30/2021 [x] [] [] 3    12 PDE 10-1-2021 [x] [] [] 4    13  10/7/2021 [x] [] [] 5    14 PDE 10- [x] [] [] 6    15 PDE 10- [x] [] [] 7      16 [facility] 07/52/3054 [x] [] [] Recertification note    17 PDE 10- [x] [] [] 1    18 PDE 10- [x] [] [] 2    19  11/3/2021 [x] [] [] 3    20  11/10/2021 [x] [] [] 4    21 PDE 11- [x] [] [] 5    22  11/24/2021 [x] [] []        [] [] []        [] [] []

## 2021-12-01 ENCOUNTER — HOSPITAL ENCOUNTER (OUTPATIENT)
Dept: PHYSICAL THERAPY | Age: 46
Discharge: HOME | End: 2021-12-01
Payer: MEDICARE

## 2021-12-01 PROCEDURE — 97110 THERAPEUTIC EXERCISES: CPT

## 2021-12-01 NOTE — PROGRESS NOTES
Patricia Torres  : 1975  Primary:   Secondary:  [facility]  [address], [address] [facility], [address]  [phone]   [phone]        OUTPATIENT PHYSICAL THERAPY: Daily Treatment Note 2021  Visit Count:  23  ICD-10: Treatment Diagnosis: Pain in right ankle and joints of right foot (M25.571)    Stiffness of right ankle, not elsewhere classified (M25.671)  Difficulty in walking, not elsewhere classified (R26.2)  Unsteadiness on feet (R26.81)  Muscle weakness (generalized) (M62.81)  Precautions/Allergies:   Erythromycin   TREATMENT PLAN:  Effective Dates/Frequency/Duration: Twice per week from 10/17/2021 until 10/30/2021 (2 weeks), once per week from 10/31/2021 until 2021 (7 weeks)    Pre-treatment Symptoms/Complaints:  Pt states she is noticing improvement with her ankle. Pain: Initial:   1/10 Post Session: no change in pain noted   Medications Last Reviewed:  2021  Updated Objective Findings: None Today   TREATMENT:   Therapeutic Exercise: (  43 minutes):  Exercises per grid below  to improve mobility, strength, balance and dynamic movement of right ankle/foot to improve functional mobility. Required minimal visual, verbal and manual cues to promote proper body alignment, promote proper body posture, promote proper body mechanics and promote proper body breathing techniques. Progressed resistance, range, repetitions and complexity of movement as indicated.     Date:  10- Date:  11/3/2021 Date:  11/10/2021 Date:  2021 Date:  2021 Date:  2021   Activity/Exercise         Physiostep Level 3  10 minutes with B LE's and blue band to keep R foot on pedal  Level 3  10 minutes with B LE's and blue band to keep R foot on pedal  Level 1  10 minutes with B LE's and blue band to keep R foot on pedal  Level 1  10 minutes with B LE's and blue band to keep R foot on pedal  Level 1  10 minutes with B LE's and blue band to keep R foot on pedal  Level 1  10 minutes B LE's with blue band to keep R foot on pedal   Ankle plantarflexor stretch on incline board 3 x 30 sec hold with knees flexed and extended 3 x 30 sec hold with knees flexed and extended 3 x 30 sec hold with knees flexed and extended 3 x 30 sec hold with knees flexed and extended 3 x 30 sec hold with knees flexed and extended 3 x 30 sec hold with knees flexed and extended   Anterior/posterior tapping on rocker board X 20   No UE support  20 reps/1 set each direction with cues for increased control throughout With feet in semi-tandem stance each LE forward; 20 reps/1 set with cues for increased control and no UE use; minimal instability with only S; pt requiring more UE support with R LE forward due to decreased stability and slight pain in R ankle Staggered stance x 20 with each foot forward - no UE support   Staggered stance x 20 with each foot forward - no UE support   Lateral tapping on rocker board X 20   N UE support  20 reps/1 set each direction with cues for minimal to no UE support 20 reps/1 set each direction with cues for minimal to no UE support Staggered stance   X 20 with each foot forward  With no UE support      Static standing on foam   Feet together for 30-60 second trials with minimal instability noted; no UE use   Feet in semi-tandem stance; 30-60 second trials with minimal to no instability noted; minimal to no UE use (occasional corrections) Feet in semi-tandem   30-60 sec trails with minimal to no instability noted: minimal to no UE use    Standing LE marching on foam         Step ups Ascend/descend   4 inch step   2 x 10   Leading with each foot. Slight UE support    Step up and down on blue foam    x 20   Leading with each foot. 6 inch   X 20 leading with each foot.     Ambulation over level ground   With narrow-based quad cane; bout of 600+ feet with cues for longer step length; walking over level ground in clinic as well as over unlevel ground outside, with cues for more careful steps when over unlevel ground With narrow-based quad cane; bout of 500+ ft with cues for longer step length - over different tile textures and obstacles in the hallways With narrow-based quad cane; bout of 800+ feet with occasional cues for longer R step length; walking over level ground in clinic as well as over unlevel ground outside, with cues for more careful steps when over unlevel ground  Also practiced stepping up/down over curb with narrow-based quad cane with S; cues for increasing proximity to step prior to stepping up/down    Varying resistance at R LE in multiple different planes of motion         R ankle PROM         Tapping cones placed anteriorly  With L forefoot to single cone placed anteriorly x 15 reps with minimal to moderate instability; cues for increased weightshift to R LE prior to tapping L foot  With each forefoot to one of 3 cones placed anteriorly (therapist calling out foot and color) x 5 reps each LE with minimal to moderate instability; minimal to no UE use throughout       Assessed AFO          L foot on step (for R sided weightbearing)  With entire L foot on 6 inch step (with foam on top) progressing to only forefoot on step; 30-60 second holds with minimal to moderate instability; progressing to performing taps with L forefoot on and off of step (foam still on step) x 15 reps/1 set       clams X 20 B        Bridging  2 x 10- with 2-3 sec hold  And good control        Forward lunges onto step with push-off for plantarflexor activation         *given in HEP  [employer] Portal   Pt given following band colors: [] Yellow          [] Red          [] Green          [] Blue          [] Grey     Treatment/Session Summary:    · Response to Treatment:  Pt continuing to show improvement in her endurance. Patient hopefully looking for shoes this weekend.          No adverse reactions/unusual changes observed/reported in clinical status this session. · Communication/Consultation:  None today  · Equipment provided today:  None today  · Recommendations/Intent for next treatment session: Next visit will focus on manual therapy/modalities to reduce pain/swelling and improve mobility at R foot and ankle; work on hip strengthening; gait mechanics.     Total Treatment Billable Duration:  43 minutes  PT Patient Time In/Time Out  Time In: 1345  Time Out: TRISTIAN [place]    Future Appointments   Date Time Provider Jessie Carlisle   12/8/2021  2:30 PM Maura Badlwin DPT [facility]   4/12/2022 11:00 AM ARTEM Galaviz SSA PMR PMR   4/13/2022  9:30 AM PRE LAB RESOURCE [facility]SA PRE PRE   4/20/2022  2:40 PM Justino Quiroz MD [facility] PRE PRE   7/29/2022  2:20 PM Bianka Schmitt NP [facility] PSCD PP        Visit Approval Visit # Therapist initials Date A NS / Cx < 24 hr >24 hr Cx Comments   99 visits total 1  8/17/2021 [x]  [] [] Initial evaluation     PDE 8-19/2021 [] [x] [] Stomach bug    2  8/24/2021 [x] [] []     3  8/27/2021 [x] [] []     4  8/31/2021 [x] [] []     5  9/2/2021 [x] [] []      ANALI 9/8/2021 [] [x] [] Car won't start    6 PDE 9-9-2021 [x] [] []     7  9/14/2021 [x] [] []     8  9/16/2021 [x] [] [] Progress note    9  9/21/2021 [x] [] [] 1    10  9/23/2021 [x] [] [] 2      9/28/2021 [] [x] [] Lack of transportation    11  9/30/2021 [x] [] [] 3    12 PDE 10-1-2021 [x] [] [] 4    13  10/7/2021 [x] [] [] 5    14 PDE 10- [x] [] [] 6    15 PDE 10- [x] [] [] 7      16  18/73/1129 [x] [] [] Recertification note    17 PDE 10- [x] [] [] 1    18 PDE 10- [x] [] [] 2    19  11/3/2021 [x] [] [] 3    20  11/10/2021 [x] [] [] 4    21 PDE 11- [x] [] [] 5    22 [facility] 11/24/2021 [x] [] [] 6    23 PDE 12-1-2021 [x] [] [] 7       [] [] []

## 2021-12-08 ENCOUNTER — HOSPITAL ENCOUNTER (OUTPATIENT)
Dept: PHYSICAL THERAPY | Age: 46
Discharge: HOME | End: 2021-12-08
Payer: MEDICARE

## 2021-12-08 NOTE — PROGRESS NOTES
Caity Credit  : 1975  [place]  Secondary:  [address]  [facility] [address], [address]  [phone]   [phone]        CANCELLATION NOTE    Ms. Ishmael Benson was a same-day cancellation for today's appointment.      # Recent No Shows/Same-Day Cancellations: 1    2021  Matthew Richmond DPT    Visit Approval Visit # Therapist initials Date A NS / Cx < 24 hr >24 hr Cx Comments   99 visits total 1  2021 [x]  [] [] Initial evaluation     PDE - [] [x] [] Stomach bug    2  2021 [x] [] []     3  2021 [x] [] []     4  2021 [x] [] []     5  2021 [x] [] []      ANALI 2021 [] [x] [] Car won't start    6 PDE 2021 [x] [] []     7  2021 [x] [] []     8  2021 [x] [] [] Progress note    9  2021 [x] [] [] 1    10  2021 [x] [] [] 2      2021 [] [x] [] Lack of transportation    11  2021 [x] [] [] 3    12 PDE 10-1-2021 [x] [] [] 4    13  10/7/2021 [x] [] [] 5    14 PDE 10- [x] [] [] 6    15 PDE 10- [x] [] [] 7      16   [x] [] [] Recertification note    17 PDE 10- [x] [] [] 1    18 PDE 10- [x] [] [] 2    19  11/3/2021 [x] [] [] 3    20  11/10/2021 [x] [] [] 4    21 PDE 2021 [x] [] [] 5    22  2021 [x] [] [] 6    23 PDE 2021 [x] [] [] 7      2021 [] [x] [] sick

## 2021-12-14 ENCOUNTER — HOSPITAL ENCOUNTER (OUTPATIENT)
Dept: PHYSICAL THERAPY | Age: 46
Discharge: HOME | End: 2021-12-14
Payer: MEDICARE

## 2021-12-14 NOTE — PROGRESS NOTES
Zoraida Boykin  : 1975  [place]  Secondary:  [address] [place]  at [facility]  [address], [address] [facility], [place], [facility]  [phone]   [phone]        OUTPATIENT PHYSICAL THERAPY:Discontinuation Summary 2021   ICD-10: Treatment Diagnosis: Pain in right ankle and joints of right foot (M25.571)    Stiffness of right ankle, not elsewhere classified (M25.671)  Difficulty in walking, not elsewhere classified (R26.2)  Unsteadiness on feet (R26.81)  Muscle weakness (generalized) (M62.81)  Precautions/Allergies:   Erythromycin   TREATMENT PLAN:  Effective Dates/Frequency/Duration: Twice per week from 2021 until 10/31/2021 (10 weeks). MEDICAL/REFERRING DIAGNOSIS:  Pain in right foot [M79.671]  Other fracture of right lower leg, initial encounter for closed fracture [S82.891A]   DATE OF ONSET: 2021 (date of initial injury)  REFERRING PHYSICIAN: Lennie Adamson MD MD Orders: Evaluate and treat  Return MD Appointment: unspecified   Kristy Madrid has been seen in physical therapy from 2021 to 2021 for 23 visits. Treatment has been discontinued at this time due to Expiration of plan of care without further referral by ordering physician. The below goals were met prior to discontinuation. Some goals were not met due to pt unable to attend additional sessions. Thank you for this referral.     PROBLEM LIST (Impacting functional limitations):  1. Decreased Strength  2. Decreased ADL/Functional Activities  3. Decreased Transfer Abilities  4. Decreased Ambulation Ability/Technique  5. Decreased Balance  6. Increased Pain  7. Decreased Activity Tolerance  8. Decreased Pacing Skills  9. Increased Fatigue  10. Decreased Flexibility/Joint Mobility  11. Edema/Girth INTERVENTIONS PLANNED: (Treatment may consist of any combination of the following)  1. Balance Exercise  2. Bed Mobility  3. Cold  4.  Electrical Stimulation  5. Family Education  6. Gait Training  7. Heat  8. Home Exercise Program (HEP)  9. Manual Therapy  10. Neuromuscular Re-education/Strengthening  11. Range of Motion (ROM)  12. Therapeutic Activites  13. Therapeutic Exercise/Strengthening  14. Transcutaneous Electrical Nerve Stimulation (TENS)  15. Transfer Training  16. Ultrasound (US)  17. Vasopneumatic Compression     GOALS: (Goals have been discussed and agreed upon with patient.)  Short-Term Functional Goals: Time Frame: 5 weeks  1. Pt will be compliant with HEP in order to increase LE strength/endurance/mobility to improve functional mobility and overall quality of life. (MET, 9/16/2021)   2. Pt will improve score on the Lower Extremity Functional Scale to 32/80 in order to demonstrate improved functional mobility and quality of life. (CONTINUE, 12/14/2021)   3. Pt will improve score on the Foot and Ankle Ability Measure to 38/84 in order to demonstrate improved functional mobility and quality of life.  (MET, 9/16/2021)   4. Pt will be able to ambulate 250 feet over level surfaces with supervision with least restrictive assistive device with minimal to increase in pain in order to improve household mobility. (MET, 9/16/2021)   5. Pt will be able to ascend/descend 2 steps with contact guard assistance with rail with safe gait pattern in order to improve community mobility.  (MET, 9/16/2021)   6. Pt will increase right ankle dorsiflexion PROM to 6 degrees with minimal to no increase in pain in order to improve functional mobility and reduce gait deficits. (MET, 9/16/2021)   Discharge Goals: Time Frame: 10 weeks  12. Pt will be independent with HEP in order to increase LE strength/endurance/mobility to improve functional mobility and overall quality of life. 13. Pt will improve score on the Lower Extremity Functional Scale to 36/80 in order to demonstrate improved functional mobility and quality of life.    14. Pt will improve score on the Foot and Ankle Ability Measure to 42/84 in order to demonstrate improved functional mobility and quality of life. 15. Pt will be able to ambulate 500 feet over level and unlevel surfaces with modified independence with least restrictive assistive device with minimal to no increase in pain in order to improve community mobility  16. Pt will be able to ascend/descend 5 steps with modified independence with rail with safe gait pattern in order to improve community mobility. 17. Pt will increase right ankle dorsiflexion PROM to 8 degrees with minimal to no increase in pain in order to improve functional mobility and reduce gait deficits. OUTCOME MEASURE:   Tool Used: Lower Extremity Functional Scale (LEFS)  Score:  Initial: 28/80 Most Recent: 26/80 (Date: 9/16/2021 )   Interpretation of Score: 20 questions each scored on a 5 point scale with 0 representing \"extreme difficulty or unable to perform\" and 4 representing \"no difficulty\". The lower the score, the greater the functional disability. 80/80 represents no disability. Minimal detectable change is 9 points. Tool Used: PT/OT FOOT AND ANKLE ABILITY MEASURE  Score:  Initial: 34/84 Most Recent: 38/84 (Date: 9/16/2021 )   Interpretation of Score: For the \"Activities of Daily Living\", there are 21 questions each scored on a 5 point scale with 0 representing \"Unable to do\" and 4 representing \"No difficulty\". The lower the score, the greater the functional disability. 84/84 represents no disability. Minimal detectable change is 5.7 points. With the addition of the 8 questions in the \"Sports Subscale,\" there are 29 questions, each scored on a 5 point scale with 0 representing \"Unable to do\" and 4 representing \"No difficulty\". The lower the score, the greater the functional disability. 116/116 represents no disability. Minimal detectable change is 12.3 points.     Tool Used: Timed Up and Go (TUG)  Score:  Initial: 25.4 seconds with wide based quad cane Most Recent: 22.15 seconds with wide based quad cane (Date: 9/16/2021 )   Interpretation of Score: The test measures, in seconds, the time taken by an individual to stand up from a standard arm chair (seat height 46 cm [18 in], arm height 65 cm [25.6 in]), walk a distance of 3 meters (118 in, approx 10 ft), turn, walk back to the chair and sit down. If the individual takes longer than 14 seconds to complete TUG, this indicates risk for falls. FALL RISK:    Ambulatory/Rehab Services H2 Model Falls Risk Assessment   Risk Factors:       (5)  History of Recent Falls [w/in 3 months] Ability to Rise from Chair:       (1)  Pushes up, successful in one attempt   Falls Prevention Plan:       Physical Limitations to Exercise (specify):  standing exercises with supervision       Mobility Assistance Device (specify):  quad cane   Total: (5 or greater = High Risk): 6   ©2010 [facility] of [employer]. All Rights Reserved. [facility] States Patent #[id number]. Federal Law prohibits the replication, distribution or use without written permission from [facility] of [employer] you for this referral,  Mariana Martínez DPT     Referring Physician Signature: Wendy De Leon MD No Signature is Required for this note.

## 2021-12-14 NOTE — PROGRESS NOTES
Crystal Diana  : 1975  [place]  Secondary:  [address] [place]  [facility] [address], [place], [facility]  [phone]   [phone]        CANCELLATION NOTE    Ms. Rebekah Talamantes was a same-day cancellation for today's appointment.      # Recent No Shows/Same-Day Cancellations: 2    2021  Gemini Martinez DPT    Visit Approval Visit # Therapist initials Date A NS / Cx < 24 hr >24 hr Cx Comments   99 visits total 1  2021 [x]  [] [] Initial evaluation     PDE - [] [x] [] Stomach bug    2  2021 [x] [] []     3  2021 [x] [] []     4  2021 [x] [] []     5  2021 [x] [] []      ANALI 2021 [] [x] [] Car won't start    6 PDE 2021 [x] [] []     7  2021 [x] [] []     8  2021 [x] [] [] Progress note    9  2021 [x] [] [] 1    10  2021 [x] [] [] 2      2021 [] [x] [] Lack of transportation    11  2021 [x] [] [] 3    12 PDE 10-1-2021 [x] [] [] 4    13  10/7/2021 [x] [] [] 5    14 PDE 10- [x] [] [] 6    15 PDE 10- [x] [] [] 7      16   [x] [] [] Recertification note    17 PDE 10- [x] [] [] 1    18 PDE 10- [x] [] [] 2    19  11/3/2021 [x] [] [] 3    20  11/10/2021 [x] [] [] 4    21 PDE 2021 [x] [] [] 5    22  2021 [x] [] [] 6    23 PDE 2021 [x] [] [] 7      2021 [] [x] [] sick       [facility] 2021 [] [x] [] Feeling sick and in [facility] quarantine       [] [] []        [] [] []        [] [] []        [] [] []

## 2022-03-18 PROBLEM — G81.10 SPASTIC HEMIPLEGIA AFFECTING DOMINANT SIDE (HCC): Status: ACTIVE | Noted: 2018-03-22

## 2022-03-19 PROBLEM — E87.6 HYPOKALEMIA: Status: ACTIVE | Noted: 2021-10-20

## 2022-03-19 PROBLEM — R79.89 ELEVATED LFTS: Status: ACTIVE | Noted: 2017-10-04

## 2022-03-19 PROBLEM — E66.01 SEVERE OBESITY (BMI 35.0-39.9) WITH COMORBIDITY (HCC): Status: ACTIVE | Noted: 2020-10-20

## 2022-03-19 PROBLEM — I63.81 LEFT SIDED LACUNAR STROKE (HCC): Status: ACTIVE | Noted: 2017-08-04

## 2022-03-19 PROBLEM — Z00.00 ANNUAL PHYSICAL EXAM: Status: ACTIVE | Noted: 2020-10-20

## 2022-03-19 PROBLEM — G56.00 CTS (CARPAL TUNNEL SYNDROME): Status: ACTIVE | Noted: 2021-10-20

## 2022-04-20 PROBLEM — G62.9 NEUROPATHY: Status: ACTIVE | Noted: 2022-04-20

## 2022-07-21 NOTE — PROGRESS NOTES
Zoraida KELVIN Lucretia  : 1975  Primary:   Secondary:  [address]  at [facility]  [address], [address], [address]  [phone]   [phone]        OUTPATIENT PHYSICAL THERAPY: Daily Treatment Note 10/13/2021  Visit Count:  14  ICD-10: Treatment Diagnosis: Pain in right ankle and joints of right foot (M25.571)    Stiffness of right ankle, not elsewhere classified (M25.671)  Difficulty in walking, not elsewhere classified (R26.2)  Unsteadiness on feet (R26.81)  Muscle weakness (generalized) (M62.81)  Precautions/Allergies:   Erythromycin   TREATMENT PLAN:  Effective Dates/Frequency/Duration: Twice per week from 2021 until 10/31/2021 (10 weeks). Pre-treatment Symptoms/Complaints:  Pt states that have ana having   Pain: Initial:   3/10 Post Session: no change in pain noted   Medications Last Reviewed:  10/13/2021  Updated Objective Findings: None Today   TREATMENT:   Therapeutic Exercise: ( 42 minutes):  Exercises per grid below to improve mobility, strength, balance and dynamic movement of right ankle/foot to improve functional mobility. Required minimal visual, verbal and manual cues to promote proper body alignment, promote proper body posture, promote proper body mechanics and promote proper body breathing techniques. Progressed resistance, range, repetitions and complexity of movement as indicated.     Date:  2021 Date:  2021 Date:  2021 Date:  10-4-2021 Date:  10/7/2021 Date:  10-   Activity/Exercise         Physiostep L2 resistance for 10 minutes with B LEs only; blue band to maintain R foot on pedal L2 resistance for 10 minutes with B LEs only; blue band to maintain R foot on pedal L2 resistance for 10 minutes with B LEs only; blue band to maintain R foot on pedal Level 2   10 minutes with B LE's;   Blue band to maintain R foot on pedal  Level 1-2   10 minutes with B LE's;   Blue band to maintain R foot on pedal  Level 1-2  10 minutes with B LE's - blue band to maintain foot on pedal    Ankle plantarflexor stretch on incline board 30 second hold x 3 reps with knees extended then flexed; L UE support 30 second hold x 3 reps with knees extended then flexed; L UE support 30 second hold x 3 reps with knees extended then flexed; L UE support 3 x 30 second with knees flexed or extended -  L UE support  30 second hold x 3 reps with knees extended then flexed; L UE support 3 x 30 sec hold with knees extended and flexed   Anterior/posterior tapping on rocker board In standing; 20 reps/1 set tapping both directions slowly with B feet (to allow for increased motion at R ankle); minimal to no UE support  In standing; feet in semi-tandem each LE forward on rocker board; 20 reps/1 set with cues to tap each direction slowly; minimal to no UE support; cues to obtain/maintain R knee extension when coming forward with R LE anteriorly X 20 - minimal to no UE support     X 20 - no UE support   Lateral tapping on rocker board In standing; 20 reps/1 set tapping both directions slowly with B feet (to allow for increased motion at R ankle) and cues to maintain entire R foot on board; minimal UE support  In standing; 20 reps/1 set tapping both directions slowly with B feet (to allow for increased motion at R ankle) and cues to maintain entire R foot on board; minimal to no UE support X 20 - minimal to no UE support   X 20 - no UE support    Static standing on foam   Feet together x 60 second trial with minimal to no instability; no UE support  Feet in semi-tandem with each LE forward x 30-45 second trials with minimal to moderate instability; no UE support; S throughout       Standing LE marching on foam         Step ups Onto 3 inch foam; 20 reps/1 set leading with R LE with cues for ensuring full contact of R foot on foam prior to stepping up with L foot to ensure safety; minimal to no UE support throughout; cues for increased control when stepping on and off foam with L LE    Onto 3 inch foam; 20 reps/1 set leading with R LE with cues for ensuring full contact of R foot on foam prior to stepping up with L foot to ensure safety; no UE support and cues for increased control with stepping onto foam with L LE On blue foam   X 15  Leading with each foot     With minimal to no UE support    Ambulation over level ground Throughout session with wide based quad cane on L with no AFO Throughout session with wide based quad cane on L with no AFO   Throughout session with wide based quad cane on L with no AFO  Also performed 1 bout of 250 with narrow based quad cane on L with no AFO with minimal increase in instability; occasional cues to have more contact of quad cane with ground around corners to increase safety  1 bout of 80 feet with single point cane on L with no AFO with minimal to moderate increase in instability Throughout session with wide based quad cane on L with no AFO    Varying resistance at R LE in multiple different planes of motion         R ankle PROM         Tapping cones placed anteriorly On firm ground; tapping single cone alternating feet x 10 reps/1 set progressing to series of 1-3 cones (therapist calling out foot and colors) with each foot x 20 reps/1 set; minimal to no UE support throughout with cues for weightshifting        Assessed AFO       Donned and looked at it.     Needs to be adjusted by orthotist    L foot on step (for R sided weightbearing)  On 4 inch step; 30 second hold with entire L foot on step with minimal to no instability with minimal to no UE support; progressing to placing only L forefoot on step with minimal to moderate instability with no UE support  Progressing to performing small lifts at L forefoot x 10 reps/2 sets with minimal to moderate instability with no UE support On 4 inch step; maintaining L forefoot on step for 10-20 second trials with minimal to no UE support and minimal instability throughout  Progressing to performing small lifts at L forefoot x 15 reps/2 sets with minimal instability with no UE support (occasional UE corrections); S throughout for safety 4 inch step   L forefoot on step and trying to lift foot up with weight bearing on R LR. Attempting to perform standing on foam with R LE and having L foot on 6 inch step, but pt everting R foot excessively due to instability so had to discontinue  Performed with entire L foot on 4 inch step with 3 inch foam placed on top to encourage increased weightbearing at R LE - minimal instability noted with 30-60 second trials - progressed to having only L forefoot on step - minimal to moderate instability with 10-20 second trials and occasional UE corrections             Forward lunges onto step with push-off for plantarflexor activation  Off of 4 inch step; 20 reps/1 set placing R foot on step with UE support, leaning forward then pushing off of R foot to step back with no UE support with S for safety; cues for increased pressure through R foot before stepping back for increased activation Off of 4 inch step; 20 reps/1 set placing R foot on step with UE support, leaning forward then pushing off of R foot to step back with no UE support with S for safety; cues for increased pressure through R foot before stepping back for increased activation 4 inch step   R foot on step then slight lunge to push off x 20       Green oval foam   X 10      *given in HEP  [employer]   Pt given following band colors: [] Yellow          [] Red          [] Florence Fried          [] Blue          [] Grey     Modalities (): Game ready not available at end of session - pt stating she will ice at home    Treatment/Session Summary:    · Response to Treatment: Patient brought in AFO. Myself and PT, Arleene Hatchet, looked at brace on and off her foot. We encouraged her to contact orthotist to have AFO adjusted. Her heel is not going into brace all the way. Patient did well with her therapy.  She is hoping that the car issue will be better next week. No adverse reactions/unusual changes observed/reported in clinical status this session. · Communication/Consultation:  None today  · Equipment provided today:  None today  · Recommendations/Intent for next treatment session: Next visit will focus on manual therapy/modalities to reduce pain/swelling and improve mobility at R foot and ankle; work on hip strengthening; gait mechanics.     Total Treatment Billable Duration:  42 minutes  PT Patient Time In/Time Out  Time In: 1345  Time Out: [address], [facility]    Future Appointments   Date Time Provider Jessie Carlisle   10/14/2021  8:15 AM PRE LAB RESOURCE [facility] PRE PRE   10/18/2021  2:30 PM Angelina Rowdy, [facility] SFD   10/20/2021  2:00 PM Sadi Nguyen MD [facility] PRE PRE   10/21/2021  1:45 PM RONDA SinghT [facility]PT [facility]   10/25/2021  1:45 PM Gaffney Reno, PTA S[facility]ORPT SFD   10/27/2021  2:30 PM Angelina Reno, [facility] SFD   11/1/2021  2:30 PM Angelina Reno, [facility] SFD   11/3/2021  2:30 PM Gabi Beverly DPT SFDORPT D   4/12/2022 11:00 AM ARTEM Aguilar SSA PMR PMR   7/29/2022  2:20 PM Ita Castaneda NP [facility] PSCD PP        Visit Approval Visit # Therapist initials Date A NS / Cx < 24 hr >24 hr Cx Comments   99 visits total 1  8/17/2021 [x]  [] [] Initial evaluation     PDE 8-19/2021 [] [x] [] Stomach bug    2  8/24/2021 [x] [] []     3  8/27/2021 [x] [] []     4  8/31/2021 [x] [] []     5  9/2/2021 [x] [] []      ANALI 9/8/2021 [] [x] [] Car won't start    6 PDE 9-9-2021 [x] [] []     7  9/14/2021 [x] [] []     8  9/16/2021 [x] [] [] Progress note    9 [facility] 9/21/2021 [x] [] [] 1    10 [facility] 9/23/2021 [x] [] [] 2     [facility] 9/28/2021 [] [x] [] Lack of transportation    11 [facility] 9/30/2021 [x] [] [] 3    12 PDE 10-1-2021 [x] [] [] 4    13 [facility] 10/7/2021 [x] [] [] 5    14 PDE 10- [x] [] [] 6       [] [] [] no

## 2022-07-28 NOTE — PROGRESS NOTES
inactive in a public place (e.g. a theatre or a meeting) 0 0   As a passenger in a car for an hour without a break 0 0   Lying down to rest in the afternoon when circumstances permit 0 0   Sitting and talking to someone 0 0   Sitting quietly after a lunch without alcohol 0 0   In a car, while stopped for a few minutes in traffic 0 0   Total score 0 0        Past Medical History:   Diagnosis Date    Generalized anxiety disorder     GERD (gastroesophageal reflux disease)     Hypertension     Insomnia, unspecified     Iron deficiency anemia 8/23/2013    Left sided lacunar stroke ([facility] 08/2017    Mitral valve prolapse 1993    Obesity (BMI 30-39.9)     ALEX on CPAP 10/2017    Prediabetes 7/28/2015 12/2/14 HgbA1C 5.9    Spastic hemiplegia affecting dominant side ([facility] 3/22/2018    Stroke [facility])          Patient Active Problem List   Diagnosis    Spastic hemiplegia affecting dominant side ([facility]    Insomnia    MVP (mitral valve prolapse)    IGT (impaired glucose tolerance)    HTN (hypertension)    Severe obesity (BMI 35.0-39. 9) with comorbidity ([address]    Left sided lacunar stroke (HCC)    GABE (generalized anxiety disorder)    HLD (hyperlipidemia)    ALEX on CPAP    Elevated LFTs    CTS (carpal tunnel syndrome)    Hypokalemia    Annual physical exam    GI (iron deficiency anemia)    Neuropathy          Past Surgical History:   Procedure Laterality Date    TUBAL LIGATION  1995    UPPER GASTROINTESTINAL ENDOSCOPY  10/1/04    Normal per Dr. Rafael Payton  1/8/2018    US GUIDED LIVER BIOPSY PERCUTANEOUS 1/8/2018 SFD RADIOLOGY US           Social History     Socioeconomic History    Marital status:      Spouse name: Not on file    Number of children: Not on file    Years of education: Not on file    Highest education level: Not on file   Occupational History    Not on file   Tobacco Use    Smoking status: Never    Smokeless tobacco: Never   Substance and Sexual Activity    Alcohol use:  No Drug use: No    Sexual activity: Not on file   Other Topics Concern    Not on file   Social History Narrative    . Her  is disabled. Mother of 2 children. Her son is 23 and may be Bipolar. 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 Social Determinants of Health     Financial Resource Strain: Not on file   Food Insecurity: Not on file   Transportation Needs: Not on file   Physical Activity: Not on file   Stress: Not on file   Social Connections: Not on file   Intimate Partner Violence: Not on file   Housing Stability: Not on file         Family History   Problem Relation Age of Onset    Diabetes Maternal Grandmother     Hypertension Brother 32    Cancer Sister         cervical    Cancer Mother         cervical    Hypertension Other         strong FH    Hypertension Mother     Coronary Art Dis Neg Hx          Allergies   Allergen Reactions    Erythromycin Nausea And Vomiting         Current Outpatient Medications   Medication Sig    aspirin 325 MG tablet Take 325 mg by mouth daily    escitalopram (LEXAPRO) 20 MG tablet Take 20 mg by mouth daily    ferrous sulfate (FE TABS 325) 325 (65 Fe) MG EC tablet Take 325 mg by mouth 2 times daily    LORazepam (ATIVAN) 1 MG tablet Take 1 mg by mouth every 8 hours as needed. potassium chloride (KLOR-CON M) 20 MEQ extended release tablet Take 20 mEq by mouth daily    pravastatin (PRAVACHOL) 20 MG tablet Take 20 mg by mouth    tiZANidine (ZANAFLEX) 4 MG tablet Take 4 mg by mouth 3 times daily    valsartan (DIOVAN) 320 MG tablet Take 320 mg by mouth daily     No current facility-administered medications for this visit. REVIEW OF SYSTEMS:   CONSTITUTIONAL:   There is no history of fever, chills, night sweats, weight loss, weight gain, persistent fatigue, or lethargy/hypersomnolence. CARDIAC:   No chest pain, pressure, discomfort, palpitations, orthopnea, murmurs, or edema.    GI:   No dysphagia, heartburn reflux, nausea/vomiting, diarrhea, abdominal pain, or bleeding. NEURO:   There is no history of AMS, persistent headache, decreased level of consciousness, seizures, or motor or sensory deficits. PHYSICAL EXAM:    Vitals:    22 1425   BP: 114/70   Pulse: (!) 102   Resp: 14   Temp: 97.2 °F (36.2 °C)   SpO2: 97%   Weight: 172 lb 6.4 oz (78.2 kg)   Height: 4' 9\" (1.448 m)        BMI: 37.31       GENERAL APPEARANCE:   The patient is obese and in no respiratory distress. HEENT:   PERRL. Conjunctivae unremarkable. Nasal mucosa is without epistaxis, exudate, or polyps. Gums and dentition are unremarkable. There is  oropharyngeal narrowing. TMs are clear. NECK/LYMPHATIC:   Symmetrical with no elevation of jugular venous pulsation. Trachea midline. No thyroid enlargement. No cervical adenopathy. LUNGS:   Normal respiratory effort with symmetrical lung expansion. Breath sounds clear. HEART:   There is a regular rate and rhythm. No murmur, rub, or gallop. There is no edema in the lower extremities. ABDOMEN:   Soft and non-tender. No hepatosplenomegaly. Bowel sounds are normal.     NEURO:   The patient is alert and oriented to person, place, and time. Memory appears intact and mood is normal.  No gross sensorimotor deficits are present. ASSESSMENT:  (Medical Decision Making)       ICD-10-CM    1. ALEX on CPAP  G47.33 DME - DURABLE MEDICAL EQUIPMENT    Z99.89 Patient is using, compliant and benefiting from Pap therapy. Continue current settings as AHI is down to 1.2 events per hour.               PLAN:    Continue CPAP 11 cm with nightly compliance  [employer] supplies  Follow up will be in 1 year or sooner if needed        Orders Placed This Encounter   Procedures    D[facility] [address] [facility]  Phone: [phone] [facility] [address]  [address] [place] [address] Way [address]  Dept: [phone]      Patient Name: Martha Lee  : 1975  Gender: female  Address: [address] [address] Patient phone: [phone] (home)       Primary Insurance: Payor: / No coverage found. Subscriber ID: No Subscriber Number on File      AMB Supply Order  Order Details     DME Location: [facility]     Order Date: 7/29/2022   There were no encounter diagnoses. (  X   )Supplies Needed       CPAP Machine   (  x   ) CPAP Unit  (     ) Auto CPAP Unit  (     ) BiLevel Unit  (     ) Auto BiLevel Unit  (     ) ASV        (     ) Bilevel ST      Length of need: 12 months    Pressure:  11 cmH20  EPR:     Starting Ramp Pressure:   cm H20  Ramp Time: min        Patient had a diagnostic Apnea Hypopnea Index (AHI) of :  72.7  *SUPPLIES* Replace all as needed, or per coverage guidelines     Masks Type:  (    ) -Full Face Mask (1 per 3 mon)  (    ) -Full Mask (1 per month) Interface/Cushion      ( x ) -Nasal Mask (1 per 3 mon)  ( x  ) - Nasal Mask (1 per month) Interface/Cushion  (    ) -Pillow (2 per mon)  (     ) -Bcpeotkzh (1 per 6 mon)            Other Supplies:    (   X  )-Oydqrbqb (1 per 6 mon)  (   X  )-Znwxso Tubing (1 per 3 mon)  (   X  )- Disposable Filter (2 per mon)  (   x  )-Lqsqqa Humidifier (1 per year)     (  x   )-Zjqahelxv (sometimes used with Full Face Mask) (1 per 6 mos)  (    )-Tubing-without heat (1 per 3 mos)  (     )-Non-Disposable Filter (1 per 6 mos)  (  x   )-Water Chamber (1 per 6 mos)  (     )-Humidifier non-heated (1 per 5 yrs)      Signed Date: 7/29/2022  Electronically Signed By: CYNTHIA Driver CNP  Electronically Dated:  7/29/2022     No orders of the defined types were placed in this encounter.         Collaborating Physician: Dr. Jhonatan Luther    Over 50% of today's office visit was spent in face to face time reviewing test results, prognosis, importance of compliance, education about disease process, benefits of medications, instructions for management of acute flare-ups, and follow up plans.  Total face to face time spent with patient was 20 minutes.         [address], APRN - CNP  Electronically signed

## 2022-07-29 ENCOUNTER — OFFICE VISIT (OUTPATIENT)
Dept: SLEEP MEDICINE | Age: 47
End: 2022-07-29
Payer: MEDICARE

## 2022-07-29 VITALS
HEART RATE: 102 BPM | TEMPERATURE: 97.2 F | OXYGEN SATURATION: 97 % | WEIGHT: 172.4 LBS | BODY MASS INDEX: 37.19 KG/M2 | DIASTOLIC BLOOD PRESSURE: 70 MMHG | SYSTOLIC BLOOD PRESSURE: 114 MMHG | RESPIRATION RATE: 14 BRPM | HEIGHT: 57 IN

## 2022-07-29 DIAGNOSIS — G47.33 OSA ON CPAP: Primary | ICD-10-CM

## 2022-07-29 DIAGNOSIS — Z99.89 OSA ON CPAP: Primary | ICD-10-CM

## 2022-07-29 PROCEDURE — 99213 OFFICE O/P EST LOW 20 MIN: CPT | Performed by: NURSE PRACTITIONER

## 2022-07-29 ASSESSMENT — SLEEP AND FATIGUE QUESTIONNAIRES
HOW LIKELY ARE YOU TO NOD OFF OR FALL ASLEEP WHILE SITTING QUIETLY AFTER LUNCH WITHOUT ALCOHOL: 0
HOW LIKELY ARE YOU TO NOD OFF OR FALL ASLEEP WHILE LYING DOWN TO REST IN THE AFTERNOON WHEN CIRCUMSTANCES PERMIT: 0
HOW LIKELY ARE YOU TO NOD OFF OR FALL ASLEEP IN A CAR, WHILE STOPPED FOR A FEW MINUTES IN TRAFFIC: 0
HOW LIKELY ARE YOU TO NOD OFF OR FALL ASLEEP WHILE WATCHING TV: 0
HOW LIKELY ARE YOU TO NOD OFF OR FALL ASLEEP WHILE SITTING INACTIVE IN A PUBLIC PLACE: 0
HOW LIKELY ARE YOU TO NOD OFF OR FALL ASLEEP WHILE SITTING AND READING: 0
HOW LIKELY ARE YOU TO NOD OFF OR FALL ASLEEP WHILE SITTING AND TALKING TO SOMEONE: 0
ESS TOTAL SCORE: 0
HOW LIKELY ARE YOU TO NOD OFF OR FALL ASLEEP WHEN YOU ARE A PASSENGER IN A CAR FOR AN HOUR WITHOUT A BREAK: 0

## 2022-07-29 NOTE — PATIENT INSTRUCTIONS
Continue CPAP 11 cm with nightly compliance  Renew supplies  Follow up will be in 1 year or sooner if needed

## 2022-08-23 ENCOUNTER — TELEMEDICINE (OUTPATIENT)
Dept: NEUROLOGY | Age: 47
End: 2022-08-23
Payer: MEDICARE

## 2022-08-23 DIAGNOSIS — G43.001 MIGRAINE WITHOUT AURA AND WITH STATUS MIGRAINOSUS, NOT INTRACTABLE: ICD-10-CM

## 2022-08-23 DIAGNOSIS — Z86.73 HISTORY OF ARTERIAL ISCHEMIC STROKE: ICD-10-CM

## 2022-08-23 DIAGNOSIS — G81.11 RIGHT SPASTIC HEMIPARESIS (HCC): Primary | ICD-10-CM

## 2022-08-23 PROCEDURE — G8427 DOCREV CUR MEDS BY ELIG CLIN: HCPCS | Performed by: PSYCHIATRY & NEUROLOGY

## 2022-08-23 PROCEDURE — 99215 OFFICE O/P EST HI 40 MIN: CPT | Performed by: PSYCHIATRY & NEUROLOGY

## 2022-08-23 NOTE — PROGRESS NOTES
Lola Chapin (:  1975) is a Established patient, here for evaluation of the following:    Assessment & Plan   Below is the assessment and plan developed based on review of pertinent history, physical exam, labs, studies, and medications. 1. Right spastic hemiparesis ([address]  -     [facility] - Physical Therapy, [facility] Clinics  -     [facility] Clinics  2. History of arterial ischemic stroke  -     CTA HEAD NECK W CONTRAST; Future  3. Migraine without aura and with status migrainosus, not intractable  -     CTA HEAD NECK W CONTRAST; Future  -     Rimegepant Sulfate 75 MG TBDP; Take 75 mg by mouth once as needed (headaches.), Disp-12 tablet, R-5Normal    Patient has closed hand from spasticity, she will benefit from Botox therapy in combination wit OT. Meantime, PT for difficulty with stairs. Patient developed moderate and occasional severe migraines, she will have CTA head and neck to complete stroke work up, for evaluation of intracranial vessels and the left neuralgic pain in submandibular/neck/upper chest region. Return if symptoms worsen or fail to improve. Subjective   Last seen in  for left corona infarction occurred in 2017, resulted in right upper and lower limb spastic paresis, spastic circumduct gait affecting dominant side. She is now able to move RUE but not distally, unable to open fingers; right foot turning inside. Several months ago, she fell from missed last 2 steps and injured right sided ankle and fractured. BP fluctuated due to BP med change. On statin, Aspirin, and Valsartan. Right handed, now mostly uses left hand. C/p stabbing pain in the left sided head, woke her up from sleep, about 10 days per month. C/o pain in the left carotid region down to chest pain almost daily. Reviewed stroke work up, had carotid US, echocardiogram and MRI brain.      Review of Systems   Neurological:  Positive for focal weakness and headaches. Carodynia         Objective   Patient-Reported Vitals  No data recorded     Physical Exam  [INSTRUCTIONS:  \"[x]\" Indicates a positive item  \"[]\" Indicates a negative item  -- DELETE ALL ITEMS NOT EXAMINED]    Constitutional: [x] Appears well-developed and well-nourished [x] No apparent distress      [] Abnormal -     Mental status: [x] Alert and awake  [x] Oriented to person/place/time [x] Able to follow commands, speech normal range. [] Abnormal -     Eyes:   EOM    [x]  Normal    [] Abnormal -   Sclera  [x]  Normal    [] Abnormal -          Discharge [x]  None visible   [] Abnormal -     HENT: [x] Normocephalic, atraumatic  [] Abnormal -   [x]     External Ears [x] Normal hearing [] Abnormal -    Neck: [x]  [] Abnormal -     Pulmonary/Chest: [x] Respiratory effort normal   [x] No visualized signs of difficulty breathing or respiratory distress        [] Abnormal -      Musculoskeletal:   [] Circumducting         [x] Normal range of motion of neck        [] Abnormal -     Neurological:        [] Facial Asymmetry right lower facial weakness, old          [x] No gaze palsy        [] Abnormal -          Skin:        [x]          [] Abnormal -            Psychiatric:       [x] Normal Affect [] Abnormal -        [x] No Hallucinations    Other pertinent observable physical exam findings:- able to move right upper limb with good shoulder joint ROM, forearm flexed. Distal spastic paralysis with fisting, not able to open fingers. On this date 8/23/2022 I have spent 40 minutes reviewing previous notes, test results and face to face (virtual) with the patient discussing the diagnosis and importance of compliance with the treatment plan as well as documenting on the day of the visit. Lola Valle, was evaluated through a synchronous (real-time) audio-video encounter.  The patient (or guardian if applicable) is aware that this is a billable service, which includes applicable co-pays. This Virtual Visit was conducted with patient's (and/or legal guardian's) consent. The visit was conducted pursuant to the emergency declaration under the [address] [place] [address] and the [employer] and [employer] Act. Patient identification was verified, and a caregiver was present when appropriate. The patient was located at Home: [address] [place] [address].    Provider was located at Home ([facility]): Tommy Albarran MD

## 2022-10-12 DIAGNOSIS — R73.02 IGT (IMPAIRED GLUCOSE TOLERANCE): ICD-10-CM

## 2022-10-12 DIAGNOSIS — R79.89 ELEVATED LFTS: ICD-10-CM

## 2022-10-12 DIAGNOSIS — Z00.00 ANNUAL PHYSICAL EXAM: Primary | ICD-10-CM

## 2022-10-12 DIAGNOSIS — D50.9 IRON DEFICIENCY ANEMIA, UNSPECIFIED IRON DEFICIENCY ANEMIA TYPE: ICD-10-CM

## 2022-10-12 DIAGNOSIS — E87.6 HYPOKALEMIA: ICD-10-CM

## 2022-10-12 DIAGNOSIS — I10 HYPERTENSION, UNSPECIFIED TYPE: ICD-10-CM

## 2022-10-12 DIAGNOSIS — E78.5 HYPERLIPIDEMIA, UNSPECIFIED HYPERLIPIDEMIA TYPE: ICD-10-CM

## 2022-10-17 DIAGNOSIS — D50.9 IRON DEFICIENCY ANEMIA, UNSPECIFIED IRON DEFICIENCY ANEMIA TYPE: ICD-10-CM

## 2022-10-17 DIAGNOSIS — Z00.00 ANNUAL PHYSICAL EXAM: ICD-10-CM

## 2022-10-17 DIAGNOSIS — R79.89 ELEVATED LFTS: ICD-10-CM

## 2022-10-17 DIAGNOSIS — R73.02 IGT (IMPAIRED GLUCOSE TOLERANCE): ICD-10-CM

## 2022-10-17 DIAGNOSIS — E78.5 HYPERLIPIDEMIA, UNSPECIFIED HYPERLIPIDEMIA TYPE: ICD-10-CM

## 2022-10-17 DIAGNOSIS — I10 HYPERTENSION, UNSPECIFIED TYPE: ICD-10-CM

## 2022-10-17 DIAGNOSIS — E87.6 HYPOKALEMIA: ICD-10-CM

## 2022-10-18 LAB
ALBUMIN SERPL-MCNC: 4.1 G/DL (ref 3.5–5)
ALBUMIN/GLOB SERPL: 1.2 {RATIO} (ref 0.4–1.6)
ALP SERPL-CCNC: 100 U/L (ref 50–136)
ALT SERPL-CCNC: 31 U/L (ref 12–65)
ANION GAP SERPL CALC-SCNC: 8 MMOL/L (ref 2–11)
AST SERPL-CCNC: 16 U/L (ref 15–37)
BASOPHILS # BLD: 0 K/UL (ref 0–0.2)
BASOPHILS NFR BLD: 0 % (ref 0–2)
BILIRUB SERPL-MCNC: 0.3 MG/DL (ref 0.2–1.1)
BUN SERPL-MCNC: 7 MG/DL (ref 6–23)
CALCIUM SERPL-MCNC: 9 MG/DL (ref 8.3–10.4)
CHLORIDE SERPL-SCNC: 107 MMOL/L (ref 101–110)
CHOLEST SERPL-MCNC: 155 MG/DL
CO2 SERPL-SCNC: 26 MMOL/L (ref 21–32)
CREAT SERPL-MCNC: 0.6 MG/DL (ref 0.6–1)
DIFFERENTIAL METHOD BLD: ABNORMAL
EOSINOPHIL # BLD: 0.1 K/UL (ref 0–0.8)
EOSINOPHIL NFR BLD: 1 % (ref 0.5–7.8)
ERYTHROCYTE [DISTWIDTH] IN BLOOD BY AUTOMATED COUNT: 14.5 % (ref 11.9–14.6)
EST. AVERAGE GLUCOSE BLD GHB EST-MCNC: 108 MG/DL
GLOBULIN SER CALC-MCNC: 3.3 G/DL (ref 2.8–4.5)
GLUCOSE SERPL-MCNC: 87 MG/DL (ref 65–100)
HBA1C MFR BLD: 5.4 % (ref 4.8–5.6)
HCT VFR BLD AUTO: 46 % (ref 35.8–46.3)
HDLC SERPL-MCNC: 56 MG/DL (ref 40–60)
HDLC SERPL: 2.8 {RATIO}
HGB BLD-MCNC: 13.9 G/DL (ref 11.7–15.4)
HIV 1+2 AB+HIV1 P24 AG SERPL QL IA: NONREACTIVE
HIV 1/2 RESULT COMMENT: NORMAL
IMM GRANULOCYTES # BLD AUTO: 0 K/UL (ref 0–0.5)
IMM GRANULOCYTES NFR BLD AUTO: 0 % (ref 0–5)
IRON SERPL-MCNC: 39 UG/DL (ref 35–150)
LDLC SERPL CALC-MCNC: 80.4 MG/DL
LYMPHOCYTES # BLD: 1.6 K/UL (ref 0.5–4.6)
LYMPHOCYTES NFR BLD: 29 % (ref 13–44)
MCH RBC QN AUTO: 27.7 PG (ref 26.1–32.9)
MCHC RBC AUTO-ENTMCNC: 30.2 G/DL (ref 31.4–35)
MCV RBC AUTO: 91.8 FL (ref 82–102)
MONOCYTES # BLD: 0.5 K/UL (ref 0.1–1.3)
MONOCYTES NFR BLD: 8 % (ref 4–12)
NEUTS SEG # BLD: 3.4 K/UL (ref 1.7–8.2)
NEUTS SEG NFR BLD: 61 % (ref 43–78)
NRBC # BLD: 0 K/UL (ref 0–0.2)
PLATELET # BLD AUTO: 368 K/UL (ref 150–450)
PMV BLD AUTO: 11 FL (ref 9.4–12.3)
POTASSIUM SERPL-SCNC: 4 MMOL/L (ref 3.5–5.1)
PROT SERPL-MCNC: 7.4 G/DL (ref 6.3–8.2)
RBC # BLD AUTO: 5.01 M/UL (ref 4.05–5.2)
SODIUM SERPL-SCNC: 141 MMOL/L (ref 133–143)
TIBC SERPL-MCNC: 445 UG/DL (ref 250–450)
TRIGL SERPL-MCNC: 93 MG/DL (ref 35–150)
TSH W FREE THYROID IF ABNORMAL: 1.51 UIU/ML (ref 0.36–3.74)
VLDLC SERPL CALC-MCNC: 18.6 MG/DL (ref 6–23)
WBC # BLD AUTO: 5.6 K/UL (ref 4.3–11.1)

## 2022-10-20 ENCOUNTER — OFFICE VISIT (OUTPATIENT)
Dept: FAMILY MEDICINE CLINIC | Facility: CLINIC | Age: 47
End: 2022-10-20
Payer: MEDICARE

## 2022-10-20 VITALS
HEART RATE: 74 BPM | HEIGHT: 57 IN | SYSTOLIC BLOOD PRESSURE: 138 MMHG | WEIGHT: 185.5 LBS | DIASTOLIC BLOOD PRESSURE: 71 MMHG | BODY MASS INDEX: 40.02 KG/M2

## 2022-10-20 DIAGNOSIS — D50.9 IRON DEFICIENCY ANEMIA, UNSPECIFIED IRON DEFICIENCY ANEMIA TYPE: ICD-10-CM

## 2022-10-20 DIAGNOSIS — R73.02 IGT (IMPAIRED GLUCOSE TOLERANCE): ICD-10-CM

## 2022-10-20 DIAGNOSIS — E78.5 HYPERLIPIDEMIA, UNSPECIFIED HYPERLIPIDEMIA TYPE: ICD-10-CM

## 2022-10-20 DIAGNOSIS — E87.6 HYPOKALEMIA: ICD-10-CM

## 2022-10-20 DIAGNOSIS — G47.00 INSOMNIA, UNSPECIFIED TYPE: ICD-10-CM

## 2022-10-20 DIAGNOSIS — F41.1 GAD (GENERALIZED ANXIETY DISORDER): ICD-10-CM

## 2022-10-20 DIAGNOSIS — I10 HYPERTENSION, UNSPECIFIED TYPE: Primary | ICD-10-CM

## 2022-10-20 DIAGNOSIS — G81.10 SPASTIC HEMIPLEGIA AFFECTING DOMINANT SIDE (HCC): ICD-10-CM

## 2022-10-20 PROBLEM — G81.11 RIGHT SPASTIC HEMIPARESIS (HCC): Status: RESOLVED | Noted: 2022-08-23 | Resolved: 2022-10-20

## 2022-10-20 LAB
HCV AB S/CO SERPL IA: 0.1 S/CO RATIO (ref 0–0.9)
HCV AB SERPL QL IA: NORMAL

## 2022-10-20 PROCEDURE — 99214 OFFICE O/P EST MOD 30 MIN: CPT | Performed by: FAMILY MEDICINE

## 2022-10-20 PROCEDURE — G8484 FLU IMMUNIZE NO ADMIN: HCPCS | Performed by: FAMILY MEDICINE

## 2022-10-20 PROCEDURE — 4004F PT TOBACCO SCREEN RCVD TLK: CPT | Performed by: FAMILY MEDICINE

## 2022-10-20 PROCEDURE — G8417 CALC BMI ABV UP PARAM F/U: HCPCS | Performed by: FAMILY MEDICINE

## 2022-10-20 PROCEDURE — G8427 DOCREV CUR MEDS BY ELIG CLIN: HCPCS | Performed by: FAMILY MEDICINE

## 2022-10-20 RX ORDER — POTASSIUM CHLORIDE 20 MEQ/1
20 TABLET, EXTENDED RELEASE ORAL DAILY
Qty: 90 TABLET | Refills: 1 | Status: SHIPPED | OUTPATIENT
Start: 2022-10-20

## 2022-10-20 RX ORDER — VALSARTAN 320 MG/1
320 TABLET ORAL DAILY
Qty: 90 TABLET | Refills: 1 | Status: SHIPPED | OUTPATIENT
Start: 2022-10-20

## 2022-10-20 RX ORDER — LORAZEPAM 1 MG/1
1 TABLET ORAL EVERY 8 HOURS PRN
Qty: 90 TABLET | Refills: 5 | Status: SHIPPED | OUTPATIENT
Start: 2022-10-20 | End: 2023-10-21

## 2022-10-20 RX ORDER — LANOLIN ALCOHOL/MO/W.PET/CERES
325 CREAM (GRAM) TOPICAL 2 TIMES DAILY
Qty: 180 TABLET | Refills: 1 | Status: SHIPPED | OUTPATIENT
Start: 2022-10-20

## 2022-10-20 RX ORDER — TIZANIDINE 4 MG/1
4 TABLET ORAL 3 TIMES DAILY
Qty: 270 TABLET | Refills: 1 | Status: SHIPPED | OUTPATIENT
Start: 2022-10-20

## 2022-10-20 RX ORDER — PRAVASTATIN SODIUM 20 MG
20 TABLET ORAL DAILY
Qty: 90 TABLET | Refills: 1 | Status: SHIPPED | OUTPATIENT
Start: 2022-10-20

## 2022-10-20 RX ORDER — ESCITALOPRAM OXALATE 20 MG/1
20 TABLET ORAL DAILY
Qty: 90 TABLET | Refills: 1 | Status: SHIPPED | OUTPATIENT
Start: 2022-10-20

## 2022-10-20 SDOH — ECONOMIC STABILITY: FOOD INSECURITY: WITHIN THE PAST 12 MONTHS, THE FOOD YOU BOUGHT JUST DIDN'T LAST AND YOU DIDN'T HAVE MONEY TO GET MORE.: NEVER TRUE

## 2022-10-20 SDOH — ECONOMIC STABILITY: FOOD INSECURITY: WITHIN THE PAST 12 MONTHS, YOU WORRIED THAT YOUR FOOD WOULD RUN OUT BEFORE YOU GOT MONEY TO BUY MORE.: NEVER TRUE

## 2022-10-20 ASSESSMENT — PATIENT HEALTH QUESTIONNAIRE - PHQ9
1. LITTLE INTEREST OR PLEASURE IN DOING THINGS: 0
SUM OF ALL RESPONSES TO PHQ QUESTIONS 1-9: 0
SUM OF ALL RESPONSES TO PHQ9 QUESTIONS 1 & 2: 0
SUM OF ALL RESPONSES TO PHQ QUESTIONS 1-9: 0
2. FEELING DOWN, DEPRESSED OR HOPELESS: 0

## 2022-10-20 ASSESSMENT — SOCIAL DETERMINANTS OF HEALTH (SDOH): HOW HARD IS IT FOR YOU TO PAY FOR THE VERY BASICS LIKE FOOD, HOUSING, MEDICAL CARE, AND HEATING?: NOT HARD AT ALL

## 2022-10-20 NOTE — PROGRESS NOTES
Cheng  [address] [place] [address]  Phone: [phone]  Fax: [phone]  [email or web address]      Encounter Dany Read; Established patient 50 y. o.female; seen 10/20/2022 for: Hypertension, Hyperlipidemia, and Mental Health Problem      Assessment & Plan    1. Hypertension, unspecified type  -     valsartan (DIOVAN) 320 MG tablet; Take 1 tablet by mouth daily, Disp-90 tablet, R-1Normal  -     Hemoglobin A1C; Future  -     Comprehensive Metabolic Panel; Future  -     Lipid Panel; Future  -     CBC with Auto Differential; Future  -     Total Iron Binding Capacity; Future  -     Iron; Future  2. Hyperlipidemia, unspecified hyperlipidemia type  -     pravastatin (PRAVACHOL) 20 MG tablet; Take 1 tablet by mouth daily, Disp-90 tablet, R-1Normal  -     Hemoglobin A1C; Future  -     Comprehensive Metabolic Panel; Future  -     Lipid Panel; Future  -     CBC with Auto Differential; Future  -     Total Iron Binding Capacity; Future  -     Iron; Future  3. Hypokalemia  -     potassium chloride (KLOR-CON M) 20 MEQ extended release tablet; Take 1 tablet by mouth daily, Disp-90 tablet, R-1Normal  -     Hemoglobin A1C; Future  -     Comprehensive Metabolic Panel; Future  -     Lipid Panel; Future  -     CBC with Auto Differential; Future  -     Total Iron Binding Capacity; Future  -     Iron; Future  4. Iron deficiency anemia, unspecified iron deficiency anemia type  -     ferrous sulfate (FE TABS 325) 325 (65 Fe) MG EC tablet; Take 1 tablet by mouth 2 times daily, Disp-180 tablet, R-1Normal  -     Hemoglobin A1C; Future  -     Comprehensive Metabolic Panel; Future  -     Lipid Panel; Future  -     CBC with Auto Differential; Future  -     Total Iron Binding Capacity; Future  -     Iron; Future  5. IGT (impaired glucose tolerance)  -     Hemoglobin A1C; Future  -     Comprehensive Metabolic Panel; Future  -     Lipid Panel;  Future  -     CBC with Auto Differential; Future  - Total Iron Binding Capacity; Future  -     Iron; Future  6. Spastic hemiplegia affecting dominant side (HCC)  -     tiZANidine (ZANAFLEX) 4 MG tablet; Take 1 tablet by mouth 3 times daily, Disp-270 tablet, R-1Normal  -     Hemoglobin A1C; Future  -     Comprehensive Metabolic Panel; Future  -     Lipid Panel; Future  -     CBC with Auto Differential; Future  -     Total Iron Binding Capacity; Future  -     Iron; Future  7. GABE (generalized anxiety disorder)  -     escitalopram (LEXAPRO) 20 MG tablet; Take 1 tablet by mouth daily, Disp-90 tablet, R-1Normal  -     LORazepam (ATIVAN) 1 MG tablet; Take 1 tablet by mouth every 8 hours as needed for Anxiety. , Disp-90 tablet, R-5Normal  -     Hemoglobin A1C; Future  -     Comprehensive Metabolic Panel; Future  -     Lipid Panel; Future  -     CBC with Auto Differential; Future  -     Total Iron Binding Capacity; Future  -     Iron; Future  8. Insomnia, unspecified type  -     Hemoglobin A1C; Future  -     Comprehensive Metabolic Panel; Future  -     Lipid Panel; Future  -     CBC with Auto Differential; Future  -     Total Iron Binding Capacity; Future  -     Iron; Future    Problem and/or Symptoms are currently stable and/or improving on current treatment plan. Will continue and have patient follow up as directed. Pertinent labs reviewed & pertinent meds refilled X 6 M. Check Out Instructions  Return in about 6 months (around 4/20/2023) for Virtual Visit, Previsit Vitals and Labs, Also Needs Virtual [facility]. Subjective & Objective    HPI  Patient states that chronic health problems are stable on current regimens and has no acute concerns today except as mentioned.       Review of Systems    Physical Exam  Vitals:    10/20/22 1439 10/20/22 1442   BP: (!) 140/70 138/71   Site: Left Upper Arm Left Upper Arm   Position: Sitting Sitting   Cuff Size: Large Adult Large Adult   Pulse: 77 74   Weight: 185 lb 8 oz (84.1 kg)    Height: 4' 9\" (1.448 m)      BP Readings from Last 3 Encounters:   10/20/22 138/71   07/29/22 114/70   04/13/22 134/66     Body mass index is 40.14 kg/m². Wt Readings from Last 3 Encounters:   10/20/22 185 lb 8 oz (84.1 kg)   07/29/22 172 lb 6.4 oz (78.2 kg)   04/13/22 176 lb (79.8 kg)       We discussed the typical prognosis and potential complications of the concern(s), including treatment options. Medication risks, benefits, costs, interactions, and alternatives were discussed as appropriate. I advised her to contact the office if her condition worsens or fails to improve as anticipated. She expressed understanding with the discussion and plan of care. An electronic signature was used to authenticate this note.   -- Bal Haro MD

## 2022-10-27 ENCOUNTER — TELEMEDICINE (OUTPATIENT)
Dept: FAMILY MEDICINE CLINIC | Facility: CLINIC | Age: 47
End: 2022-10-27

## 2022-10-27 DIAGNOSIS — Z00.00 ANNUAL PHYSICAL EXAM: Primary | ICD-10-CM

## 2022-10-27 DIAGNOSIS — Z12.11 COLON CANCER SCREENING: ICD-10-CM

## 2022-10-27 RX ORDER — FERROUS SULFATE 325(65) MG
TABLET ORAL
COMMUNITY
Start: 2022-10-20

## 2022-10-27 ASSESSMENT — PATIENT HEALTH QUESTIONNAIRE - PHQ9
SUM OF ALL RESPONSES TO PHQ QUESTIONS 1-9: 0
SUM OF ALL RESPONSES TO PHQ9 QUESTIONS 1 & 2: 0
2. FEELING DOWN, DEPRESSED OR HOPELESS: 0
SUM OF ALL RESPONSES TO PHQ QUESTIONS 1-9: 0
1. LITTLE INTEREST OR PLEASURE IN DOING THINGS: 0
SUM OF ALL RESPONSES TO PHQ QUESTIONS 1-9: 0
SUM OF ALL RESPONSES TO PHQ QUESTIONS 1-9: 0

## 2022-10-27 NOTE — PROGRESS NOTES
Medicare Annual Wellness Visit    [address] [place] [address] is here for Medicare AWV    Assessment & Plan   Annual physical exam  Assessment & Plan:  Discussed ideal body weight and encouraged regular physical activity and healthy diet. Recommended routine preventative measures such as always wearing seatbelt & home safety measures. Counseled the patient regarding the rationale for the recommended immunizations and screenings and they are current and/or updated. Reviewed personalized prevention plan which is current and/or updated and a list of screening services available to patient. Provided end of life counseling as appropriate and recommended patient discuss with family their wishes for end of life decisions if they have not already done so. Recommendations for Preventive Services Due: see orders and patient instructions/AVS.  Recommended screening schedule for the next 5-10 years is provided to the patient in written form: see Patient Instructions/AVS.     No follow-ups on file. Subjective       Patient's complete Health Risk Assessment and screening values have been reviewed and are found in Flowsheets. The following problems were reviewed today and where indicated follow up appointments were made and/or referrals ordered.     Positive Risk Factor Screenings with Interventions:             General Health and ACP:  General  In general, how would you say your health is?: Good  In the past 7 days, have you experienced any of the following: New or Increased Pain, New or Increased Fatigue, Loneliness, Social Isolation, Stress or Anger?: No  Do you get the social and emotional support that you need?: Yes  Do you have a Living Will?: (!) No    Advance Directives       Power of  Living Will ACP-Advance Directive ACP-Power of     Not on File Not on File Not on File Not on File        General Health Risk Interventions:  No Living Will: Patient declines ACP discussion/assistance    Health Habits/Nutrition:  Physical Activity: Inactive    Days of Exercise per Week: 0 days    Minutes of Exercise per Session: 0 min     Have you lost any weight without trying in the past 3 months?: (!) Yes (eligio goes up and down)     Have you seen the dentist within the past year?: (!) No  Health Habits/Nutrition Interventions:  Dental exam overdue:  patient encouraged to make appointment with his/her dentist      ADLs:  In the past 7 days, did you need help from others to perform any of the following everyday activities: Eating, dressing, grooming, bathing, toileting, or walking/balance?: (!) Yes  Select all that apply: (!) Walking/Balance, Bathing  In the past 7 days, did you need help from others to take care of any of the following: Laundry, housekeeping, banking/finances, shopping, telephone use, food preparation, transportation, or taking medications?: (!) Yes  Select all that apply: [employer], Shopping  ADL Interventions:  Patient declines any further evaluation/treatment for this issue          Objective      Patient-Reported Vitals  No data recorded          Allergies   Allergen Reactions    Erythromycin Nausea And Vomiting     Prior to Visit Medications    Medication Sig Taking? Authorizing Provider   FEROSUL 325 (65 Fe) MG tablet TAKE 1 TABLET BY MOUTH TWICE DAILY Yes Historical Provider, MD   escitalopram (LEXAPRO) 20 MG tablet Take 1 tablet by mouth daily Yes Cindy Marley MD   ferrous sulfate (FE TABS 325) 325 (65 Fe) MG EC tablet Take 1 tablet by mouth 2 times daily Yes Cindy Marley MD   LORazepam (ATIVAN) 1 MG tablet Take 1 tablet by mouth every 8 hours as needed for Anxiety.  Yes Cindy Marley MD   potassium chloride (KLOR-CON M) 20 MEQ extended release tablet Take 1 tablet by mouth daily Yes Cindy Marley MD   pravastatin (PRAVACHOL) 20 MG tablet Take 1 tablet by mouth daily Yes Cindy Marley MD   tiZANidine (ZANAFLEX) 4 MG tablet Take 1 tablet by mouth 3 times daily Yes Bal Haro MD   valsartan (DIOVAN) 320 MG tablet Take 1 tablet by mouth daily Yes Bal Haro MD   aspirin 325 MG tablet Take 325 mg by mouth daily Yes Ar Automatic Reconciliation       CareTeam (Including outside providers/suppliers regularly involved in providing care):   Patient Care Team:  Bal Haro MD as PCP - General  Bla Haro MD as PCP - [facility] Empaneled Provider     Reviewed and updated this visit:  Allergies  Meds           Lola E Walling, was evaluated through a synchronous (real-time) audio-video encounter. The patient (or guardian if applicable) is aware that this is a billable service, which includes applicable co-pays. This Virtual Visit was conducted with patient's (and/or legal guardian's) consent. The visit was conducted pursuant to the emergency declaration under the [address] [place] [address], [address] [place] [address] authority and the [employer] General Act. Patient identification was verified, and a caregiver was present when appropriate. The patient was located at Home: [address] [place] [address]. Provider was located at [place] [address] (Appt Dept): [id number] Rohan ,  [place] .

## 2022-10-27 NOTE — ASSESSMENT & PLAN NOTE
Discussed ideal body weight and encouraged regular physical activity and healthy diet. Recommended routine preventative measures such as always wearing seatbelt & home safety measures. Counseled the patient regarding the rationale for the recommended immunizations and screenings and they are current and/or updated. Reviewed personalized prevention plan which is current and/or updated and a list of screening services available to patient. Provided end of life counseling as appropriate and recommended patient discuss with family their wishes for end of life decisions if they have not already done so.

## 2022-10-28 ENCOUNTER — HOSPITAL ENCOUNTER (OUTPATIENT)
Dept: CT IMAGING | Age: 47
Discharge: HOME | End: 2022-10-31
Payer: MEDICARE

## 2022-10-28 DIAGNOSIS — Z86.73 HISTORY OF ARTERIAL ISCHEMIC STROKE: ICD-10-CM

## 2022-10-28 DIAGNOSIS — G43.001 MIGRAINE WITHOUT AURA AND WITH STATUS MIGRAINOSUS, NOT INTRACTABLE: ICD-10-CM

## 2022-10-28 PROCEDURE — 6360000004 HC RX CONTRAST MEDICATION: Performed by: PSYCHIATRY & NEUROLOGY

## 2022-10-28 PROCEDURE — 70496 CT ANGIOGRAPHY HEAD: CPT

## 2022-10-28 PROCEDURE — 2580000003 HC RX 258: Performed by: PSYCHIATRY & NEUROLOGY

## 2022-10-28 RX ORDER — 0.9 % SODIUM CHLORIDE 0.9 %
100 INTRAVENOUS SOLUTION INTRAVENOUS
Status: COMPLETED | OUTPATIENT
Start: 2022-10-28 | End: 2022-10-28

## 2022-10-28 RX ORDER — SODIUM CHLORIDE 0.9 % (FLUSH) 0.9 %
10 SYRINGE (ML) INJECTION
Status: COMPLETED | OUTPATIENT
Start: 2022-10-28 | End: 2022-10-28

## 2022-10-28 RX ADMIN — IOPAMIDOL 50 ML: 755 INJECTION, SOLUTION INTRAVENOUS at 15:58

## 2022-10-28 RX ADMIN — SODIUM CHLORIDE 100 ML: 9 INJECTION, SOLUTION INTRAVENOUS at 15:58

## 2022-10-28 RX ADMIN — SODIUM CHLORIDE, PRESERVATIVE FREE 10 ML: 5 INJECTION INTRAVENOUS at 15:58

## 2023-04-20 ENCOUNTER — TELEMEDICINE (OUTPATIENT)
Dept: FAMILY MEDICINE CLINIC | Facility: CLINIC | Age: 48
End: 2023-04-20
Payer: MEDICARE

## 2023-04-20 DIAGNOSIS — R79.89 ABNORMAL LFTS (LIVER FUNCTION TESTS): ICD-10-CM

## 2023-04-20 DIAGNOSIS — E87.6 HYPOKALEMIA: ICD-10-CM

## 2023-04-20 DIAGNOSIS — G81.10 SPASTIC HEMIPLEGIA AFFECTING DOMINANT SIDE (HCC): ICD-10-CM

## 2023-04-20 DIAGNOSIS — D50.9 IRON DEFICIENCY ANEMIA, UNSPECIFIED IRON DEFICIENCY ANEMIA TYPE: ICD-10-CM

## 2023-04-20 DIAGNOSIS — I10 HYPERTENSION, UNSPECIFIED TYPE: Primary | ICD-10-CM

## 2023-04-20 DIAGNOSIS — F41.1 GAD (GENERALIZED ANXIETY DISORDER): ICD-10-CM

## 2023-04-20 DIAGNOSIS — E78.5 HYPERLIPIDEMIA, UNSPECIFIED HYPERLIPIDEMIA TYPE: ICD-10-CM

## 2023-04-20 PROCEDURE — 3017F COLORECTAL CA SCREEN DOC REV: CPT | Performed by: FAMILY MEDICINE

## 2023-04-20 PROCEDURE — G8427 DOCREV CUR MEDS BY ELIG CLIN: HCPCS | Performed by: FAMILY MEDICINE

## 2023-04-20 PROCEDURE — 99214 OFFICE O/P EST MOD 30 MIN: CPT | Performed by: FAMILY MEDICINE

## 2023-04-20 RX ORDER — LORAZEPAM 1 MG/1
1 TABLET ORAL EVERY 8 HOURS PRN
Qty: 90 TABLET | Refills: 5 | Status: SHIPPED | OUTPATIENT
Start: 2023-04-20 | End: 2024-04-20

## 2023-04-20 RX ORDER — ESCITALOPRAM OXALATE 20 MG/1
20 TABLET ORAL DAILY
Qty: 90 TABLET | Refills: 1 | Status: SHIPPED | OUTPATIENT
Start: 2023-04-20

## 2023-04-20 RX ORDER — PRAVASTATIN SODIUM 20 MG
20 TABLET ORAL DAILY
Qty: 90 TABLET | Refills: 1 | Status: SHIPPED | OUTPATIENT
Start: 2023-04-20

## 2023-04-20 RX ORDER — POTASSIUM CHLORIDE 20 MEQ/1
20 TABLET, EXTENDED RELEASE ORAL DAILY
Qty: 90 TABLET | Refills: 1 | Status: SHIPPED | OUTPATIENT
Start: 2023-04-20

## 2023-04-20 RX ORDER — VALSARTAN 320 MG/1
320 TABLET ORAL DAILY
Qty: 90 TABLET | Refills: 1 | Status: SHIPPED | OUTPATIENT
Start: 2023-04-20

## 2023-04-20 RX ORDER — TIZANIDINE 4 MG/1
4 TABLET ORAL 3 TIMES DAILY
Qty: 270 TABLET | Refills: 1 | Status: SHIPPED | OUTPATIENT
Start: 2023-04-20

## 2023-04-20 RX ORDER — LANOLIN ALCOHOL/MO/W.PET/CERES
325 CREAM (GRAM) TOPICAL 2 TIMES DAILY
Qty: 180 TABLET | Refills: 1 | Status: SHIPPED | OUTPATIENT
Start: 2023-04-20

## 2023-04-20 SDOH — ECONOMIC STABILITY: FOOD INSECURITY: WITHIN THE PAST 12 MONTHS, YOU WORRIED THAT YOUR FOOD WOULD RUN OUT BEFORE YOU GOT MONEY TO BUY MORE.: NEVER TRUE

## 2023-04-20 SDOH — ECONOMIC STABILITY: TRANSPORTATION INSECURITY
IN THE PAST 12 MONTHS, HAS LACK OF TRANSPORTATION KEPT YOU FROM MEETINGS, WORK, OR FROM GETTING THINGS NEEDED FOR DAILY LIVING?: YES

## 2023-04-20 SDOH — ECONOMIC STABILITY: FOOD INSECURITY: WITHIN THE PAST 12 MONTHS, THE FOOD YOU BOUGHT JUST DIDN'T LAST AND YOU DIDN'T HAVE MONEY TO GET MORE.: NEVER TRUE

## 2023-04-20 SDOH — ECONOMIC STABILITY: INCOME INSECURITY: HOW HARD IS IT FOR YOU TO PAY FOR THE VERY BASICS LIKE FOOD, HOUSING, MEDICAL CARE, AND HEATING?: SOMEWHAT HARD

## 2023-04-20 SDOH — ECONOMIC STABILITY: HOUSING INSECURITY
IN THE LAST 12 MONTHS, WAS THERE A TIME WHEN YOU DID NOT HAVE A STEADY PLACE TO SLEEP OR SLEPT IN A SHELTER (INCLUDING NOW)?: NO

## 2023-04-20 NOTE — PROGRESS NOTES
Cheng  [address] [place] [address], [address] [place] [address]  Phone: [phone]  Fax:[email or web address]: [phone] [facility]; Established patient 46 y. o.female; seen 4/20/2023 for: Cholesterol Problem and Hypertension      Assessment & Plan    1. Abnormal LFTs (liver function tests)  2. GABE (generalized anxiety disorder)  -     escitalopram (LEXAPRO) 20 MG tablet; Take 1 tablet by mouth daily, Disp-90 tablet, R-1Normal  -     LORazepam (ATIVAN) 1 MG tablet; Take 1 tablet by mouth every 8 hours as needed for Anxiety. , Disp-90 tablet, R-5Normal  3. Iron deficiency anemia, unspecified iron deficiency anemia type  -     ferrous sulfate (FE TABS 325) 325 (65 Fe) MG EC tablet; Take 1 tablet by mouth 2 times daily, Disp-180 tablet, R-1Normal  4. Hypokalemia  -     potassium chloride (KLOR-CON M) 20 MEQ extended release tablet; Take 1 tablet by mouth daily, Disp-90 tablet, R-1Normal  5. Hyperlipidemia, unspecified hyperlipidemia type  -     pravastatin (PRAVACHOL) 20 MG tablet; Take 1 tablet by mouth daily, Disp-90 tablet, R-1Normal  6. Spastic hemiplegia affecting dominant side (HCC)  -     tiZANidine (ZANAFLEX) 4 MG tablet; Take 1 tablet by mouth 3 times daily, Disp-270 tablet, R-1Normal  7. Hypertension, unspecified type  -     valsartan (DIOVAN) 320 MG tablet; Take 1 tablet by mouth daily, Disp-90 tablet, R-1Normal    Problem and/or Symptoms are currently stable and/or improving on current treatment plan. Will continue and have patient follow up as directed. Pertinent labs reviewed & pertinent meds refilled X 6 M      Check Out Instructions  Return in about 6 months (around 10/20/2023) for Office Visit, Previsit Labs. Subjective & Objective    HPI  Patient states that chronic health problems are stable on current regimens and has no acute concerns today except as mentioned.       Review of Systems     Physical Exam  Patient-Reported Vitals 4/20/2023

## 2023-04-20 NOTE — PATIENT INSTRUCTIONS

## 2023-07-28 NOTE — PROGRESS NOTES
[address] [place] [address]  [phone]    Patient Name:  Devyn Boateng  YOB: 1975      Office Visit 7/31/2023    CHIEF COMPLAINT:    Chief Complaint   Patient presents with    Sleep Apnea         HISTORY OF PRESENT ILLNESS:      Patient is a 45 yo female seen today for follow up of ALEX. Diagnostic sleep study on 10/17/2017 with an AHI of 72.7 and lowest oxygen saturation of 77%. She is prescribed cpap therapy with a humidifier set at 11cm with a nasal mask. Most recent download reveals AHI on PAP therapy is 1.1, leak is median 3.9 and 12.2 at 95th percentile and the hourly usage is 6 hours 48 minutes nightly. The overall use is 2477 hours with days greater than four hours at 348/365. The patient is compliant with the Pap therapy and is feeling better as a result. She reports that she cannot sleep at night without using her CPAP. States that if she occasionally forgets to put it on and falls asleep that she feels terrible upon awakening. States that she awakens every morning fully refreshed after using CPAP. Denies any excessive daytime sleepiness or fatigue. Bristol score is 1/24. She reports her weight has consistently been around 175-190 pounds. We did discuss decreasing carbohydrates in her diet and increasing her activity by walking to help with weight reduction. Her blood pressure is well controlled today.              Sleep Medicine 7/31/2023 7/29/2022 7/29/2021   Sitting and reading 0 0 0   Watching TV 0 0 0   Sitting, inactive in a public place (e.g. a theatre or a meeting) 0 0 0   As a passenger in a car for an hour without a break 0 0 0   Lying down to rest in the afternoon when circumstances permit 1 0 0   Sitting and talking to someone 0 0 0   Sitting quietly after a lunch without alcohol 0 0 0   In a car, while stopped for a few minutes in traffic 0 0 0   Bristol Sleepiness Score 1 0 0        Past Medical History:

## 2023-07-31 ENCOUNTER — OFFICE VISIT (OUTPATIENT)
Dept: SLEEP MEDICINE | Age: 48
End: 2023-07-31
Payer: MEDICARE

## 2023-07-31 ENCOUNTER — TELEPHONE (OUTPATIENT)
Dept: SLEEP MEDICINE | Age: 48
End: 2023-07-31

## 2023-07-31 VITALS
SYSTOLIC BLOOD PRESSURE: 124 MMHG | WEIGHT: 178 LBS | TEMPERATURE: 97.5 F | DIASTOLIC BLOOD PRESSURE: 69 MMHG | OXYGEN SATURATION: 100 % | HEART RATE: 85 BPM | HEIGHT: 57 IN | BODY MASS INDEX: 38.4 KG/M2

## 2023-07-31 DIAGNOSIS — E66.01 SEVERE OBESITY (BMI 35.0-39.9) WITH COMORBIDITY (HCC): ICD-10-CM

## 2023-07-31 DIAGNOSIS — G47.33 OSA (OBSTRUCTIVE SLEEP APNEA): Primary | ICD-10-CM

## 2023-07-31 PROCEDURE — G8427 DOCREV CUR MEDS BY ELIG CLIN: HCPCS | Performed by: NURSE PRACTITIONER

## 2023-07-31 PROCEDURE — 3078F DIAST BP <80 MM HG: CPT | Performed by: NURSE PRACTITIONER

## 2023-07-31 PROCEDURE — 1036F TOBACCO NON-USER: CPT | Performed by: NURSE PRACTITIONER

## 2023-07-31 PROCEDURE — 3017F COLORECTAL CA SCREEN DOC REV: CPT | Performed by: NURSE PRACTITIONER

## 2023-07-31 PROCEDURE — G8417 CALC BMI ABV UP PARAM F/U: HCPCS | Performed by: NURSE PRACTITIONER

## 2023-07-31 PROCEDURE — 3074F SYST BP LT 130 MM HG: CPT | Performed by: NURSE PRACTITIONER

## 2023-07-31 PROCEDURE — 99213 OFFICE O/P EST LOW 20 MIN: CPT | Performed by: NURSE PRACTITIONER

## 2023-07-31 ASSESSMENT — SLEEP AND FATIGUE QUESTIONNAIRES
HOW LIKELY ARE YOU TO NOD OFF OR FALL ASLEEP WHEN YOU ARE A PASSENGER IN A CAR FOR AN HOUR WITHOUT A BREAK: 0
HOW LIKELY ARE YOU TO NOD OFF OR FALL ASLEEP WHILE WATCHING TV: 0
HOW LIKELY ARE YOU TO NOD OFF OR FALL ASLEEP WHILE LYING DOWN TO REST IN THE AFTERNOON WHEN CIRCUMSTANCES PERMIT: 1
HOW LIKELY ARE YOU TO NOD OFF OR FALL ASLEEP WHILE SITTING QUIETLY AFTER LUNCH WITHOUT ALCOHOL: 0
HOW LIKELY ARE YOU TO NOD OFF OR FALL ASLEEP WHILE SITTING AND TALKING TO SOMEONE: 0
ESS TOTAL SCORE: 1
HOW LIKELY ARE YOU TO NOD OFF OR FALL ASLEEP IN A CAR, WHILE STOPPED FOR A FEW MINUTES IN TRAFFIC: 0
HOW LIKELY ARE YOU TO NOD OFF OR FALL ASLEEP WHILE SITTING INACTIVE IN A PUBLIC PLACE: 0
HOW LIKELY ARE YOU TO NOD OFF OR FALL ASLEEP WHILE SITTING AND READING: 0

## 2023-07-31 NOTE — PATIENT INSTRUCTIONS
Continue CPAP 11 cm with nightly compliance  Renew supplies  Recommendations as above  Follow up will be in 1 year or sooner if needed

## 2023-10-24 ENCOUNTER — OFFICE VISIT (OUTPATIENT)
Dept: FAMILY MEDICINE CLINIC | Facility: CLINIC | Age: 48
End: 2023-10-24
Payer: MEDICARE

## 2023-10-24 VITALS
SYSTOLIC BLOOD PRESSURE: 137 MMHG | TEMPERATURE: 97.1 F | HEART RATE: 77 BPM | DIASTOLIC BLOOD PRESSURE: 89 MMHG | WEIGHT: 175.2 LBS | BODY MASS INDEX: 37.8 KG/M2 | HEIGHT: 57 IN

## 2023-10-24 DIAGNOSIS — I63.81 LEFT SIDED LACUNAR STROKE (HCC): ICD-10-CM

## 2023-10-24 DIAGNOSIS — E87.6 HYPOKALEMIA: ICD-10-CM

## 2023-10-24 DIAGNOSIS — R73.02 IGT (IMPAIRED GLUCOSE TOLERANCE): ICD-10-CM

## 2023-10-24 DIAGNOSIS — Z12.11 COLON CANCER SCREENING: ICD-10-CM

## 2023-10-24 DIAGNOSIS — G81.10 SPASTIC HEMIPLEGIA AFFECTING DOMINANT SIDE (HCC): ICD-10-CM

## 2023-10-24 DIAGNOSIS — E66.01 SEVERE OBESITY (BMI 35.0-39.9) WITH COMORBIDITY (HCC): ICD-10-CM

## 2023-10-24 DIAGNOSIS — Z12.31 SCREENING MAMMOGRAM, ENCOUNTER FOR: ICD-10-CM

## 2023-10-24 DIAGNOSIS — F41.1 GAD (GENERALIZED ANXIETY DISORDER): ICD-10-CM

## 2023-10-24 DIAGNOSIS — Z00.00 ANNUAL PHYSICAL EXAM: Primary | ICD-10-CM

## 2023-10-24 DIAGNOSIS — E78.5 HYPERLIPIDEMIA, UNSPECIFIED HYPERLIPIDEMIA TYPE: ICD-10-CM

## 2023-10-24 DIAGNOSIS — Z29.11 NEED FOR RSV VACCINATION: ICD-10-CM

## 2023-10-24 DIAGNOSIS — M15.9 OSTEOARTHRITIS OF MULTIPLE JOINTS, UNSPECIFIED OSTEOARTHRITIS TYPE: ICD-10-CM

## 2023-10-24 DIAGNOSIS — D50.9 IRON DEFICIENCY ANEMIA, UNSPECIFIED IRON DEFICIENCY ANEMIA TYPE: ICD-10-CM

## 2023-10-24 DIAGNOSIS — I10 HYPERTENSION, UNSPECIFIED TYPE: ICD-10-CM

## 2023-10-24 DIAGNOSIS — Z12.4 CERVICAL CANCER SCREENING: ICD-10-CM

## 2023-10-24 DIAGNOSIS — R79.89 ABNORMAL LFTS (LIVER FUNCTION TESTS): ICD-10-CM

## 2023-10-24 DIAGNOSIS — Z23 NEED FOR INFLUENZA VACCINATION: ICD-10-CM

## 2023-10-24 DIAGNOSIS — L60.0 INGROWN TOENAIL OF RIGHT FOOT: ICD-10-CM

## 2023-10-24 DIAGNOSIS — G62.9 NEUROPATHY: ICD-10-CM

## 2023-10-24 PROBLEM — G43.909 MIGRAINES: Status: ACTIVE | Noted: 2022-08-23

## 2023-10-24 PROBLEM — M19.90 OA (OSTEOARTHRITIS): Status: ACTIVE | Noted: 2023-10-24

## 2023-10-24 PROCEDURE — G8427 DOCREV CUR MEDS BY ELIG CLIN: HCPCS | Performed by: FAMILY MEDICINE

## 2023-10-24 PROCEDURE — 90674 CCIIV4 VAC NO PRSV 0.5 ML IM: CPT | Performed by: FAMILY MEDICINE

## 2023-10-24 PROCEDURE — G0008 ADMIN INFLUENZA VIRUS VAC: HCPCS | Performed by: FAMILY MEDICINE

## 2023-10-24 PROCEDURE — 3017F COLORECTAL CA SCREEN DOC REV: CPT | Performed by: FAMILY MEDICINE

## 2023-10-24 PROCEDURE — 3079F DIAST BP 80-89 MM HG: CPT | Performed by: FAMILY MEDICINE

## 2023-10-24 PROCEDURE — 1036F TOBACCO NON-USER: CPT | Performed by: FAMILY MEDICINE

## 2023-10-24 PROCEDURE — 3075F SYST BP GE 130 - 139MM HG: CPT | Performed by: FAMILY MEDICINE

## 2023-10-24 PROCEDURE — G8484 FLU IMMUNIZE NO ADMIN: HCPCS | Performed by: FAMILY MEDICINE

## 2023-10-24 PROCEDURE — G8417 CALC BMI ABV UP PARAM F/U: HCPCS | Performed by: FAMILY MEDICINE

## 2023-10-24 PROCEDURE — 99214 OFFICE O/P EST MOD 30 MIN: CPT | Performed by: FAMILY MEDICINE

## 2023-10-24 PROCEDURE — G0439 PPPS, SUBSEQ VISIT: HCPCS | Performed by: FAMILY MEDICINE

## 2023-10-24 RX ORDER — MELOXICAM 7.5 MG/1
7.5 TABLET ORAL DAILY
Qty: 100 TABLET | Refills: 1 | Status: SHIPPED | OUTPATIENT
Start: 2023-10-24

## 2023-10-24 RX ORDER — PRAVASTATIN SODIUM 20 MG
20 TABLET ORAL DAILY
Qty: 100 TABLET | Refills: 1 | Status: SHIPPED | OUTPATIENT
Start: 2023-10-24

## 2023-10-24 RX ORDER — LORAZEPAM 1 MG/1
1 TABLET ORAL EVERY 8 HOURS PRN
Qty: 90 TABLET | Refills: 5 | Status: SHIPPED | OUTPATIENT
Start: 2023-10-24 | End: 2024-10-24

## 2023-10-24 RX ORDER — FERROUS SULFATE 143(45) MG
TABLET, EXTENDED RELEASE ORAL
COMMUNITY

## 2023-10-24 RX ORDER — ESCITALOPRAM OXALATE 20 MG/1
20 TABLET ORAL DAILY
Qty: 100 TABLET | Refills: 0 | Status: CANCELLED | OUTPATIENT
Start: 2023-10-24

## 2023-10-24 RX ORDER — TIZANIDINE 4 MG/1
4 TABLET ORAL 3 TIMES DAILY
Qty: 270 TABLET | Refills: 1 | Status: CANCELLED | OUTPATIENT
Start: 2023-10-24

## 2023-10-24 RX ORDER — POTASSIUM CHLORIDE 20 MEQ/1
20 TABLET, EXTENDED RELEASE ORAL DAILY
Qty: 100 TABLET | Refills: 1 | Status: SHIPPED | OUTPATIENT
Start: 2023-10-24

## 2023-10-24 RX ORDER — ESCITALOPRAM OXALATE 20 MG/1
20 TABLET ORAL DAILY
Qty: 100 TABLET | Refills: 1 | Status: SHIPPED | OUTPATIENT
Start: 2023-10-24

## 2023-10-24 RX ORDER — LANOLIN ALCOHOL/MO/W.PET/CERES
325 CREAM (GRAM) TOPICAL 2 TIMES DAILY
Qty: 200 TABLET | Refills: 1 | Status: SHIPPED | OUTPATIENT
Start: 2023-10-24

## 2023-10-24 RX ORDER — VALSARTAN 320 MG/1
320 TABLET ORAL DAILY
Qty: 100 TABLET | Refills: 1 | Status: SHIPPED | OUTPATIENT
Start: 2023-10-24

## 2023-10-24 RX ORDER — METAXALONE 800 MG/1
800 TABLET ORAL 3 TIMES DAILY
Qty: 300 TABLET | Refills: 1 | Status: SHIPPED | OUTPATIENT
Start: 2023-10-24

## 2023-10-24 ASSESSMENT — ENCOUNTER SYMPTOMS
BLOOD IN STOOL: 0
DIARRHEA: 0
SHORTNESS OF BREATH: 0
COLOR CHANGE: 0
ABDOMINAL PAIN: 0
CONSTIPATION: 0

## 2023-10-24 NOTE — PATIENT INSTRUCTIONS
Continue taking all the medications on this list as directed; this list is current and please discontinue any medications not on this list. Please call the office or use \"My Chart\" online to request medication refills prior to running out. - Please know that we keep Urgent Care visit slots in reserve every day for any acute illness or injury. If you ever have an urgent issue please call our office and we should typically be able to see you within 24 hours, but most often you will be able to be seen the same day that you call. We also offer Virtual Visits that can be done over a video connection, or regular phone call if you don't have a video connection, if that is your preference vs an office visit. Finally, please make sure you are scheduling your visits with someone who works at our office, [address] [place] [address], and not scheduling with our \"call center\". When you get the phone tree options, press \"Option 2\" first & then \"Option 1\". This combination should take you directly to someone at our office. Please try to avoid scheduling any visits through our call center if at all possible.

## 2023-10-24 NOTE — ASSESSMENT & PLAN NOTE
Problem and/or Symptoms are currently not stable and/or well controlled on current treatment plan. Will have patient follow up as directed and make the following changes for further evaluation and/or treatment:     Starting Mobic 7.5 mg QD & changing Zanaflex to Skelaxin (pt cannot tolerate Zanaflex d/t excessive sedation; Skelaxin as a newer generation muscle relaxer should be much less sedating).

## 2023-10-24 NOTE — ASSESSMENT & PLAN NOTE
Problem and/or Symptoms are currently stable and/or improving on current treatment plan. Will continue and have patient follow up as directed.     Cont to monitor

## 2023-11-15 ENCOUNTER — TELEPHONE (OUTPATIENT)
Dept: FAMILY MEDICINE CLINIC | Facility: CLINIC | Age: 48
End: 2023-11-15

## 2023-11-15 DIAGNOSIS — G81.10 SPASTIC HEMIPLEGIA AFFECTING DOMINANT SIDE (HCC): ICD-10-CM

## 2023-11-15 RX ORDER — TIZANIDINE 4 MG/1
4 TABLET ORAL 3 TIMES DAILY
Qty: 300 TABLET | Refills: 1 | Status: SHIPPED | OUTPATIENT
Start: 2023-11-15

## 2023-11-15 NOTE — TELEPHONE ENCOUNTER
Pt called Monday stating that she can not take Metaxlone 800 mg causing her dizziness and stomach pains. Pt would like to know if she could start back on the zanaflex.  Please advise tks :)

## 2023-11-30 NOTE — PROGRESS NOTES
New Patient presents today for a routine gynecological examination with no complaints. She still gets periods, but does skip periods sometimes. Has never gone 12 mo without menses. Sees PCP regularly. PT has had a stroke and has R side limited mobility. She has a Cologuard kit at home to send back. Last pap smear: 's per patient and normal.   Mammogram: has never had screening   Colonoscopy: has not had screening  Dexa: NA    No known history of endometriosis, uterine fibroids, PCOS, or ovarian cyst.   No known history of abnormal pap smear, colposcopy or Leep. No known history of std's or herpes. Not really sexually active. She's been  for 30 years. Had stroke in 2017     OB History          2    Para   2    Term   2            AB        Living   2         SAB        IAB        Ectopic        Molar        Multiple        Live Births   2                  GYN History           Patient's last menstrual period was 10/06/2023 (exact date). Patient does not keep track of periods. Normally periods are monthly lasting 5-7 days. She did skip period in November.    negative dysmenorrhea; negative postcoital bleeding    Past Medical History:  Past Medical History:   Diagnosis Date    Generalized anxiety disorder     GERD (gastroesophageal reflux disease)     Hypertension     Insomnia, unspecified     Iron deficiency anemia 2013    Left sided lacunar stroke ([address]) 2017    Mitral valve prolapse     Obesity (BMI 30-39.9)     ALEX on CPAP 10/2017    Pre-eclampsia 1992    Prediabetes 2015 HgbA1C 5.9    Spastic hemiplegia affecting dominant side ([address]) 3/22/2018    Stroke [facility])        Past Surgical History:  Past Surgical History:   Procedure Laterality Date    TUBAL LIGATION      UPPER GASTROINTESTINAL ENDOSCOPY  10/1/04    Normal per Dr. Esther Erickson  2018    US GUIDED LIVER BIOPSY PERCUTANEOUS 2018 [facility]

## 2023-12-01 ENCOUNTER — OFFICE VISIT (OUTPATIENT)
Dept: OBGYN CLINIC | Age: 48
End: 2023-12-01
Payer: MEDICARE

## 2023-12-01 VITALS
DIASTOLIC BLOOD PRESSURE: 80 MMHG | WEIGHT: 173.8 LBS | SYSTOLIC BLOOD PRESSURE: 130 MMHG | BODY MASS INDEX: 37.49 KG/M2 | HEIGHT: 57 IN

## 2023-12-01 DIAGNOSIS — Z11.51 SCREENING FOR HUMAN PAPILLOMAVIRUS (HPV): ICD-10-CM

## 2023-12-01 DIAGNOSIS — Z12.31 ENCOUNTER FOR SCREENING MAMMOGRAM FOR BREAST CANCER: ICD-10-CM

## 2023-12-01 DIAGNOSIS — Z01.419 WELL WOMAN EXAM: Primary | ICD-10-CM

## 2023-12-01 DIAGNOSIS — Z12.4 SCREENING FOR CERVICAL CANCER: ICD-10-CM

## 2023-12-01 PROCEDURE — G0101 CA SCREEN;PELVIC/BREAST EXAM: HCPCS | Performed by: NURSE PRACTITIONER

## 2023-12-01 PROCEDURE — G8427 DOCREV CUR MEDS BY ELIG CLIN: HCPCS | Performed by: NURSE PRACTITIONER

## 2023-12-01 PROCEDURE — G8417 CALC BMI ABV UP PARAM F/U: HCPCS | Performed by: NURSE PRACTITIONER

## 2023-12-08 LAB
COLLECTION METHOD: ABNORMAL
CYTOLOGIST CVX/VAG CYTO: ABNORMAL
CYTOLOGY CVX/VAG DOC THIN PREP: ABNORMAL
DATE OF LMP: ABNORMAL
HPV APTIMA: POSITIVE
HPV GENOTYPE 18,45: NEGATIVE
HPV, GENOTYPE 16: NEGATIVE
Lab: ABNORMAL
PAP SOURCE: ABNORMAL
PATH REPORT.FINAL DX SPEC: ABNORMAL
PATHOLOGIST CVX/VAG CYTO: ABNORMAL
PATHOLOGIST PROVIDED ICD: ABNORMAL
RECOM F/U CVX/VAG CYTO: ABNORMAL
STAT OF ADQ CVX/VAG CYTO-IMP: ABNORMAL

## 2023-12-11 ENCOUNTER — TELEPHONE (OUTPATIENT)
Dept: OBGYN CLINIC | Age: 48
End: 2023-12-11

## 2023-12-11 NOTE — TELEPHONE ENCOUNTER
Made an attempt to contact the patient but there was no answer. Left a message to return my call to discuss pap smear results. Patient is active on my chart so I will sent her a message through there.      Results:   HGSIL pap with + HPV   Needs colpo pls schedule

## 2023-12-28 ENCOUNTER — TELEPHONE (OUTPATIENT)
Dept: OBGYN CLINIC | Age: 48
End: 2023-12-28

## 2023-12-28 NOTE — TELEPHONE ENCOUNTER
Made an attempt to contact the patient about colposcopy results but there was no answer. Left a message that Dr. Shyanne Holder would like for her to come in for a talk appointment to further discuss pathology results and plan. I also sent the patient a my chart as she is active. Results:   Her pathology from colposcopy came back as severely precancerous cells / superficial cancer. We could do a cone or hysterectomy. Does she want to come in to talk about options?

## 2024-01-02 NOTE — PROGRESS NOTES
1/8/2018    US GUIDED LIVER BIOPSY PERCUTANEOUS 1/8/2018 [facility] US       Her current meds are:    Current Outpatient Medications:     tiZANidine (ZANAFLEX) 4 MG tablet, Take 1 tablet by mouth 3 times daily, Disp: 300 tablet, Rfl: 1    Ferrous Sulfate Dried ER (SM SLOW RELEASE IRON) 143 (45 Fe) MG TBCR, , Disp: , Rfl:     ferrous sulfate (FE TABS 325) 325 (65 Fe) MG EC tablet, Take 1 tablet by mouth 2 times daily, Disp: 200 tablet, Rfl: 1    LORazepam (ATIVAN) 1 MG tablet, Take 1 tablet by mouth every 8 hours as needed for Anxiety., Disp: 90 tablet, Rfl: 5    potassium chloride (KLOR-CON M) 20 MEQ extended release tablet, Take 1 tablet by mouth daily, Disp: 100 tablet, Rfl: 1    pravastatin (PRAVACHOL) 20 MG tablet, Take 1 tablet by mouth daily, Disp: 100 tablet, Rfl: 1    valsartan (DIOVAN) 320 MG tablet, Take 1 tablet by mouth daily, Disp: 100 tablet, Rfl: 1    escitalopram (LEXAPRO) 20 MG tablet, Take 1 tablet by mouth daily, Disp: 100 tablet, Rfl: 1    aspirin 325 MG tablet, Take 1 tablet by mouth daily, Disp: , Rfl:     meloxicam (MOBIC) 7.5 MG tablet, Take 1 tablet by mouth daily (Patient not taking: Reported on 1/3/2024), Disp: 100 tablet, Rfl: 1     Review of Systems  Neg except hpi       PHYSICAL EXAM:  /78   Wt 78.3 kg (172 lb 9.6 oz)   LMP 10/06/2023 (Approximate)   BMI 37.35 kg/m²   Physical Exam  General: well nourished well developed female in nad     ASSESSMENT / PLAN:  Lola was seen today for advice only.    Diagnoses and all orders for this visit:    Severe cervical dysplasia    Severe obesity (BMI 35.0-39.9) with comorbidity (HCC)      Will proceed with cold knife cone as pt is a little medically complex.  Likely 2-4 weeks of pelvic rest.  Will ask cards / vascular to see pt before surgery.  Do not need to see her for preop visit.  All questions answered.      Denise Alt, DO

## 2024-01-03 ENCOUNTER — TELEPHONE (OUTPATIENT)
Dept: OBGYN CLINIC | Age: 49
End: 2024-01-03

## 2024-01-03 ENCOUNTER — OFFICE VISIT (OUTPATIENT)
Dept: OBGYN CLINIC | Age: 49
End: 2024-01-03
Payer: MEDICARE

## 2024-01-03 VITALS — DIASTOLIC BLOOD PRESSURE: 78 MMHG | WEIGHT: 172.6 LBS | SYSTOLIC BLOOD PRESSURE: 128 MMHG | BODY MASS INDEX: 37.35 KG/M2

## 2024-01-03 DIAGNOSIS — D06.9 SEVERE CERVICAL DYSPLASIA: Primary | ICD-10-CM

## 2024-01-03 DIAGNOSIS — E66.01 SEVERE OBESITY (BMI 35.0-39.9) WITH COMORBIDITY (HCC): ICD-10-CM

## 2024-01-03 PROCEDURE — 99214 OFFICE O/P EST MOD 30 MIN: CPT | Performed by: OBSTETRICS & GYNECOLOGY

## 2024-01-03 PROCEDURE — G8417 CALC BMI ABV UP PARAM F/U: HCPCS | Performed by: OBSTETRICS & GYNECOLOGY

## 2024-01-03 PROCEDURE — 3017F COLORECTAL CA SCREEN DOC REV: CPT | Performed by: OBSTETRICS & GYNECOLOGY

## 2024-01-03 PROCEDURE — 3074F SYST BP LT 130 MM HG: CPT | Performed by: OBSTETRICS & GYNECOLOGY

## 2024-01-03 PROCEDURE — 1036F TOBACCO NON-USER: CPT | Performed by: OBSTETRICS & GYNECOLOGY

## 2024-01-03 PROCEDURE — 3078F DIAST BP <80 MM HG: CPT | Performed by: OBSTETRICS & GYNECOLOGY

## 2024-01-03 PROCEDURE — G8427 DOCREV CUR MEDS BY ELIG CLIN: HCPCS | Performed by: OBSTETRICS & GYNECOLOGY

## 2024-01-03 PROCEDURE — G8482 FLU IMMUNIZE ORDER/ADMIN: HCPCS | Performed by: OBSTETRICS & GYNECOLOGY

## 2024-01-03 NOTE — TELEPHONE ENCOUNTER
I called and spoke with the patient about her results. Patient was already scheduled to come in this afternoon to see Dr. Yu. All questions were answered.     Results:   Her pathology from colposcopy came back as severely precancerous cells / superficial cancer. We could do a cone or hysterectomy.  Does she want to come in to talk about options?

## 2024-01-10 ENCOUNTER — TELEPHONE (OUTPATIENT)
Dept: OBGYN CLINIC | Age: 49
End: 2024-01-10

## 2024-01-10 NOTE — TELEPHONE ENCOUNTER
Attempted to speak with pt regarding a cardiac clearance prior to surgery with Dr. Yu. Pt was not keen on her last cardiologist, left office phone number and referrals specialist name so pt can get set up for an appointment prior to surgery.

## 2024-01-29 ENCOUNTER — TELEPHONE (OUTPATIENT)
Dept: OBGYN CLINIC | Age: 49
End: 2024-01-29

## 2024-01-29 NOTE — TELEPHONE ENCOUNTER
Patient called [facility] to request appointment to get cardiac clearance for procedure she and Dr. Yu spoke about at her last appt. [facility] advised that they needed something from us stating the above before she could make that appointment. Please advise next steps. Thank you!

## 2024-01-30 DIAGNOSIS — I10 HYPERTENSION, UNSPECIFIED TYPE: ICD-10-CM

## 2024-01-30 DIAGNOSIS — G81.10 SPASTIC HEMIPLEGIA AFFECTING DOMINANT SIDE (HCC): ICD-10-CM

## 2024-01-30 DIAGNOSIS — I63.81 LEFT SIDED LACUNAR STROKE (HCC): ICD-10-CM

## 2024-01-30 DIAGNOSIS — I34.1 MVP (MITRAL VALVE PROLAPSE): Primary | ICD-10-CM

## 2024-01-30 NOTE — PROGRESS NOTES
Orders Placed This Encounter   Procedures    [facility] [place]     Referral Priority:   Routine     Referral Type:   Eval and Treat     Referral Reason:   Specialty Services Required     Requested Specialty:   Cardiology     Number of Visits Requested:   1

## 2024-02-09 PROBLEM — D06.9 SEVERE DYSPLASIA OF CERVIX: Status: ACTIVE | Noted: 2024-04-04

## 2024-03-01 ENCOUNTER — INITIAL CONSULT (OUTPATIENT)
Age: 49
End: 2024-03-01
Payer: MEDICARE

## 2024-03-01 VITALS
HEIGHT: 57 IN | HEART RATE: 68 BPM | DIASTOLIC BLOOD PRESSURE: 86 MMHG | WEIGHT: 175 LBS | BODY MASS INDEX: 37.76 KG/M2 | SYSTOLIC BLOOD PRESSURE: 138 MMHG

## 2024-03-01 DIAGNOSIS — R07.2 PRECORDIAL PAIN: ICD-10-CM

## 2024-03-01 DIAGNOSIS — I10 PRIMARY HYPERTENSION: ICD-10-CM

## 2024-03-01 DIAGNOSIS — I34.1 MVP (MITRAL VALVE PROLAPSE): Primary | ICD-10-CM

## 2024-03-01 PROCEDURE — 3017F COLORECTAL CA SCREEN DOC REV: CPT | Performed by: INTERNAL MEDICINE

## 2024-03-01 PROCEDURE — 3075F SYST BP GE 130 - 139MM HG: CPT | Performed by: INTERNAL MEDICINE

## 2024-03-01 PROCEDURE — 3079F DIAST BP 80-89 MM HG: CPT | Performed by: INTERNAL MEDICINE

## 2024-03-01 PROCEDURE — G8417 CALC BMI ABV UP PARAM F/U: HCPCS | Performed by: INTERNAL MEDICINE

## 2024-03-01 PROCEDURE — 99204 OFFICE O/P NEW MOD 45 MIN: CPT | Performed by: INTERNAL MEDICINE

## 2024-03-01 PROCEDURE — 93000 ELECTROCARDIOGRAM COMPLETE: CPT | Performed by: INTERNAL MEDICINE

## 2024-03-01 PROCEDURE — G8428 CUR MEDS NOT DOCUMENT: HCPCS | Performed by: INTERNAL MEDICINE

## 2024-03-01 PROCEDURE — 1036F TOBACCO NON-USER: CPT | Performed by: INTERNAL MEDICINE

## 2024-03-01 PROCEDURE — G8482 FLU IMMUNIZE ORDER/ADMIN: HCPCS | Performed by: INTERNAL MEDICINE

## 2024-03-01 ASSESSMENT — ENCOUNTER SYMPTOMS
SHORTNESS OF BREATH: 0
ABDOMINAL PAIN: 0

## 2024-03-01 NOTE — PROGRESS NOTES
CHOL 163 10/23/2023    CHOL 153 04/13/2023    CHOL 155 10/17/2022     Lab Results   Component Value Date    TRIG 101 10/23/2023    TRIG 101 04/13/2023    TRIG 93 10/17/2022     Lab Results   Component Value Date    HDL 65 (H) 10/23/2023    HDL 65 (H) 04/13/2023    HDL 56 10/17/2022     Lab Results   Component Value Date    LDLCALC 77.8 10/23/2023    LDLCALC 67.8 04/13/2023    LDLCALC 80.4 10/17/2022     Lab Results   Component Value Date    VLDL 21 10/14/2021     Lab Results   Component Value Date    CHOLHDLRATIO 2.5 10/23/2023    CHOLHDLRATIO 2.4 04/13/2023    CHOLHDLRATIO 2.8 10/17/2022       EKG    Normal sinus rhythm      OUTSIDE RECORDS REVIEW    Records from outside providers have been reviewed and summarized as noted in the HPI, past history and data review sections of this note, and reviewed with patient. .       ASSESSMENT and PLAN    Lola was seen today for consultation and mitral valve prolapse.    Diagnoses and all orders for this visit:    MVP (mitral valve prolapse)  -     EKG 12 lead    HTN (hypertension)  -     EKG 12 lead          IMPRESSION:    Ms. Chapin presents today for follow-up.  Patient had chest pain for many years but notes change in the discomfort with some radiation to the left shoulder left neck and jaw.  Nonexertional.  She does have balance issues after her stroke several years ago.  Walks with a cane.  Given her ongoing symptoms, recommended a nuclear stress test with Lexiscan due to her inability to walk on the treadmill.  Her most recent echo in 2021 showed no significant mitral valve disease.  If her stress test is normal, we can be reassured her heart is in good shape and she can follow-up as needed.    No follow-ups on file.            Thank you for allowing me to participate in this patient's care.  Please call or contact me if there are any questions or concerns regarding the above.      Demetrius Moise III, MD  03/01/24  10:03 AM

## 2024-03-12 NOTE — PROGRESS NOTES
Rfl: 1    valsartan (DIOVAN) 320 MG tablet, Take 1 tablet by mouth daily, Disp: 100 tablet, Rfl: 1    escitalopram (LEXAPRO) 20 MG tablet, Take 1 tablet by mouth daily, Disp: 100 tablet, Rfl: 1    aspirin 325 MG tablet, Take 1 tablet by mouth daily, Disp: , Rfl:      Allergies   Allergen Reactions    Erythromycin Nausea And Vomiting   .     reports that she has never smoked. She has never used smokeless tobacco. She reports that she does not drink alcohol and does not use drugs.    family history includes Cancer in her maternal grandmother, mother, and sister; Diabetes in her maternal grandmother; Hypertension in her mother and another family member; Hypertension (age of onset: 27) in her brother; Ovarian Cancer in her maternal grandmother; Stroke in her brother and maternal grandmother.      Physical Exam:    Vitals:    03/14/24 1005   BP: 128/72   Weight: 79 kg (174 lb 3.2 oz)   Height: 1.448 m (4' 9\")       Patient without distress.  Heart: Regular rate and rhythm   Lung: clear to auscultation throughout lung fields, no wheezes, no rales, no rhonchi and normal respiratory effort  Abdomen: soft, nontender  Lower Extremities:  - Edema No    Findings/Diagnosis: \"CERVICAL BIOPSY AT 12 O'CLOCK\":  HIGH GRADE SQUAMOUS INTRAEPITHELIAL   LESION (HSIL).     Pre-Op Evaluation done today.       Surgery to be Performed: [facility]  Surgery Date:  4/4/24  [place]  [facility] [place]  [place]gery Duration:  64 min  Surgery Type: Outpatient surgery    The risks of surgery were discussed in detail, including anesthesia, hemorrhage, blood clots, infection.  Hopefully this will be curative for dysplasia.

## 2024-03-14 ENCOUNTER — OFFICE VISIT (OUTPATIENT)
Dept: OBGYN CLINIC | Age: 49
End: 2024-03-14

## 2024-03-14 VITALS
BODY MASS INDEX: 37.58 KG/M2 | DIASTOLIC BLOOD PRESSURE: 72 MMHG | SYSTOLIC BLOOD PRESSURE: 128 MMHG | WEIGHT: 174.2 LBS | HEIGHT: 57 IN

## 2024-03-14 DIAGNOSIS — Z01.818 PREOP EXAMINATION: Primary | ICD-10-CM

## 2024-03-15 NOTE — H&P
History and Physical      Lola Chapin:   Physician:  Denise Yu,     This 48 y.o. female presents today for pre-operative evaluation. Pt seen by cardiology 3/1/24 and has upcoming stress test on 3/25/24.  Having surgery on .      History:  This 48 y.o.  patient has history of abnormal pap smear.       Colposcopy 23:  \"CERVICAL BIOPSY AT 12 O'CLOCK\":  HIGH GRADE SQUAMOUS INTRAEPITHELIAL   LESION (HSIL).       Pap smear 23:  HGSIL, HPV Pos, 16/18/45 Neg    Previous treatment includes: Colposcopy.    OB History          2    Para   2    Term   2            AB        Living   2         SAB        IAB        Ectopic        Molar        Multiple        Live Births   2                Past Medical History:   Diagnosis Date    Generalized anxiety disorder     GERD (gastroesophageal reflux disease)     Hypertension     Insomnia, unspecified     Iron deficiency anemia 2013    Left sided lacunar stroke (HCC) 2017    Mitral valve prolapse     Obesity (BMI 30-39.9)     ALEX on CPAP 10/2017    Pre-eclampsia     Prediabetes 2015 HgbA1C 5.9    Spastic hemiplegia affecting dominant side (HCC) 3/22/2018    Stroke (HCC)         has a past surgical history that includes Upper gastrointestinal endoscopy (10/1/04); Tubal ligation (); and US BIOPSY LIVER PERCUTANEOUS (2018).      Current Outpatient Medications:     tiZANidine (ZANAFLEX) 4 MG tablet, Take 1 tablet by mouth 3 times daily, Disp: 300 tablet, Rfl: 1    ferrous sulfate (FE TABS 325) 325 (65 Fe) MG EC tablet, Take 1 tablet by mouth 2 times daily, Disp: 200 tablet, Rfl: 1    LORazepam (ATIVAN) 1 MG tablet, Take 1 tablet by mouth every 8 hours as needed for Anxiety., Disp: 90 tablet, Rfl: 5    potassium chloride (KLOR-CON M) 20 MEQ extended release tablet, Take 1 tablet by mouth daily, Disp: 100 tablet, Rfl: 1    pravastatin (PRAVACHOL) 20 MG tablet, Take 1 tablet by mouth daily, Disp: 100 tablet,

## 2024-03-26 ENCOUNTER — TELEPHONE (OUTPATIENT)
Age: 49
End: 2024-03-26

## 2024-03-26 NOTE — TELEPHONE ENCOUNTER
----- Message from Demetrius Moise III, MD sent at 3/26/2024 11:17 AM EDT -----  Please let patient know that recent stress test was normal. Patient can follow up as scheduled.

## 2024-04-02 ENCOUNTER — TELEPHONE (OUTPATIENT)
Dept: SURGERY | Age: 49
End: 2024-04-02

## 2024-04-02 NOTE — TELEPHONE ENCOUNTER
Pt takes aspirin 325 mg daily for stroke prevention. She has a history of a prior stroke. I did tell her to decrease her aspirin to 81 mg daily. I told her if you guys wanted her to stop it completely then you would contact her. Thanks!

## 2024-04-02 NOTE — PERIOP NOTE
Patient verified name and .  Order for consent found in EHR and matches case posting; patient verifies procedure.   Type 1B surgery, PAT phone assessment complete.  Orders received.  Labs per surgeon: urine pregnancy DOS  Labs per anesthesia protocol: Hgb- Pt instructed to come for lab work (Monday- Friday 8:00 AM- 3:00 PM) prior to surgery day. Pt voiced an understanding.      Last cardiology office note dated 3/1/24, EKG dated 3/1/24, stress test dated 3/25/24 and Echo dated 21 found in EHR if needed for anesthesia reference.     Patient answered medical/surgical history questions at their best of ability. All prior to admission medications documented in EPIC.    Patient instructed to continue taking all prescription medications up to the day of surgery but to take only the following medications the day of surgery according to anesthesia guidelines with a small sip of water: ativan if needed, flexeril if needed. Also, patient is requested to take 2 Tylenol in the morning and then again before bed on the day before surgery. Regular or extra strength may be used.       Patient informed that all vitamins and supplements should be held 7 days prior to surgery and NSAIDS 5 days prior to surgery. Pt instructed to decrease aspirin to 81 mg.     Patient instructed on the following:    > Arrive at A Entrance, time of arrival to be called the day before by 1700  > NPO after midnight, unless otherwise indicated, including gum, mints, and ice chips  > Responsible adult must drive patient to the hospital, stay during surgery, and patient will need supervision 24 hours after anesthesia  > Use non moisturizing soap in shower the night before surgery and on the morning of surgery  > All piercings must be removed prior to arrival.    > Leave all valuables (money and jewelry) at home but bring insurance card and ID on DOS.   > Do not wear make-up, nail polish, lotions, cologne, perfumes, powders, or oil on skin.

## 2024-04-03 ENCOUNTER — ANESTHESIA EVENT (OUTPATIENT)
Dept: SURGERY | Age: 49
End: 2024-04-03
Payer: MEDICARE

## 2024-04-03 ENCOUNTER — HOSPITAL ENCOUNTER (OUTPATIENT)
Dept: LAB | Age: 49
Discharge: HOME | End: 2024-04-06
Payer: MEDICARE

## 2024-04-03 DIAGNOSIS — Z01.818 PRE-OP TESTING: ICD-10-CM

## 2024-04-03 LAB — HGB BLD-MCNC: 14.1 G/DL (ref 11.7–15.4)

## 2024-04-03 PROCEDURE — 85018 HEMOGLOBIN: CPT

## 2024-04-03 PROCEDURE — 36415 COLL VENOUS BLD VENIPUNCTURE: CPT

## 2024-04-04 ENCOUNTER — PREP FOR PROCEDURE (OUTPATIENT)
Dept: OBGYN CLINIC | Age: 49
End: 2024-04-04

## 2024-04-04 ENCOUNTER — ANESTHESIA (OUTPATIENT)
Dept: SURGERY | Age: 49
End: 2024-04-04
Payer: MEDICARE

## 2024-04-04 ENCOUNTER — HOSPITAL ENCOUNTER (OUTPATIENT)
Age: 49
Discharge: HOME | End: 2024-04-04
Attending: OBSTETRICS & GYNECOLOGY | Admitting: OBSTETRICS & GYNECOLOGY
Payer: MEDICARE

## 2024-04-04 VITALS
RESPIRATION RATE: 17 BRPM | SYSTOLIC BLOOD PRESSURE: 125 MMHG | BODY MASS INDEX: 37.71 KG/M2 | WEIGHT: 174.8 LBS | DIASTOLIC BLOOD PRESSURE: 77 MMHG | HEART RATE: 67 BPM | TEMPERATURE: 97.6 F | HEIGHT: 57 IN | OXYGEN SATURATION: 94 %

## 2024-04-04 DIAGNOSIS — Z01.818 PRE-OP TESTING: Primary | ICD-10-CM

## 2024-04-04 DIAGNOSIS — D06.9 SEVERE CERVICAL DYSPLASIA: ICD-10-CM

## 2024-04-04 DIAGNOSIS — D06.9 SEVERE DYSPLASIA OF CERVIX: ICD-10-CM

## 2024-04-04 LAB — HCG UR QL: NEGATIVE

## 2024-04-04 PROCEDURE — 81025 URINE PREGNANCY TEST: CPT

## 2024-04-04 PROCEDURE — 7100000001 HC PACU RECOVERY - ADDTL 15 MIN: Performed by: OBSTETRICS & GYNECOLOGY

## 2024-04-04 PROCEDURE — 3700000000 HC ANESTHESIA ATTENDED CARE: Performed by: OBSTETRICS & GYNECOLOGY

## 2024-04-04 PROCEDURE — 57522 CONIZATION OF CERVIX: CPT | Performed by: OBSTETRICS & GYNECOLOGY

## 2024-04-04 PROCEDURE — 7100000010 HC PHASE II RECOVERY - FIRST 15 MIN: Performed by: OBSTETRICS & GYNECOLOGY

## 2024-04-04 PROCEDURE — 2500000003 HC RX 250 WO HCPCS: Performed by: NURSE ANESTHETIST, CERTIFIED REGISTERED

## 2024-04-04 PROCEDURE — 7100000011 HC PHASE II RECOVERY - ADDTL 15 MIN: Performed by: OBSTETRICS & GYNECOLOGY

## 2024-04-04 PROCEDURE — 88305 TISSUE EXAM BY PATHOLOGIST: CPT

## 2024-04-04 PROCEDURE — 2709999900 HC NON-CHARGEABLE SUPPLY: Performed by: OBSTETRICS & GYNECOLOGY

## 2024-04-04 PROCEDURE — 88307 TISSUE EXAM BY PATHOLOGIST: CPT

## 2024-04-04 PROCEDURE — 6370000000 HC RX 637 (ALT 250 FOR IP): Performed by: OBSTETRICS & GYNECOLOGY

## 2024-04-04 PROCEDURE — 2580000003 HC RX 258: Performed by: ANESTHESIOLOGY

## 2024-04-04 PROCEDURE — 3700000001 HC ADD 15 MINUTES (ANESTHESIA): Performed by: OBSTETRICS & GYNECOLOGY

## 2024-04-04 PROCEDURE — 7100000000 HC PACU RECOVERY - FIRST 15 MIN: Performed by: OBSTETRICS & GYNECOLOGY

## 2024-04-04 PROCEDURE — 6360000002 HC RX W HCPCS: Performed by: NURSE ANESTHETIST, CERTIFIED REGISTERED

## 2024-04-04 PROCEDURE — 3600000003 HC SURGERY LEVEL 3 BASE: Performed by: OBSTETRICS & GYNECOLOGY

## 2024-04-04 PROCEDURE — 3600000013 HC SURGERY LEVEL 3 ADDTL 15MIN: Performed by: OBSTETRICS & GYNECOLOGY

## 2024-04-04 RX ORDER — SODIUM CHLORIDE, SODIUM LACTATE, POTASSIUM CHLORIDE, CALCIUM CHLORIDE 600; 310; 30; 20 MG/100ML; MG/100ML; MG/100ML; MG/100ML
INJECTION, SOLUTION INTRAVENOUS CONTINUOUS
Status: DISCONTINUED | OUTPATIENT
Start: 2024-04-04 | End: 2024-04-04 | Stop reason: HOSPADM

## 2024-04-04 RX ORDER — IBUPROFEN 600 MG/1
600 TABLET ORAL EVERY 8 HOURS PRN
Qty: 20 TABLET | Refills: 0 | Status: SHIPPED | OUTPATIENT
Start: 2024-04-04

## 2024-04-04 RX ORDER — LIDOCAINE HYDROCHLORIDE 20 MG/ML
INJECTION, SOLUTION EPIDURAL; INFILTRATION; INTRACAUDAL; PERINEURAL PRN
Status: DISCONTINUED | OUTPATIENT
Start: 2024-04-04 | End: 2024-04-04 | Stop reason: SDUPTHER

## 2024-04-04 RX ORDER — PROPOFOL 10 MG/ML
INJECTION, EMULSION INTRAVENOUS PRN
Status: DISCONTINUED | OUTPATIENT
Start: 2024-04-04 | End: 2024-04-04 | Stop reason: SDUPTHER

## 2024-04-04 RX ORDER — SODIUM CHLORIDE 0.9 % (FLUSH) 0.9 %
5-40 SYRINGE (ML) INJECTION EVERY 12 HOURS SCHEDULED
Status: DISCONTINUED | OUTPATIENT
Start: 2024-04-04 | End: 2024-04-04 | Stop reason: HOSPADM

## 2024-04-04 RX ORDER — ACETAMINOPHEN 500 MG
1000 TABLET ORAL EVERY 6 HOURS PRN
Qty: 30 TABLET | Refills: 0 | Status: SHIPPED | OUTPATIENT
Start: 2024-04-04

## 2024-04-04 RX ORDER — DEXAMETHASONE SODIUM PHOSPHATE 10 MG/ML
INJECTION INTRAMUSCULAR; INTRAVENOUS PRN
Status: DISCONTINUED | OUTPATIENT
Start: 2024-04-04 | End: 2024-04-04 | Stop reason: SDUPTHER

## 2024-04-04 RX ORDER — ACETAMINOPHEN 500 MG
1000 TABLET ORAL ONCE
Status: DISCONTINUED | OUTPATIENT
Start: 2024-04-04 | End: 2024-04-04 | Stop reason: HOSPADM

## 2024-04-04 RX ORDER — NALOXONE HYDROCHLORIDE 0.4 MG/ML
INJECTION, SOLUTION INTRAMUSCULAR; INTRAVENOUS; SUBCUTANEOUS PRN
Status: DISCONTINUED | OUTPATIENT
Start: 2024-04-04 | End: 2024-04-04 | Stop reason: HOSPADM

## 2024-04-04 RX ORDER — KETOROLAC TROMETHAMINE 30 MG/ML
INJECTION, SOLUTION INTRAMUSCULAR; INTRAVENOUS PRN
Status: DISCONTINUED | OUTPATIENT
Start: 2024-04-04 | End: 2024-04-04 | Stop reason: SDUPTHER

## 2024-04-04 RX ORDER — MIDAZOLAM HYDROCHLORIDE 2 MG/2ML
2 INJECTION, SOLUTION INTRAMUSCULAR; INTRAVENOUS
Status: DISCONTINUED | OUTPATIENT
Start: 2024-04-04 | End: 2024-04-04 | Stop reason: HOSPADM

## 2024-04-04 RX ORDER — FENTANYL CITRATE 50 UG/ML
INJECTION, SOLUTION INTRAMUSCULAR; INTRAVENOUS PRN
Status: DISCONTINUED | OUTPATIENT
Start: 2024-04-04 | End: 2024-04-04 | Stop reason: SDUPTHER

## 2024-04-04 RX ORDER — SODIUM CHLORIDE 0.9 % (FLUSH) 0.9 %
5-40 SYRINGE (ML) INJECTION PRN
Status: DISCONTINUED | OUTPATIENT
Start: 2024-04-04 | End: 2024-04-04 | Stop reason: HOSPADM

## 2024-04-04 RX ORDER — FERRIC SUBSULFATE 20-22G/100
SOLUTION, NON-ORAL MISCELLANEOUS PRN
Status: DISCONTINUED | OUTPATIENT
Start: 2024-04-04 | End: 2024-04-04 | Stop reason: ALTCHOICE

## 2024-04-04 RX ORDER — OXYCODONE HYDROCHLORIDE 5 MG/1
5 TABLET ORAL EVERY 4 HOURS PRN
Qty: 3 TABLET | Refills: 0 | Status: SHIPPED | OUTPATIENT
Start: 2024-04-04 | End: 2024-04-07

## 2024-04-04 RX ORDER — ONDANSETRON 2 MG/ML
4 INJECTION INTRAMUSCULAR; INTRAVENOUS
Status: DISCONTINUED | OUTPATIENT
Start: 2024-04-04 | End: 2024-04-04 | Stop reason: HOSPADM

## 2024-04-04 RX ORDER — SODIUM CHLORIDE 9 MG/ML
INJECTION, SOLUTION INTRAVENOUS PRN
Status: DISCONTINUED | OUTPATIENT
Start: 2024-04-04 | End: 2024-04-04

## 2024-04-04 RX ORDER — DIPHENHYDRAMINE HYDROCHLORIDE 50 MG/ML
12.5 INJECTION INTRAMUSCULAR; INTRAVENOUS
Status: DISCONTINUED | OUTPATIENT
Start: 2024-04-04 | End: 2024-04-04 | Stop reason: HOSPADM

## 2024-04-04 RX ORDER — OXYCODONE HYDROCHLORIDE 5 MG/1
5 TABLET ORAL
Status: DISCONTINUED | OUTPATIENT
Start: 2024-04-04 | End: 2024-04-04 | Stop reason: HOSPADM

## 2024-04-04 RX ORDER — ONDANSETRON 2 MG/ML
INJECTION INTRAMUSCULAR; INTRAVENOUS PRN
Status: DISCONTINUED | OUTPATIENT
Start: 2024-04-04 | End: 2024-04-04 | Stop reason: SDUPTHER

## 2024-04-04 RX ORDER — MIDAZOLAM HYDROCHLORIDE 1 MG/ML
INJECTION INTRAMUSCULAR; INTRAVENOUS PRN
Status: DISCONTINUED | OUTPATIENT
Start: 2024-04-04 | End: 2024-04-04 | Stop reason: SDUPTHER

## 2024-04-04 RX ORDER — SODIUM CHLORIDE 9 MG/ML
INJECTION, SOLUTION INTRAVENOUS PRN
Status: DISCONTINUED | OUTPATIENT
Start: 2024-04-04 | End: 2024-04-04 | Stop reason: HOSPADM

## 2024-04-04 RX ORDER — FENTANYL CITRATE 50 UG/ML
100 INJECTION, SOLUTION INTRAMUSCULAR; INTRAVENOUS
Status: DISCONTINUED | OUTPATIENT
Start: 2024-04-04 | End: 2024-04-04 | Stop reason: HOSPADM

## 2024-04-04 RX ORDER — HYDROMORPHONE HYDROCHLORIDE 1 MG/ML
0.5 INJECTION, SOLUTION INTRAMUSCULAR; INTRAVENOUS; SUBCUTANEOUS EVERY 5 MIN PRN
Status: DISCONTINUED | OUTPATIENT
Start: 2024-04-04 | End: 2024-04-04 | Stop reason: HOSPADM

## 2024-04-04 RX ADMIN — KETOROLAC TROMETHAMINE 30 MG: 30 INJECTION, SOLUTION INTRAMUSCULAR at 09:56

## 2024-04-04 RX ADMIN — MIDAZOLAM 2 MG: 1 INJECTION INTRAMUSCULAR; INTRAVENOUS at 09:15

## 2024-04-04 RX ADMIN — DEXAMETHASONE SODIUM PHOSPHATE 5 MG: 10 INJECTION INTRAMUSCULAR; INTRAVENOUS at 09:39

## 2024-04-04 RX ADMIN — FENTANYL CITRATE 25 MCG: 50 INJECTION, SOLUTION INTRAMUSCULAR; INTRAVENOUS at 09:24

## 2024-04-04 RX ADMIN — FENTANYL CITRATE 25 MCG: 50 INJECTION, SOLUTION INTRAMUSCULAR; INTRAVENOUS at 09:31

## 2024-04-04 RX ADMIN — FENTANYL CITRATE 25 MCG: 50 INJECTION, SOLUTION INTRAMUSCULAR; INTRAVENOUS at 09:40

## 2024-04-04 RX ADMIN — FENTANYL CITRATE 25 MCG: 50 INJECTION, SOLUTION INTRAMUSCULAR; INTRAVENOUS at 09:49

## 2024-04-04 RX ADMIN — ONDANSETRON 4 MG: 2 INJECTION INTRAMUSCULAR; INTRAVENOUS at 09:39

## 2024-04-04 RX ADMIN — PROPOFOL 50 MG: 10 INJECTION, EMULSION INTRAVENOUS at 09:25

## 2024-04-04 RX ADMIN — LIDOCAINE HYDROCHLORIDE 60 MG: 20 INJECTION, SOLUTION EPIDURAL; INFILTRATION; INTRACAUDAL; PERINEURAL at 09:24

## 2024-04-04 RX ADMIN — PROPOFOL 100 MG: 10 INJECTION, EMULSION INTRAVENOUS at 09:24

## 2024-04-04 RX ADMIN — SODIUM CHLORIDE, POTASSIUM CHLORIDE, SODIUM LACTATE AND CALCIUM CHLORIDE: 600; 310; 30; 20 INJECTION, SOLUTION INTRAVENOUS at 08:57

## 2024-04-04 NOTE — ANESTHESIA PRE PROCEDURE
Department of Anesthesiology  Preprocedure Note       Name:  Lola Chapin   Age:  48 y.o.  :  1975                                          MRN:  [id number]         Date:  2024      Surgeon: Surgeon(s):  Denise Yu DO Hoy, Heather P, MD    Procedure: Procedure(s):  COLD KNIFE CONE/ CERVICAL LEEP    Medications prior to admission:   Prior to Admission medications    Medication Sig Start Date End Date Taking? Authorizing Provider   acetaminophen (TYLENOL) 500 MG tablet Take 2 tablets by mouth every 6 hours as needed for Pain 24  Yes Denise Yu DO   ibuprofen (ADVIL;MOTRIN) 600 MG tablet Take 1 tablet by mouth every 8 hours as needed for Pain 24  Yes Denise Yu DO   oxyCODONE (ROXICODONE) 5 MG immediate release tablet Take 1 tablet by mouth every 4 hours as needed for Pain for up to 3 days. Intended supply: 3 days. Take lowest dose possible to manage pain Max Daily Amount: 30 mg 24 Yes Denise Yu DO   tiZANidine (ZANAFLEX) 4 MG tablet Take 1 tablet by mouth 3 times daily 11/15/23   Fede Preciado MD   ferrous sulfate (FE TABS 325) 325 (65 Fe) MG EC tablet Take 1 tablet by mouth 2 times daily 10/24/23   Fede Preciado MD   LORazepam (ATIVAN) 1 MG tablet Take 1 tablet by mouth every 8 hours as needed for Anxiety. 10/24/23 10/24/24  Fede Preciado MD   potassium chloride (KLOR-CON M) 20 MEQ extended release tablet Take 1 tablet by mouth daily 10/24/23   Fede Preciado MD   pravastatin (PRAVACHOL) 20 MG tablet Take 1 tablet by mouth daily  Patient taking differently: Take 1 tablet by mouth at bedtime 10/24/23   Fede Preciado MD   valsartan (DIOVAN) 320 MG tablet Take 1 tablet by mouth daily  Patient taking differently: Take 1 tablet by mouth nightly 10/24/23   Fede Preciado MD   escitalopram (LEXAPRO) 20 MG tablet Take 1 tablet by mouth daily  Patient taking differently: Take 1 tablet by mouth at bedtime 10/24/23   Fede Preciado MD   aspirin

## 2024-04-04 NOTE — ANESTHESIA PROCEDURE NOTES
Airway  Date/Time: 4/4/2024 9:26 AM  Urgency: elective    Airway not difficult    General Information and Staff    Patient location during procedure: OR  Performed: resident/CRNA/CAA   Performed by: Thao Britt APRN - CRNA  Authorized by: Lázaro Landeros MD      Indications and Patient Condition  Indications for airway management: anesthesia  Spontaneous Ventilation: absent  Sedation level: deep  Preoxygenated: yes  Patient position: sniffing  MILS not maintained throughout  Mask difficulty assessment: not attempted    Final Airway Details  Final airway type: supraglottic airway      Successful airway: oropharyngeal  Size 3     Number of attempts at approach: 1    no

## 2024-04-04 NOTE — ANESTHESIA POSTPROCEDURE EVALUATION
Department of Anesthesiology  Postprocedure Note    Patient: Lola Chapin  MRN: [id number]  YOB: 1975  Date of evaluation: 4/4/2024    Procedure Summary       Date: 04/04/24 Room / Location: [facility] MAIN OR 02 / [facility] MAIN OR    Anesthesia Start: 0910 Anesthesia Stop: 1010    Procedure: COLD KNIFE CONE/ CERVICAL LEEP (Pelvis) Diagnosis:       Severe dysplasia of cervix      (Severe dysplasia of cervix [D06.9])    Surgeons: Denise Yu DO Responsible Provider: Lázaro Landeros MD    Anesthesia Type: General ASA Status: Not recorded            Anesthesia Type: General    Cheryl Phase I: Chreyl Score: 8    Cheryl Phase II: Cheryl Score: 9    Anesthesia Post Evaluation    Patient location during evaluation: PACU  Patient participation: complete - patient participated  Level of consciousness: awake and alert  Airway patency: patent  Nausea & Vomiting: no nausea and no vomiting  Cardiovascular status: hemodynamically stable  Respiratory status: acceptable, nonlabored ventilation and spontaneous ventilation  Hydration status: euvolemic  Comments: /74   Pulse 73   Temp 97.3 °F (36.3 °C)   Resp 18   Ht 1.448 m (4' 9.01\")   Wt 79.3 kg (174 lb 12.8 oz)   LMP 03/31/2024   SpO2 92%   BMI 37.82 kg/m²     Multimodal analgesia pain management approach  Pain management: adequate and satisfactory to patient    No notable events documented.

## 2024-04-04 NOTE — OP NOTE
[facility] Operative Report    Date of Surgery: 4/4/2024    Preoperative Diagnosis: SEVERE CERVICAL DYSPLASIA     Postoperative Diagnosis: same     Surgeon:  Denise Yu DO     Anesthesia: LMA  - General    Procedure:  CERVIX CONE BIOPSY and ECC     Estimated Blood Loss:     30 cc    Intravenous Fluids: 600 cc    Specimen:    ID Type Source Tests Collected by Time Destination   1 :   Denise Armando  4/4/2024 1001    A : CERVICAL LEEP SUPERIOR HALF Tissue Cervix SURGICAL PATHOLOGY Denise YuDO 4/4/2024 0955    B : CERVICAL LEEP INFERIOR HALF Tissue Cervix SURGICAL PATHOLOGY Denise Yu, DO 4/4/2024 0956          Findings:  aceto white area seen  surrounding transformation zone.    Operative Procedure in detail:    Pt was taken to the OR where she was administered anesthesia.  Once her anesthesia was found to be adequate, she was prepped in draped in sterile fashion in low luigi stirrups.  Next heavy weighted speculum was placed in vaginal vault and right angle retractor used to visualize the cervix.   Anterior lip of the cervix was grasped with single toothed tenaculum.   Cervix was then painted with vinegar with findings as above.  Next 15mm x 12mm loop was used to remove superior and inferior half of the cervix with excellent hemostasis.    Next ECC performed with nilo tafoya.  External cervical os margin was made hemostatic with roller ball.  Bed of cervix was made hemostatic with bovie.  Excellent hemostasis was seen of surgical site and monsels was applied.  Single toothed tenaculum was removed.  Pt tolerated procedure well.    Signed by: Denise Yu DO          4/4/2024          10:03 AM

## 2024-04-12 NOTE — PROGRESS NOTES
Patient presents today for a post-op visit.  She is 2 weeks from CERVIX CONE BIOPSY and ECC.  She is tolerating a regular diet, passing gas and her pain is under good control.  She denies signs of bleeding and infection.  Todays her bday.      Shared surgical pictures with pt and discussed results of pathology.      /72   Ht 1.499 m (4' 11\")   Wt 78.5 kg (173 lb 1.6 oz)   LMP 03/31/2024   BMI 34.96 kg/m²     Surgical pathology:  A:  \" CERVICAL LEEP, SUPERIOR HALF\":         SQUAMOUS EPITHELIUM FOCALLY HAVING CHANGES CONSISTENT WITH CERVICAL   INTRAEPITHELIAL NEOPLASIA, III (SEVERE DYSPLASIA). SEE MICROSCOPIC   DESCRIPTION         B:  \" CERVICAL LEEP, INFERIOR HALF\":         SQUAMOUS EPITHELIUM FOCALLY HAVING CHANGES CONSISTENT WITH CERVICAL   INTRAEPITHELIAL NEOPLASIA, III (SEVERE DYSPLASIA). SEE MICROSCOPIC   DESCRIPTION         C:  \"ENDOCERVICAL CURETTINGS\":         DETACHED BENIGN ENDOCERVICAL CELLS, LIMITED MATERIAL     General appearance: no distress  Pelvic exam: normal external genitalia, vulva, vagina, cervix with healing leep bed with small amount of blood present.  No sign of infection.      Neurologic: alert and oriented, cranial nerves grossly intact    Plan: discussed pathology with pt.  Severe dysplasia to margin but no invasion.  Hopefully leep still curative.  Will repeat pap 6 months.  She is aware.

## 2024-04-17 ENCOUNTER — NURSE ONLY (OUTPATIENT)
Dept: FAMILY MEDICINE CLINIC | Facility: CLINIC | Age: 49
End: 2024-04-17

## 2024-04-17 VITALS
BODY MASS INDEX: 34.88 KG/M2 | HEIGHT: 59 IN | HEART RATE: 77 BPM | SYSTOLIC BLOOD PRESSURE: 123 MMHG | DIASTOLIC BLOOD PRESSURE: 84 MMHG | WEIGHT: 173 LBS

## 2024-04-17 DIAGNOSIS — D50.9 IRON DEFICIENCY ANEMIA, UNSPECIFIED IRON DEFICIENCY ANEMIA TYPE: ICD-10-CM

## 2024-04-17 DIAGNOSIS — I10 HYPERTENSION, UNSPECIFIED TYPE: ICD-10-CM

## 2024-04-17 DIAGNOSIS — E78.5 HYPERLIPIDEMIA, UNSPECIFIED HYPERLIPIDEMIA TYPE: ICD-10-CM

## 2024-04-17 DIAGNOSIS — E87.6 HYPOKALEMIA: ICD-10-CM

## 2024-04-17 DIAGNOSIS — Z01.30 BLOOD PRESSURE CHECK: Primary | ICD-10-CM

## 2024-04-17 DIAGNOSIS — R79.89 ABNORMAL LFTS (LIVER FUNCTION TESTS): ICD-10-CM

## 2024-04-17 DIAGNOSIS — R73.02 IGT (IMPAIRED GLUCOSE TOLERANCE): ICD-10-CM

## 2024-04-17 LAB
ALBUMIN SERPL-MCNC: 3.9 G/DL (ref 3.5–5)
ALBUMIN/GLOB SERPL: 1 (ref 0.4–1.6)
ALP SERPL-CCNC: 111 U/L (ref 50–136)
ALT SERPL-CCNC: 22 U/L (ref 12–65)
ANION GAP SERPL CALC-SCNC: 3 MMOL/L (ref 2–11)
AST SERPL-CCNC: 11 U/L (ref 15–37)
BASOPHILS # BLD: 0 K/UL (ref 0–0.2)
BASOPHILS NFR BLD: 1 % (ref 0–2)
BILIRUB SERPL-MCNC: 0.3 MG/DL (ref 0.2–1.1)
BUN SERPL-MCNC: 10 MG/DL (ref 6–23)
CALCIUM SERPL-MCNC: 9.2 MG/DL (ref 8.3–10.4)
CHLORIDE SERPL-SCNC: 106 MMOL/L (ref 103–113)
CHOLEST SERPL-MCNC: 175 MG/DL
CO2 SERPL-SCNC: 29 MMOL/L (ref 21–32)
CREAT SERPL-MCNC: 0.7 MG/DL (ref 0.6–1)
DIFFERENTIAL METHOD BLD: ABNORMAL
EOSINOPHIL # BLD: 0.1 K/UL (ref 0–0.8)
EOSINOPHIL NFR BLD: 2 % (ref 0.5–7.8)
ERYTHROCYTE [DISTWIDTH] IN BLOOD BY AUTOMATED COUNT: 13.6 % (ref 11.9–14.6)
GLOBULIN SER CALC-MCNC: 4 G/DL (ref 2.8–4.5)
GLUCOSE SERPL-MCNC: 102 MG/DL (ref 65–100)
HCT VFR BLD AUTO: 45 % (ref 35.8–46.3)
HDLC SERPL-MCNC: 68 MG/DL (ref 40–60)
HDLC SERPL: 2.6
HGB BLD-MCNC: 14.4 G/DL (ref 11.7–15.4)
IMM GRANULOCYTES # BLD AUTO: 0 K/UL (ref 0–0.5)
IMM GRANULOCYTES NFR BLD AUTO: 0 % (ref 0–5)
IRON SERPL-MCNC: 85 UG/DL (ref 35–150)
LDLC SERPL CALC-MCNC: 84.6 MG/DL
LYMPHOCYTES # BLD: 1.6 K/UL (ref 0.5–4.6)
LYMPHOCYTES NFR BLD: 27 % (ref 13–44)
MCH RBC QN AUTO: 27.6 PG (ref 26.1–32.9)
MCHC RBC AUTO-ENTMCNC: 32 G/DL (ref 31.4–35)
MCV RBC AUTO: 86.2 FL (ref 82–102)
MONOCYTES # BLD: 0.5 K/UL (ref 0.1–1.3)
MONOCYTES NFR BLD: 9 % (ref 4–12)
NEUTS SEG # BLD: 3.6 K/UL (ref 1.7–8.2)
NEUTS SEG NFR BLD: 61 % (ref 43–78)
NRBC # BLD: 0 K/UL (ref 0–0.2)
PLATELET # BLD AUTO: 309 K/UL (ref 150–450)
PMV BLD AUTO: 11 FL (ref 9.4–12.3)
POTASSIUM SERPL-SCNC: 3.7 MMOL/L (ref 3.5–5.1)
PROT SERPL-MCNC: 7.9 G/DL (ref 6.3–8.2)
RBC # BLD AUTO: 5.22 M/UL (ref 4.05–5.2)
SODIUM SERPL-SCNC: 138 MMOL/L (ref 136–146)
TIBC SERPL-MCNC: 451 UG/DL (ref 250–450)
TRIGL SERPL-MCNC: 112 MG/DL (ref 35–150)
VLDLC SERPL CALC-MCNC: 22.4 MG/DL (ref 6–23)
WBC # BLD AUTO: 5.8 K/UL (ref 4.3–11.1)

## 2024-04-18 ENCOUNTER — OFFICE VISIT (OUTPATIENT)
Dept: OBGYN CLINIC | Age: 49
End: 2024-04-18

## 2024-04-18 VITALS
SYSTOLIC BLOOD PRESSURE: 124 MMHG | BODY MASS INDEX: 34.9 KG/M2 | HEIGHT: 59 IN | DIASTOLIC BLOOD PRESSURE: 72 MMHG | WEIGHT: 173.1 LBS

## 2024-04-18 DIAGNOSIS — Z48.89 ENCOUNTER FOR POSTOPERATIVE CARE: Primary | ICD-10-CM

## 2024-04-18 LAB
EST. AVERAGE GLUCOSE BLD GHB EST-MCNC: 103 MG/DL
HBA1C MFR BLD: 5.2 % (ref 4.8–5.6)

## 2024-04-18 PROCEDURE — 99024 POSTOP FOLLOW-UP VISIT: CPT | Performed by: OBSTETRICS & GYNECOLOGY

## 2024-04-24 ENCOUNTER — TELEMEDICINE (OUTPATIENT)
Dept: FAMILY MEDICINE CLINIC | Facility: CLINIC | Age: 49
End: 2024-04-24
Payer: MEDICARE

## 2024-04-24 DIAGNOSIS — E87.6 HYPOKALEMIA: ICD-10-CM

## 2024-04-24 DIAGNOSIS — G81.10 SPASTIC HEMIPLEGIA AFFECTING DOMINANT SIDE (HCC): ICD-10-CM

## 2024-04-24 DIAGNOSIS — D50.9 IRON DEFICIENCY ANEMIA, UNSPECIFIED IRON DEFICIENCY ANEMIA TYPE: ICD-10-CM

## 2024-04-24 DIAGNOSIS — E78.5 HYPERLIPIDEMIA, UNSPECIFIED HYPERLIPIDEMIA TYPE: ICD-10-CM

## 2024-04-24 DIAGNOSIS — F41.1 GAD (GENERALIZED ANXIETY DISORDER): ICD-10-CM

## 2024-04-24 DIAGNOSIS — R79.89 ABNORMAL LFTS (LIVER FUNCTION TESTS): ICD-10-CM

## 2024-04-24 DIAGNOSIS — I10 HYPERTENSION, UNSPECIFIED TYPE: Primary | ICD-10-CM

## 2024-04-24 DIAGNOSIS — R73.02 IGT (IMPAIRED GLUCOSE TOLERANCE): ICD-10-CM

## 2024-04-24 PROBLEM — G43.909 MIGRAINES: Status: RESOLVED | Noted: 2022-08-23 | Resolved: 2024-04-24

## 2024-04-24 PROCEDURE — G2211 COMPLEX E/M VISIT ADD ON: HCPCS | Performed by: FAMILY MEDICINE

## 2024-04-24 PROCEDURE — G8417 CALC BMI ABV UP PARAM F/U: HCPCS | Performed by: FAMILY MEDICINE

## 2024-04-24 PROCEDURE — G8428 CUR MEDS NOT DOCUMENT: HCPCS | Performed by: FAMILY MEDICINE

## 2024-04-24 PROCEDURE — 1036F TOBACCO NON-USER: CPT | Performed by: FAMILY MEDICINE

## 2024-04-24 PROCEDURE — 3017F COLORECTAL CA SCREEN DOC REV: CPT | Performed by: FAMILY MEDICINE

## 2024-04-24 PROCEDURE — 99214 OFFICE O/P EST MOD 30 MIN: CPT | Performed by: FAMILY MEDICINE

## 2024-04-24 RX ORDER — POTASSIUM CHLORIDE 20 MEQ/1
20 TABLET, EXTENDED RELEASE ORAL DAILY
Qty: 100 TABLET | Refills: 1 | Status: SHIPPED | OUTPATIENT
Start: 2024-04-24

## 2024-04-24 RX ORDER — TIZANIDINE 4 MG/1
4 TABLET ORAL 3 TIMES DAILY
Qty: 300 TABLET | Refills: 1 | Status: SHIPPED | OUTPATIENT
Start: 2024-04-24

## 2024-04-24 RX ORDER — PRAVASTATIN SODIUM 20 MG
20 TABLET ORAL NIGHTLY
Qty: 100 TABLET | Refills: 1 | Status: SHIPPED | OUTPATIENT
Start: 2024-04-24

## 2024-04-24 RX ORDER — LANOLIN ALCOHOL/MO/W.PET/CERES
325 CREAM (GRAM) TOPICAL 2 TIMES DAILY
Qty: 200 TABLET | Refills: 1 | Status: SHIPPED | OUTPATIENT
Start: 2024-04-24

## 2024-04-24 RX ORDER — VALSARTAN 320 MG/1
320 TABLET ORAL NIGHTLY
Qty: 100 TABLET | Refills: 1 | Status: SHIPPED | OUTPATIENT
Start: 2024-04-24

## 2024-04-24 RX ORDER — LORAZEPAM 1 MG/1
1 TABLET ORAL EVERY 8 HOURS PRN
Qty: 90 TABLET | Refills: 5 | Status: SHIPPED | OUTPATIENT
Start: 2024-04-24 | End: 2025-04-25

## 2024-04-24 RX ORDER — ESCITALOPRAM OXALATE 20 MG/1
20 TABLET ORAL NIGHTLY
Qty: 100 TABLET | Refills: 1 | Status: SHIPPED | OUTPATIENT
Start: 2024-04-24

## 2024-04-24 NOTE — PATIENT INSTRUCTIONS
Continue taking all the medications on this list as directed; this list is current and please discontinue any medications not on this list. Please call the office or use \"My Chart\" online to request medication refills prior to running out.    - PLEASE NOTE: when needing to schedule a visit you should send our office a [employer] message requesting a visit (or call the office if you can't use [employer]) and we will send you a scheduling ticket that will include open dates & times. You can then use this feature to select your preferred date & time for an office visit, virtual visit, and/or lab visit. Please make sure to respond to this scheduling ticket ASAP when received.     Please do NOT schedule any visit through the [employer] self-scheduling feature; any self-scheduling should only be done using the scheduling ticket provided to you directly from our office. Also, please do NOT schedule any visits through our out of state call center. If you ever need to speak to someone at our office directly please follow these instructions: when you call our office number, [phone], and get the phone tree options, press \"Option 2\" first & then \"Option 1\". This combination should take you directly to someone at our office and bypass the out of state call center.

## 2024-04-24 NOTE — PROGRESS NOTES
[address] [address]  Phone: [phone]  Fax: [phone]  Email: [email or web address]      Encounter Info  Lola Chapin; Established patient 49 y.o.female; seen 4/24/2024 for: Cholesterol Problem, Anxiety, and Hypertension      Assessment & Plan    1. Hypertension, unspecified type  -     valsartan (DIOVAN) 320 MG tablet; Take 1 tablet by mouth nightly, Disp-100 tablet, R-1Normal  2. GABE (generalized anxiety disorder)  -     escitalopram (LEXAPRO) 20 MG tablet; Take 1 tablet by mouth at bedtime, Disp-100 tablet, R-1Normal  -     LORazepam (ATIVAN) 1 MG tablet; Take 1 tablet by mouth every 8 hours as needed for Anxiety., Disp-90 tablet, R-5Normal  3. Iron deficiency anemia, unspecified iron deficiency anemia type  -     ferrous sulfate (FE TABS 325) 325 (65 Fe) MG EC tablet; Take 1 tablet by mouth 2 times daily, Disp-200 tablet, R-1Normal  4. Hypokalemia  -     potassium chloride (KLOR-CON M) 20 MEQ extended release tablet; Take 1 tablet by mouth daily, Disp-100 tablet, R-1Normal  5. Hyperlipidemia, unspecified hyperlipidemia type  -     pravastatin (PRAVACHOL) 20 MG tablet; Take 1 tablet by mouth at bedtime, Disp-100 tablet, R-1Normal  6. Spastic hemiplegia affecting dominant side (HCC)  -     tiZANidine (ZANAFLEX) 4 MG tablet; Take 1 tablet by mouth 3 times daily, Disp-300 tablet, R-1Normal    Problem and/or Symptoms are currently stable and/or improving on current treatment plan. Will continue and have patient follow up as directed.    Pertinent labs reviewed/pending; pertinent meds RF X 6 M      Check Out Instructions  Return in about 6 months (around 10/24/2024) for IP (In Person) Visit, Previsit Labs (add Vitals if Virtual).      Subjective & Objective    HPI  Patient states that chronic health problems are stable on current regimens and has no acute concerns today except as mentioned.      Review of Systems     Physical Exam      4/20/2023    11:52 AM

## 2024-07-31 ENCOUNTER — OFFICE VISIT (OUTPATIENT)
Dept: SLEEP MEDICINE | Age: 49
End: 2024-07-31
Payer: MEDICARE

## 2024-07-31 VITALS
RESPIRATION RATE: 17 BRPM | WEIGHT: 169 LBS | DIASTOLIC BLOOD PRESSURE: 71 MMHG | BODY MASS INDEX: 34.07 KG/M2 | HEART RATE: 76 BPM | OXYGEN SATURATION: 96 % | HEIGHT: 59 IN | SYSTOLIC BLOOD PRESSURE: 115 MMHG

## 2024-07-31 DIAGNOSIS — G47.33 OSA (OBSTRUCTIVE SLEEP APNEA): Primary | ICD-10-CM

## 2024-07-31 DIAGNOSIS — E66.9 OBESITY (BMI 30.0-34.9): ICD-10-CM

## 2024-07-31 PROCEDURE — G8417 CALC BMI ABV UP PARAM F/U: HCPCS | Performed by: NURSE PRACTITIONER

## 2024-07-31 PROCEDURE — G8427 DOCREV CUR MEDS BY ELIG CLIN: HCPCS | Performed by: NURSE PRACTITIONER

## 2024-07-31 PROCEDURE — 99213 OFFICE O/P EST LOW 20 MIN: CPT | Performed by: NURSE PRACTITIONER

## 2024-07-31 PROCEDURE — 3078F DIAST BP <80 MM HG: CPT | Performed by: NURSE PRACTITIONER

## 2024-07-31 PROCEDURE — G2211 COMPLEX E/M VISIT ADD ON: HCPCS | Performed by: NURSE PRACTITIONER

## 2024-07-31 PROCEDURE — 3017F COLORECTAL CA SCREEN DOC REV: CPT | Performed by: NURSE PRACTITIONER

## 2024-07-31 PROCEDURE — 1036F TOBACCO NON-USER: CPT | Performed by: NURSE PRACTITIONER

## 2024-07-31 PROCEDURE — 3074F SYST BP LT 130 MM HG: CPT | Performed by: NURSE PRACTITIONER

## 2024-07-31 ASSESSMENT — SLEEP AND FATIGUE QUESTIONNAIRES
ESS TOTAL SCORE: 1
HOW LIKELY ARE YOU TO NOD OFF OR FALL ASLEEP WHILE SITTING QUIETLY AFTER LUNCH WITHOUT ALCOHOL: WOULD NEVER DOZE
HOW LIKELY ARE YOU TO NOD OFF OR FALL ASLEEP WHILE WATCHING TV: WOULD NEVER DOZE
HOW LIKELY ARE YOU TO NOD OFF OR FALL ASLEEP WHILE LYING DOWN TO REST IN THE AFTERNOON WHEN CIRCUMSTANCES PERMIT: SLIGHT CHANCE OF DOZING
HOW LIKELY ARE YOU TO NOD OFF OR FALL ASLEEP WHILE SITTING AND TALKING TO SOMEONE: WOULD NEVER DOZE
HOW LIKELY ARE YOU TO NOD OFF OR FALL ASLEEP WHILE SITTING INACTIVE IN A PUBLIC PLACE: WOULD NEVER DOZE
HOW LIKELY ARE YOU TO NOD OFF OR FALL ASLEEP WHILE SITTING AND READING: WOULD NEVER DOZE
HOW LIKELY ARE YOU TO NOD OFF OR FALL ASLEEP WHEN YOU ARE A PASSENGER IN A CAR FOR AN HOUR WITHOUT A BREAK: WOULD NEVER DOZE
HOW LIKELY ARE YOU TO NOD OFF OR FALL ASLEEP IN A CAR, WHILE STOPPED FOR A FEW MINUTES IN TRAFFIC: WOULD NEVER DOZE

## 2024-07-31 NOTE — PROGRESS NOTES
[facility]  [address]  [place] [address]  [phone]    Patient Name:  Lola Chapin  YOB: 1975      Office Visit 7/31/2024    CHIEF COMPLAINT:    Chief Complaint   Patient presents with    Sleep Apnea         HISTORY OF PRESENT ILLNESS:  Patient is a 49 y.o. female seen today for follow up of ALEX.  Diagnostic sleep study on 10/17/2017 with an AHI of 72.7 and lowest oxygen saturation of 77%. She is prescribed cpap therapy with a humidifier set at 11 cm with a full face mask. Most recent download reveals AHI on PAP therapy is 1.0, leak is median 3.5 and 14.7 at 95th percentile and the hourly usage is 7 hours 27 minutes nightly. The overall use is 2712 hours with days greater than four hours at 362/365. The patient is compliant with the Pap therapy and is feeling better as a result.    Her compliance with CPAP is excellent.  She reports that she continues to awaken in the mornings refreshed and denies any excessive daytime sleepiness or fatigue.  Rankin score is 1/24.  She denies any major medical changes since her last visit.  Reports that her weight has consistently been around 165 pounds.  Her blood pressure is well-controlled today.    Rankin Sleepiness Scale      7/31/2024     3:07 PM 7/31/2023     1:28 PM 7/29/2022     2:24 PM 7/29/2021     1:00 PM   Sleep Medicine   Sitting and reading 0 0 0 0   Watching TV 0 0 0 0   Sitting, inactive in a public place (e.g. a theatre or a meeting) 0 0 0 0   As a passenger in a car for an hour without a break 0 0 0 0   Lying down to rest in the afternoon when circumstances permit 1 1 0 0   Sitting and talking to someone 0 0 0 0   Sitting quietly after a lunch without alcohol 0 0 0 0   In a car, while stopped for a few minutes in traffic 0 0 0 0   Rankin Sleepiness Score 1 1 0 0          Past Medical History:   Diagnosis Date    Elevated liver enzymes 2017    had biopsy and was dx with fatty liver    Fatty liver

## 2024-07-31 NOTE — PATIENT INSTRUCTIONS
Continue CPAP 11 cm H2O with nightly compliance  New CPAP supplies ordered  Recommendations as above  Follow-up in 1 year or sooner if needed

## 2024-09-15 NOTE — PROGRESS NOTES
Zoraida pitt  : 1975 [facility]  [address], [facility], [address]  [phone]   [phone]         OUTPATIENT OCCUPATIONAL THERAPY: Daily Note and Treatment Day: 5th 10/20/2017    ICD-10: Treatment Diagnosis:   Hemiplegia and hemiparesis following cerebral infarction affecting right dominant side (I69.351)  Precautions/Allergies:   Erythromycin   Fall Risk Score: 3 (? 5 = High Risk)  MD Orders: OT evaluate and treat  MEDICAL/REFERRING DIAGNOSIS:   Stroke (cerebrum) ([facility] [address] [I63.9]   DATE OF ONSET: 2017   REFERRING PHYSICIAN: Kera Cox*  RETURN PHYSICIAN APPOINTMENT: 2017     INITIAL ASSESSMENT:  Ms. Demi pitt presents to outpatient occupational therapy services s/p CVA with R side hemiplegia. Pt arrived to therapy using quad cane for functional mobility. Pt is extremely pleasant and appears motivated to increase independence levels. Pt currently has no active R UE AROM, but does have some scapular movement present at this time. Pt does report pain in the R shoulder above 90 degrees of ROM, but no subluxation present at this time. Pt noted to have some increased tone in the R UE flexors. Pt receptive to education and verbalizes that she has been using techniques and suggestions that she learned in inpatient rehab. Pt has had no falls since returning home but does report some incontinence at times (stating this all started with the use of Zanaflex). Pt has minimal sensory deficits at this time. Pt does have some impaired midline orientation with functional transfers. Pt is currently functioning below baseline for ADL/functional transfers which impacts her overall quality of life. Pt will benefit from OT services to address stated goals and plan of care. PLAN OF CARE:   PROBLEM LIST:  1. Decreased Strength  2. Decreased ADL/Functional Activities  3. Decreased Transfer Abilities  4.  Decreased Ambulation Ability/Technique  5. Decreased Balance  6. Increased Pain  7. Decreased Activity Tolerance  8. Decreased Flexibility/Joint Mobility  9. Decreased [place] with Home Exercise Program INTERVENTIONS PLANNED:  1. Activities of daily living training  2. Adaptive equipment training  3. Balance training  4. Clothing management  5. Donning&doffing training  6. Manual therapy training  7. Modalities  8. Neuromuscular re-eduation  9. Splinting  10. Therapeutic activity  11. Therapeutic exercise   TREATMENT PLAN:  Effective Dates: 9/11/17 TO 12/11/17. Frequency/Duration: 2 times a week for 12 weeks  GOALS: (Goals have been discussed and agreed upon with patient.)  Short-Term Functional Goals: Time Frame:4- 6 weeks  1. Pipo Stauffer will report decreased pain in the R shoulder from 5/10 to 3/10 with passive movement to decrease stiffness for daily activity. 2. Pipo Stauffer will be 100% compliant with daily SROM exercises for improving R UE ROM to decrease risk for contractures. 3. Pipo Stauffer will demonstrate ability to complete weightbearing activities in R UE with moderate facilitation to improve functional use for transfers. 4. Pipo Stauffer will complete basic ADL with supervision to improve independence with functional tasks. 5. Pipo Stauffer will tolerate 15 minutes of dynamic standing balance activity with CGA to decrease risk for falls with ADL and to improve activity tolerance. 6. Discharge Goals: Time Frame: 8-12 weeks   1. Pipo Stauffer will have functional PROM of R shoulder to [facility] to decrease pain and stiffness in R dominant UE. 2. Pipo tSauffer will demonstrate improved strength in the R dominant UE as evidenced by at least 2+ to 3-/5 strength for using R UE as functional assist.   3. Pipo Stauffer will demonstrate ability to functionally grasp and release light objects with supervision and with use of R dominant hand.    4. Pipo Stauffer will be modified independent with all basic ADL to improve overall quality of life. 5. Dede Solo will use R hand as functional assist with daily activities/transfers with supervision to improve independence with ADL. Rehabilitation Potential For Stated Goals: Good  Regarding [place] therapy, I certify that the treatment plan above will be carried out by a therapist or under their direction. Thank you for this referral,  Christiano Rene OT                 The information in this section was collected on 9/11/17 (except where otherwise noted). OCCUPATIONAL PROFILE & HISTORY:   History of Present Injury/Illness (Reason for Referral):  Pt reports she was at work when she started feeling like she was going to pass out. Pt reports she didn't really stop working and the feeling wouldn't go away. It started at 2pm and left work at 3:30 pm. Pt felt like she was going to fall and pushed a buggy to her car and drove to her daughter's house. Pt then was brought over to the ER at [facility]. Progressively got worse with R sided weakness and woke up the next morning with full parlaysis of the R side. Reports her eyesight was decreasing and needing reading glasses. R eye is a little worse with the blurry vision. Pt wears to reading glasses. Pt using a wheelchair as less as possible. Using wheelchair when out and about. Pt using a quad cane around the home and has AFO to R LE. Tub transfer bench with CGA. Eats at the kitchen table. Pt stayed from Aug 8th- September 6th in inpatient rehab. Pt reports no falls at home. Pt getting dressed using techniques learned for inpatient rehab. Difficulty walking and carrying objects. Pt has some issues with Zanaflex and incontinence (she is unsure if that is related). Pt presents today for outpatient occupational therapy services. Past Medical History/Comorbidities:   Ms. Edgar Goodpasture  has a past medical history of Generalized anxiety disorder; GERD (gastroesophageal reflux disease); Hypertension;  Insomnia, unspecified; Iron deficiency anemia (8/23/2013); Left sided lacunar stroke ([facility] [address] (08/2017); Mitral valve prolapse (1993); Obesity (BMI 30-39.9); and Prediabetes (7/28/2015). Ms. Ramila Rey  has a past surgical history that includes tubal ligation (1995) and endoscopy (10/1/04). Social History/Living Environment:   Lives with mom with her  because toilet/tub is lower with more open spaces. Pt also has a son. Pt has 24 hr supervision. Prior Level of Function/Work/Activity: Indpendent and working full time in [employer] .   Dominant Side:         RIGHTPersonal Factors: Other factors that influence how disability is experienced by the patient:  Hx of anxiety disorder, HTN, Pre-diabetic    Current Medications:    Current Outpatient Prescriptions:     oxybutynin chloride XL (DITROPAN XL) 10 mg CR tablet, Take 1 Tab by mouth daily. Indications: URINARY URGE INCONTINENCE, Disp: 30 Tab, Rfl: 6    raNITIdine (ZANTAC) 150 mg tablet, Take 1 Tab by mouth two (2) times a day., Disp: 60 Tab, Rfl: 5    diazePAM (VALIUM) 5 mg tablet, Take 0.5 Tabs by mouth every six (6) hours as needed. Max Daily Amount: 10 mg. Indications: MUSCLE SPASM, Disp: 60 Tab, Rfl: 1    tiZANidine (ZANAFLEX) 4 mg tablet, Take 1 Tab by mouth three (3) times daily. , Disp: 90 Tab, Rfl: 6    traZODone (DESYREL) 50 mg tablet, Take 1 Tab by mouth nightly as needed for Sleep. Indications: insomnia associated with depression, Disp: 30 Tab, Rfl: 2    hydroCHLOROthiazide (MICROZIDE) 12.5 mg capsule, Take 2 Caps by mouth daily. , Disp: 30 Cap, Rfl: 6    potassium chloride SR (K-TAB) 20 mEq tablet, Take 1 Tab by mouth two (2) times a day., Disp: 60 Tab, Rfl: 6    aspirin (ASPIRIN) 325 mg tablet, Take 1 Tab by mouth daily. , Disp: 30 Tab, Rfl: 12    acetaminophen (TYLENOL) 325 mg tablet, Take 2 Tabs by mouth every six (6) hours as needed for Pain or Fever (headache). , Disp: 30 Tab, Rfl: 0    escitalopram oxalate (LEXAPRO) 20 mg tablet, Take 1 Tab by mouth daily. , Disp: 30 Tab, Rfl: 5            Date Last Reviewed:  9/11/17   Complexity Level : Expanded review of therapy/medical records (1-2):  MODERATE COMPLEXITY   ASSESSMENT OF OCCUPATIONAL PERFORMANCE:     Palpation:  Patient with no palpated subluxation of the R shoulder at this time. Increased pain and tightness with R shoulder flexion/abduction with passive ROM above 90 degrees. Pain along anterior portion of humeral head with anterior tilt. Pain radiates along the middle deltoid. Modified Maggi Scale     Grade Description : Noted in ELBOW FLEXORS, WRIST FLEXORS, FINGER FLEXORS  []0 - No increase in muscle tone  []1 - Slight increase in muscle tone, manifested by a catch and release, or by minimal resistance at the end of the range of motion when the affected part(s) is moved in flexion or extension  [x]1+ - Slight increase in muscle tone, manifested by a catch, followed by minimal resistance throughout the remainder (less than half) of the range of movement (ROM)  []2 - More marked increase in muscle tone through most of ROM, but affected part(s) easily moved  []3 - Considerable increase in muscle tone, passive movement difficult  []4 - Affected part(s) rigid in flexion and extension      ROM/Strength:  L UE WNL; No functional AROM of R UE.            Date:  9/11/17  Date:  9/11/17    PROM  STRENGTH   Movement RIGHT  RIGHT   SCAPULA:      Elevation  Limited  2   Protraction  Limited  2+   Retraction  Limited   2   SHOULDER:      Flexion  100  0   Abduction  90  1   External rotation  70  0   Internal rotation  WNL  0   Horizontal abduction 100  0   Horizontal adduction  WFL  0   Extension       ELBOW:      Flexion  WNL  0   Extension  WNL  0   FOREARM:      Supination  WFL  0   Pronation  WFL  0   WRIST:      Wrist flexion  65  0   Wrist extension  65  0   Wrist radial deviation       Wrist ulnar deviation       HAND:      Finger flexion WFL  0   Finger extension WFL  0   THUMB:       Abduction WFL  0    Extension WFL  0    Adduction        MCP flexion  WFL  0    IP flexion  WFL  0   Hand Strength:       unable  0   THREE JAW KARY PINCH unable  0   LATERAL PINCH  unable  0     Functional Mobility:         Gait/Ambulation:  CGA to supervision with quad cane; Uses wheelchair occasionally         Bed Mobility:  CGA  Balance:          Good static standing balance but fair to poor dynamic standing balance  Coordination:          Non-functional in R dominant UE  Mental Status: WNL  Vision:          Reading glasses. Reports some blurry vision in R eye. Tracking and visual fields intact. Some decreased speed with saccadic movement to the R. Activities of Daily Living:           Basic ADLs (From Assessment) Complex ADLs (From Assessment)         Grooming/Bathing/Dressing Activities of Daily Living                                   Sensation:         Grossly intact to light touch in B UE. R hand with some decreased light touch sensation. Reports some hypersensitivity to cold. Physical Skills Involved:  1. Range of Motion  2. Balance  3. Strength  4. Activity Tolerance  5. Sensation  6. Fine Motor Control  7. Gross Motor Control  8. Pain (acute) Cognitive Skills Affected (resulting in the inability to perform in a timely and safe manner):  1. WNL Psychosocial Skills Affected:  1. Anxiety Disorder (per chart review)   Number of elements that affect the Plan of Care: 5+:  HIGH COMPLEXITY   CLINICAL DECISION MAKING:   Outcome Measure: Tool Used: Fugl-Goodman Upper Extremity Motor Assessment  Score:  Initial: 8/66 Most Recent: X (Date: -- )   Interpretation of Score: Outcome Measure Conversion Site    ?  Carrying, Moving, and Handling Objects:     - CURRENT STATUS: CM - 80%-99% impaired, limited or restricted    - GOAL STATUS: CK - 40%-59% impaired, limited or restricted    - D/C STATUS:  ---------------To be determined---------------     Fugl-Goodman Upper Extremity Motor Assessment (without reflexes) Date:  9/11 Date:   Date:     Item Description Score Score Score   Wrist circumduction 0     Hook grasp 0     Shoulder flexion to 180°, elbow extended 0     Spherical grasp 0     Lateral prehension 0     Wrist flexion/extension, elbow extended 0     Pronation-supination, elbow extended 0     Wrist stable, elbow extended 0     Movement with normal speed 0     Forearm supination 0     Shoulder abduction to 90°, elbow extended 0     Movement without dysmetria 0     Shoulder external rotation 0     Wrist stable, elbow at 90° 0     Wrist flexion/extension, elbow at 90° 0     Palmar prehension 0     Scapular retraction 1     Pronation-supination, elbow at 90° 0     Shoulder flexion to 90°, elbow extended 0     Hand to lumbar spine 0     Shoulder abduction 0     Elbow extension 0     Forearm pronation 0     Movement without tremor 0     Cylindrical grasp 0     Finger mass extension (relaxation of flexion) 0     Scapular elevation 1     Finger mass flexion 0     Shoulder adduction with internal rotation 0     Elbow flexion 0     Total Score (Max = 60) 2           Medical Necessity:   · Patient demonstrates good rehab potential due to higher previous functional level. Reason for Services/Other Comments:  · Patient continues to require skilled intervention due to decreased functional independence s/p CVA impacting overall quality of life. Clinical Decision-Making Assessment:     Assessment process, impact of co-morbidities, assessment modification\need for assistance, and selection of interventions: Analytical Complexity:HIGH COMPLEXITY   TREATMENT:   (In addition to Assessment/Re-Assessment sessions the following treatments were rendered)    Pre-treatment Symptoms/Complaints:    Pain: Initial:    Post Session:       Therapeutic Exercise: ( 20 minutes):  Exercises per grid below to improve mobility.   Required maximal manual and tactile cues to promote proper body alignment, promote proper body posture and promote proper body mechanics. Progressed repetitions and complexity of movement as indicated.    Date:  9/25/17 Date:  10/2/17 Date:  10/4/17 Date:  10/6/17 Date:  10/9/17 Date:  10/20/17   Activity/Exercise Parameters Parameters Parameters Parameters     Shoulder external rotation PROM to tolerance with caregiver education  PROM to tolerance x 10 reps (shoulder adducted in supine)   PROM x 15 reps (combined with glenohumeral posterior glide- pt tolerated with greater ROM/less pain) PROM X 10 reps combined with glenohumeral posterior glides (pain)   Shoulder flexion  PROM to tolerance with caregiver education  PROM to ~100 degrees x 10 reps to tolerance (supine)   PROM 8-10 reps to ~100 degrees PROM 8-10 reps to ~100 degrees   Shoulder abduction  PROM to tolerance with caregiver education  PROM x 10 reps to tolerance in supine    PROM 8-10 reps ~70-80 degrees in supine  PROM 5 reps to 60-70 pain in supine   Shoulder horizontal abduction/adduction  PROM to tolerance with caregiver education      2 sets holding 30 seconds combined with deep breathing    Elbow flexion/extension  PROM to tolerance with caregiver education  PROM into extension with stretch for 2-3 minutes with pressure along biceps tendon    PROM 10 reps with extension stretch for ~2 minutes 1) 5 reps AROM (unable to extend)  2) PROM x 10 reps   Wrist extension  PROM to tolerance with caregiver education  PROM to tolerance and holding 2-3 minutes  Stretch at wrist with hand on the mat and flexing forward at the trunk to allow for extension to tolerance 1) Wrist extension stretch holding for 15 seconds x 10 reps to tolerance with fingers in extended position (unable to fully extend)  2) Stretch at wrist with hand on the mat and flexing forward at the trunk to allow for extension to tolerance x 10 reps  10 reps PROM with minimal complaints  10 reps PROM with minimal complaints    Forearm supination/pronation       PROM x 10 reps with some pain reported into supination    Shoulder Shrug    1) 10 reps AAROM  2) 10 reps AROM      Scapular Retraction    15 reps AROM      Shoulder Protraction    15 reps AROM        Neuromuscular Re-education: (  25minutes):  Exercise/activities per grid below to improve kinesthetic sense and proprioception. Required minimal visual, verbal, manual and tactile cues to promote motor control of right, upper extremity(s).     Date:  10/2/17 Date:  10/4/17 Date:  10/6/17 Date:  10/11/17 Date:  10/20/17   Activity/Exercise Parameters Parameters Parameters     Supine to Sit Pt completed with NDT technique and CGA with encouragement of use of R UE    Pt worked on NDT technique for 5 reps working on use R UE as a functional assist with minimal to moderate verbal cues and minimal tactile cues (difficulty pulling up L LE)   Sit to Stand/Stand to sit      Pt worked on placing hand on R arm rest with moderate guidance and then working on weightbearing through R UE in sit to stand/stand to sit (pt also worked on pulling hand from armrest and placing in lap with moderate guidance)    PNF D1 flexion/extension to total manual cues        Mobile Arm Support 1) Forward reaching with total assistance and pulling back into retraction with maximal assistance; 15 reps  2) External Rotation x 10-15 reps with total assistance 1) Forward reaching with total assistance and pulling back into retraction with maximal assistance; 15 reps  2) External Rotation x 10-15 reps with total assistance 1) Forward reaching with maximal assistance and pulling back into retraction with maximal assistance; 15 reps  2) External Rotation x 10-15 reps with maximal to total assistance Forward reaching with maximal assistance and pulling back into retraction with maximal assistance; 15 reps    Weightbearing  1) 8 reps into the elbow with minimal verbal/tactile cues  2) 8 reps with prop under R wrist to decrease pain with maximal facilitation at R elbow  15 reps with elbow extended and hand on a ball to serve as a prop 15 reps with elbow extended and hand on a ball to serve as a prop 15 reps with elbow extended and hand on a ball to serve as a prop with maximal facilitation at the hand/elbow     Tone Reduction into hand Blood stretching with facilitation of R thumb into extension Blood stretching with metacarpal mobilizations Blood stretching with metacarpal mobilizations Blood stretching with metacarpal mobilizations Blood press in the R hand into lap to help with tone reduction at the hand/fingers with minimal tactile cues x 15 reps    Trunk Rotation   5 reps to each side holding 5-10 seconds       Trunk Flexion   10 reps in sitting with hands clasped and with supervision       Weightshiftting   Pt shifting side to side with hands clasped and reaching  X 10 reps to each side       Shen Bin    Hand over hand assistance     Putty press   5 reps with total assistance handover hand      Ball Lift      Using B hands to press into ball and lift ball into elbow flexion/extesion to work on bimanual task with moderate facilitation for placement of R hand; 10-15 reps with additional time              Treatment/Session Assessment:    · Response to Treatment:  Pt demonstrating some increase tone in the R UE with some active elbow flexion noted today. Pt is very sore in the fingers with extreme pain with just minimal movement into finger extension. Pt tolerated session with some pain in the R shoulder as well due to increase tone. Pt continues to move primarily from scapula. Continues to have limited active movement otherwise. Pt reports struggle with HEP at home. Pt pt continue per plan of care. · Compliance with Program/Exercises: Will assess as treatment progresses. · Recommendations/Intent for next treatment session: \"Next visit will focus on advancements to more challenging activities and reduction in assistance provided\".   Total Treatment Duration:OT Patient Time In/Time Out  Time In: 1430  Time Out: [address]uss[address] 177.8

## 2024-10-17 ENCOUNTER — LAB (OUTPATIENT)
Dept: FAMILY MEDICINE CLINIC | Facility: CLINIC | Age: 49
End: 2024-10-17

## 2024-10-17 DIAGNOSIS — E78.5 HYPERLIPIDEMIA, UNSPECIFIED HYPERLIPIDEMIA TYPE: ICD-10-CM

## 2024-10-17 DIAGNOSIS — R79.89 ABNORMAL LFTS (LIVER FUNCTION TESTS): ICD-10-CM

## 2024-10-17 DIAGNOSIS — I10 HYPERTENSION, UNSPECIFIED TYPE: ICD-10-CM

## 2024-10-17 DIAGNOSIS — E87.6 HYPOKALEMIA: ICD-10-CM

## 2024-10-17 DIAGNOSIS — R73.02 IGT (IMPAIRED GLUCOSE TOLERANCE): ICD-10-CM

## 2024-10-17 DIAGNOSIS — D50.9 IRON DEFICIENCY ANEMIA, UNSPECIFIED IRON DEFICIENCY ANEMIA TYPE: ICD-10-CM

## 2024-10-17 LAB
ALBUMIN SERPL-MCNC: 3.9 G/DL (ref 3.5–5)
ALBUMIN/GLOB SERPL: 1 (ref 1–1.9)
ALP SERPL-CCNC: 105 U/L (ref 35–104)
ALT SERPL-CCNC: 15 U/L (ref 8–45)
ANION GAP SERPL CALC-SCNC: 11 MMOL/L (ref 9–18)
AST SERPL-CCNC: 25 U/L (ref 15–37)
BASOPHILS # BLD: 0 K/UL (ref 0–0.2)
BASOPHILS NFR BLD: 1 % (ref 0–2)
BILIRUB SERPL-MCNC: 0.6 MG/DL (ref 0–1.2)
BUN SERPL-MCNC: 7 MG/DL (ref 6–23)
CALCIUM SERPL-MCNC: 9.7 MG/DL (ref 8.8–10.2)
CHLORIDE SERPL-SCNC: 102 MMOL/L (ref 98–107)
CHOLEST SERPL-MCNC: 159 MG/DL (ref 0–200)
CO2 SERPL-SCNC: 28 MMOL/L (ref 20–28)
CREAT SERPL-MCNC: 0.61 MG/DL (ref 0.6–1.1)
DIFFERENTIAL METHOD BLD: ABNORMAL
EOSINOPHIL # BLD: 0.1 K/UL (ref 0–0.8)
EOSINOPHIL NFR BLD: 2 % (ref 0.5–7.8)
ERYTHROCYTE [DISTWIDTH] IN BLOOD BY AUTOMATED COUNT: 13.5 % (ref 11.9–14.6)
EST. AVERAGE GLUCOSE BLD GHB EST-MCNC: 113 MG/DL
FERRITIN SERPL-MCNC: 32 NG/ML (ref 8–388)
GLOBULIN SER CALC-MCNC: 3.8 G/DL (ref 2.3–3.5)
GLUCOSE SERPL-MCNC: 91 MG/DL (ref 70–99)
HBA1C MFR BLD: 5.6 % (ref 0–5.6)
HCT VFR BLD AUTO: 46.5 % (ref 35.8–46.3)
HDLC SERPL-MCNC: 59 MG/DL (ref 40–60)
HDLC SERPL: 2.7 (ref 0–5)
HGB BLD-MCNC: 14.7 G/DL (ref 11.7–15.4)
IMM GRANULOCYTES # BLD AUTO: 0 K/UL (ref 0–0.5)
IMM GRANULOCYTES NFR BLD AUTO: 0 % (ref 0–5)
IRON SERPL-MCNC: 81 UG/DL (ref 35–100)
LDLC SERPL CALC-MCNC: 81 MG/DL (ref 0–100)
LYMPHOCYTES # BLD: 1.1 K/UL (ref 0.5–4.6)
LYMPHOCYTES NFR BLD: 24 % (ref 13–44)
MCH RBC QN AUTO: 27.2 PG (ref 26.1–32.9)
MCHC RBC AUTO-ENTMCNC: 31.6 G/DL (ref 31.4–35)
MCV RBC AUTO: 86.1 FL (ref 82–102)
MONOCYTES # BLD: 0.5 K/UL (ref 0.1–1.3)
MONOCYTES NFR BLD: 11 % (ref 4–12)
NEUTS SEG # BLD: 2.7 K/UL (ref 1.7–8.2)
NEUTS SEG NFR BLD: 62 % (ref 43–78)
NRBC # BLD: 0 K/UL (ref 0–0.2)
PLATELET # BLD AUTO: 269 K/UL (ref 150–450)
PMV BLD AUTO: 10.8 FL (ref 9.4–12.3)
POTASSIUM SERPL-SCNC: 3.8 MMOL/L (ref 3.5–5.1)
PROT SERPL-MCNC: 7.7 G/DL (ref 6.3–8.2)
RBC # BLD AUTO: 5.4 M/UL (ref 4.05–5.2)
SODIUM SERPL-SCNC: 141 MMOL/L (ref 136–145)
TIBC SERPL-MCNC: 398 UG/DL (ref 240–450)
TRIGL SERPL-MCNC: 96 MG/DL (ref 0–150)
VLDLC SERPL CALC-MCNC: 19 MG/DL (ref 6–23)
WBC # BLD AUTO: 4.4 K/UL (ref 4.3–11.1)

## 2024-10-24 SDOH — HEALTH STABILITY: PHYSICAL HEALTH: ON AVERAGE, HOW MANY DAYS PER WEEK DO YOU ENGAGE IN MODERATE TO STRENUOUS EXERCISE (LIKE A BRISK WALK)?: 0 DAYS

## 2024-10-24 SDOH — ECONOMIC STABILITY: FOOD INSECURITY: WITHIN THE PAST 12 MONTHS, YOU WORRIED THAT YOUR FOOD WOULD RUN OUT BEFORE YOU GOT MONEY TO BUY MORE.: SOMETIMES TRUE

## 2024-10-24 SDOH — ECONOMIC STABILITY: INCOME INSECURITY: HOW HARD IS IT FOR YOU TO PAY FOR THE VERY BASICS LIKE FOOD, HOUSING, MEDICAL CARE, AND HEATING?: SOMEWHAT HARD

## 2024-10-24 SDOH — ECONOMIC STABILITY: TRANSPORTATION INSECURITY
IN THE PAST 12 MONTHS, HAS LACK OF TRANSPORTATION KEPT YOU FROM MEETINGS, WORK, OR FROM GETTING THINGS NEEDED FOR DAILY LIVING?: NO

## 2024-10-24 SDOH — ECONOMIC STABILITY: FOOD INSECURITY: WITHIN THE PAST 12 MONTHS, THE FOOD YOU BOUGHT JUST DIDN'T LAST AND YOU DIDN'T HAVE MONEY TO GET MORE.: NEVER TRUE

## 2024-10-24 SDOH — HEALTH STABILITY: PHYSICAL HEALTH: ON AVERAGE, HOW MANY MINUTES DO YOU ENGAGE IN EXERCISE AT THIS LEVEL?: 0 MIN

## 2024-10-24 ASSESSMENT — PATIENT HEALTH QUESTIONNAIRE - PHQ9
SUM OF ALL RESPONSES TO PHQ QUESTIONS 1-9: 0
2. FEELING DOWN, DEPRESSED OR HOPELESS: NOT AT ALL
SUM OF ALL RESPONSES TO PHQ9 QUESTIONS 1 & 2: 0
1. LITTLE INTEREST OR PLEASURE IN DOING THINGS: NOT AT ALL
SUM OF ALL RESPONSES TO PHQ QUESTIONS 1-9: 0

## 2024-10-24 ASSESSMENT — LIFESTYLE VARIABLES
HOW MANY STANDARD DRINKS CONTAINING ALCOHOL DO YOU HAVE ON A TYPICAL DAY: 0
HOW OFTEN DO YOU HAVE A DRINK CONTAINING ALCOHOL: NEVER
HOW MANY STANDARD DRINKS CONTAINING ALCOHOL DO YOU HAVE ON A TYPICAL DAY: PATIENT DOES NOT DRINK
HOW OFTEN DO YOU HAVE A DRINK CONTAINING ALCOHOL: 1
HOW OFTEN DO YOU HAVE SIX OR MORE DRINKS ON ONE OCCASION: 1

## 2024-10-25 ENCOUNTER — OFFICE VISIT (OUTPATIENT)
Dept: FAMILY MEDICINE CLINIC | Facility: CLINIC | Age: 49
End: 2024-10-25

## 2024-10-25 VITALS
HEART RATE: 69 BPM | BODY MASS INDEX: 33.1 KG/M2 | HEIGHT: 59 IN | DIASTOLIC BLOOD PRESSURE: 77 MMHG | WEIGHT: 164.2 LBS | TEMPERATURE: 97.2 F | SYSTOLIC BLOOD PRESSURE: 128 MMHG

## 2024-10-25 DIAGNOSIS — E78.5 HYPERLIPIDEMIA, UNSPECIFIED HYPERLIPIDEMIA TYPE: ICD-10-CM

## 2024-10-25 DIAGNOSIS — R79.89 ABNORMAL LFTS (LIVER FUNCTION TESTS): ICD-10-CM

## 2024-10-25 DIAGNOSIS — D50.9 IRON DEFICIENCY ANEMIA, UNSPECIFIED IRON DEFICIENCY ANEMIA TYPE: ICD-10-CM

## 2024-10-25 DIAGNOSIS — I10 HYPERTENSION, UNSPECIFIED TYPE: ICD-10-CM

## 2024-10-25 DIAGNOSIS — G81.10 SPASTIC HEMIPLEGIA AFFECTING DOMINANT SIDE (HCC): ICD-10-CM

## 2024-10-25 DIAGNOSIS — Z12.11 COLON CANCER SCREENING: ICD-10-CM

## 2024-10-25 DIAGNOSIS — F41.1 GAD (GENERALIZED ANXIETY DISORDER): Primary | ICD-10-CM

## 2024-10-25 DIAGNOSIS — R73.02 IGT (IMPAIRED GLUCOSE TOLERANCE): ICD-10-CM

## 2024-10-25 DIAGNOSIS — E87.6 HYPOKALEMIA: ICD-10-CM

## 2024-10-25 RX ORDER — FERROUS SULFATE 325(65) MG
325 TABLET, DELAYED RELEASE (ENTERIC COATED) ORAL 2 TIMES DAILY
Qty: 200 TABLET | Refills: 1 | Status: SHIPPED | OUTPATIENT
Start: 2024-10-25

## 2024-10-25 RX ORDER — POTASSIUM CHLORIDE 1500 MG/1
20 TABLET, EXTENDED RELEASE ORAL DAILY
Qty: 100 TABLET | Refills: 1 | Status: SHIPPED | OUTPATIENT
Start: 2024-10-25

## 2024-10-25 RX ORDER — PRAVASTATIN SODIUM 20 MG
20 TABLET ORAL NIGHTLY
Qty: 100 TABLET | Refills: 1 | Status: SHIPPED | OUTPATIENT
Start: 2024-10-25

## 2024-10-25 RX ORDER — VALSARTAN 320 MG/1
320 TABLET ORAL NIGHTLY
Qty: 100 TABLET | Refills: 1 | Status: SHIPPED | OUTPATIENT
Start: 2024-10-25

## 2024-10-25 RX ORDER — LORAZEPAM 1 MG/1
1 TABLET ORAL EVERY 8 HOURS PRN
Qty: 90 TABLET | Refills: 5 | Status: SHIPPED | OUTPATIENT
Start: 2024-10-25 | End: 2025-10-26

## 2024-10-25 RX ORDER — ESCITALOPRAM OXALATE 20 MG/1
20 TABLET ORAL NIGHTLY
Qty: 100 TABLET | Refills: 1 | Status: SHIPPED | OUTPATIENT
Start: 2024-10-25

## 2024-10-25 NOTE — PATIENT INSTRUCTIONS
Continue taking all the medications on this list as directed; this list is current and please discontinue any medications not on this list. Please call the office or use \"My Chart\" online to request medication refills prior to running out.    - PLEASE NOTE: when needing to schedule a visit you should send our office a [employer] message requesting a visit (or call the office if you can't use [employer]) and we will send you a scheduling ticket that will include open dates & times. You can then use this feature to select your preferred date & time for an office visit, virtual visit, and/or lab visit. Please make sure to respond to this scheduling ticket ASAP when received.     Please do NOT schedule any visit through the [employer] self-scheduling feature; any self-scheduling should only be done using the scheduling ticket provided to you directly from our office. Also, please do NOT schedule any visits through our out of state call center. If you ever need to speak to someone at our office directly please follow these instructions: when you call our office number, [phone], and get the phone tree options, press \"Option 2\" first & then \"Option 1\". This combination should take you directly to someone at our office and bypass the out of state call center.

## 2024-10-25 NOTE — PROGRESS NOTES
[place] [address]  Phone: [phone]  Fax: [phone]  Email: [email or web address]      Encounter Info  Lola Chapin; Established patient 49 y.o.female; seen 10/25/2024 for: Cholesterol Problem, Hypertension, and Anxiety      Assessment & Plan    1. GABE (generalized anxiety disorder)  -     escitalopram (LEXAPRO) 20 MG tablet; Take 1 tablet by mouth at bedtime, Disp-100 tablet, R-1Normal  -     LORazepam (ATIVAN) 1 MG tablet; Take 1 tablet by mouth every 8 hours as needed for Anxiety., Disp-90 tablet, R-5Normal  2. Iron deficiency anemia, unspecified iron deficiency anemia type  -     ferrous sulfate (FE TABS 325) 325 (65 Fe) MG EC tablet; Take 1 tablet by mouth 2 times daily, Disp-200 tablet, R-1Normal  -     Hemoglobin A1C; Standing  -     CBC with Auto Differential; Standing  -     Comprehensive Metabolic Panel; Standing  -     Lipid Panel; Standing  -     Iron; Standing  -     Ferritin; Standing  3. Hypokalemia  -     potassium chloride (KLOR-CON M) 20 MEQ extended release tablet; Take 1 tablet by mouth daily, Disp-100 tablet, R-1Normal  -     Hemoglobin A1C; Standing  -     CBC with Auto Differential; Standing  -     Comprehensive Metabolic Panel; Standing  -     Lipid Panel; Standing  -     Iron; Standing  -     Ferritin; Standing  4. Hyperlipidemia, unspecified hyperlipidemia type  -     pravastatin (PRAVACHOL) 20 MG tablet; Take 1 tablet by mouth at bedtime, Disp-100 tablet, R-1Normal  -     Hemoglobin A1C; Standing  -     CBC with Auto Differential; Standing  -     Comprehensive Metabolic Panel; Standing  -     Lipid Panel; Standing  -     Iron; Standing  -     Ferritin; Standing  5. Spastic hemiplegia affecting dominant side (HCC)  -     tiZANidine (ZANAFLEX) 4 MG tablet; Take 1 tablet by mouth 3 times daily, Disp-300 tablet, R-1Normal  6. Hypertension, unspecified type  -     valsartan (DIOVAN) 320 MG tablet; Take 1 tablet by mouth nightly, Disp-100

## 2024-10-29 NOTE — PROGRESS NOTES
Lola  is a 49 y.o. female, .  No LMP recorded., who is being seen for repeat pap smear after LEEP 24.  She is doing well overall.      24 pathology:  A:  \" CERVICAL LEEP, SUPERIOR HALF\":         SQUAMOUS EPITHELIUM FOCALLY HAVING CHANGES CONSISTENT WITH CERVICAL   INTRAEPITHELIAL NEOPLASIA, III (SEVERE DYSPLASIA). SEE MICROSCOPIC   DESCRIPTION         B:  \" CERVICAL LEEP, INFERIOR HALF\":         SQUAMOUS EPITHELIUM FOCALLY HAVING CHANGES CONSISTENT WITH CERVICAL   INTRAEPITHELIAL NEOPLASIA, III (SEVERE DYSPLASIA). SEE MICROSCOPIC   DESCRIPTION         C:  \"ENDOCERVICAL CURETTINGS\":         DETACHED BENIGN ENDOCERVICAL CELLS, LIMITED MATERIAL      Colposcopy 23:  \"CERVICAL BIOPSY AT 12 O'CLOCK\":  HIGH GRADE SQUAMOUS INTRAEPITHELIAL   LESION (HSIL).       23 Pap: HGSIL. HPV Positive. Genotype 16, 18/45 Negative     HISTORY:    OB History          2    Para   2    Term   2            AB        Living   2         SAB        IAB        Ectopic        Molar        Multiple        Live Births   2              GYN History         Sexual History:   Social History     Substance and Sexual Activity   Sexual Activity Not Currently    Partners: Male    Birth control/protection: Surgical    Comment:  (27yrs.)          has a past medical history of Elevated liver enzymes, Fatty liver, Generalized anxiety disorder, GERD (gastroesophageal reflux disease), Hypertension, Insomnia, unspecified, Iron deficiency anemia, Left sided lacunar stroke (HCC), Mitral valve prolapse, Obesity (BMI 30-39.9), ALEX on CPAP, Pre-eclampsia, Prediabetes, Spastic hemiplegia affecting dominant side (HCC), and Stroke (HCC). .    Past Surgical History:   Procedure Laterality Date    CERVIX BIOPSY N/A 2024    COLD KNIFE CONE/ CERVICAL LEEP performed by Denise Yu DO at [facility]    TUBAL LIGATION      UPPER GASTROINTESTINAL ENDOSCOPY  10/1/04    Normal per Dr. Thomas    US GUIDED LIVER BIOPSY

## 2024-10-30 ENCOUNTER — OFFICE VISIT (OUTPATIENT)
Dept: OBGYN CLINIC | Age: 49
End: 2024-10-30
Payer: MEDICARE

## 2024-10-30 VITALS
HEIGHT: 59 IN | WEIGHT: 163.7 LBS | SYSTOLIC BLOOD PRESSURE: 140 MMHG | DIASTOLIC BLOOD PRESSURE: 86 MMHG | BODY MASS INDEX: 33 KG/M2

## 2024-10-30 DIAGNOSIS — D06.9 CIN III WITH SEVERE DYSPLASIA: Primary | ICD-10-CM

## 2024-10-30 PROCEDURE — 3079F DIAST BP 80-89 MM HG: CPT | Performed by: OBSTETRICS & GYNECOLOGY

## 2024-10-30 PROCEDURE — 1036F TOBACCO NON-USER: CPT | Performed by: OBSTETRICS & GYNECOLOGY

## 2024-10-30 PROCEDURE — G8427 DOCREV CUR MEDS BY ELIG CLIN: HCPCS | Performed by: OBSTETRICS & GYNECOLOGY

## 2024-10-30 PROCEDURE — 99213 OFFICE O/P EST LOW 20 MIN: CPT | Performed by: OBSTETRICS & GYNECOLOGY

## 2024-10-30 PROCEDURE — G8417 CALC BMI ABV UP PARAM F/U: HCPCS | Performed by: OBSTETRICS & GYNECOLOGY

## 2024-10-30 PROCEDURE — 3077F SYST BP >= 140 MM HG: CPT | Performed by: OBSTETRICS & GYNECOLOGY

## 2024-10-30 PROCEDURE — 3017F COLORECTAL CA SCREEN DOC REV: CPT | Performed by: OBSTETRICS & GYNECOLOGY

## 2024-10-30 PROCEDURE — G8484 FLU IMMUNIZE NO ADMIN: HCPCS | Performed by: OBSTETRICS & GYNECOLOGY

## 2024-11-07 LAB
COLLECTION METHOD: NORMAL
CYTOLOGIST CVX/VAG CYTO: NORMAL
CYTOLOGY CVX/VAG DOC THIN PREP: NORMAL
HPV APTIMA: NEGATIVE
Lab: NORMAL
Lab: NORMAL
PAP SOURCE: NORMAL
PATH REPORT.FINAL DX SPEC: NORMAL
STAT OF ADQ CVX/VAG CYTO-IMP: NORMAL

## 2024-11-12 ENCOUNTER — OFFICE VISIT (OUTPATIENT)
Dept: FAMILY MEDICINE CLINIC | Facility: CLINIC | Age: 49
End: 2024-11-12

## 2024-11-12 VITALS
WEIGHT: 163 LBS | BODY MASS INDEX: 32.86 KG/M2 | SYSTOLIC BLOOD PRESSURE: 139 MMHG | HEIGHT: 59 IN | TEMPERATURE: 97.8 F | HEART RATE: 81 BPM | DIASTOLIC BLOOD PRESSURE: 81 MMHG

## 2024-11-12 DIAGNOSIS — S07.0XXA: Primary | ICD-10-CM

## 2024-11-12 SDOH — HEALTH STABILITY: PHYSICAL HEALTH: ON AVERAGE, HOW MANY DAYS PER WEEK DO YOU ENGAGE IN MODERATE TO STRENUOUS EXERCISE (LIKE A BRISK WALK)?: 0 DAYS

## 2024-11-12 SDOH — HEALTH STABILITY: PHYSICAL HEALTH: ON AVERAGE, HOW MANY MINUTES DO YOU ENGAGE IN EXERCISE AT THIS LEVEL?: 0 MIN

## 2024-11-12 ASSESSMENT — PATIENT HEALTH QUESTIONNAIRE - PHQ9
2. FEELING DOWN, DEPRESSED OR HOPELESS: NOT AT ALL
1. LITTLE INTEREST OR PLEASURE IN DOING THINGS: NOT AT ALL
SUM OF ALL RESPONSES TO PHQ QUESTIONS 1-9: 0
SUM OF ALL RESPONSES TO PHQ9 QUESTIONS 1 & 2: 0
SUM OF ALL RESPONSES TO PHQ QUESTIONS 1-9: 0

## 2024-11-12 ASSESSMENT — LIFESTYLE VARIABLES
HOW MANY STANDARD DRINKS CONTAINING ALCOHOL DO YOU HAVE ON A TYPICAL DAY: PATIENT DOES NOT DRINK
HOW OFTEN DO YOU HAVE A DRINK CONTAINING ALCOHOL: NEVER
HOW MANY STANDARD DRINKS CONTAINING ALCOHOL DO YOU HAVE ON A TYPICAL DAY: 0
HOW OFTEN DO YOU HAVE SIX OR MORE DRINKS ON ONE OCCASION: 1
HOW OFTEN DO YOU HAVE A DRINK CONTAINING ALCOHOL: 1

## 2024-11-12 NOTE — PROGRESS NOTES
[place] [address]  Phone: [phone]  Fax: [phone]  Email: [email or web address]      Encounter Info  Lola Chapin; Established patient 49 y.o.female; seen 11/12/2024 for: hit in the nose (Happened 27th ->was accidentally head butted by her granddaughter playing. Pain still present, tender, hurt when she inhales most tender on the right side of the nose)      Assessment & Plan    1. Crushing injury of nose  -     diclofenac sodium (VOLTAREN) 1 % GEL; Apply 2 g topically 3 times daily as needed for Pain, Topical, 3 TIMES DAILY PRN Starting Tue 11/12/2024, Disp-100 g, R-2, Normal  -     XR FACIAL BONES (MIN 3 VIEWS ); Future    Problem and/or Symptoms are new to provider. Will have patient follow up as directed and make the following plan for further evaluation and/or treatment:    Suspect soft tissue injury with localized swelling & tenderness, but cannot rule out completely a possible fracture or dislocation with exam alone, as pt is still very TTP even today. Will check Xray to further assess, and providing pt with topical NSAID gel to apply 2-4 X a day for Sx relief.       Check Out Instructions  Return if symptoms worsen or fail to improve.      Subjective & Objective    HPI  Pt was hit directly on her nasal bridge with the back of her grand daughter's head approx 2 weeks ago. Pt feels like she was hit \"as hard as I've ever been\" and saw stars for several minutes afterwards. Developed a pretty large bruise & swelling on the R side of her nasal bridge & around her eye; bruising & swelling are much better now but still very tender.     Review of Systems    Physical Exam  HENT:      Nose: Nasal tenderness present.      Right Nostril: No foreign body, epistaxis or septal hematoma.      Right Turbinates: Swollen.      Right Sinus: No maxillary sinus tenderness or frontal sinus tenderness.      Left Sinus: No maxillary sinus tenderness or frontal sinus

## 2024-11-12 NOTE — PATIENT INSTRUCTIONS
Please see the below addresses for a few local places you can get plain film Xrays; you can go to any [facility] radiology dept or [facility] facility. You do not need an appointment for this.    [facility] [place]  [address], [place] [address]    [facility]  [address], [place] [address]    - PLEASE NOTE: when needing to schedule a visit you should send our office a [employer] message requesting a visit (or call the office if you can't use [employer]) and we will send you a scheduling ticket that will include open dates & times. You can then use this feature to select your preferred date & time for an office visit, virtual visit, and/or lab visit. Please make sure to respond to this scheduling ticket ASAP when received.     Please do NOT schedule any visit through the [employer] self-scheduling feature; any self-scheduling should only be done using the scheduling ticket provided to you directly from our office. Also, please do NOT schedule any visits through our out of state call center. If you ever need to speak to someone at our office directly please follow these instructions: when you call our office number, [phone], and get the phone tree options, press \"Option 2\" first & then \"Option 1\". This combination should take you directly to someone at our office and bypass the out of state call center.

## 2024-11-13 ENCOUNTER — TELEMEDICINE (OUTPATIENT)
Dept: FAMILY MEDICINE CLINIC | Facility: CLINIC | Age: 49
End: 2024-11-13

## 2024-11-13 DIAGNOSIS — Z00.00 ANNUAL PHYSICAL EXAM: Primary | ICD-10-CM

## 2024-11-13 NOTE — PROGRESS NOTES
Medicare Annual Wellness Visit    Lola Chapin is here for Medicare AWV (Would like a referral for living medical will )    Assessment & Plan   Annual physical exam  Assessment & Plan:  Discussed ideal body weight and encouraged regular physical activity and healthy diet. Recommended routine preventative measures such as always wearing seatbelt & home safety measures. Counseled the patient regarding the rationale for the recommended immunizations and screenings and they are current and/or updated. Reviewed personalized prevention plan which is current and/or updated and a list of screening services available to patient. Provided end of life counseling as appropriate and recommended patient discuss with family their wishes for end of life decisions if they have not already done so.    Orders:  -     [facility] - Referral to ACP Clinical Specialist    Recommendations for Preventive Services Due: see orders and patient instructions/AVS.  Recommended screening schedule for the next 5-10 years is provided to the patient in written form: see Patient Instructions/AVS.     No follow-ups on file.     Subjective       Patient's complete Health Risk Assessment and screening values have been reviewed and are found in Flowsheets. The following problems were reviewed today and where indicated follow up appointments were made and/or referrals ordered.    Positive Risk Factor Screenings with Interventions:    Fall Risk:  Do you feel unsteady or are you worried about falling? : no  2 or more falls in past year?: (!) yes  Fall with injury in past year?: no     Interventions:    Reviewed medications, home hazards, visual acuity, and co-morbidities that can increase risk for falls  Patient declines any further evaluation or treatment             Inactivity:  On average, how many days per week do you engage in moderate to strenuous exercise (like a brisk walk)?: 0 days (!) Abnormal  On average, how many minutes do you engage in exercise at

## 2024-11-14 ENCOUNTER — CLINICAL DOCUMENTATION (OUTPATIENT)
Dept: SPIRITUAL SERVICES | Age: 49
End: 2024-11-14

## 2024-11-14 NOTE — PROGRESS NOTES
Advance Care Planning   Ambulatory ACP Specialist Patient Outreach    Date:  11/14/2024    ACP Specialist:  Alisia Jacobs    Outreach call to patient in follow-up to ACP Specialist referral from:Fede Preciado MD    [x] PCP  [] Provider   [] Ambulatory Care Management [] Other     For:                  [x] Advance Directive Assistance              [] Complete Portable DNR order              [] Complete POST/POLST/MOST              [] Code Status Discussion             [] Discuss Goals of Care             [x] Early ACP Decision-Making              [] Other (Specify)    Date Referral Received:11/13/24    Next Step:   [] ACP scheduled conversation  [x] Outreach again in one week               [] Email / Mail ACP Info Sheets  [] Email / Mail Advance Directive   [] Closing referral.  Routing closure to referring provider/staff and to ACP Specialist .    [] Closure letter mailed to patient with invitation to contact ACP Specialist if / when ready.   [] Other (Specify here):       [x] At this time, Healthcare Decision Maker Is:   Primary Decision Maker: TianJag resendez - Spouse - [phone]    Secondary Decision Maker: Anitra Chapin - Child - [phone]         [] Primary agent named in scanned advance directive.    [x] Legal Next of Kin.     [] Unable to determine legal decision maker at this time.    Outreaches:         [x] 1st -  Date:  11/14/24               Intervention:  [] Spoke with Patient   [x] Left Voice mail [] Email / Mail    [] [employer]  [] Other (Specify) :     Outcomes:  Outreach phone call to the patient on both home and mobile phone numbers.  Unable to reach patient.  Provided contact information on [employer] requesting a return call.  Will attempt to follow up in one week.           [] 2nd -  Date:                 Intervention:  [] Spoke with Patient  [] Left Voice mail [] Email / Mail    [] [employer]  [] Other (Specify) :              Outcomes:                [] 3rd -  Date:

## 2024-11-15 ENCOUNTER — HOSPITAL ENCOUNTER (OUTPATIENT)
Dept: GENERAL RADIOLOGY | Age: 49
Discharge: HOME | End: 2024-11-18
Payer: MEDICARE

## 2024-11-15 DIAGNOSIS — S07.0XXA: ICD-10-CM

## 2024-11-15 PROCEDURE — 70150 X-RAY EXAM OF FACIAL BONES: CPT

## 2024-11-21 ENCOUNTER — CLINICAL DOCUMENTATION (OUTPATIENT)
Dept: SPIRITUAL SERVICES | Age: 49
End: 2024-11-21

## 2024-12-05 ENCOUNTER — CLINICAL DOCUMENTATION (OUTPATIENT)
Dept: SPIRITUAL SERVICES | Age: 49
End: 2024-12-05

## 2025-04-18 DIAGNOSIS — D50.9 IRON DEFICIENCY ANEMIA, UNSPECIFIED IRON DEFICIENCY ANEMIA TYPE: ICD-10-CM

## 2025-04-18 DIAGNOSIS — E78.5 HYPERLIPIDEMIA, UNSPECIFIED HYPERLIPIDEMIA TYPE: ICD-10-CM

## 2025-04-18 DIAGNOSIS — R73.02 IGT (IMPAIRED GLUCOSE TOLERANCE): ICD-10-CM

## 2025-04-18 DIAGNOSIS — R79.89 ABNORMAL LFTS (LIVER FUNCTION TESTS): ICD-10-CM

## 2025-04-18 DIAGNOSIS — E87.6 HYPOKALEMIA: ICD-10-CM

## 2025-04-18 DIAGNOSIS — I10 HYPERTENSION, UNSPECIFIED TYPE: ICD-10-CM

## 2025-04-18 LAB
ALBUMIN SERPL-MCNC: 3.9 G/DL (ref 3.5–5)
ALBUMIN/GLOB SERPL: 1 (ref 1–1.9)
ALP SERPL-CCNC: 119 U/L (ref 35–104)
ALT SERPL-CCNC: 28 U/L (ref 8–45)
ANION GAP SERPL CALC-SCNC: 12 MMOL/L (ref 7–16)
AST SERPL-CCNC: 25 U/L (ref 15–37)
BASOPHILS # BLD: 0.03 K/UL (ref 0–0.2)
BASOPHILS NFR BLD: 0.5 % (ref 0–2)
BILIRUB SERPL-MCNC: 0.4 MG/DL (ref 0–1.2)
BUN SERPL-MCNC: 12 MG/DL (ref 6–23)
CALCIUM SERPL-MCNC: 10 MG/DL (ref 8.8–10.2)
CHLORIDE SERPL-SCNC: 99 MMOL/L (ref 98–107)
CHOLEST SERPL-MCNC: 173 MG/DL (ref 0–200)
CO2 SERPL-SCNC: 29 MMOL/L (ref 20–29)
CREAT SERPL-MCNC: 0.64 MG/DL (ref 0.6–1.1)
DIFFERENTIAL METHOD BLD: NORMAL
EOSINOPHIL # BLD: 0.08 K/UL (ref 0–0.8)
EOSINOPHIL NFR BLD: 1.4 % (ref 0.5–7.8)
ERYTHROCYTE [DISTWIDTH] IN BLOOD BY AUTOMATED COUNT: 13.2 % (ref 11.9–14.6)
EST. AVERAGE GLUCOSE BLD GHB EST-MCNC: 113 MG/DL
FERRITIN SERPL-MCNC: 30 NG/ML (ref 8–388)
GLOBULIN SER CALC-MCNC: 3.7 G/DL (ref 2.3–3.5)
GLUCOSE SERPL-MCNC: 100 MG/DL (ref 70–99)
HBA1C MFR BLD: 5.6 % (ref 0–5.6)
HCT VFR BLD AUTO: 42.3 % (ref 35.8–46.3)
HDLC SERPL-MCNC: 74 MG/DL (ref 40–60)
HDLC SERPL: 2.3 (ref 0–5)
HGB BLD-MCNC: 14.2 G/DL (ref 11.7–15.4)
IMM GRANULOCYTES # BLD AUTO: 0.01 K/UL (ref 0–0.5)
IMM GRANULOCYTES NFR BLD AUTO: 0.2 % (ref 0–5)
IRON SERPL-MCNC: 73 UG/DL (ref 35–100)
LDLC SERPL CALC-MCNC: 80 MG/DL (ref 0–100)
LYMPHOCYTES # BLD: 1.88 K/UL (ref 0.5–4.6)
LYMPHOCYTES NFR BLD: 33.3 % (ref 13–44)
MCH RBC QN AUTO: 27.8 PG (ref 26.1–32.9)
MCHC RBC AUTO-ENTMCNC: 33.6 G/DL (ref 31.4–35)
MCV RBC AUTO: 82.8 FL (ref 82–102)
MONOCYTES # BLD: 0.53 K/UL (ref 0.1–1.3)
MONOCYTES NFR BLD: 9.4 % (ref 4–12)
NEUTS SEG # BLD: 3.11 K/UL (ref 1.7–8.2)
NEUTS SEG NFR BLD: 55.2 % (ref 43–78)
NRBC # BLD: 0 K/UL (ref 0–0.2)
PLATELET # BLD AUTO: 302 K/UL (ref 150–450)
PMV BLD AUTO: 10.9 FL (ref 9.4–12.3)
POTASSIUM SERPL-SCNC: 4.1 MMOL/L (ref 3.5–5.1)
PROT SERPL-MCNC: 7.5 G/DL (ref 6.3–8.2)
RBC # BLD AUTO: 5.11 M/UL (ref 4.05–5.2)
SODIUM SERPL-SCNC: 140 MMOL/L (ref 136–145)
TRIGL SERPL-MCNC: 97 MG/DL (ref 0–150)
VLDLC SERPL CALC-MCNC: 19 MG/DL (ref 6–23)
WBC # BLD AUTO: 5.6 K/UL (ref 4.3–11.1)

## 2025-04-25 ENCOUNTER — TELEMEDICINE (OUTPATIENT)
Dept: FAMILY MEDICINE CLINIC | Facility: CLINIC | Age: 50
End: 2025-04-25

## 2025-04-25 DIAGNOSIS — M15.9 OSTEOARTHRITIS OF MULTIPLE JOINTS, UNSPECIFIED OSTEOARTHRITIS TYPE: ICD-10-CM

## 2025-04-25 DIAGNOSIS — Z12.11 COLON CANCER SCREENING: ICD-10-CM

## 2025-04-25 DIAGNOSIS — D50.9 IRON DEFICIENCY ANEMIA, UNSPECIFIED IRON DEFICIENCY ANEMIA TYPE: ICD-10-CM

## 2025-04-25 DIAGNOSIS — F41.1 GAD (GENERALIZED ANXIETY DISORDER): Primary | ICD-10-CM

## 2025-04-25 DIAGNOSIS — I10 HYPERTENSION, UNSPECIFIED TYPE: ICD-10-CM

## 2025-04-25 DIAGNOSIS — R73.02 IGT (IMPAIRED GLUCOSE TOLERANCE): ICD-10-CM

## 2025-04-25 DIAGNOSIS — Z12.31 SCREENING MAMMOGRAM, ENCOUNTER FOR: ICD-10-CM

## 2025-04-25 DIAGNOSIS — I63.81 LEFT SIDED LACUNAR STROKE (HCC): ICD-10-CM

## 2025-04-25 DIAGNOSIS — G81.10 SPASTIC HEMIPLEGIA AFFECTING DOMINANT SIDE (HCC): ICD-10-CM

## 2025-04-25 DIAGNOSIS — E87.6 HYPOKALEMIA: ICD-10-CM

## 2025-04-25 DIAGNOSIS — R79.89 ABNORMAL LFTS (LIVER FUNCTION TESTS): ICD-10-CM

## 2025-04-25 DIAGNOSIS — K04.7 DENTAL INFECTION: ICD-10-CM

## 2025-04-25 DIAGNOSIS — E66.01 SEVERE OBESITY (BMI 35.0-39.9) WITH COMORBIDITY (HCC): ICD-10-CM

## 2025-04-25 DIAGNOSIS — E78.5 HYPERLIPIDEMIA, UNSPECIFIED HYPERLIPIDEMIA TYPE: ICD-10-CM

## 2025-04-25 PROBLEM — D06.9 SEVERE CERVICAL DYSPLASIA: Status: RESOLVED | Noted: 2024-04-04 | Resolved: 2025-04-25

## 2025-04-25 RX ORDER — VALSARTAN 320 MG/1
320 TABLET ORAL NIGHTLY
Qty: 100 TABLET | Refills: 1 | Status: SHIPPED | OUTPATIENT
Start: 2025-04-25

## 2025-04-25 RX ORDER — FERROUS SULFATE 325(65) MG
325 TABLET, DELAYED RELEASE (ENTERIC COATED) ORAL 2 TIMES DAILY
Qty: 200 TABLET | Refills: 1 | Status: SHIPPED | OUTPATIENT
Start: 2025-04-25

## 2025-04-25 RX ORDER — POTASSIUM CHLORIDE 1500 MG/1
20 TABLET, EXTENDED RELEASE ORAL DAILY
Qty: 100 TABLET | Refills: 1 | Status: SHIPPED | OUTPATIENT
Start: 2025-04-25

## 2025-04-25 RX ORDER — LORAZEPAM 1 MG/1
1 TABLET ORAL EVERY 8 HOURS PRN
Qty: 90 TABLET | Refills: 5 | Status: SHIPPED | OUTPATIENT
Start: 2025-04-25 | End: 2026-04-26

## 2025-04-25 RX ORDER — PRAVASTATIN SODIUM 20 MG
20 TABLET ORAL NIGHTLY
Qty: 100 TABLET | Refills: 1 | Status: SHIPPED | OUTPATIENT
Start: 2025-04-25

## 2025-04-25 RX ORDER — ESCITALOPRAM OXALATE 20 MG/1
20 TABLET ORAL NIGHTLY
Qty: 100 TABLET | Refills: 1 | Status: SHIPPED | OUTPATIENT
Start: 2025-04-25

## 2025-04-25 SDOH — ECONOMIC STABILITY: FOOD INSECURITY: WITHIN THE PAST 12 MONTHS, THE FOOD YOU BOUGHT JUST DIDN'T LAST AND YOU DIDN'T HAVE MONEY TO GET MORE.: NEVER TRUE

## 2025-04-25 SDOH — ECONOMIC STABILITY: INCOME INSECURITY: IN THE LAST 12 MONTHS, WAS THERE A TIME WHEN YOU WERE NOT ABLE TO PAY THE MORTGAGE OR RENT ON TIME?: NO

## 2025-04-25 SDOH — ECONOMIC STABILITY: FOOD INSECURITY: WITHIN THE PAST 12 MONTHS, YOU WORRIED THAT YOUR FOOD WOULD RUN OUT BEFORE YOU GOT MONEY TO BUY MORE.: NEVER TRUE

## 2025-04-25 SDOH — ECONOMIC STABILITY: TRANSPORTATION INSECURITY
IN THE PAST 12 MONTHS, HAS THE LACK OF TRANSPORTATION KEPT YOU FROM MEDICAL APPOINTMENTS OR FROM GETTING MEDICATIONS?: YES

## 2025-04-25 NOTE — ASSESSMENT & PLAN NOTE
Reviewed ideal body weight and encouraged regular physical activity and healthy diet. Reviewed routine preventative measures such as always wearing seatbelt & home safety measures. Reviewed the recommended immunizations and screenings and they are current and/or updated and/or declined. Reviewed personalized prevention plan which is current and/or updated and a list of screening services available to patient. Provided end of life counseling as appropriate and recommended patient discuss with family their wishes for end of life decisions if they have not already done so.

## 2025-04-25 NOTE — PROGRESS NOTES
[place] [address]  Phone: [phone]  Fax: [phone]  Email: [email or web address]      Encounter Info  Lola Chapin; Established patient 50 y.o.female; seen 4/25/2025 for: Hypertension, Anxiety, infected tooth (Pt has procedure w/ her dentist and he told her she would need to go to her pcp for abx), and GI      Assessment & Plan    1. GAEB (generalized anxiety disorder)  -     escitalopram (LEXAPRO) 20 MG tablet; Take 1 tablet by mouth at bedtime, Disp-100 tablet, R-1Normal  -     LORazepam (ATIVAN) 1 MG tablet; Take 1 tablet by mouth every 8 hours as needed for Anxiety., Disp-90 tablet, R-5Normal  2. Iron deficiency anemia, unspecified iron deficiency anemia type  -     ferrous sulfate (FE TABS 325) 325 (65 Fe) MG EC tablet; Take 1 tablet by mouth 2 times daily, Disp-200 tablet, R-1Normal  3. Hypokalemia  -     potassium chloride (KLOR-CON M) 20 MEQ extended release tablet; Take 1 tablet by mouth daily, Disp-100 tablet, R-1Normal  4. Hyperlipidemia, unspecified hyperlipidemia type  -     pravastatin (PRAVACHOL) 20 MG tablet; Take 1 tablet by mouth at bedtime, Disp-100 tablet, R-1Normal  5. Spastic hemiplegia affecting dominant side (HCC)  -     tiZANidine (ZANAFLEX) 4 MG tablet; Take 1 tablet by mouth 3 times daily, Disp-300 tablet, R-1Normal  6. Hypertension, unspecified type  -     valsartan (DIOVAN) 320 MG tablet; Take 1 tablet by mouth nightly, Disp-100 tablet, R-1Normal  7. Osteoarthritis of multiple joints, unspecified osteoarthritis type  -     diclofenac sodium (VOLTAREN) 1 % GEL; Apply 2 g topically 3 times daily as needed for Pain, Topical, 3 TIMES DAILY PRN Starting Fri 4/25/2025, Disp-100 g, R-5, Normal  8. Abnormal LFTs (liver function tests)  9. IGT (impaired glucose tolerance)  10. Colon cancer screening  -     Cologuard (Fecal DNA Colorectal Cancer Screening)  11. Screening mammogram, encounter for  -     [facility] TYRONE DIGITAL SCREEN BILATERAL;

## 2025-08-23 ENCOUNTER — OFFICE VISIT (OUTPATIENT)
Dept: SLEEP MEDICINE | Age: 50
End: 2025-08-23
Payer: MEDICARE

## 2025-08-23 VITALS
DIASTOLIC BLOOD PRESSURE: 76 MMHG | HEART RATE: 75 BPM | RESPIRATION RATE: 14 BRPM | WEIGHT: 171 LBS | SYSTOLIC BLOOD PRESSURE: 122 MMHG | BODY MASS INDEX: 34.47 KG/M2 | HEIGHT: 59 IN | OXYGEN SATURATION: 97 %

## 2025-08-23 DIAGNOSIS — G47.33 OSA (OBSTRUCTIVE SLEEP APNEA): Primary | ICD-10-CM

## 2025-08-23 DIAGNOSIS — E66.811 OBESITY (BMI 30.0-34.9): ICD-10-CM

## 2025-08-23 PROCEDURE — 99213 OFFICE O/P EST LOW 20 MIN: CPT | Performed by: NURSE PRACTITIONER

## 2025-08-23 PROCEDURE — G2211 COMPLEX E/M VISIT ADD ON: HCPCS | Performed by: NURSE PRACTITIONER

## 2025-08-23 PROCEDURE — G8427 DOCREV CUR MEDS BY ELIG CLIN: HCPCS | Performed by: NURSE PRACTITIONER

## 2025-08-23 PROCEDURE — G8417 CALC BMI ABV UP PARAM F/U: HCPCS | Performed by: NURSE PRACTITIONER

## 2025-08-23 PROCEDURE — 3078F DIAST BP <80 MM HG: CPT | Performed by: NURSE PRACTITIONER

## 2025-08-23 PROCEDURE — 1036F TOBACCO NON-USER: CPT | Performed by: NURSE PRACTITIONER

## 2025-08-23 PROCEDURE — 3017F COLORECTAL CA SCREEN DOC REV: CPT | Performed by: NURSE PRACTITIONER

## 2025-08-23 PROCEDURE — 3074F SYST BP LT 130 MM HG: CPT | Performed by: NURSE PRACTITIONER

## 2025-08-23 ASSESSMENT — SLEEP AND FATIGUE QUESTIONNAIRES
HOW LIKELY ARE YOU TO NOD OFF OR FALL ASLEEP WHILE LYING DOWN TO REST IN THE AFTERNOON WHEN CIRCUMSTANCES PERMIT: SLIGHT CHANCE OF DOZING
HOW LIKELY ARE YOU TO NOD OFF OR FALL ASLEEP WHILE SITTING QUIETLY AFTER LUNCH WITHOUT ALCOHOL: WOULD NEVER DOZE
ESS TOTAL SCORE: 2
HOW LIKELY ARE YOU TO NOD OFF OR FALL ASLEEP WHILE WATCHING TV: SLIGHT CHANCE OF DOZING
HOW LIKELY ARE YOU TO NOD OFF OR FALL ASLEEP WHILE SITTING INACTIVE IN A PUBLIC PLACE: WOULD NEVER DOZE
HOW LIKELY ARE YOU TO NOD OFF OR FALL ASLEEP WHILE SITTING AND TALKING TO SOMEONE: WOULD NEVER DOZE
HOW LIKELY ARE YOU TO NOD OFF OR FALL ASLEEP IN A CAR, WHILE STOPPED FOR A FEW MINUTES IN TRAFFIC: WOULD NEVER DOZE
HOW LIKELY ARE YOU TO NOD OFF OR FALL ASLEEP WHILE SITTING AND READING: WOULD NEVER DOZE
HOW LIKELY ARE YOU TO NOD OFF OR FALL ASLEEP WHEN YOU ARE A PASSENGER IN A CAR FOR AN HOUR WITHOUT A BREAK: WOULD NEVER DOZE

## (undated) DEVICE — SUREFIT, DUAL DISPERSIVE ELECTRODE, CONTACT QUALITY MONITOR: Brand: SUREFIT

## (undated) DEVICE — INTENDED FOR TISSUE SEPARATION, AND OTHER PROCEDURES THAT REQUIRE A SHARP SURGICAL BLADE TO PUNCTURE OR CUT.: Brand: BARD-PARKER ® STAINLESS STEEL BLADES

## (undated) DEVICE — PAD,NON-ADHERENT,3X8,STERILE,LF,1/PK: Brand: MEDLINE

## (undated) DEVICE — ELECTRODE BALL DIA5MM TUNGSTEN LLETZ DURABLE RESIST

## (undated) DEVICE — GLOVE SURG SZ 6 THK91MIL LTX FREE SYN POLYISOPRENE ANTI

## (undated) DEVICE — LITHOTOMY: Brand: MEDLINE INDUSTRIES, INC.

## (undated) DEVICE — GARMENT,MEDLINE,DVT,INT,CALF,MED, GEN2: Brand: MEDLINE

## (undated) DEVICE — SOLUTION IRRIG 1000ML 0.9% SOD CHL USP POUR PLAS BTL

## (undated) DEVICE — CANISTER, RIGID, 2000CC: Brand: MEDLINE INDUSTRIES, INC.

## (undated) DEVICE — APPLICATOR SWAB L16IN RAYON POLYPR TIP SHFT PROCTOSCOPIC